# Patient Record
Sex: FEMALE | Race: ASIAN | NOT HISPANIC OR LATINO | Employment: UNEMPLOYED | URBAN - METROPOLITAN AREA
[De-identification: names, ages, dates, MRNs, and addresses within clinical notes are randomized per-mention and may not be internally consistent; named-entity substitution may affect disease eponyms.]

---

## 2017-01-05 ENCOUNTER — COMMUNICATION - HEALTHEAST (OUTPATIENT)
Dept: FAMILY MEDICINE | Facility: CLINIC | Age: 77
End: 2017-01-05

## 2017-01-05 DIAGNOSIS — M54.50 LOW BACK PAIN: ICD-10-CM

## 2017-01-17 ENCOUNTER — RECORDS - HEALTHEAST (OUTPATIENT)
Dept: ADMINISTRATIVE | Facility: OTHER | Age: 77
End: 2017-01-17

## 2017-01-17 ENCOUNTER — AMBULATORY - HEALTHEAST (OUTPATIENT)
Dept: FAMILY MEDICINE | Facility: CLINIC | Age: 77
End: 2017-01-17

## 2017-01-17 ENCOUNTER — OFFICE VISIT - HEALTHEAST (OUTPATIENT)
Dept: FAMILY MEDICINE | Facility: CLINIC | Age: 77
End: 2017-01-17

## 2017-01-17 DIAGNOSIS — G89.29 OTHER CHRONIC PAIN: ICD-10-CM

## 2017-01-17 DIAGNOSIS — K59.00 CONSTIPATION: ICD-10-CM

## 2017-01-17 DIAGNOSIS — E55.9 VITAMIN D DEFICIENCY: ICD-10-CM

## 2017-01-17 ASSESSMENT — MIFFLIN-ST. JEOR: SCORE: 942.63

## 2017-01-28 ENCOUNTER — COMMUNICATION - HEALTHEAST (OUTPATIENT)
Dept: PALLIATIVE MEDICINE | Facility: OTHER | Age: 77
End: 2017-01-28

## 2017-01-28 DIAGNOSIS — G89.4 CHRONIC PAIN SYNDROME: ICD-10-CM

## 2017-02-01 ENCOUNTER — HOSPITAL ENCOUNTER (OUTPATIENT)
Dept: PALLIATIVE MEDICINE | Facility: OTHER | Age: 77
Discharge: HOME | End: 2017-02-01
Attending: PHYSICIAN ASSISTANT

## 2017-02-01 ENCOUNTER — COMMUNICATION - HEALTHEAST (OUTPATIENT)
Dept: FAMILY MEDICINE | Facility: CLINIC | Age: 77
End: 2017-02-01

## 2017-02-01 DIAGNOSIS — M53.3 SACROILIAC JOINT PAIN: ICD-10-CM

## 2017-02-01 DIAGNOSIS — M79.602 BILATERAL ARM PAIN: ICD-10-CM

## 2017-02-01 DIAGNOSIS — K59.03 DRUG-INDUCED CONSTIPATION: ICD-10-CM

## 2017-02-01 DIAGNOSIS — G89.4 CHRONIC PAIN SYNDROME: ICD-10-CM

## 2017-02-01 DIAGNOSIS — M79.601 BILATERAL ARM PAIN: ICD-10-CM

## 2017-02-01 ASSESSMENT — MIFFLIN-ST. JEOR: SCORE: 942.63

## 2017-02-17 ENCOUNTER — COMMUNICATION - HEALTHEAST (OUTPATIENT)
Dept: PALLIATIVE MEDICINE | Facility: OTHER | Age: 77
End: 2017-02-17

## 2017-02-20 ENCOUNTER — HOSPITAL ENCOUNTER (OUTPATIENT)
Dept: PALLIATIVE MEDICINE | Facility: OTHER | Age: 77
Discharge: HOME | End: 2017-02-20
Attending: PHYSICIAN ASSISTANT

## 2017-02-20 DIAGNOSIS — G89.29 CHRONIC LEFT SI JOINT PAIN: ICD-10-CM

## 2017-02-20 DIAGNOSIS — M53.3 CHRONIC LEFT SI JOINT PAIN: ICD-10-CM

## 2017-02-20 ASSESSMENT — MIFFLIN-ST. JEOR: SCORE: 942.63

## 2017-02-21 ENCOUNTER — COMMUNICATION - HEALTHEAST (OUTPATIENT)
Dept: PALLIATIVE MEDICINE | Facility: OTHER | Age: 77
End: 2017-02-21

## 2017-02-22 ENCOUNTER — COMMUNICATION - HEALTHEAST (OUTPATIENT)
Dept: FAMILY MEDICINE | Facility: CLINIC | Age: 77
End: 2017-02-22

## 2017-02-22 DIAGNOSIS — K21.9 ESOPHAGEAL REFLUX: ICD-10-CM

## 2017-03-01 ENCOUNTER — COMMUNICATION - HEALTHEAST (OUTPATIENT)
Dept: PALLIATIVE MEDICINE | Facility: OTHER | Age: 77
End: 2017-03-01

## 2017-03-01 DIAGNOSIS — G89.4 CHRONIC PAIN SYNDROME: ICD-10-CM

## 2017-03-07 ENCOUNTER — COMMUNICATION - HEALTHEAST (OUTPATIENT)
Dept: NURSING | Facility: CLINIC | Age: 77
End: 2017-03-07

## 2017-03-08 ENCOUNTER — COMMUNICATION - HEALTHEAST (OUTPATIENT)
Dept: PALLIATIVE MEDICINE | Facility: OTHER | Age: 77
End: 2017-03-08

## 2017-03-08 ENCOUNTER — HOSPITAL ENCOUNTER (OUTPATIENT)
Dept: PALLIATIVE MEDICINE | Facility: OTHER | Age: 77
Discharge: HOME | End: 2017-03-08
Attending: PHYSICIAN ASSISTANT

## 2017-03-08 DIAGNOSIS — M71.9 DISORDER OF BURSAE AND TENDONS IN SHOULDER REGION: ICD-10-CM

## 2017-03-08 DIAGNOSIS — G89.4 CHRONIC PAIN SYNDROME: ICD-10-CM

## 2017-03-08 DIAGNOSIS — M67.919 DISORDER OF BURSAE AND TENDONS IN SHOULDER REGION: ICD-10-CM

## 2017-03-08 DIAGNOSIS — M25.562 CHRONIC PAIN OF BOTH KNEES: ICD-10-CM

## 2017-03-08 DIAGNOSIS — M54.50 LOW BACK PAIN: ICD-10-CM

## 2017-03-08 DIAGNOSIS — G89.29 CHRONIC PAIN OF BOTH KNEES: ICD-10-CM

## 2017-03-08 DIAGNOSIS — M25.561 CHRONIC PAIN OF BOTH KNEES: ICD-10-CM

## 2017-03-08 ASSESSMENT — MIFFLIN-ST. JEOR: SCORE: 942.63

## 2017-03-13 ENCOUNTER — COMMUNICATION - HEALTHEAST (OUTPATIENT)
Dept: PALLIATIVE MEDICINE | Facility: OTHER | Age: 77
End: 2017-03-13

## 2017-03-14 ENCOUNTER — HOSPITAL ENCOUNTER (OUTPATIENT)
Dept: PALLIATIVE MEDICINE | Facility: OTHER | Age: 77
Discharge: HOME | End: 2017-03-14
Attending: PAIN MEDICINE

## 2017-03-14 DIAGNOSIS — M79.18 MYOFASCIAL PAIN: ICD-10-CM

## 2017-03-14 ASSESSMENT — MIFFLIN-ST. JEOR: SCORE: 942.63

## 2017-03-15 ENCOUNTER — COMMUNICATION - HEALTHEAST (OUTPATIENT)
Dept: FAMILY MEDICINE | Facility: CLINIC | Age: 77
End: 2017-03-15

## 2017-03-15 ENCOUNTER — COMMUNICATION - HEALTHEAST (OUTPATIENT)
Dept: PALLIATIVE MEDICINE | Facility: OTHER | Age: 77
End: 2017-03-15

## 2017-03-15 DIAGNOSIS — I10 HTN (HYPERTENSION): ICD-10-CM

## 2017-03-20 ENCOUNTER — OFFICE VISIT - HEALTHEAST (OUTPATIENT)
Dept: FAMILY MEDICINE | Facility: CLINIC | Age: 77
End: 2017-03-20

## 2017-03-20 DIAGNOSIS — Z79.899 POLYPHARMACY: ICD-10-CM

## 2017-03-20 DIAGNOSIS — E55.9 VITAMIN D DEFICIENCY: ICD-10-CM

## 2017-03-20 DIAGNOSIS — I10 HTN (HYPERTENSION): ICD-10-CM

## 2017-03-20 DIAGNOSIS — G89.4 CHRONIC PAIN SYNDROME: ICD-10-CM

## 2017-03-20 ASSESSMENT — MIFFLIN-ST. JEOR: SCORE: 954.87

## 2017-03-21 ENCOUNTER — RECORDS - HEALTHEAST (OUTPATIENT)
Dept: ADMINISTRATIVE | Facility: OTHER | Age: 77
End: 2017-03-21

## 2017-03-28 ENCOUNTER — COMMUNICATION - HEALTHEAST (OUTPATIENT)
Dept: FAMILY MEDICINE | Facility: CLINIC | Age: 77
End: 2017-03-28

## 2017-03-30 ENCOUNTER — OFFICE VISIT - HEALTHEAST (OUTPATIENT)
Dept: FAMILY MEDICINE | Facility: CLINIC | Age: 77
End: 2017-03-30

## 2017-03-30 DIAGNOSIS — I10 HTN (HYPERTENSION): ICD-10-CM

## 2017-03-30 DIAGNOSIS — M81.0 OSTEOPOROSIS: ICD-10-CM

## 2017-03-30 DIAGNOSIS — G89.4 CHRONIC PAIN SYNDROME: ICD-10-CM

## 2017-03-30 DIAGNOSIS — Z79.899 POLYPHARMACY: ICD-10-CM

## 2017-03-30 ASSESSMENT — MIFFLIN-ST. JEOR: SCORE: 950.9

## 2017-04-06 ENCOUNTER — OFFICE VISIT - HEALTHEAST (OUTPATIENT)
Dept: NURSING | Facility: CLINIC | Age: 77
End: 2017-04-06

## 2017-04-06 DIAGNOSIS — M35.3 PMR (POLYMYALGIA RHEUMATICA) (H): ICD-10-CM

## 2017-04-06 DIAGNOSIS — G89.29 OTHER CHRONIC PAIN: ICD-10-CM

## 2017-04-06 DIAGNOSIS — Z79.52 CURRENT CHRONIC USE OF SYSTEMIC STEROIDS: ICD-10-CM

## 2017-04-06 DIAGNOSIS — I10 ESSENTIAL HYPERTENSION WITH GOAL BLOOD PRESSURE LESS THAN 140/90: ICD-10-CM

## 2017-04-06 DIAGNOSIS — Z79.899 POLYPHARMACY: ICD-10-CM

## 2017-04-19 ENCOUNTER — COMMUNICATION - HEALTHEAST (OUTPATIENT)
Dept: FAMILY MEDICINE | Facility: CLINIC | Age: 77
End: 2017-04-19

## 2017-04-19 DIAGNOSIS — G89.4 CHRONIC PAIN SYNDROME: ICD-10-CM

## 2017-04-20 ENCOUNTER — COMMUNICATION - HEALTHEAST (OUTPATIENT)
Dept: NURSING | Facility: CLINIC | Age: 77
End: 2017-04-20

## 2017-04-24 ENCOUNTER — RECORDS - HEALTHEAST (OUTPATIENT)
Dept: ADMINISTRATIVE | Facility: OTHER | Age: 77
End: 2017-04-24

## 2017-04-27 ENCOUNTER — OFFICE VISIT - HEALTHEAST (OUTPATIENT)
Dept: FAMILY MEDICINE | Facility: CLINIC | Age: 77
End: 2017-04-27

## 2017-04-27 DIAGNOSIS — M48.00 SPINAL STENOSIS, MULTILEVEL: ICD-10-CM

## 2017-04-27 DIAGNOSIS — M81.0 OSTEOPOROSIS: ICD-10-CM

## 2017-04-27 DIAGNOSIS — M50.30 DEGENERATIVE DISC DISEASE, CERVICAL: ICD-10-CM

## 2017-04-27 DIAGNOSIS — M47.819 SPONDYLARTHRITIS: ICD-10-CM

## 2017-04-27 DIAGNOSIS — I10 HTN (HYPERTENSION): ICD-10-CM

## 2017-04-27 DIAGNOSIS — Z79.899 POLYPHARMACY: ICD-10-CM

## 2017-04-27 DIAGNOSIS — M50.30 DISC DISEASE, DEGENERATIVE, CERVICAL: ICD-10-CM

## 2017-04-27 DIAGNOSIS — G89.4 CHRONIC PAIN SYNDROME: ICD-10-CM

## 2017-04-27 DIAGNOSIS — E55.9 VITAMIN D DEFICIENCY: ICD-10-CM

## 2017-04-27 ASSESSMENT — MIFFLIN-ST. JEOR: SCORE: 953.68

## 2017-05-02 ENCOUNTER — HOSPITAL ENCOUNTER (OUTPATIENT)
Dept: PALLIATIVE MEDICINE | Facility: OTHER | Age: 77
Discharge: HOME | End: 2017-05-02
Attending: PHYSICIAN ASSISTANT

## 2017-05-02 DIAGNOSIS — M54.50 LOW BACK PAIN: ICD-10-CM

## 2017-05-02 DIAGNOSIS — G89.4 CHRONIC PAIN SYNDROME: ICD-10-CM

## 2017-05-02 DIAGNOSIS — M35.3 PMR (POLYMYALGIA RHEUMATICA) (H): ICD-10-CM

## 2017-05-02 DIAGNOSIS — F11.20 OPIOID DEPENDENCE, EPISODIC (H): ICD-10-CM

## 2017-05-02 ASSESSMENT — MIFFLIN-ST. JEOR: SCORE: 948.29

## 2017-05-04 ENCOUNTER — RECORDS - HEALTHEAST (OUTPATIENT)
Dept: BONE DENSITY | Facility: CLINIC | Age: 77
End: 2017-05-04

## 2017-05-04 ENCOUNTER — COMMUNICATION - HEALTHEAST (OUTPATIENT)
Dept: PALLIATIVE MEDICINE | Facility: OTHER | Age: 77
End: 2017-05-04

## 2017-05-04 ENCOUNTER — RECORDS - HEALTHEAST (OUTPATIENT)
Dept: ADMINISTRATIVE | Facility: OTHER | Age: 77
End: 2017-05-04

## 2017-05-04 DIAGNOSIS — M81.0 AGE-RELATED OSTEOPOROSIS WITHOUT CURRENT PATHOLOGICAL FRACTURE: ICD-10-CM

## 2017-05-08 ENCOUNTER — HOSPITAL ENCOUNTER (OUTPATIENT)
Dept: PALLIATIVE MEDICINE | Facility: OTHER | Age: 77
Discharge: HOME | End: 2017-05-08
Attending: PAIN MEDICINE

## 2017-05-08 ENCOUNTER — RECORDS - HEALTHEAST (OUTPATIENT)
Dept: ADMINISTRATIVE | Facility: OTHER | Age: 77
End: 2017-05-08

## 2017-05-08 DIAGNOSIS — M79.18 MYOFASCIAL PAIN: ICD-10-CM

## 2017-05-08 ASSESSMENT — MIFFLIN-ST. JEOR: SCORE: 948.29

## 2017-05-16 ENCOUNTER — COMMUNICATION - HEALTHEAST (OUTPATIENT)
Dept: FAMILY MEDICINE | Facility: CLINIC | Age: 77
End: 2017-05-16

## 2017-06-01 ENCOUNTER — OFFICE VISIT - HEALTHEAST (OUTPATIENT)
Dept: FAMILY MEDICINE | Facility: CLINIC | Age: 77
End: 2017-06-01

## 2017-06-01 DIAGNOSIS — G89.4 CHRONIC PAIN SYNDROME: ICD-10-CM

## 2017-06-01 DIAGNOSIS — E55.9 VITAMIN D DEFICIENCY: ICD-10-CM

## 2017-06-01 DIAGNOSIS — I10 ESSENTIAL HYPERTENSION WITH GOAL BLOOD PRESSURE LESS THAN 140/90: ICD-10-CM

## 2017-06-01 DIAGNOSIS — M35.3 PMR (POLYMYALGIA RHEUMATICA) (H): ICD-10-CM

## 2017-06-01 DIAGNOSIS — Z79.899 MEDICATION DOSE DECREASED: ICD-10-CM

## 2017-06-01 DIAGNOSIS — Z09 HOSPITAL DISCHARGE FOLLOW-UP: ICD-10-CM

## 2017-06-01 DIAGNOSIS — I10 HTN (HYPERTENSION): ICD-10-CM

## 2017-06-01 DIAGNOSIS — Z79.899 POLYPHARMACY: ICD-10-CM

## 2017-06-01 DIAGNOSIS — D64.9 ANEMIA: ICD-10-CM

## 2017-06-01 ASSESSMENT — MIFFLIN-ST. JEOR: SCORE: 947.16

## 2017-06-05 ENCOUNTER — AMBULATORY - HEALTHEAST (OUTPATIENT)
Dept: FAMILY MEDICINE | Facility: CLINIC | Age: 77
End: 2017-06-05

## 2017-06-05 DIAGNOSIS — E55.9 VITAMIN D DEFICIENCY: ICD-10-CM

## 2017-06-28 ENCOUNTER — HOSPITAL ENCOUNTER (OUTPATIENT)
Dept: PALLIATIVE MEDICINE | Facility: OTHER | Age: 77
Discharge: HOME | End: 2017-06-28
Attending: PHYSICIAN ASSISTANT

## 2017-06-28 DIAGNOSIS — G89.4 CHRONIC PAIN SYNDROME: ICD-10-CM

## 2017-06-28 DIAGNOSIS — M35.3 POLYMYALGIA RHEUMATICA (H): ICD-10-CM

## 2017-06-28 DIAGNOSIS — F11.20 OPIOID DEPENDENCE, EPISODIC (H): ICD-10-CM

## 2017-06-28 DIAGNOSIS — M51.369 DEGENERATION OF LUMBAR INTERVERTEBRAL DISC: ICD-10-CM

## 2017-06-28 ASSESSMENT — MIFFLIN-ST. JEOR: SCORE: 944.89

## 2017-07-13 ENCOUNTER — OFFICE VISIT - HEALTHEAST (OUTPATIENT)
Dept: FAMILY MEDICINE | Facility: CLINIC | Age: 77
End: 2017-07-13

## 2017-07-13 DIAGNOSIS — M35.3 PMR (POLYMYALGIA RHEUMATICA) (H): ICD-10-CM

## 2017-07-13 DIAGNOSIS — M81.0 OSTEOPOROSIS: ICD-10-CM

## 2017-07-13 DIAGNOSIS — G89.4 CHRONIC PAIN SYNDROME: ICD-10-CM

## 2017-07-13 DIAGNOSIS — B36.9 FUNGAL SKIN INFECTION: ICD-10-CM

## 2017-07-13 DIAGNOSIS — I10 HTN (HYPERTENSION): ICD-10-CM

## 2017-07-13 DIAGNOSIS — E55.9 VITAMIN D DEFICIENCY: ICD-10-CM

## 2017-07-13 DIAGNOSIS — I10 ESSENTIAL HYPERTENSION WITH GOAL BLOOD PRESSURE LESS THAN 140/90: ICD-10-CM

## 2017-07-13 ASSESSMENT — MIFFLIN-ST. JEOR: SCORE: 955.44

## 2017-07-17 ENCOUNTER — RECORDS - HEALTHEAST (OUTPATIENT)
Dept: ADMINISTRATIVE | Facility: OTHER | Age: 77
End: 2017-07-17

## 2017-08-08 ENCOUNTER — OFFICE VISIT - HEALTHEAST (OUTPATIENT)
Dept: FAMILY MEDICINE | Facility: CLINIC | Age: 77
End: 2017-08-08

## 2017-08-08 DIAGNOSIS — E55.9 VITAMIN D DEFICIENCY: ICD-10-CM

## 2017-08-08 DIAGNOSIS — Z79.899 POLYPHARMACY: ICD-10-CM

## 2017-08-08 DIAGNOSIS — G89.4 CHRONIC PAIN SYNDROME: ICD-10-CM

## 2017-08-08 DIAGNOSIS — B36.9 FUNGAL SKIN INFECTION: ICD-10-CM

## 2017-08-08 DIAGNOSIS — G47.30 BREATHING-RELATED SLEEP DISORDER: ICD-10-CM

## 2017-08-08 DIAGNOSIS — M81.0 OSTEOPOROSIS: ICD-10-CM

## 2017-08-08 DIAGNOSIS — M25.511 SHOULDER PAIN, RIGHT: ICD-10-CM

## 2017-08-08 DIAGNOSIS — I10 HTN (HYPERTENSION): ICD-10-CM

## 2017-08-08 ASSESSMENT — MIFFLIN-ST. JEOR: SCORE: 954.87

## 2017-08-14 ENCOUNTER — HOSPITAL ENCOUNTER (OUTPATIENT)
Dept: PALLIATIVE MEDICINE | Facility: OTHER | Age: 77
Discharge: HOME | End: 2017-08-14
Attending: PAIN MEDICINE

## 2017-08-14 DIAGNOSIS — M25.511 SHOULDER PAIN, BILATERAL: ICD-10-CM

## 2017-08-14 DIAGNOSIS — M79.18 MYOFASCIAL PAIN: ICD-10-CM

## 2017-08-14 DIAGNOSIS — M25.512 SHOULDER PAIN, BILATERAL: ICD-10-CM

## 2017-08-14 ASSESSMENT — MIFFLIN-ST. JEOR: SCORE: 951.7

## 2017-08-15 ENCOUNTER — COMMUNICATION - HEALTHEAST (OUTPATIENT)
Dept: PALLIATIVE MEDICINE | Facility: OTHER | Age: 77
End: 2017-08-15

## 2017-08-16 ENCOUNTER — COMMUNICATION - HEALTHEAST (OUTPATIENT)
Dept: FAMILY MEDICINE | Facility: CLINIC | Age: 77
End: 2017-08-16

## 2017-08-16 DIAGNOSIS — K21.00 REFLUX ESOPHAGITIS: ICD-10-CM

## 2017-08-23 ENCOUNTER — COMMUNICATION - HEALTHEAST (OUTPATIENT)
Dept: FAMILY MEDICINE | Facility: CLINIC | Age: 77
End: 2017-08-23

## 2017-09-19 ENCOUNTER — RECORDS - HEALTHEAST (OUTPATIENT)
Dept: ADMINISTRATIVE | Facility: OTHER | Age: 77
End: 2017-09-19

## 2017-09-19 ENCOUNTER — OFFICE VISIT - HEALTHEAST (OUTPATIENT)
Dept: FAMILY MEDICINE | Facility: CLINIC | Age: 77
End: 2017-09-19

## 2017-09-19 ENCOUNTER — RECORDS - HEALTHEAST (OUTPATIENT)
Dept: GENERAL RADIOLOGY | Facility: CLINIC | Age: 77
End: 2017-09-19

## 2017-09-19 ENCOUNTER — COMMUNICATION - HEALTHEAST (OUTPATIENT)
Dept: FAMILY MEDICINE | Facility: CLINIC | Age: 77
End: 2017-09-19

## 2017-09-19 DIAGNOSIS — M25.511 SHOULDER PAIN, RIGHT: ICD-10-CM

## 2017-09-19 DIAGNOSIS — R06.09 DOE (DYSPNEA ON EXERTION): ICD-10-CM

## 2017-09-19 DIAGNOSIS — G47.01 INSOMNIA DUE TO MEDICAL CONDITION: ICD-10-CM

## 2017-09-19 DIAGNOSIS — R06.09 OTHER FORMS OF DYSPNEA: ICD-10-CM

## 2017-09-19 DIAGNOSIS — R06.02 SHORTNESS OF BREATH: ICD-10-CM

## 2017-09-19 DIAGNOSIS — R09.89 OTHER SPECIFIED SYMPTOMS AND SIGNS INVOLVING THE CIRCULATORY AND RESPIRATORY SYSTEMS: ICD-10-CM

## 2017-09-19 DIAGNOSIS — R06.02 SOB (SHORTNESS OF BREATH): ICD-10-CM

## 2017-09-19 DIAGNOSIS — Z23 NEED FOR IMMUNIZATION AGAINST INFLUENZA: ICD-10-CM

## 2017-09-19 DIAGNOSIS — R09.89 BIBASILAR CRACKLES: ICD-10-CM

## 2017-09-19 DIAGNOSIS — I10 HTN (HYPERTENSION): ICD-10-CM

## 2017-09-19 ASSESSMENT — MIFFLIN-ST. JEOR: SCORE: 931.74

## 2017-09-21 ENCOUNTER — COMMUNICATION - HEALTHEAST (OUTPATIENT)
Dept: FAMILY MEDICINE | Facility: CLINIC | Age: 77
End: 2017-09-21

## 2017-09-21 DIAGNOSIS — I10 HTN (HYPERTENSION): ICD-10-CM

## 2017-09-21 DIAGNOSIS — K59.00 CONSTIPATION: ICD-10-CM

## 2017-10-13 ENCOUNTER — COMMUNICATION - HEALTHEAST (OUTPATIENT)
Dept: FAMILY MEDICINE | Facility: CLINIC | Age: 77
End: 2017-10-13

## 2017-10-16 ENCOUNTER — OFFICE VISIT - HEALTHEAST (OUTPATIENT)
Dept: FAMILY MEDICINE | Facility: CLINIC | Age: 77
End: 2017-10-16

## 2017-10-16 DIAGNOSIS — I10 HTN (HYPERTENSION): ICD-10-CM

## 2017-10-16 DIAGNOSIS — R06.02 SOB (SHORTNESS OF BREATH): ICD-10-CM

## 2017-10-16 DIAGNOSIS — A04.72 C. DIFFICILE DIARRHEA: ICD-10-CM

## 2017-10-16 DIAGNOSIS — Z09 HOSPITAL DISCHARGE FOLLOW-UP: ICD-10-CM

## 2017-10-16 DIAGNOSIS — M25.511 SHOULDER PAIN, RIGHT: ICD-10-CM

## 2017-10-16 DIAGNOSIS — J18.9 CAP (COMMUNITY ACQUIRED PNEUMONIA): ICD-10-CM

## 2017-10-16 ASSESSMENT — MIFFLIN-ST. JEOR: SCORE: 939

## 2017-10-19 ENCOUNTER — COMMUNICATION - HEALTHEAST (OUTPATIENT)
Dept: NURSING | Facility: CLINIC | Age: 77
End: 2017-10-19

## 2017-10-19 ENCOUNTER — AMBULATORY - HEALTHEAST (OUTPATIENT)
Dept: FAMILY MEDICINE | Facility: CLINIC | Age: 77
End: 2017-10-19

## 2017-10-19 ENCOUNTER — COMMUNICATION - HEALTHEAST (OUTPATIENT)
Dept: FAMILY MEDICINE | Facility: CLINIC | Age: 77
End: 2017-10-19

## 2017-10-19 DIAGNOSIS — L29.9 ITCHING: ICD-10-CM

## 2017-10-23 ENCOUNTER — COMMUNICATION - HEALTHEAST (OUTPATIENT)
Dept: FAMILY MEDICINE | Facility: CLINIC | Age: 77
End: 2017-10-23

## 2017-10-23 DIAGNOSIS — G89.4 CHRONIC PAIN SYNDROME: ICD-10-CM

## 2017-10-25 ENCOUNTER — AMBULATORY - HEALTHEAST (OUTPATIENT)
Dept: CARE COORDINATION | Facility: CLINIC | Age: 77
End: 2017-10-25

## 2017-11-01 ENCOUNTER — OFFICE VISIT - HEALTHEAST (OUTPATIENT)
Dept: PULMONOLOGY | Facility: OTHER | Age: 77
End: 2017-11-01

## 2017-11-01 DIAGNOSIS — J98.4 RESTRICTIVE LUNG DISEASE: ICD-10-CM

## 2017-11-09 ENCOUNTER — OFFICE VISIT - HEALTHEAST (OUTPATIENT)
Dept: FAMILY MEDICINE | Facility: CLINIC | Age: 77
End: 2017-11-09

## 2017-11-09 DIAGNOSIS — I10 HTN (HYPERTENSION): ICD-10-CM

## 2017-11-09 DIAGNOSIS — Z79.899 POLYPHARMACY: ICD-10-CM

## 2017-11-09 DIAGNOSIS — J98.4 RESTRICTIVE LUNG DISEASE: ICD-10-CM

## 2017-11-09 DIAGNOSIS — A04.72 C. DIFFICILE DIARRHEA: ICD-10-CM

## 2017-11-09 DIAGNOSIS — G89.4 CHRONIC PAIN SYNDROME: ICD-10-CM

## 2017-11-09 DIAGNOSIS — Z29.89 NEED FOR MALARIA PROPHYLAXIS: ICD-10-CM

## 2017-11-09 DIAGNOSIS — Z71.84 TRAVEL ADVICE ENCOUNTER: ICD-10-CM

## 2017-11-09 DIAGNOSIS — M25.511 SHOULDER PAIN, RIGHT: ICD-10-CM

## 2017-11-09 ASSESSMENT — MIFFLIN-ST. JEOR: SCORE: 951.7

## 2017-11-20 ENCOUNTER — COMMUNICATION - HEALTHEAST (OUTPATIENT)
Dept: FAMILY MEDICINE | Facility: CLINIC | Age: 77
End: 2017-11-20

## 2017-11-20 ENCOUNTER — RECORDS - HEALTHEAST (OUTPATIENT)
Dept: ADMINISTRATIVE | Facility: OTHER | Age: 77
End: 2017-11-20

## 2017-11-20 DIAGNOSIS — E55.9 VITAMIN D DEFICIENCY: ICD-10-CM

## 2017-12-27 ENCOUNTER — COMMUNICATION - HEALTHEAST (OUTPATIENT)
Dept: FAMILY MEDICINE | Facility: CLINIC | Age: 77
End: 2017-12-27

## 2017-12-27 DIAGNOSIS — G89.29 OTHER CHRONIC PAIN: ICD-10-CM

## 2018-01-11 ENCOUNTER — OFFICE VISIT - HEALTHEAST (OUTPATIENT)
Dept: FAMILY MEDICINE | Facility: CLINIC | Age: 78
End: 2018-01-11

## 2018-01-11 DIAGNOSIS — E55.9 VITAMIN D DEFICIENCY: ICD-10-CM

## 2018-01-11 DIAGNOSIS — G89.4 CHRONIC PAIN SYNDROME: ICD-10-CM

## 2018-01-11 DIAGNOSIS — J98.4 RESTRICTIVE LUNG DISEASE: ICD-10-CM

## 2018-01-11 DIAGNOSIS — M81.0 OSTEOPOROSIS: ICD-10-CM

## 2018-01-11 DIAGNOSIS — I10 HTN (HYPERTENSION): ICD-10-CM

## 2018-01-11 DIAGNOSIS — Z79.899 POLYPHARMACY: ICD-10-CM

## 2018-01-11 DIAGNOSIS — M25.511 SHOULDER PAIN, RIGHT: ICD-10-CM

## 2018-01-11 DIAGNOSIS — R35.0 FREQUENT URINATION: ICD-10-CM

## 2018-01-11 LAB
ALBUMIN UR-MCNC: NEGATIVE MG/DL
APPEARANCE UR: CLEAR
BILIRUB UR QL STRIP: NEGATIVE
COLOR UR AUTO: YELLOW
GLUCOSE UR STRIP-MCNC: NEGATIVE MG/DL
HGB UR QL STRIP: NEGATIVE
KETONES UR STRIP-MCNC: NEGATIVE MG/DL
LEUKOCYTE ESTERASE UR QL STRIP: NEGATIVE
NITRATE UR QL: NEGATIVE
PH UR STRIP: 5 [PH] (ref 5–8)
SP GR UR STRIP: 1.02 (ref 1–1.03)
UROBILINOGEN UR STRIP-ACNC: NORMAL

## 2018-01-11 ASSESSMENT — MIFFLIN-ST. JEOR: SCORE: 942.63

## 2018-01-12 ENCOUNTER — COMMUNICATION - HEALTHEAST (OUTPATIENT)
Dept: NURSING | Facility: CLINIC | Age: 78
End: 2018-01-12

## 2018-02-13 ENCOUNTER — OFFICE VISIT - HEALTHEAST (OUTPATIENT)
Dept: FAMILY MEDICINE | Facility: CLINIC | Age: 78
End: 2018-02-13

## 2018-02-13 DIAGNOSIS — M81.0 OSTEOPOROSIS: ICD-10-CM

## 2018-02-13 DIAGNOSIS — Z79.899 POLYPHARMACY: ICD-10-CM

## 2018-02-13 DIAGNOSIS — E55.9 VITAMIN D DEFICIENCY: ICD-10-CM

## 2018-02-13 DIAGNOSIS — G89.4 CHRONIC PAIN SYNDROME: ICD-10-CM

## 2018-02-13 DIAGNOSIS — I10 HTN (HYPERTENSION): ICD-10-CM

## 2018-02-13 DIAGNOSIS — J98.4 RESTRICTIVE LUNG DISEASE: ICD-10-CM

## 2018-02-13 DIAGNOSIS — M25.511 SHOULDER PAIN, RIGHT: ICD-10-CM

## 2018-02-13 ASSESSMENT — MIFFLIN-ST. JEOR: SCORE: 940.64

## 2018-02-21 ENCOUNTER — COMMUNICATION - HEALTHEAST (OUTPATIENT)
Dept: FAMILY MEDICINE | Facility: CLINIC | Age: 78
End: 2018-02-21

## 2018-02-21 DIAGNOSIS — G89.4 CHRONIC PAIN SYNDROME: ICD-10-CM

## 2018-03-12 ENCOUNTER — OFFICE VISIT - HEALTHEAST (OUTPATIENT)
Dept: FAMILY MEDICINE | Facility: CLINIC | Age: 78
End: 2018-03-12

## 2018-03-12 DIAGNOSIS — M81.0 OSTEOPOROSIS: ICD-10-CM

## 2018-03-12 DIAGNOSIS — J98.4 RESTRICTIVE LUNG DISEASE: ICD-10-CM

## 2018-03-12 DIAGNOSIS — K59.00 CONSTIPATION: ICD-10-CM

## 2018-03-12 DIAGNOSIS — Z79.899 POLYPHARMACY: ICD-10-CM

## 2018-03-12 DIAGNOSIS — I10 HTN (HYPERTENSION): ICD-10-CM

## 2018-03-12 DIAGNOSIS — E55.9 VITAMIN D DEFICIENCY: ICD-10-CM

## 2018-03-12 DIAGNOSIS — G89.4 CHRONIC PAIN SYNDROME: ICD-10-CM

## 2018-03-12 ASSESSMENT — MIFFLIN-ST. JEOR: SCORE: 944.04

## 2018-03-18 ENCOUNTER — COMMUNICATION - HEALTHEAST (OUTPATIENT)
Dept: FAMILY MEDICINE | Facility: CLINIC | Age: 78
End: 2018-03-18

## 2018-03-18 DIAGNOSIS — I10 HTN (HYPERTENSION): ICD-10-CM

## 2018-03-24 ENCOUNTER — COMMUNICATION - HEALTHEAST (OUTPATIENT)
Dept: FAMILY MEDICINE | Facility: CLINIC | Age: 78
End: 2018-03-24

## 2018-03-24 DIAGNOSIS — G89.4 CHRONIC PAIN SYNDROME: ICD-10-CM

## 2018-04-10 ENCOUNTER — OFFICE VISIT - HEALTHEAST (OUTPATIENT)
Dept: FAMILY MEDICINE | Facility: CLINIC | Age: 78
End: 2018-04-10

## 2018-04-10 DIAGNOSIS — K59.00 CONSTIPATION: ICD-10-CM

## 2018-04-10 DIAGNOSIS — D64.9 ANEMIA: ICD-10-CM

## 2018-04-10 DIAGNOSIS — E55.9 VITAMIN D DEFICIENCY: ICD-10-CM

## 2018-04-10 DIAGNOSIS — E87.6 LOW SERUM POTASSIUM: ICD-10-CM

## 2018-04-10 DIAGNOSIS — R79.0 LOW MAGNESIUM LEVEL: ICD-10-CM

## 2018-04-10 DIAGNOSIS — G89.4 CHRONIC PAIN SYNDROME: ICD-10-CM

## 2018-04-10 DIAGNOSIS — M54.9 BACK PAIN: ICD-10-CM

## 2018-04-10 DIAGNOSIS — I10 HTN (HYPERTENSION): ICD-10-CM

## 2018-04-10 DIAGNOSIS — M81.0 OSTEOPOROSIS: ICD-10-CM

## 2018-04-10 DIAGNOSIS — M25.511 SHOULDER PAIN, RIGHT: ICD-10-CM

## 2018-04-10 DIAGNOSIS — M25.569 KNEE PAIN: ICD-10-CM

## 2018-04-10 DIAGNOSIS — E83.39 LOW BLOOD PHOSPHATE: ICD-10-CM

## 2018-04-10 DIAGNOSIS — Z79.899 POLYPHARMACY: ICD-10-CM

## 2018-04-10 DIAGNOSIS — J98.4 RESTRICTIVE LUNG DISEASE: ICD-10-CM

## 2018-04-10 LAB
HGB BLD-MCNC: 11.5 G/DL (ref 12–16)
MAGNESIUM SERPL-MCNC: 2.3 MG/DL (ref 1.8–2.6)
PHOSPHATE SERPL-MCNC: 3.6 MG/DL (ref 2.5–4.5)
POTASSIUM BLD-SCNC: 5 MMOL/L (ref 3.5–5)

## 2018-04-10 ASSESSMENT — MIFFLIN-ST. JEOR: SCORE: 944.44

## 2018-04-11 LAB — 25(OH)D3 SERPL-MCNC: 23.4 NG/ML (ref 30–80)

## 2018-04-16 ENCOUNTER — COMMUNICATION - HEALTHEAST (OUTPATIENT)
Dept: FAMILY MEDICINE | Facility: CLINIC | Age: 78
End: 2018-04-16

## 2018-04-16 DIAGNOSIS — K59.03 DRUG-INDUCED CONSTIPATION: ICD-10-CM

## 2018-04-27 ENCOUNTER — COMMUNICATION - HEALTHEAST (OUTPATIENT)
Dept: FAMILY MEDICINE | Facility: CLINIC | Age: 78
End: 2018-04-27

## 2018-05-08 ENCOUNTER — OFFICE VISIT - HEALTHEAST (OUTPATIENT)
Dept: FAMILY MEDICINE | Facility: CLINIC | Age: 78
End: 2018-05-08

## 2018-05-08 DIAGNOSIS — D72.10 EOSINOPHILIA: ICD-10-CM

## 2018-05-08 DIAGNOSIS — Z87.39 HISTORY OF POLYMYALGIA RHEUMATICA: ICD-10-CM

## 2018-05-08 DIAGNOSIS — I10 HTN (HYPERTENSION): ICD-10-CM

## 2018-05-08 DIAGNOSIS — E55.9 VITAMIN D DEFICIENCY: ICD-10-CM

## 2018-05-08 DIAGNOSIS — M13.0 POLYARTHRITIS: ICD-10-CM

## 2018-05-08 DIAGNOSIS — R06.02 SOB (SHORTNESS OF BREATH): ICD-10-CM

## 2018-05-08 DIAGNOSIS — Z79.899 POLYPHARMACY: ICD-10-CM

## 2018-05-08 DIAGNOSIS — M81.0 OSTEOPOROSIS: ICD-10-CM

## 2018-05-08 DIAGNOSIS — Z87.39 HISTORY OF CONNECTIVE TISSUE DISEASE: ICD-10-CM

## 2018-05-08 DIAGNOSIS — Z09 HOSPITAL DISCHARGE FOLLOW-UP: ICD-10-CM

## 2018-05-08 DIAGNOSIS — G89.4 CHRONIC PAIN SYNDROME: ICD-10-CM

## 2018-05-08 DIAGNOSIS — J98.4 RESTRICTIVE LUNG DISEASE: ICD-10-CM

## 2018-05-08 DIAGNOSIS — M54.9 BACK PAIN: ICD-10-CM

## 2018-05-08 DIAGNOSIS — K59.00 CONSTIPATION: ICD-10-CM

## 2018-05-08 ASSESSMENT — MIFFLIN-ST. JEOR: SCORE: 943.33

## 2018-05-09 ENCOUNTER — AMBULATORY - HEALTHEAST (OUTPATIENT)
Dept: FAMILY MEDICINE | Facility: CLINIC | Age: 78
End: 2018-05-09

## 2018-05-11 ENCOUNTER — COMMUNICATION - HEALTHEAST (OUTPATIENT)
Dept: FAMILY MEDICINE | Facility: CLINIC | Age: 78
End: 2018-05-11

## 2018-05-14 ENCOUNTER — COMMUNICATION - HEALTHEAST (OUTPATIENT)
Dept: PALLIATIVE MEDICINE | Facility: OTHER | Age: 78
End: 2018-05-14

## 2018-05-15 ENCOUNTER — AMBULATORY - HEALTHEAST (OUTPATIENT)
Dept: LAB | Facility: CLINIC | Age: 78
End: 2018-05-15

## 2018-05-15 ENCOUNTER — HOSPITAL ENCOUNTER (OUTPATIENT)
Dept: PALLIATIVE MEDICINE | Facility: OTHER | Age: 78
Discharge: HOME | End: 2018-05-15
Attending: PAIN MEDICINE | Admitting: PAIN MEDICINE

## 2018-05-15 DIAGNOSIS — M79.18 MYOFASCIAL PAIN: ICD-10-CM

## 2018-05-15 DIAGNOSIS — M54.16 LUMBAR RADICULOPATHY: ICD-10-CM

## 2018-05-15 DIAGNOSIS — D72.10 EOSINOPHILIA: ICD-10-CM

## 2018-05-15 ASSESSMENT — MIFFLIN-ST. JEOR: SCORE: 942.63

## 2018-05-16 ENCOUNTER — COMMUNICATION - HEALTHEAST (OUTPATIENT)
Dept: FAMILY MEDICINE | Facility: CLINIC | Age: 78
End: 2018-05-16

## 2018-05-16 ENCOUNTER — COMMUNICATION - HEALTHEAST (OUTPATIENT)
Dept: PALLIATIVE MEDICINE | Facility: OTHER | Age: 78
End: 2018-05-16

## 2018-05-16 DIAGNOSIS — M35.1 MCTD (MIXED CONNECTIVE TISSUE DISEASE) (H): ICD-10-CM

## 2018-05-16 LAB
O+P STL MICRO: NORMAL

## 2018-05-29 ENCOUNTER — AMBULATORY - HEALTHEAST (OUTPATIENT)
Dept: FAMILY MEDICINE | Facility: CLINIC | Age: 78
End: 2018-05-29

## 2018-05-29 DIAGNOSIS — R06.02 SHORT OF BREATH ON EXERTION: ICD-10-CM

## 2018-05-29 DIAGNOSIS — M35.1 MCTD (MIXED CONNECTIVE TISSUE DISEASE) (H): ICD-10-CM

## 2018-05-29 DIAGNOSIS — J98.4 RESTRICTIVE LUNG DISEASE: ICD-10-CM

## 2018-05-31 ENCOUNTER — AMBULATORY - HEALTHEAST (OUTPATIENT)
Dept: NURSING | Facility: CLINIC | Age: 78
End: 2018-05-31

## 2018-06-04 ENCOUNTER — OFFICE VISIT - HEALTHEAST (OUTPATIENT)
Dept: FAMILY MEDICINE | Facility: CLINIC | Age: 78
End: 2018-06-04

## 2018-06-04 ENCOUNTER — COMMUNICATION - HEALTHEAST (OUTPATIENT)
Dept: FAMILY MEDICINE | Facility: CLINIC | Age: 78
End: 2018-06-04

## 2018-06-04 DIAGNOSIS — M84.48XA: ICD-10-CM

## 2018-06-04 DIAGNOSIS — R06.02 SOB (SHORTNESS OF BREATH): ICD-10-CM

## 2018-06-04 DIAGNOSIS — M54.40 CHRONIC LOW BACK PAIN WITH SCIATICA, SCIATICA LATERALITY UNSPECIFIED, UNSPECIFIED BACK PAIN LATERALITY: ICD-10-CM

## 2018-06-04 DIAGNOSIS — R64 CACHEXIA (H): ICD-10-CM

## 2018-06-04 DIAGNOSIS — J98.4 RESTRICTIVE LUNG DISEASE: ICD-10-CM

## 2018-06-04 DIAGNOSIS — M54.9 BACK PAIN: ICD-10-CM

## 2018-06-04 DIAGNOSIS — R63.4 WEIGHT LOSS: ICD-10-CM

## 2018-06-04 DIAGNOSIS — G89.29 CHRONIC LOW BACK PAIN WITH SCIATICA, SCIATICA LATERALITY UNSPECIFIED, UNSPECIFIED BACK PAIN LATERALITY: ICD-10-CM

## 2018-06-04 DIAGNOSIS — M35.1 MCTD (MIXED CONNECTIVE TISSUE DISEASE) (H): ICD-10-CM

## 2018-06-04 LAB
BASOPHILS # BLD AUTO: 0.1 THOU/UL (ref 0–0.2)
BASOPHILS NFR BLD AUTO: 1 % (ref 0–2)
BNP SERPL-MCNC: 29 PG/ML (ref 0–144)
EOSINOPHIL # BLD AUTO: 0.7 THOU/UL (ref 0–0.4)
EOSINOPHIL NFR BLD AUTO: 9 % (ref 0–6)
ERYTHROCYTE [DISTWIDTH] IN BLOOD BY AUTOMATED COUNT: 14.2 % (ref 11–14.5)
HCT VFR BLD AUTO: 37.4 % (ref 35–47)
HGB BLD-MCNC: 11.7 G/DL (ref 12–16)
LYMPHOCYTES # BLD AUTO: 3.3 THOU/UL (ref 0.8–4.4)
LYMPHOCYTES NFR BLD AUTO: 40 % (ref 20–40)
MCH RBC QN AUTO: 25.2 PG (ref 27–34)
MCHC RBC AUTO-ENTMCNC: 31.4 G/DL (ref 32–36)
MCV RBC AUTO: 80 FL (ref 80–100)
MONOCYTES # BLD AUTO: 0.8 THOU/UL (ref 0–0.9)
MONOCYTES NFR BLD AUTO: 10 % (ref 2–10)
NEUTROPHILS # BLD AUTO: 3.3 THOU/UL (ref 2–7.7)
NEUTROPHILS NFR BLD AUTO: 40 % (ref 50–70)
PLATELET # BLD AUTO: 267 THOU/UL (ref 140–440)
PMV BLD AUTO: 8.3 FL (ref 7–10)
RBC # BLD AUTO: 4.65 MILL/UL (ref 3.8–5.4)
WBC: 8.2 THOU/UL (ref 4–11)

## 2018-06-04 ASSESSMENT — MIFFLIN-ST. JEOR: SCORE: 938.79

## 2018-06-05 ENCOUNTER — COMMUNICATION - HEALTHEAST (OUTPATIENT)
Dept: FAMILY MEDICINE | Facility: CLINIC | Age: 78
End: 2018-06-05

## 2018-06-05 DIAGNOSIS — E55.9 VITAMIN D DEFICIENCY: ICD-10-CM

## 2018-06-08 ENCOUNTER — HOSPITAL ENCOUNTER (OUTPATIENT)
Dept: CT IMAGING | Facility: HOSPITAL | Age: 78
Discharge: HOME | End: 2018-06-08
Attending: FAMILY MEDICINE

## 2018-06-08 ENCOUNTER — HOSPITAL ENCOUNTER (OUTPATIENT)
Dept: MRI IMAGING | Facility: HOSPITAL | Age: 78
Discharge: HOME | End: 2018-06-08
Attending: FAMILY MEDICINE

## 2018-06-08 DIAGNOSIS — M84.48XA: ICD-10-CM

## 2018-06-08 DIAGNOSIS — R64 CACHEXIA (H): ICD-10-CM

## 2018-06-08 DIAGNOSIS — R63.4 WEIGHT LOSS: ICD-10-CM

## 2018-06-08 DIAGNOSIS — M54.9 BACK PAIN: ICD-10-CM

## 2018-06-08 LAB
CREAT BLD-MCNC: 0.7 MG/DL
POC GFR AMER AF HE - HISTORICAL: >60 ML/MIN/1.73M2
POC GFR NON AMER AF HE - HISTORICAL: >60 ML/MIN/1.73M2

## 2018-06-25 ENCOUNTER — OFFICE VISIT - HEALTHEAST (OUTPATIENT)
Dept: PULMONOLOGY | Facility: OTHER | Age: 78
End: 2018-06-25

## 2018-06-25 DIAGNOSIS — J98.4 RESTRICTIVE LUNG DISEASE: ICD-10-CM

## 2018-06-30 ENCOUNTER — COMMUNICATION - HEALTHEAST (OUTPATIENT)
Dept: FAMILY MEDICINE | Facility: CLINIC | Age: 78
End: 2018-06-30

## 2018-06-30 DIAGNOSIS — E55.9 VITAMIN D DEFICIENCY: ICD-10-CM

## 2018-07-02 ENCOUNTER — OFFICE VISIT - HEALTHEAST (OUTPATIENT)
Dept: RHEUMATOLOGY | Facility: CLINIC | Age: 78
End: 2018-07-02

## 2018-07-02 DIAGNOSIS — M17.0 BILATERAL PRIMARY OSTEOARTHRITIS OF KNEE: ICD-10-CM

## 2018-07-02 DIAGNOSIS — M25.50 POLYARTHRALGIA: ICD-10-CM

## 2018-07-02 LAB
ALBUMIN SERPL-MCNC: 3.4 G/DL (ref 3.5–5)
ALT SERPL W P-5'-P-CCNC: 9 U/L (ref 0–45)
BASOPHILS # BLD AUTO: 0 THOU/UL (ref 0–0.2)
BASOPHILS NFR BLD AUTO: 0 % (ref 0–2)
C3 SERPL-MCNC: 151 MG/DL (ref 83–177)
CREAT SERPL-MCNC: 0.75 MG/DL (ref 0.6–1.1)
EOSINOPHIL # BLD AUTO: 0.8 THOU/UL (ref 0–0.4)
EOSINOPHIL NFR BLD AUTO: 10 % (ref 0–6)
ERYTHROCYTE [DISTWIDTH] IN BLOOD BY AUTOMATED COUNT: 12.6 % (ref 11–14.5)
GFR SERPL CREATININE-BSD FRML MDRD: >60 ML/MIN/1.73M2
HCT VFR BLD AUTO: 37.3 % (ref 35–47)
HGB BLD-MCNC: 11.5 G/DL (ref 12–16)
LYMPHOCYTES # BLD AUTO: 3 THOU/UL (ref 0.8–4.4)
LYMPHOCYTES NFR BLD AUTO: 39 % (ref 20–40)
MCH RBC QN AUTO: 24.7 PG (ref 27–34)
MCHC RBC AUTO-ENTMCNC: 30.9 G/DL (ref 32–36)
MCV RBC AUTO: 80 FL (ref 80–100)
MONOCYTES # BLD AUTO: 0.6 THOU/UL (ref 0–0.9)
MONOCYTES NFR BLD AUTO: 7 % (ref 2–10)
NEUTROPHILS # BLD AUTO: 3.4 THOU/UL (ref 2–7.7)
NEUTROPHILS NFR BLD AUTO: 44 % (ref 50–70)
PLATELET # BLD AUTO: 248 THOU/UL (ref 140–440)
PMV BLD AUTO: 8.3 FL (ref 7–10)
RBC # BLD AUTO: 4.67 MILL/UL (ref 3.8–5.4)
RHEUMATOID FACT SERPL-ACNC: <15 IU/ML (ref 0–30)
WBC: 7.8 THOU/UL (ref 4–11)

## 2018-07-03 LAB
CCP AB SER IA-ACNC: <0.5 U/ML
DNA (DS) ANTIBODY - HISTORICAL: 3 IU
JO-1 AUTOANTIBODIES - HISTORICAL: 1 EU
SCL-70 AUTOANTIBODIES - HISTORICAL: 2 EU
SM (SMITH AUTOANTIBODIES - HISTORICAL: 2 EU
SM/RNP AUTOANTIBODIES - HISTORICAL: 8 EU
SS-A/RO AUTOANTIBODIES - HISTORICAL: 3 EU
SS-B/LA AUTOANTIBODIES - HISTORICAL: 1 EU

## 2018-07-04 LAB — ANCA IGG TITR SER IF: NORMAL {TITER}

## 2018-07-19 ENCOUNTER — OFFICE VISIT - HEALTHEAST (OUTPATIENT)
Dept: FAMILY MEDICINE | Facility: CLINIC | Age: 78
End: 2018-07-19

## 2018-07-19 DIAGNOSIS — M13.0 POLYARTHRITIS: ICD-10-CM

## 2018-07-19 DIAGNOSIS — M54.40 CHRONIC LOW BACK PAIN WITH SCIATICA, SCIATICA LATERALITY UNSPECIFIED, UNSPECIFIED BACK PAIN LATERALITY: ICD-10-CM

## 2018-07-19 DIAGNOSIS — J98.4 RESTRICTIVE LUNG DISEASE: ICD-10-CM

## 2018-07-19 DIAGNOSIS — M35.1 MCTD (MIXED CONNECTIVE TISSUE DISEASE) (H): ICD-10-CM

## 2018-07-19 DIAGNOSIS — G89.29 CHRONIC LOW BACK PAIN WITH SCIATICA, SCIATICA LATERALITY UNSPECIFIED, UNSPECIFIED BACK PAIN LATERALITY: ICD-10-CM

## 2018-07-19 DIAGNOSIS — G89.4 CHRONIC PAIN SYNDROME: ICD-10-CM

## 2018-07-19 DIAGNOSIS — M48.061 SPINAL STENOSIS OF LUMBAR REGION WITHOUT NEUROGENIC CLAUDICATION: ICD-10-CM

## 2018-07-19 DIAGNOSIS — Z79.899 POLYPHARMACY: ICD-10-CM

## 2018-07-19 ASSESSMENT — MIFFLIN-ST. JEOR: SCORE: 931.88

## 2018-07-25 ENCOUNTER — HOSPITAL ENCOUNTER (OUTPATIENT)
Dept: PHYSICAL MEDICINE AND REHAB | Facility: CLINIC | Age: 78
Discharge: HOME | End: 2018-07-25
Attending: FAMILY MEDICINE

## 2018-07-25 ENCOUNTER — COMMUNICATION - HEALTHEAST (OUTPATIENT)
Dept: FAMILY MEDICINE | Facility: CLINIC | Age: 78
End: 2018-07-25

## 2018-07-25 DIAGNOSIS — M47.816 LUMBAR FACET ARTHROPATHY: ICD-10-CM

## 2018-07-25 DIAGNOSIS — E55.9 VITAMIN D DEFICIENCY: ICD-10-CM

## 2018-07-25 DIAGNOSIS — M54.16 LUMBAR RADICULITIS: ICD-10-CM

## 2018-07-25 DIAGNOSIS — M48.061 LUMBAR SPINAL STENOSIS: ICD-10-CM

## 2018-08-21 ENCOUNTER — COMMUNICATION - HEALTHEAST (OUTPATIENT)
Dept: FAMILY MEDICINE | Facility: CLINIC | Age: 78
End: 2018-08-21

## 2018-08-21 DIAGNOSIS — E55.9 VITAMIN D DEFICIENCY: ICD-10-CM

## 2018-08-22 ENCOUNTER — COMMUNICATION - HEALTHEAST (OUTPATIENT)
Dept: FAMILY MEDICINE | Facility: CLINIC | Age: 78
End: 2018-08-22

## 2018-08-23 ENCOUNTER — RECORDS - HEALTHEAST (OUTPATIENT)
Dept: GENERAL RADIOLOGY | Facility: CLINIC | Age: 78
End: 2018-08-23

## 2018-08-23 ENCOUNTER — OFFICE VISIT - HEALTHEAST (OUTPATIENT)
Dept: FAMILY MEDICINE | Facility: CLINIC | Age: 78
End: 2018-08-23

## 2018-08-23 DIAGNOSIS — Z79.899 POLYPHARMACY: ICD-10-CM

## 2018-08-23 DIAGNOSIS — M13.0 POLYARTHRITIS: ICD-10-CM

## 2018-08-23 DIAGNOSIS — R10.32 LLQ ABDOMINAL PAIN: ICD-10-CM

## 2018-08-23 DIAGNOSIS — E55.9 VITAMIN D DEFICIENCY: ICD-10-CM

## 2018-08-23 DIAGNOSIS — I10 HTN (HYPERTENSION): ICD-10-CM

## 2018-08-23 DIAGNOSIS — K59.00 CONSTIPATION: ICD-10-CM

## 2018-08-23 DIAGNOSIS — R10.32 LEFT LOWER QUADRANT PAIN: ICD-10-CM

## 2018-08-23 DIAGNOSIS — M48.061 SPINAL STENOSIS OF LUMBAR REGION WITHOUT NEUROGENIC CLAUDICATION: ICD-10-CM

## 2018-08-23 DIAGNOSIS — G89.4 CHRONIC PAIN SYNDROME: ICD-10-CM

## 2018-08-23 DIAGNOSIS — M35.1 MCTD (MIXED CONNECTIVE TISSUE DISEASE) (H): ICD-10-CM

## 2018-08-23 DIAGNOSIS — M54.40 CHRONIC LOW BACK PAIN WITH SCIATICA, SCIATICA LATERALITY UNSPECIFIED, UNSPECIFIED BACK PAIN LATERALITY: ICD-10-CM

## 2018-08-23 DIAGNOSIS — G89.29 CHRONIC LOW BACK PAIN WITH SCIATICA, SCIATICA LATERALITY UNSPECIFIED, UNSPECIFIED BACK PAIN LATERALITY: ICD-10-CM

## 2018-08-23 ASSESSMENT — MIFFLIN-ST. JEOR: SCORE: 924.73

## 2018-08-24 ENCOUNTER — COMMUNICATION - HEALTHEAST (OUTPATIENT)
Dept: FAMILY MEDICINE | Facility: CLINIC | Age: 78
End: 2018-08-24

## 2018-08-27 ENCOUNTER — OFFICE VISIT - HEALTHEAST (OUTPATIENT)
Dept: RHEUMATOLOGY | Facility: CLINIC | Age: 78
End: 2018-08-27

## 2018-08-27 DIAGNOSIS — M25.50 POLYARTHRALGIA: ICD-10-CM

## 2018-08-27 DIAGNOSIS — M17.0 BILATERAL PRIMARY OSTEOARTHRITIS OF KNEE: ICD-10-CM

## 2018-08-27 DIAGNOSIS — M48.00 SPINAL STENOSIS, MULTILEVEL: ICD-10-CM

## 2018-08-30 ENCOUNTER — OFFICE VISIT - HEALTHEAST (OUTPATIENT)
Dept: FAMILY MEDICINE | Facility: CLINIC | Age: 78
End: 2018-08-30

## 2018-08-30 ENCOUNTER — COMMUNICATION - HEALTHEAST (OUTPATIENT)
Dept: FAMILY MEDICINE | Facility: CLINIC | Age: 78
End: 2018-08-30

## 2018-08-30 DIAGNOSIS — Z23 NEED FOR IMMUNIZATION AGAINST INFLUENZA: ICD-10-CM

## 2018-08-30 DIAGNOSIS — Z79.899 POLYPHARMACY: ICD-10-CM

## 2018-08-30 DIAGNOSIS — M13.0 POLYARTHRITIS: ICD-10-CM

## 2018-08-30 DIAGNOSIS — G89.4 CHRONIC PAIN SYNDROME: ICD-10-CM

## 2018-08-30 DIAGNOSIS — M48.061 SPINAL STENOSIS OF LUMBAR REGION WITHOUT NEUROGENIC CLAUDICATION: ICD-10-CM

## 2018-08-30 DIAGNOSIS — R10.9 ABDOMINAL PAIN: ICD-10-CM

## 2018-08-30 DIAGNOSIS — E55.9 VITAMIN D DEFICIENCY: ICD-10-CM

## 2018-08-30 LAB
ALBUMIN SERPL-MCNC: 3.2 G/DL (ref 3.5–5)
ALP SERPL-CCNC: 116 U/L (ref 45–120)
ALT SERPL W P-5'-P-CCNC: 10 U/L (ref 0–45)
ANION GAP SERPL CALCULATED.3IONS-SCNC: 8 MMOL/L (ref 5–18)
AST SERPL W P-5'-P-CCNC: 22 U/L (ref 0–40)
BILIRUB SERPL-MCNC: 0.5 MG/DL (ref 0–1)
BUN SERPL-MCNC: 16 MG/DL (ref 8–28)
CALCIUM SERPL-MCNC: 9.4 MG/DL (ref 8.5–10.5)
CHLORIDE BLD-SCNC: 103 MMOL/L (ref 98–107)
CO2 SERPL-SCNC: 32 MMOL/L (ref 22–31)
CREAT SERPL-MCNC: 0.9 MG/DL (ref 0.6–1.1)
GFR SERPL CREATININE-BSD FRML MDRD: >60 ML/MIN/1.73M2
GLUCOSE BLD-MCNC: 109 MG/DL (ref 70–125)
MAGNESIUM SERPL-MCNC: 2.2 MG/DL (ref 1.8–2.6)
POTASSIUM BLD-SCNC: 4.8 MMOL/L (ref 3.5–5)
PROT SERPL-MCNC: 7.5 G/DL (ref 6–8)
SODIUM SERPL-SCNC: 143 MMOL/L (ref 136–145)

## 2018-08-30 ASSESSMENT — MIFFLIN-ST. JEOR: SCORE: 933.99

## 2018-08-31 LAB — 25(OH)D3 SERPL-MCNC: 34.1 NG/ML (ref 30–80)

## 2018-09-06 ENCOUNTER — COMMUNICATION - HEALTHEAST (OUTPATIENT)
Dept: FAMILY MEDICINE | Facility: CLINIC | Age: 78
End: 2018-09-06

## 2018-09-10 ENCOUNTER — HOSPITAL ENCOUNTER (OUTPATIENT)
Dept: PHYSICAL MEDICINE AND REHAB | Facility: CLINIC | Age: 78
Discharge: HOME | End: 2018-09-10
Attending: PHYSICIAN ASSISTANT

## 2018-09-10 DIAGNOSIS — M48.061 LUMBAR SPINAL STENOSIS: ICD-10-CM

## 2018-09-10 DIAGNOSIS — M47.816 LUMBAR FACET ARTHROPATHY: ICD-10-CM

## 2018-09-12 ENCOUNTER — OFFICE VISIT - HEALTHEAST (OUTPATIENT)
Dept: PHYSICAL THERAPY | Facility: REHABILITATION | Age: 78
End: 2018-09-12

## 2018-09-12 DIAGNOSIS — M54.16 LUMBAR RADICULOPATHY: ICD-10-CM

## 2018-09-12 DIAGNOSIS — M62.81 GENERALIZED MUSCLE WEAKNESS: ICD-10-CM

## 2018-09-17 ENCOUNTER — RECORDS - HEALTHEAST (OUTPATIENT)
Dept: ADMINISTRATIVE | Facility: OTHER | Age: 78
End: 2018-09-17

## 2018-09-19 ENCOUNTER — OFFICE VISIT - HEALTHEAST (OUTPATIENT)
Dept: PHYSICAL THERAPY | Facility: REHABILITATION | Age: 78
End: 2018-09-19

## 2018-09-19 DIAGNOSIS — M62.81 GENERALIZED MUSCLE WEAKNESS: ICD-10-CM

## 2018-09-19 DIAGNOSIS — M54.16 LUMBAR RADICULOPATHY: ICD-10-CM

## 2018-09-20 ENCOUNTER — COMMUNICATION - HEALTHEAST (OUTPATIENT)
Dept: FAMILY MEDICINE | Facility: CLINIC | Age: 78
End: 2018-09-20

## 2018-09-20 DIAGNOSIS — E55.9 VITAMIN D DEFICIENCY: ICD-10-CM

## 2018-09-26 ENCOUNTER — OFFICE VISIT - HEALTHEAST (OUTPATIENT)
Dept: PHYSICAL THERAPY | Facility: REHABILITATION | Age: 78
End: 2018-09-26

## 2018-09-26 DIAGNOSIS — M62.81 GENERALIZED MUSCLE WEAKNESS: ICD-10-CM

## 2018-09-26 DIAGNOSIS — M54.16 LUMBAR RADICULOPATHY: ICD-10-CM

## 2018-09-27 ENCOUNTER — OFFICE VISIT - HEALTHEAST (OUTPATIENT)
Dept: FAMILY MEDICINE | Facility: CLINIC | Age: 78
End: 2018-09-27

## 2018-09-27 DIAGNOSIS — M13.0 POLYARTHRITIS: ICD-10-CM

## 2018-09-27 DIAGNOSIS — G89.4 CHRONIC PAIN SYNDROME: ICD-10-CM

## 2018-09-27 DIAGNOSIS — E55.9 VITAMIN D DEFICIENCY: ICD-10-CM

## 2018-09-27 DIAGNOSIS — K21.9 GERD (GASTROESOPHAGEAL REFLUX DISEASE): ICD-10-CM

## 2018-09-27 DIAGNOSIS — Z79.899 POLYPHARMACY: ICD-10-CM

## 2018-09-27 DIAGNOSIS — M81.0 OSTEOPOROSIS: ICD-10-CM

## 2018-09-27 DIAGNOSIS — J98.4 RESTRICTIVE LUNG DISEASE: ICD-10-CM

## 2018-09-27 DIAGNOSIS — I10 HTN (HYPERTENSION): ICD-10-CM

## 2018-09-27 DIAGNOSIS — M48.061 SPINAL STENOSIS OF LUMBAR REGION WITHOUT NEUROGENIC CLAUDICATION: ICD-10-CM

## 2018-09-27 ASSESSMENT — MIFFLIN-ST. JEOR: SCORE: 924.28

## 2018-10-01 ENCOUNTER — OFFICE VISIT - HEALTHEAST (OUTPATIENT)
Dept: PHARMACY | Facility: CLINIC | Age: 78
End: 2018-10-01

## 2018-10-01 DIAGNOSIS — M81.0 AGE-RELATED OSTEOPOROSIS WITHOUT CURRENT PATHOLOGICAL FRACTURE: ICD-10-CM

## 2018-10-01 DIAGNOSIS — M50.30 DEGENERATIVE DISC DISEASE, CERVICAL: ICD-10-CM

## 2018-10-01 DIAGNOSIS — I10 ESSENTIAL HYPERTENSION: ICD-10-CM

## 2018-10-01 DIAGNOSIS — J98.4 RESTRICTIVE LUNG DISEASE: ICD-10-CM

## 2018-10-01 DIAGNOSIS — K59.03 DRUG-INDUCED CONSTIPATION: ICD-10-CM

## 2018-10-03 ENCOUNTER — COMMUNICATION - HEALTHEAST (OUTPATIENT)
Dept: FAMILY MEDICINE | Facility: CLINIC | Age: 78
End: 2018-10-03

## 2018-10-03 DIAGNOSIS — G89.29 OTHER CHRONIC PAIN: ICD-10-CM

## 2018-10-08 ENCOUNTER — COMMUNICATION - HEALTHEAST (OUTPATIENT)
Dept: FAMILY MEDICINE | Facility: CLINIC | Age: 78
End: 2018-10-08

## 2018-10-08 ENCOUNTER — HOSPITAL ENCOUNTER (OUTPATIENT)
Dept: PHYSICAL MEDICINE AND REHAB | Facility: CLINIC | Age: 78
Discharge: HOME | End: 2018-10-08
Attending: PHYSICIAN ASSISTANT

## 2018-10-08 DIAGNOSIS — I10 ESSENTIAL HYPERTENSION: ICD-10-CM

## 2018-10-08 DIAGNOSIS — M48.061 LUMBAR SPINAL STENOSIS: ICD-10-CM

## 2018-10-08 DIAGNOSIS — M47.816 LUMBAR FACET ARTHROPATHY: ICD-10-CM

## 2018-10-10 ENCOUNTER — OFFICE VISIT - HEALTHEAST (OUTPATIENT)
Dept: PHYSICAL THERAPY | Facility: REHABILITATION | Age: 78
End: 2018-10-10

## 2018-10-10 DIAGNOSIS — M54.16 LUMBAR RADICULOPATHY: ICD-10-CM

## 2018-10-10 DIAGNOSIS — M62.81 GENERALIZED MUSCLE WEAKNESS: ICD-10-CM

## 2018-10-17 ENCOUNTER — COMMUNICATION - HEALTHEAST (OUTPATIENT)
Dept: FAMILY MEDICINE | Facility: CLINIC | Age: 78
End: 2018-10-17

## 2018-10-17 ENCOUNTER — HOSPITAL ENCOUNTER (OUTPATIENT)
Dept: PHYSICAL MEDICINE AND REHAB | Facility: CLINIC | Age: 78
Discharge: HOME | End: 2018-10-17
Attending: PHYSICIAN ASSISTANT

## 2018-10-17 DIAGNOSIS — M48.061 LUMBAR SPINAL STENOSIS: ICD-10-CM

## 2018-10-17 DIAGNOSIS — E55.9 VITAMIN D DEFICIENCY: ICD-10-CM

## 2018-10-25 ENCOUNTER — COMMUNICATION - HEALTHEAST (OUTPATIENT)
Dept: FAMILY MEDICINE | Facility: CLINIC | Age: 78
End: 2018-10-25

## 2018-10-25 DIAGNOSIS — J98.4 RESTRICTIVE LUNG DISEASE: ICD-10-CM

## 2018-10-26 ENCOUNTER — COMMUNICATION - HEALTHEAST (OUTPATIENT)
Dept: FAMILY MEDICINE | Facility: CLINIC | Age: 78
End: 2018-10-26

## 2018-10-29 ENCOUNTER — RECORDS - HEALTHEAST (OUTPATIENT)
Dept: ADMINISTRATIVE | Facility: OTHER | Age: 78
End: 2018-10-29

## 2018-10-31 ENCOUNTER — HOSPITAL ENCOUNTER (OUTPATIENT)
Dept: PHYSICAL MEDICINE AND REHAB | Facility: CLINIC | Age: 78
Discharge: HOME | End: 2018-10-31
Attending: PHYSICIAN ASSISTANT

## 2018-10-31 ENCOUNTER — OFFICE VISIT - HEALTHEAST (OUTPATIENT)
Dept: FAMILY MEDICINE | Facility: CLINIC | Age: 78
End: 2018-10-31

## 2018-10-31 DIAGNOSIS — I10 ESSENTIAL HYPERTENSION: ICD-10-CM

## 2018-10-31 DIAGNOSIS — M53.3 SACROILIAC JOINT PAIN: ICD-10-CM

## 2018-10-31 DIAGNOSIS — M48.061 LUMBAR SPINAL STENOSIS: ICD-10-CM

## 2018-10-31 DIAGNOSIS — I10 HTN (HYPERTENSION): ICD-10-CM

## 2018-10-31 ASSESSMENT — MIFFLIN-ST. JEOR: SCORE: 925.39

## 2018-11-01 ENCOUNTER — OFFICE VISIT - HEALTHEAST (OUTPATIENT)
Dept: FAMILY MEDICINE | Facility: CLINIC | Age: 78
End: 2018-11-01

## 2018-11-01 DIAGNOSIS — Z79.899 POLYPHARMACY: ICD-10-CM

## 2018-11-01 DIAGNOSIS — G89.4 CHRONIC PAIN SYNDROME: ICD-10-CM

## 2018-11-01 DIAGNOSIS — J98.4 RESTRICTIVE LUNG DISEASE: ICD-10-CM

## 2018-11-01 DIAGNOSIS — K21.9 GERD (GASTROESOPHAGEAL REFLUX DISEASE): ICD-10-CM

## 2018-11-01 DIAGNOSIS — I10 ESSENTIAL HYPERTENSION: ICD-10-CM

## 2018-11-01 DIAGNOSIS — M81.0 OSTEOPOROSIS: ICD-10-CM

## 2018-11-01 DIAGNOSIS — E55.9 VITAMIN D DEFICIENCY: ICD-10-CM

## 2018-11-01 DIAGNOSIS — M13.0 POLYARTHRITIS: ICD-10-CM

## 2018-11-01 DIAGNOSIS — M48.061 SPINAL STENOSIS OF LUMBAR REGION WITHOUT NEUROGENIC CLAUDICATION: ICD-10-CM

## 2018-11-01 ASSESSMENT — MIFFLIN-ST. JEOR: SCORE: 922.01

## 2018-11-12 ENCOUNTER — HOSPITAL ENCOUNTER (OUTPATIENT)
Dept: PHYSICAL MEDICINE AND REHAB | Facility: CLINIC | Age: 78
Discharge: HOME | End: 2018-11-12
Attending: PHYSICIAN ASSISTANT

## 2018-11-12 DIAGNOSIS — M53.3 SACROILIAC JOINT PAIN: ICD-10-CM

## 2018-11-14 ENCOUNTER — COMMUNICATION - HEALTHEAST (OUTPATIENT)
Dept: FAMILY MEDICINE | Facility: CLINIC | Age: 78
End: 2018-11-14

## 2018-11-14 ENCOUNTER — RECORDS - HEALTHEAST (OUTPATIENT)
Dept: ADMINISTRATIVE | Facility: OTHER | Age: 78
End: 2018-11-14

## 2018-11-14 DIAGNOSIS — M35.1 MCTD (MIXED CONNECTIVE TISSUE DISEASE) (H): ICD-10-CM

## 2018-11-14 DIAGNOSIS — E55.9 VITAMIN D DEFICIENCY: ICD-10-CM

## 2018-11-23 ENCOUNTER — COMMUNICATION - HEALTHEAST (OUTPATIENT)
Dept: PHYSICAL MEDICINE AND REHAB | Facility: CLINIC | Age: 78
End: 2018-11-23

## 2018-11-25 ENCOUNTER — COMMUNICATION - HEALTHEAST (OUTPATIENT)
Dept: FAMILY MEDICINE | Facility: CLINIC | Age: 78
End: 2018-11-25

## 2018-11-25 DIAGNOSIS — K21.9 GERD (GASTROESOPHAGEAL REFLUX DISEASE): ICD-10-CM

## 2018-11-28 ENCOUNTER — OFFICE VISIT - HEALTHEAST (OUTPATIENT)
Dept: RHEUMATOLOGY | Facility: CLINIC | Age: 78
End: 2018-11-28

## 2018-11-28 DIAGNOSIS — G89.4 CHRONIC PAIN SYNDROME: ICD-10-CM

## 2018-11-28 DIAGNOSIS — M50.30 DEGENERATIVE DISC DISEASE, CERVICAL: ICD-10-CM

## 2018-11-28 DIAGNOSIS — M25.50 POLYARTHRALGIA: ICD-10-CM

## 2018-11-28 DIAGNOSIS — M17.0 BILATERAL PRIMARY OSTEOARTHRITIS OF KNEE: ICD-10-CM

## 2018-12-06 ENCOUNTER — OFFICE VISIT - HEALTHEAST (OUTPATIENT)
Dept: FAMILY MEDICINE | Facility: CLINIC | Age: 78
End: 2018-12-06

## 2018-12-06 DIAGNOSIS — M13.0 POLYARTHRITIS: ICD-10-CM

## 2018-12-06 DIAGNOSIS — I10 ESSENTIAL HYPERTENSION: ICD-10-CM

## 2018-12-06 DIAGNOSIS — M48.061 SPINAL STENOSIS OF LUMBAR REGION WITHOUT NEUROGENIC CLAUDICATION: ICD-10-CM

## 2018-12-06 DIAGNOSIS — Z79.899 POLYPHARMACY: ICD-10-CM

## 2018-12-06 DIAGNOSIS — G89.4 CHRONIC PAIN SYNDROME: ICD-10-CM

## 2018-12-06 DIAGNOSIS — E55.9 VITAMIN D DEFICIENCY: ICD-10-CM

## 2018-12-06 DIAGNOSIS — J98.4 RESTRICTIVE LUNG DISEASE: ICD-10-CM

## 2018-12-06 ASSESSMENT — MIFFLIN-ST. JEOR: SCORE: 937.49

## 2018-12-12 ENCOUNTER — COMMUNICATION - HEALTHEAST (OUTPATIENT)
Dept: FAMILY MEDICINE | Facility: CLINIC | Age: 78
End: 2018-12-12

## 2018-12-12 ENCOUNTER — HOSPITAL ENCOUNTER (OUTPATIENT)
Dept: PHYSICAL MEDICINE AND REHAB | Facility: CLINIC | Age: 78
Discharge: HOME | End: 2018-12-12
Attending: PHYSICIAN ASSISTANT

## 2018-12-12 DIAGNOSIS — M47.816 LUMBAR FACET ARTHROPATHY: ICD-10-CM

## 2018-12-12 DIAGNOSIS — M53.3 SACROILIAC JOINT PAIN: ICD-10-CM

## 2018-12-12 DIAGNOSIS — M48.061 SPINAL STENOSIS OF LUMBAR REGION, UNSPECIFIED WHETHER NEUROGENIC CLAUDICATION PRESENT: ICD-10-CM

## 2018-12-12 DIAGNOSIS — M35.1 MCTD (MIXED CONNECTIVE TISSUE DISEASE) (H): ICD-10-CM

## 2018-12-12 DIAGNOSIS — G89.4 CHRONIC PAIN SYNDROME: ICD-10-CM

## 2018-12-19 ENCOUNTER — COMMUNICATION - HEALTHEAST (OUTPATIENT)
Dept: FAMILY MEDICINE | Facility: CLINIC | Age: 78
End: 2018-12-19

## 2018-12-19 DIAGNOSIS — E55.9 VITAMIN D DEFICIENCY: ICD-10-CM

## 2018-12-20 ENCOUNTER — RECORDS - HEALTHEAST (OUTPATIENT)
Dept: ADMINISTRATIVE | Facility: OTHER | Age: 78
End: 2018-12-20

## 2018-12-20 ENCOUNTER — OFFICE VISIT - HEALTHEAST (OUTPATIENT)
Dept: OCCUPATIONAL THERAPY | Facility: REHABILITATION | Age: 78
End: 2018-12-20

## 2018-12-20 DIAGNOSIS — M54.5 CHRONIC BILATERAL LOW BACK PAIN, WITH SCIATICA PRESENCE UNSPECIFIED: ICD-10-CM

## 2018-12-20 DIAGNOSIS — Z78.9 DECREASED ACTIVITIES OF DAILY LIVING (ADL): ICD-10-CM

## 2018-12-20 DIAGNOSIS — Z78.9 IMPAIRED INSTRUMENTAL ACTIVITIES OF DAILY LIVING (IADL): ICD-10-CM

## 2018-12-20 DIAGNOSIS — G89.4 CHRONIC PAIN SYNDROME: ICD-10-CM

## 2018-12-20 DIAGNOSIS — G89.29 CHRONIC BILATERAL LOW BACK PAIN, WITH SCIATICA PRESENCE UNSPECIFIED: ICD-10-CM

## 2018-12-27 ENCOUNTER — OFFICE VISIT - HEALTHEAST (OUTPATIENT)
Dept: OCCUPATIONAL THERAPY | Facility: REHABILITATION | Age: 78
End: 2018-12-27

## 2018-12-27 DIAGNOSIS — G89.4 CHRONIC PAIN SYNDROME: ICD-10-CM

## 2018-12-27 DIAGNOSIS — M54.5 CHRONIC BILATERAL LOW BACK PAIN, WITH SCIATICA PRESENCE UNSPECIFIED: ICD-10-CM

## 2018-12-27 DIAGNOSIS — Z78.9 IMPAIRED INSTRUMENTAL ACTIVITIES OF DAILY LIVING (IADL): ICD-10-CM

## 2018-12-27 DIAGNOSIS — G89.29 CHRONIC BILATERAL LOW BACK PAIN, WITH SCIATICA PRESENCE UNSPECIFIED: ICD-10-CM

## 2018-12-27 DIAGNOSIS — Z78.9 DECREASED ACTIVITIES OF DAILY LIVING (ADL): ICD-10-CM

## 2019-01-10 ENCOUNTER — OFFICE VISIT - HEALTHEAST (OUTPATIENT)
Dept: OCCUPATIONAL THERAPY | Facility: REHABILITATION | Age: 79
End: 2019-01-10

## 2019-01-10 DIAGNOSIS — Z78.9 DECREASED ACTIVITIES OF DAILY LIVING (ADL): ICD-10-CM

## 2019-01-10 DIAGNOSIS — G89.29 CHRONIC BILATERAL LOW BACK PAIN, WITH SCIATICA PRESENCE UNSPECIFIED: ICD-10-CM

## 2019-01-10 DIAGNOSIS — Z78.9 IMPAIRED INSTRUMENTAL ACTIVITIES OF DAILY LIVING (IADL): ICD-10-CM

## 2019-01-10 DIAGNOSIS — M54.5 CHRONIC BILATERAL LOW BACK PAIN, WITH SCIATICA PRESENCE UNSPECIFIED: ICD-10-CM

## 2019-01-10 DIAGNOSIS — G89.4 CHRONIC PAIN SYNDROME: ICD-10-CM

## 2019-01-14 ENCOUNTER — COMMUNICATION - HEALTHEAST (OUTPATIENT)
Dept: FAMILY MEDICINE | Facility: CLINIC | Age: 79
End: 2019-01-14

## 2019-01-14 DIAGNOSIS — E55.9 VITAMIN D DEFICIENCY: ICD-10-CM

## 2019-01-15 ENCOUNTER — OFFICE VISIT - HEALTHEAST (OUTPATIENT)
Dept: FAMILY MEDICINE | Facility: CLINIC | Age: 79
End: 2019-01-15

## 2019-01-15 DIAGNOSIS — M48.00 SPINAL STENOSIS, MULTILEVEL: ICD-10-CM

## 2019-01-15 DIAGNOSIS — M25.50 POLYARTHRALGIA: ICD-10-CM

## 2019-01-15 DIAGNOSIS — E55.9 VITAMIN D DEFICIENCY: ICD-10-CM

## 2019-01-15 DIAGNOSIS — Z79.899 POLYPHARMACY: ICD-10-CM

## 2019-01-15 DIAGNOSIS — K21.9 GASTROESOPHAGEAL REFLUX DISEASE WITHOUT ESOPHAGITIS: ICD-10-CM

## 2019-01-15 DIAGNOSIS — G89.4 CHRONIC PAIN SYNDROME: ICD-10-CM

## 2019-01-15 DIAGNOSIS — M81.0 AGE RELATED OSTEOPOROSIS, UNSPECIFIED PATHOLOGICAL FRACTURE PRESENCE: ICD-10-CM

## 2019-01-15 DIAGNOSIS — K59.09 OTHER CONSTIPATION: ICD-10-CM

## 2019-01-15 DIAGNOSIS — J98.4 RESTRICTIVE LUNG DISEASE: ICD-10-CM

## 2019-01-15 DIAGNOSIS — G89.29 OTHER CHRONIC PAIN: ICD-10-CM

## 2019-01-15 ASSESSMENT — MIFFLIN-ST. JEOR: SCORE: 949.68

## 2019-01-16 ENCOUNTER — COMMUNICATION - HEALTHEAST (OUTPATIENT)
Dept: FAMILY MEDICINE | Facility: CLINIC | Age: 79
End: 2019-01-16

## 2019-01-17 ENCOUNTER — OFFICE VISIT - HEALTHEAST (OUTPATIENT)
Dept: OCCUPATIONAL THERAPY | Facility: REHABILITATION | Age: 79
End: 2019-01-17

## 2019-01-17 DIAGNOSIS — G89.4 CHRONIC PAIN SYNDROME: ICD-10-CM

## 2019-01-17 DIAGNOSIS — G89.29 CHRONIC BILATERAL LOW BACK PAIN, WITH SCIATICA PRESENCE UNSPECIFIED: ICD-10-CM

## 2019-01-17 DIAGNOSIS — M54.5 CHRONIC BILATERAL LOW BACK PAIN, WITH SCIATICA PRESENCE UNSPECIFIED: ICD-10-CM

## 2019-01-17 DIAGNOSIS — Z78.9 DECREASED ACTIVITIES OF DAILY LIVING (ADL): ICD-10-CM

## 2019-01-17 DIAGNOSIS — Z78.9 IMPAIRED INSTRUMENTAL ACTIVITIES OF DAILY LIVING (IADL): ICD-10-CM

## 2019-02-05 ENCOUNTER — OFFICE VISIT - HEALTHEAST (OUTPATIENT)
Dept: INTERNAL MEDICINE | Facility: CLINIC | Age: 79
End: 2019-02-05

## 2019-02-05 DIAGNOSIS — R53.82 CHRONIC FATIGUE: ICD-10-CM

## 2019-02-05 DIAGNOSIS — M81.0 AGE-RELATED OSTEOPOROSIS WITHOUT CURRENT PATHOLOGICAL FRACTURE: ICD-10-CM

## 2019-02-05 LAB
ALP SERPL-CCNC: 117 U/L (ref 45–120)
ANION GAP SERPL CALCULATED.3IONS-SCNC: 10 MMOL/L (ref 5–18)
BUN SERPL-MCNC: 9 MG/DL (ref 8–28)
CALCIUM SERPL-MCNC: 9.1 MG/DL (ref 8.5–10.5)
CHLORIDE BLD-SCNC: 101 MMOL/L (ref 98–107)
CO2 SERPL-SCNC: 31 MMOL/L (ref 22–31)
CREAT SERPL-MCNC: 0.85 MG/DL (ref 0.6–1.1)
GFR SERPL CREATININE-BSD FRML MDRD: >60 ML/MIN/1.73M2
GLUCOSE BLD-MCNC: 98 MG/DL (ref 70–125)
POTASSIUM BLD-SCNC: 3.8 MMOL/L (ref 3.5–5)
PTH-INTACT SERPL-MCNC: 94 PG/ML (ref 10–86)
SODIUM SERPL-SCNC: 142 MMOL/L (ref 136–145)
TSH SERPL DL<=0.005 MIU/L-ACNC: 1.21 UIU/ML (ref 0.3–5)

## 2019-02-06 ENCOUNTER — COMMUNICATION - HEALTHEAST (OUTPATIENT)
Dept: INTERNAL MEDICINE | Facility: CLINIC | Age: 79
End: 2019-02-06

## 2019-02-06 LAB
ALBUMIN PERCENT: 47.9 % (ref 51–67)
ALBUMIN SERPL ELPH-MCNC: 3.6 G/DL (ref 3.2–4.7)
ALPHA 1 PERCENT: 3.2 % (ref 2–4)
ALPHA 2 PERCENT: 12.9 % (ref 5–13)
ALPHA1 GLOB SERPL ELPH-MCNC: 0.2 G/DL (ref 0.1–0.3)
ALPHA2 GLOB SERPL ELPH-MCNC: 1 G/DL (ref 0.4–0.9)
B-GLOBULIN SERPL ELPH-MCNC: 1.6 G/DL (ref 0.7–1.2)
BETA PERCENT: 21.2 % (ref 10–17)
GAMMA GLOB SERPL ELPH-MCNC: 1.1 G/DL (ref 0.6–1.4)
GAMMA GLOBULIN PERCENT: 14.8 % (ref 9–20)
PATH ICD:: ABNORMAL
PROT PATTERN SERPL ELPH-IMP: ABNORMAL
PROT SERPL-MCNC: 7.6 G/DL (ref 6–8)
REVIEWING PATHOLOGIST: ABNORMAL

## 2019-02-07 ENCOUNTER — AMBULATORY - HEALTHEAST (OUTPATIENT)
Dept: LAB | Facility: CLINIC | Age: 79
End: 2019-02-07

## 2019-02-07 DIAGNOSIS — M81.0 AGE-RELATED OSTEOPOROSIS WITHOUT CURRENT PATHOLOGICAL FRACTURE: ICD-10-CM

## 2019-02-07 LAB
CALCIUM 24H UR-MRATE: 17 MG/24HR (ref 20–275)
CALCIUM UR-MCNC: 4.9 MG/DL
COLLAGEN CTX SERPL-MCNC: 60 PG/ML
SPECIMEN VOL UR: 350 ML

## 2019-02-08 ENCOUNTER — COMMUNICATION - HEALTHEAST (OUTPATIENT)
Dept: INTERNAL MEDICINE | Facility: CLINIC | Age: 79
End: 2019-02-08

## 2019-02-08 ENCOUNTER — COMMUNICATION - HEALTHEAST (OUTPATIENT)
Dept: FAMILY MEDICINE | Facility: CLINIC | Age: 79
End: 2019-02-08

## 2019-02-08 DIAGNOSIS — M48.00 SPINAL STENOSIS, MULTILEVEL: ICD-10-CM

## 2019-02-10 ENCOUNTER — COMMUNICATION - HEALTHEAST (OUTPATIENT)
Dept: INTERNAL MEDICINE | Facility: CLINIC | Age: 79
End: 2019-02-10

## 2019-02-11 ENCOUNTER — COMMUNICATION - HEALTHEAST (OUTPATIENT)
Dept: FAMILY MEDICINE | Facility: CLINIC | Age: 79
End: 2019-02-11

## 2019-02-11 ENCOUNTER — COMMUNICATION - HEALTHEAST (OUTPATIENT)
Dept: INTERNAL MEDICINE | Facility: CLINIC | Age: 79
End: 2019-02-11

## 2019-02-12 LAB — 25(OH)D3 SERPL-MCNC: 34.9 NG/ML (ref 30–80)

## 2019-02-14 ENCOUNTER — OFFICE VISIT - HEALTHEAST (OUTPATIENT)
Dept: FAMILY MEDICINE | Facility: CLINIC | Age: 79
End: 2019-02-14

## 2019-02-14 ENCOUNTER — RECORDS - HEALTHEAST (OUTPATIENT)
Dept: MAMMOGRAPHY | Facility: CLINIC | Age: 79
End: 2019-02-14

## 2019-02-14 DIAGNOSIS — Z78.9 MEDICALLY COMPLEX PATIENT: ICD-10-CM

## 2019-02-14 DIAGNOSIS — G89.4 CHRONIC PAIN SYNDROME: ICD-10-CM

## 2019-02-14 DIAGNOSIS — Z12.31 ENCOUNTER FOR SCREENING MAMMOGRAM FOR BREAST CANCER: ICD-10-CM

## 2019-02-14 DIAGNOSIS — Z75.8 LANGUAGE BARRIER AFFECTING HEALTH CARE: ICD-10-CM

## 2019-02-14 DIAGNOSIS — Z79.899 POLYPHARMACY: ICD-10-CM

## 2019-02-14 DIAGNOSIS — M81.0 AGE RELATED OSTEOPOROSIS, UNSPECIFIED PATHOLOGICAL FRACTURE PRESENCE: ICD-10-CM

## 2019-02-14 DIAGNOSIS — M48.00 SPINAL STENOSIS, MULTILEVEL: ICD-10-CM

## 2019-02-14 DIAGNOSIS — K59.09 OTHER CONSTIPATION: ICD-10-CM

## 2019-02-14 DIAGNOSIS — M25.50 POLYARTHRALGIA: ICD-10-CM

## 2019-02-14 DIAGNOSIS — J98.4 RESTRICTIVE LUNG DISEASE: ICD-10-CM

## 2019-02-14 DIAGNOSIS — Z12.31 ENCOUNTER FOR SCREENING MAMMOGRAM FOR MALIGNANT NEOPLASM OF BREAST: ICD-10-CM

## 2019-02-14 DIAGNOSIS — Z12.11 COLON CANCER SCREENING: ICD-10-CM

## 2019-02-14 DIAGNOSIS — Z59.9 FINANCIAL DIFFICULTIES: ICD-10-CM

## 2019-02-14 DIAGNOSIS — E55.9 VITAMIN D DEFICIENCY: ICD-10-CM

## 2019-02-14 DIAGNOSIS — Z60.3 LANGUAGE BARRIER AFFECTING HEALTH CARE: ICD-10-CM

## 2019-02-14 DIAGNOSIS — K21.9 GASTROESOPHAGEAL REFLUX DISEASE WITHOUT ESOPHAGITIS: ICD-10-CM

## 2019-02-14 DIAGNOSIS — I10 ESSENTIAL HYPERTENSION: ICD-10-CM

## 2019-02-14 SDOH — ECONOMIC STABILITY - INCOME SECURITY: PROBLEM RELATED TO HOUSING AND ECONOMIC CIRCUMSTANCES, UNSPECIFIED: Z59.9

## 2019-02-14 SDOH — SOCIAL STABILITY - SOCIAL INSECURITY: ACCULTURATION DIFFICULTY: Z60.3

## 2019-02-14 ASSESSMENT — MIFFLIN-ST. JEOR: SCORE: 935.83

## 2019-02-20 ENCOUNTER — HOSPITAL ENCOUNTER (OUTPATIENT)
Dept: PHYSICAL MEDICINE AND REHAB | Facility: CLINIC | Age: 79
Discharge: HOME | End: 2019-02-20
Attending: PHYSICIAN ASSISTANT

## 2019-02-20 DIAGNOSIS — M54.2 CERVICALGIA: ICD-10-CM

## 2019-02-20 DIAGNOSIS — M53.3 SACROILIAC JOINT PAIN: ICD-10-CM

## 2019-02-20 DIAGNOSIS — M47.816 LUMBAR FACET ARTHROPATHY: ICD-10-CM

## 2019-02-20 DIAGNOSIS — M48.061 SPINAL STENOSIS OF LUMBAR REGION, UNSPECIFIED WHETHER NEUROGENIC CLAUDICATION PRESENT: ICD-10-CM

## 2019-02-21 ENCOUNTER — COMMUNICATION - HEALTHEAST (OUTPATIENT)
Dept: FAMILY MEDICINE | Facility: CLINIC | Age: 79
End: 2019-02-21

## 2019-02-21 ENCOUNTER — COMMUNICATION - HEALTHEAST (OUTPATIENT)
Dept: PHYSICAL MEDICINE AND REHAB | Facility: CLINIC | Age: 79
End: 2019-02-21

## 2019-02-21 DIAGNOSIS — I10 HTN (HYPERTENSION): ICD-10-CM

## 2019-02-25 ENCOUNTER — AMBULATORY - HEALTHEAST (OUTPATIENT)
Dept: LAB | Facility: CLINIC | Age: 79
End: 2019-02-25

## 2019-02-25 DIAGNOSIS — G89.4 CHRONIC PAIN SYNDROME: ICD-10-CM

## 2019-02-25 DIAGNOSIS — Z12.11 COLON CANCER SCREENING: ICD-10-CM

## 2019-02-25 LAB
HEMOCCULT SP1 STL QL: NEGATIVE
HEMOCCULT SP2 STL QL: NEGATIVE
HEMOCCULT SP3 STL QL: NEGATIVE

## 2019-03-11 ENCOUNTER — COMMUNICATION - HEALTHEAST (OUTPATIENT)
Dept: FAMILY MEDICINE | Facility: CLINIC | Age: 79
End: 2019-03-11

## 2019-03-12 ENCOUNTER — COMMUNICATION - HEALTHEAST (OUTPATIENT)
Dept: FAMILY MEDICINE | Facility: CLINIC | Age: 79
End: 2019-03-12

## 2019-03-12 ENCOUNTER — RECORDS - HEALTHEAST (OUTPATIENT)
Dept: ADMINISTRATIVE | Facility: OTHER | Age: 79
End: 2019-03-12

## 2019-03-14 ENCOUNTER — OFFICE VISIT - HEALTHEAST (OUTPATIENT)
Dept: FAMILY MEDICINE | Facility: CLINIC | Age: 79
End: 2019-03-14

## 2019-03-14 DIAGNOSIS — M81.0 AGE RELATED OSTEOPOROSIS, UNSPECIFIED PATHOLOGICAL FRACTURE PRESENCE: ICD-10-CM

## 2019-03-14 DIAGNOSIS — M48.00 SPINAL STENOSIS, MULTILEVEL: ICD-10-CM

## 2019-03-14 DIAGNOSIS — Z60.3 LANGUAGE BARRIER AFFECTING HEALTH CARE: ICD-10-CM

## 2019-03-14 DIAGNOSIS — G89.4 CHRONIC PAIN SYNDROME: ICD-10-CM

## 2019-03-14 DIAGNOSIS — Z79.899 POLYPHARMACY: ICD-10-CM

## 2019-03-14 DIAGNOSIS — Z59.9 FINANCIAL DIFFICULTIES: ICD-10-CM

## 2019-03-14 DIAGNOSIS — Z75.8 LANGUAGE BARRIER AFFECTING HEALTH CARE: ICD-10-CM

## 2019-03-14 DIAGNOSIS — Z78.9 MEDICALLY COMPLEX PATIENT: ICD-10-CM

## 2019-03-14 DIAGNOSIS — I10 ESSENTIAL HYPERTENSION: ICD-10-CM

## 2019-03-14 DIAGNOSIS — R32 URINARY INCONTINENCE IN FEMALE: ICD-10-CM

## 2019-03-14 DIAGNOSIS — M25.50 POLYARTHRALGIA: ICD-10-CM

## 2019-03-14 SDOH — ECONOMIC STABILITY - INCOME SECURITY: PROBLEM RELATED TO HOUSING AND ECONOMIC CIRCUMSTANCES, UNSPECIFIED: Z59.9

## 2019-03-14 SDOH — SOCIAL STABILITY - SOCIAL INSECURITY: ACCULTURATION DIFFICULTY: Z60.3

## 2019-03-14 ASSESSMENT — MIFFLIN-ST. JEOR: SCORE: 964.36

## 2019-03-22 ENCOUNTER — COMMUNICATION - HEALTHEAST (OUTPATIENT)
Dept: FAMILY MEDICINE | Facility: CLINIC | Age: 79
End: 2019-03-22

## 2019-03-22 DIAGNOSIS — K59.03 DRUG-INDUCED CONSTIPATION: ICD-10-CM

## 2019-03-22 DIAGNOSIS — M81.0 OSTEOPOROSIS WITHOUT CURRENT PATHOLOGICAL FRACTURE, UNSPECIFIED OSTEOPOROSIS TYPE: ICD-10-CM

## 2019-03-22 DIAGNOSIS — I10 HTN (HYPERTENSION): ICD-10-CM

## 2019-03-26 ENCOUNTER — RECORDS - HEALTHEAST (OUTPATIENT)
Dept: ADMINISTRATIVE | Facility: OTHER | Age: 79
End: 2019-03-26

## 2019-04-02 ENCOUNTER — OFFICE VISIT - HEALTHEAST (OUTPATIENT)
Dept: FAMILY MEDICINE | Facility: CLINIC | Age: 79
End: 2019-04-02

## 2019-04-02 ENCOUNTER — RECORDS - HEALTHEAST (OUTPATIENT)
Dept: GENERAL RADIOLOGY | Facility: CLINIC | Age: 79
End: 2019-04-02

## 2019-04-02 DIAGNOSIS — R06.02 SHORT OF BREATH ON EXERTION: ICD-10-CM

## 2019-04-02 DIAGNOSIS — Z75.8 LANGUAGE BARRIER AFFECTING HEALTH CARE: ICD-10-CM

## 2019-04-02 DIAGNOSIS — Z60.3 LANGUAGE BARRIER AFFECTING HEALTH CARE: ICD-10-CM

## 2019-04-02 DIAGNOSIS — J98.4 RESTRICTIVE LUNG DISEASE: ICD-10-CM

## 2019-04-02 DIAGNOSIS — J18.9 COMMUNITY ACQUIRED PNEUMONIA OF RIGHT UPPER LOBE OF LUNG: ICD-10-CM

## 2019-04-02 DIAGNOSIS — R05.9 COUGH: ICD-10-CM

## 2019-04-02 DIAGNOSIS — Z79.899 POLYPHARMACY: ICD-10-CM

## 2019-04-02 DIAGNOSIS — Z78.9 MEDICALLY COMPLEX PATIENT: ICD-10-CM

## 2019-04-02 SDOH — SOCIAL STABILITY - SOCIAL INSECURITY: ACCULTURATION DIFFICULTY: Z60.3

## 2019-04-02 ASSESSMENT — MIFFLIN-ST. JEOR: SCORE: 943.04

## 2019-04-03 ENCOUNTER — COMMUNICATION - HEALTHEAST (OUTPATIENT)
Dept: FAMILY MEDICINE | Facility: CLINIC | Age: 79
End: 2019-04-03

## 2019-04-03 DIAGNOSIS — K21.9 GASTROESOPHAGEAL REFLUX DISEASE WITHOUT ESOPHAGITIS: ICD-10-CM

## 2019-04-04 ENCOUNTER — COMMUNICATION - HEALTHEAST (OUTPATIENT)
Dept: FAMILY MEDICINE | Facility: CLINIC | Age: 79
End: 2019-04-04

## 2019-04-16 ENCOUNTER — OFFICE VISIT - HEALTHEAST (OUTPATIENT)
Dept: FAMILY MEDICINE | Facility: CLINIC | Age: 79
End: 2019-04-16

## 2019-04-16 ENCOUNTER — COMMUNICATION - HEALTHEAST (OUTPATIENT)
Dept: FAMILY MEDICINE | Facility: CLINIC | Age: 79
End: 2019-04-16

## 2019-04-16 ENCOUNTER — AMBULATORY - HEALTHEAST (OUTPATIENT)
Dept: FAMILY MEDICINE | Facility: CLINIC | Age: 79
End: 2019-04-16

## 2019-04-16 ENCOUNTER — COMMUNICATION - HEALTHEAST (OUTPATIENT)
Dept: NURSING | Facility: CLINIC | Age: 79
End: 2019-04-16

## 2019-04-16 DIAGNOSIS — M81.0 AGE RELATED OSTEOPOROSIS, UNSPECIFIED PATHOLOGICAL FRACTURE PRESENCE: ICD-10-CM

## 2019-04-16 DIAGNOSIS — Z78.9 MEDICALLY COMPLEX PATIENT: ICD-10-CM

## 2019-04-16 DIAGNOSIS — J98.4 RESTRICTIVE LUNG DISEASE: ICD-10-CM

## 2019-04-16 DIAGNOSIS — M48.00 SPINAL STENOSIS, MULTILEVEL: ICD-10-CM

## 2019-04-16 DIAGNOSIS — R32 URINARY INCONTINENCE IN FEMALE: ICD-10-CM

## 2019-04-16 DIAGNOSIS — G89.4 CHRONIC PAIN SYNDROME: ICD-10-CM

## 2019-04-16 DIAGNOSIS — I10 ESSENTIAL HYPERTENSION: ICD-10-CM

## 2019-04-16 DIAGNOSIS — Z60.3 LANGUAGE BARRIER AFFECTING HEALTH CARE: ICD-10-CM

## 2019-04-16 DIAGNOSIS — Z75.8 LANGUAGE BARRIER AFFECTING HEALTH CARE: ICD-10-CM

## 2019-04-16 DIAGNOSIS — Z79.899 POLYPHARMACY: ICD-10-CM

## 2019-04-16 DIAGNOSIS — Z59.71 INSURANCE COVERAGE PROBLEMS: ICD-10-CM

## 2019-04-16 SDOH — SOCIAL STABILITY - SOCIAL INSECURITY: ACCULTURATION DIFFICULTY: Z60.3

## 2019-04-16 ASSESSMENT — MIFFLIN-ST. JEOR: SCORE: 948.49

## 2019-04-25 ENCOUNTER — COMMUNICATION - HEALTHEAST (OUTPATIENT)
Dept: FAMILY MEDICINE | Facility: CLINIC | Age: 79
End: 2019-04-25

## 2019-05-01 ENCOUNTER — COMMUNICATION - HEALTHEAST (OUTPATIENT)
Dept: FAMILY MEDICINE | Facility: CLINIC | Age: 79
End: 2019-05-01

## 2019-05-01 DIAGNOSIS — F51.04 PSYCHOPHYSIOLOGICAL INSOMNIA: ICD-10-CM

## 2019-05-01 DIAGNOSIS — R63.0 POOR APPETITE: ICD-10-CM

## 2019-05-02 ENCOUNTER — AMBULATORY - HEALTHEAST (OUTPATIENT)
Dept: INTERNAL MEDICINE | Facility: CLINIC | Age: 79
End: 2019-05-02

## 2019-05-02 DIAGNOSIS — M81.0 OSTEOPOROSIS: ICD-10-CM

## 2019-05-03 ENCOUNTER — COMMUNICATION - HEALTHEAST (OUTPATIENT)
Dept: INTERNAL MEDICINE | Facility: CLINIC | Age: 79
End: 2019-05-03

## 2019-05-07 ENCOUNTER — AMBULATORY - HEALTHEAST (OUTPATIENT)
Dept: NURSING | Facility: CLINIC | Age: 79
End: 2019-05-07

## 2019-05-14 ENCOUNTER — OFFICE VISIT - HEALTHEAST (OUTPATIENT)
Dept: FAMILY MEDICINE | Facility: CLINIC | Age: 79
End: 2019-05-14

## 2019-05-14 DIAGNOSIS — J98.4 RESTRICTIVE LUNG DISEASE: ICD-10-CM

## 2019-05-14 DIAGNOSIS — M50.30 DEGENERATIVE DISC DISEASE, CERVICAL: ICD-10-CM

## 2019-05-14 DIAGNOSIS — M48.00 SPINAL STENOSIS, MULTILEVEL: ICD-10-CM

## 2019-05-14 DIAGNOSIS — I10 ESSENTIAL HYPERTENSION: ICD-10-CM

## 2019-05-14 DIAGNOSIS — M80.00XD AGE-RELATED OSTEOPOROSIS WITH CURRENT PATHOLOGICAL FRACTURE WITH ROUTINE HEALING: ICD-10-CM

## 2019-05-14 DIAGNOSIS — Z75.8 LANGUAGE BARRIER AFFECTING HEALTH CARE: ICD-10-CM

## 2019-05-14 DIAGNOSIS — K59.03 DRUG-INDUCED CONSTIPATION: ICD-10-CM

## 2019-05-14 DIAGNOSIS — G89.4 CHRONIC PAIN SYNDROME: ICD-10-CM

## 2019-05-14 DIAGNOSIS — Z59.9 FINANCIAL DIFFICULTIES: ICD-10-CM

## 2019-05-14 DIAGNOSIS — Z79.899 POLYPHARMACY: ICD-10-CM

## 2019-05-14 DIAGNOSIS — E55.9 VITAMIN D DEFICIENCY: ICD-10-CM

## 2019-05-14 DIAGNOSIS — Z60.3 LANGUAGE BARRIER AFFECTING HEALTH CARE: ICD-10-CM

## 2019-05-14 SDOH — SOCIAL STABILITY - SOCIAL INSECURITY: ACCULTURATION DIFFICULTY: Z60.3

## 2019-05-14 SDOH — ECONOMIC STABILITY - INCOME SECURITY: PROBLEM RELATED TO HOUSING AND ECONOMIC CIRCUMSTANCES, UNSPECIFIED: Z59.9

## 2019-05-14 ASSESSMENT — MIFFLIN-ST. JEOR: SCORE: 947.11

## 2019-05-20 ENCOUNTER — RECORDS - HEALTHEAST (OUTPATIENT)
Dept: ADMINISTRATIVE | Facility: OTHER | Age: 79
End: 2019-05-20

## 2019-05-29 ENCOUNTER — COMMUNICATION - HEALTHEAST (OUTPATIENT)
Dept: FAMILY MEDICINE | Facility: CLINIC | Age: 79
End: 2019-05-29

## 2019-05-29 DIAGNOSIS — R63.0 POOR APPETITE: ICD-10-CM

## 2019-05-29 DIAGNOSIS — F51.04 PSYCHOPHYSIOLOGICAL INSOMNIA: ICD-10-CM

## 2019-05-30 ENCOUNTER — RECORDS - HEALTHEAST (OUTPATIENT)
Dept: ADMINISTRATIVE | Facility: OTHER | Age: 79
End: 2019-05-30

## 2019-06-28 ENCOUNTER — COMMUNICATION - HEALTHEAST (OUTPATIENT)
Dept: PALLIATIVE MEDICINE | Facility: OTHER | Age: 79
End: 2019-06-28

## 2019-07-09 ENCOUNTER — RECORDS - HEALTHEAST (OUTPATIENT)
Dept: ADMINISTRATIVE | Facility: OTHER | Age: 79
End: 2019-07-09

## 2019-07-11 ENCOUNTER — OFFICE VISIT - HEALTHEAST (OUTPATIENT)
Dept: FAMILY MEDICINE | Facility: CLINIC | Age: 79
End: 2019-07-11

## 2019-07-11 DIAGNOSIS — J98.4 RESTRICTIVE LUNG DISEASE: ICD-10-CM

## 2019-07-11 DIAGNOSIS — Z60.3 LANGUAGE BARRIER AFFECTING HEALTH CARE: ICD-10-CM

## 2019-07-11 DIAGNOSIS — E55.9 VITAMIN D DEFICIENCY: ICD-10-CM

## 2019-07-11 DIAGNOSIS — Z75.8 LANGUAGE BARRIER AFFECTING HEALTH CARE: ICD-10-CM

## 2019-07-11 DIAGNOSIS — M48.00 SPINAL STENOSIS, MULTILEVEL: ICD-10-CM

## 2019-07-11 DIAGNOSIS — M70.61 TROCHANTERIC BURSITIS OF BOTH HIPS: ICD-10-CM

## 2019-07-11 DIAGNOSIS — M80.00XA AGE-RELATED OSTEOPOROSIS WITH CURRENT PATHOLOGICAL FRACTURE, INITIAL ENCOUNTER: ICD-10-CM

## 2019-07-11 DIAGNOSIS — R32 URINARY INCONTINENCE IN FEMALE: ICD-10-CM

## 2019-07-11 DIAGNOSIS — M80.00XD AGE-RELATED OSTEOPOROSIS WITH CURRENT PATHOLOGICAL FRACTURE WITH ROUTINE HEALING: ICD-10-CM

## 2019-07-11 DIAGNOSIS — M25.50 POLYARTHRALGIA: ICD-10-CM

## 2019-07-11 DIAGNOSIS — M70.62 TROCHANTERIC BURSITIS OF BOTH HIPS: ICD-10-CM

## 2019-07-11 DIAGNOSIS — I10 ESSENTIAL HYPERTENSION: ICD-10-CM

## 2019-07-11 DIAGNOSIS — G89.4 CHRONIC PAIN SYNDROME: ICD-10-CM

## 2019-07-11 DIAGNOSIS — K59.03 DRUG-INDUCED CONSTIPATION: ICD-10-CM

## 2019-07-11 DIAGNOSIS — Z78.9 MEDICALLY COMPLEX PATIENT: ICD-10-CM

## 2019-07-11 SDOH — SOCIAL STABILITY - SOCIAL INSECURITY: ACCULTURATION DIFFICULTY: Z60.3

## 2019-07-11 ASSESSMENT — MIFFLIN-ST. JEOR: SCORE: 937.8

## 2019-08-08 ENCOUNTER — OFFICE VISIT - HEALTHEAST (OUTPATIENT)
Dept: FAMILY MEDICINE | Facility: CLINIC | Age: 79
End: 2019-08-08

## 2019-08-08 ENCOUNTER — COMMUNICATION - HEALTHEAST (OUTPATIENT)
Dept: CARE COORDINATION | Facility: CLINIC | Age: 79
End: 2019-08-08

## 2019-08-08 DIAGNOSIS — M70.60 TROCHANTERIC BURSITIS, UNSPECIFIED LATERALITY: ICD-10-CM

## 2019-08-08 DIAGNOSIS — M48.00 SPINAL STENOSIS, MULTILEVEL: ICD-10-CM

## 2019-08-08 DIAGNOSIS — Z59.71 INSURANCE COVERAGE PROBLEMS: ICD-10-CM

## 2019-08-08 DIAGNOSIS — Z79.899 POLYPHARMACY: ICD-10-CM

## 2019-08-08 DIAGNOSIS — I10 ESSENTIAL HYPERTENSION: ICD-10-CM

## 2019-08-08 DIAGNOSIS — M80.00XG AGE-RELATED OSTEOPOROSIS WITH CURRENT PATHOLOGICAL FRACTURE WITH DELAYED HEALING, SUBSEQUENT ENCOUNTER: ICD-10-CM

## 2019-08-08 DIAGNOSIS — K59.03 DRUG-INDUCED CONSTIPATION: ICD-10-CM

## 2019-08-08 DIAGNOSIS — M50.30 DEGENERATIVE DISC DISEASE, CERVICAL: ICD-10-CM

## 2019-08-08 DIAGNOSIS — E55.9 VITAMIN D DEFICIENCY: ICD-10-CM

## 2019-08-08 DIAGNOSIS — G89.4 CHRONIC PAIN SYNDROME: ICD-10-CM

## 2019-08-08 DIAGNOSIS — F11.20 OPIOID DEPENDENCE, EPISODIC (H): ICD-10-CM

## 2019-08-08 ASSESSMENT — MIFFLIN-ST. JEOR: SCORE: 934.46

## 2019-08-09 ENCOUNTER — COMMUNICATION - HEALTHEAST (OUTPATIENT)
Dept: NURSING | Facility: CLINIC | Age: 79
End: 2019-08-09

## 2019-08-15 ENCOUNTER — COMMUNICATION - HEALTHEAST (OUTPATIENT)
Dept: GERIATRIC MEDICINE | Facility: CLINIC | Age: 79
End: 2019-08-15

## 2019-08-19 ENCOUNTER — OFFICE VISIT - HEALTHEAST (OUTPATIENT)
Dept: PULMONOLOGY | Facility: OTHER | Age: 79
End: 2019-08-19

## 2019-08-19 DIAGNOSIS — J84.10 PULMONARY FIBROSIS (H): ICD-10-CM

## 2019-08-19 ASSESSMENT — MIFFLIN-ST. JEOR: SCORE: 921.08

## 2019-08-22 ENCOUNTER — AMBULATORY - HEALTHEAST (OUTPATIENT)
Dept: CARE COORDINATION | Facility: CLINIC | Age: 79
End: 2019-08-22

## 2019-08-22 DIAGNOSIS — Z71.89 COUNSELING AND COORDINATION OF CARE: ICD-10-CM

## 2019-08-30 ENCOUNTER — COMMUNICATION - HEALTHEAST (OUTPATIENT)
Dept: GERIATRIC MEDICINE | Facility: CLINIC | Age: 79
End: 2019-08-30

## 2019-09-09 ENCOUNTER — COMMUNICATION - HEALTHEAST (OUTPATIENT)
Dept: NURSING | Facility: CLINIC | Age: 79
End: 2019-09-09

## 2019-09-12 ENCOUNTER — OFFICE VISIT - HEALTHEAST (OUTPATIENT)
Dept: FAMILY MEDICINE | Facility: CLINIC | Age: 79
End: 2019-09-12

## 2019-09-12 ENCOUNTER — COMMUNICATION - HEALTHEAST (OUTPATIENT)
Dept: FAMILY MEDICINE | Facility: CLINIC | Age: 79
End: 2019-09-12

## 2019-09-12 DIAGNOSIS — M48.00 SPINAL STENOSIS, MULTILEVEL: ICD-10-CM

## 2019-09-12 DIAGNOSIS — G89.4 CHRONIC PAIN SYNDROME: ICD-10-CM

## 2019-09-12 DIAGNOSIS — Z60.3 LANGUAGE BARRIER AFFECTING HEALTH CARE: ICD-10-CM

## 2019-09-12 DIAGNOSIS — Z59.71 INSURANCE COVERAGE PROBLEMS: ICD-10-CM

## 2019-09-12 DIAGNOSIS — Z23 NEED FOR IMMUNIZATION AGAINST INFLUENZA: ICD-10-CM

## 2019-09-12 DIAGNOSIS — I10 ESSENTIAL HYPERTENSION: ICD-10-CM

## 2019-09-12 DIAGNOSIS — Z75.8 LANGUAGE BARRIER AFFECTING HEALTH CARE: ICD-10-CM

## 2019-09-12 DIAGNOSIS — M80.00XD AGE-RELATED OSTEOPOROSIS WITH CURRENT PATHOLOGICAL FRACTURE WITH ROUTINE HEALING: ICD-10-CM

## 2019-09-12 DIAGNOSIS — K59.03 DRUG-INDUCED CONSTIPATION: ICD-10-CM

## 2019-09-12 DIAGNOSIS — Z78.9 MEDICALLY COMPLEX PATIENT: ICD-10-CM

## 2019-09-12 DIAGNOSIS — Z79.899 POLYPHARMACY: ICD-10-CM

## 2019-09-12 DIAGNOSIS — E55.9 VITAMIN D DEFICIENCY: ICD-10-CM

## 2019-09-12 DIAGNOSIS — M25.50 POLYARTHRALGIA: ICD-10-CM

## 2019-09-12 DIAGNOSIS — Z59.9 FINANCIAL DIFFICULTIES: ICD-10-CM

## 2019-09-12 SDOH — SOCIAL STABILITY - SOCIAL INSECURITY: ACCULTURATION DIFFICULTY: Z60.3

## 2019-09-12 SDOH — ECONOMIC STABILITY - INCOME SECURITY: PROBLEM RELATED TO HOUSING AND ECONOMIC CIRCUMSTANCES, UNSPECIFIED: Z59.9

## 2019-09-12 ASSESSMENT — MIFFLIN-ST. JEOR: SCORE: 925.9

## 2019-09-13 ENCOUNTER — AMBULATORY - HEALTHEAST (OUTPATIENT)
Dept: NURSING | Facility: CLINIC | Age: 79
End: 2019-09-13

## 2019-09-17 ENCOUNTER — AMBULATORY - HEALTHEAST (OUTPATIENT)
Dept: FAMILY MEDICINE | Facility: CLINIC | Age: 79
End: 2019-09-17

## 2019-09-17 DIAGNOSIS — G89.29 OTHER CHRONIC PAIN: ICD-10-CM

## 2019-09-17 DIAGNOSIS — M48.00 SPINAL STENOSIS, MULTILEVEL: ICD-10-CM

## 2019-09-25 ENCOUNTER — COMMUNICATION - HEALTHEAST (OUTPATIENT)
Dept: CARE COORDINATION | Facility: CLINIC | Age: 79
End: 2019-09-25

## 2019-10-10 ENCOUNTER — OFFICE VISIT - HEALTHEAST (OUTPATIENT)
Dept: FAMILY MEDICINE | Facility: CLINIC | Age: 79
End: 2019-10-10

## 2019-10-10 DIAGNOSIS — Z60.3 LANGUAGE BARRIER AFFECTING HEALTH CARE: ICD-10-CM

## 2019-10-10 DIAGNOSIS — Z75.8 LANGUAGE BARRIER AFFECTING HEALTH CARE: ICD-10-CM

## 2019-10-10 DIAGNOSIS — G89.29 OTHER CHRONIC PAIN: ICD-10-CM

## 2019-10-10 DIAGNOSIS — G89.4 CHRONIC PAIN SYNDROME: ICD-10-CM

## 2019-10-10 DIAGNOSIS — M48.00 SPINAL STENOSIS, MULTILEVEL: ICD-10-CM

## 2019-10-10 DIAGNOSIS — Z79.899 POLYPHARMACY: ICD-10-CM

## 2019-10-10 DIAGNOSIS — Z59.71 INSURANCE COVERAGE PROBLEMS: ICD-10-CM

## 2019-10-10 DIAGNOSIS — I10 ESSENTIAL HYPERTENSION: ICD-10-CM

## 2019-10-10 DIAGNOSIS — E55.9 VITAMIN D DEFICIENCY: ICD-10-CM

## 2019-10-10 DIAGNOSIS — M25.50 POLYARTHRALGIA: ICD-10-CM

## 2019-10-10 DIAGNOSIS — K59.03 DRUG-INDUCED CONSTIPATION: ICD-10-CM

## 2019-10-10 SDOH — SOCIAL STABILITY - SOCIAL INSECURITY: ACCULTURATION DIFFICULTY: Z60.3

## 2019-10-10 ASSESSMENT — MIFFLIN-ST. JEOR: SCORE: 929.53

## 2019-10-29 ENCOUNTER — COMMUNICATION - HEALTHEAST (OUTPATIENT)
Dept: FAMILY MEDICINE | Facility: CLINIC | Age: 79
End: 2019-10-29

## 2019-10-29 DIAGNOSIS — K21.9 GERD (GASTROESOPHAGEAL REFLUX DISEASE): ICD-10-CM

## 2019-11-06 ENCOUNTER — COMMUNICATION - HEALTHEAST (OUTPATIENT)
Dept: INTERNAL MEDICINE | Facility: CLINIC | Age: 79
End: 2019-11-06

## 2019-11-07 ENCOUNTER — RECORDS - HEALTHEAST (OUTPATIENT)
Dept: GENERAL RADIOLOGY | Facility: CLINIC | Age: 79
End: 2019-11-07

## 2019-11-07 ENCOUNTER — COMMUNICATION - HEALTHEAST (OUTPATIENT)
Dept: INTERNAL MEDICINE | Facility: CLINIC | Age: 79
End: 2019-11-07

## 2019-11-07 ENCOUNTER — OFFICE VISIT - HEALTHEAST (OUTPATIENT)
Dept: FAMILY MEDICINE | Facility: CLINIC | Age: 79
End: 2019-11-07

## 2019-11-07 DIAGNOSIS — M54.6 ACUTE LEFT-SIDED THORACIC BACK PAIN: ICD-10-CM

## 2019-11-07 DIAGNOSIS — D50.8 IRON DEFICIENCY ANEMIA SECONDARY TO INADEQUATE DIETARY IRON INTAKE: ICD-10-CM

## 2019-11-07 DIAGNOSIS — M81.0 OSTEOPOROSIS: ICD-10-CM

## 2019-11-07 DIAGNOSIS — R10.814 LEFT LOWER QUADRANT ABDOMINAL TENDERNESS WITHOUT REBOUND TENDERNESS: ICD-10-CM

## 2019-11-07 DIAGNOSIS — R09.89 OTHER SPECIFIED SYMPTOMS AND SIGNS INVOLVING THE CIRCULATORY AND RESPIRATORY SYSTEMS: ICD-10-CM

## 2019-11-07 DIAGNOSIS — Z79.899 POLYPHARMACY: ICD-10-CM

## 2019-11-07 DIAGNOSIS — R05.9 COUGH: ICD-10-CM

## 2019-11-07 DIAGNOSIS — Z92.29 PERSONAL HISTORY OF OTHER DRUG THERAPY: ICD-10-CM

## 2019-11-07 DIAGNOSIS — I10 ESSENTIAL HYPERTENSION: ICD-10-CM

## 2019-11-07 DIAGNOSIS — M79.89 SOFT TISSUE MASS: ICD-10-CM

## 2019-11-07 DIAGNOSIS — R10.814 LEFT LOWER QUADRANT ABDOMINAL TENDERNESS: ICD-10-CM

## 2019-11-07 DIAGNOSIS — J18.9 COMMUNITY ACQUIRED PNEUMONIA, UNSPECIFIED LATERALITY: ICD-10-CM

## 2019-11-07 DIAGNOSIS — R09.89 RHONCHI AT LEFT LUNG BASE: ICD-10-CM

## 2019-11-07 DIAGNOSIS — K59.03 DRUG-INDUCED CONSTIPATION: ICD-10-CM

## 2019-11-07 DIAGNOSIS — J02.9 SORE THROAT: ICD-10-CM

## 2019-11-07 DIAGNOSIS — E55.9 VITAMIN D DEFICIENCY: ICD-10-CM

## 2019-11-07 DIAGNOSIS — J98.4 RESTRICTIVE LUNG DISEASE: ICD-10-CM

## 2019-11-07 DIAGNOSIS — M79.89 OTHER SPECIFIED SOFT TISSUE DISORDERS: ICD-10-CM

## 2019-11-07 DIAGNOSIS — M54.50 BILATERAL LOW BACK PAIN, UNSPECIFIED CHRONICITY, UNSPECIFIED WHETHER SCIATICA PRESENT: ICD-10-CM

## 2019-11-07 DIAGNOSIS — R10.9 ACUTE LEFT FLANK PAIN: ICD-10-CM

## 2019-11-07 LAB
ALBUMIN SERPL-MCNC: 3.5 G/DL (ref 3.5–5)
ALBUMIN UR-MCNC: NEGATIVE MG/DL
ALP SERPL-CCNC: 101 U/L (ref 45–120)
ALT SERPL W P-5'-P-CCNC: <9 U/L (ref 0–45)
ANION GAP SERPL CALCULATED.3IONS-SCNC: 12 MMOL/L (ref 5–18)
APPEARANCE UR: CLEAR
AST SERPL W P-5'-P-CCNC: 17 U/L (ref 0–40)
BASOPHILS # BLD AUTO: 0 THOU/UL (ref 0–0.2)
BASOPHILS NFR BLD AUTO: 0 % (ref 0–2)
BILIRUB SERPL-MCNC: 0.5 MG/DL (ref 0–1)
BILIRUB UR QL STRIP: NEGATIVE
BUN SERPL-MCNC: 19 MG/DL (ref 8–28)
CALCIUM SERPL-MCNC: 9.7 MG/DL (ref 8.5–10.5)
CHLORIDE BLD-SCNC: 99 MMOL/L (ref 98–107)
CO2 SERPL-SCNC: 30 MMOL/L (ref 22–31)
COLOR UR AUTO: YELLOW
CREAT SERPL-MCNC: 0.96 MG/DL (ref 0.6–1.1)
DEPRECATED S PYO AG THROAT QL EIA: NORMAL
EOSINOPHIL # BLD AUTO: 0.2 THOU/UL (ref 0–0.4)
EOSINOPHIL NFR BLD AUTO: 2 % (ref 0–6)
ERYTHROCYTE [DISTWIDTH] IN BLOOD BY AUTOMATED COUNT: 14.3 % (ref 11–14.5)
GFR SERPL CREATININE-BSD FRML MDRD: 56 ML/MIN/1.73M2
GLUCOSE BLD-MCNC: 98 MG/DL (ref 70–125)
GLUCOSE UR STRIP-MCNC: NEGATIVE MG/DL
HCT VFR BLD AUTO: 40.9 % (ref 35–47)
HGB BLD-MCNC: 11.8 G/DL (ref 12–16)
HGB UR QL STRIP: NEGATIVE
KETONES UR STRIP-MCNC: NEGATIVE MG/DL
LEUKOCYTE ESTERASE UR QL STRIP: NEGATIVE
LYMPHOCYTES # BLD AUTO: 2.4 THOU/UL (ref 0.8–4.4)
LYMPHOCYTES NFR BLD AUTO: 26 % (ref 20–40)
MCH RBC QN AUTO: 25.2 PG (ref 27–34)
MCHC RBC AUTO-ENTMCNC: 28.9 G/DL (ref 32–36)
MCV RBC AUTO: 87 FL (ref 80–100)
MONOCYTES # BLD AUTO: 0.6 THOU/UL (ref 0–0.9)
MONOCYTES NFR BLD AUTO: 7 % (ref 2–10)
NEUTROPHILS # BLD AUTO: 6 THOU/UL (ref 2–7.7)
NEUTROPHILS NFR BLD AUTO: 65 % (ref 50–70)
NITRATE UR QL: NEGATIVE
PH UR STRIP: 5.5 [PH] (ref 5–8)
PLATELET # BLD AUTO: 266 THOU/UL (ref 140–440)
PMV BLD AUTO: 12 FL (ref 8.5–12.5)
POTASSIUM BLD-SCNC: 4.7 MMOL/L (ref 3.5–5)
PROT SERPL-MCNC: 8 G/DL (ref 6–8)
RBC # BLD AUTO: 4.69 MILL/UL (ref 3.8–5.4)
SODIUM SERPL-SCNC: 141 MMOL/L (ref 136–145)
SP GR UR STRIP: 1.02 (ref 1–1.03)
UROBILINOGEN UR STRIP-ACNC: NORMAL
WBC: 9.4 THOU/UL (ref 4–11)

## 2019-11-07 ASSESSMENT — MIFFLIN-ST. JEOR: SCORE: 920

## 2019-11-08 ENCOUNTER — AMBULATORY - HEALTHEAST (OUTPATIENT)
Dept: NURSING | Facility: CLINIC | Age: 79
End: 2019-11-08

## 2019-11-08 LAB — GROUP A STREP BY PCR: NORMAL

## 2019-11-22 ENCOUNTER — COMMUNICATION - HEALTHEAST (OUTPATIENT)
Dept: FAMILY MEDICINE | Facility: CLINIC | Age: 79
End: 2019-11-22

## 2019-11-22 DIAGNOSIS — E55.9 VITAMIN D DEFICIENCY: ICD-10-CM

## 2019-12-06 ENCOUNTER — COMMUNICATION - HEALTHEAST (OUTPATIENT)
Dept: SCHEDULING | Facility: CLINIC | Age: 79
End: 2019-12-06

## 2019-12-06 ENCOUNTER — AMBULATORY - HEALTHEAST (OUTPATIENT)
Dept: FAMILY MEDICINE | Facility: CLINIC | Age: 79
End: 2019-12-06

## 2019-12-06 DIAGNOSIS — I10 ESSENTIAL HYPERTENSION: ICD-10-CM

## 2019-12-09 ENCOUNTER — OFFICE VISIT - HEALTHEAST (OUTPATIENT)
Dept: FAMILY MEDICINE | Facility: CLINIC | Age: 79
End: 2019-12-09

## 2019-12-09 DIAGNOSIS — M48.062 SPINAL STENOSIS OF LUMBAR REGION WITH NEUROGENIC CLAUDICATION: ICD-10-CM

## 2019-12-09 DIAGNOSIS — M17.0 BILATERAL PRIMARY OSTEOARTHRITIS OF KNEE: ICD-10-CM

## 2019-12-09 DIAGNOSIS — G89.4 CHRONIC PAIN SYNDROME: ICD-10-CM

## 2019-12-09 DIAGNOSIS — M80.00XD AGE-RELATED OSTEOPOROSIS WITH CURRENT PATHOLOGICAL FRACTURE WITH ROUTINE HEALING: ICD-10-CM

## 2019-12-09 DIAGNOSIS — M54.50 CHRONIC LOW BACK PAIN, UNSPECIFIED BACK PAIN LATERALITY, UNSPECIFIED WHETHER SCIATICA PRESENT: ICD-10-CM

## 2019-12-09 DIAGNOSIS — G89.29 CHRONIC LOW BACK PAIN, UNSPECIFIED BACK PAIN LATERALITY, UNSPECIFIED WHETHER SCIATICA PRESENT: ICD-10-CM

## 2019-12-09 DIAGNOSIS — K59.03 DRUG-INDUCED CONSTIPATION: ICD-10-CM

## 2019-12-09 DIAGNOSIS — Z79.899 POLYPHARMACY: ICD-10-CM

## 2019-12-09 DIAGNOSIS — Z60.3 LANGUAGE BARRIER AFFECTING HEALTH CARE: ICD-10-CM

## 2019-12-09 DIAGNOSIS — Z75.8 LANGUAGE BARRIER AFFECTING HEALTH CARE: ICD-10-CM

## 2019-12-09 DIAGNOSIS — J98.4 RESTRICTIVE LUNG DISEASE: ICD-10-CM

## 2019-12-09 SDOH — SOCIAL STABILITY - SOCIAL INSECURITY: ACCULTURATION DIFFICULTY: Z60.3

## 2019-12-09 ASSESSMENT — MIFFLIN-ST. JEOR: SCORE: 926.8

## 2019-12-16 ENCOUNTER — COMMUNICATION - HEALTHEAST (OUTPATIENT)
Dept: GERIATRIC MEDICINE | Facility: CLINIC | Age: 79
End: 2019-12-16

## 2020-01-03 ENCOUNTER — COMMUNICATION - HEALTHEAST (OUTPATIENT)
Dept: GERIATRIC MEDICINE | Facility: CLINIC | Age: 80
End: 2020-01-03

## 2020-01-04 ENCOUNTER — COMMUNICATION - HEALTHEAST (OUTPATIENT)
Dept: FAMILY MEDICINE | Facility: CLINIC | Age: 80
End: 2020-01-04

## 2020-01-04 DIAGNOSIS — M50.30 DEGENERATIVE DISC DISEASE, CERVICAL: ICD-10-CM

## 2020-01-22 ENCOUNTER — COMMUNICATION - HEALTHEAST (OUTPATIENT)
Dept: GERIATRIC MEDICINE | Facility: CLINIC | Age: 80
End: 2020-01-22

## 2020-01-30 ASSESSMENT — ACTIVITIES OF DAILY LIVING (ADL)
DEPENDENT_IADLS:: CLEANING;COOKING;LAUNDRY;SHOPPING;MEAL PREPARATION;MEDICATION MANAGEMENT;MONEY MANAGEMENT;TRANSPORTATION

## 2020-01-31 ENCOUNTER — COMMUNICATION - HEALTHEAST (OUTPATIENT)
Dept: GERIATRIC MEDICINE | Facility: CLINIC | Age: 80
End: 2020-01-31

## 2020-02-27 ENCOUNTER — COMMUNICATION - HEALTHEAST (OUTPATIENT)
Dept: FAMILY MEDICINE | Facility: CLINIC | Age: 80
End: 2020-02-27

## 2020-02-27 DIAGNOSIS — M48.00 SPINAL STENOSIS, MULTILEVEL: ICD-10-CM

## 2020-02-27 DIAGNOSIS — M17.0 BILATERAL PRIMARY OSTEOARTHRITIS OF KNEE: ICD-10-CM

## 2020-02-27 DIAGNOSIS — M54.50 CHRONIC LOW BACK PAIN, UNSPECIFIED BACK PAIN LATERALITY, UNSPECIFIED WHETHER SCIATICA PRESENT: ICD-10-CM

## 2020-02-27 DIAGNOSIS — M48.062 SPINAL STENOSIS OF LUMBAR REGION WITH NEUROGENIC CLAUDICATION: ICD-10-CM

## 2020-02-27 DIAGNOSIS — G89.4 CHRONIC PAIN SYNDROME: ICD-10-CM

## 2020-02-27 DIAGNOSIS — G89.29 CHRONIC LOW BACK PAIN, UNSPECIFIED BACK PAIN LATERALITY, UNSPECIFIED WHETHER SCIATICA PRESENT: ICD-10-CM

## 2020-02-27 DIAGNOSIS — M80.00XD AGE-RELATED OSTEOPOROSIS WITH CURRENT PATHOLOGICAL FRACTURE WITH ROUTINE HEALING: ICD-10-CM

## 2020-02-28 ENCOUNTER — COMMUNICATION - HEALTHEAST (OUTPATIENT)
Dept: FAMILY MEDICINE | Facility: CLINIC | Age: 80
End: 2020-02-28

## 2020-02-28 DIAGNOSIS — K21.9 GASTROESOPHAGEAL REFLUX DISEASE WITHOUT ESOPHAGITIS: ICD-10-CM

## 2020-03-20 ENCOUNTER — COMMUNICATION - HEALTHEAST (OUTPATIENT)
Dept: FAMILY MEDICINE | Facility: CLINIC | Age: 80
End: 2020-03-20

## 2020-03-20 DIAGNOSIS — K21.9 GASTROESOPHAGEAL REFLUX DISEASE WITHOUT ESOPHAGITIS: ICD-10-CM

## 2020-03-26 ENCOUNTER — COMMUNICATION - HEALTHEAST (OUTPATIENT)
Dept: FAMILY MEDICINE | Facility: CLINIC | Age: 80
End: 2020-03-26

## 2020-03-26 DIAGNOSIS — G89.4 CHRONIC PAIN SYNDROME: ICD-10-CM

## 2020-03-26 DIAGNOSIS — M17.0 BILATERAL PRIMARY OSTEOARTHRITIS OF KNEE: ICD-10-CM

## 2020-03-26 DIAGNOSIS — M80.00XD AGE-RELATED OSTEOPOROSIS WITH CURRENT PATHOLOGICAL FRACTURE WITH ROUTINE HEALING: ICD-10-CM

## 2020-03-26 DIAGNOSIS — M48.00 SPINAL STENOSIS, MULTILEVEL: ICD-10-CM

## 2020-03-26 DIAGNOSIS — M54.50 CHRONIC LOW BACK PAIN, UNSPECIFIED BACK PAIN LATERALITY, UNSPECIFIED WHETHER SCIATICA PRESENT: ICD-10-CM

## 2020-03-26 DIAGNOSIS — G89.29 CHRONIC LOW BACK PAIN, UNSPECIFIED BACK PAIN LATERALITY, UNSPECIFIED WHETHER SCIATICA PRESENT: ICD-10-CM

## 2020-03-26 DIAGNOSIS — M48.062 SPINAL STENOSIS OF LUMBAR REGION WITH NEUROGENIC CLAUDICATION: ICD-10-CM

## 2020-03-26 DIAGNOSIS — I10 ESSENTIAL HYPERTENSION: ICD-10-CM

## 2020-04-07 ENCOUNTER — COMMUNICATION - HEALTHEAST (OUTPATIENT)
Dept: GERIATRIC MEDICINE | Facility: CLINIC | Age: 80
End: 2020-04-07

## 2020-04-10 ENCOUNTER — COMMUNICATION - HEALTHEAST (OUTPATIENT)
Dept: GERIATRIC MEDICINE | Facility: CLINIC | Age: 80
End: 2020-04-10

## 2020-04-22 ENCOUNTER — COMMUNICATION - HEALTHEAST (OUTPATIENT)
Dept: FAMILY MEDICINE | Facility: CLINIC | Age: 80
End: 2020-04-22

## 2020-04-22 DIAGNOSIS — M17.0 BILATERAL PRIMARY OSTEOARTHRITIS OF KNEE: ICD-10-CM

## 2020-04-22 DIAGNOSIS — G89.29 CHRONIC LOW BACK PAIN, UNSPECIFIED BACK PAIN LATERALITY, UNSPECIFIED WHETHER SCIATICA PRESENT: ICD-10-CM

## 2020-04-22 DIAGNOSIS — G89.4 CHRONIC PAIN SYNDROME: ICD-10-CM

## 2020-04-22 DIAGNOSIS — M80.00XD AGE-RELATED OSTEOPOROSIS WITH CURRENT PATHOLOGICAL FRACTURE WITH ROUTINE HEALING: ICD-10-CM

## 2020-04-22 DIAGNOSIS — M48.062 SPINAL STENOSIS OF LUMBAR REGION WITH NEUROGENIC CLAUDICATION: ICD-10-CM

## 2020-04-22 DIAGNOSIS — M54.50 CHRONIC LOW BACK PAIN, UNSPECIFIED BACK PAIN LATERALITY, UNSPECIFIED WHETHER SCIATICA PRESENT: ICD-10-CM

## 2020-04-23 ENCOUNTER — COMMUNICATION - HEALTHEAST (OUTPATIENT)
Dept: FAMILY MEDICINE | Facility: CLINIC | Age: 80
End: 2020-04-23

## 2020-04-23 DIAGNOSIS — K21.9 GASTROESOPHAGEAL REFLUX DISEASE WITHOUT ESOPHAGITIS: ICD-10-CM

## 2020-04-23 DIAGNOSIS — M50.30 DEGENERATIVE DISC DISEASE, CERVICAL: ICD-10-CM

## 2020-04-23 DIAGNOSIS — I10 ESSENTIAL HYPERTENSION: ICD-10-CM

## 2020-05-04 ENCOUNTER — COMMUNICATION - HEALTHEAST (OUTPATIENT)
Dept: FAMILY MEDICINE | Facility: CLINIC | Age: 80
End: 2020-05-04

## 2020-05-07 ENCOUNTER — COMMUNICATION - HEALTHEAST (OUTPATIENT)
Dept: FAMILY MEDICINE | Facility: CLINIC | Age: 80
End: 2020-05-07

## 2020-05-07 DIAGNOSIS — M48.00 SPINAL STENOSIS, MULTILEVEL: ICD-10-CM

## 2020-05-07 DIAGNOSIS — K59.03 DRUG-INDUCED CONSTIPATION: ICD-10-CM

## 2020-05-14 ENCOUNTER — OFFICE VISIT - HEALTHEAST (OUTPATIENT)
Dept: FAMILY MEDICINE | Facility: CLINIC | Age: 80
End: 2020-05-14

## 2020-05-14 DIAGNOSIS — Z79.899 POLYPHARMACY: ICD-10-CM

## 2020-05-14 DIAGNOSIS — J98.4 RESTRICTIVE LUNG DISEASE: ICD-10-CM

## 2020-05-14 DIAGNOSIS — I10 ESSENTIAL HYPERTENSION: ICD-10-CM

## 2020-05-14 DIAGNOSIS — G89.4 CHRONIC PAIN SYNDROME: ICD-10-CM

## 2020-05-14 DIAGNOSIS — M54.50 CHRONIC LOW BACK PAIN, UNSPECIFIED BACK PAIN LATERALITY, UNSPECIFIED WHETHER SCIATICA PRESENT: ICD-10-CM

## 2020-05-14 DIAGNOSIS — G89.29 CHRONIC LOW BACK PAIN, UNSPECIFIED BACK PAIN LATERALITY, UNSPECIFIED WHETHER SCIATICA PRESENT: ICD-10-CM

## 2020-05-20 ENCOUNTER — COMMUNICATION - HEALTHEAST (OUTPATIENT)
Dept: FAMILY MEDICINE | Facility: CLINIC | Age: 80
End: 2020-05-20

## 2020-05-20 DIAGNOSIS — M80.00XD AGE-RELATED OSTEOPOROSIS WITH CURRENT PATHOLOGICAL FRACTURE WITH ROUTINE HEALING: ICD-10-CM

## 2020-05-20 DIAGNOSIS — M17.0 BILATERAL PRIMARY OSTEOARTHRITIS OF KNEE: ICD-10-CM

## 2020-05-20 DIAGNOSIS — G89.29 CHRONIC LOW BACK PAIN, UNSPECIFIED BACK PAIN LATERALITY, UNSPECIFIED WHETHER SCIATICA PRESENT: ICD-10-CM

## 2020-05-20 DIAGNOSIS — M54.50 CHRONIC LOW BACK PAIN, UNSPECIFIED BACK PAIN LATERALITY, UNSPECIFIED WHETHER SCIATICA PRESENT: ICD-10-CM

## 2020-05-20 DIAGNOSIS — G89.4 CHRONIC PAIN SYNDROME: ICD-10-CM

## 2020-05-20 DIAGNOSIS — M48.062 SPINAL STENOSIS OF LUMBAR REGION WITH NEUROGENIC CLAUDICATION: ICD-10-CM

## 2020-05-21 ENCOUNTER — COMMUNICATION - HEALTHEAST (OUTPATIENT)
Dept: FAMILY MEDICINE | Facility: CLINIC | Age: 80
End: 2020-05-21

## 2020-05-21 DIAGNOSIS — I10 ESSENTIAL HYPERTENSION: ICD-10-CM

## 2020-05-21 DIAGNOSIS — K21.9 GASTROESOPHAGEAL REFLUX DISEASE WITHOUT ESOPHAGITIS: ICD-10-CM

## 2020-06-10 ENCOUNTER — COMMUNICATION - HEALTHEAST (OUTPATIENT)
Dept: FAMILY MEDICINE | Facility: CLINIC | Age: 80
End: 2020-06-10

## 2020-06-10 DIAGNOSIS — F51.04 PSYCHOPHYSIOLOGICAL INSOMNIA: ICD-10-CM

## 2020-06-10 DIAGNOSIS — M48.00 SPINAL STENOSIS, MULTILEVEL: ICD-10-CM

## 2020-06-10 DIAGNOSIS — R63.0 POOR APPETITE: ICD-10-CM

## 2020-06-17 ENCOUNTER — COMMUNICATION - HEALTHEAST (OUTPATIENT)
Dept: FAMILY MEDICINE | Facility: CLINIC | Age: 80
End: 2020-06-17

## 2020-06-17 DIAGNOSIS — M48.00 SPINAL STENOSIS, MULTILEVEL: ICD-10-CM

## 2020-06-17 DIAGNOSIS — M50.30 DEGENERATIVE DISC DISEASE, CERVICAL: ICD-10-CM

## 2020-06-17 DIAGNOSIS — G89.29 CHRONIC LOW BACK PAIN, UNSPECIFIED BACK PAIN LATERALITY, UNSPECIFIED WHETHER SCIATICA PRESENT: ICD-10-CM

## 2020-06-17 DIAGNOSIS — M48.062 SPINAL STENOSIS OF LUMBAR REGION WITH NEUROGENIC CLAUDICATION: ICD-10-CM

## 2020-06-17 DIAGNOSIS — G89.4 CHRONIC PAIN SYNDROME: ICD-10-CM

## 2020-06-17 DIAGNOSIS — M80.00XD AGE-RELATED OSTEOPOROSIS WITH CURRENT PATHOLOGICAL FRACTURE WITH ROUTINE HEALING: ICD-10-CM

## 2020-06-17 DIAGNOSIS — M54.50 CHRONIC LOW BACK PAIN, UNSPECIFIED BACK PAIN LATERALITY, UNSPECIFIED WHETHER SCIATICA PRESENT: ICD-10-CM

## 2020-06-17 DIAGNOSIS — M17.0 BILATERAL PRIMARY OSTEOARTHRITIS OF KNEE: ICD-10-CM

## 2020-07-08 ENCOUNTER — COMMUNICATION - HEALTHEAST (OUTPATIENT)
Dept: FAMILY MEDICINE | Facility: CLINIC | Age: 80
End: 2020-07-08

## 2020-07-08 DIAGNOSIS — H57.9 ITCHY EYES: ICD-10-CM

## 2020-07-14 ENCOUNTER — RECORDS - HEALTHEAST (OUTPATIENT)
Dept: ADMINISTRATIVE | Facility: OTHER | Age: 80
End: 2020-07-14

## 2020-07-14 ENCOUNTER — OFFICE VISIT - HEALTHEAST (OUTPATIENT)
Dept: FAMILY MEDICINE | Facility: CLINIC | Age: 80
End: 2020-07-14

## 2020-07-14 DIAGNOSIS — M48.00 SPINAL STENOSIS, MULTILEVEL: ICD-10-CM

## 2020-07-14 DIAGNOSIS — M54.50 CHRONIC LOW BACK PAIN, UNSPECIFIED BACK PAIN LATERALITY, UNSPECIFIED WHETHER SCIATICA PRESENT: ICD-10-CM

## 2020-07-14 DIAGNOSIS — I10 ESSENTIAL HYPERTENSION: ICD-10-CM

## 2020-07-14 DIAGNOSIS — R05.9 COUGH: ICD-10-CM

## 2020-07-14 DIAGNOSIS — M70.62 TROCHANTERIC BURSITIS OF BOTH HIPS: ICD-10-CM

## 2020-07-14 DIAGNOSIS — K59.03 DRUG-INDUCED CONSTIPATION: ICD-10-CM

## 2020-07-14 DIAGNOSIS — Z79.899 POLYPHARMACY: ICD-10-CM

## 2020-07-14 DIAGNOSIS — J98.4 RESTRICTIVE LUNG DISEASE: ICD-10-CM

## 2020-07-14 DIAGNOSIS — F51.04 PSYCHOPHYSIOLOGICAL INSOMNIA: ICD-10-CM

## 2020-07-14 DIAGNOSIS — M70.61 TROCHANTERIC BURSITIS OF BOTH HIPS: ICD-10-CM

## 2020-07-14 DIAGNOSIS — M17.0 BILATERAL PRIMARY OSTEOARTHRITIS OF KNEE: ICD-10-CM

## 2020-07-14 DIAGNOSIS — M25.50 POLYARTHRALGIA: ICD-10-CM

## 2020-07-14 DIAGNOSIS — Z60.3 LANGUAGE BARRIER AFFECTING HEALTH CARE: ICD-10-CM

## 2020-07-14 DIAGNOSIS — K21.9 GASTROESOPHAGEAL REFLUX DISEASE WITHOUT ESOPHAGITIS: ICD-10-CM

## 2020-07-14 DIAGNOSIS — G89.4 CHRONIC PAIN SYNDROME: ICD-10-CM

## 2020-07-14 DIAGNOSIS — H57.9 ITCHY EYES: ICD-10-CM

## 2020-07-14 DIAGNOSIS — M80.00XD AGE-RELATED OSTEOPOROSIS WITH CURRENT PATHOLOGICAL FRACTURE WITH ROUTINE HEALING: ICD-10-CM

## 2020-07-14 DIAGNOSIS — M48.062 SPINAL STENOSIS OF LUMBAR REGION WITH NEUROGENIC CLAUDICATION: ICD-10-CM

## 2020-07-14 DIAGNOSIS — J84.10 PULMONARY FIBROSIS (H): ICD-10-CM

## 2020-07-14 DIAGNOSIS — E55.9 VITAMIN D DEFICIENCY: ICD-10-CM

## 2020-07-14 DIAGNOSIS — R63.0 POOR APPETITE: ICD-10-CM

## 2020-07-14 DIAGNOSIS — K59.03 DRUG INDUCED CONSTIPATION: ICD-10-CM

## 2020-07-14 DIAGNOSIS — Z75.8 LANGUAGE BARRIER AFFECTING HEALTH CARE: ICD-10-CM

## 2020-07-14 DIAGNOSIS — G89.29 CHRONIC LOW BACK PAIN, UNSPECIFIED BACK PAIN LATERALITY, UNSPECIFIED WHETHER SCIATICA PRESENT: ICD-10-CM

## 2020-07-14 SDOH — SOCIAL STABILITY - SOCIAL INSECURITY: ACCULTURATION DIFFICULTY: Z60.3

## 2020-07-30 ENCOUNTER — COMMUNICATION - HEALTHEAST (OUTPATIENT)
Dept: GERIATRIC MEDICINE | Facility: CLINIC | Age: 80
End: 2020-07-30

## 2020-07-30 DIAGNOSIS — J98.4 RESTRICTIVE LUNG DISEASE: ICD-10-CM

## 2020-07-30 DIAGNOSIS — I10 ESSENTIAL HYPERTENSION: ICD-10-CM

## 2020-07-30 DIAGNOSIS — M54.50 CHRONIC LOW BACK PAIN, UNSPECIFIED BACK PAIN LATERALITY, UNSPECIFIED WHETHER SCIATICA PRESENT: ICD-10-CM

## 2020-07-30 DIAGNOSIS — G89.29 CHRONIC LOW BACK PAIN, UNSPECIFIED BACK PAIN LATERALITY, UNSPECIFIED WHETHER SCIATICA PRESENT: ICD-10-CM

## 2020-08-26 ENCOUNTER — RECORDS - HEALTHEAST (OUTPATIENT)
Dept: ADMINISTRATIVE | Facility: OTHER | Age: 80
End: 2020-08-26

## 2020-08-28 ENCOUNTER — RECORDS - HEALTHEAST (OUTPATIENT)
Dept: ADMINISTRATIVE | Facility: OTHER | Age: 80
End: 2020-08-28

## 2020-09-01 ENCOUNTER — RECORDS - HEALTHEAST (OUTPATIENT)
Dept: ADMINISTRATIVE | Facility: OTHER | Age: 80
End: 2020-09-01

## 2020-09-09 ENCOUNTER — COMMUNICATION - HEALTHEAST (OUTPATIENT)
Dept: FAMILY MEDICINE | Facility: CLINIC | Age: 80
End: 2020-09-09

## 2020-09-14 ENCOUNTER — RECORDS - HEALTHEAST (OUTPATIENT)
Dept: ADMINISTRATIVE | Facility: OTHER | Age: 80
End: 2020-09-14

## 2020-09-22 ENCOUNTER — COMMUNICATION - HEALTHEAST (OUTPATIENT)
Dept: FAMILY MEDICINE | Facility: CLINIC | Age: 80
End: 2020-09-22

## 2020-09-22 ENCOUNTER — RECORDS - HEALTHEAST (OUTPATIENT)
Dept: ADMINISTRATIVE | Facility: OTHER | Age: 80
End: 2020-09-22

## 2020-09-22 DIAGNOSIS — M54.50 CHRONIC LOW BACK PAIN, UNSPECIFIED BACK PAIN LATERALITY, UNSPECIFIED WHETHER SCIATICA PRESENT: ICD-10-CM

## 2020-09-22 DIAGNOSIS — G89.4 CHRONIC PAIN SYNDROME: ICD-10-CM

## 2020-09-22 DIAGNOSIS — M48.062 SPINAL STENOSIS OF LUMBAR REGION WITH NEUROGENIC CLAUDICATION: ICD-10-CM

## 2020-09-22 DIAGNOSIS — G89.29 CHRONIC LOW BACK PAIN, UNSPECIFIED BACK PAIN LATERALITY, UNSPECIFIED WHETHER SCIATICA PRESENT: ICD-10-CM

## 2020-09-22 DIAGNOSIS — M17.0 BILATERAL PRIMARY OSTEOARTHRITIS OF KNEE: ICD-10-CM

## 2020-09-22 DIAGNOSIS — M80.00XD AGE-RELATED OSTEOPOROSIS WITH CURRENT PATHOLOGICAL FRACTURE WITH ROUTINE HEALING: ICD-10-CM

## 2020-09-29 ENCOUNTER — RECORDS - HEALTHEAST (OUTPATIENT)
Dept: ADMINISTRATIVE | Facility: OTHER | Age: 80
End: 2020-09-29

## 2020-10-09 ENCOUNTER — COMMUNICATION - HEALTHEAST (OUTPATIENT)
Dept: SCHEDULING | Facility: CLINIC | Age: 80
End: 2020-10-09

## 2020-10-15 ENCOUNTER — COMMUNICATION - HEALTHEAST (OUTPATIENT)
Dept: HOME HEALTH SERVICES | Facility: HOME HEALTH | Age: 80
End: 2020-10-15

## 2020-10-15 ENCOUNTER — OFFICE VISIT - HEALTHEAST (OUTPATIENT)
Dept: FAMILY MEDICINE | Facility: CLINIC | Age: 80
End: 2020-10-15

## 2020-10-15 ENCOUNTER — HOME CARE/HOSPICE - HEALTHEAST (OUTPATIENT)
Dept: HOME HEALTH SERVICES | Facility: HOME HEALTH | Age: 80
End: 2020-10-15

## 2020-10-15 DIAGNOSIS — G89.4 CHRONIC PAIN SYNDROME: ICD-10-CM

## 2020-10-15 DIAGNOSIS — F51.04 PSYCHOPHYSIOLOGICAL INSOMNIA: ICD-10-CM

## 2020-10-15 DIAGNOSIS — Z79.01 ANTICOAGULATED: ICD-10-CM

## 2020-10-15 DIAGNOSIS — K59.03 DRUG-INDUCED CONSTIPATION: ICD-10-CM

## 2020-10-15 DIAGNOSIS — J98.4 RESTRICTIVE LUNG DISEASE: ICD-10-CM

## 2020-10-15 DIAGNOSIS — Z79.899 POLYPHARMACY: ICD-10-CM

## 2020-10-15 DIAGNOSIS — I26.93 SINGLE SUBSEGMENTAL PULMONARY EMBOLISM WITHOUT ACUTE COR PULMONALE (H): ICD-10-CM

## 2020-10-15 DIAGNOSIS — I10 ESSENTIAL HYPERTENSION: ICD-10-CM

## 2020-10-15 DIAGNOSIS — R63.0 POOR APPETITE: ICD-10-CM

## 2020-10-15 LAB
ALBUMIN SERPL-MCNC: 3.1 G/DL (ref 3.5–5)
ALP SERPL-CCNC: 115 U/L (ref 45–120)
ALT SERPL W P-5'-P-CCNC: 11 U/L (ref 0–45)
ANION GAP SERPL CALCULATED.3IONS-SCNC: 12 MMOL/L (ref 5–18)
AST SERPL W P-5'-P-CCNC: 21 U/L (ref 0–40)
BILIRUB SERPL-MCNC: 0.3 MG/DL (ref 0–1)
BUN SERPL-MCNC: 17 MG/DL (ref 8–28)
CALCIUM SERPL-MCNC: 9.3 MG/DL (ref 8.5–10.5)
CHLORIDE BLD-SCNC: 97 MMOL/L (ref 98–107)
CO2 SERPL-SCNC: 29 MMOL/L (ref 22–31)
CREAT SERPL-MCNC: 1.37 MG/DL (ref 0.6–1.1)
ERYTHROCYTE [DISTWIDTH] IN BLOOD BY AUTOMATED COUNT: 13.4 % (ref 11–14.5)
GFR SERPL CREATININE-BSD FRML MDRD: 37 ML/MIN/1.73M2
GLUCOSE BLD-MCNC: 99 MG/DL (ref 70–125)
HCT VFR BLD AUTO: 35.4 % (ref 35–47)
HGB BLD-MCNC: 11.1 G/DL (ref 12–16)
INR PPP: 2.51 (ref 0.9–1.1)
MCH RBC QN AUTO: 24.5 PG (ref 27–34)
MCHC RBC AUTO-ENTMCNC: 31.3 G/DL (ref 32–36)
MCV RBC AUTO: 78 FL (ref 80–100)
PLATELET # BLD AUTO: 292 THOU/UL (ref 140–440)
PMV BLD AUTO: 8.2 FL (ref 7–10)
POTASSIUM BLD-SCNC: 4.1 MMOL/L (ref 3.5–5)
PROT SERPL-MCNC: 7.3 G/DL (ref 6–8)
RBC # BLD AUTO: 4.51 MILL/UL (ref 3.8–5.4)
SODIUM SERPL-SCNC: 138 MMOL/L (ref 136–145)
WBC: 5.6 THOU/UL (ref 4–11)

## 2020-10-16 ENCOUNTER — COMMUNICATION - HEALTHEAST (OUTPATIENT)
Dept: HOME HEALTH SERVICES | Facility: HOME HEALTH | Age: 80
End: 2020-10-16

## 2020-10-16 ENCOUNTER — COMMUNICATION - HEALTHEAST (OUTPATIENT)
Dept: ANTICOAGULATION | Facility: CLINIC | Age: 80
End: 2020-10-16

## 2020-10-16 DIAGNOSIS — I26.93 SINGLE SUBSEGMENTAL PULMONARY EMBOLISM WITHOUT ACUTE COR PULMONALE (H): ICD-10-CM

## 2020-10-16 DIAGNOSIS — Z79.01 ANTICOAGULATED: ICD-10-CM

## 2020-10-19 ENCOUNTER — COMMUNICATION - HEALTHEAST (OUTPATIENT)
Dept: FAMILY MEDICINE | Facility: CLINIC | Age: 80
End: 2020-10-19

## 2020-10-21 ENCOUNTER — COMMUNICATION - HEALTHEAST (OUTPATIENT)
Dept: FAMILY MEDICINE | Facility: CLINIC | Age: 80
End: 2020-10-21

## 2020-10-21 DIAGNOSIS — I26.93 SINGLE SUBSEGMENTAL PULMONARY EMBOLISM WITHOUT ACUTE COR PULMONALE (H): ICD-10-CM

## 2020-10-21 DIAGNOSIS — Z79.01 ANTICOAGULATED: ICD-10-CM

## 2020-10-21 LAB — INR PPP: 4.8 (ref 0.9–1.1)

## 2020-10-26 ENCOUNTER — COMMUNICATION - HEALTHEAST (OUTPATIENT)
Dept: FAMILY MEDICINE | Facility: CLINIC | Age: 80
End: 2020-10-26

## 2020-10-26 DIAGNOSIS — I26.93 SINGLE SUBSEGMENTAL PULMONARY EMBOLISM WITHOUT ACUTE COR PULMONALE (H): ICD-10-CM

## 2020-10-26 DIAGNOSIS — Z79.01 ANTICOAGULATED: ICD-10-CM

## 2020-10-26 LAB — INR PPP: 1.5 (ref 0.9–1.1)

## 2020-10-29 ENCOUNTER — COMMUNICATION - HEALTHEAST (OUTPATIENT)
Dept: FAMILY MEDICINE | Facility: CLINIC | Age: 80
End: 2020-10-29

## 2020-10-29 DIAGNOSIS — Z79.01 ANTICOAGULATED: ICD-10-CM

## 2020-10-29 DIAGNOSIS — I26.93 SINGLE SUBSEGMENTAL PULMONARY EMBOLISM WITHOUT ACUTE COR PULMONALE (H): ICD-10-CM

## 2020-10-29 LAB — INR PPP: 3.6 (ref 0.9–1.1)

## 2020-11-02 ENCOUNTER — COMMUNICATION - HEALTHEAST (OUTPATIENT)
Dept: FAMILY MEDICINE | Facility: CLINIC | Age: 80
End: 2020-11-02

## 2020-11-02 ENCOUNTER — OFFICE VISIT - HEALTHEAST (OUTPATIENT)
Dept: FAMILY MEDICINE | Facility: CLINIC | Age: 80
End: 2020-11-02

## 2020-11-02 DIAGNOSIS — K21.9 GASTROESOPHAGEAL REFLUX DISEASE WITHOUT ESOPHAGITIS: ICD-10-CM

## 2020-11-02 DIAGNOSIS — I26.93 SINGLE SUBSEGMENTAL PULMONARY EMBOLISM WITHOUT ACUTE COR PULMONALE (H): ICD-10-CM

## 2020-11-02 DIAGNOSIS — I10 ESSENTIAL HYPERTENSION: ICD-10-CM

## 2020-11-02 DIAGNOSIS — J98.4 RESTRICTIVE LUNG DISEASE: ICD-10-CM

## 2020-11-02 DIAGNOSIS — Z79.01 ANTICOAGULATED: ICD-10-CM

## 2020-11-02 DIAGNOSIS — M48.00 SPINAL STENOSIS, MULTILEVEL: ICD-10-CM

## 2020-11-02 DIAGNOSIS — Z23 NEED FOR INFLUENZA VACCINATION: ICD-10-CM

## 2020-11-02 LAB — INR PPP: 3.6 (ref 0.9–1.1)

## 2020-11-02 ASSESSMENT — MIFFLIN-ST. JEOR: SCORE: 932.97

## 2020-11-06 ENCOUNTER — COMMUNICATION - HEALTHEAST (OUTPATIENT)
Dept: FAMILY MEDICINE | Facility: CLINIC | Age: 80
End: 2020-11-06

## 2020-11-06 DIAGNOSIS — M50.30 DEGENERATIVE DISC DISEASE, CERVICAL: ICD-10-CM

## 2020-11-06 DIAGNOSIS — I26.93 SINGLE SUBSEGMENTAL PULMONARY EMBOLISM WITHOUT ACUTE COR PULMONALE (H): ICD-10-CM

## 2020-11-06 DIAGNOSIS — Z79.01 ANTICOAGULATED: ICD-10-CM

## 2020-11-06 LAB — INR PPP: 2.3 (ref 0.9–1.1)

## 2020-11-09 ENCOUNTER — RECORDS - HEALTHEAST (OUTPATIENT)
Dept: ADMINISTRATIVE | Facility: OTHER | Age: 80
End: 2020-11-09

## 2020-11-09 ENCOUNTER — COMMUNICATION - HEALTHEAST (OUTPATIENT)
Dept: FAMILY MEDICINE | Facility: CLINIC | Age: 80
End: 2020-11-09

## 2020-11-09 DIAGNOSIS — I26.93 SINGLE SUBSEGMENTAL PULMONARY EMBOLISM WITHOUT ACUTE COR PULMONALE (H): ICD-10-CM

## 2020-11-09 DIAGNOSIS — Z79.01 ANTICOAGULATED: ICD-10-CM

## 2020-11-09 LAB — INR PPP: 1.8 (ref 0.9–1.1)

## 2020-11-12 ENCOUNTER — RECORDS - HEALTHEAST (OUTPATIENT)
Dept: ADMINISTRATIVE | Facility: OTHER | Age: 80
End: 2020-11-12

## 2020-11-13 ENCOUNTER — COMMUNICATION - HEALTHEAST (OUTPATIENT)
Dept: FAMILY MEDICINE | Facility: CLINIC | Age: 80
End: 2020-11-13

## 2020-11-13 DIAGNOSIS — Z79.01 ANTICOAGULATED: ICD-10-CM

## 2020-11-13 DIAGNOSIS — I26.93 SINGLE SUBSEGMENTAL PULMONARY EMBOLISM WITHOUT ACUTE COR PULMONALE (H): ICD-10-CM

## 2020-11-13 LAB — INR PPP: 1.3 (ref 0.9–1.1)

## 2020-11-16 ENCOUNTER — COMMUNICATION - HEALTHEAST (OUTPATIENT)
Dept: FAMILY MEDICINE | Facility: CLINIC | Age: 80
End: 2020-11-16

## 2020-11-16 DIAGNOSIS — I26.93 SINGLE SUBSEGMENTAL PULMONARY EMBOLISM WITHOUT ACUTE COR PULMONALE (H): ICD-10-CM

## 2020-11-16 DIAGNOSIS — Z79.01 ANTICOAGULATED: ICD-10-CM

## 2020-11-16 LAB — INR PPP: 1.3 (ref 0.9–1.1)

## 2020-11-19 ENCOUNTER — COMMUNICATION - HEALTHEAST (OUTPATIENT)
Dept: FAMILY MEDICINE | Facility: CLINIC | Age: 80
End: 2020-11-19

## 2020-11-19 DIAGNOSIS — G89.4 CHRONIC PAIN SYNDROME: ICD-10-CM

## 2020-11-19 DIAGNOSIS — I26.93 SINGLE SUBSEGMENTAL PULMONARY EMBOLISM WITHOUT ACUTE COR PULMONALE (H): ICD-10-CM

## 2020-11-19 DIAGNOSIS — Z79.01 ANTICOAGULATED: ICD-10-CM

## 2020-11-19 DIAGNOSIS — M70.60 TROCHANTERIC BURSITIS, UNSPECIFIED LATERALITY: ICD-10-CM

## 2020-11-19 LAB — INR PPP: 1.1 (ref 0.9–1.1)

## 2020-11-23 ENCOUNTER — COMMUNICATION - HEALTHEAST (OUTPATIENT)
Dept: SCHEDULING | Facility: CLINIC | Age: 80
End: 2020-11-23

## 2020-11-23 DIAGNOSIS — I26.93 SINGLE SUBSEGMENTAL PULMONARY EMBOLISM WITHOUT ACUTE COR PULMONALE (H): ICD-10-CM

## 2020-11-23 DIAGNOSIS — Z79.01 ANTICOAGULATED: ICD-10-CM

## 2020-11-23 LAB — INR PPP: 4.6 (ref 0.9–1.1)

## 2020-11-25 ENCOUNTER — COMMUNICATION - HEALTHEAST (OUTPATIENT)
Dept: FAMILY MEDICINE | Facility: CLINIC | Age: 80
End: 2020-11-25

## 2020-11-25 DIAGNOSIS — I26.93 SINGLE SUBSEGMENTAL PULMONARY EMBOLISM WITHOUT ACUTE COR PULMONALE (H): ICD-10-CM

## 2020-11-25 DIAGNOSIS — Z79.01 ANTICOAGULATED: ICD-10-CM

## 2020-11-25 LAB — INR PPP: 4.6 (ref 0.9–1.1)

## 2020-11-27 ENCOUNTER — COMMUNICATION - HEALTHEAST (OUTPATIENT)
Dept: FAMILY MEDICINE | Facility: CLINIC | Age: 80
End: 2020-11-27

## 2020-11-27 DIAGNOSIS — I26.93 SINGLE SUBSEGMENTAL PULMONARY EMBOLISM WITHOUT ACUTE COR PULMONALE (H): ICD-10-CM

## 2020-11-27 DIAGNOSIS — Z79.01 ANTICOAGULATED: ICD-10-CM

## 2020-11-27 LAB — INR PPP: 3.3 (ref 0.9–1.1)

## 2020-11-30 ENCOUNTER — COMMUNICATION - HEALTHEAST (OUTPATIENT)
Dept: FAMILY MEDICINE | Facility: CLINIC | Age: 80
End: 2020-11-30

## 2020-11-30 DIAGNOSIS — Z79.01 ANTICOAGULATED: ICD-10-CM

## 2020-11-30 DIAGNOSIS — I26.93 SINGLE SUBSEGMENTAL PULMONARY EMBOLISM WITHOUT ACUTE COR PULMONALE (H): ICD-10-CM

## 2020-11-30 LAB — INR PPP: 2.8 (ref 0.9–1.1)

## 2020-12-02 ENCOUNTER — COMMUNICATION - HEALTHEAST (OUTPATIENT)
Dept: FAMILY MEDICINE | Facility: CLINIC | Age: 80
End: 2020-12-02

## 2020-12-03 ENCOUNTER — COMMUNICATION - HEALTHEAST (OUTPATIENT)
Dept: FAMILY MEDICINE | Facility: CLINIC | Age: 80
End: 2020-12-03

## 2020-12-03 DIAGNOSIS — I26.93 SINGLE SUBSEGMENTAL PULMONARY EMBOLISM WITHOUT ACUTE COR PULMONALE (H): ICD-10-CM

## 2020-12-03 DIAGNOSIS — I10 ESSENTIAL HYPERTENSION: ICD-10-CM

## 2020-12-03 DIAGNOSIS — Z79.01 ANTICOAGULATED: ICD-10-CM

## 2020-12-03 LAB — INR PPP: 1.7 (ref 0.9–1.1)

## 2020-12-07 ENCOUNTER — OFFICE VISIT - HEALTHEAST (OUTPATIENT)
Dept: PULMONOLOGY | Facility: OTHER | Age: 80
End: 2020-12-07

## 2020-12-07 DIAGNOSIS — J45.41 MODERATE PERSISTENT ASTHMA WITH EXACERBATION: ICD-10-CM

## 2020-12-07 DIAGNOSIS — B37.0 THRUSH: ICD-10-CM

## 2020-12-07 ASSESSMENT — MIFFLIN-ST. JEOR: SCORE: 922.21

## 2020-12-10 ENCOUNTER — COMMUNICATION - HEALTHEAST (OUTPATIENT)
Dept: FAMILY MEDICINE | Facility: CLINIC | Age: 80
End: 2020-12-10

## 2020-12-10 DIAGNOSIS — Z79.01 ANTICOAGULATED: ICD-10-CM

## 2020-12-10 DIAGNOSIS — I26.93 SINGLE SUBSEGMENTAL PULMONARY EMBOLISM WITHOUT ACUTE COR PULMONALE (H): ICD-10-CM

## 2020-12-10 LAB — INR PPP: 3.6 (ref 0.9–1.1)

## 2020-12-14 ENCOUNTER — COMMUNICATION - HEALTHEAST (OUTPATIENT)
Dept: GERIATRIC MEDICINE | Facility: CLINIC | Age: 80
End: 2020-12-14

## 2020-12-14 ENCOUNTER — RECORDS - HEALTHEAST (OUTPATIENT)
Dept: ADMINISTRATIVE | Facility: OTHER | Age: 80
End: 2020-12-14

## 2020-12-14 ENCOUNTER — OFFICE VISIT - HEALTHEAST (OUTPATIENT)
Dept: FAMILY MEDICINE | Facility: CLINIC | Age: 80
End: 2020-12-14

## 2020-12-14 DIAGNOSIS — Z00.00 ROUTINE GENERAL MEDICAL EXAMINATION AT A HEALTH CARE FACILITY: ICD-10-CM

## 2020-12-14 DIAGNOSIS — N18.31 STAGE 3A CHRONIC KIDNEY DISEASE (H): ICD-10-CM

## 2020-12-14 DIAGNOSIS — F51.04 PSYCHOPHYSIOLOGICAL INSOMNIA: ICD-10-CM

## 2020-12-14 DIAGNOSIS — Z60.3 LANGUAGE BARRIER AFFECTING HEALTH CARE: ICD-10-CM

## 2020-12-14 DIAGNOSIS — K59.03 DRUG-INDUCED CONSTIPATION: ICD-10-CM

## 2020-12-14 DIAGNOSIS — M81.0 AGE-RELATED OSTEOPOROSIS WITHOUT CURRENT PATHOLOGICAL FRACTURE: ICD-10-CM

## 2020-12-14 DIAGNOSIS — J84.10 PULMONARY FIBROSIS (H): ICD-10-CM

## 2020-12-14 DIAGNOSIS — E55.9 VITAMIN D DEFICIENCY: ICD-10-CM

## 2020-12-14 DIAGNOSIS — G89.4 CHRONIC PAIN SYNDROME: ICD-10-CM

## 2020-12-14 DIAGNOSIS — I26.93 SINGLE SUBSEGMENTAL PULMONARY EMBOLISM WITHOUT ACUTE COR PULMONALE (H): ICD-10-CM

## 2020-12-14 DIAGNOSIS — Z65.8 SUPPORT SYSTEM DEFICIT: ICD-10-CM

## 2020-12-14 DIAGNOSIS — Z75.8 LANGUAGE BARRIER AFFECTING HEALTH CARE: ICD-10-CM

## 2020-12-14 DIAGNOSIS — K21.00 GASTROESOPHAGEAL REFLUX DISEASE WITH ESOPHAGITIS WITHOUT HEMORRHAGE: ICD-10-CM

## 2020-12-14 DIAGNOSIS — Z78.9 MEDICALLY COMPLEX PATIENT: ICD-10-CM

## 2020-12-14 DIAGNOSIS — Z79.899 POLYPHARMACY: ICD-10-CM

## 2020-12-14 DIAGNOSIS — R63.0 POOR APPETITE: ICD-10-CM

## 2020-12-14 SDOH — SOCIAL STABILITY - SOCIAL INSECURITY: ACCULTURATION DIFFICULTY: Z60.3

## 2020-12-14 ASSESSMENT — MIFFLIN-ST. JEOR: SCORE: 921.02

## 2020-12-16 ENCOUNTER — COMMUNICATION - HEALTHEAST (OUTPATIENT)
Dept: GERIATRIC MEDICINE | Facility: CLINIC | Age: 80
End: 2020-12-16

## 2020-12-17 ENCOUNTER — COMMUNICATION - HEALTHEAST (OUTPATIENT)
Dept: FAMILY MEDICINE | Facility: CLINIC | Age: 80
End: 2020-12-17

## 2020-12-17 DIAGNOSIS — Z79.01 ANTICOAGULATED: ICD-10-CM

## 2020-12-17 DIAGNOSIS — I26.93 SINGLE SUBSEGMENTAL PULMONARY EMBOLISM WITHOUT ACUTE COR PULMONALE (H): ICD-10-CM

## 2020-12-17 DIAGNOSIS — I10 ESSENTIAL HYPERTENSION: ICD-10-CM

## 2020-12-17 LAB — INR PPP: 1.6 (ref 0.9–1.1)

## 2020-12-22 ENCOUNTER — COMMUNICATION - HEALTHEAST (OUTPATIENT)
Dept: GERIATRIC MEDICINE | Facility: CLINIC | Age: 80
End: 2020-12-22

## 2020-12-28 ENCOUNTER — COMMUNICATION - HEALTHEAST (OUTPATIENT)
Dept: FAMILY MEDICINE | Facility: CLINIC | Age: 80
End: 2020-12-28

## 2020-12-28 DIAGNOSIS — I26.93 SINGLE SUBSEGMENTAL PULMONARY EMBOLISM WITHOUT ACUTE COR PULMONALE (H): ICD-10-CM

## 2020-12-28 DIAGNOSIS — Z79.01 ANTICOAGULATED: ICD-10-CM

## 2020-12-28 LAB — INR PPP: 1.4 (ref 0.9–1.1)

## 2020-12-31 ENCOUNTER — COMMUNICATION - HEALTHEAST (OUTPATIENT)
Dept: FAMILY MEDICINE | Facility: CLINIC | Age: 80
End: 2020-12-31

## 2020-12-31 DIAGNOSIS — I26.93 SINGLE SUBSEGMENTAL PULMONARY EMBOLISM WITHOUT ACUTE COR PULMONALE (H): ICD-10-CM

## 2020-12-31 DIAGNOSIS — Z79.01 ANTICOAGULATED: ICD-10-CM

## 2020-12-31 LAB — INR PPP: 2.6 (ref 0.9–1.1)

## 2021-01-05 ENCOUNTER — RECORDS - HEALTHEAST (OUTPATIENT)
Dept: ADMINISTRATIVE | Facility: OTHER | Age: 81
End: 2021-01-05

## 2021-01-07 ENCOUNTER — COMMUNICATION - HEALTHEAST (OUTPATIENT)
Dept: FAMILY MEDICINE | Facility: CLINIC | Age: 81
End: 2021-01-07

## 2021-01-07 DIAGNOSIS — I26.93 SINGLE SUBSEGMENTAL PULMONARY EMBOLISM WITHOUT ACUTE COR PULMONALE (H): ICD-10-CM

## 2021-01-07 DIAGNOSIS — Z79.01 ANTICOAGULATED: ICD-10-CM

## 2021-01-07 LAB — INR PPP: 1.1 (ref 0.9–1.1)

## 2021-01-12 ENCOUNTER — RECORDS - HEALTHEAST (OUTPATIENT)
Dept: ADMINISTRATIVE | Facility: OTHER | Age: 81
End: 2021-01-12

## 2021-01-14 ENCOUNTER — COMMUNICATION - HEALTHEAST (OUTPATIENT)
Dept: FAMILY MEDICINE | Facility: CLINIC | Age: 81
End: 2021-01-14

## 2021-01-14 DIAGNOSIS — Z79.01 ANTICOAGULATED: ICD-10-CM

## 2021-01-14 DIAGNOSIS — I26.93 SINGLE SUBSEGMENTAL PULMONARY EMBOLISM WITHOUT ACUTE COR PULMONALE (H): ICD-10-CM

## 2021-01-14 LAB — INR PPP: 4 (ref 0.9–1.1)

## 2021-01-18 ENCOUNTER — COMMUNICATION - HEALTHEAST (OUTPATIENT)
Dept: FAMILY MEDICINE | Facility: CLINIC | Age: 81
End: 2021-01-18

## 2021-01-18 DIAGNOSIS — I10 ESSENTIAL HYPERTENSION: ICD-10-CM

## 2021-01-19 ENCOUNTER — RECORDS - HEALTHEAST (OUTPATIENT)
Dept: ADMINISTRATIVE | Facility: OTHER | Age: 81
End: 2021-01-19

## 2021-01-21 ENCOUNTER — COMMUNICATION - HEALTHEAST (OUTPATIENT)
Dept: FAMILY MEDICINE | Facility: CLINIC | Age: 81
End: 2021-01-21

## 2021-01-21 DIAGNOSIS — I26.93 SINGLE SUBSEGMENTAL PULMONARY EMBOLISM WITHOUT ACUTE COR PULMONALE (H): ICD-10-CM

## 2021-01-21 DIAGNOSIS — I10 ESSENTIAL HYPERTENSION: ICD-10-CM

## 2021-01-21 DIAGNOSIS — Z79.01 ANTICOAGULATED: ICD-10-CM

## 2021-01-21 LAB — INR PPP: 1.8 (ref 0.9–1.1)

## 2021-01-25 ENCOUNTER — RECORDS - HEALTHEAST (OUTPATIENT)
Dept: ADMINISTRATIVE | Facility: OTHER | Age: 81
End: 2021-01-25

## 2021-01-28 ENCOUNTER — COMMUNICATION - HEALTHEAST (OUTPATIENT)
Dept: FAMILY MEDICINE | Facility: CLINIC | Age: 81
End: 2021-01-28

## 2021-01-28 DIAGNOSIS — I26.93 SINGLE SUBSEGMENTAL PULMONARY EMBOLISM WITHOUT ACUTE COR PULMONALE (H): ICD-10-CM

## 2021-01-28 DIAGNOSIS — Z79.01 ANTICOAGULATED: ICD-10-CM

## 2021-01-28 LAB — INR PPP: 2.4 (ref 0.9–1.1)

## 2021-02-04 ENCOUNTER — COMMUNICATION - HEALTHEAST (OUTPATIENT)
Dept: SCHEDULING | Facility: CLINIC | Age: 81
End: 2021-02-04

## 2021-02-04 DIAGNOSIS — Z79.01 ANTICOAGULATED: ICD-10-CM

## 2021-02-04 DIAGNOSIS — I26.93 SINGLE SUBSEGMENTAL PULMONARY EMBOLISM WITHOUT ACUTE COR PULMONALE (H): ICD-10-CM

## 2021-02-04 LAB — INR PPP: 1.9 (ref 0.9–1.1)

## 2021-02-11 ENCOUNTER — COMMUNICATION - HEALTHEAST (OUTPATIENT)
Dept: FAMILY MEDICINE | Facility: CLINIC | Age: 81
End: 2021-02-11

## 2021-02-11 DIAGNOSIS — Z79.01 ANTICOAGULATED: ICD-10-CM

## 2021-02-11 DIAGNOSIS — I26.93 SINGLE SUBSEGMENTAL PULMONARY EMBOLISM WITHOUT ACUTE COR PULMONALE (H): ICD-10-CM

## 2021-02-11 LAB — INR PPP: 3.4 (ref 0.9–1.1)

## 2021-02-18 ENCOUNTER — COMMUNICATION - HEALTHEAST (OUTPATIENT)
Dept: ANTICOAGULATION | Facility: CLINIC | Age: 81
End: 2021-02-18

## 2021-02-18 ENCOUNTER — COMMUNICATION - HEALTHEAST (OUTPATIENT)
Dept: FAMILY MEDICINE | Facility: CLINIC | Age: 81
End: 2021-02-18

## 2021-02-18 DIAGNOSIS — I26.93 SINGLE SUBSEGMENTAL PULMONARY EMBOLISM WITHOUT ACUTE COR PULMONALE (H): ICD-10-CM

## 2021-02-18 DIAGNOSIS — Z79.01 ANTICOAGULATED: ICD-10-CM

## 2021-02-18 LAB — INR PPP: 1.5 (ref 0.9–1.1)

## 2021-02-25 ENCOUNTER — OFFICE VISIT - HEALTHEAST (OUTPATIENT)
Dept: FAMILY MEDICINE | Facility: CLINIC | Age: 81
End: 2021-02-25

## 2021-02-25 ENCOUNTER — COMMUNICATION - HEALTHEAST (OUTPATIENT)
Dept: FAMILY MEDICINE | Facility: CLINIC | Age: 81
End: 2021-02-25

## 2021-02-25 DIAGNOSIS — I26.93 SINGLE SUBSEGMENTAL PULMONARY EMBOLISM WITHOUT ACUTE COR PULMONALE (H): ICD-10-CM

## 2021-02-25 DIAGNOSIS — M50.30 DEGENERATIVE DISC DISEASE, CERVICAL: ICD-10-CM

## 2021-02-25 DIAGNOSIS — R63.0 POOR APPETITE: ICD-10-CM

## 2021-02-25 DIAGNOSIS — F51.04 PSYCHOPHYSIOLOGICAL INSOMNIA: ICD-10-CM

## 2021-02-25 DIAGNOSIS — Z75.8 LANGUAGE BARRIER AFFECTING HEALTH CARE: ICD-10-CM

## 2021-02-25 DIAGNOSIS — M48.061 SPINAL STENOSIS OF LUMBAR REGION WITHOUT NEUROGENIC CLAUDICATION: ICD-10-CM

## 2021-02-25 DIAGNOSIS — N18.31 STAGE 3A CHRONIC KIDNEY DISEASE (H): ICD-10-CM

## 2021-02-25 DIAGNOSIS — G89.4 CHRONIC PAIN SYNDROME: ICD-10-CM

## 2021-02-25 DIAGNOSIS — Z86.711 HISTORY OF PULMONARY EMBOLISM: ICD-10-CM

## 2021-02-25 DIAGNOSIS — Z79.899 POLYPHARMACY: ICD-10-CM

## 2021-02-25 DIAGNOSIS — Z78.9 MEDICALLY COMPLEX PATIENT: ICD-10-CM

## 2021-02-25 DIAGNOSIS — M54.50 CHRONIC LOW BACK PAIN, UNSPECIFIED BACK PAIN LATERALITY, UNSPECIFIED WHETHER SCIATICA PRESENT: ICD-10-CM

## 2021-02-25 DIAGNOSIS — J84.10 PULMONARY FIBROSIS (H): ICD-10-CM

## 2021-02-25 DIAGNOSIS — G89.29 CHRONIC LOW BACK PAIN, UNSPECIFIED BACK PAIN LATERALITY, UNSPECIFIED WHETHER SCIATICA PRESENT: ICD-10-CM

## 2021-02-25 DIAGNOSIS — R09.89 SCATTERED RESPIRATORY CRACKLES OF RIGHT LUNG: ICD-10-CM

## 2021-02-25 DIAGNOSIS — R79.89 ELEVATED SERUM CREATININE: ICD-10-CM

## 2021-02-25 DIAGNOSIS — Z60.3 LANGUAGE BARRIER AFFECTING HEALTH CARE: ICD-10-CM

## 2021-02-25 LAB
ANION GAP SERPL CALCULATED.3IONS-SCNC: 9 MMOL/L (ref 5–18)
BUN SERPL-MCNC: 17 MG/DL (ref 8–28)
CALCIUM SERPL-MCNC: 8.8 MG/DL (ref 8.5–10.5)
CHLORIDE BLD-SCNC: 102 MMOL/L (ref 98–107)
CO2 SERPL-SCNC: 31 MMOL/L (ref 22–31)
CREAT SERPL-MCNC: 1.01 MG/DL (ref 0.6–1.1)
GFR SERPL CREATININE-BSD FRML MDRD: 53 ML/MIN/1.73M2
GLUCOSE BLD-MCNC: 98 MG/DL (ref 70–125)
POTASSIUM BLD-SCNC: 5 MMOL/L (ref 3.5–5)
SODIUM SERPL-SCNC: 142 MMOL/L (ref 136–145)

## 2021-02-25 SDOH — SOCIAL STABILITY - SOCIAL INSECURITY: ACCULTURATION DIFFICULTY: Z60.3

## 2021-02-25 ASSESSMENT — MIFFLIN-ST. JEOR: SCORE: 918.8

## 2021-02-26 ENCOUNTER — HOSPITAL ENCOUNTER (OUTPATIENT)
Dept: PHYSICAL MEDICINE AND REHAB | Facility: CLINIC | Age: 81
Discharge: HOME | End: 2021-02-26
Attending: PHYSICIAN ASSISTANT

## 2021-02-26 DIAGNOSIS — G89.29 CHRONIC BILATERAL LOW BACK PAIN WITH BILATERAL SCIATICA: ICD-10-CM

## 2021-02-26 DIAGNOSIS — M54.42 CHRONIC BILATERAL LOW BACK PAIN WITH BILATERAL SCIATICA: ICD-10-CM

## 2021-02-26 DIAGNOSIS — R20.2 PARESTHESIA: ICD-10-CM

## 2021-02-26 DIAGNOSIS — M48.062 SPINAL STENOSIS OF LUMBAR REGION WITH NEUROGENIC CLAUDICATION: ICD-10-CM

## 2021-02-26 DIAGNOSIS — G89.29 CHRONIC NECK PAIN: ICD-10-CM

## 2021-02-26 DIAGNOSIS — M54.41 CHRONIC BILATERAL LOW BACK PAIN WITH BILATERAL SCIATICA: ICD-10-CM

## 2021-02-26 DIAGNOSIS — M54.2 CHRONIC NECK PAIN: ICD-10-CM

## 2021-03-01 ENCOUNTER — OFFICE VISIT - HEALTHEAST (OUTPATIENT)
Dept: INTERNAL MEDICINE | Facility: CLINIC | Age: 81
End: 2021-03-01

## 2021-03-01 ENCOUNTER — RECORDS - HEALTHEAST (OUTPATIENT)
Dept: ADMINISTRATIVE | Facility: OTHER | Age: 81
End: 2021-03-01

## 2021-03-01 DIAGNOSIS — Z92.29 PERSONAL HISTORY OF OTHER DRUG THERAPY: ICD-10-CM

## 2021-03-01 DIAGNOSIS — M81.0 AGE-RELATED OSTEOPOROSIS WITHOUT CURRENT PATHOLOGICAL FRACTURE: ICD-10-CM

## 2021-03-01 LAB — PTH-INTACT SERPL-MCNC: 101 PG/ML (ref 10–86)

## 2021-03-01 ASSESSMENT — MIFFLIN-ST. JEOR: SCORE: 922.49

## 2021-03-02 LAB — 25(OH)D3 SERPL-MCNC: 26.8 NG/ML (ref 30–80)

## 2021-03-04 ENCOUNTER — COMMUNICATION - HEALTHEAST (OUTPATIENT)
Dept: INTERNAL MEDICINE | Facility: CLINIC | Age: 81
End: 2021-03-04

## 2021-03-08 ENCOUNTER — HOSPITAL ENCOUNTER (OUTPATIENT)
Dept: MRI IMAGING | Facility: HOSPITAL | Age: 81
Discharge: HOME | End: 2021-03-08
Attending: PHYSICIAN ASSISTANT

## 2021-03-08 DIAGNOSIS — G89.29 CHRONIC NECK PAIN: ICD-10-CM

## 2021-03-08 DIAGNOSIS — M48.062 SPINAL STENOSIS OF LUMBAR REGION WITH NEUROGENIC CLAUDICATION: ICD-10-CM

## 2021-03-08 DIAGNOSIS — M54.42 CHRONIC BILATERAL LOW BACK PAIN WITH BILATERAL SCIATICA: ICD-10-CM

## 2021-03-08 DIAGNOSIS — M54.41 CHRONIC BILATERAL LOW BACK PAIN WITH BILATERAL SCIATICA: ICD-10-CM

## 2021-03-08 DIAGNOSIS — M54.2 CHRONIC NECK PAIN: ICD-10-CM

## 2021-03-08 DIAGNOSIS — G89.29 CHRONIC BILATERAL LOW BACK PAIN WITH BILATERAL SCIATICA: ICD-10-CM

## 2021-03-10 ENCOUNTER — COMMUNICATION - HEALTHEAST (OUTPATIENT)
Dept: PHYSICAL MEDICINE AND REHAB | Facility: CLINIC | Age: 81
End: 2021-03-10

## 2021-03-12 ENCOUNTER — HOSPITAL ENCOUNTER (OUTPATIENT)
Dept: PHYSICAL MEDICINE AND REHAB | Facility: CLINIC | Age: 81
Discharge: HOME | End: 2021-03-12
Attending: PHYSICIAN ASSISTANT

## 2021-03-12 DIAGNOSIS — M48.02 CERVICAL STENOSIS OF SPINAL CANAL: ICD-10-CM

## 2021-03-12 DIAGNOSIS — M54.16 LUMBAR RADICULITIS: ICD-10-CM

## 2021-03-12 DIAGNOSIS — M47.816 LUMBAR FACET ARTHROPATHY: ICD-10-CM

## 2021-03-12 ASSESSMENT — MIFFLIN-ST. JEOR: SCORE: 922.21

## 2021-03-25 ENCOUNTER — RECORDS - HEALTHEAST (OUTPATIENT)
Dept: ADMINISTRATIVE | Facility: OTHER | Age: 81
End: 2021-03-25

## 2021-03-26 ENCOUNTER — HOSPITAL ENCOUNTER (OUTPATIENT)
Dept: PHYSICAL MEDICINE AND REHAB | Facility: CLINIC | Age: 81
Discharge: HOME | End: 2021-03-26
Attending: PHYSICIAN ASSISTANT

## 2021-03-26 DIAGNOSIS — M47.816 LUMBAR FACET ARTHROPATHY: ICD-10-CM

## 2021-03-31 ENCOUNTER — HOSPITAL ENCOUNTER (OUTPATIENT)
Dept: PHYSICAL MEDICINE AND REHAB | Facility: CLINIC | Age: 81
Discharge: HOME | End: 2021-03-31
Attending: PHYSICIAN ASSISTANT

## 2021-03-31 DIAGNOSIS — M54.2 CHRONIC NECK PAIN: ICD-10-CM

## 2021-03-31 DIAGNOSIS — G89.29 CHRONIC NECK PAIN: ICD-10-CM

## 2021-03-31 DIAGNOSIS — M47.816 LUMBAR FACET ARTHROPATHY: ICD-10-CM

## 2021-04-06 ENCOUNTER — HOSPITAL ENCOUNTER (OUTPATIENT)
Dept: PHYSICAL MEDICINE AND REHAB | Facility: CLINIC | Age: 81
Discharge: HOME | End: 2021-04-06
Attending: PHYSICIAN ASSISTANT

## 2021-04-06 DIAGNOSIS — M47.816 LUMBAR FACET ARTHROPATHY: ICD-10-CM

## 2021-04-08 ENCOUNTER — OFFICE VISIT - HEALTHEAST (OUTPATIENT)
Dept: FAMILY MEDICINE | Facility: CLINIC | Age: 81
End: 2021-04-08

## 2021-04-08 DIAGNOSIS — I10 ESSENTIAL HYPERTENSION: ICD-10-CM

## 2021-04-08 DIAGNOSIS — H57.9 ITCHY EYES: ICD-10-CM

## 2021-04-08 DIAGNOSIS — Z59.6 POVERTY: ICD-10-CM

## 2021-04-08 DIAGNOSIS — Z23 NEED FOR VACCINATION: ICD-10-CM

## 2021-04-08 DIAGNOSIS — Z75.8 LANGUAGE BARRIER AFFECTING HEALTH CARE: ICD-10-CM

## 2021-04-08 DIAGNOSIS — Z60.3 LANGUAGE BARRIER AFFECTING HEALTH CARE: ICD-10-CM

## 2021-04-08 DIAGNOSIS — M81.0 SENILE OSTEOPOROSIS: ICD-10-CM

## 2021-04-08 DIAGNOSIS — G89.4 CHRONIC PAIN SYNDROME: ICD-10-CM

## 2021-04-08 DIAGNOSIS — M48.061 SPINAL STENOSIS OF LUMBAR REGION WITHOUT NEUROGENIC CLAUDICATION: ICD-10-CM

## 2021-04-08 SDOH — ECONOMIC STABILITY - INCOME SECURITY: LOW INCOME: Z59.6

## 2021-04-08 SDOH — SOCIAL STABILITY - SOCIAL INSECURITY: ACCULTURATION DIFFICULTY: Z60.3

## 2021-04-08 ASSESSMENT — MIFFLIN-ST. JEOR: SCORE: 891.24

## 2021-04-09 ENCOUNTER — HOSPITAL ENCOUNTER (OUTPATIENT)
Dept: PHYSICAL MEDICINE AND REHAB | Facility: CLINIC | Age: 81
Discharge: HOME | End: 2021-04-09
Attending: PHYSICIAN ASSISTANT

## 2021-04-09 DIAGNOSIS — M47.816 LUMBAR FACET ARTHROPATHY: ICD-10-CM

## 2021-04-19 ENCOUNTER — HOSPITAL ENCOUNTER (OUTPATIENT)
Dept: PHYSICAL MEDICINE AND REHAB | Facility: CLINIC | Age: 81
Discharge: HOME | End: 2021-04-19
Attending: PHYSICIAN ASSISTANT

## 2021-04-19 DIAGNOSIS — M47.816 LUMBAR FACET ARTHROPATHY: ICD-10-CM

## 2021-05-11 ENCOUNTER — OFFICE VISIT - HEALTHEAST (OUTPATIENT)
Dept: FAMILY MEDICINE | Facility: CLINIC | Age: 81
End: 2021-05-11

## 2021-05-11 DIAGNOSIS — Z59.6 POVERTY: ICD-10-CM

## 2021-05-11 DIAGNOSIS — Z86.711 HISTORY OF PULMONARY EMBOLISM: ICD-10-CM

## 2021-05-11 DIAGNOSIS — F51.04 PSYCHOPHYSIOLOGICAL INSOMNIA: ICD-10-CM

## 2021-05-11 DIAGNOSIS — M48.061 SPINAL STENOSIS OF LUMBAR REGION WITHOUT NEUROGENIC CLAUDICATION: ICD-10-CM

## 2021-05-11 DIAGNOSIS — J98.4 RESTRICTIVE LUNG DISEASE: ICD-10-CM

## 2021-05-11 DIAGNOSIS — R63.0 POOR APPETITE: ICD-10-CM

## 2021-05-11 DIAGNOSIS — Z79.899 POLYPHARMACY: ICD-10-CM

## 2021-05-11 DIAGNOSIS — J84.10 PULMONARY FIBROSIS (H): ICD-10-CM

## 2021-05-11 DIAGNOSIS — Z78.9 MEDICALLY COMPLEX PATIENT: ICD-10-CM

## 2021-05-11 DIAGNOSIS — I10 ESSENTIAL HYPERTENSION: ICD-10-CM

## 2021-05-11 DIAGNOSIS — M81.0 SENILE OSTEOPOROSIS: ICD-10-CM

## 2021-05-11 DIAGNOSIS — E55.9 VITAMIN D DEFICIENCY: ICD-10-CM

## 2021-05-11 DIAGNOSIS — F33.0 MAJOR DEPRESSIVE DISORDER, RECURRENT EPISODE, MILD (H): ICD-10-CM

## 2021-05-11 DIAGNOSIS — G89.4 CHRONIC PAIN SYNDROME: ICD-10-CM

## 2021-05-11 DIAGNOSIS — Z75.8 LANGUAGE BARRIER AFFECTING HEALTH CARE: ICD-10-CM

## 2021-05-11 DIAGNOSIS — Z60.3 LANGUAGE BARRIER AFFECTING HEALTH CARE: ICD-10-CM

## 2021-05-11 SDOH — ECONOMIC STABILITY - INCOME SECURITY: LOW INCOME: Z59.6

## 2021-05-11 SDOH — SOCIAL STABILITY - SOCIAL INSECURITY: ACCULTURATION DIFFICULTY: Z60.3

## 2021-05-11 ASSESSMENT — MIFFLIN-ST. JEOR: SCORE: 913.12

## 2021-05-12 ENCOUNTER — HOSPITAL ENCOUNTER (OUTPATIENT)
Dept: CT IMAGING | Facility: HOSPITAL | Age: 81
Discharge: HOME | End: 2021-05-12
Attending: FAMILY MEDICINE

## 2021-05-12 DIAGNOSIS — J84.10 PULMONARY FIBROSIS (H): ICD-10-CM

## 2021-05-12 DIAGNOSIS — Z86.711 HISTORY OF PULMONARY EMBOLISM: ICD-10-CM

## 2021-05-12 DIAGNOSIS — J98.4 RESTRICTIVE LUNG DISEASE: ICD-10-CM

## 2021-05-12 LAB
CREAT BLD-MCNC: 1.1 MG/DL (ref 0.6–1.1)
GFR SERPL CREATININE-BSD FRML MDRD: 48 ML/MIN/1.73M2

## 2021-05-15 ENCOUNTER — COMMUNICATION - HEALTHEAST (OUTPATIENT)
Dept: FAMILY MEDICINE | Facility: CLINIC | Age: 81
End: 2021-05-15

## 2021-05-15 DIAGNOSIS — G89.4 CHRONIC PAIN SYNDROME: ICD-10-CM

## 2021-05-15 DIAGNOSIS — M48.061 SPINAL STENOSIS OF LUMBAR REGION WITHOUT NEUROGENIC CLAUDICATION: ICD-10-CM

## 2021-05-15 DIAGNOSIS — M50.30 DEGENERATIVE DISC DISEASE, CERVICAL: ICD-10-CM

## 2021-05-15 DIAGNOSIS — G89.29 CHRONIC LOW BACK PAIN, UNSPECIFIED BACK PAIN LATERALITY, UNSPECIFIED WHETHER SCIATICA PRESENT: ICD-10-CM

## 2021-05-15 DIAGNOSIS — F51.04 PSYCHOPHYSIOLOGICAL INSOMNIA: ICD-10-CM

## 2021-05-15 DIAGNOSIS — M54.50 CHRONIC LOW BACK PAIN, UNSPECIFIED BACK PAIN LATERALITY, UNSPECIFIED WHETHER SCIATICA PRESENT: ICD-10-CM

## 2021-05-17 ENCOUNTER — HOSPITAL ENCOUNTER (OUTPATIENT)
Dept: PHYSICAL MEDICINE AND REHAB | Facility: CLINIC | Age: 81
Discharge: HOME | End: 2021-05-17
Attending: PHYSICIAN ASSISTANT

## 2021-05-17 DIAGNOSIS — M47.816 LUMBAR FACET ARTHROPATHY: ICD-10-CM

## 2021-05-17 DIAGNOSIS — M79.18 MYOFASCIAL PAIN: ICD-10-CM

## 2021-05-17 DIAGNOSIS — G89.29 CHRONIC NECK PAIN: ICD-10-CM

## 2021-05-17 DIAGNOSIS — M54.2 CHRONIC NECK PAIN: ICD-10-CM

## 2021-05-17 ASSESSMENT — MIFFLIN-ST. JEOR: SCORE: 911.71

## 2021-05-26 NOTE — TELEPHONE ENCOUNTER
Refill Approved    Rx renewed per Medication Renewal Policy.     Docusate sodium was last renewed on 4/18/18  #60 R-11.  Ibandronate was last renewed on 8/23/18  #1 R-6.  Losartan-hydrochlorothiazide was last renewed on 2/21/19  #30 R-0.    Last OV 3/14/19    Ruby Sorensen, [facility] Triage/Med Refill 3/23/2019     Requested Prescriptions   Pending Prescriptions Disp Refills     ibandronate (BONIVA) 150 mg tablet [Pharmacy Med Name: IBANDRONATE SODIUM 150 MG TAB] 1 tablet 0     Sig: TAKE 1 TABLET BY MOUTH EVERY 30 DAYS. TAKE IN MORNING WITH A GLASS OFWATER PRIOR TO FOOD. DON'T LIE DOWN FOR 30 MINUTES    Biphosphonates Refill Protocol Passed - 3/22/2019  2:28 PM       Passed - PCP or prescribing provider visit in last 12 months    Last office visit with prescriber/PCP: 3/14/2019 Kateryna Morales MD OR same dept: 3/14/2019 Kateryna Morales MD OR same specialty: 3/14/2019 Kateryna Morales MD  Last physical: Visit date not found Last MTM visit: Visit date not found   Next visit within 3 mo: Visit date not found  Next physical within 3 mo: Visit date not found  Prescriber OR PCP: Kateryna Morales MD  Last diagnosis associated with med order: 1. HTN (hypertension)  - losartan-hydrochlorothiazide (HYZAAR) 100-25 mg per tablet [Pharmacy Med Name: LOSARTAN-HCTZ 100-25 MG TAB]; TAKE 1 TABLET BY MOUTH DAILY.  Dispense: 30 tablet; Refill: 0    2. Drug-induced constipation  - docusate sodium (COLACE) 100 MG capsule [Pharmacy Med Name: DOCUSATE SODIUM 100MG CAP]; TAKE 1 CAPSULE BY MOUTH TWICE DAILY.  Dispense: 60 capsule; Refill: 0    If protocol passes may refill for 12 months if within 3 months of last provider visit (or a total of 15 months).            Passed - Serum creatinine in last 12 months    Creatinine   Date Value Ref Range Status   02/05/2019 0.85 0.60 - 1.10 mg/dL Final             losartan-hydrochlorothiazide (HYZAAR) 100-25 mg per tablet [Pharmacy Med Name: LOSARTAN-HCTZ 100-25 MG TAB] 30  tablet 0     Sig: TAKE 1 TABLET BY MOUTH DAILY.    Diuretics/Combination Diuretics Refill Protocol  Passed - 3/22/2019  2:28 PM       Passed - Visit with PCP or prescribing provider visit in past 12 months    Last office visit with prescriber/PCP: 3/14/2019 Kateryna Morales MD OR same dept: 3/14/2019 Kateryna Morales MD OR same specialty: 3/14/2019 Kateryna Morales MD  Last physical: Visit date not found Last MTM visit: Visit date not found   Next visit within 3 mo: Visit date not found  Next physical within 3 mo: Visit date not found  Prescriber OR PCP: Kateryna Morales MD  Last diagnosis associated with med order: 1. HTN (hypertension)  - losartan-hydrochlorothiazide (HYZAAR) 100-25 mg per tablet [Pharmacy Med Name: LOSARTAN-HCTZ 100-25 MG TAB]; TAKE 1 TABLET BY MOUTH DAILY.  Dispense: 30 tablet; Refill: 0    2. Drug-induced constipation  - docusate sodium (COLACE) 100 MG capsule [Pharmacy Med Name: DOCUSATE SODIUM 100MG CAP]; TAKE 1 CAPSULE BY MOUTH TWICE DAILY.  Dispense: 60 capsule; Refill: 0    If protocol passes may refill for 12 months if within 3 months of last provider visit (or a total of 15 months).            Passed - Serum Potassium in past 12 months     Lab Results   Component Value Date    Potassium 3.8 02/05/2019            Passed - Serum Sodium in past 12 months     Lab Results   Component Value Date    Sodium 142 02/05/2019            Passed - Blood pressure on file in past 12 months    BP Readings from Last 1 Encounters:   03/14/19 162/72            Passed - Serum Creatinine in past 12 months     Creatinine   Date Value Ref Range Status   02/05/2019 0.85 0.60 - 1.10 mg/dL Final             docusate sodium (COLACE) 100 MG capsule [Pharmacy Med Name: DOCUSATE SODIUM 100MG CAP] 60 capsule 0     Sig: TAKE 1 CAPSULE BY MOUTH TWICE DAILY.    GI Medications Refill Protocol Passed - 3/22/2019  2:28 PM       Passed - PCP or prescribing provider visit in last 12 or next 3 months.    Last  office visit with prescriber/PCP: 3/14/2019 Kateryna Morales MD OR same dept: 3/14/2019 Kateryna Morales MD OR same specialty: 3/14/2019 Kateryna Morales MD  Last physical: Visit date not found Last MTM visit: Visit date not found   Next visit within 3 mo: Visit date not found  Next physical within 3 mo: Visit date not found  Prescriber OR PCP: Kateryna Morales MD  Last diagnosis associated with med order: 1. HTN (hypertension)  - losartan-hydrochlorothiazide (HYZAAR) 100-25 mg per tablet [Pharmacy Med Name: LOSARTAN-HCTZ 100-25 MG TAB]; TAKE 1 TABLET BY MOUTH DAILY.  Dispense: 30 tablet; Refill: 0    2. Drug-induced constipation  - docusate sodium (COLACE) 100 MG capsule [Pharmacy Med Name: DOCUSATE SODIUM 100MG CAP]; TAKE 1 CAPSULE BY MOUTH TWICE DAILY.  Dispense: 60 capsule; Refill: 0    If protocol passes may refill for 12 months if within 3 months of last provider visit (or a total of 15 months).

## 2021-05-27 VITALS
HEART RATE: 60 BPM | TEMPERATURE: 97.7 F | WEIGHT: 122.3 LBS | SYSTOLIC BLOOD PRESSURE: 134 MMHG | RESPIRATION RATE: 16 BRPM | OXYGEN SATURATION: 99 % | DIASTOLIC BLOOD PRESSURE: 66 MMHG | HEIGHT: 58 IN | BODY MASS INDEX: 25.67 KG/M2

## 2021-05-27 VITALS — BODY MASS INDEX: 25.61 KG/M2 | WEIGHT: 122 LBS | HEIGHT: 58 IN

## 2021-05-27 NOTE — TELEPHONE ENCOUNTER
----- Message from Carmella Felder sent at 4/16/2019  9:22 AM CDT -----  Regarding: Dexa order request  Dr. Morales,    Patient is scheduled to get a prolia shot in 3 weeks but  stated patient will need the DEXA done first.  Can you enter an order.  [facility] will not schedule without an order.      Note to SS.  Prefers any day around 9:30.  Needs transportation.    Thank you,  Carmella Felder  04.16.19

## 2021-05-27 NOTE — TELEPHONE ENCOUNTER
Dr Morales    Fax received from [employer] , insurance plan does not cover Gabapentin 100mg capsules for more than 3 capsules daily    Current RX: Gabapentin 100mg- take 2 capsules 3 times daily    Will you consider changing to a higher strength?    If you prefer that the patient stays on this medciation and you would like to start the PA process, please send this encounter to the [facility] MED pool       Insurance Plan: [employer] Part D  Patient ID: [id number]

## 2021-05-27 NOTE — TELEPHONE ENCOUNTER
Care Guide received referral in the WQ which PCP indicated that patient needs a new medicare ID card. In this case patient does not need to be see by [facility]  just to request new medicare ID card, Care Guide contact patient, spoke with daughter and informed that patient must go to social security office to update with new address and request new medicare ID card because patient recent move. Care Guide completed referral in the WQ and informed PCP.

## 2021-05-27 NOTE — TELEPHONE ENCOUNTER
RN cannot approve Refill Request    RN can NOT refill this medication med is not covered by policy/route to provider     . Last office visit: 4/2/2019 Kateryna Morales MD Last Physical: Visit date not found Last MTM visit: Visit date not found Last visit same specialty: 4/2/2019 Kateryna Morales MD.  Next visit within 3 mo: Visit date not found  Next physical within 3 mo: Visit date not found      Blaire Keating, [facility] Triage/Med Refill 4/4/2019    Requested Prescriptions   Pending Prescriptions Disp Refills     VICK-GEST ANTACID 200 mg calcium (500 mg) chewable tablet [Pharmacy Med Name: VICK-GEST 500 MG TABLET CHEW] 90 tablet 0     Sig: CHEW 1 TABLET BY MOUTH THREE TIMES A DAY TO KEEP YOUR BONES HEALTHY    There is no refill protocol information for this order

## 2021-05-27 NOTE — PROGRESS NOTES
HPI - 80 yo female here with a cough and wheezing for 4 days.     Cough started 4 days ago  Feeling some shortness of breath and wheezing  Sputum is white  Mild sore throat but not pain with swallowing  No ear pain, vomiting or diarrhea or rash  Sick contact - none  Tried - ginger drink  inhlalers include combivent and ventolin but she stopped the combivent b/c confused about to use it  H/o restrictive lung disease    Bruising easily  hgb 11.5, plts 248 on 7/2018  INR wnl 2017      Current Outpatient Medications   Medication Sig Note     acetaminophen (MAPAP ARTHRITIS PAIN) 650 MG CR tablet Take 2 tablets (1,300 mg total) by mouth 2 (two) times a day.      amLODIPine (NORVASC) 10 MG tablet TAKE 1 TABLET ORALLY EVERY DAY.      ANTACID, CALCIUM CARBONATE, 200 mg calcium (500 mg) chewable tablet CHEW 1 TABLET BY MOUTH THREE TIMES A DAY TO KEEP YOUR BONES HEALTHY      docusate sodium (COLACE) 100 MG capsule TAKE 1 CAPSULE BY MOUTH TWICE DAILY.      gabapentin (NEURONTIN) 100 MG capsule Take 200 mg by mouth 3 (three) times a day. 2/20/2019: 1 cap AM, 1 cap noon, 3 caps at bedtime      ibandronate (BONIVA) 150 mg tablet TAKE 1 TABLET BY MOUTH EVERY 30 DAYS. TAKE IN MORNING WITH A GLASS OFWATER PRIOR TO FOOD. DON'T LIE DOWN FOR 30 MINUTES      ipratropium-albuterol (COMBIVENT RESPIMAT)  mcg/actuation Mist inhaler Inhale 1 puff every 6 (six) hours as needed.      losartan-hydrochlorothiazide (HYZAAR) 100-25 mg per tablet TAKE 1 TABLET BY MOUTH DAILY.      metoprolol succinate (TOPROL XL) 25 MG Take 1 tablet (25 mg total) by mouth daily.      mirtazapine (REMERON) 7.5 MG tablet Take 1 tablet (7.5 mg total) by mouth at bedtime.      polyvinyl alcohol (LIQUIFILM TEARS) 1.4 % ophthalmic solution Apply to eye daily as needed      ranitidine (ZANTAC) 150 MG tablet TAKE 1 TABLET BY MOUTH 2 TIMES A DAY      VENTOLIN HFA 90 mcg/actuation inhaler INAHLE 2 PUFFS BY MOUTH EVERY 6 HOURS AS NEEDED FOR WHEEZING      VITAMIN D2  "50,000 unit capsule TAKE 1 CAPSULE BY MOUTH ONCE WEEKLY      clotrimazole (LOTRIMIN) 1 % cream Apply topically 2 (two) times a day as needed.      Vitals:    04/02/19 1554   BP: 100/60   Pulse: 78   Resp: 18   Temp: 98.2  F (36.8  C)   TempSrc: Oral   SpO2: 94%   Weight: 124 lb 14.4 oz (56.7 kg)   Height: 4' 11.06\" (1.5 m)     OBJECTIVE:  Vitals listed above within normal limits  General appearance: well groomed, pleasant, well hydrated, nontoxic appearing  ENT: PERRL, throat clear  Neck: neck supple, no lymphadenopathy, no thyromegaly  Lungs: bibasilar crackles and coarse breath sounds L>R  Heart: regular rate and rhythm, no murmurs, rubs or gallops  Abdomen: soft, nontender  Neuro: no focal deficits, CN II-XII grossly intact, alert and oriented  Psych:  mood stable, appears to have good insight and judgment    CXR - right infiltration    A/P  Community acquired pneumonia  rx for azithromycin  rx for robitussin-DM  Call or return to clinic if not improving or symptoms worsening, fever or unable to adequate oral intake.   F/u appointment already scheduled in 2 weeks on 4/16/19      "

## 2021-05-27 NOTE — TELEPHONE ENCOUNTER
Appt has been scheduled as:  Date: 5/7/2019 Status: [facility]   Time: 9:30 AM Length: 15   Visit Type: NURSE VISIT [[id number]] Copay: $0.00   Provider: FAITH NICOLE Department: [facility] CLINICAL SUPPORT   Referring Provider: ABDULLAHI LINARES CSN: [id number]   Notes: Ana Pt - Prolia Injection      Please check for PA/PP.

## 2021-05-27 NOTE — TELEPHONE ENCOUNTER
Please start PA process.   She did not tolerate the 300mg doses that causes dizziness and constipation.   She has already tried narcotics, plaquenil, cellcept, methotrexate, prednisone, NSAIDS, tylenol, acupunture,PT, pain clinic, Rheum consults.       Thanks,   Dr. Kateryna Morales  4/4/2019

## 2021-05-27 NOTE — PROGRESS NOTES
HPI - 80 yo femalel here for f/u    She recently had pneumonia and now feeling better.     Female stress and urge incontinence -   Order for adult pull up diapers only with [employer] - she was able to get these        Chronic neck, back and joint pain  Already seen by endo, rheum, pain and spine specialists w/o much improvement     Lumbar stenosis and compression fracture T12  on gabapen 200mg TID and tylenol 650mg x2 tabs twice daily  On 12/11/18 seen at [facility] for SI belt - she reports she has this belt but is is uncomfortable on her pelvis  Last visit spine clinic on 2/20/19:        Polyarthralgia -   S/p rheum consults, multiple meds including prednisone and rheum meds, acupuncture, pain consults  continue gabapen 200mg TID and tylenol 650mg x2 tabs twice daily  She feels she is managing her pain ok        HTN  - BP is highly variable and per note from home RN, BP has variablity but high  Taking losaratan- HCTZ 100-25mg and amlodipine 10mg  Added  metoprolol XL 25mg daily      Restrictive lung disease - minimal symptoms - prn combivent only.  Last pulm consult was 6/25/18 - f/u 1 yr     Poor appetite and sleep - sleeping better with remeron 7.5mg     H/o Vit D deficiency -continue weekly ergo     Age-related osteoporosis without current pathological fracture  Approved by  insurance for [employer] in 2/2019 - but not yet received  Check bone density after 5/4/2019    Insurance questions    Current Outpatient Medications   Medication Sig Note     acetaminophen (MAPAP ARTHRITIS PAIN) 650 MG CR tablet Take 2 tablets (1,300 mg total) by mouth 2 (two) times a day.      amLODIPine (NORVASC) 10 MG tablet TAKE 1 TABLET ORALLY EVERY DAY.      VICK-GEST ANTACID 200 mg calcium (500 mg) chewable tablet CHEW 1 TABLET BY MOUTH THREE TIMES A DAY TO KEEP YOUR BONES HEALTHY      docusate sodium (COLACE) 100 MG capsule TAKE 1 CAPSULE BY MOUTH TWICE DAILY.      gabapentin (NEURONTIN) 100 MG capsule Take 200 mg by mouth 3 (three)  "times a day. 2/20/2019: 1 cap AM, 1 cap noon, 3 caps at bedtime      ibandronate (BONIVA) 150 mg tablet TAKE 1 TABLET BY MOUTH EVERY 30 DAYS. TAKE IN MORNING WITH A GLASS OFWATER PRIOR TO FOOD. DON'T LIE DOWN FOR 30 MINUTES      ipratropium-albuterol (COMBIVENT RESPIMAT)  mcg/actuation Mist inhaler Inhale 1 puff every 6 (six) hours as needed.      losartan-hydrochlorothiazide (HYZAAR) 100-25 mg per tablet TAKE 1 TABLET BY MOUTH DAILY.      metoprolol succinate (TOPROL XL) 25 MG Take 1 tablet (25 mg total) by mouth daily.      mirtazapine (REMERON) 7.5 MG tablet Take 1 tablet (7.5 mg total) by mouth at bedtime.      polyvinyl alcohol (LIQUIFILM TEARS) 1.4 % ophthalmic solution Apply to eye daily as needed      ranitidine (ZANTAC) 150 MG tablet TAKE 1 TABLET BY MOUTH 2 TIMES A DAY      VENTOLIN HFA 90 mcg/actuation inhaler INAHLE 2 PUFFS BY MOUTH EVERY 6 HOURS AS NEEDED FOR WHEEZING      VITAMIN D2 50,000 unit capsule TAKE 1 CAPSULE BY MOUTH ONCE WEEKLY      clotrimazole (LOTRIMIN) 1 % cream Apply topically 2 (two) times a day as needed.      Vitals:    04/16/19 0809   BP: 118/74   Pulse: 64   Resp: 12   Temp: 98.2  F (36.8  C)   TempSrc: Oral   SpO2: 96%   Weight: 126 lb 1.6 oz (57.2 kg)   Height: 4' 11.06\" (1.5 m)     PHYSICAL EXAM   General Appearance: Awake and alert, in no acute distress  HEENT: neck is supple  CV: regular rate  Resp: No respiratory distress. Breathing comfortably  Musculoskeletal: moving limbs comfortably with not deficits or deformities  Skin: no rashes noted    A/P  Chronic neck, back and joint pain  Already seen by endo, rheum, pain and spine specialists w/o much improvement     Lumbar stenosis and compression fracture T12  on gabapen 200mg TID and tylenol 650mg x2 tabs twice daily  continue SI belt        Polyarthralgia -   S/p rheum consults, multiple meds including prednisone and rheum meds, acupuncture, pain consults and spine consults  continue gabapen 200mg TID and tylenol 650mg " x2 tabs twice daily  She feels she is managing her pain ok        HTN  - well controlled with current regimen     Restrictive lung disease - minimal symptoms - prn combivent only.  Last pulm consult was 6/25/18 - f/u 1 yr     Poor appetite and sleep - sleeping better with remeron 7.5mg     H/o Vit D deficiency -continue weekly ergo     Age-related osteoporosis without current pathological fracture  Approved by  insurance for Prolia in 2/2019 - but not yet received  Check bone density after 5/4/2019  Needs f/u with  for prolia shot and repeat DEXA -    Female stress and urge incontinence -   Order for adult pull up diapers only with [employer] - she was able to get these    Insurance questions - referral for [facility] resource guide    Polypharmacy - RN sets up meds    Spent 25 min face to face with patient with more the 50% spent in counseling, reviewing chart, and coordination of care and discussing problems listed above.

## 2021-05-27 NOTE — TELEPHONE ENCOUNTER
Central PA team  [phone]  Pool: [facility] PA MED ([id number])          PA has been initiated.       PA form completed and faxed insurance via Cover My Meds     Key: [id number]     Medication:  Gabapentin 100MG capsules    Insurance: [employer]         Response will be received via fax and may take up to 5-10 business days depending on plan

## 2021-05-28 ASSESSMENT — ASTHMA QUESTIONNAIRES: ACT_TOTALSCORE: 20

## 2021-05-28 NOTE — PROGRESS NOTES
HPI - 78 yo female here for f/u.     Lumbar stenosis and compression fracture T12  on gabapen 200mg TID and tylenol 650mg x2 tabs twice daily  On 12/11/18 seen at [facility] for SI belt - she reports she has this belt but is is uncomfortable on her pelvis  Lower back pain is so bad, she has trouble bending over.     Polyarthralgia -   Chronic neck, back and joint pain  Already seen by endo, rheum, pain and spine specialists w/o much improvement  S/p rheum consults, multiple meds including prednisone and rheum meds, acupuncture, pain consults  continue gabapen 200mg TID and tylenol 650mg x2 tabs twice daily  Tramadol caused dizziness and nausea  She reports that in [place] she used to get IVs with Vit B1 and B12 that helped hr    HTN  - BP is highly variable and per note from home RN, BP has variablity but high  Taking losaratan- HCTZ 100-25mg and amlodipine 10mg  Added  metoprolol XL 25mg daily on 3/14/19  Discussed in diet including Khmer bouillon for soup     Restrictive lung disease - minimal symptoms - prn combivent only.  Last pulm consult was 6/25/18 - f/u 1 yr     Poor appetite and sleep - sleeping better with remeron 7.5mg     H/o Vit D deficiency -continue weekly ergo    Drug induced constipation - on colace     Age-related osteoporosis without current pathological fracture  Approved by  insurance for Prolia in 2/2019  Also on calcium and Vit D and boniva  DEXA 5/1/19: OSTEOPOROSIS and HIGH predicted future fracture risk .  Recommendations:  Stability or increase in bone density is regarded as a positive response to an antiresorptive agent.  Therefore, continuation of current management is reasonable with a recheck in 2 years.       Current Outpatient Medications   Medication Sig Note     acetaminophen (MAPAP ARTHRITIS PAIN) 650 MG CR tablet Take 2 tablets (1,300 mg total) by mouth 2 (two) times a day.      amLODIPine (NORVASC) 10 MG tablet TAKE 1 TABLET ORALLY EVERY DAY.      VICK-GEST ANTACID 200 mg calcium  "(500 mg) chewable tablet CHEW 1 TABLET BY MOUTH THREE TIMES A DAY TO KEEP YOUR BONES HEALTHY      docusate sodium (COLACE) 100 MG capsule TAKE 1 CAPSULE BY MOUTH TWICE DAILY.      gabapentin (NEURONTIN) 100 MG capsule Take 200 mg by mouth 3 (three) times a day. 2/20/2019: 1 cap AM, 1 cap noon, 3 caps at bedtime      ibandronate (BONIVA) 150 mg tablet TAKE 1 TABLET BY MOUTH EVERY 30 DAYS. TAKE IN MORNING WITH A GLASS OFWATER PRIOR TO FOOD. DON'T LIE DOWN FOR 30 MINUTES      ipratropium-albuterol (COMBIVENT RESPIMAT)  mcg/actuation Mist inhaler Inhale 1 puff every 6 (six) hours as needed.      losartan-hydrochlorothiazide (HYZAAR) 100-25 mg per tablet TAKE 1 TABLET BY MOUTH DAILY.      metoprolol succinate (TOPROL XL) 25 MG Take 1 tablet (25 mg total) by mouth daily.      mirtazapine (REMERON) 15 MG tablet TAKE HALF TABLET (7.5 MG TOTAL) BY MOUTH AT BEDTIME      ranitidine (ZANTAC) 150 MG tablet TAKE 1 TABLET BY MOUTH 2 TIMES A DAY      VENTOLIN HFA 90 mcg/actuation inhaler INAHLE 2 PUFFS BY MOUTH EVERY 6 HOURS AS NEEDED FOR WHEEZING      VITAMIN D2 50,000 unit capsule TAKE 1 CAPSULE BY MOUTH ONCE WEEKLY      clotrimazole (LOTRIMIN) 1 % cream Apply topically 2 (two) times a day as needed.      mirtazapine (REMERON) 7.5 MG tablet Take 1 tablet (7.5 mg total) by mouth at bedtime.      polyvinyl alcohol (LIQUIFILM TEARS) 1.4 % ophthalmic solution Apply to eye daily as needed      Vitals:    05/14/19 0828 05/14/19 0836 05/14/19 0908   BP: 160/70 162/70 162/68   Pulse: 73     Resp: 14     Temp: 97.8  F (36.6  C)     TempSrc: Oral     SpO2: 94%     Weight: 126 lb 14.4 oz (57.6 kg)     Height: 4' 10.74\" (1.492 m)             PHYSICAL EXAM   General Appearance: Awake and alert, in no acute distress  HEENT: neck is supple  CV: regular rate  Resp: No respiratory distress. Breathing comfortably  Musculoskeletal: moving limbs comfortably with not deficits or deformities  Skin: no rashes noted    A/P  Lumbar stenosis and " compression fracture T12 - pain not managed well   on gabapen 200mg TID and tylenol 650mg x2 tabs twice daily      Polyarthralgia - Chronic neck, back and joint pain  Already seen by endo, rheum, pain and spine specialists w/o much improvement  S/p rheum consults, multiple meds including prednisone and rheum meds, acupuncture, pain consults  continue gabapen 200mg TID and tylenol 650mg x2 tabs twice daily  Start Vit B complex    HTN  - not controlled   Continue losaratan- HCTZ 100-25mg and amlodipine 10mg  increase metoprolol XL 25mg daily -> 50mg     Restrictive lung disease - minimal symptoms -   Refill combivent and ventolin prn  Last pulm consult was 6/25/18 - f/u 1 yr     Poor appetite and sleep - sleeping better with remeron 7.5mg     H/o Vit D deficiency -continue weekly ergo    Drug induced constipation - on colace     Age-related osteoporosis with current pathological fracture DEXA 5/1/19   Prolia started 5/7/19  continue calcium and Vit D and boniva    Spent 25 min face to face with patient with more the 100% spent in counseling, reviewing chart, and coordination of care and discussing problems listed above.

## 2021-05-28 NOTE — TELEPHONE ENCOUNTER
"Spoke with pt through  services and went over below questions.    Prolia Injection Phone Screen      Screening questions have been asked 2-3 days prior to administration visit for Prolia. If any questions are answered with \"Yes,\" this phone encounter were will routed to ordering provider for further evaluation.     1.  When was the last injection?  New    2.  Has insurance for this injection been verified?  Yes    3.  Did you experience any new onset achiness or rashes that lasted for over a month with your previous Prolia injection?   NEW    4.  Do you have a fever over 101?F or a new deep cough that is unusual for you today? No    5.  Have you started any new medications in the last 6 months that you were told could affect your immune system? These may have been prescribed by oncologist, transplant, rheumatology, or dermatology.   No    6.  In the last 6 months have you have gastric bypass or parathyroid surgery?   No    7.  Do you plan dental work requiring drilling into the bone such as implants/extractions or oral surgery in the next 2-3 months?   No    8. Do you have new insurance since the last injection?    NEW    Patient informed if symptoms discussed above present prior to their administration appointment, they are to notify clinic immediately.     Aria Ball              "

## 2021-05-28 NOTE — TELEPHONE ENCOUNTER
RN cannot approve Refill Request    RN can NOT refill this medication medication not on med list     Pt has 7.5 mg on med list    Blaire Keating, [facility] Triage/Med Refill 5/2/2019    Requested Prescriptions   Pending Prescriptions Disp Refills     mirtazapine (REMERON) 15 MG tablet [Pharmacy Med Name: MIRTAZAPINE 15 MG TABLET] 15 tablet 0     Sig: TAKE HALF TABLET (7.5 MG TOTAL) BY MOUTH AT BEDTIME       Tricyclics/Misc Antidepressant/Antianxiety Meds Refill Protocol Passed - 5/1/2019  2:46 PM        Passed - PCP or prescribing provider visit in last year     Last office visit with prescriber/PCP: 4/16/2019 Kateryna Morales MD OR same dept: 4/16/2019 Kateryna Morales MD OR same specialty: 4/16/2019 Kateryna Morales MD  Last physical: Visit date not found Last MTM visit: Visit date not found   Next visit within 3 mo: Visit date not found  Next physical within 3 mo: Visit date not found  Prescriber OR PCP: Kateryna Morales MD  Last diagnosis associated with med order: There are no diagnoses linked to this encounter.  If protocol passes may refill for 12 months if within 3 months of last provider visit (or a total of 15 months).

## 2021-05-28 NOTE — TELEPHONE ENCOUNTER
Who is calling:  [facility]  Reason for Call:  They are requesting the order for patient's bone density scan be transmitted electronically to them. Patient scheduled for 5/1/19. Please advise, thank you.   Date of last appointment with primary care: 4/16/19  Okay to leave a detailed message: Yes

## 2021-05-29 NOTE — TELEPHONE ENCOUNTER
Refill Approved    Rx renewed per Medication Renewal Policy. Medication was last renewed on 5/2/19.    Blaire Keating, [facility] Triage/Med Refill 5/30/2019     Requested Prescriptions   Pending Prescriptions Disp Refills     mirtazapine (REMERON) 15 MG tablet [Pharmacy Med Name: MIRTAZAPINE 15 MG TABLET] 15 tablet 0     Sig: TAKE 1/2 TABLET BY MOUTH AT BEDTIME       Tricyclics/Misc Antidepressant/Antianxiety Meds Refill Protocol Passed - 5/29/2019  3:33 PM        Passed - PCP or prescribing provider visit in last year     Last office visit with prescriber/PCP: 5/14/2019 Kateryna Morales MD OR same dept: 5/14/2019 Kateryna Morales MD OR same specialty: 5/14/2019 Kateryna Morales MD  Last physical: Visit date not found Last MTM visit: Visit date not found   Next visit within 3 mo: Visit date not found  Next physical within 3 mo: Visit date not found  Prescriber OR PCP: Kateryna Morales MD  Last diagnosis associated with med order: 1. Psychophysiological insomnia  - mirtazapine (REMERON) 15 MG tablet [Pharmacy Med Name: MIRTAZAPINE 15 MG TABLET]; TAKE 1/2 TABLET BY MOUTH AT BEDTIME  Dispense: 15 tablet; Refill: 0    2. Poor appetite  - mirtazapine (REMERON) 15 MG tablet [Pharmacy Med Name: MIRTAZAPINE 15 MG TABLET]; TAKE 1/2 TABLET BY MOUTH AT BEDTIME  Dispense: 15 tablet; Refill: 0    If protocol passes may refill for 12 months if within 3 months of last provider visit (or a total of 15 months).

## 2021-05-30 VITALS — HEIGHT: 59 IN | WEIGHT: 127.7 LBS | BODY MASS INDEX: 25.74 KG/M2

## 2021-05-30 VITALS — BODY MASS INDEX: 25.2 KG/M2 | WEIGHT: 125 LBS | HEIGHT: 59 IN

## 2021-05-30 VITALS — WEIGHT: 125 LBS | HEIGHT: 59 IN | BODY MASS INDEX: 25.2 KG/M2

## 2021-05-30 VITALS — BODY MASS INDEX: 25.2 KG/M2 | HEIGHT: 59 IN | WEIGHT: 125 LBS

## 2021-05-30 VITALS — HEIGHT: 59 IN | BODY MASS INDEX: 25.8 KG/M2 | WEIGHT: 128 LBS

## 2021-05-30 VITALS — HEIGHT: 59 IN | BODY MASS INDEX: 25.87 KG/M2 | WEIGHT: 128.31 LBS

## 2021-05-30 VITALS — HEIGHT: 59 IN | BODY MASS INDEX: 25.2 KG/M2 | WEIGHT: 125 LBS

## 2021-05-30 NOTE — PROGRESS NOTES
HPI - 80yo female here to f/u HTN and Pain      HTN - BP good today  Taking  losaratan- HCTZ 100-25mg and amlodipine 10mg and metoprolol that was recently increased to 50mg    Chronic Pain - on gabapen 200mg TID and tylenol 650mg x2 tabs twice daily   - She is reporting pain all over her body and especially near lower back and hip.   Walking and exercises at adult day care make pain worse  She fell 4 years ago and that triggered her lower back pain. She reports the back /hip pain is progressing.   Sitting and standing are painful   --Lumbar stenosis and compression fracture T12 - pain not managed well   Per spine clinic consult note 2/20/19:  Chronic bilateral low back pain with radiation into the right greater than left lower extremity.  MRI lumbar spine shows mild spinal canal stenosis at L4-5 related to mild facet arthropathy and a circumferential disc bulge.  Patient is status post a right SI joint injection on November 12, 2018 which provided partial relief of her pain lasting only 2 days.  She had previously had an L5-S1 intralaminar epidural steroid injection on provided relief of her left-sided pain.  I believe pain is multifactorial.  I do think she is at least in part symptomatic from sacroiliac joint dysfunction.  She is likely symptomatic from the spinal stenosis.  Additionally, she may have myofascial pain.  She had some hypertonicity in the bilateral gluteal muscles on exam today  -h/o trochanter bursitis   --Polyarthralgia - Chronic neck, back and joint pain  Already seen by endo, rheum, pain and spine specialists w/o much improvement  S/p rheum consults, multiple meds including prednisone and rheum meds, acupuncture, pain consults  Started Vit B complex    Restrictive lung disease - minimal symptoms - with current regimen of combivent and ventolin prn  Last pulm consult was 6/25/18 - f/u 1 yr     Poor appetite and sleep - sleeping better with remeron 7.5mg     H/o Vit D deficiency -continue  weekly ergo  Last level 34.9 on 2/5/19     Drug induced constipation - on colace and miralax  but still constipated     Age-related osteoporosis with current pathological fracture DEXA 5/1/19   Prolia started 5/7/19 and needs Q6 months  Also taking calcium and Vit D and boniva    mildlly elevated parathyroid hormone and low urine calcium  Per letter from Endo, this is likely related to Vit D deficiency and other osteoporosis labs were good and thus started prolia shots    Polypharmacy - home health RN helps with meds    H/o Urinary incontinence - she has occasional accidents and wears diapers sometimes  Usually related to nocturia and difficulty getting to toilet in time (she walks slowly). Diapers are not comfortable and she does not like to wear it. She has a bedside commode but she does not like to use it.     Current Outpatient Medications   Medication Sig Note     acetaminophen (MAPAP ARTHRITIS PAIN) 650 MG CR tablet Take 2 tablets (1,300 mg total) by mouth 2 (two) times a day.      albuterol (VENTOLIN HFA) 90 mcg/actuation inhaler INAHLE 2 PUFFS BY MOUTH EVERY 6 HOURS AS NEEDED FOR WHEEZING      amLODIPine (NORVASC) 10 MG tablet TAKE 1 TABLET ORALLY EVERY DAY.      VICK-GEST ANTACID 200 mg calcium (500 mg) chewable tablet CHEW 1 TABLET BY MOUTH THREE TIMES A DAY TO KEEP YOUR BONES HEALTHY      docusate sodium (COLACE) 100 MG capsule TAKE 1 CAPSULE BY MOUTH TWICE DAILY.      gabapentin (NEURONTIN) 100 MG capsule Take 200 mg by mouth 3 (three) times a day. 2/20/2019: 1 cap AM, 1 cap noon, 3 caps at bedtime      ipratropium-albuterol (COMBIVENT RESPIMAT)  mcg/actuation Mist inhaler Inhale 1 puff every 6 (six) hours as needed.      losartan-hydrochlorothiazide (HYZAAR) 100-25 mg per tablet TAKE 1 TABLET BY MOUTH DAILY.      metoprolol succinate (TOPROL XL) 50 MG 24 hr tablet Take 1 tablet (50 mg total) by mouth daily.      mirtazapine (REMERON) 15 MG tablet TAKE 1/2 TABLET BY MOUTH AT BEDTIME      polyvinyl  "alcohol (LIQUIFILM TEARS) 1.4 % ophthalmic solution Apply to eye daily as needed      ranitidine (ZANTAC) 150 MG tablet TAKE 1 TABLET BY MOUTH 2 TIMES A DAY      VITAMIN D2 50,000 unit capsule TAKE 1 CAPSULE BY MOUTH ONCE WEEKLY      b complex vitamins capsule Take 1 capsule by mouth daily.      clotrimazole (LOTRIMIN) 1 % cream Apply topically 2 (two) times a day as needed.      ibandronate (BONIVA) 150 mg tablet TAKE 1 TABLET BY MOUTH EVERY 30 DAYS. TAKE IN MORNING WITH A GLASS OFWATER PRIOR TO FOOD. DON'T LIE DOWN FOR 30 MINUTES      Vitals:    07/11/19 0856   BP: 124/62   Patient Site: Right Arm   Patient Position: Sitting   Cuff Size: Adult Regular   Pulse: 92   Resp: (!) 32   Temp: 97.5  F (36.4  C)   TempSrc: Oral   SpO2: 97%   Weight: 125 lb 2 oz (56.8 kg)   Height: 4' 10.66\" (1.49 m)     PHYSICAL EXAM   General Appearance: Awake and alert, in no acute distress  HEENT: neck is supple  CV: regular rate  Resp: No respiratory distress. Breathing comfortably  Musculoskeletal: MS: normal back exam, able to bend forward and touch toes and extend back, twist at the waist and bend side-to-side, negative straight leg raises, no bony tenderness along the spine  Skin: no rashes noted    A/P  HTN -  Well controlled with current regimen  -  losaratan- HCTZ 100-25mg and amlodipine 10mg and metoprolol 50mg    Chronic Pain - on gabapen 200mg TID and tylenol 650mg x2 tabs twice daily  --Lumbar stenosis and compression fracture T12 - pain not managed well   -h/o trochanter bursitis   --Polyarthralgia   Given toradol 30mg  shot in clinic  rx for naproxen     Restrictive lung disease - minimal symptoms - with current regimen of combivent and ventolin prn  Last pulm consult was 6/25/18 - f/u 1 yr  Will help her schedule f/u with pulm clinic     Poor appetite and sleep - continue remeron 7.5mg     H/o Vit D deficiency -continue weekly ergo     Drug induced constipation - continue colace and miralax  Discussed increasing water and " trying pineapple and prune juice     Age-related osteoporosis with current pathological fracture DEXA 5/1/19   Prolia started 5/7/19 and needs Q6 months  Also taking calcium and Vit D and boniva    mildlly elevated parathyroid hormone and low urine calcium  Continue to montior    Polypharmacy - home health RN helps with meds    H/o Urinary incontinence -discussed her current supplies    Spent 40 min face to face with patient with more the 100% spent in counseling, reviewing chart with patient, and coordination of care and discussing problems listed above.

## 2021-05-31 VITALS — WEIGHT: 129.5 LBS | HEIGHT: 58 IN | BODY MASS INDEX: 27.18 KG/M2

## 2021-05-31 VITALS — WEIGHT: 128.7 LBS | HEIGHT: 59 IN | BODY MASS INDEX: 25.95 KG/M2

## 2021-05-31 VITALS — HEIGHT: 58 IN | BODY MASS INDEX: 27.08 KG/M2 | WEIGHT: 129 LBS

## 2021-05-31 VITALS — BODY MASS INDEX: 24.72 KG/M2 | WEIGHT: 122.6 LBS | HEIGHT: 59 IN

## 2021-05-31 VITALS — HEIGHT: 59 IN | BODY MASS INDEX: 25.74 KG/M2 | WEIGHT: 127.7 LBS

## 2021-05-31 VITALS — WEIGHT: 128 LBS | HEIGHT: 59 IN | BODY MASS INDEX: 25.8 KG/M2

## 2021-05-31 VITALS — WEIGHT: 124.2 LBS | HEIGHT: 59 IN | BODY MASS INDEX: 25.04 KG/M2

## 2021-05-31 VITALS — WEIGHT: 127 LBS | BODY MASS INDEX: 25.6 KG/M2 | HEIGHT: 59 IN

## 2021-05-31 VITALS — WEIGHT: 127 LBS | HEIGHT: 59 IN | BODY MASS INDEX: 25.6 KG/M2

## 2021-05-31 VITALS — BODY MASS INDEX: 25.2 KG/M2 | HEIGHT: 59 IN | WEIGHT: 125 LBS

## 2021-05-31 VITALS — WEIGHT: 124 LBS | BODY MASS INDEX: 25.04 KG/M2

## 2021-05-31 NOTE — PROGRESS NOTES
[facility] Care Coordination Contact    Internal CC change effective 9/1/19.  Christina Collazo  [facility]  [phone]

## 2021-05-31 NOTE — PATIENT INSTRUCTIONS - HE
1) It doesn't seem like the fibrosis is progressing  2) The shortness of breath that wakes you up is interesting.  I'm not sure what that is from but I suspect you are having a component of heart burn.  I would take the zantac in the morning at night.  This may help prevent your stomach burning and the breathing problems.  3) The combivent may help as needed

## 2021-05-31 NOTE — PROGRESS NOTES
"HPI - 80 yo female here to f/u on BP and knee/back pain.       HTN -   Taking  losaratan- HCTZ 100-25mg and amlodipine 10mg and metoprolol that was recently increased to 50mg         Chronic Pain - --Polyarthralgia - Chronic neck, back and joint pain  Pain meds: gabapentin 200mg TID and tylenol 650mg x2 tabs twice daily and Vit B complex  --Lumbar stenosis and compression fracture T12 - MRI lumbar spine shows mild spinal canal stenosis at L4-5 related to mild facet arthropathy and a circumferential disc bulge  Per spine clinic consult note 2/20/19: s/p  S/p  L5-S1 intralaminar epidural steroid injection  -symptomatic from the spinal stenosis and  myofascial pain  -h/o trochanter bursitis   -S/p rheum consults, multiple meds including prednisone and rheum meds, acupuncture, pain consults      Restrictive lung disease - minimal symptoms - with current regimen of combivent and ventolin prn  Last pulm consult was 6/25/18      Poor appetite and sleep - sleeping better with remeron 7.5mg     H/o Vit D deficiency -continue weekly ergo  Last level 34.9 on 2/5/19     Drug induced constipation - on colace and miralax   which helped     Age-related osteoporosis with current pathological fracture DEXA 5/1/19   Prolia started 5/7/19 and needs Q6 months  Also taking calcium and Vit D and boniva     mildlly elevated parathyroid hormone and low urine calcium  Per letter from Endo, this is likely related to Vit D deficiency and other osteoporosis labs were good and thus started prolia shots    Polypharmacy -   Home RN sets up meds  Unclear about dose she is taking for gabapentin:   Per clinic note: \"1 cap AM, 1 cap noon, 3 caps at bedtime\" vs 200mg three times a day as on bottle    Note from pharmacy - \"patient no longer has secondary insurance so patient's OTC will be covered\".     Current Outpatient Medications   Medication Sig Note     acetaminophen (MAPAP ARTHRITIS PAIN) 650 MG CR tablet Take 2 tablets (1,300 mg total) by mouth " "2 (two) times a day.      albuterol (VENTOLIN HFA) 90 mcg/actuation inhaler INAHLE 2 PUFFS BY MOUTH EVERY 6 HOURS AS NEEDED FOR WHEEZING      amLODIPine (NORVASC) 10 MG tablet TAKE 1 TABLET ORALLY EVERY DAY.      b complex vitamins capsule Take 1 capsule by mouth daily.      VICK-GEST ANTACID 200 mg calcium (500 mg) chewable tablet CHEW 1 TABLET BY MOUTH THREE TIMES A DAY TO KEEP YOUR BONES HEALTHY      capsaicin (ZOSTRIX) 0.025 % cream Apply topically 2 (two) times a day. 8/8/2019: Patient states uses once a day      docusate sodium (COLACE) 100 MG capsule TAKE 1 CAPSULE BY MOUTH TWICE DAILY.      ibandronate (BONIVA) 150 mg tablet TAKE 1 TABLET BY MOUTH EVERY 30 DAYS. TAKE IN MORNING WITH A GLASS OFWATER PRIOR TO FOOD. DON'T LIE DOWN FOR 30 MINUTES      ipratropium-albuterol (COMBIVENT RESPIMAT)  mcg/actuation Mist inhaler Inhale 1 puff every 6 (six) hours as needed.      losartan-hydrochlorothiazide (HYZAAR) 100-25 mg per tablet TAKE 1 TABLET BY MOUTH DAILY.      metoprolol succinate (TOPROL XL) 50 MG 24 hr tablet Take 1 tablet (50 mg total) by mouth daily.      mirtazapine (REMERON) 15 MG tablet TAKE 1/2 TABLET BY MOUTH AT BEDTIME      naproxen sodium (ALEVE) 220 MG tablet Take 1 tablet (220 mg total) by mouth 2 (two) times a day with meals.      polyvinyl alcohol (LIQUIFILM TEARS) 1.4 % ophthalmic solution Apply to eye daily as needed      ranitidine (ZANTAC) 150 MG tablet TAKE 1 TABLET BY MOUTH 2 TIMES A DAY      VITAMIN D2 50,000 unit capsule TAKE 1 CAPSULE BY MOUTH ONCE WEEKLY      gabapentin (NEURONTIN) 100 MG capsule Take 200 mg by mouth 3 (three) times a day. 2/20/2019: 1 cap AM, 1 cap noon, 3 caps at bedtime      menthol 2.5 % Gel Apply 1 application topically 3 (three) times a day.      Vitals:    08/08/19 0827   BP: 136/64   Pulse: 70   Resp: 16   Temp: 97.6  F (36.4  C)   TempSrc: Oral   SpO2: 95%   Weight: 124 lb 12.8 oz (56.6 kg)   Height: 4' 10.54\" (1.487 m)     PHYSICAL EXAM   General " "Appearance: Awake and alert, in no acute distress  HEENT: neck is supple  CV: regular rate  Resp: No respiratory distress. Breathing comfortably  Musculoskeletal: moving limbs comfortably with not deficits or deformities  Skin: no rashes noted    A/P  HTN -   Well controlled with current regimen     Chronic Pain - --Polyarthralgia - Chronic neck, back and joint pain  Pain meds: gabapentin 200mg TID and tylenol 650mg x2 tabs twice daily and Vit B complex and topical capsaicin cream  Will get clarification for dose of gabapentin   - order for Toradol shot in clinic today  -ace wraps per patient request      Restrictive lung disease - Last pulm consult with  f/u 1 yr on 8/19/19     Poor appetite and sleep - improved, continue remeron     H/o Vit D deficiency -continue weekly ergo     Drug induced constipation - improved with current regimen     Age-related osteoporosis with current pathological fracture DEXA 5/1/19  Continue Prolia Q6 months and continue calcium and Vit D and boniva     mildlly elevated parathyroid hormone and low urine calcium  continue to monitor    Polypharmacy -   Will ask patient to give note to home RN  To try to clarify her dose she is taking for gabapentin:   Per clinic note: \"1 cap AM, 1 cap noon, 3 caps at bedtime\" vs 200mg three times a day as on bottle    Referral for  for insurance help b/c of the   Note from pharmacy - \"patient no longer has secondary insurance so patient's OTC will be covered\".     Spent 25 min face to face with patient with more the 50% spent in counseling, reviewing chart with patient and coordination of care, medication reconciliation and discussing problems listed above.     "

## 2021-05-31 NOTE — PROGRESS NOTES
See FR notes in chart.   Ruby [facility] Financial Resource Guide  [phone]

## 2021-05-31 NOTE — PATIENT INSTRUCTIONS - HE
"  Home RN -  Can you please clarify what dose she is taking for gabapentin?  Is she taking?   Per clinic note: \"1 cap AM, 1 cap noon, 3 caps at bedtime\"   vs 200mg three times a day as on bottle    Note from pharmacy - \"patient no longer has secondary insurance so patient's OTC will be covered\".   - referred to  to get help with insurance  "

## 2021-05-31 NOTE — PROGRESS NOTES
Programs Applying For: Medical Assistance   County:  Case #: [id number]  County Worker: Luis #[phone]  Mnsure #:   PMI #:   Tracking:   Date Applied:     Outreach:   8/29/2019: FRW called financial workerLuis for 2nd time and left . FRW asked to expedite due to patient is in nursing home and may discharge due to insurance issues. FRW will make 3rd attempt next week.  8/22/2019: FRW met patient's daughter in clinic. [facility]. Gerson indicated the health insurance case is with financial workerLuis. FRW called Luis and left  for next steps to get patient with active Medical Assistance. Patient's daughter is going to have english speaking children also call Luis this week and leave . FRW sent daughter home with [facility] for mother to sign for FRW, FRW will call financial worker again next week.  8/15/2019: FRW called patient and spoke with daughter. Daughter would like to come to clinic to complete paperwork. Appointment made for 8/22 @ 10:00.  8/9/2019: FRW called patient and spoke with daughter. Daughter explained she brought in paperwork for mother's health insurance to [employer]. FRW will receive form next week and call [facility].       Health Insurance Information:       Referral:

## 2021-05-31 NOTE — PROGRESS NOTES
[facility] Care Coordination Contact      [facility] Six-Month Telephone Assessment    6 month telephone assessment completed on 8.15.19. Introduced self as new Care Coordinator with [facility]. Updated Care Team to reflect involvement.     ER visits: No  Hospitalizations: No  TCU stays: No  Significant health status: Member has been having a lot of difficulty managing pain lately. Pt does report that pain is a little better today following injections that were given yesterday. Member reports doing exercises daily at home in the morning and afternoon. These exercises were provided to member by PT who also included a band that she uses for strengthening. Does endorse some ongoing difficulty with back pain and leg mobility.   Unclear if utilizing tylenol 2 x daily for pain management. Nurse comes and sets up her medications once every two weeks. Will verify with [facility] that medication list is up to date.   Falls/Injuries: No:   ADL/IADL changes: No  Changes in services: No. Reviewed the need to complete Medical Assistance paperwork. Member's daughter reports that she did not complete any paperwork yet. Noticed that a financial worker had made note of this last week will touch base with W for follow-up. Member's daughter Lay May said she is available today after 3 pm to come into the clinic if needed to complete the paperwork.     Caregiver Assessment follow up:  N/A    Goals: See POC in chart for goal progress documentation. Member will continue to receive PCA support daily from her daughter, Shanita, as well as bi-weekly SN visits for medication management and set-up. Please continue to notify [employer] [phone] with any medication changes.      Will see member in 6 months for an annual health risk assessment.   Encouraged member to call CC with any questions or concerns in the meantime.     REBECCA Srinivasan  [facility]  [phone]

## 2021-05-31 NOTE — PROGRESS NOTES
"Assessment and Plan:Luis Manuel Lemon is a 79 y.o. 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  She doesn't describe classic symptoms of pulmonary fibrosis as she doesn't cough or have progressive dyspnea.  She does occasionally awaken short of breath, and remains that way for a couple of days.  It only happens at night, which makes me highly suspicious for nocturnal aspiration.  This could also explain her fibrosis issue as well.  She needs to remain on zantac and tums as prescribed.     1) Dyspnea - more likely occasional nocturnal reflux than progressive fibrosis.   Remain on two times a day zantac and prn tums.  OK to use as needed combivent, and can stop the albuterol if she doesn't like that one.    2) RTC in 1 year           CCx: dyspnea    HPI: Ms. Lemon is a 79 year old woman who returns for evaluation of her presumed pulmonary fibrosis.  She states her breathing is usually fine with no cough or dyspnea.  She does occasionally feel worse from a breathing standpoint, possibly once every month or so she will wake up feeling short of breath.  Combivent helps some, but she will remain short of breath for a couple of days before it gets better.  She denies having heart burn, or acid reflux, but does mention that foods make her belly \"burn\" like chili paste.  She thinks she is not on zantac anymore, but produces a pill bottle with zantac in it.  She has been using her combivent, but there is no cartridge in it.  Its not clear how long she has been using it this way.  I placed a cartridge in it for her from her medication bag.    ROS:  Review of Systems - History obtained from the patient  General ROS: negative  Psychological ROS: negative  ENT ROS: negative  Allergy and Immunology ROS: negative  Endocrine ROS: negative  Respiratory ROS: positive for - shortness of breath  negative for - cough, hemoptysis, sputum changes, " stridor, tachypnea or wheezing  Cardiovascular ROS: no chest pain or palpitations  Gastrointestinal ROS: no abdominal pain, change in bowel habits, or black or bloody stools  Genito-Urinary ROS: no dysuria, trouble voiding, or hematuria  Musculoskeletal ROS: negative  Neurological ROS: no TIA or stroke symptoms  Dermatological ROS: negative      Current Meds:  Current Outpatient Medications   Medication Sig Note     acetaminophen (MAPAP ARTHRITIS PAIN) 650 MG CR tablet Take 2 tablets (1,300 mg total) by mouth 2 (two) times a day.      amLODIPine (NORVASC) 10 MG tablet TAKE 1 TABLET ORALLY EVERY DAY.      b complex vitamins capsule Take 1 capsule by mouth daily.      VICK-GEST ANTACID 200 mg calcium (500 mg) chewable tablet CHEW 1 TABLET BY MOUTH THREE TIMES A DAY TO KEEP YOUR BONES HEALTHY      capsaicin (ZOSTRIX) 0.025 % cream Apply topically 2 (two) times a day. 8/8/2019: Patient states uses once a day      docusate sodium (COLACE) 100 MG capsule TAKE 1 CAPSULE BY MOUTH TWICE DAILY.      gabapentin (NEURONTIN) 100 MG capsule Take 200 mg by mouth 3 (three) times a day. 2/20/2019: 1 cap AM, 1 cap noon, 3 caps at bedtime      ipratropium-albuterol (COMBIVENT RESPIMAT)  mcg/actuation Mist inhaler Inhale 1 puff every 6 (six) hours as needed.      losartan-hydrochlorothiazide (HYZAAR) 100-25 mg per tablet TAKE 1 TABLET BY MOUTH DAILY.      menthol 2.5 % Gel Apply 1 application topically 3 (three) times a day.      metoprolol succinate (TOPROL XL) 50 MG 24 hr tablet Take 1 tablet (50 mg total) by mouth daily.      mirtazapine (REMERON) 15 MG tablet TAKE 1/2 TABLET BY MOUTH AT BEDTIME      naproxen sodium (ALEVE) 220 MG tablet Take 1 tablet (220 mg total) by mouth 2 (two) times a day with meals.      polyvinyl alcohol (LIQUIFILM TEARS) 1.4 % ophthalmic solution Apply to eye daily as needed      ranitidine (ZANTAC) 150 MG tablet TAKE 1 TABLET BY MOUTH 2 TIMES A DAY      VITAMIN D2 50,000 unit capsule TAKE 1 CAPSULE BY  "MOUTH ONCE WEEKLY        Labs:  No results found for this or any previous visit (from the past 72 hour(s)).    I have personally reviewed all pertinent imaging studies and PFT results unless otherwise noted.    Imaging studies:  No results found.      Physical Exam:  /60   Pulse 73   Ht 4' 10.5\" (1.486 m)   Wt 122 lb (55.3 kg)   SpO2 93%   Breastfeeding? No   BMI 25.06 kg/m    General - Well nourished  Ears/Mouth -  OP pink moist, no thrush  Neck - no cervical lymphadenopathy  Lungs - basilar crackles  CVS - regular rhythm with no murmurs, rubs or gallups  Abdomen - soft, NT, ND, NABS  Ext - no cyanosis, clubbing or edema  Skin - no rash  Psychology - alert and oriented, answers appropriate        Electronically signed by:    Johan Chavez MD PhD  [facility] Pulmonary and Critical Care Medicine  "

## 2021-06-01 VITALS — BODY MASS INDEX: 25.09 KG/M2 | WEIGHT: 124.3 LBS

## 2021-06-01 VITALS — WEIGHT: 125.4 LBS | HEIGHT: 59 IN | BODY MASS INDEX: 25.28 KG/M2

## 2021-06-01 VITALS — BODY MASS INDEX: 25.29 KG/M2 | WEIGHT: 125.3 LBS

## 2021-06-01 VITALS — HEIGHT: 59 IN | WEIGHT: 122.56 LBS | BODY MASS INDEX: 24.71 KG/M2

## 2021-06-01 VITALS — WEIGHT: 125.31 LBS | BODY MASS INDEX: 25.26 KG/M2 | HEIGHT: 59 IN

## 2021-06-01 VITALS — HEIGHT: 59 IN | WEIGHT: 121 LBS | BODY MASS INDEX: 24.39 KG/M2

## 2021-06-01 VITALS — WEIGHT: 124.56 LBS | BODY MASS INDEX: 25.11 KG/M2 | HEIGHT: 59 IN

## 2021-06-01 VITALS — BODY MASS INDEX: 25.22 KG/M2 | WEIGHT: 125.1 LBS | HEIGHT: 59 IN

## 2021-06-01 VITALS — WEIGHT: 125 LBS | BODY MASS INDEX: 25.23 KG/M2

## 2021-06-01 VITALS — HEIGHT: 59 IN | BODY MASS INDEX: 25.02 KG/M2 | WEIGHT: 124.1 LBS

## 2021-06-01 VITALS — WEIGHT: 125 LBS | HEIGHT: 59 IN | BODY MASS INDEX: 25.2 KG/M2

## 2021-06-01 NOTE — PROGRESS NOTES
Programs Applying For: Medical Assistance   County:  Case #: [id number]  [facility] Worker: Luis #[phone]  Mnsure #:   PMI #:   Tracking:   Date Applied:     Outreach:   9/13/2019: FRW and patient's daughter called [employer] together and spoke with Magali. Magali indicated it was a 12 month renewal that wasn't returned in the time. Magali stated case is closed and we would need to complete a certain population application. Daughter stated she is going to [facility] on Monday, 9/16 to complete application. FRW made sure daughter new it was a certain population application. FRW offered to meet with daughter to complete, daughter would like to take mother to [facility]. FRW consulted with RN. No further needs at this time, taking patient off panel.   9/9/2019: FRW called Luis arcos for 3nd time and left VM. FRW asked to expedite due to patient is in nursing home and may discharge due to insurance issues. FRW will make 4th attempt next week.  CLOSED ENCOUNTER:  8/29/2019: FRW called Luis arcos for 2nd time and left VM. FRW asked to expedite due to patient is in nursing home and may discharge due to insurance issues. FRW will make 3rd attempt next week.  8/22/2019: FRW met patient's daughter in clinic. FRW called [facility] and spoke with Gerson. Gerson indicated the health insurance case is with financial workerLuis. FRW called Luis and left  for next steps to get patient with active Medical Assistance. Patient's daughter is going to have english speaking children also call Luis this week and leave VM. FRW sent daughter home with [facility] for mother to sign for FRW, FRW will call financial worker again next week.  8/15/2019: FRW called patient and spoke with daughter. Daughter would like to come to clinic to complete paperwork. Appointment made for 8/22 @ 10:00.  8/9/2019: FRW called patient and spoke with daughter. Daughter explained she brought in paperwork for mother's health insurance to  [place]. W will receive form next week and call County.       Health Insurance Information:       Referral:

## 2021-06-01 NOTE — TELEPHONE ENCOUNTER
Refill Approved    Rx renewed per Medication Renewal Policy. Medication was last renewed on 10/8/2018 .    Sherwin Bhagat, [facility] Triage/Med Refill 9/12/2019     Requested Prescriptions   Pending Prescriptions Disp Refills     amLODIPine (NORVASC) 10 MG tablet [Pharmacy Med Name: AMLODIPINE BESYLATE 10 MG TABZ] 90 tablet 0     Sig: TAKE 1 TABLET ORALLY EVERY DAY.       Calcium-Channel Blockers Protocol Passed - 9/12/2019  1:17 PM        Passed - PCP or prescribing provider visit in past 12 months or next 3 months     Last office visit with prescriber/PCP: 9/12/2019 Kateryna Morales MD OR same dept: 9/12/2019 Kateryna Morales MD OR same specialty: 9/12/2019 Kateryna Morales MD  Last physical: Visit date not found Last MTM visit: Visit date not found   Next visit within 3 mo: Visit date not found  Next physical within 3 mo: Visit date not found  Prescriber OR PCP: Kateryna Morales MD  Last diagnosis associated with med order: 1. Essential hypertension  - amLODIPine (NORVASC) 10 MG tablet [Pharmacy Med Name: AMLODIPINE BESYLATE 10 MG TABZ]; TAKE 1 TABLET ORALLY EVERY DAY.  Dispense: 90 tablet; Refill: 0    If protocol passes may refill for 12 months if within 3 months of last provider visit (or a total of 15 months).             Passed - Blood pressure filed in past 12 months     BP Readings from Last 1 Encounters:   09/12/19 134/60

## 2021-06-01 NOTE — PROGRESS NOTES
Clinic Care Coordination Contact    Clinic Care Coordination Medication Education INITIAL Visit    Patient presents for:  Medication education, Pill box teaching, Compliance monitoring and Medication reconciliation    Language: Amie    Communication: Illiterate    Accompanied by:     Patient Living Situation:  Dependent with family    Primary Care Provider:  Kateryna Morales MD    Barriers:  Language, Cultural, Poor insight into disease process and Multiple uncontrolled disease states    Medication List (see cited below): Patient presents with all ordered medications    Current Outpatient Medications   Medication Sig     acetaminophen (MAPAP ARTHRITIS PAIN) 650 MG CR tablet Take 2 tablets (1,300 mg total) by mouth 2 (two) times a day.     amLODIPine (NORVASC) 10 MG tablet TAKE 1 TABLET ORALLY EVERY DAY.     b complex vitamins capsule Take 1 capsule by mouth daily.     VICK-GEST ANTACID 200 mg calcium (500 mg) chewable tablet CHEW 1 TABLET BY MOUTH THREE TIMES A DAY TO KEEP YOUR BONES HEALTHY     capsaicin (ZOSTRIX) 0.025 % cream Apply topically 2 (two) times a day.     docusate sodium (COLACE) 100 MG capsule TAKE 1 CAPSULE BY MOUTH TWICE DAILY.     gabapentin (NEURONTIN) 100 MG capsule Take 200 mg by mouth 3 (three) times a day.     ipratropium-albuterol (COMBIVENT RESPIMAT)  mcg/actuation Mist inhaler Inhale 1 puff every 6 (six) hours as needed.     losartan-hydrochlorothiazide (HYZAAR) 100-25 mg per tablet TAKE 1 TABLET BY MOUTH DAILY.     menthol 2.5 % Gel Apply 1 application topically 3 (three) times a day.     metoprolol succinate (TOPROL XL) 50 MG 24 hr tablet Take 1 tablet (50 mg total) by mouth daily.     mirtazapine (REMERON) 15 MG tablet TAKE 1/2 TABLET BY MOUTH AT BEDTIME     polyvinyl alcohol (LIQUIFILM TEARS) 1.4 % ophthalmic solution Apply to eye daily as needed     ranitidine (ZANTAC) 150 MG tablet TAKE 1 TABLET BY MOUTH 2 TIMES A DAY     VITAMIN D2 50,000 unit capsule TAKE 1 CAPSULE BY  MOUTH ONCE WEEKLY       Equipment: Basic pill box- twice daily dosing    Pill Box was set up by RN at visit  [facility] RN    Medication Set Up: Dependent  Medication Administration:  Dependent    Compliance:100%    Future Appointments   Date Time Provider Department [place]   10/10/2019  9:20 AM Kateryna Morales MD [facility]   11/7/2019  9:20 AM Kateryna Morales MD [facility]   11/8/2019  9:30 AM MID [facility] Clinic       Action Plan  RN Will:  MEV on 9/27/19 if insurance still in active.     Care Guide Will:  Care Guide Delegation      Nursing Notes:      Patient came to see [facility] RN today for med set up.   There's lapse in insurance, FRW has been working with patient since 8/9/19. FRQ left 3 messages since with financial workerLuis but no return back.     Writer explained to daughter that FRW has been calling Luis financial jer since 8/9/19 x3 but no return call back.     The best option is to go down to the [facility] and find out why insurance was stopped and request to see or speak with someone.     Daughter agrees to take patient down to the [facility] today after visit with writer.     Home care stopped coming due to in active insurance. States she's out of meds x1 week, not 3 weeks.     Patient received a letter from social security stating that the [facility] has stopped paying for medicare premium and patient is responsible to pay for the premium as of 9/1/19. Patient and daughter states they can't pay for the premium.     Writer called and refilled - Amlodipine, Metoprolol, Mirtazapine, and Hyzaar today. Instructed daughter to pick it up on Monday and to call [facility] RN with any concerns or questions.     Has enough meds x 2 weeks.     ~~~MSU BID x 2 weeks~~~    1) Tylenol - taking only as needed - not two times a day as prescribed. States she doesn't need it.     2) Amlodipine 10mg (1) tab daily - AM    3) B complex - none in home - states hasn't been taking it for months. Not  sure is she should take it. No refill left per pharmacy - sent message to PCP to clarify    4) Colace 100mg (1) tab two times a day     5) Gabapentin 100mg (2) tabs two times a day - reports taking two times a day, not three times a day because making her too drowsy.    6) Combivent inhaler - as needed    7) Hyzaar (1) tab AM    8) Metoprolol 50mg (1) tab AM    9) Mirtazapine 15mg (1/2) tab HS    10) Ranitidine 150mg (1) tab two times a day     11) Vit D 50, 000 IU every Wednesday.

## 2021-06-01 NOTE — PROGRESS NOTES
Clinic Care Coordination Contact    Follow Up Progress Note      Assessment: post active insurance  Writer was notified by patient that her insurance is active now and she would like home care service back for med mgmt.   Writer left a vm for FLORENTINO Worrell with [facility] to call writer back if she needs a new referral for home care or they can re-start home care service.    Goals addressed this encounter:   Goals Addressed    None                 Plan:   No further assist needed unless home care needs a new referral for home care.

## 2021-06-01 NOTE — PROGRESS NOTES
HPI - 78 yo female here for BP check.     She has had problems with insurance and thus the home health RN has not been coming. Since she does not know how to take her meds, she has not taking any for almost 3 weeks.   The financial resource care guide has been trying to help her with insurance renewal for almost 4 weeks.         HTN -   Meds: losaratan- HCTZ 100-25mg and amlodipine 10mg and metoprolol 50mg        Chronic Pain - --Polyarthralgia - Chronic neck, back and joint pain  Pain meds: gabapentin 200mg TID and tylenol 650mg x2 tabs twice daily and Vit B complex  --Lumbar stenosis and compression fracture T12 - MRI lumbar spine shows mild spinal canal stenosis at L4-5 related to mild facet arthropathy and a circumferential disc bulge  Per spine clinic consult note 2/20/19: s/p  S/p  L5-S1 intralaminar epidural steroid injection  -symptomatic from the spinal stenosis and  myofascial pain  -h/o trochanter bursitis   -S/p rheum consults, multiple meds including prednisone and rheum meds, acupuncture, pain consults        Restrictive lung disease and pulmonary fibrosis   - minimal symptoms - with current regimen of combivent and ventolin prn  Last pulm consult  8/19/19 and note was reviewed with patient     Poor appetite and sleep - she has not taken remeron 7.5mg for 2 weeks and is sleeping ok   Appetite has not changes off the meds     H/o Vit D deficiency -  Last level 34.9 on 2/5/19     Drug induced constipation - on colace and miralax     Age-related osteoporosis with current pathological fracture DEXA 5/1/19   Prolia started 5/7/19 with repeat Q6 months  Also taking calcium and Vit D      GERD - ranitidine on med clinic and possibly cause of shortness of breath at night per pulm consult  Naproxen is causing worsening heart burn    Current Outpatient Medications   Medication Sig Note     acetaminophen (MAPAP ARTHRITIS PAIN) 650 MG CR tablet Take 2 tablets (1,300 mg total) by mouth 2 (two) times a day.       "amLODIPine (NORVASC) 10 MG tablet TAKE 1 TABLET ORALLY EVERY DAY.      b complex vitamins capsule Take 1 capsule by mouth daily.      VICK-GEST ANTACID 200 mg calcium (500 mg) chewable tablet CHEW 1 TABLET BY MOUTH THREE TIMES A DAY TO KEEP YOUR BONES HEALTHY      capsaicin (ZOSTRIX) 0.025 % cream Apply topically 2 (two) times a day. 8/8/2019: Patient states uses once a day      docusate sodium (COLACE) 100 MG capsule TAKE 1 CAPSULE BY MOUTH TWICE DAILY.      gabapentin (NEURONTIN) 100 MG capsule Take 200 mg by mouth 3 (three) times a day. 2/20/2019: 1 cap AM, 1 cap noon, 3 caps at bedtime      ipratropium-albuterol (COMBIVENT RESPIMAT)  mcg/actuation Mist inhaler Inhale 1 puff every 6 (six) hours as needed.      losartan-hydrochlorothiazide (HYZAAR) 100-25 mg per tablet TAKE 1 TABLET BY MOUTH DAILY.      menthol 2.5 % Gel Apply 1 application topically 3 (three) times a day.      metoprolol succinate (TOPROL XL) 50 MG 24 hr tablet Take 1 tablet (50 mg total) by mouth daily.      mirtazapine (REMERON) 15 MG tablet TAKE 1/2 TABLET BY MOUTH AT BEDTIME      naproxen sodium (ALEVE) 220 MG tablet Take 1 tablet (220 mg total) by mouth 2 (two) times a day with meals.      polyvinyl alcohol (LIQUIFILM TEARS) 1.4 % ophthalmic solution Apply to eye daily as needed      ranitidine (ZANTAC) 150 MG tablet TAKE 1 TABLET BY MOUTH 2 TIMES A DAY      VITAMIN D2 50,000 unit capsule TAKE 1 CAPSULE BY MOUTH ONCE WEEKLY      Vitals:    09/12/19 0905 09/12/19 0909   BP: 140/64 134/60   Pulse: 70    Resp: 14    Temp: 97.8  F (36.6  C)    TempSrc: Oral    SpO2: 96%    Weight: 122 lb 8 oz (55.6 kg)    Height: 4' 10.66\" (1.49 m)      PHYSICAL EXAM   General Appearance: Awake and alert, in no acute distress  HEENT: neck is supple  CV: regular rate  Resp: No respiratory distress. Breathing comfortably  Musculoskeletal: moving limbs comfortably with not deficits or deformities  Skin: no rashes noted    A/P  Insurance problems - financial " resource guide is helping    Polypharmacy - She needs help with med set up  She will come to clinic tomorrow to meet with [facility] RN and have med box set up, so she can restart her meds  Discussed having daughter try to learn to copy the med box set up.       HTN - BP elevated b/c out of meds   Meds: losaratan- HCTZ 100-25mg and amlodipine 10mg and metoprolol 50mg        Chronic Pain - --Polyarthralgia - Chronic neck, back and joint pain  Restart Pain meds as listed above    Restrictive lung disease and pulmonary fibrosis  Continue current regimen of combivent and ventolin prn     Poor appetite and sleep - restart remeron 7.5mg     H/o Vit D deficiency -  Restart ergo weekly     Drug induced constipation - restart colace and miralax     Age-related osteoporosis with current pathological fracture DEXA 5/1/19    continue Prolia shots and calcium and Vit D    GERD -restart ranitidine   Naproxen stopped b/c side effects  May consider celebrex if she needs a different NSAIDs    Flu shot given    Spent 25 min face to face with patient with more the 50% spent in counseling, reviewing chart with patient and coordination of care, medication reconciliation and discussing problems listed above.

## 2021-06-02 VITALS — WEIGHT: 121.2 LBS | BODY MASS INDEX: 24.44 KG/M2 | HEIGHT: 59 IN

## 2021-06-02 VITALS — BODY MASS INDEX: 26.13 KG/M2 | HEIGHT: 59 IN | WEIGHT: 129.6 LBS

## 2021-06-02 VITALS — HEIGHT: 59 IN | WEIGHT: 120.4 LBS | BODY MASS INDEX: 24.27 KG/M2

## 2021-06-02 VITALS — WEIGHT: 126.5 LBS | HEIGHT: 59 IN | BODY MASS INDEX: 25.5 KG/M2

## 2021-06-02 VITALS — WEIGHT: 120.9 LBS | BODY MASS INDEX: 24.37 KG/M2 | HEIGHT: 59 IN

## 2021-06-02 VITALS — BODY MASS INDEX: 24.83 KG/M2 | WEIGHT: 123 LBS

## 2021-06-02 VITALS — BODY MASS INDEX: 24.63 KG/M2 | WEIGHT: 122 LBS

## 2021-06-02 VITALS — BODY MASS INDEX: 24.27 KG/M2 | WEIGHT: 120.25 LBS

## 2021-06-02 VITALS — WEIGHT: 120 LBS | BODY MASS INDEX: 24.22 KG/M2

## 2021-06-02 VITALS — BODY MASS INDEX: 25.18 KG/M2 | WEIGHT: 124.9 LBS | HEIGHT: 59 IN

## 2021-06-02 VITALS — BODY MASS INDEX: 25.37 KG/M2 | WEIGHT: 125 LBS

## 2021-06-02 VITALS — WEIGHT: 123 LBS | BODY MASS INDEX: 24.83 KG/M2

## 2021-06-02 VITALS — BODY MASS INDEX: 24.8 KG/M2 | WEIGHT: 123.04 LBS | HEIGHT: 59 IN

## 2021-06-02 VITALS — BODY MASS INDEX: 24.22 KG/M2 | WEIGHT: 120 LBS

## 2021-06-02 VITALS — HEIGHT: 59 IN | BODY MASS INDEX: 25 KG/M2 | WEIGHT: 124 LBS

## 2021-06-02 VITALS — WEIGHT: 125 LBS | BODY MASS INDEX: 25.23 KG/M2

## 2021-06-02 VITALS — HEIGHT: 59 IN | BODY MASS INDEX: 24.96 KG/M2 | WEIGHT: 123.8 LBS

## 2021-06-02 NOTE — TELEPHONE ENCOUNTER
Refill Approved    Rx renewed per Medication Renewal Policy. Medication was last renewed on 11/27/18    Angle Noel, [facility] Triage/Med Refill 10/29/2019     Requested Prescriptions   Pending Prescriptions Disp Refills     ranitidine (ZANTAC) 150 MG tablet [Pharmacy Med Name: RANITIDINE 150 MG TABLET] 180 tablet 0     Sig: TAKE 1 TABLET BY MOUTH 2 TIMES A DAY       GI Medications Refill Protocol Passed - 10/29/2019  1:55 PM        Passed - PCP or prescribing provider visit in last 12 or next 3 months.     Last office visit with prescriber/PCP: 10/10/2019 Kateryna Morales MD OR same dept: 10/10/2019 Kateryna Morales MD OR same specialty: 10/10/2019 Kateryna Morales MD  Last physical: Visit date not found Last MTM visit: Visit date not found   Next visit within 3 mo: Visit date not found  Next physical within 3 mo: Visit date not found  Prescriber OR PCP: Kateryna Morales MD  Last diagnosis associated with med order: 1. GERD (gastroesophageal reflux disease)  - ranitidine (ZANTAC) 150 MG tablet [Pharmacy Med Name: RANITIDINE 150 MG TABLET]; TAKE 1 TABLET BY MOUTH 2 TIMES A DAY  Dispense: 180 tablet; Refill: 0    If protocol passes may refill for 12 months if within 3 months of last provider visit (or a total of 15 months).

## 2021-06-02 NOTE — PROGRESS NOTES
HPI - 80 yo female here to f/u on HTN.     Insurance problems - she met with financial care guide and it is now activated  She just restarted her meds a few weeks ago.     HTN  -  restarted BP meds but forgot to take her BP meds today b/c rushing to get to clinic  BP yesterday with home RN was 130/80    Chronic Pain - --Polyarthralgia - Chronic neck, back and joint pain  Pain meds: gabapentin 200mg TID and tylenol 650mg x2 tabs twice daily and Vit B complex     Restrictive lung disease and pulmonary fibrosis  current regimen: combivent and ventolin prn     Poor appetite and sleep - improving after restarting remeron 7.5mg     H/o Vit D deficiency - now taking ergo weekly     Drug induced constipation - colace and miralax     Age-related osteoporosis with current pathological fracture DEXA 5/1/19   stopped boniva and started Prolia shots in May and calcium and Vit D       GERD -restarted ranitidine   Naproxen stopped b/c side effects  May consider celebrex if she needs a different NSAIDs    Current Outpatient Medications   Medication Sig Note     acetaminophen (MAPAP ARTHRITIS PAIN) 650 MG CR tablet Take 2 tablets (1,300 mg total) by mouth 2 (two) times a day as needed for pain.      amLODIPine (NORVASC) 10 MG tablet TAKE 1 TABLET ORALLY EVERY DAY.      VICK-GEST ANTACID 200 mg calcium (500 mg) chewable tablet CHEW 1 TABLET BY MOUTH THREE TIMES A DAY TO KEEP YOUR BONES HEALTHY      docusate sodium (COLACE) 100 MG capsule TAKE 1 CAPSULE BY MOUTH TWICE DAILY.      gabapentin (NEURONTIN) 100 MG capsule Take 200 mg by mouth 2 (two) times a day.      ipratropium-albuterol (COMBIVENT RESPIMAT)  mcg/actuation Mist inhaler Inhale 1 puff every 6 (six) hours as needed.      losartan-hydrochlorothiazide (HYZAAR) 100-25 mg per tablet TAKE 1 TABLET BY MOUTH DAILY.      metoprolol succinate (TOPROL XL) 50 MG 24 hr tablet Take 1 tablet (50 mg total) by mouth daily.      mirtazapine (REMERON) 15 MG tablet TAKE 1/2 TABLET BY  "MOUTH AT BEDTIME      ranitidine (ZANTAC) 150 MG tablet TAKE 1 TABLET BY MOUTH 2 TIMES A DAY      VITAMIN D2 50,000 unit capsule TAKE 1 CAPSULE BY MOUTH ONCE WEEKLY      capsaicin (ZOSTRIX) 0.025 % cream Apply topically 2 (two) times a day. 8/8/2019: Patient states uses once a day      menthol 2.5 % Gel Apply 1 application topically 3 (three) times a day.      polyvinyl alcohol (LIQUIFILM TEARS) 1.4 % ophthalmic solution Apply to eye daily as needed      Vitals:    10/10/19 0939 10/10/19 0946   BP: 160/80 160/80   Pulse: 76    Resp: 14    Temp: 97.4  F (36.3  C)    TempSrc: Oral    SpO2: 92%    Weight: 123 lb 4.8 oz (55.9 kg)    Height: 4' 10.66\" (1.49 m)      PHYSICAL EXAM   General Appearance: Awake and alert, in no acute distress  HEENT: neck is supple  CV: regular rate  Resp: No respiratory distress. Breathing comfortably  Musculoskeletal: moving limbs comfortably with not deficits or deformities  Skin: no rashes noted    A/P  HTN  -  Elevated today b/c forget pills today  Meds: amlodipine, losartan-HCTZ, metoprolol  Will ask home health RN to check BP and RTC in 4 weeks    Chronic Pain - --Polyarthralgia - Chronic neck, back and joint pain  Pain meds: gabapentin 200mg TID and tylenol 650mg x2 tabs twice daily and Vit B complex  Discussed adding celebrex but she wants to wait on this b/c is controlling her pain     Restrictive lung disease and pulmonary fibrosis - breathing ok with current inhalers      Poor appetite and sleep - improving with remeron     H/o Vit D deficiency - continue ergo weekly     Drug induced constipation - colace and miralax     Age-related osteoporosis with current pathological fracture DEXA 5/1/19  continue Prolia shots and calcium and Vit D       GERD -improved with ranitidine     Polypharmacy  Med reconciled    Spent 40 min face to face with patient with more the 50% spent in counseling, reviewing chart with patient, and coordination of care, medication reconciliation and discussing " problems listed above.

## 2021-06-03 VITALS
DIASTOLIC BLOOD PRESSURE: 80 MMHG | BODY MASS INDEX: 24.86 KG/M2 | HEART RATE: 76 BPM | TEMPERATURE: 97.4 F | OXYGEN SATURATION: 92 % | SYSTOLIC BLOOD PRESSURE: 170 MMHG | WEIGHT: 123.3 LBS | RESPIRATION RATE: 14 BRPM | HEIGHT: 59 IN

## 2021-06-03 VITALS — BODY MASS INDEX: 24.6 KG/M2 | WEIGHT: 122 LBS | HEIGHT: 59 IN

## 2021-06-03 VITALS
HEART RATE: 70 BPM | HEIGHT: 59 IN | TEMPERATURE: 97.8 F | DIASTOLIC BLOOD PRESSURE: 60 MMHG | OXYGEN SATURATION: 96 % | SYSTOLIC BLOOD PRESSURE: 134 MMHG | WEIGHT: 122.5 LBS | RESPIRATION RATE: 14 BRPM | BODY MASS INDEX: 24.7 KG/M2

## 2021-06-03 VITALS — WEIGHT: 126.9 LBS | BODY MASS INDEX: 25.58 KG/M2 | HEIGHT: 59 IN

## 2021-06-03 VITALS — HEIGHT: 59 IN | BODY MASS INDEX: 25.23 KG/M2 | WEIGHT: 125.13 LBS

## 2021-06-03 VITALS
RESPIRATION RATE: 12 BRPM | WEIGHT: 121.2 LBS | HEIGHT: 59 IN | DIASTOLIC BLOOD PRESSURE: 60 MMHG | HEART RATE: 54 BPM | BODY MASS INDEX: 24.44 KG/M2 | OXYGEN SATURATION: 92 % | TEMPERATURE: 97 F | SYSTOLIC BLOOD PRESSURE: 104 MMHG

## 2021-06-03 VITALS — BODY MASS INDEX: 25.42 KG/M2 | WEIGHT: 126.1 LBS | HEIGHT: 59 IN

## 2021-06-03 VITALS — WEIGHT: 124.8 LBS | HEIGHT: 59 IN | BODY MASS INDEX: 25.16 KG/M2

## 2021-06-03 NOTE — TELEPHONE ENCOUNTER
RN cannot approve Refill Request    RN can NOT refill this medication med is not covered by policy/route to provider     . Last office visit: 11/7/2019 Kateryna Morales MD Last Physical: Visit date not found Last MTM visit: Visit date not found Last visit same specialty: 11/7/2019 Kateryna Morales MD.  Next visit within 3 mo: Visit date not found  Next physical within 3 mo: Visit date not found      Blaire Keating, [facility] Triage/Med Refill 11/22/2019    Requested Prescriptions   Pending Prescriptions Disp Refills     VITAMIN D2 50,000 unit capsule [Pharmacy Med Name: VITAMIN D2 1.25MG(50,000 UNIT)] 4 capsule 0     Sig: TAKE 1 CAPSULE BY MOUTH ONCE WEEKLY       There is no refill protocol information for this order

## 2021-06-03 NOTE — TELEPHONE ENCOUNTER
LVTCB.  Please transfer call to clinic on [facility] so we can complete prolia prescreening questions.

## 2021-06-03 NOTE — TELEPHONE ENCOUNTER
----- Message from Cindy Guzman sent at 11/7/2019  2:17 PM CST -----  Regarding: Prolia  Hello,    This patient is scheduled for prolia 11.8.19.  With their insurance coverage, it follows Medicare's NGS policy.  I have attached the policy below:    [email or web address]/medicare-coverage-database/details/article-[email or web address]    Please scroll to the bottom of the page and click accept, this will bring you to the policy on the next page.    Per this policy, the diagnosis on hand falls into Group 3, for this group, and a secondary diagnosis code is required from Group 4, if applicable, would a second diagnosis code apply to this patient? If so, can you please have this added to the order?    Thank you,  Dyana Guzman

## 2021-06-03 NOTE — PROGRESS NOTES
HPI - 80 yo female here for f/u and worsening back pain.     She has chronic pain but last night she started to worsening back pain and left flank pain.   She has been coughing and having a sore throat for a couple of days.   Pain with swallowing.   Left mid back pain/flank pain that radiates to LLQ and down left leg  No fevers, no N&V, no diarrhea, no ear pain, no dysuria or hematuria, no vaginal irritation or discharge, no shortness of breath, no rash  Sick contacts - none    H/o anemia    HTN  -    Meds: amlodipine, losartan-HCTZ, metoprolol   home health RN has been checking BP     Chronic Pain - --Polyarthralgia - Chronic neck, back and joint pain  Pain meds: gabapentin 200mg TID and tylenol 650mg x2 tabs twice daily and Vit B complex  Last month we discussed adding celebrex but she wants to wait on this b/c is controlling her pain     Restrictive lung disease and pulmonary fibrosis - breathing ok with current inhalers        Poor appetite and sleep - improving with remeron     H/o Vit D deficiency - on ergo weekly     Drug induced constipation - colace and miralax    Age-related osteoporosis with current pathological fracture DEXA 5/1/19   Prolia shots started and next shot is tomorrow on 11/8/19 and calcium and Vit D        GERD -improved with ranitidine      Polypharmacy  Med reconciled    REVIEW OF SYSTEMS  General: no weight changes, fatigue  HEENT:  no HA,  no vision changes, yes - URI sx  Respiratory:  Yes -  cough, no - dyspnea  Cardiovascular: no chest pain, palpitations  Gastrointestinal: no nausea/vomiting, diarrhea/constipation, melena  : no dysuria, no vaginal d/c  Neurologic: no seizures, focal weakness, tremors  Skin: no rashes        Current Outpatient Medications   Medication Sig Note     acetaminophen (MAPAP ARTHRITIS PAIN) 650 MG CR tablet Take 2 tablets (1,300 mg total) by mouth 2 (two) times a day as needed for pain.      amLODIPine (NORVASC) 10 MG tablet TAKE 1 TABLET ORALLY EVERY DAY.   "    VICK-GEST ANTACID 200 mg calcium (500 mg) chewable tablet CHEW 1 TABLET BY MOUTH THREE TIMES A DAY TO KEEP YOUR BONES HEALTHY      docusate sodium (COLACE) 100 MG capsule TAKE 1 CAPSULE BY MOUTH TWICE DAILY.      gabapentin (NEURONTIN) 100 MG capsule Take 200 mg by mouth 2 (two) times a day.      ipratropium-albuterol (COMBIVENT RESPIMAT)  mcg/actuation Mist inhaler Inhale 1 puff every 6 (six) hours as needed.      losartan-hydrochlorothiazide (HYZAAR) 100-25 mg per tablet TAKE 1 TABLET BY MOUTH DAILY.      metoprolol succinate (TOPROL XL) 50 MG 24 hr tablet Take 1 tablet (50 mg total) by mouth daily.      mirtazapine (REMERON) 15 MG tablet TAKE 1/2 TABLET BY MOUTH AT BEDTIME      polyvinyl alcohol (LIQUIFILM TEARS) 1.4 % ophthalmic solution Apply to eye daily as needed      ranitidine (ZANTAC) 150 MG tablet TAKE 1 TABLET BY MOUTH 2 TIMES A DAY      VITAMIN D2 50,000 unit capsule TAKE 1 CAPSULE BY MOUTH ONCE WEEKLY      capsaicin (ZOSTRIX) 0.025 % cream Apply topically 2 (two) times a day. 8/8/2019: Patient states uses once a day      menthol 2.5 % Gel Apply 1 application topically 3 (three) times a day.      Vitals:    11/07/19 0848   BP: 104/60   Pulse: (!) 54   Resp: 12   Temp: 97  F (36.1  C)   TempSrc: Oral   SpO2: 92%   Weight: 121 lb 3.2 oz (55 kg)   Height: 4' 10.66\" (1.49 m)     OBJECTIVE:  Vitals listed above within normal limits  General appearance: well groomed, pleasant, well hydrated, nontoxic appearing  ENT: PERRL, throat clear  Neck: neck supple, no lymphadenopathy, no thyromegaly  Lungs: left base with rhonchi  Heart: regular rate and rhythm, no murmurs, rubs or gallops  Abdomen: soft,tender to palpate left flank with soft tissue mass at edge of lower left rib  Neuro: no focal deficits, CN II-XII grossly intact, alert and oriented  Psych:  mood stable, appears to have good insight and judgment    Xr Abdomen 2 Views -Result Date: 11/7/2019  Prior cholecystectomy. No evidence of free air. " Bowel gas pattern is unremarkable with a mild stool burden. No suspicious calcifications.     CXR - pending but possible consolidation    Recent Results (from the past 1008 hour(s))   Rapid Strep A Screen- Throat Swab    Collection Time: 11/07/19  9:42 AM   Result Value Ref Range    Rapid Strep A Antigen No Group A Strep detected, presumptive negative No Group A Strep detected, presumptive negative     Results for LUIS FRAZIER (MRN [id number]) as of 11/7/2019 11:10   Ref. Range 11/7/2019 10:20   Color, UA Latest Ref Range: Colorless, Yellow, Straw, Light Yellow  Yellow   Clarity, UA Latest Ref Range: Clear  Clear   Blood, UA Latest Ref Range: Negative  Negative   Bilirubin, UA Latest Ref Range: Negative  Negative   Urobilinogen, UA Latest Ref Range: 0.2 E.U./dL, 1.0 E.U./dL  0.2 E.U./dL   Ketones, UA Latest Ref Range: Negative  Negative   Glucose, UA Latest Ref Range: Negative  Negative   Protein, UA Latest Ref Range: Negative mg/dL Negative   Nitrite, UA Latest Ref Range: Negative  Negative   Leukocytes, UA Latest Ref Range: Negative  Negative   pH, UA Latest Ref Range: 5.0 - 8.0  5.5   Specific Gravity, UA Latest Ref Range: 1.005 - 1.030  1.020       Orders Placed This Encounter   Procedures     Rapid Strep A Screen- Throat Swab     Group A Strep, RNA Direct Detection, Throat     XR Chest 2 Views     XR Abdomen 2 Views     Urinalysis-UC if Indicated     Comprehensive Metabolic Panel     HM1 (CBC with Diff)         A/P  Possible pneumonia -   rx for azithromycin  rx for nyquil  Call or return to clinic if not improving or symptoms worsening, fever or unable to adequate oral intake.       Flank/back pain -  No hematuria or calcifications on AXR to be concerning for urinary stones  Soft tissues mass not seen on xray and may be muscles spasm  Lidocaine gel applied in clinic    H/o anemia check CBC    HTN  -    Meds: amlodipine, losartan-HCTZ, metoprolol   home health RN has been checking BP     Chronic Pain  - --Polyarthralgia - Chronic neck, back and joint pain  Pain meds: gabapentin 200mg TID and tylenol 650mg x2 tabs twice daily and Vit B complex  Last month we discussed adding celebrex but she wants to wait on this b/c is controlling her pain  Lidocaine gel applied in clinic     Restrictive lung disease and pulmonary fibrosis - continue current inhalers     Poor appetite and sleep - improving with remeron     H/o Vit D deficiency - continue ergo weekly     Drug induced constipation - continue colace and miralax    Age-related osteoporosis with current pathological fracture   DEXA 5/1/19   Prolia shots started and next shot is tomorrow on 11/8/19 and calcium and Vit D        GERD -continue ranitidine      Polypharmacy      Spent 40 min face to face with patient with more the 50% spent in counseling, reviewing chart with patient, and coordination of care, medication reconciliation and discussing problems listed above.

## 2021-06-03 NOTE — PROGRESS NOTES
"Prolia Injection Phone Screen      Screening questions have been asked 2-3 days prior to administration visit for Prolia. If any questions are answered with \"Yes,\" this phone encounter were will routed to ordering provider for further evaluation.     1.  When was the last injection?  5/7/19    2.  Has insurance for this injection been verified?  Yes    3.  Did you experience any new onset achiness or rashes that lasted for over a month with your previous Prolia injection?   No    4.  Do you have a fever over 101?F or a new deep cough that is unusual for you today? No    5.  Have you started any new medications in the last 6 months that you were told could affect your immune system? These may have been prescribed by oncologist, transplant, rheumatology, or dermatology.   No    6.  In the last 6 months have you have gastric bypass or parathyroid surgery?   No    7.  Do you plan dental work requiring drilling into the bone such as implants/extractions or oral surgery in the next 2-3 months?   No    8. Do you have new insurance since the last injection?No    Patient informed if symptoms discussed above present prior to their administration appointment, they are to notify clinic immediately.     Kera Benavidez              "

## 2021-06-04 VITALS
BODY MASS INDEX: 24.74 KG/M2 | DIASTOLIC BLOOD PRESSURE: 76 MMHG | RESPIRATION RATE: 14 BRPM | HEIGHT: 59 IN | HEART RATE: 70 BPM | TEMPERATURE: 97.2 F | WEIGHT: 122.7 LBS | OXYGEN SATURATION: 92 % | SYSTOLIC BLOOD PRESSURE: 130 MMHG

## 2021-06-04 NOTE — TELEPHONE ENCOUNTER
RN cannot approve Refill Request    RN can NOT refill this medication historical medication requested. Last office visit: 12/9/2019 Kateryna Morales MD Last Physical: Visit date not found Last MTM visit: Visit date not found Last visit same specialty: 12/9/2019 Kateryna Morales MD.  Next visit within 3 mo: Visit date not found  Next physical within 3 mo: Visit date not found      Carlito Riojas, [facility] Triage/Med Refill 1/5/2020    Requested Prescriptions   Pending Prescriptions Disp Refills     gabapentin (NEURONTIN) 100 MG capsule [Pharmacy Med Name: GABAPENTIN 100 MG CAPSULE] 450 capsule 0     Sig: TAKE 2 CAPSULES BY MOUTH THREE TIMES A DAY.       Gabapentin/Levetiracetam/Tiagabine Refill Protocol  Passed - 1/4/2020  9:57 AM        Passed - PCP or prescribing provider visit in past 12 months or next 3 months     Last office visit with prescriber/PCP: 12/9/2019 Kateryna Morales MD OR same dept: 12/9/2019 Kateryna Morales MD OR same specialty: 12/9/2019 Kateryna Morales MD  Last physical: Visit date not found Last MTM visit: Visit date not found   Next visit within 3 mo: Visit date not found  Next physical within 3 mo: Visit date not found  Prescriber OR PCP: Kateryna Morales MD  Last diagnosis associated with med order: There are no diagnoses linked to this encounter.  If protocol passes may refill for 12 months if within 3 months of last provider visit (or a total of 15 months).

## 2021-06-04 NOTE — PROGRESS NOTES
[facility] Care Coordination Contact    Called member to schedule annual HRA home visit. Left a message requesting a return call to schedule HRA.     REBECCA Srinivasan  [facility]  [phone]

## 2021-06-04 NOTE — PROGRESS NOTES
[facility] Care Coordination Contact  CC received notification of Emergency Room visit.  ER visit occurred on 12/14/2019 at [facility] with Dx of shortness of breathe and fatigue.    CC contacted adult daughter Lay May and reviewed discharge summary.  Member has a follow-up appointment with PCP: Yes: scheduled on 1/9/2020. Offered assistance with helping to schedule an earlier appointment due to recent ED visit and family declined.   Member has had a change in condition: No  New referrals placed: No  Home Visit Needed: No  Care plan reviewed and updated.  PCP notified of ED visit via EMR.    REBECCA Srinivasan  [facility]  [phone]

## 2021-06-04 NOTE — TELEPHONE ENCOUNTER
Separate prescriptions, losartan 100 mg and hydrochlorothiazide 25 mg for 1 month sent.    Please let the patient know that it will be 2 separate medications instead of 1 combination pill for 1 month.    Dr. Agusto Olguin  12/6/2019 9:11 AM

## 2021-06-04 NOTE — TELEPHONE ENCOUNTER
Medication Question or Clarification  Who is calling: [facility]  What medication are you calling about?: hyzaar  What dose do you take?: 100-25 mg  How often are you taking the medication?: daily  Who prescribed the medication?: [facility]  What is your question/concern?: on back order- please send new rx for separate meds  Pharmacy: [facility]  Okay to leave a detailed message?: Yes  Site CMT - Please call the pharmacy to obtain any additional needed information.

## 2021-06-04 NOTE — PATIENT INSTRUCTIONS - HE
HOME HEALTH RN    MED CHANGES -    Start celebrex   Change colace to senna-docusate   Encourage miralax only one scoop at a time  And 1-2 daily prn

## 2021-06-04 NOTE — PROGRESS NOTES
HPI - 80 yo female here for f/u back and flank and knee pain        Chronic Pain - --Polyarthralgia - Chronic neck, back and joint pain  Pain meds: gabapentin 200mg TID and tylenol 650mg x2 tabs twice daily and Vit B complex  She has chronic pain but last night she started to worsening back pain and left flank pain.   Left mid back pain/flank pain that radiates to LLQ and down left leg  Recent pneumonia on 11/7/19 resolved after z-pack  No hematuria or calcifications on AXR to be concerning for urinary stones  Soft tissues mass not seen on xray and may be muscles spasm  Lidocaine gel applied in clinic - not holping  Lumbar spinal stenosis per MRI lumbar spine shows mild spinal canal stenosis at L4-5 related to mild facet arthropathy and a circumferential disc bulge.  Patient has had multiple steroid injections in the past.   She had a bilateral L5-S1 facet joint injection June 22, 2016, and L4-5 interlaminar epidural steroid injection July 6, 2016, and a left sacroiliac joint injection February 20, 2017.  Last month we discussed adding celebrex but she wants to wait on this b/c is controlling her pain  Already tried cymbalta, amitriptyline, gabapentin PT, acupuncture, spine clinic, pain clinic, rheum consults, prednisone, plaquenil, methotrexate, steroid joint injections, SI belt      H/o anemia -   hgb 11.8 with MCV 87 on 11/7/19     HTN  -    Meds: amlodipine, losartan-HCTZ, metoprolol   home health RN has been checking BP        Restrictive lung disease and pulmonary fibrosis - breathing intermittently ok with current inhalers     Poor appetite and sleep - poor appetite even with remeron 7.5mg  but only tolerated the lower dose b/c higher dose caused excessive sleepiness and dizziness  Sleep has improved     H/o Vit D deficiency - on ergo weekly     Drug induced constipation - colace and miralax  Using 2 scoops of miralax     Age-related osteoporosis with current pathological fracture DEXA 5/1/19   Prolia shots  started and next shot is tomorrow on 11/8/19 and calcium and Vit D        GERD -improved with ranitidine      Polypharmacy  Med reconciled  Home health RN sets up meds  She feels she is taking too many meds and reluctant to start a new one    Current Outpatient Medications   Medication Sig Note     acetaminophen (MAPAP ARTHRITIS PAIN) 650 MG CR tablet Take 2 tablets (1,300 mg total) by mouth 2 (two) times a day as needed for pain.      amLODIPine (NORVASC) 10 MG tablet TAKE 1 TABLET ORALLY EVERY DAY.      VICK-GEST ANTACID 200 mg calcium (500 mg) chewable tablet CHEW 1 TABLET BY MOUTH THREE TIMES A DAY TO KEEP YOUR BONES HEALTHY      docusate sodium (COLACE) 100 MG capsule TAKE 1 CAPSULE BY MOUTH TWICE DAILY.      gabapentin (NEURONTIN) 100 MG capsule Take 200 mg by mouth 2 (two) times a day.      ipratropium-albuterol (COMBIVENT RESPIMAT)  mcg/actuation Mist inhaler Inhale 1 puff every 6 (six) hours as needed.      losartan-hydrochlorothiazide (HYZAAR) 100-25 mg per tablet TAKE 1 TABLET BY MOUTH DAILY.      metoprolol succinate (TOPROL XL) 50 MG 24 hr tablet Take 1 tablet (50 mg total) by mouth daily.      mirtazapine (REMERON) 15 MG tablet TAKE 1/2 TABLET BY MOUTH AT BEDTIME      ranitidine (ZANTAC) 150 MG tablet TAKE 1 TABLET BY MOUTH 2 TIMES A DAY      VITAMIN D2 50,000 unit capsule TAKE 1 CAPSULE BY MOUTH ONCE WEEKLY      capsaicin (ZOSTRIX) 0.025 % cream Apply topically 2 (two) times a day. 8/8/2019: Patient states uses once a day      hydroCHLOROthiazide (HYDRODIURIL) 25 MG tablet Take 1 tablet (25 mg total) by mouth daily.      losartan (COZAAR) 100 MG tablet Take 1 tablet (100 mg total) by mouth daily.      menthol 2.5 % Gel Apply 1 application topically 3 (three) times a day.      polyvinyl alcohol (LIQUIFILM TEARS) 1.4 % ophthalmic solution Apply to eye daily as needed      Vitals:    12/09/19 0838   BP: 130/76   Pulse: 70   Resp: 14   Temp: 97.2  F (36.2  C)   TempSrc: Oral   SpO2: 92%   Weight: 122  "lb 11.2 oz (55.7 kg)   Height: 4' 10.66\" (1.49 m)     PHYSICAL EXAM   General Appearance: Awake and alert, in no acute distress  HEENT: neck is supple  CV: regular rate  Resp: No respiratory distress. Breathing comfortably  Musculoskeletal: moving limbs comfortably with not deficits or deformities  Skin: no rashes noted    A/P  Chronic Pain - --Polyarthralgia - Chronic neck, back and joint pain,Lumbar spinal stenosis  Pain meds: gabapentin 200mg TID and tylenol 650mg x2 tabs twice daily and Vit B complex  Add celebrex     H/o anemia - continue to monitor       HTN  -  well controlled with current regimen     Restrictive lung disease and pulmonary fibrosis - continue current inhalers     Poor appetite and sleep - continue remeron 7.5mg       H/o Vit D deficiency - on ergo weekly     Drug induced constipation -   continue miralax but only one scoop at a time  Change colace to senna-docusate     Age-related osteoporosis with current pathological fracture DEXA 5/1/19   Prolia shots started and next shot is tomorrow on 11/8/19 and calcium and Vit D        GERD -improved with ranitidine      Polypharmacy  Med reconciled  Home health RN sets up meds    Spent 25 min face to face with patient with more the 50% spent in counseling, reviewing chart with patient and coordination of care, medication reconciliation and discussing problems listed above.       "

## 2021-06-05 VITALS
WEIGHT: 124.06 LBS | RESPIRATION RATE: 18 BRPM | HEIGHT: 58 IN | BODY MASS INDEX: 26.04 KG/M2 | DIASTOLIC BLOOD PRESSURE: 70 MMHG | HEART RATE: 50 BPM | OXYGEN SATURATION: 100 % | SYSTOLIC BLOOD PRESSURE: 116 MMHG

## 2021-06-05 VITALS
HEART RATE: 56 BPM | DIASTOLIC BLOOD PRESSURE: 68 MMHG | HEIGHT: 59 IN | RESPIRATION RATE: 20 BRPM | WEIGHT: 124.06 LBS | BODY MASS INDEX: 25.01 KG/M2 | OXYGEN SATURATION: 92 % | TEMPERATURE: 97.6 F | SYSTOLIC BLOOD PRESSURE: 130 MMHG

## 2021-06-05 VITALS
HEIGHT: 58 IN | BODY MASS INDEX: 25.78 KG/M2 | OXYGEN SATURATION: 96 % | SYSTOLIC BLOOD PRESSURE: 132 MMHG | TEMPERATURE: 97.4 F | DIASTOLIC BLOOD PRESSURE: 66 MMHG | RESPIRATION RATE: 18 BRPM | WEIGHT: 122.8 LBS | HEART RATE: 88 BPM

## 2021-06-05 VITALS
HEART RATE: 88 BPM | HEIGHT: 58 IN | OXYGEN SATURATION: 95 % | WEIGHT: 117.2 LBS | RESPIRATION RATE: 16 BRPM | TEMPERATURE: 97.7 F | SYSTOLIC BLOOD PRESSURE: 158 MMHG | DIASTOLIC BLOOD PRESSURE: 98 MMHG | BODY MASS INDEX: 24.6 KG/M2

## 2021-06-05 VITALS
HEIGHT: 58 IN | WEIGHT: 123 LBS | DIASTOLIC BLOOD PRESSURE: 60 MMHG | BODY MASS INDEX: 25.89 KG/M2 | RESPIRATION RATE: 18 BRPM | TEMPERATURE: 97.5 F | HEART RATE: 57 BPM | BODY MASS INDEX: 25.82 KG/M2 | OXYGEN SATURATION: 96 % | WEIGHT: 124.2 LBS | SYSTOLIC BLOOD PRESSURE: 114 MMHG

## 2021-06-05 VITALS — BODY MASS INDEX: 26.03 KG/M2 | HEIGHT: 58 IN | WEIGHT: 124 LBS

## 2021-06-05 VITALS
DIASTOLIC BLOOD PRESSURE: 52 MMHG | WEIGHT: 125.25 LBS | BODY MASS INDEX: 25.59 KG/M2 | SYSTOLIC BLOOD PRESSURE: 102 MMHG | HEART RATE: 60 BPM

## 2021-06-05 VITALS
WEIGHT: 124 LBS | BODY MASS INDEX: 26.03 KG/M2 | HEIGHT: 58 IN | HEART RATE: 58 BPM | OXYGEN SATURATION: 94 % | DIASTOLIC BLOOD PRESSURE: 62 MMHG | SYSTOLIC BLOOD PRESSURE: 128 MMHG

## 2021-06-05 NOTE — PROGRESS NOTES
[facility] Care Coordination Contact  [facility] Home Visit Assessment     Home visit for Health Risk Assessment with [facility] completed on 1/30/2020    Type of residence:: Apartment  Current living arrangement:: I live in a private home with family     Assessment completed with:: Patient, Family    Current Care Plan  Member currently receiving the following home care services: Skilled Nursing Visits- Biweekly- [facility],  [phone]  Member currently receiving the following community resources: PCA, PCA supervision    Medication Review  Medication reconciliation completed in Epic: YES  Medication set-up & administration: RN set up every two weeks  Self-administers medications-Family provides reminders when needed  Medication Risk Assessment Medication (1 or more, place referral to MTM):  N/A: No risk factors identified  MTM Referral Placed: No: No risk factors idenified    Mental/Behavioral Health   Depression Screening: See PHQ assessment flowsheet.   Mental health DX:: No      Mental Health Diagnosis: No  Mental Health Services: None: No further intervention needed at this time.    Falls Assessment:   Fallen 2 or more times in the past year?: No   Any fall with injury in the past year?: No    ADL/IADL Dependencies:   Dependent ADLs:: Ambulation-cane  Dependent IADLs:: Cleaning, Cooking, Laundry, Shopping, Meal Preparation, Medication Management, Money Management, Transportation    [facility] benefits: Shared information re: Silver Sneakers/gym memberships, ASA, Calcium +D.    PCA Assessment completed at visit: Yes- Luis Manuel showed significant functional improvement which was both verbalized and demonstrated during PCA assessment. Areas of functional improvement include independence with: dressing, grooming, transfers, mobility, positioning, and toileting. Reviewed assessment due to significant decrease in dependencies and Member agreed with statements of independence of the above  areas. Reviewed how this will be reflected in PCA hours.     Elderly Waiver Eligibility: Yes, but member declines EW service; will not open to EW    Care Plan & Recommendations: Luis Manuel Lemon is a 80 year old femal with a past medical history of chronic pain syndrome, restrictive lung disease, pulmonary fibrosis, age related constipation, GERD, and hypertension.     Luis Manuel lives in a duplex on the second floor with her daughter, Lay Nicholas (who is her PCA/Primary caregiver), and grandchildren. Luis Manuel appears fairly independent with most ADL's with the exception of bathing, but requires assistance with most IADL's to ensure that her needs are met.     See [facility] for detailed assessment information.    Follow-Up Plan: Member informed of future contact, plan to f/u with member with a 6 month telephone assessment.  Contact information shared with member and family, encouraged member to call with any questions or concerns at any time.    REBECCA Srinivasan  [facility]  [phone]

## 2021-06-05 NOTE — PROGRESS NOTES
[facility] Care Coordination Contact    Received after visit chart from care coordinator.  Completed following tasks: Mailed copy of care plan to client.    PCA DTR per CC.  Faxed completed PCA Assessment to PCA Agency and mailed copy to member.  Faxed MD Communication to PCP.    Kera Cavanaugh  Care Management Specialist  [facility]  [phone]     negative detailed exam

## 2021-06-05 NOTE — PROGRESS NOTES
[facility] Care Coordination Contact    Called adult daughter La May to schedule annual HRA home visit. HRA has been scheduled for 1/22/2020.  Called Ellie Salomon and scheduled an  for the home visit. Timing pending  confirmation.     REBECCA Srinivasan  [facility]  [phone]

## 2021-06-06 NOTE — TELEPHONE ENCOUNTER
RN cannot approve Refill Request    RN can NOT refill this medication med is not covered by policy/route to provider     . Last office visit: 12/9/2019 Kateryna Morales MD Last Physical: Visit date not found Last MTM visit: Visit date not found Last visit same specialty: 12/9/2019 Kateryna Morales MD.  Next visit within 3 mo: Visit date not found  Next physical within 3 mo: Visit date not found      Blaire Keating, [facility] Connection Triage/Med Refill 2/28/2020    Requested Prescriptions   Pending Prescriptions Disp Refills     ARTHRITIS PAIN RELIEF, ACETAM, 650 mg CR tablet [Pharmacy Med Name: GS ARTHRITIS PAIN  MG] 120 tablet 0     Sig: TAKE TWO TABLETS BY MOUTH 2 TIMES A DAY       There is no refill protocol information for this order        celecoxib (CELEBREX) 100 MG capsule [Pharmacy Med Name: CELECOXIB 100 MG CAPSULE] 30 capsule 0     Sig: TAKE ONE CAPSULE BY MOUTH DAILY.       There is no refill protocol information for this order

## 2021-06-06 NOTE — TELEPHONE ENCOUNTER
Drug Change Request  Who is calling?:  Fax from pharmacy  What is the current medication?:    ranitidine (ZANTAC) 150 MG tablet 180 tablet 3 10/29/2019     Sig: TAKE 1 TABLET BY MOUTH 2 TIMES A DAY        What alternative is being requested?: suggested famotidine  Why the request to change?:  Fax request received from pharmacy states: this medication is on recall.  Requested Pharmacy?: [facility]  Okay to leave a detailed message?:  Yes

## 2021-06-07 ENCOUNTER — COMMUNICATION - HEALTHEAST (OUTPATIENT)
Dept: FAMILY MEDICINE | Facility: CLINIC | Age: 81
End: 2021-06-07

## 2021-06-07 DIAGNOSIS — M48.00 SPINAL STENOSIS, MULTILEVEL: ICD-10-CM

## 2021-06-07 NOTE — TELEPHONE ENCOUNTER
Last office visit: 12/09/2019  Last refill: 03/27/2020   Last lab check: 12/14/19 BMP   Next appointment:    Chart reviewed. Please review findings below.     HTN  -    Meds: amlodipine, losartan-HCTZ, metoprolol   home health RN has been checking BP    HTN  -  well controlled with current regimen

## 2021-06-07 NOTE — PROGRESS NOTES
[facility] Care Coordination Contact    [facility] Health Plan or Product Change    CC received notification that member's health plan or health plan product changed from [facility] MSC+ to are [facility] effective 04/01/2020.  CC contacted member and discussed change and face-to-face visit was offered. Member declined need for home visit. CC reviewed previous Health Risk Assessment/LTCC and POC with member and no changes noted.    Called member and introduced self as member s new CC. Confirmed with member that the welcome letter with writer's name and contact information has been received.  Reviewed LTCC/Health Risk Assessment (HRA) and POC with member. No changes noted.  Transitional HRA completed. Care Plan Summary updated and reflects current services.  Required referral authorization information communicated to CMS: No  MMIS entry completed by: Care Coordinator  Writer reviewed the following with member:  ER visits: Yes -  [facility]-Dyspnea- Reevaluated at most recent assessment, No significant changes noted.   Hospitalizations: No  TCU stays: No  Significant health status changes: No significant health changes reported.   Falls/Injuries: No  ADL/IADL changes: No  Changes in services: No    Follow-Up Plan: Member informed of future contact, plan to f/u with member with at next regularly scheduled contact.  Contact information shared with member and family, encouraged member to call with any questions or concerns.  REBECCA Srinivasan  [facility]  [phone]

## 2021-06-07 NOTE — TELEPHONE ENCOUNTER
Last office visit: 01/15/2019  Last refill: 01/06/2020  Last lab check: N/A   Next appointment: 05/14/2020 Dr. Morales     Chart reviewed. Please review findings below.     A/P  Lumbar stenosis and compression fracture T12  on gabapentin and tylenol  Continue OT and recommended by spine clinic  Continue wearing SI belt   rx lidocaine gel  F/u with spine 2/12/19

## 2021-06-07 NOTE — PROGRESS NOTES
[facility] Care Coordination Contact    Product Change from MSC+ to [facility].  Remain with same CC.  Welcome Letter sent.    Kera Cavanaugh  Care Management Specialist  [facility]  [phone]

## 2021-06-07 NOTE — PROGRESS NOTES
[facility] Care Coordination Contact     Situation: Received notification from [facility] that AVS form is due in April. Outreach to Member to discuss.      Assessment: Spoke with member via phone explaining the need to complete the form and what to look for in the mail. Encouraged her to call with any questions or concerns.      Plan/Recommendations: Will remain available to Member to answer any questions.      REBECCA Srinivasan  [facility]  [phone]

## 2021-06-07 NOTE — TELEPHONE ENCOUNTER
RN cannot approve Refill Request    RN can NOT refill this medication medication not on med list.      Blaire Keating, [facility] Triage/Med Refill 3/27/2020    Requested Prescriptions   Pending Prescriptions Disp Refills     hydroCHLOROthiazide (HYDRODIURIL) 25 MG tablet [Pharmacy Med Name: HYDROCHLOROTHIAZIDE 25 MG TAB] 30 tablet 0     Sig: TAKE ONE TABLET BY MOUTH ONCE DAILY WITH LOSARTAN 100MG.       Diuretics/Combination Diuretics Refill Protocol  Passed - 3/26/2020  2:32 PM        Passed - Visit with PCP or prescribing provider visit in past 12 months     Last office visit with prescriber/PCP: 12/9/2019 Kateryna Morales MD OR same dept: 12/9/2019 Kateryna Morales MD OR same specialty: 12/9/2019 Kateryna Morales MD  Last physical: Visit date not found Last MTM visit: Visit date not found   Next visit within 3 mo: Visit date not found  Next physical within 3 mo: Visit date not found  Prescriber OR PCP: Kateryna Morales MD  Last diagnosis associated with med order: There are no diagnoses linked to this encounter.  If protocol passes may refill for 12 months if within 3 months of last provider visit (or a total of 15 months).             Passed - Serum Potassium in past 12 months      Lab Results   Component Value Date    Potassium 4.4 12/14/2019             Passed - Serum Sodium in past 12 months      Lab Results   Component Value Date    Sodium 140 12/14/2019             Passed - Blood pressure on file in past 12 months     BP Readings from Last 1 Encounters:   12/14/19 155/69             Passed - Serum Creatinine in past 12 months      Creatinine   Date Value Ref Range Status   12/14/2019 1.23 (H) 0.60 - 1.10 mg/dL Final                losartan (COZAAR) 100 MG tablet [Pharmacy Med Name: LOSARTAN POTASSIUM 100 MG TAB] 30 tablet 0     Sig: TAKE 1 TABLET BY MOUTH DAILY WITH HYDROCHLOROTHIAZIDE 25MG       Angiotensin Receptor Blocker Protocol Passed - 3/26/2020  2:32 PM        Passed - PCP or  prescribing provider visit in past 12 months       Last office visit with prescriber/PCP: 12/9/2019 Kateryna Morales MD OR same dept: 12/9/2019 Kateryna Morales MD OR same specialty: 12/9/2019 Kateryna Morales MD  Last physical: Visit date not found Last MTM visit: Visit date not found   Next visit within 3 mo: Visit date not found  Next physical within 3 mo: Visit date not found  Prescriber OR PCP: Kateryna Morales MD  Last diagnosis associated with med order: There are no diagnoses linked to this encounter.  If protocol passes may refill for 12 months if within 3 months of last provider visit (or a total of 15 months).             Passed - Serum potassium within the past 12 months     Lab Results   Component Value Date    Potassium 4.4 12/14/2019             Passed - Blood pressure filed in past 12 months     BP Readings from Last 1 Encounters:   12/14/19 155/69             Passed - Serum creatinine within the past 12 months     Creatinine   Date Value Ref Range Status   12/14/2019 1.23 (H) 0.60 - 1.10 mg/dL Final

## 2021-06-07 NOTE — TELEPHONE ENCOUNTER
RN cannot approve Refill Request    RN can NOT refill this medication med is not covered by policy/route to provider     . Last office visit: Visit date not found Last Physical: Visit date not found Last MTM visit: Visit date not found Last visit same specialty: 12/9/2019 Kateryna Morales MD.  Next visit within 3 mo: Visit date not found  Next physical within 3 mo: Visit date not found      Blaire Keating, [facility] Triage/Med Refill 3/27/2020    Requested Prescriptions   Pending Prescriptions Disp Refills     MAPAP ARTHRITIS PAIN 650 mg CR tablet [Pharmacy Med Name: MAPAP ARTHRITIS  MG CPLT] 120 tablet 0     Sig: TAKE TWO TABLETS BY MOUTH 2 TIMES A DAY       There is no refill protocol information for this order        celecoxib (CELEBREX) 100 MG capsule [Pharmacy Med Name: CELECOXIB 100 MG CAPSULE] 30 capsule 0     Sig: TAKE ONE CAPSULE BY MOUTH DAILY.       There is no refill protocol information for this order

## 2021-06-07 NOTE — TELEPHONE ENCOUNTER
RN cannot approve Refill Request    RN can NOT refill this medication med is not covered by policy/route to provider. Last office visit: 12/9/2019 Kateryna Morales MD Last Physical: Visit date not found Last MTM visit: Visit date not found Last visit same specialty: 12/9/2019 Kateryna Morales MD.  Next visit within 3 mo: Visit date not found  Next physical within 3 mo: Visit date not found      Carli Moody, [facility] Triage/Med Refill 4/23/2020    Requested Prescriptions   Pending Prescriptions Disp Refills     ANTACID, CALCIUM CARBONATE, 200 mg calcium (500 mg) chewable tablet [Pharmacy Med Name: ANTACID 500 MG CHEWABLE TABLET] 90 tablet 0     Sig: CHEW 1 TABLET BY MOUTH THREE TIMES A DAY TO KEEP YOUR BONES HEALTHY       There is no refill protocol information for this order

## 2021-06-07 NOTE — TELEPHONE ENCOUNTER
Refill Approved    Rx renewed per Medication Renewal Policy. Medication was last renewed on 2/28/20.    Blaire Keating, [facility] Connection Triage/Med Refill 3/20/2020     Requested Prescriptions   Pending Prescriptions Disp Refills     famotidine (PEPCID) 20 MG tablet [Pharmacy Med Name: FAMOTIDINE 20 MG TABLET] 60 tablet 0     Sig: TAKE 2 TABLETS BY MOUTH TWICE DAILY.       GI Medications Refill Protocol Passed - 3/20/2020 11:29 AM        Passed - PCP or prescribing provider visit in last 12 or next 3 months.     Last office visit with prescriber/PCP: Visit date not found OR same dept: 12/9/2019 Kateryna Morales MD OR same specialty: 12/9/2019 Kateryna Morales MD  Last physical: Visit date not found Last MTM visit: Visit date not found   Next visit within 3 mo: Visit date not found  Next physical within 3 mo: Visit date not found  Prescriber OR PCP: Chichi Brown MD  Last diagnosis associated with med order: 1. Gastroesophageal reflux disease without esophagitis  - famotidine (PEPCID) 20 MG tablet [Pharmacy Med Name: FAMOTIDINE 20 MG TABLET]; TAKE 2 TABLETS BY MOUTH TWICE DAILY.  Dispense: 60 tablet; Refill: 0    If protocol passes may refill for 12 months if within 3 months of last provider visit (or a total of 15 months).

## 2021-06-07 NOTE — TELEPHONE ENCOUNTER
Thank you  Results for orders placed or performed during the hospital encounter of 12/14/19   Basic Metabolic Panel   Result Value Ref Range    Sodium 140 136 - 145 mmol/L    Potassium 4.4 3.5 - 5.0 mmol/L    Chloride 100 98 - 107 mmol/L    CO2 29 22 - 31 mmol/L    Anion Gap, Calculation 11 5 - 18 mmol/L    Glucose 105 70 - 125 mg/dL    Calcium 9.5 8.5 - 10.5 mg/dL    BUN 23 8 - 28 mg/dL    Creatinine 1.23 (H) 0.60 - 1.10 mg/dL    GFR MDRD Af Amer 51 (L) >60 mL/min/1.73m2    GFR MDRD Non Af Amer 42 (L) >60 mL/min/1.73m2       BP Readings from Last 3 Encounters:   12/14/19 155/69   12/09/19 130/76   11/07/19 104/60

## 2021-06-08 NOTE — TELEPHONE ENCOUNTER
RN cannot approve Refill Request    RN can NOT refill this medication med is not covered by policy/route to provider. Last office visit: Visit date not found Last Physical: Visit date not found Last MTM visit: Visit date not found Last visit same specialty: 12/9/2019 Kateryna Morales MD.  Next visit within 3 mo: Visit date not found  Next physical within 3 mo: Visit date not found      Sofi Pelletier, [facility] Triage/Med Refill 5/7/2020    Requested Prescriptions   Pending Prescriptions Disp Refills     MAPAP ARTHRITIS PAIN 650 mg CR tablet [Pharmacy Med Name: MAPAP ARTHRITIS  MG CPLT] 120 tablet 0     Sig: TAKE TWO TABLETS BY MOUTH 2 TIMES A DAY       There is no refill protocol information for this order

## 2021-06-08 NOTE — PROGRESS NOTES
"Luis Manuel Lemon is a 80 y.o. female who is being evaluated via a billable telephone visit.      The patient has been notified of following:     \"This telephone visit will be conducted via a call between you and your physician/provider. We have found that certain health care needs can be provided without the need for a physical exam.  This service lets us provide the care you need with a short phone conversation.  If a prescription is necessary we can send it directly to your pharmacy.  If lab work is needed we can place an order for that and you can then stop by our lab to have the test done at a later time.    Telephone visits are billed at different rates depending on your insurance coverage. During this emergency period, for some insurers they may be billed the same as an in-person visit.  Please reach out to your insurance provider with any questions.    If during the course of the call the physician/provider feels a telephone visit is not appropriate, you will not be charged for this service.\"    Patient has given verbal consent to a Telephone visit? Yes    What phone number would you like to be contacted at?     Patient would like to receive their AVS by AVS Preference: Mail a copy.no    Additional provider notes: n/a     Per patient:   \"doing good\" except her usual issues of back pain and shortness of breath      H/o shortness of breath and seen in ER on 12/14/19  She reports still having concerns about her breathing about the same. She is able to walk around house w/o shortness of breath or MILLER. She uses her ProAir inhaler that helps and only using less than once per week    Back pain -   Pain meds: tylenol and celebrex and capsaicin  Pain not worse or getting better    HTN -   Report taking her BP meds    Refills - home health RN helps with refills    Home health RN visits every 2 weeks  Lives with daughter and grandchildren - no sick contacts  Discussed calling if anyone in house gets symptoms    Patient Active " Problem List   Diagnosis     Constipation     Vitamin D Deficiency     Essential hypertension     Rotator Cuff Tendonitis     Rupture Of The Bicipital Tendon     Asymptomatic Postmenopausal Status     Hyperlipidemia     Osteoporosis on Prolia 5.7.19     Chronic reflux esophagitis     Bursitis, trochanteric     Hypoalbuminemia     Low back pain     Polypharmacy     Degenerative disc disease, cervical     Spinal stenosis, multilevel     Chronic pain syndrome     Restrictive lung disease     Short of breath on exertion     GERD (gastroesophageal reflux disease)     Sinus bradycardia     Bilateral primary osteoarthritis of knee     Polyarthralgia     Medically complex patient     Language barrier affecting health care     Age-related osteoporosis with current pathological fracture with routine healing     Financial difficulties     Urinary incontinence in female     Insurance coverage problems     Current Outpatient Medications   Medication Sig     albuterol (PROAIR HFA;PROVENTIL HFA;VENTOLIN HFA) 90 mcg/actuation inhaler Inhale 2 puffs every 6 (six) hours as needed for wheezing or shortness of breath.     amLODIPine (NORVASC) 10 MG tablet TAKE 1 TABLET ORALLY EVERY DAY.     ANTACID, CALCIUM CARBONATE, 200 mg calcium (500 mg) chewable tablet CHEW 1 TABLET BY MOUTH THREE TIMES A DAY TO KEEP YOUR BONES HEALTHY     capsaicin (ZOSTRIX) 0.025 % cream Apply topically 2 (two) times a day as needed.      celecoxib (CELEBREX) 100 MG capsule TAKE ONE CAPSULE BY MOUTH DAILY.     diphenhydrAMINE (BENADRYL) 25 mg tablet Take 25 mg by mouth at bedtime as needed for sleep.     famotidine (PEPCID) 20 MG tablet TAKE 2 TABLETS BY MOUTH TWICE DAILY.     gabapentin (NEURONTIN) 100 MG capsule TAKE 2 CAPSULES BY MOUTH THREE TIMES A DAY.     hydroCHLOROthiazide (HYDRODIURIL) 25 MG tablet TAKE ONE TABLET BY MOUTH ONCE DAILY WITH LOSARTAN 100MG.     ipratropium-albuterol (COMBIVENT RESPIMAT)  mcg/actuation Mist inhaler Inhale 1 puff every  6 (six) hours as needed.     losartan (COZAAR) 100 MG tablet TAKE 1 TABLET BY MOUTH DAILY WITH HYDROCHLOROTHIAZIDE 25MG     MAPAP ARTHRITIS PAIN 650 mg CR tablet TAKE TWO TABLETS BY MOUTH 2 TIMES A DAY     menthol 2.5 % Gel Apply 1 application topically 3 (three) times a day. (Patient taking differently: Apply 1 application topically 3 (three) times a day as needed. )     metoprolol succinate (TOPROL XL) 50 MG 24 hr tablet Take 1 tablet (50 mg total) by mouth daily.     mirtazapine (REMERON) 15 MG tablet TAKE 1/2 TABLET BY MOUTH AT BEDTIME     polyethylene glycol (MIRALAX) 17 gram packet Take 17 g by mouth daily as needed.     polyvinyl alcohol (LIQUIFILM TEARS) 1.4 % ophthalmic solution Apply to eye daily as needed     SENEXON-S 8.6-50 mg tablet TAKE 2 TABLETS BY MOUTH EVERY DAY.     VITAMIN D2 50,000 unit capsule TAKE 1 CAPSULE BY MOUTH ONCE WEEKLY     dextromethorphan-guaiFENesin (ROBITUSSIN-DM)  mg/5 mL liquid Take 5 mL by mouth 3 (three) times a day as needed.     losartan-hydrochlorothiazide (HYZAAR) 100-25 mg per tablet TAKE 1 TABLET BY MOUTH DAILY.         Assessment/Plan:  1. Restrictive lung disease - continue inhaler prn    2. Chronic back and joint pain - continue current pain meds which are keeping pain manageagble and stable    3. HTN - monitored by home health RN, well controlled, continue current regimen    4. Polypharmacy - home health RN sets up meds and helps with refills    Reviewed how to call clinic to get help and to  request  from [facility]. Bi reviewed what symptoms to watch for if concerned about COVID      Phone call duration:  15 minutes    TI Morales Dr.  5/14/2020

## 2021-06-08 NOTE — TELEPHONE ENCOUNTER
RN cannot approve Refill Request    RN can NOT refill this medication med is not covered by policy/route to provider. Last office visit: Visit date not found Last Physical: Visit date not found Last MTM visit: Visit date not found Last visit same specialty: 12/9/2019 Kateryna Morales MD.  Next visit within 3 mo: Visit date not found  Next physical within 3 mo: Visit date not found      Sofi Pelletier, [facility] Triage/Med Refill 5/20/2020    Requested Prescriptions   Pending Prescriptions Disp Refills     celecoxib (CELEBREX) 100 MG capsule [Pharmacy Med Name: CELECOXIB 100 MG CAPSULE] 30 capsule 0     Sig: TAKE ONE CAPSULE BY MOUTH DAILY.       There is no refill protocol information for this order

## 2021-06-08 NOTE — TELEPHONE ENCOUNTER
Refill Approved    Rx renewed per Medication Renewal Policy. Medication was last renewed on 5/14/19.    Blaire Keating, [facility] Triage/Med Refill 5/26/2020     Requested Prescriptions   Pending Prescriptions Disp Refills     metoprolol succinate (TOPROL-XL) 50 MG 24 hr tablet [Pharmacy Med Name: METOPROLOL SUCC ER 50 MG TAB] 30 tablet 0     Sig: TAKE 1 TABLET BY MOUTH DAILY       Beta-Blockers Refill Protocol Passed - 5/21/2020 11:01 AM        Passed - PCP or prescribing provider visit in past 12 months or next 3 months     Last office visit with prescriber/PCP: 12/9/2019 Kateryna Morales MD OR same dept: 12/9/2019 Kateryna Morales MD OR same specialty: 12/9/2019 Kateryna Morales MD  Last physical: Visit date not found Last MTM visit: Visit date not found   Next visit within 3 mo: Visit date not found  Next physical within 3 mo: Visit date not found  Prescriber OR PCP: Kateryna Morales MD  Last diagnosis associated with med order: 1. Essential hypertension  - metoprolol succinate (TOPROL-XL) 50 MG 24 hr tablet [Pharmacy Med Name: METOPROLOL SUCC ER 50 MG TAB]; TAKE 1 TABLET BY MOUTH DAILY  Dispense: 30 tablet; Refill: 0    If protocol passes may refill for 12 months if within 3 months of last provider visit (or a total of 15 months).             Passed - Blood pressure filed in past 12 months     BP Readings from Last 1 Encounters:   12/14/19 155/69

## 2021-06-08 NOTE — TELEPHONE ENCOUNTER
RN cannot approve Refill Request    RN can NOT refill this medication med is not covered by policy/route to provider     . Last office visit: 12/9/2019 Kateryna Morales MD Last Physical: Visit date not found Last MTM visit: Visit date not found Last visit same specialty: 12/9/2019 Kateryna Morales MD.  Next visit within 3 mo: Visit date not found  Next physical within 3 mo: Visit date not found      Blaire Keating, [facility] Connection Triage/Med Refill 6/18/2020    Requested Prescriptions   Pending Prescriptions Disp Refills     MAPAP ARTHRITIS PAIN 650 mg CR tablet [Pharmacy Med Name: MAPAP ARTHRITIS  MG CPLT] 120 tablet 0     Sig: TAKE 2 TABLETS BY MOUTH 2 TIMES A DAY       There is no refill protocol information for this order        celecoxib (CELEBREX) 100 MG capsule [Pharmacy Med Name: CELECOXIB 100 MG CAPSULE] 30 capsule 0     Sig: TAKE ONE CAPSULE BY MOUTH DAILY.       There is no refill protocol information for this order

## 2021-06-08 NOTE — TELEPHONE ENCOUNTER
RN cannot approve Refill Request    RN can NOT refill this medication med is not covered by policy/route to provider. Last office visit: Visit date not found Last Physical: Visit date not found Last MTM visit: Visit date not found Last visit same specialty: 12/9/2019 Kateryna Morales MD.  Next visit within 3 mo: Visit date not found  Next physical within 3 mo: Visit date not found      Sofi Pelletier, [facility] Triage/Med Refill 5/21/2020    Requested Prescriptions   Pending Prescriptions Disp Refills     ANTACID, CALCIUM CARBONATE, 200 mg calcium (500 mg) chewable tablet [Pharmacy Med Name: ANTACID 500 MG CHEWABLE TABLET] 90 tablet 0     Sig: CHEW 1 TABLET BY MOUTH THREE TIMES A DAY TO KEEP YOUR BONES HEALTHY       There is no refill protocol information for this order

## 2021-06-08 NOTE — TELEPHONE ENCOUNTER
Refill Approved    Rx renewed per Medication Renewal Policy. Medication was last renewed on 12/9/19.    Blaire Keating, [facility] Triage/Med Refill 5/8/2020     Requested Prescriptions   Pending Prescriptions Disp Refills     SENEXON-S 8.6-50 mg tablet [Pharmacy Med Name: SENEXON-S 50-8.6 MG TABLET] 60 tablet 0     Sig: TAKE 2 TABLETS BY MOUTH EVERY DAY.       GI Medications Refill Protocol Passed - 5/7/2020 11:02 AM        Passed - PCP or prescribing provider visit in last 12 or next 3 months.     Last office visit with prescriber/PCP: 12/9/2019 Kateryna Morales MD OR same dept: 12/9/2019 Kateryna Morales MD OR same specialty: 12/9/2019 Kateryna Morales MD  Last physical: Visit date not found Last MTM visit: Visit date not found   Next visit within 3 mo: Visit date not found  Next physical within 3 mo: Visit date not found  Prescriber OR PCP: Kateryna Morales MD  Last diagnosis associated with med order: 1. Drug-induced constipation  - SENEXON-S 8.6-50 mg tablet [Pharmacy Med Name: SENEXON-S 50-8.6 MG TABLET]; TAKE 2 TABLETS BY MOUTH EVERY DAY.  Dispense: 60 tablet; Refill: 0    If protocol passes may refill for 12 months if within 3 months of last provider visit (or a total of 15 months).

## 2021-06-08 NOTE — TELEPHONE ENCOUNTER
RN cannot approve Refill Request    RN can NOT refill this medication med is not covered by policy/route to provider. Last office visit: 12/9/2019 Kateryna Morales MD Last Physical: Visit date not found Last MTM visit: Visit date not found Last visit same specialty: 12/9/2019 Kateryna Morales MD.  Next visit within 3 mo: Visit date not found  Next physical within 3 mo: Visit date not found      Kandcae Sanchez, [facility] Triage/Med Refill 6/11/2020    Requested Prescriptions   Pending Prescriptions Disp Refills     MAPAP ARTHRITIS PAIN 650 mg CR tablet [Pharmacy Med Name: MAPAP ARTHRITIS  MG CPLT] 120 tablet 0     Sig: TAKE TWO TABLETS BY MOUTH 2 TIMES A DAY       There is no refill protocol information for this order        mirtazapine (REMERON) 15 MG tablet [Pharmacy Med Name: MIRTAZAPINE 15 MG TABLET] 45 tablet 0     Sig: TAKE 1/2 TABLET BY MOUTH AT BEDTIME       Tricyclics/Misc Antidepressant/Antianxiety Meds Refill Protocol Passed - 6/10/2020  9:29 AM        Passed - PCP or prescribing provider visit in last year     Last office visit with prescriber/PCP: 12/9/2019 Kateryna Morales MD OR same dept: 12/9/2019 Kateryna Morales MD OR same specialty: 12/9/2019 Kateryna Morales MD  Last physical: Visit date not found Last MTM visit: Visit date not found   Next visit within 3 mo: Visit date not found  Next physical within 3 mo: Visit date not found  Prescriber OR PCP: Kateryna Morales MD  Last diagnosis associated with med order: 1. Spinal stenosis, multilevel  - MAPAP ARTHRITIS PAIN 650 mg CR tablet [Pharmacy Med Name: MAPAP ARTHRITIS  MG CPLT]; TAKE TWO TABLETS BY MOUTH 2 TIMES A DAY  Dispense: 120 tablet; Refill: 0    2. Psychophysiological insomnia  - mirtazapine (REMERON) 15 MG tablet [Pharmacy Med Name: MIRTAZAPINE 15 MG TABLET]; TAKE 1/2 TABLET BY MOUTH AT BEDTIME  Dispense: 45 tablet; Refill: 0    3. Poor appetite  - mirtazapine (REMERON) 15 MG tablet [Pharmacy Med  Name: MIRTAZAPINE 15 MG TABLET]; TAKE 1/2 TABLET BY MOUTH AT BEDTIME  Dispense: 45 tablet; Refill: 0    If protocol passes may refill for 12 months if within 3 months of last provider visit (or a total of 15 months).

## 2021-06-08 NOTE — PROGRESS NOTES
HPI - 78 yo female here to f/u on chronic pain and pain clinic referral.     She was seen at the pain clinic on 12/21/16 for the following:   Major issues:  1. Chronic pain syndrome    2. Nontraumatic rupture of tendons of biceps (long head), left    3. Chronic left SI joint pain    4. Myalgia    5. Low back pain    6. Neck pain      Per consult note from Pain Clinic:  Recommend increasing Gabapentin to 100 mg 3 times a day. Will continue to increase as tolerated  Continue Cymbalta 60 mg twice daily.  Start physical therapy at [facility] as scheduled  Recommend compounded cream to apply topically up to 3 times a day to painful areas including bicep, left flank  Recommend left sacroiliac joint injection with Dr. Sagastume with a . Patient not taking anticoagulants. We will review results at your next follow-up appointment  Follow-up as needed after above injection to evaluate the above plan of care.       Per daughter, she was told she needed to get extra xrays and the return to pain clinic and did not know about the f/u apt on 2/1/16. She and daughter did not know that SI joint injections.   Starting PT on 1/24/17    Vit D deficiency - 14.1 on 4/25/16  Taking ergo 793435 units weekly    Current Outpatient Prescriptions   Medication Sig Note     acetaminophen (TYLENOL) 650 MG CR tablet Take 650 mg by mouth every 8 (eight) hours as needed.      alendronate (FOSAMAX) 70 MG tablet TAKE 1 TABLET ORALLY EVERY 7 DAYS      amLODIPine (NORVASC) 10 MG tablet Take 1 tablet (10 mg total) by mouth daily.      ANTACID, CALCIUM CARBONATE, 200 mg calcium (500 mg) chewable tablet CHEW 1 TABLET BY MOUTH THREE TIMES A DAY TO KEEP YOUR BONES HEALTHY      docusate sodium (COLACE) 100 MG capsule TAKE 1 CAPSULE BY MOUTH TWICE DAILY.      DULoxetine (CYMBALTA) 60 MG capsule Take 1 capsule (60 mg total) by mouth 2 (two) times a day.      gabapentin (NEURONTIN) 100 MG capsule Take 100 mg by mouth 3 (three) times a day.       "hydrochlorothiazide (HYDRODIURIL) 25 MG tablet Take 1 tablet (25 mg total) by mouth daily.      losartan (COZAAR) 25 MG tablet TAKE ONE TABLET BY MOUTH DAILY      mirtazapine (REMERON) 7.5 MG tablet TAKE 1 TABLET BY MOUTH AT BEDTIME.      omeprazole (PRILOSEC) 20 MG capsule Take 1 capsule (20 mg total) by mouth daily. 1 hour before a meal      ondansetron (ZOFRAN, AS HYDROCHLORIDE,) 4 MG tablet Take 1-2 tab po 4 times daily prn nausea, max daily dose 4 tab      predniSONE (DELTASONE) 5 MG tablet TAKE 1 TABLET ORALLY 2 TIMES DAILY      VITAMIN D2 50,000 unit capsule Take 1 capsule (50,000 Units total) by mouth once a week.      atenolol (TENORMIN) 100 MG tablet TAKE ONE TABLET BY MOUTH DAILY      L.acidophilus-Bif. animalis 5 billion cell CpSP Take 1 capsule by mouth daily. 4/8/2016: Culturelle     polyvinyl alcohol (LIQUIFILM TEARS) 1.4 % ophthalmic solution Apply to eye daily as needed      Vitals:    01/17/17 1356 01/17/17 1403   BP: 160/80 150/80   Pulse: 75    Resp: 16    Temp: 97.5  F (36.4  C)    SpO2: 92%    Weight: 125 lb (56.7 kg)    Height: 4' 11\" (1.499 m)      PHYSICAL EXAM   General Appearance: Awake and alert, in no acute distress  HEENT: neck is supple  CV: regular rate  Resp: No respiratory distress. Breathing comfortably  Musculoskeletal: moving limbs comfortably with not deficits or deformities  Skin: no rashes noted    A/P  Chronic pain - ? Connective tissue d/o, fibromyalgia, polyarthritis, SI joint pain  There seems to be a discrepancy between what daughter understood and what is in the consult note.   Pain clinic f/u 2/1/17  PT - next apt on 1/24/17  Continue current meds  Ask  to help with ride  Consider acupuncture or massage     Vit D deficiency - 14.1 on 4/25/16  Taking ergo [id number] units weekly  "

## 2021-06-08 NOTE — PROGRESS NOTES
PAIN CENTER PROGRESS NOTE    Subjective:   Luis Manuel Lemon is a 77 y.o. female who presents for evaluation of multi-site pain secondary to cervical and lumbar degeneration, connective tissue disease following rheumatologist.      Major issues:  1. Chronic pain syndrome    2. Drug-induced constipation    3. Sacroiliac joint pain    4. Bilateral arm pain      Pain location and description: 8/10 constant shooting sharp pain in lower back and bilateral arms.  Function rated 7.  Last visit pain rated 8/10.  Difficulty sleeping due to pain in right shoulder.  Radiation of pain: left lower back to left hip and leg, not past the knee.  Lower abdomen bilaterally.  Paresthesias, numbness, weakness: upper extremity weakness bilaterally  Gait disturbance: Uses cane.  Denies recent falls or changes in balance.  Exacerbating factors: activity, walking.  Difficult to change positions from sitting to standing.  Alleviating factors: rest, injections  Associated symptoms: sleep interruption, activity limitations, mood  Adverse effects of medications: Dizziness and headache, constipation.  Denies nausea or headache.  BM once every 1-2 days.  Current treatment efficacy: Fair, reports Gabapentin helpful for a little while.  Current treatment compliance: Fair; keeping follow-up and injection appointments.  Doing PT appointments.    LBP primarily on left lower back to left hip down her leg to her knee.  She states she has not had new injury or fall since last seen. She denies any lumbar surgery history.  Denies new numbness, tingling, weakness in bilateral legs.  LESI L4-5 on 07/06/16 she reports she felt much better with lower back pain relief 60%.  Duration of pain relief 1 week.  Denies any side effects from steroid and denies post procedure complications.  Lumbar facet injection bilateral L5-S1 did not give relief on 06/22/16 which was tried first as she was reporting more axial back pain.  Had EDE C6-7 07/27/16 for neck symptoms which she  is not currently reporting.      Did not schedule SI joint injection as there was a discrepancy in understanding how to schedule after last visit.  She is interested in pursuing for treatment.    Reports weakness bilateral arms near her biceps due to pain.  Right handed.     States for pain relief currently she is using heat but there is still pain.  She denies topical medications at home.      She was asked to continue with rheumatologist Dr. Pedro and does not have any upcoming appointments scheduled.  She has diagnosis of connective tissue disease and PMR.    She is taking Gabapentin 100 mg TID and Cymbalta 60 mg daily.  Tylenol 650 mg every 8 hours as needed.  RN sets up medications weekly.  States when she takes Gabapentin she feels better but then when it wears off she feels pain worsen.  She estimates duration of relief to be 2-3 hours.  She denies side effects.    She has hx biceps tendon rupture and referred to orthopedics.  She was seen 12/19 by Dr. Santos and was referred to PT at [facility].  Scheduled every 2 weeks and reports she is seeing benefits in ROM exercises but pain is the same.  Ice pack modalities and using topical cream.  HEP daily.  She is not scheduled with orthopedic in follow-up.  She has had joint injections in the past.    She saw Dr. Morales on 01/17/17 and also discussed considering acupuncture or massage for chronic pain.     Review of Systems  Constitutional: Sleep disturbance due to pain.  Denies fever, chills, night sweats, lethargy, weight loss, weight gain.  Musculoskeletal: Positive for back pain, neck pain, bilateral shoulder/arm pain. denies joint pain, joint swelling, recent falls.  Gastrointestional: Constipation.  Denies difficulty swallowing, change in appetite, abdominal pain, nausea, vomiting, diarrhea, GERD, fecal incontinence.  Genitourinary: Denies urinary incontinence, dysuria, hematuria, UTI, frequency, hesitancy, change in libido.  Neurologic: Denies headaches,  "confusion, seizure, weakness, changes in balance, changes in speech.  Psychiatric: Denies depression, anxiety, memory loss, psychoses, suicidal ideation, substance use/abuse.     Objective:     Vitals:    02/01/17 1320   BP: 126/75   Pulse: 90   Resp: 18   Weight: 125 lb (56.7 kg)   Height: 4' 11\" (1.499 m)   PainSc:   8     Physical Exam  Constitutional- General appearance: Abnormal.  Well developed, uncomfortable, within normal limits of ideal weight and appearance reflects stated age.  No acute distress or pain behaviors noted.  Presents with daughter and , all information gathered through .  Psychiatric- Judgment and insight: Fair.  Speech: Normal rhythm.  Thought process: Normal.  No abnormal thoughts reported. Alert & Oriented to person, place, and time.  Recent and remote memory: Normal.  Mood and affect: Normal.  Observed mood: appropriate  Respiratory- Breathing is non-labored; normal rhythm and rate.  Cardiovascular- Extremities warm and well perfused, no peripheral edema or varicosities.  Dermatologic- Exposed skin is clean, dry, and intact to inspection and palpation.  Musculoskeletal- Gait and station: Abnormal, stooped, antalgic on left.  Gait evaluation demonstrates ambulating with single point cane.  Patient does have difficulty rising from a seated position.      Reviewed [place] [facility] 02/01/17 as expected    Imaging:  CT Abdomen and Pelvis 09/16/16  IMPRESSION:   CONCLUSION:  1. Nonspecific wall thickening of the distal stomach. Small amount of fluid and debris in the stomach. Correlate with recent meal and consider gastritis.  2. No mechanical bowel obstruction. There is colonic diverticulosis without acute diverticulitis.  3. Heterogeneity of the liver may be related to contrast bolus timing or hepatic congestion.  4. Atherosclerotic disease including coronary artery calcification. The middle coronary vein is prominent, which is nonspecific.  5. Significant epicardial and " subpleural fat. No airspace disease in the lung bases.    Assessment:   Luis Manuel Lemon is a 77 y.o. female seen in clinic today for low back pain, lumbar spine x-ray demonstrating disc space height loss L4-S1 and interbdy spurring L3-4 and L4-5. Compression deformity T12. She did not have pain relief with lumbar facet injection but reports 60% relief from LESI.  Upon examination, her pain appears originating at the left SI joint and testing of this joint is positive.  She may return to see Dr. Sagastume for left SI joint injection versus repeating LESI and is educated on these treatment options today including possible risks and benefits. She is instructed on how to schedule before she leaves the pain center today. Until injection, she may continue medications for pain with increase in Gabapentin to 300 mg at night to see if assistive as she is waking from pain.  Also, temporary use of Tramadol 50 mg BID prn until injection, she has taken this medication in the past without adverse effects. Unclear how much benefit it provided in the past.  She is educated on constipation and treatment including diet, OTC measures, walking for exercise, increasing fluid intake.   For bilateral arm pain she will follow recommendations by [facility] and continue PT.      Plan:   Recommend increasing Gabapentin to 100 mg in the am and pm and 300 mg at night.  Will continue to increase as tolerated  Temporary use of Tramadol 50 mg 1-2 times a day as needed for pain until injection completed.  Continue Cymbalta 60 mg twice daily.  Continue physical therapy at [facility] as scheduled and home exercise program  For constipation, take miralax 1 capful of powder in 8 ounces of liquid.  Also patient has stool softener colace to take twice daily  Schedule left sacroiliac joint injection with Dr. Sagastume with a . Patient not taking anticoagulants.  We will review results at your next follow-up appointment  Follow-up in 4-6 weeks after above  injection to evaluate the above plan of care.      Trista Hoffman PA-C  [facility]  [address]  [place] [address]  Ph: [phone]  Fax: [phone]

## 2021-06-09 ENCOUNTER — HOSPITAL ENCOUNTER (EMERGENCY)
Dept: EMERGENCY MEDICINE | Facility: HOSPITAL | Age: 81
Discharge: HOME | End: 2021-06-09
Attending: EMERGENCY MEDICINE

## 2021-06-09 ENCOUNTER — COMMUNICATION - HEALTHEAST (OUTPATIENT)
Dept: GERIATRIC MEDICINE | Facility: CLINIC | Age: 81
End: 2021-06-09

## 2021-06-09 DIAGNOSIS — M54.50 CHRONIC BILATERAL LOW BACK PAIN WITHOUT SCIATICA: ICD-10-CM

## 2021-06-09 DIAGNOSIS — R06.02 SOB (SHORTNESS OF BREATH): ICD-10-CM

## 2021-06-09 DIAGNOSIS — G89.29 CHRONIC BILATERAL LOW BACK PAIN WITHOUT SCIATICA: ICD-10-CM

## 2021-06-09 DIAGNOSIS — G89.29 CHRONIC NECK PAIN: ICD-10-CM

## 2021-06-09 DIAGNOSIS — M54.2 CHRONIC NECK PAIN: ICD-10-CM

## 2021-06-09 LAB
ANION GAP SERPL CALCULATED.3IONS-SCNC: 10 MMOL/L (ref 5–18)
BNP SERPL-MCNC: 26 PG/ML (ref 0–155)
BUN SERPL-MCNC: 11 MG/DL (ref 8–28)
CALCIUM SERPL-MCNC: 8.8 MG/DL (ref 8.5–10.5)
CHLORIDE BLD-SCNC: 102 MMOL/L (ref 98–107)
CO2 SERPL-SCNC: 26 MMOL/L (ref 22–31)
CREAT SERPL-MCNC: 0.94 MG/DL (ref 0.6–1.1)
ERYTHROCYTE [DISTWIDTH] IN BLOOD BY AUTOMATED COUNT: 14.7 % (ref 11–14.5)
GFR SERPL CREATININE-BSD FRML MDRD: 57 ML/MIN/1.73M2
GLUCOSE BLD-MCNC: 99 MG/DL (ref 70–125)
HCT VFR BLD AUTO: 35.6 % (ref 35–47)
HGB BLD-MCNC: 10.3 G/DL (ref 12–16)
MCH RBC QN AUTO: 24.5 PG (ref 27–34)
MCHC RBC AUTO-ENTMCNC: 28.9 G/DL (ref 32–36)
MCV RBC AUTO: 85 FL (ref 80–100)
PLATELET # BLD AUTO: 262 THOU/UL (ref 140–440)
PMV BLD AUTO: 10.8 FL (ref 8.5–12.5)
POTASSIUM BLD-SCNC: 4.5 MMOL/L (ref 3.5–5)
RBC # BLD AUTO: 4.2 MILL/UL (ref 3.8–5.4)
SARS-COV-2 PCR RESULT-HE - HISTORICAL: NEGATIVE
SODIUM SERPL-SCNC: 138 MMOL/L (ref 136–145)
TROPONIN I SERPL-MCNC: <0.01 NG/ML (ref 0–0.29)
WBC: 5.9 THOU/UL (ref 4–11)

## 2021-06-09 NOTE — PROGRESS NOTES
-Care Guide reminded patient for upcoming appointments with specialist, PCP and arranged transportation.  -Patient receives medications monthly and health insurance is active.  -Patient stated no form or paper work needed helps to complete.  -Care Guide will outreach patient again in 6 to 8 weeks and inform to call sooner if needed.

## 2021-06-09 NOTE — PROGRESS NOTES
Injection - Other  Date/Time: 3/14/2017 1:55 PM  Performed by: HEATH RUIZ  Authorized by: HEATH RUIZ   Comments:     TRIGGER POINT INJECTION  Performed on: 3/14/2017    Ms. Lemon is a 77-year-old woman who is here today with the .  She has pain especially in the low back and right shoulder.  This is myofascial pain.  She is referred by her primary care physician to give a trial to trigger point injections which have helped her in the past.    Pre Procedure Diagnosis:  Myofascial pain  Vital Signs:  As per intra-procedure documentation    The procedure was discussed with Luis Manuel Lemon in detail along with the attendant risks, including but not limited to: nerve injury, infection, bleeding, allergic reaction, or worsening of pain.  Informed consent was obtained and patient elected to proceed.    After informed consent was obtained the patient was seated in the examination chair.  The lumbar area and posterior right shoulder was prepped sterilely with alcohol.  A 1-1/2 inch #25-gauge needle was used.  Therefore injections made in the lower lumbosacral muscles and upper lumbar paraspinal muscles bilaterally.  An injection was also made in the right levator scapula muscle at the attachment of the scapula as well as in the subacromial bursa space on the right.  A total of 10 mL of 0.25% Marcaine with 10 mg of Decadron was used in divided doses.    The patient tolerated the procedure well.  The patient was taken to the recovery area after the injection.  There were no complications.      Pre Procedure Pain Scale: 9  Pain Score:   8 (POST PROCEDURE VITALS AND PAIN)    If Luis Manuel Lemon has any questions or concerns after discharge, she was instructed to call us.

## 2021-06-09 NOTE — TELEPHONE ENCOUNTER
RN cannot approve Refill Request    RN can NOT refill this medication med is not covered by policy/route to provider. Last office visit: 12/9/2019 Kateryna Morales MD Last Physical: Visit date not found Last MTM visit: Visit date not found Last visit same specialty: 12/9/2019 Kateryna Morales MD.  Next visit within 3 mo: Visit date not found  Next physical within 3 mo: Visit date not found      Sofi Pelletier, [facility] Triage/Med Refill 7/8/2020    Requested Prescriptions   Pending Prescriptions Disp Refills     polyvinyl alcohol (ARTIFICIAL TEARS, POLYVIN ALC,) 1.4 % ophthalmic solution [Pharmacy Med Name: ARTIFICIAL TEARS 1.4% DROPS] 15 mL 0     Sig: APPLY TO EYE DAILY AS NEEDED.       There is no refill protocol information for this order

## 2021-06-09 NOTE — PROGRESS NOTES
MTM Encounter    Assessment/Plan  Polypharmacy: Improving.  We discussed proper medication disposal today regarding her duloxetine.  We reviewed the intended taper for her prednisone and she will start doing this now.  Although she did express some hesitancy because she thought this medication was for pain, she was willing to move forward with the taper when I described the possible side effects of this medication including: Increased blood pressure, increased hunger and weight gain, mood changes including irritability, difficulty sleeping, and water retention.[facility] endorsed some of the symptoms.  - Prednisone taper: 1 tablet for one week, then 1/2 tablet for one week, then stop    Hypertension: Controlled to goal of less than 150/90.  She appears to have properly executed the switch from individual losartan and HCTZ to Hyzaar.  - Continue current therapy    Follow Up  As requested by patient or provider.    Subjective/Objective  Luis Manuel Lemon is a 77 y.o. female here for a medication therapy management (MTM) appointment; her chief concern today is a six month medication check up.    Polypharmacy: List below of recent changes from visit with PCP one week ago.  Simplified regimen  Combining losaratan 50mg and HCTZ 25mg -> [id number]  Taper prednisone - 5mg BID -> one a day for a week, then 1/2 tab a day for a week, then stop  Taper off cymbalta - 60mg BID -> one daily for a week, then one every other day for a week  Taken off list since she is not taking and has not bottles - atenolol and remeron    Luis Manuel stopped taking her duloxetine without a taper.  She feels fine and did not experience any withdrawal side effects.  She has not started her taper off prednisone, it does not seem that she knew she was supposed to stop this medication.    Hypertension:  Currently taking combination losartan-HCTZ and amlodipine.  Does not have individual losartan and HCTZ with her today.  RN at home is using a wrist cuff; Luis Manuel  wonders if the nurse at home is not sure how to take blood pressure because it is always good when she comes into clinic.  BP today is 130/82.    Arthritis: Getting injections which are helping a lot.  She is being followed by the pain clinic and has an appointment with them this month.Luis Manuel has refills remaining on all medications except tramadol and gabapentin, both of which are managed by the pain clinic.     Osteoporosis/Hypovitaminosis D: No apparent side effects or adherence issues with alendronate, tums, or vitamin D.  Needs to refill Tums; has refills available.  Daughter also notes a second bottle is available at home.  ----------------    Luis Manuel's medication list was reviewed with them, discussing reason for use, directions for use, and potential side effects of each medication as needed. Indication, safety, efficacy, and convenience was assessed for all medications addressed above.  No environmental factors were noted currently affecting patient.  This care plan was communicated via EMR with her primary care provider, Kateryna Morales MD, who is the authorizing prescriber for this visit.  Direct supervision was available by either the patient's PCP or other available provider.  Time and complexity billing metrics are included in the docflowsheet linked to this visit.  Time spent: 25 minutes      Odell Meek, PharmD  [facility] Pharmacist  [facility]  [phone]

## 2021-06-09 NOTE — PROGRESS NOTES
"Luis Manuel Lemon is a 80 y.o. female who is being evaluated via a billable telephone visit.      The patient has been notified of following:     \"This telephone visit will be conducted via a call between you and your physician/provider. We have found that certain health care needs can be provided without the need for a physical exam.  This service lets us provide the care you need with a short phone conversation.  If a prescription is necessary we can send it directly to your pharmacy.  If lab work is needed we can place an order for that and you can then stop by our lab to have the test done at a later time.    Telephone visits are billed at different rates depending on your insurance coverage. During this emergency period, for some insurers they may be billed the same as an in-person visit.  Please reach out to your insurance provider with any questions.    If during the course of the call the physician/provider feels a telephone visit is not appropriate, you will not be charged for this service.\"    Patient has given verbal consent to a Telephone visit? Yes    What phone number would you like to be contacted at? [phone]    Patient would like to receive their AVS by AVS Preference: Mail a copy.    HPI - 81 yo female with phone for f/u.     She reports shortness of breath and need for inhaler refill.        H/o shortness of breath and seen in ER on 12/14/19 and had a virtual visit on 5/14/20 and at that time she reported still having concerns about her breathing but improving. She was able to walk around house w/o shortness of breath or MILLER.    She ran out of both inhalers combivent-respimat and ventolin about 2 weeks ago.   She feels fatigued and feels shortness of breath if lying flat - this started when she ran out of inhaler.     Per chart review:   Echo 4/2018:     IVC: Normal central venous pressure.    Left ventricle ejection fraction is normal. The estimated left ventricular ejection fraction is 70%.    Normal " right ventricular size and systolic function.    No hemodynamically significant valvular heart abnormalities.    Thoracic Aorta: The ascending aorta is mildly dilated.    When compared to the previous study dated 10/9/2017, no significant change.    Back pain -   Pain meds: tylenol and celebrex and capsaicin  Pain not worse or getting better     Chronic Pain - --Polyarthralgia, spinal stenosis  Chronic neck, back and joint pain  Already tried cymbalta, amitriptyline, gabapentin PT, acupuncture, spine clinic, pain clinic, rheum consults, prednisone, plaquenil, methotrexate, steroid joint injections, SI belt   Pain meds: gabapentin 200mg TID and tylenol 650mg x2 tabs twice daily and Vit B complex    HTN -   Report taking her BP meds  Meds: amlodipine, losartan,HCTZ, metoprolol     H/o Vit D deficiency - on ergo weekly     Drug induced constipation - needs colace and miralax      Refills - home health RN helps with refills but was unable get refills b/c the rx .   Home health RN visits every 2 weeks    Polypharmacy - after reviewing the med list, she needs refills on several meds within the next month    Social -   Lives with daughter and next month, she will move in with her older son in [place], [place]    Patient Active Problem List   Diagnosis     Constipation     Vitamin D Deficiency     Essential hypertension     Rotator Cuff Tendonitis     Rupture Of The Bicipital Tendon     Asymptomatic Postmenopausal Status     Hyperlipidemia     Osteoporosis on Prolia 5.7.19     Chronic reflux esophagitis     Bursitis, trochanteric     Hypoalbuminemia     Low back pain     Polypharmacy     Degenerative disc disease, cervical     Spinal stenosis, multilevel     Chronic pain syndrome     Restrictive lung disease     Short of breath on exertion     GERD (gastroesophageal reflux disease)     Sinus bradycardia     Bilateral primary osteoarthritis of knee     Polyarthralgia     Medically complex patient     Language barrier  affecting health care     Age-related osteoporosis with current pathological fracture with routine healing     Financial difficulties     Urinary incontinence in female     Insurance coverage problems     Current Outpatient Medications   Medication Sig     albuterol (PROAIR HFA;PROVENTIL HFA;VENTOLIN HFA) 90 mcg/actuation inhaler Inhale 2 puffs every 6 (six) hours as needed for wheezing or shortness of breath.     amLODIPine (NORVASC) 10 MG tablet TAKE 1 TABLET ORALLY EVERY DAY.     ANTACID, CALCIUM CARBONATE, 200 mg calcium (500 mg) chewable tablet CHEW 1 TABLET BY MOUTH THREE TIMES A DAY TO KEEP YOUR BONES HEALTHY     capsaicin (ZOSTRIX) 0.025 % cream Apply topically 2 (two) times a day as needed.      celecoxib (CELEBREX) 100 MG capsule TAKE ONE CAPSULE BY MOUTH DAILY.     dextromethorphan-guaiFENesin (ROBITUSSIN-DM)  mg/5 mL liquid Take 5 mL by mouth 3 (three) times a day as needed.     diphenhydrAMINE (BENADRYL) 25 mg tablet Take 25 mg by mouth at bedtime as needed for sleep.     famotidine (PEPCID) 20 MG tablet TAKE 2 TABLETS BY MOUTH TWICE DAILY.     gabapentin (NEURONTIN) 100 MG capsule TAKE 2 CAPSULES BY MOUTH THREE TIMES A DAY.     hydroCHLOROthiazide (HYDRODIURIL) 25 MG tablet TAKE ONE TABLET BY MOUTH ONCE DAILY WITH LOSARTAN 100MG.     ipratropium-albuterol (COMBIVENT RESPIMAT)  mcg/actuation Mist inhaler Inhale 1 puff every 6 (six) hours as needed.     losartan (COZAAR) 100 MG tablet TAKE 1 TABLET BY MOUTH DAILY WITH HYDROCHLOROTHIAZIDE 25MG     losartan-hydrochlorothiazide (HYZAAR) 100-25 mg per tablet TAKE 1 TABLET BY MOUTH DAILY.     MAPAP ARTHRITIS PAIN 650 mg CR tablet TAKE 2 TABLETS BY MOUTH 2 TIMES A DAY     menthol 2.5 % Gel Apply 1 application topically 3 (three) times a day. (Patient taking differently: Apply 1 application topically 3 (three) times a day as needed. )     metoprolol succinate (TOPROL-XL) 50 MG 24 hr tablet TAKE 1 TABLET BY MOUTH DAILY     mirtazapine (REMERON) 15 MG  tablet TAKE 1/2 TABLET BY MOUTH AT BEDTIME     polyethylene glycol (MIRALAX) 17 gram packet Take 17 g by mouth daily as needed.     polyvinyl alcohol (ARTIFICIAL TEARS, POLYVIN ALC,) 1.4 % ophthalmic solution APPLY TO EYE DAILY AS NEEDED.     SENEXON-S 8.6-50 mg tablet TAKE 2 TABLETS BY MOUTH EVERY DAY.     VITAMIN D2 50,000 unit capsule TAKE 1 CAPSULE BY MOUTH ONCE WEEKLY           Assessment/Plan:    Restrictive lung disease and fibrosis  Refilled both inhalers which were helping before she ran out    Chronic pain syndrome  Manageable with current regimen    HTN -   Stable, continue current meds     H/o Vit D deficiency - continue ergo weekly     Drug induced constipation - improved with colace and miralax      Refills - continue with Home health RN med set up  Discussed that when she moves to [place], [place] with her son, she may needs a new doctor there to help with ordering home health services. She can continue with phone visits and/or inperson visits at [facility] as she wished but we are unable to order home health in a different location.   She will discuss with her son.     Polypharmacy - refilled meds as indicated today        Phone call duration:  25 minutes    Dr. Kateryna Morales  7/14/2020

## 2021-06-09 NOTE — PROGRESS NOTES
HPI - 76 yo female here to f/u on pain clinic consult.     She was seen 2 x and received trigger point injections in back and shoulder, most recently 3/14/17.   F/u apt on 4/11/7.   She feels the injections are helpful and would like them regularly - like monthly  Starting PT on 1/24/17 - went 2 times but missed other b/c of transportation  She was seen at the pain clinic on 12/21/16 for the following:   Major issues:  1. Chronic pain syndrome    2. Nontraumatic rupture of tendons of biceps (long head), left    3. Chronic left SI joint pain    4. Myalgia    5. Low back pain    6. Neck pain       gabapentin 300mg makes her throat burn but the 100mg does not cause problems    Vit D deficiency - 14.1 on 4/25/16  Taking ergo 912768 units weekly    Polypharmacy -   Home health RN sets up weekly  She has apt with clinic pharmacist on 3/29/17    HTN - on amlodipine, HCTZ, losartan  BP high today    Current Outpatient Prescriptions   Medication Sig Note     acetaminophen (TYLENOL) 650 MG CR tablet Take 650 mg by mouth every 8 (eight) hours as needed.      alendronate (FOSAMAX) 70 MG tablet TAKE 1 TABLET ORALLY EVERY 7 DAYS      amLODIPine (NORVASC) 10 MG tablet Take 1 tablet (10 mg total) by mouth daily.      ANTACID, CALCIUM CARBONATE, 200 mg calcium (500 mg) chewable tablet CHEW 1 TABLET BY MOUTH THREE TIMES A DAY TO KEEP YOUR BONES HEALTHY      docusate sodium (COLACE) 100 MG capsule TAKE 1 CAPSULE BY MOUTH TWICE DAILY.      DULoxetine (CYMBALTA) 60 MG capsule Take 1 capsule (60 mg total) by mouth 2 (two) times a day.      gabapentin (NEURONTIN) 100 MG capsule TAKE ONE CAPSULE BY MOUTH TWO TIMES A DAY.      gabapentin (NEURONTIN) 300 MG capsule TAKE 1 CAPSULE BY MOUTH DAILY AT BEDTIME      hydrochlorothiazide (HYDRODIURIL) 25 MG tablet Take 1 tablet (25 mg total) by mouth daily.      losartan (COZAAR) 25 MG tablet TAKE ONE TABLET BY MOUTH DAILY      mirtazapine (REMERON) 7.5 MG tablet TAKE 1 TABLET BY MOUTH AT BEDTIME.       omeprazole (PRILOSEC) 20 MG capsule Take 1 capsule (20 mg total) by mouth daily. 1 hour before a meal      ondansetron (ZOFRAN, AS HYDROCHLORIDE,) 4 MG tablet Take 1-2 tab po 4 times daily prn nausea, max daily dose 4 tab      predniSONE (DELTASONE) 5 MG tablet TAKE 1 TABLET BY MOUTH 2 TIMES DAILY.      traMADol (ULTRAM) 50 mg tablet Take 1 tablet (50 mg total) by mouth 2 (two) times a day as needed for pain.      VITAMIN D2 50,000 unit capsule Take 1 capsule (50,000 Units total) by mouth once a week.      atenolol (TENORMIN) 100 MG tablet TAKE ONE TABLET BY MOUTH DAILY      L.acidophilus-Bif. animalis 5 billion cell CpSP Take 1 capsule by mouth daily. 4/8/2016: Culturelle     polyvinyl alcohol (LIQUIFILM TEARS) 1.4 % ophthalmic solution Apply to eye daily as needed          PHYSICAL EXAM   General Appearance: Awake and alert, in no acute distress  HEENT: neck is supple  CV: regular rate  Resp: No respiratory distress. Breathing comfortably  Musculoskeletal: moving limbs comfortably with not deficits or deformities  Skin: no rashes noted    A/P  Chronic pain syndrome  She and daughter are reporting that she was told she can get the injections Q2 weeks but she wants to get them Q4 weeks.   Continue current pain meds  Stop gabapentin 300mg b/c causing dizziness and throat burning  F/u apt with pain clinic on 4/11/17    Vit D deficiency - 14.1 on 4/25/16  Taking ergo 811043 units weekly    Polypharmacy -   Home health RN sets up weekly  She has apt with clinic pharmacist on 3/29/17  Encouraged take meds daily    HTN -   Variable -947 /60's and to 160/80  Continue amlodipine 10m  Continue HCTZ 25mg  Increase losartan from 25mg to 50mg  Limit salt - she does use a lot of boullion but not as much fish paste      Spent 40 min face to face with patient with more the 50% spent in counseling, reviewing chart, and coordination of care and discussing problems listed above.           Vitals:    03/20/17 1340 03/20/17  "1348 03/20/17 1417   BP: 150/84 150/86 158/84   Pulse: 86     Resp: 18     Temp: 97.7  F (36.5  C)     TempSrc: Oral     SpO2: 97%     Weight: 127 lb 11.2 oz (57.9 kg)     Height: 4' 11\" (1.499 m)         "

## 2021-06-09 NOTE — PROGRESS NOTES
PAIN CENTER PROGRESS NOTE    Subjective:   Luis Manuel Lemon is a 77 y.o. female who presents for evaluation of multi-site pain secondary to cervical and lumbar degeneration, connective tissue disease following rheumatologist.      Major issues:  1. Chronic pain syndrome    2. Low back pain    3. Disorder of bursae and tendons in shoulder region    4. Chronic pain of both knees      Pain location and description: 8/10 constant unable to describe pain in lower back, bilateral knees, right shoulder greater than left.  Function rated 8.  Last visit pain rated 8/10.  Difficulty sleeping due to pain in right shoulder.  Radiation of pain: left lower back to left hip and leg, not past the knee.  Lower abdomen bilaterally.  Paresthesias, numbness, weakness: upper extremity weakness bilaterally  Gait disturbance: Uses cane.  Denies recent falls or changes in balance.  Exacerbating factors: activity, walking.  Difficult to change positions from sitting to standing.  Alleviating factors: rest, injections  Associated symptoms: sleep interruption, activity limitations, mood  Adverse effects of medications: Constipation, takes Colace BID which helps.  Dizziness and headache, constipation.  Denies nausea or headache.  BM once every 1-2 days.  Current treatment efficacy: Fair, reports Gabapentin helpful for a little while.  Did not start Tramadol since last visit.  Current treatment compliance: Fair; keeping follow-up and injection appointments.  Doing PT appointments.    LBP primarily on left lower back to left hip down her leg to her knee.  She states she has not had new injury or fall since last seen. She denies any lumbar surgery history.  Denies new numbness, tingling, weakness in bilateral legs.  LESI L4-5 on 07/06/16 she reports she felt much better with lower back pain relief 60%.  Duration of pain relief 1 week.  Denies any side effects from steroid and denies post procedure complications.  Lumbar facet injection bilateral L5-S1  did not give relief on 06/22/16 which was tried first as she was reporting more axial back pain.  Had left SI joint injection 02/20/17 and reports she had more than 50% pain relief in this area.  Denies adverse effects from steroid or post procedure complications.      She is interested in having right shoulder injection as this is worst area of pain and limitations in sleep and ROM due to pain.  Reports weakness bilateral arms near her biceps due to pain.  Right handed.   She has hx biceps tendon rupture and referred to orthopedics.  She was seen 12/19 by Dr. Santos and was referred to PT at [facility] which she attended and does her HEP.     States for pain relief currently she is using heat but there is still pain.  She denies topical medications at home.      She was asked to continue with rheumatologist Dr. Pedro and does not have any upcoming appointments scheduled.  She has diagnosis of connective tissue disease and PMR.  Takes prednisone 5 mg daily.    She is taking Gabapentin 100 mg TID and Cymbalta 60 mg daily.  Tylenol 650 mg every 8 hours as needed.  RN sets up medications weekly.  States when she takes Gabapentin she feels better but then when it wears off she feels pain worsen.  She estimates duration of relief to be 2-3 hours.  She denies side effects. She was prescribed tramadol but wasn't filled at pharmacy due to unknown reason.  Nurse fills medication each Wednesday.    She saw Dr. Morales on 01/17/17 and also discussed considering acupuncture or massage for chronic pain.     Review of Systems  Constitutional: Sleep disturbance due to pain.  Denies fever, chills, night sweats, lethargy, weight loss, weight gain.  Musculoskeletal: Positive for back pain, neck pain, bilateral shoulder/arm pain, knee joint pain, joint swelling, recent falls.  Gastrointestional: Constipation.  Denies difficulty swallowing, change in appetite, abdominal pain, nausea, vomiting, diarrhea, GERD, fecal  "incontinence.  Genitourinary: Denies urinary incontinence, dysuria, hematuria, UTI, frequency, hesitancy, change in libido.  Neurologic: Denies headaches, confusion, seizure, weakness, changes in balance, changes in speech.  Psychiatric: Denies depression, anxiety, memory loss, psychoses, suicidal ideation, substance use/abuse.     Objective:     Vitals:    03/08/17 1301   BP: 169/85   Pulse: 81   Resp: 18   Weight: 125 lb (56.7 kg)   Height: 4' 11\" (1.499 m)   PainSc:   8     Physical Exam  Constitutional- General appearance: Abnormal.  Well developed, uncomfortable, within normal limits of ideal weight and appearance reflects stated age.  No acute distress or pain behaviors noted.  Presents with daughter and , all information gathered through .  Psychiatric- Judgment and insight: Fair.  Speech: Normal rhythm.  Thought process: Normal.  No abnormal thoughts reported. Alert & Oriented to person, place, and time.  Recent and remote memory: Normal.  Mood and affect: Normal.  Observed mood: appropriate  Respiratory- Breathing is non-labored; normal rhythm and rate.  Cardiovascular- Extremities warm and well perfused, no peripheral edema or varicosities.  Dermatologic- Exposed skin is clean, dry, and intact to inspection and palpation.  Musculoskeletal- Gait and station: Abnormal, stooped, antalgic on left.  Gait evaluation demonstrates ambulating with single point cane.  Patient does have difficulty rising from a seated position.  Pain to palpation right shoulder is generalized at right trapezius, right bicipital groove, right bicep.    Reviewed [place] [facility] 03/08/17 as expected, no tramadol filled.    Imaging:  None new.    Assessment:   Luis Manuel Lemon is a 77 y.o. female seen in clinic today for low back pain, lumbar spine x-ray demonstrating disc space height loss L4-S1 and interbdy spurring L3-4 and L4-5. Compression deformity T12. She did not have pain relief with lumbar facet injection but reports 60% " relief from LESI and recent left SI joint injection over 50% relief.  She may continue medications for pain with increase in Gabapentin to 300 mg at night to see if assistive as she is waking from pain.  Also, temporary use of Tramadol 50 mg BID prn; she has taken this medication in the past without adverse effects. Unclear how much benefit it provided in the past.  She is educated on constipation and treatment including diet, OTC measures, walking for exercise, increasing fluid intake.   For bilateral arm pain she will follow recommendations by [facility] and continue HEP and also TPI for right shoulder pain.       Plan:   Continue Gabapentin 100 mg in the am and pm and 300 mg at night.    Fill Tramadol 50 mg 1-2 times a day as needed for pain.  Continue Cymbalta 60 mg twice daily.  Continue home exercise program learned at physical therapy  For constipation, take miralax 1 capful of powder in 8 ounces of liquid.  Also patient has stool softener colace to take twice daily  Schedule right shoulder trigger point injection with Dr. Sagastume with a . Patient not taking anticoagulants.  We will review results at your next follow-up appointment  Follow-up in 4-6 weeks after above injection to evaluate the above plan of care.      Trista Hoffman PA-C  [facility]  [address]  [place] [address]  Ph: [phone]  Fax: [phone]

## 2021-06-09 NOTE — PROGRESS NOTES
HPI - 76 yo female here to f/u on HTN.     HTN - Her BP at home with the RN was 195/98 two days ago.   Today her BP is 132/80  She denies missing any meds.   BP meds: [id number], HCTZ 25mg, losartan 50mg, (not atenolol)  Salty: fish sauce (no fish paste), octopus sauce, boullion -all very high in sodium (teaching with labels)  She does have a home BP machine.     gabapentin 300mg makes her throat burn but the 100mg does not cause problems   Vit D deficiency - 14.1 on 4/25/16  Taking ergo 150529 units weekly    Back/neck/SI joint and biceps tendon pain   Managed by pain clinic     Osteoporosis =  Did have DEXA  on alendoldronate  Taking Tums and Vit D    Polypharmacy -   Home health RN sets up weekly  She has apt with clinic pharmacist on 3/29/17 - this needs to be rescheduled 4/6/17  She wants to simplify meds    Current Outpatient Prescriptions   Medication Sig Note     acetaminophen (TYLENOL) 650 MG CR tablet Take 650 mg by mouth every 8 (eight) hours as needed.      amLODIPine (NORVASC) 10 MG tablet Take 1 tablet (10 mg total) by mouth daily.      ANTACID, CALCIUM CARBONATE, 200 mg calcium (500 mg) chewable tablet CHEW 1 TABLET BY MOUTH THREE TIMES A DAY TO KEEP YOUR BONES HEALTHY      docusate sodium (COLACE) 100 MG capsule TAKE 1 CAPSULE BY MOUTH TWICE DAILY.      DULoxetine (CYMBALTA) 60 MG capsule Take 1 capsule (60 mg total) by mouth 2 (two) times a day.      gabapentin (NEURONTIN) 100 MG capsule Take 100 mg by mouth 3 (three) times a day.      hydroCHLOROthiazide (HYDRODIURIL) 25 MG tablet Take 1 tablet (25 mg total) by mouth daily.      losartan (COZAAR) 50 MG tablet Take 1 tablet (50 mg total) by mouth daily.      mirtazapine (REMERON) 7.5 MG tablet TAKE 1 TABLET BY MOUTH AT BEDTIME.      omeprazole (PRILOSEC) 20 MG capsule Take 1 capsule (20 mg total) by mouth daily. 1 hour before a meal      ondansetron (ZOFRAN, AS HYDROCHLORIDE,) 4 MG tablet Take 1-2 tab po 4 times daily prn nausea, max daily dose  "4 tab      predniSONE (DELTASONE) 5 MG tablet TAKE 1 TABLET BY MOUTH 2 TIMES DAILY.      traMADol (ULTRAM) 50 mg tablet Take 1 tablet (50 mg total) by mouth 2 (two) times a day as needed for pain.      VITAMIN D2 50,000 unit capsule Take 1 capsule (50,000 Units total) by mouth once a week.      alendronate (FOSAMAX) 70 MG tablet TAKE 1 TABLET ORALLY EVERY 7 DAYS      atenolol (TENORMIN) 100 MG tablet TAKE ONE TABLET BY MOUTH DAILY      L.acidophilus-Bif. animalis 5 billion cell CpSP Take 1 capsule by mouth daily. 4/8/2016: Culturelle     polyvinyl alcohol (LIQUIFILM TEARS) 1.4 % ophthalmic solution Apply to eye daily as needed      Vitals:    03/30/17 1608   BP: 132/80   Pulse: 87   Resp: 14   Temp: 98  F (36.7  C)   SpO2: 93%   Weight: 127 lb 11.2 oz (57.9 kg)   Height: 4' 10.75\" (1.492 m)     PHYSICAL EXAM   General Appearance: Awake and alert, in no acute distress  HEENT: neck is supple  CV: regular rate  Resp: No respiratory distress. Breathing comfortably  Musculoskeletal: moving limbs comfortably with not deficits or deformities  Skin: no rashes noted      A/P  Polypharmacy   Simplified regimen  Combining losaratan  50mg and HCTZ 25mg -> [id number]  Taper prednisone - 5mg BID -> one a day for a week, then 1/2 tab a day for a week, then stop  Taper off cymbalta - 60mg BID -> one daily for a week, then one every other day for a week  Taken off list since she is not taking and has not bottles - atenolol and remeron    HTN -   Discussed salt at length with pictures and labels (boullion, fish paste, fish sauce all very high in salt)  Check BP machine with RN and clinic  Continue current meds    Vit D deficiency - 14.1 on 4/25/16  Taking ergo 575101 units weekly    Back/neck/SI joint and biceps tendon pain   Managed by pain clinic   Pain meds - gabapentin 100mg and tramadol prn    Osteoporosis =  Did have DEXA  on alendoldronate  Taking Tums and Vit D    Spent 40 min face to face with patient with more " the 50% spent in counseling, reviewing chart, and coordination of care and discussing problems listed above.

## 2021-06-09 NOTE — TELEPHONE ENCOUNTER
Refill Approved    Rx renewed per Medication Renewal Policy. Medication was last renewed on 4/23/20.    Blaire Keating, [facility] Connection Triage/Med Refill 6/18/2020     Requested Prescriptions   Pending Prescriptions Disp Refills     gabapentin (NEURONTIN) 100 MG capsule [Pharmacy Med Name: GABAPENTIN 100 MG CAPSULE] 450 capsule 0     Sig: TAKE 2 CAPSULES BY MOUTH THREE TIMES A DAY.       Gabapentin/Levetiracetam/Tiagabine Refill Protocol  Passed - 6/17/2020 12:30 PM        Passed - PCP or prescribing provider visit in past 12 months or next 3 months     Last office visit with prescriber/PCP: Visit date not found OR same dept: Visit date not found OR same specialty: 12/9/2019 Kateryna Morales MD  Last physical: Visit date not found Last MTM visit: Visit date not found   Next visit within 3 mo: Visit date not found  Next physical within 3 mo: Visit date not found  Prescriber OR PCP: Kera Delgado MD  Last diagnosis associated with med order: 1. Degenerative disc disease, cervical  - gabapentin (NEURONTIN) 100 MG capsule [Pharmacy Med Name: GABAPENTIN 100 MG CAPSULE]; TAKE 2 CAPSULES BY MOUTH THREE TIMES A DAY.  Dispense: 450 capsule; Refill: 0    If protocol passes may refill for 12 months if within 3 months of last provider visit (or a total of 15 months).

## 2021-06-10 ENCOUNTER — COMMUNICATION - HEALTHEAST (OUTPATIENT)
Dept: GERIATRIC MEDICINE | Facility: CLINIC | Age: 81
End: 2021-06-10

## 2021-06-10 ENCOUNTER — COMMUNICATION - HEALTHEAST (OUTPATIENT)
Dept: SCHEDULING | Facility: CLINIC | Age: 81
End: 2021-06-10

## 2021-06-10 ENCOUNTER — OFFICE VISIT - HEALTHEAST (OUTPATIENT)
Dept: FAMILY MEDICINE | Facility: CLINIC | Age: 81
End: 2021-06-10

## 2021-06-10 DIAGNOSIS — G89.29 CHRONIC LOW BACK PAIN, UNSPECIFIED BACK PAIN LATERALITY, UNSPECIFIED WHETHER SCIATICA PRESENT: ICD-10-CM

## 2021-06-10 DIAGNOSIS — M48.00 SPINAL STENOSIS, MULTILEVEL: ICD-10-CM

## 2021-06-10 DIAGNOSIS — Z75.8 LANGUAGE BARRIER AFFECTING HEALTH CARE: ICD-10-CM

## 2021-06-10 DIAGNOSIS — M54.50 CHRONIC LOW BACK PAIN, UNSPECIFIED BACK PAIN LATERALITY, UNSPECIFIED WHETHER SCIATICA PRESENT: ICD-10-CM

## 2021-06-10 DIAGNOSIS — E55.9 VITAMIN D DEFICIENCY: ICD-10-CM

## 2021-06-10 DIAGNOSIS — G89.4 CHRONIC PAIN SYNDROME: ICD-10-CM

## 2021-06-10 DIAGNOSIS — M81.0 AGE-RELATED OSTEOPOROSIS WITHOUT CURRENT PATHOLOGICAL FRACTURE: ICD-10-CM

## 2021-06-10 DIAGNOSIS — I10 ESSENTIAL HYPERTENSION: ICD-10-CM

## 2021-06-10 DIAGNOSIS — R06.02 SHORT OF BREATH ON EXERTION: ICD-10-CM

## 2021-06-10 DIAGNOSIS — K21.9 GASTROESOPHAGEAL REFLUX DISEASE WITHOUT ESOPHAGITIS: ICD-10-CM

## 2021-06-10 DIAGNOSIS — F33.0 MAJOR DEPRESSIVE DISORDER, RECURRENT EPISODE, MILD (H): ICD-10-CM

## 2021-06-10 DIAGNOSIS — J98.4 RESTRICTIVE LUNG DISEASE: ICD-10-CM

## 2021-06-10 DIAGNOSIS — R59.0 LAD (LYMPHADENOPATHY) OF LEFT CERVICAL REGION: ICD-10-CM

## 2021-06-10 DIAGNOSIS — J84.10 PULMONARY FIBROSIS (H): ICD-10-CM

## 2021-06-10 DIAGNOSIS — Z79.899 POLYPHARMACY: ICD-10-CM

## 2021-06-10 DIAGNOSIS — Z60.3 LANGUAGE BARRIER AFFECTING HEALTH CARE: ICD-10-CM

## 2021-06-10 LAB
ATRIAL RATE - MUSE: 67 BPM
DIASTOLIC BLOOD PRESSURE - MUSE: 58 MMHG
INTERPRETATION ECG - MUSE: NORMAL
P AXIS - MUSE: 57 DEGREES
PR INTERVAL - MUSE: 138 MS
QRS DURATION - MUSE: 78 MS
QT - MUSE: 424 MS
QTC - MUSE: 448 MS
R AXIS - MUSE: 56 DEGREES
SYSTOLIC BLOOD PRESSURE - MUSE: 120 MMHG
T AXIS - MUSE: 73 DEGREES
VENTRICULAR RATE- MUSE: 67 BPM

## 2021-06-10 SDOH — SOCIAL STABILITY - SOCIAL INSECURITY: ACCULTURATION DIFFICULTY: Z60.3

## 2021-06-10 ASSESSMENT — MIFFLIN-ST. JEOR: SCORE: 922.02

## 2021-06-10 ASSESSMENT — PATIENT HEALTH QUESTIONNAIRE - PHQ9: SUM OF ALL RESPONSES TO PHQ QUESTIONS 1-9: 7

## 2021-06-10 NOTE — PROGRESS NOTES
This Maintenance Wellness Plan provides private information in regards to the work I have done with my Care Team from my Primary Care Clinic.  This document provides insight on the goals I have worked hard to achieve.  My Care Team congratulates me on my journey to become well.  With the assistance of my Clinic Care Guide and Primary Care Physician,  I have succeeded in improving areas of my health that I identified as barriers to becoming well.  I will continue to seek wellness and use the skills I have obtained to further my journey.  My Care Guide will follow up with me in six months.  In the meantime, if I should have any questions or concerns I will contact my Care Guide.      My Clinic Care Coordination Maintenance Plan    [facility]  [address] [place], [place]  [id number] [phone]      My Preferred Method of Contact:  Phone: [phone]    My Primary/Preferred Language:  Amie    Emergency Contact: Extended Emergency Contact Information  Primary Emergency Contact: May,La  Address: [address] [place] [address] [id number] [place] [address] of [place]  Home Phone: [phone]  Relation: Child     PCP:  Kateryna Morales MD  Specialists:    [facility]  [phone]  Trista Hoffman PA-C    Advanced Directive/Living Will: The patient was given information regarding Adanced Directives/Living Will      My Care Guide's Name and Phone Number:  Jose Win: [phone]   The Care Guide and myself agreed to be in contact every 6 months.    Goal:  I will maintain to stay healthy in terms of managing my chronic pain and depression.   Warning signs  1.  If the pain is increasing and unable to tolerate even taking anti-pain medications.  2.  If the anti-pain medications are not helping.  3.  If I lost to follow up with specialist and PCP.    Responses to warning signs  1.  I will contact the clinic or Care Guide and request an appointment to be seen.  2.  I will keep taking  anti-pain medicines daily as prescribed.  3.  I will go to the nearest ED if I have sever pain or life threatening situation.     All [facility] clinic patients have access to a Nurse 24 hours a day, 7 days a week.  If you have questions or want advice from a Nurse, please know [facility] is here for you.  You can call your clinic and they will connect you or you can call [facility] at [phone].  [facility] also has Walk In Care clinics in multiple locations.  Call the number listed above for more information about our Walk In Care clinics or visit the [facility] website at [email or web address].

## 2021-06-10 NOTE — PROGRESS NOTES
HPI - 78 yo female here with chronic pain and HTN.    Chronic pain   multifactorial -   Polyarthritis, myalgia,   The arm pain is a little better.   The back pain is constant but worse in the morning.   She has been going to the pain clinic and getting injections that help for a few weeks. Last Pain clinic apt 3/14/16 and f/u 5/2/17  Pain meds:  Cymbalta, prednisone, tylenol, gabapentin, tramadol    Prior imaging:  XR LUMBAR SPINE 4 OR MORE VWS12/8/2015 11:54 AMINDICATION: Low back pain. .COMPARISON: Lumbar spine radiographs Cecilia 6 2015.FINDINGS: Mild levoconvex curvature of the lumbar spine. Diffuse osteopenia. Five lumbar type vertebral bodies. Vertebral bodies  are in anatomic sagittal alignment. Mild loss of disc space height at L4-L5 and L5-S1. Mild ventral interbody spurring at L3-L4 and L4-L5. Mild superior endplate compression deformity of the T12 vertebral body is chronic and unchanged in appearance from  previous lumbar spine radiographs 01/06/2015. Atherosclerotic vascular calcifications in the abdomen. Surgical clips in the right upper quadrant of the abdomen. Moderate degenerative changes of the bilateral hipsThis report was electronically   interpreted by    Cervical MRI 4/15/16  CONCLUSION:  1. Limited by motion. Moderate cervical spondylosis, congenital shortening of the pedicles, and narrowing of the spinal canal contributes to mild to moderate spinal canal narrowing at C5-C6 and mild changes C3-C4, C4-C5, and C6-C7.  2. Multilevel foraminal narrowing moderate bilaterally at C5-C6 and at least moderate bilaterally at C4-C5.  3. Moderate multilevel degenerative disc disease and mild multilevel facet arthropathy.    HTN - BP good today  On amlodipine, losartan-HCTZ    Osteoporosis  On Dexa 2014  Repeat ordered 9/2016 but this has not yet been done  On alendronate and calcium and Vit D      Current Outpatient Prescriptions   Medication Sig     acetaminophen (TYLENOL) 650 MG CR tablet Take 650 mg by  "mouth every 8 (eight) hours as needed.     alendronate (FOSAMAX) 70 MG tablet TAKE 1 TABLET ORALLY EVERY 7 DAYS     amLODIPine (NORVASC) 10 MG tablet Take 1 tablet (10 mg total) by mouth daily.     ANTACID, CALCIUM CARBONATE, 200 mg calcium (500 mg) chewable tablet CHEW 1 TABLET BY MOUTH THREE TIMES A DAY TO KEEP YOUR BONES HEALTHY     docusate sodium (COLACE) 100 MG capsule TAKE 1 CAPSULE BY MOUTH TWICE DAILY.     gabapentin (NEURONTIN) 100 MG capsule TAKE 1 CAPSULE BY MOUTH 3 TIMES A DAY.     losartan-hydrochlorothiazide (HYZAAR) 100-25 mg per tablet Take 1 tablet by mouth daily.     omeprazole (PRILOSEC) 20 MG capsule Take 1 capsule (20 mg total) by mouth daily. 1 hour before a meal     polyvinyl alcohol (LIQUIFILM TEARS) 1.4 % ophthalmic solution Apply to eye daily as needed     traMADol (ULTRAM) 50 mg tablet Take 50 mg by mouth daily as needed for pain.     VITAMIN D2 50,000 unit capsule Take 1 capsule (50,000 Units total) by mouth once a week.     DULoxetine (CYMBALTA) 60 MG capsule Take 1 capsule (60 mg total) by mouth 2 (two) times a day.     ondansetron (ZOFRAN, AS HYDROCHLORIDE,) 4 MG tablet Take 1-2 tab po 4 times daily prn nausea, max daily dose 4 tab     predniSONE (DELTASONE) 5 MG tablet TAKE 1 TABLET BY MOUTH 2 TIMES DAILY.     Vitals:    04/27/17 1342   BP: 126/68   Pulse: 96   Resp: 20   Temp: 97.2  F (36.2  C)   TempSrc: Oral   Weight: 128 lb 5 oz (58.2 kg)   Height: 4' 10.75\" (1.492 m)     PHYSICAL EXAM   General Appearance: Awake and alert, in no acute distress  HEENT: neck is supple  CV: regular rate  Resp: No respiratory distress. Breathing comfortably  Musculoskeletal: moving limbs comfortably with not deficits or deformities  Skin: no rashes noted    A/P  Chronic pain multifactorial -   Change tylenol to BID (not prn. She is not taking since not in pill box)  Teaching to take tramadol out of bottle for pain as needed  Continue other meds  F/u with pain clinic as schduled    HTN - BP good " today  On amlodipine, losartan-HCTZ    Osteoporosis  On Dexa 2014  Repeat ordered 9/2016 - will ask specialty  to help schedule  On alendronate and calcium and Vit D    Spent 25 min face to face with patient with more the 50% spent in counseling, reviewing chart, and coordination of care and discussing problems listed above.

## 2021-06-10 NOTE — PROGRESS NOTES
08/05/20  4:12 PM    Orders faxed along with face sheet, problem list, and med list to [facility].

## 2021-06-10 NOTE — PROGRESS NOTES
[facility] Care Coordination Contact      [facility] Six-Month Telephone Assessment    6 month telephone assessment completed on 7/30/2020 .    ER visits: No  Hospitalizations: No  TCU stays: No  Significant health status changes: No significant health changes noted.   Falls/Injuries: No  ADL/IADL changes: No  Changes in services: Yes- Member's daughter Efrain Prieto reports that Luis Manuel plans to move to [place], [place] on Saturday to live with her son, Santos Stallings (his family recently purchased a home there).   Discussed transferring services to the appropriate county ([facility]) and decided to hold off a month to ensure that Luis Manuel is comfortable in her new environment.   Luis Manuel does currently receive PCA services- 30 minutes a day, as well as Biweekly SN visits provided by [facility].   [facility] does not service this area, so a new agency will need to be identified to ensure medication compliance. Previous SN set up was Wednesday 7/29 (next set up needed by Wednesday 8/12).     Identified an agency:  [facility]   DAN[place] Kang   Phone: [phone]  Fax: [phone]  Will request home care orders be faxed.     New Address/Contact:  Santos Eaton  Phone: [phone] (Amie Speaking)  Address: [address] [place] [address] [place] [address] [address]    Caregiver Assessment follow up:  N/A    Goals: See POC in chart for goal progress documentation.      Will plan to follow up with Member's son in one month to ensure that everything is going well and reassess.     REBECCA Srinivasan  [facility]  [phone]

## 2021-06-10 NOTE — PROGRESS NOTES
PAIN CENTER PROGRESS NOTE    Subjective:   Luis Manuel Lemon is a 77 y.o. female who presents for evaluation of multi-site pain secondary to cervical and lumbar degeneration, connective tissue disease following rheumatologist.      Major issues:  1. Chronic pain syndrome    2. Low back pain    3. PMR (polymyalgia rheumatica)    4. Opioid dependence, episodic      Pain location and description: 7/10 constant aching, muscle pain everywhere primarily lower back, bilateral knees, right shoulder greater than left.  Function rated 7.  Last visit pain rated 8/10.    Radiation of pain: left lower back to left hip and leg, not past the knee.  Lower abdomen bilaterally.  Paresthesias, numbness, weakness: upper extremity weakness bilaterally  Gait disturbance: Uses cane.  Denies recent falls or changes in balance.  Exacerbating factors: activity, walking.  Difficult to change positions from sitting to standing.  Alleviating factors: rest, injections  Associated symptoms: sleep interruption, activity limitations, mood  Adverse effects of medications: Constipation, takes Colace BID which helps.  Dizziness and headache.  Denies nausea or headache.   Current treatment efficacy: Fair, reports Gabapentin helpful for a little while.  Tramadol helpful, using sparingly.  Current treatment compliance: Fair; keeping follow-up and injection appointments.  Has difficult with transportation service at times.  Discontinued PT appointments.    LBP primarily on left lower back to left hip down her leg to her knee.  She states she has not had new injury or fall since last seen. She denies any lumbar surgery history.  Denies new numbness, tingling, weakness in bilateral legs.  LESI L4-5 on 07/06/16 she reports she felt much better with lower back pain relief 60%.  Duration of pain relief 1 week.  Denies any side effects from steroid and denies post procedure complications.  Lumbar facet injection bilateral L5-S1 did not give relief on 06/22/16 as she was  "reporting more axial back pain.  Had left SI joint injection 02/20/17 and reports she had more than 50% pain relief in this area.  Denies adverse effects from steroid or post procedure complications.      She is interested in having repeat right shoulder injection as this is worst area of pain and limitations in sleep and ROM due to pain.  Reports weakness bilateral arms near her biceps due to pain.  Right handed.   She has hx biceps tendon rupture and referred to orthopedics.  She was seen 12/19 by Dr. Santos and was referred to PT at [facility] which she attended and does her HEP.  She had right TPI to shoulder on 03/14/17 and reports this gave over 70% pain relief but now the pain has returned.  Reports her pain rating down to 2/10 after injection, daughter present today states she noticed right away pain and sleep improved.  Activity still fairly poor at home.  She reports duration of relief lasting about 6 weeks. She denies post procedure complications or reactions to steroid.      States for pain relief currently she is using heat but there is still pain.  She denies topical medications at home.  We have discussed acupuncture for pain control but patient reports her preference is to continue injections.    She is taking Gabapentin 100 mg TID and Cymbalta 60 mg daily. Gabapentin 300 mg caused \"throat burning.\" Tylenol 650 mg every 8 hours as needed.  RN sets up medications weekly. She had MTM visit with Odell Meek PharmD on 04/06/17 and recommended tapering prednisone no longer taking.    Saw Dr. Morales on 04/27/17 and reported arm pain a little better, back pain constant, worse in the morning. Recommended Tylenol BID instead of prn.  Teaching to take tramadol prn out of the bottle and she reports she is taking on average 1-2 tabs but not daily.  Last week didn't take at all.  Brings bottle filled on 03/08/17 for #60 and #52 remaining.  Denies side effects but feels tired all the time and sometimes dizzy not " "related to this medication.    Review of Systems  Constitutional: Sleep disturbance due to pain.  Feels tired \"all the time.\"  Denies fever, chills, night sweats, weight loss, weight gain.  Musculoskeletal: Positive for back pain, neck pain, bilateral shoulder/arm pain, knee joint pain, joint swelling, recent falls.  Gastrointestional: Constipation.  Denies difficulty swallowing, change in appetite, abdominal pain, nausea, vomiting, diarrhea, GERD, fecal incontinence.  Genitourinary: Denies urinary incontinence, dysuria, hematuria, UTI, frequency, hesitancy, change in libido.  Neurologic: Denies headaches, confusion, seizure, weakness, changes in balance, changes in speech.  Psychiatric: Depression.  Denies anxiety, memory loss, psychoses, suicidal ideation, substance use/abuse.     Objective:     Vitals:    05/02/17 1332   BP: 99/62   Pulse: 76   Resp: 16   Weight: 128 lb (58.1 kg)   Height: 4' 10.5\" (1.486 m)   PainSc:   7     Physical Exam  Constitutional- General appearance: Normal.  Well developed, comfortable, within normal limits of ideal weight and appearance reflects stated age.  No acute distress or pain behaviors noted.  Presents with daughter and , all information gathered through .  Psychiatric- Judgment and insight: Fair.  Speech: Normal rhythm.  Thought process: Normal.  No abnormal thoughts reported. Alert & Oriented to person, place, and time.  Recent and remote memory: Normal.  Mood and affect: Normal.  Observed mood: appropriate  Respiratory- Breathing is non-labored; normal rhythm and rate.  Cardiovascular- Extremities warm and well perfused, no peripheral edema or varicosities.  Dermatologic- Exposed skin is clean, dry, and intact to inspection and palpation.  Musculoskeletal- Gait and station: Abnormal, stooped, antalgic on left.  Gait evaluation demonstrates ambulating with single point cane.  Patient does have difficulty rising from a seated position.  Pain to palpation " right shoulder is generalized at right trapezius, right bicipital groove, right bicep.    *Opioid Universal Precautions:    UDS/Swab - will collect random in future (no tramadol dose reported within detection window)  Opioid Consent - n/a   Opioid Agreement - due  Pharmacy- as documented    [place]  Reviewed - 05/02/17 last refill tramadol 50 mg #60 on 03/08/7 and gabapentin 100 mg #90 on 04/23/17 by Dr. Morales.  Pill Count - n/a  Psychological evaluation - n/a  MME - up to 10  Pharmacogenetic testing - n/a    Imaging:  None new.    Assessment:   Luis Manuel Lemon is a 77 y.o. female seen in clinic today for low back pain, lumbar spine x-ray demonstrating disc space height loss L4-S1 and interbdy spurring L3-4 and L4-5. Compression deformity T12. She did not have pain relief with lumbar facet injection but reports 60% relief from LESI and recent left SI joint injection over 50% relief.  She also has multi-site joint pain, several etiologies including OA, PMR/connective tissue disease per rheumatology. She may continue medications for pain including Gabapentin, Cymbalta, Tramadol 50 mg BID prn; she has taken this medication in the past without adverse effects.  She may repeat trigger point injections and discuss today would still like to space these out by 2-3 months as possible. At times, Dr. Sagastume will repeat TPI in 2 weeks but we discuss today that we cannot indefinitely repeat these every 2-4 weeks as patient would like.  She is having DEXA scan on 05/04/17 and also discontinued oral prednisone dose.     Plan:   Continue Gabapentin 100 mg 3 times a day.   Continue Tramadol 50 mg 1-2 times a day as needed for pain.  #52 tablets left, no refill needed.    Continue Cymbalta 60 mg twice daily.  Continue home exercise program learned at physical therapy  Patient off prednisone orally per recommendations of PMD/MTM visit.  For constipation, take miralax 1 capful of powder in 8 ounces of liquid.  Also patient has stool  softener colace to take twice daily  Schedule right shoulder trigger point injection with Dr. Sagastume with a . Patient not taking anticoagulants.    Patient will wait on acupuncture but discussed today as another treatment option.  Follow-up with Trista in 2 months to evaluate the above plan of care.      Trista Hoffman PA-C  [facility]  [address] [place] [address]. [address]  [place] [address]  Ph: [phone]  Fax: [phone]

## 2021-06-10 NOTE — PROGRESS NOTES
Injection - Other  Date/Time: 5/8/2017 1:20 PM  Performed by: HEATH RUIZ  Authorized by: HEATH RUIZ   Comments:     TRIGGER POINT INJECTION  Performed on: 5/8/2017    Ms. Lemon is a 77-year-old woman here today with the .  She mainly has shoulder pain and low back pain.   this year she had sacroiliac joint injections done as well as some trigger point injections in the shoulder and low back.  These have helped.  Today she is bothered by the shoulders and wishes to repeat the trigger point injections in both shoulders.  If the low back pain persists she could return for trigger point injections in the low back also.    Pre Procedure Diagnosis:  Myofascial pain  Vital Signs:  As per intra-procedure documentation    The procedure was discussed with Luis Manuel Lemon in detail along with the attendant risks, including but not limited to: nerve injury, infection, bleeding, allergic reaction, or worsening of pain.  Informed consent was obtained and patient elected to proceed.    After informed consent was obtained the patient was seated in the examination chair.  The shoulder areas were prepped sterilely with alcohol.  A 1-1/2 inch #25-gauge needle was used.  There were injections made into both shoulders from a posterolateral approach into the subacromial bursa area.  A total of 10 mL of 0.25% Marcaine with 10 mg of Decadron was used.    The patient tolerated the procedure well.  The patient was taken to the recovery area after the injection.  There were no complications.      Pre Procedure Pain Scale: 8  Pain Score:   8 (Post-TPI to both shoulders)    If Luis Manuel Lemon has any questions or concerns after discharge, she was instructed to call us.

## 2021-06-11 NOTE — TELEPHONE ENCOUNTER
Chart reviewed no release to speak to son but staff did obtain permission to speak with daughter. CMT called Brisa Prieto (tawanna). Advised per note below.

## 2021-06-11 NOTE — PROGRESS NOTES
HPI - 78 yo female here for hospital f/u.     She was admitted 5/12/17-5/15/17 for Acute respiratory failure with hypoxia and headache, and N&V and diarrhea.   D/c summary reviewed.   Tx'd with prednisone   Anemia - hgb 9.9    Today she is feeling better. Neck and shoulder and headache are better. No N&V and diarrhea.   Breathing is good.     She wants to stop the prednisone, which she has been on 5mg BID for years.   Prior to hospital, she had cut down the prednisone to 1/2 tab BID about 2-3 weeks ago.     Chronic pain multifactorial -   Change tylenol to BID (not prn. She is not taking since not in pill box)  Teaching to take tramadol out of bottle for pain as needed  Continue other meds  F/u with pain clinic as schduled     HTN - BP slightly elevated  On amlodipine, losartan-HCTZ     Osteoporosis  On Dexa 2014  Repeat ordered 9/2016 - will ask specialty  to help schedule  On alendronate and calcium and Vit D    H/o Vit D deficiency -   Last level 16.4 on 8/2016  Taking weekly ergo 29816    Polypharmacy - Home RN sets up meds weekly    Current Outpatient Prescriptions   Medication Sig Note     acetaminophen (TYLENOL) 650 MG CR tablet Take 1 tablet (650 mg total) by mouth 2 (two) times a day.      alendronate (FOSAMAX) 70 MG tablet Take 70 mg by mouth every 7 days. Take in the morning on an empty stomach with a full glass of water 30 minutes before food 5/12/2017: Wednesdays      amLODIPine (NORVASC) 10 MG tablet Take 1 tablet (10 mg total) by mouth daily.      calcium, as carbonate, (TUMS) 200 mg calcium (500 mg) chewable tablet Chew 1 tablet 3 (three) times a day.      docusate sodium (COLACE) 100 MG capsule Take 100 mg by mouth 2 (two) times a day.      gabapentin (NEURONTIN) 100 MG capsule Take 100 mg by mouth 3 (three) times a day.      losartan-hydrochlorothiazide (HYZAAR) 100-25 mg per tablet Take 1 tablet by mouth daily.      omeprazole (PRILOSEC) 20 MG capsule Take 1 capsule (20 mg total) by mouth  "daily. 1 hour before a meal      polyvinyl alcohol (LIQUIFILM TEARS) 1.4 % ophthalmic solution Apply to eye daily as needed      traMADol (ULTRAM) 50 mg tablet Take 50 mg by mouth 2 (two) times a day as needed for pain.       VITAMIN D2 50,000 unit capsule Take 1 capsule (50,000 Units total) by mouth once a week. 5/12/2017: Monday per bottle     ondansetron (ZOFRAN, AS HYDROCHLORIDE,) 4 MG tablet Take 1-2 tab po 4 times daily prn nausea, max daily dose 4 tab      predniSONE (DELTASONE) 5 MG tablet Take 1 tablet (5 mg total) by mouth daily with breakfast. Fill only if muscle aches worsening and talk to your doctor.      Vitals:    06/01/17 1145 06/01/17 1152   BP: 150/70 150/70   Pulse: 76    Resp: 16    Temp: 97.6  F (36.4  C)    TempSrc: Oral    SpO2: 94%    Weight: 129 lb 8 oz (58.7 kg)    Height: 4' 10\" (1.473 m)      OBJECTIVE:  Vitals listed above within normal limits  General appearance: well groomed, pleasant, well hydrated, nontoxic appearing  ENT: PERRL, throat clear  Neck: neck supple, no lymphadenopathy, no thyromegaly  Lungs: lungs clear to auscultation bilaterally, no wheezes or rhonchi  Heart: regular rate and rhythm, no murmurs, rubs or gallops  Abdomen: soft, nontender  Neuro: no focal deficits, CN II-XII grossly intact, alert and oriented  Psych:  mood stable, appears to have good insight and judgment    A/P  Hospital followup - sx improving  D/c summary reviewed.   Tx'd with prednisone   Anemia - hgb 9.9 - recheck today      Prednisone taper  She wants to stop the prednisone, which she has been on 5mg BID for years.   Prior to hospital, she had cut down the prednisone to 1/2 tab BID about 2-3 weeks ago. This was increased in the hospital to one tab 5mg daily.   Will finish this week with current 5mg daily  Next week new dose of 2.5 mg daily x 7   Then 1/2 tab of 1.25mg daily x 8 days  Then stop      Chronic pain multifactorial -   Change tylenol to BID  Tramadol prn - print rx    Continue other " meds  F/u with pain clinic as schduled     HTN - BP slightly elevated  On amlodipine, losartan-HCTZ     Osteoporosis  On Dexa 2014  Repeat ordered 9/2016 - will ask specialty  to help schedule  On alendronate and calcium and Vit D    H/o Vit D deficiency -   Last level 16.4 on 8/2016  Taking weekly ergo 21145  Recheck today    Polypharmacy - Home RN sets up meds weekly    Spent 40 min face to face with patient with more the 50% spent in counseling, reviewing chart, and coordination of care and discussing problems listed above.

## 2021-06-11 NOTE — TELEPHONE ENCOUNTER
RN cannot approve Refill Request    RN can NOT refill this medication No established PCP. Last office visit: 12/9/2019 Kateryna Morales MD Last Physical: Visit date not found Last MTM visit: Visit date not found Last visit same specialty: 12/9/2019 Kateryna Morales MD.  Next visit within 3 mo: Visit date not found  Next physical within 3 mo: Visit date not found      Madelyn Miles, [facility] Triage/Med Refill 9/23/2020    Requested Prescriptions   Pending Prescriptions Disp Refills     celecoxib (CELEBREX) 100 MG capsule [Pharmacy Med Name: CELECOXIB 100 MG CAPSULE] 30 capsule 0     Sig: TAKE ONE CAPSULE BY MOUTH DAILY.       There is no refill protocol information for this order

## 2021-06-11 NOTE — PROGRESS NOTES
PAIN CENTER PROGRESS NOTE    Subjective:   Luis Manuel Lemon is a 77 y.o. female who presents for evaluation of multi-site pain secondary to cervical and lumbar degeneration, connective tissue disease following rheumatologist.      Major issues:  1. Chronic pain syndrome    2. Polymyalgia rheumatica    3. Opioid dependence, episodic    4. Degeneration of lumbar intervertebral disc      Pain location and description: 6/10 constant aching, muscle pain everywhere primarily lower back, bilateral knees, right shoulder greater than left.  Function rated 7.  Last visit pain rated 7/10.    Radiation of pain: left lower back to left hip and leg, not past the knee.  Lower abdomen bilaterally.  Paresthesias, numbness, weakness: upper extremity weakness bilaterally  Gait disturbance: Uses cane.  Denies recent falls or changes in balance.  Exacerbating factors: activity, walking.  Difficult to change positions from sitting to standing.  Alleviating factors: rest, injections, heat  Associated symptoms: sleep interruption, activity limitations, mood  Adverse effects of medications: Constipation, takes Colace BID which helps.  Dizziness and headache.  Denies nausea or headache.   Current treatment efficacy: Fair, reports Gabapentin 100 mg TID helpful for a little while.  Tramadol helpful 50 mg BID, using sparingly.  Current treatment compliance: Fair; keeping follow-up and injection appointments.  Has difficult with transportation service at times.  Discontinued PT appointments.    Had TPI 05/08/17 and reports she recalls helpful for shoulder pain without complications.    ED-hospital admission 05/12/17-05/15/2017 for acute respiratory failure with hypoxia.  Denies other health concerns since last visit.  Saw PMD in follow-up on 06/01/17 for which prednisone taper was reviewed and also tramadol and gabapentin renewed.    She states she is doing a bit better and pain control stable, even without prednisone.  Lower back most painful to  "bilateral knees.      LBP primarily on left lower back to left hip down her leg to her knee.  She states she has not had new injury or fall since last seen. She denies any lumbar surgery hisory.  Denies new numbness, tingling, weakness in bilateral legs.  Had left SI joint injection 02/20/17 and reports she had more than 50% pain relief in this area.  Denies adverse effects from steroid or post procedure complications.      Review of Systems  Constitutional: Sleep disturbance due to pain.  Feels tired \"all the time.\"  Denies fever, chills, night sweats, weight loss, weight gain.  Musculoskeletal: Positive for back pain, neck pain, bilateral shoulder/arm pain, knee joint pain, joint swelling, recent falls.  Gastrointestional: Constipation.  Denies difficulty swallowing, change in appetite, abdominal pain, nausea, vomiting, diarrhea, GERD, fecal incontinence.  Genitourinary: Denies urinary incontinence, dysuria, hematuria, UTI, frequency, hesitancy, change in libido.  Neurologic: Denies headaches, confusion, seizure, weakness, changes in balance, changes in speech.  Psychiatric: Depression.  Denies anxiety, memory loss, psychoses, suicidal ideation, substance use/abuse.     Objective:     Vitals:    06/28/17 1307   BP: 150/84   Pulse: 70   Resp: 16   Weight: 129 lb (58.5 kg)   Height: 4' 10\" (1.473 m)   PainSc:   6     Physical Exam  Constitutional- General appearance: Normal.  Well developed, comfortable, within normal limits of ideal weight and appearance reflects stated age.  No acute distress or pain behaviors noted.  Presents with daughter and , all information gathered through .  Psychiatric- Judgment and insight: Fair.  Speech: Normal rhythm.  Thought process: Normal.  No abnormal thoughts reported. Alert & Oriented to person, place, and time.  Recent and remote memory: Normal.  Mood and affect: Normal.  Observed mood: appropriate  Respiratory- Breathing is non-labored; normal rhythm and " rate.  Cardiovascular- Extremities warm and well perfused, no peripheral edema or varicosities.  Dermatologic- Exposed skin is clean, dry, and intact to inspection and palpation.  Musculoskeletal- Gait and station: Abnormal, stooped, antalgic on left.  Gait evaluation demonstrates ambulating with single point cane.  Patient does have difficulty rising from a seated position.      Imaging:  None new.    Assessment:   Luis Manuel Lemon is a 77 y.o. female seen in clinic today for low back pain, lumbar spine x-ray demonstrating disc space height loss L4-S1 and interbdy spurring L3-4 and L4-5. Compression deformity T12. She did not have pain relief with lumbar facet injection but reports 60% relief from LESI and recent left SI joint injection over 50% relief.  She also has multi-site joint pain, several etiologies including OA, PMR/connective tissue disease per rheumatology. She may continue medications for pain including Gabapentin, Cymbalta, Tramadol 50 mg BID prn; she has taken this medication in the past without adverse effects.  Dr. Morales has currently refilled so we will continue to have PMD renew to keep continuity of care; discussed with patient she may continue with Dr. Sagastume for intermittent injections prn and only return to see me if symptoms worsen or change.       Plan:   Continue Gabapentin 100 mg 3 times a day.   Continue Tramadol 50 mg 1-2 times a day as needed for pain.      Continue Cymbalta 60 mg twice daily.  Recommend Dr. Morales continue to refill; if this plan is not helping or changes in pain, then you may return to re-evaluate.  Continue home exercise program learned at physical therapy  Patient off prednisone orally per recommendations of PMD/MTM visit.  Continue with Dr. Sagastume as needed for lower back and shoulder injections.      Trista Hoffman PA-C  [facility]  [address]  [place] [address]  Ph: [phone]  Fax: [phone]

## 2021-06-11 NOTE — TELEPHONE ENCOUNTER
When she was here in July with her last visit we discussed that when she moves to [place], [place] with her son, she may needs a new doctor there to help with ordering home health services. She can continue with phone visits and/or inperson visits at [facility] as she wished but we are unable to order home health in a different location.   She will discuss with her son.     Can you contact her and her son and let them know that if they want home health this will need to be ordered by  and [facility] for service in that area.    Thanks  s

## 2021-06-11 NOTE — TELEPHONE ENCOUNTER
Need to schedule an appt with Dr Morales for face to face for Home Care orders med check MILLER spinal stenosis.    [phone]  No answer

## 2021-06-11 NOTE — PROGRESS NOTES
HPI - 78 yo female here for f/u on HTN, pain, and DEXA.       HTN -   BP elevated   On amlodipine, losartan-HCTZ,     Chronic pain multifactorial - Today she feels the pain is well controlled and manageable.   Changed tylenol to BID (not prn. She is not taking since not in pill box)  Teaching to take tramadol out of bottle for pain as needed  She has tapered off prednisone and does not have more - pain is the same  Per Pain Clinic consult note 6/28/17:  Major issues:  1. Chronic pain syndrome    2. Polymyalgia rheumatica    3. Opioid dependence, episodic    4. Degeneration of lumbar intervertebral disc    lumbar spine x-ray demonstrating disc space height loss L4-S1 and interbdy spurring L3-4 and L4-5. Compression deformity T12. She did not have pain relief with lumbar facet injection but reports 60% relief from LESI and recent left SI joint injection over 50% relief.   She is not on cymbalta.     Osteoporosis -   On alendronate and TUMS and Vit D  DEXA - 5/4/17  OSTEOPOROSIS currently on pharmacologic treatment   Recommendations:  Appropriate ongoing  evaluation and treatment is recommended. Because of mixed response, one could continue a change in therapy to Prolia if bisphosphonate treatment has approached the five year antoine,  with follow up bone density scan in 2 years.  Per chart review - initial DEXA was 4/2014    GERD - on omeprazole    Polypharmacy - home RN set up meds    Fungal infection on feet    Current Outpatient Prescriptions   Medication Sig Note     acetaminophen (TYLENOL) 650 MG CR tablet Take 1 tablet (650 mg total) by mouth 2 (two) times a day.      alendronate (FOSAMAX) 70 MG tablet Take 70 mg by mouth every 7 days. Take in the morning on an empty stomach with a full glass of water 30 minutes before food 5/12/2017: Wednesdays      amLODIPine (NORVASC) 10 MG tablet Take 1 tablet (10 mg total) by mouth daily.      calcium, as carbonate, (TUMS) 200 mg calcium (500 mg) chewable tablet Chew 1 tablet  "3 (three) times a day.      docusate sodium (COLACE) 100 MG capsule Take 100 mg by mouth 2 (two) times a day.      gabapentin (NEURONTIN) 100 MG capsule Take 100 mg by mouth 3 (three) times a day.      losartan-hydrochlorothiazide (HYZAAR) 100-25 mg per tablet Take 1 tablet by mouth daily.      omeprazole (PRILOSEC) 20 MG capsule Take 1 capsule (20 mg total) by mouth daily. 1 hour before a meal      polyvinyl alcohol (LIQUIFILM TEARS) 1.4 % ophthalmic solution Apply to eye daily as needed      predniSONE (DELTASONE) 2.5 MG tablet 1 tab 2.5mg daily x 7 days, then 1/2 tab of 1.25 mg daily x 8 days then stop.      traMADol (ULTRAM) 50 mg tablet Take 1 tablet (50 mg total) by mouth 2 (two) times a day as needed for pain.      VITAMIN D2 50,000 unit capsule Take 1 capsule (50,000 Units total) by mouth once a week. 5/12/2017: Monday per bottle     ondansetron (ZOFRAN, AS HYDROCHLORIDE,) 4 MG tablet Take 1-2 tab po 4 times daily prn nausea, max daily dose 4 tab      Vitals:    07/13/17 1319 07/13/17 1328 07/13/17 1402   BP: 166/76 178/84 160/84   Patient Site: Left Arm Left Arm    Patient Position: Sitting Sitting    Cuff Size: Adult Regular Adult Regular    Pulse: 75     Resp: 16     Temp: 97.9  F (36.6  C)     TempSrc: Oral     SpO2: 94%     Weight: 128 lb 11.2 oz (58.4 kg)     Height: 4' 10.75\" (1.492 m)           PHYSICAL EXAM   General Appearance: Awake and alert, in no acute distress  HEENT: neck is supple  CV: regular rate  Resp: No respiratory distress. Breathing comfortably  Musculoskeletal: moving limbs comfortably with not deficits or deformities  Skin: no rashes noted    A/P  HTN -   BP elevated   On amlodipine, losartan-HCTZ,     Chronic pain multifactorial - Today she feels the pain is well controlled and manageable after injections.  Changed tylenol to BID (not prn. She is not taking since not in pill box)  Teaching to take tramadol out of bottle for pain as needed    Osteoporosis -   continue TUMS and Vit " D  Stop alendronate and start boniva  DEXA - 5/4/17  OSTEOPOROSIS currently on pharmacologic treatment   Recommendations:  Appropriate ongoing  evaluation and treatment is recommended. Because of mixed response, one could continue a change in therapy to Prolia if bisphosphonate treatment has approached the five year antoine,  with follow up bone density scan in 2 years.  Per chart review - initial DEXA was 4/2014      GERD - on omeprazole    Fungal infection on feet  rx clotrimazole cream  Encouraged epsom salt soaks

## 2021-06-12 NOTE — PROGRESS NOTES
Injection - Other  Date/Time: 8/14/2017 8:20 AM  Performed by: HEATH RUIZ  Authorized by: HEATH RUIZ   Comments:     TRIGGER POINT INJECTION SHOULDERS BILATERALLY  Performed on: 8/14/2017    Ms. Lemon is a 77 year old woman with pain in the posterior shoulder areas, right greater than left.  Trigger point injections do seem to give her about 2-3 months of benefit.    Pre Procedure Diagnosis:  Myofascial pain  Vital Signs:  As per intra-procedure documentation    The procedure was discussed with Luis Manuel Lemon in detail along with the attendant risks, including but not limited to: nerve injury, infection, bleeding, allergic reaction, or worsening of pain.  Informed consent was obtained and patient elected to proceed.    After informed consent was obtained the patient was seated in the examination chair.  The posterior shoulder areas were prepped sterilely.  A 1-1/4 inch #27-gauge needle was used.  There were injections made in the levator scapula muscles near the attachment to the scapula on both sides and also in the rhomboid muscles bilaterally.  A single injection was made in the right subacromial bursa area.  A total of 10 mL of 0.25% Marcaine with 20 mg of Decadron was used in divided doses.    The patient tolerated the procedure well.  The patient was taken to the recovery area after the injection.  There were no complications.      Pre Procedure Pain Scale: 9  Pain Score:   9 (post procedure vitals and pain. bilateral shoulder injection)    If Luis Manuel Lemon has any questions or concerns after discharge, she was instructed to call us.

## 2021-06-12 NOTE — TELEPHONE ENCOUNTER
New order placed for homecare specifying anticoagulation education for patient and family in addition to med management. Okay to initiate at 48 hours

## 2021-06-12 NOTE — TELEPHONE ENCOUNTER
Request for Orders  HE Home received referral for patient to be seen within 24hrs of order date, however HE cannot see patient within 24hrs. Can we get a 48hr home care order.    Who s Requesting: Renee Becerra    Orders being requested: SN for med management  to begin within 48hrs.      Where to send Orders:   Simply respond to this Epic Telephone Call message string, and we can take as a verbal order if appropriate.         Thank you.

## 2021-06-12 NOTE — PATIENT INSTRUCTIONS - HE
Recommendations from today's visit                                                       1. We are going to order home health to come out and help with med management    2. Medications NOT currently in med box:  Warfarin, amlodipine, losartan, mirtazipine.        Next appointment: one week     To reschedule your appointment, please call the clinic directly at [phone].   It was a pleasure seeing you today! I look forward to seeing you again.

## 2021-06-12 NOTE — TELEPHONE ENCOUNTER
RN cannot approve Refill Request    RN can NOT refill this medication med is not covered by policy/route to provider. Last office visit: 12/9/2019 Kateryna Morales MD Last Physical: Visit date not found Last MTM visit: Visit date not found Last visit same specialty: 10/15/2020 Ludivina Early CNP.  Next visit within 3 mo: 11/2/2020 Kateryna Morales MD  Next physical within 3 mo: Visit date not found      Blaire Keating, [facility] Triage/Med Refill 11/5/2020    Requested Prescriptions   Pending Prescriptions Disp Refills     MAPAP ARTHRITIS PAIN 650 mg CR tablet [Pharmacy Med Name: MAPAP ARTHRITIS  MG C 650 Tablet] 120 tablet 6     Sig: TAKE 2 TABLETS BY MOUTH 2 TIMES A DAY       There is no refill protocol information for this order      Signed Prescriptions Disp Refills    metoprolol succinate (TOPROL-XL) 50 MG 24 hr tablet 90 tablet 3     Sig: TAKE 1 TABLET BY MOUTH DAILY       Beta-Blockers Refill Protocol Passed - 11/2/2020 10:44 AM        Passed - PCP or prescribing provider visit in past 12 months or next 3 months     Last office visit with prescriber/PCP: 12/9/2019 Kateryna Morales MD OR same dept: 10/15/2020 Ludivina Early CNP OR same specialty: 10/15/2020 Ludivina Early CNP  Last physical: Visit date not found Last MTM visit: Visit date not found   Next visit within 3 mo: 11/2/2020 Kateryna Morales MD  Next physical within 3 mo: Visit date not found  Prescriber OR PCP: Kateryna Morales MD  Last diagnosis associated with med order: 1. Essential hypertension  - metoprolol succinate (TOPROL-XL) 50 MG 24 hr tablet; TAKE 1 TABLET BY MOUTH DAILY  Dispense: 90 tablet; Refill: 3    2. Spinal stenosis, multilevel  - MAPAP ARTHRITIS PAIN 650 mg CR tablet [Pharmacy Med Name: MAPAP ARTHRITIS  MG C 650 Tablet]; TAKE 2 TABLETS BY MOUTH 2 TIMES A DAY  Dispense: 120 tablet; Refill: 6    If protocol passes may refill for 12 months if within 3 months of last provider visit (or a total of 15  months).             Passed - Blood pressure filed in past 12 months     BP Readings from Last 1 Encounters:   11/02/20 130/68

## 2021-06-12 NOTE — TELEPHONE ENCOUNTER
Orders being requested: Home Care Orders  Reason service is needed/diagnosis: Patient was being followed by [facility] for a Pulmonary Embolism, and is currently on blood thinners. Patient is due for an INR on Monday 10/12/2020.  When are orders needed by: As Able  Where to send Orders: Did not have a home care agency at the time of the call.  Okay to leave detailed message?  Yes

## 2021-06-12 NOTE — TELEPHONE ENCOUNTER
INR result is        3.6  INR   Date Value Ref Range Status   10/26/2020 1.50 (!) 0.9 - 1.1 Final       Will the patient be seen, or did they already see, MD or CNP today? No    Most Recent Warfarin dose day/week  Sunday Monday Tuesday Wednesday Thursday Friday Saturday        none 2.5     Sunday Monday Tuesday Wednesday Thursday Friday Saturday   2.5 5 2.5 2.5          Has the patient missed any doses of Coumadin, Warfarin, Jantoven in the past 7 days? No    Has the patients medications changed since the last visit? No    Has the patient experienced any bleeding recently? No    Has the patient experienced any injuries or illness recently? No    Has the patient experienced any 'new' shortness of breath, severe headaches, or changes in vision recently? No    Has the patient had any changes in their diet, or alcohol consumption? No    Is the patient here today to prepare for any type of upcoming surgery, procedure, or for a cardioversion procedure? No    What phone number can we reach the patient at today? home phone listed in demographics.

## 2021-06-12 NOTE — TELEPHONE ENCOUNTER
INR result is        1.8  INR   Date Value Ref Range Status   11/06/2020 2.30 (!) 0.9 - 1.1 Final       Will the patient be seen, or did they already see, MD or CNP today? No    Most Recent Warfarin dose day/week  Sunday Monday Tuesday Wednesday Thursday Friday Saturday     2.5 2.5  1.25 2.5     Sunday Monday Tuesday Wednesday Thursday Friday Saturday   2.5             Has the patient missed any doses of Coumadin, Warfarin, Jantoven in the past 7 days? No    Has the patients medications changed since the last visit? No    Has the patient experienced any bleeding recently? No    Has the patient experienced any injuries or illness recently? No    Has the patient experienced any 'new' shortness of breath, severe headaches, or changes in vision recently? No    Has the patient had any changes in their diet, or alcohol consumption? No    Is the patient here today to prepare for any type of upcoming surgery, procedure, or for a cardioversion procedure? No    What phone number can we reach the patient at today? home phone listed in demographics.

## 2021-06-12 NOTE — PROGRESS NOTES
Assessment & Plan   1. Single subsegmental pulmonary embolism without acute cor pulmonale (H)  Records received after visit from [facility] describing acute PE on 8/22.  Called granddaughter back and updated her as well as discussed plan for INR monitoring and management.  Orders for home care for anticoagulation teaching and med management.  Referral to anticoagulation monitoring.  INR therapeutic today.  Unclear per daughter and granddaughter if she has actually taken the warfarin the last two days since she has been home so far now will continue with previous regimen of 1/2 tablet daily with full tablet Wednesdays. Follow up with PCP next week   - Ambulatory referral to Home Health  - Ambulatory referral to Anticoagulation Monitoring    2. Anticoagulated  - INR   - HM2(CBC w/o Differential)  - Ambulatory referral to Home Health  - Ambulatory referral to Anticoagulation Monitoring    3. Essential hypertension  Reviewed records.  Clarified with daughter and granddaughter that she was previously on amlodipine, losartan, hydrochlorothiazide and metoprolol in [place].  Ordered the two medications they are missing.  Again, unclear if she has been taking these the past two days and blood pressure is slightly soft today.  Will schedule them for follow up next week to recheck blood pressure after she is taking her medications to ensure she is not hypotensive.    - amLODIPine (NORVASC) 10 MG tablet; TAKE 1 TABLET ORALLY EVERY DAY.  Dispense: 90 tablet; Refill: 0  - losartan (COZAAR) 100 MG tablet; TAKE 1 TABLET BY MOUTH DAILY WITH HYDROCHLOROTHIAZIDE 25MG  Dispense: 90 tablet; Refill: 0  - Comprehensive Metabolic Panel    4. Restrictive lung disease  Due for follow up with pulmonology. Denies dyspnea today.  As needed combivent and albuterol  - ipratropium-albuteroL (COMBIVENT RESPIMAT)  mcg/actuation Mist inhaler; Inhale 1 puff every 6 (six) hours as needed.  Dispense: 1 Inhaler; Refill: 12    5. Chronic pain  syndrome  Continue current medications. Did not have time to address chronic pain today, follow up with visit next week    6. Polypharmacy  - Ambulatory referral to Home Health    7. Drug-induced constipation  - senna-docusate (SENEXON-S) 8.6-50 mg tablet; Take 2 tablets by mouth daily.  Dispense: 180 tablet; Refill: 3    8. Psychophysiological insomnia  - mirtazapine (REMERON) 15 MG tablet; Take 0.5 tablets (7.5 mg total) by mouth at bedtime.  Dispense: 45 tablet; Refill: 0    9. Poor appetite  - mirtazapine (REMERON) 15 MG tablet; Take 0.5 tablets (7.5 mg total) by mouth at bedtime.  Dispense: 45 tablet; Refill: 0    Ludivina Early CNP     Total Time: 65 minutes.  Coordination of Care Time: 60 minutes spent including:  History, exam, discussion of possible diagnoses, testing today, next steps and follow up.    Subjective   Chief Complaint:  Follow-up and Medication Refill    HPI:   Luis Manuel Lemon is a 80 y.o. female who presents for medication check.     Patient of Dr. Morales.  Complicated medical history.  Here today with daughter and granddaughter who serves as .      Just moved back from [place] where she was staying with her son. Son called during the visit and states she was in the emergency room twice.  States didn't have energy and was having trouble breathing.  Not admitted.  Now on Warfarin. One tablet Wednesday and 1/2 tablet every other day.  Daughter and granddaughter do not know the details and why she is on this medication.      Records received from [facility] in [place].  Stated ED visit 8/22 for shortness of breath.  D-dimer elevated.  CT showed small solitary subsegmental PE in right upper lobe.  Started on Lovenox and Warfarin.  Also noted mild anemia in ED.  She states she is overall feeling well.  No dyspnea.  No fatigue.  Her pain is her only concern.     Need new home health care and med setup. Meds have not been set up so has not taken medications for two days. Brings in med boxes  today.     Chronic pain:  Spinal stenosis, polyarthralgia, back pain. Continues on gabapentin 200mg three times a day and Tylenol 650mg two times a day    Pulmonary fibrosis/Restrictive lung disease:  Albuterol and Combivent. Follows with pulmonology?     Constipation:  Colace and Miralax.     HTN:  Amlodipine, losartan, hydrochlorothiazide, metoprolol.  Do not have losartan and hydrochlorothiazide bottles.  Daughter states that she did not bring these bottles back from [place].  Questions if still taking.  Records from [facility] show that she was still taking these medications.  Son is unsure.      Allergies:  is allergic to ciprofloxacin and lisinopril.    SH/FH:  Social History and Family History reviewed and updated.   Tobacco Status:  She  reports that she has never smoked. She has never used smokeless tobacco.    Review of Systems:  A complete head to toe ROS is negative unless otherwise noted in HPI    Objective     Vitals:    10/15/20 0841   BP: 102/52   Patient Site: Right Arm   Patient Position: Sitting   Cuff Size: Adult Regular   Pulse: 60   Weight: 125 lb 4 oz (56.8 kg)       Physical Exam:  GENERAL: Alert, frail appearing. Seated in wheelchair   CV: Regular rate and rhythm without murmurs, rubs or gallops.  RESP: Lung sounds clear.  No increased work of breathing.   PV : No edema

## 2021-06-12 NOTE — TELEPHONE ENCOUNTER
Spoke with nurse Casanova from [facility]. She will home home care nurse check INR at home on 10/21/2020.

## 2021-06-12 NOTE — TELEPHONE ENCOUNTER
INR result is 4.8  INR   Date Value Ref Range Status   10/15/2020 2.51 (H) 0.90 - 1.10 Final       Will the patient be seen, or did they already see, MD or CNP today? No    Most Recent Warfarin dose day/week  Sunday Monday Tuesday Wednesday Thursday Friday Saturday      5 2.5 2.5 2.5     Sunday Monday Tuesday Wednesday Thursday Friday Saturday   2.5 2.5 2.5           Has the patient missed any doses of Coumadin, Warfarin, Jantoven in the past 7 days? No    Has the patients medications changed since the last visit? No    Has the patient experienced any bleeding recently? Yes bruising on right arm    Has the patient experienced any injuries or illness recently? No    Has the patient experienced any 'new' shortness of breath, severe headaches, or changes in vision recently? No    Has the patient had any changes in their diet, or alcohol consumption? No    Is the patient here today to prepare for any type of upcoming surgery, procedure, or for a cardioversion procedure? No    What phone number can we reach the patient at today? home phone listed in demographics.

## 2021-06-12 NOTE — TELEPHONE ENCOUNTER
ANTICOAGULATION  MANAGEMENT    Assessment     10/15's INR result of 2.51 is Therapeutic (goal INR of 2.0-3.0)        Warfarin taken as previously instructed    No new diet changes affecting INR    No new medication/supplements affecting INR    Continues to tolerate warfarin with no reported s/s of bleeding or thromboembolism     Has been on warfarin since August 2020.     Plan:     Spoke on phone with granddaughter La May regarding INR result and instructed:     Warfarin Dosing Instructions:  Continue current warfarin dose 5 mg daily on Wed; and 2.5 mg daily rest of week  (0 % change)    Instructed patient to follow up no later than: 1 week with home care.    Education provided: importance of following up for INR monitoring at instructed interval and importance of taking warfarin as instructed    La May verbalizes understanding and agrees to warfarin dosing plan.    Instructed to call the [facility] for any changes, questions or concerns. (#[phone])   ?   Kevin Duarte RN    Subjective/Objective:      Luis Manuel Lemon, a 80 y.o. female is on warfarin.     [facility] reports:     Home warfarin dose: verbally confirmed home dose with La May and updated on anticoagulation calendar     Missed doses: No     Medication changes:  No     S/S of bleeding or thromboembolism:  No     New Injury or illness:  No     Changes in diet or alcohol consumption:  No     Upcoming surgery, procedure or cardioversion:  No    Anticoagulation Episode Summary     Current INR goal:  2.0-3.0   TTR:  --   Next INR check:  10/22/2020   INR from last check:  2.51 (10/15/2020)   Weekly max warfarin dose:     Target end date:  2/22/2021   INR check location:     Preferred lab:     Send INR reminders to:  ROOSEVELT DUFF    Indications    Anticoagulated [Z79.01]  Single subsegmental pulmonary embolism without acute cor pulmonale (H) [I26.93]           Comments:           Anticoagulation Care Providers     Provider Role Specialty Phone number     Ludivina Early, CNP Referring Nurse Practitioner [phone]

## 2021-06-12 NOTE — TELEPHONE ENCOUNTER
INR result is   3.6    Will the patient be seen, or did they already see, MD or CNP today? Yes at 3 pm.    Most Recent Warfarin dose day/week    Home Care nurse had no INR dosing information.    Sunday Monday Tuesday Wednesday Thursday Friday Saturday Sunday Monday Tuesday Wednesday Thursday Friday Saturday                Has the patient missed any doses of Coumadin, Warfarin, Jantoven in the past 7 days? Unknown    Has the patients medications changed since the last visit? No    Has the patient experienced any bleeding recently? No    Has the patient experienced any injuries or illness recently? No    Has the patient experienced any 'new' shortness of breath, severe headaches, or changes in vision recently? No    Has the patient had any changes in their diet, or alcohol consumption? No    Is the patient here today to prepare for any type of upcoming surgery, procedure, or for a cardioversion procedure? No    What phone number can we reach the patient at today? [phone]

## 2021-06-12 NOTE — TELEPHONE ENCOUNTER
INR result is 1.5  INR   Date Value Ref Range Status   10/21/2020 4.80 (!) 0.9 - 1.1 Final       Will the patient be seen, or did they already see, MD or CNP today? No    Most Recent Warfarin dose day/week  Sunday Monday Tuesday Wednesday Thursday Friday Saturday       HELD HELD HELD     Sunday Monday Tuesday Wednesday Thursday Friday Saturday   HELD             Has the patient missed any doses of Coumadin, Warfarin, Jantoven in the past 7 days? No    Has the patients medications changed since the last visit? No    Has the patient experienced any bleeding recently? No    Has the patient experienced any injuries or illness recently? No    Has the patient experienced any 'new' shortness of breath, severe headaches, or changes in vision recently? No    Has the patient had any changes in their diet, or alcohol consumption? No    Is the patient here today to prepare for any type of upcoming surgery, procedure, or for a cardioversion procedure? No    What phone number can we reach the patient at today? home phone listed in demographics.

## 2021-06-12 NOTE — TELEPHONE ENCOUNTER
INR result is 2.3  INR   Date Value Ref Range Status   11/02/2020 3.60 (!) 0.9 - 1.1 Final       Will the patient be seen, or did they already see, MD or CNP today? No    Most Recent Warfarin dose day/week  Sunday Monday Tuesday Wednesday Thursday Friday Saturday        2.5 1.25     Sunday Monday Tuesday Wednesday Thursday Friday Saturday   2.5 HELD 2.5 2.5          Has the patient missed any doses of Coumadin, Warfarin, Jantoven in the past 7 days? No    Has the patients medications changed since the last visit? No    Has the patient experienced any bleeding recently? No    Has the patient experienced any injuries or illness recently? No    Has the patient experienced any 'new' shortness of breath, severe headaches, or changes in vision recently? No    Has the patient had any changes in their diet, or alcohol consumption? No    Is the patient here today to prepare for any type of upcoming surgery, procedure, or for a cardioversion procedure? No    What phone number can we reach the patient at today? home phone listed in demographics.

## 2021-06-12 NOTE — TELEPHONE ENCOUNTER
Due to our capacity we sent this referral to [facility]. Phone : [phone]    Tyson Sorensen at [facility].

## 2021-06-12 NOTE — TELEPHONE ENCOUNTER
Refill Approved    Rx renewed per Medication Renewal Policy. Medication was last renewed on 7/14/20.    Blaire Keating, Care Connection Triage/Med Refill 11/5/2020     Requested Prescriptions   Pending Prescriptions Disp Refills     metoprolol succinate (TOPROL-XL) 50 MG 24 hr tablet [Pharmacy Med Name: METOPROLOL SUCC ER 50 MG TA 50 Tablet] 90 tablet 2     Sig: TAKE 1 TABLET BY MOUTH DAILY       Beta-Blockers Refill Protocol Passed - 11/2/2020 10:44 AM        Passed - PCP or prescribing provider visit in past 12 months or next 3 months     Last office visit with prescriber/PCP: 12/9/2019 Kateryna Morales MD OR same dept: 10/15/2020 Ludivina Early CNP OR same specialty: 10/15/2020 Ludivina Early CNP  Last physical: Visit date not found Last MTM visit: Visit date not found   Next visit within 3 mo: 11/2/2020 Kateryna Morales MD  Next physical within 3 mo: Visit date not found  Prescriber OR PCP: Kateryna Morales MD  Last diagnosis associated with med order: 1. Essential hypertension  - metoprolol succinate (TOPROL-XL) 50 MG 24 hr tablet [Pharmacy Med Name: METOPROLOL SUCC ER 50 MG TA 50 Tablet]; TAKE 1 TABLET BY MOUTH DAILY  Dispense: 90 tablet; Refill: 2    2. Spinal stenosis, multilevel  - MAPAP ARTHRITIS PAIN 650 mg CR tablet [Pharmacy Med Name: MAPAP ARTHRITIS  MG C 650 Tablet]; TAKE 2 TABLETS BY MOUTH 2 TIMES A DAY  Dispense: 120 tablet; Refill: 6    If protocol passes may refill for 12 months if within 3 months of last provider visit (or a total of 15 months).             Passed - Blood pressure filed in past 12 months     BP Readings from Last 1 Encounters:   11/02/20 130/68                MAPAP ARTHRITIS PAIN 650 mg CR tablet [Pharmacy Med Name: MAPAP ARTHRITIS  MG C 650 Tablet] 120 tablet 6     Sig: TAKE 2 TABLETS BY MOUTH 2 TIMES A DAY       There is no refill protocol information for this order

## 2021-06-12 NOTE — TELEPHONE ENCOUNTER
Refill Approved    Rx renewed per Medication Renewal Policy. Medication was last renewed on 6/18/20.    Blaire Keating, [facility] Connection Triage/Med Refill 11/9/2020     Requested Prescriptions   Pending Prescriptions Disp Refills     gabapentin (NEURONTIN) 100 MG capsule [Pharmacy Med Name: GABAPENTIN 100 MG CAPS 100 Capsule] 450 capsule 3     Sig: TAKE 2 CAPSULES BY MOUTH THREE TIMES A DAY.       Gabapentin/Levetiracetam/Tiagabine Refill Protocol  Passed - 11/6/2020 12:30 PM        Passed - PCP or prescribing provider visit in past 12 months or next 3 months     Last office visit with prescriber/PCP: Visit date not found OR same dept: Visit date not found OR same specialty: 11/2/2020 Kateryna Morales MD  Last physical: Visit date not found Last MTM visit: Visit date not found   Next visit within 3 mo: Visit date not found  Next physical within 3 mo: Visit date not found  Prescriber OR PCP: Kera Delgado MD  Last diagnosis associated with med order: 1. Degenerative disc disease, cervical  - gabapentin (NEURONTIN) 100 MG capsule [Pharmacy Med Name: GABAPENTIN 100 MG CAPS 100 Capsule]; TAKE 2 CAPSULES BY MOUTH THREE TIMES A DAY.  Dispense: 450 capsule; Refill: 3    If protocol passes may refill for 12 months if within 3 months of last provider visit (or a total of 15 months).

## 2021-06-12 NOTE — TELEPHONE ENCOUNTER
ANTICOAGULATION  MANAGEMENT- Home Care/Care Facility Result    Assessment     Today's INR result of 2.3 is Therapeutic (goal INR of 2.0-3.0)        Missed dose(s) may be affecting INR- missed yesterday 11/5.    No new diet changes affecting INR    No new medication/supplements affecting INR    Continues to tolerate warfarin with no reported s/s of bleeding or thromboembolism     Previous INR was Supratherapeutic    Plan:     Spoke with home care nurse Vicky discussed INR result and instructed:     Warfarin Dosing Instructions: Continue current warfarin dose    1.25 mg every Mon, Fri; 2.5 mg all other days         Next INR to be drawn: Mon 11/9.    Education provided: importance of following up for INR monitoring at instructed interval and importance of taking warfarin as instructed    Vicky verbalizes understanding and agrees to warfarin dosing plan.   ?   Kevin Duarte RN    Subjective/Objective:      Luis Manuel Lemon, a 80 y.o. female is established on warfarin.     Home care/care facility RN's report of [facility] INR, recent warfarin dosing, diet changes, medication changes, and symptoms is documented below.    Additional findings: verbally confirmed home dose with Vicky and updated on anticoagulation calendar    Anticoagulation Episode Summary     Current INR goal:  2.0-3.0   TTR:  31.9 % (1.7 wk)   Next INR check:  11/9/2020   INR from last check:  2.30 (11/6/2020)   Weekly max warfarin dose:     Target end date:  2/22/2021   INR check location:     Preferred lab:     Send INR reminders to:  ROOSEVELT DUFF    Indications    Anticoagulated [Z79.01]  Single subsegmental pulmonary embolism without acute cor pulmonale (H) [I26.93]           Comments:           Anticoagulation Care Providers     Provider Role Specialty Phone number    Ludivina Early CNP Referring Nurse Practitioner [phone]

## 2021-06-12 NOTE — PROGRESS NOTES
"HPI - 76 yo female here for left shoulder pain and paperwork.     HTN -   BP elevated   On amlodipine, losartan-HCTZ, toprol XL    Chronic pain multifactorial -  Pain is intermittent and at times feels good and other days pain returns  Today c/o left shoulder pain  Seen by spine clinic  Last visit on 7/13/17 we changed tylenol to scheduled BID and gabapentin 100mg TID  She was advised to try tramadol prn but it makes her dizzy and nausea, so she takes only when really painful    Osteoporosis -   continue TUMS and Vit D  Started on boniva last month  Vit D deficiency  - 22.7 on 6/1/17 and on ergo        GERD - on omeprazole and TUMS     Fungal infection on feet  rx clotrimazole cream  Encouraged epsom salt soaks        H/o respiratory failure with hypoxia - admit 5/2017 5/ 2017  Enlarged pulmonary artery, consistent with pulmonary hypertension and Prominent subpleural and mediastinal fat deposition - on chest CT  Echo was normal   During her sleep, she feels sob like she has no oxygen but the is still breathing fine  Daughter says she does not snore or stop breathing during sleep but she seems to make effort to \"try to breath\"   Breathing is ok during the day    Polypharmacy - home RN set up meds      Current Outpatient Prescriptions   Medication Sig Note     acetaminophen (TYLENOL) 650 MG CR tablet Take 1 tablet (650 mg total) by mouth 2 (two) times a day.      amLODIPine (NORVASC) 10 MG tablet Take 1 tablet (10 mg total) by mouth daily.      calcium, as carbonate, (TUMS) 200 mg calcium (500 mg) chewable tablet Chew 1 tablet 3 (three) times a day.      docusate sodium (COLACE) 100 MG capsule Take 100 mg by mouth 2 (two) times a day.      gabapentin (NEURONTIN) 100 MG capsule Take 100 mg by mouth 3 (three) times a day.      ibandronate (BONIVA) 150 mg tablet Take 1 tablet (150 mg total) by mouth every 30 (thirty) days. Take in AM with glass of water prior to food, don't lie down for 30 minutes.      " "losartan-hydrochlorothiazide (HYZAAR) 100-25 mg per tablet Take 1 tablet by mouth daily.      metoprolol succinate (TOPROL XL) 25 MG Take 1 tablet (25 mg total) by mouth daily.      omeprazole (PRILOSEC) 20 MG capsule Take 1 capsule (20 mg total) by mouth daily. 1 hour before a meal      polyvinyl alcohol (LIQUIFILM TEARS) 1.4 % ophthalmic solution Apply to eye daily as needed      VITAMIN D2 50,000 unit capsule Take 1 capsule (50,000 Units total) by mouth once a week. 5/12/2017: Monday per bottle     clotrimazole (LOTRIMIN) 1 % cream Apply to affected area twice daily      ondansetron (ZOFRAN, AS HYDROCHLORIDE,) 4 MG tablet Take 1-2 tab po 4 times daily prn nausea, max daily dose 4 tab      traMADol (ULTRAM) 50 mg tablet Take 1 tablet (50 mg total) by mouth 2 (two) times a day as needed for pain.      Vitals:    08/08/17 0932   BP: 130/78   Pulse: 73   Resp: 20   Temp: 97.8  F (36.6  C)   TempSrc: Oral   SpO2: 93%   Weight: 127 lb 11.2 oz (57.9 kg)   Height: 4' 11\" (1.499 m)     OBJECTIVE:  Vitals listed above within normal limits  General appearance: well groomed, pleasant, well hydrated, nontoxic appearing  ENT: PERRL, throat clear  Neck: neck supple, no lymphadenopathy, no thyromegaly  Lungs: lungs clear to auscultation bilaterally, no wheezes or rhonchi  Heart: regular rate and rhythm, no murmurs, rubs or gallops  Abdomen: soft, nontender  Neuro: no focal deficits, CN II-XII grossly intact, alert and oriented  Psych:  mood stable, appears to have good insight and judgment        A/P  HTN -   BP elevated   On amlodipine, losartan-HCTZ, toprol XL    Chronic pain multifactorial -  Pain is intermittent and at times feels good and other days pain returns  Today c/o left shoulder pain  (Joint injection in the past helped with the pain)  Seen by spine clinic  Last visit on 7/13/17 we changed tylenol to scheduled BID  Stop tramadol   Increase gabapentin to 300 qhs, and 100mg am and midday  Help schedule f/u apt with " pain clinic for repeat injection      Osteoporosis -   continue TUMS and Vit D  Started on boniva last month  Vit D deficiency  - 22.7 on 6/1/17 and on ergo        GERD - on omeprazole and TUMS     Fungal infection on feet  rx clotrimazole cream  Encouraged epsom salt soaks    Polypharmacy - home RN set up meds    Sleep /breathing concerns with sleep  Discussed referral to sleep clinic eval to r/o ALFRED or other sleep d/o  H/o respiratory distress with hypoxia and admit in 5/2017 - referral to pulm for further evaluation      Spent 40 min face to face with patient with more the 50% spent in counseling, reviewing chart, and coordination of care and discussing problems listed above.

## 2021-06-13 NOTE — PROGRESS NOTES
HPI - 78 yo female here for HTN, SOB, and shoulder pain.     HTN - BP well controlled with current meds  On amlodipine, losartan-HCTZ, toprol XL      Shoulder pain  -  Chronic pain multifactorial -  Pain is intermittent and at times feels good and other days pain returns  Today c/o left shoulder pain  (Joint injection in the past helped with the pain)  Seen by spine clinic and had another shoulder injection on 8/14/17  Last month we stopped tramadol and Increased gabapentin to 300 qhs, and 100mg am and midday    SOB that is worse at night and with exertion  Sleep /breathing concerns with sleep - she has to sit up and gaps in the middle of the night b/c of feeling like her breathing might stop  Discussed referral to sleep clinic eval to r/o ALFRED or other sleep d/o  H/o respiratory distress with hypoxia and admit in 5/2017 - referral to pulm for further evaluation and this is set up for 10/3/17  Per chart review:  1. Chest CT on 5/12/17 showed  * Enlarged pulmonary artery, consistent with pulmonary hypertension  *Prominent subpleural and mediastinal fat deposition  *Coronary artery calcification.  2. Echo 5/13/7 showed    When compared to the previous study dated 6/01/2016, no significant change    Left ventricle ejection fraction is normal. The estimated left ventricular ejection fraction is 65%.    Mild aortic insufficiency      Current Outpatient Prescriptions   Medication Sig Note     acetaminophen (TYLENOL) 650 MG CR tablet Take 1 tablet (650 mg total) by mouth 2 (two) times a day.      amLODIPine (NORVASC) 10 MG tablet Take 1 tablet (10 mg total) by mouth daily.      calcium, as carbonate, (TUMS) 200 mg calcium (500 mg) chewable tablet Chew 1 tablet 3 (three) times a day.      DOC-Q-LACE 100 mg capsule TAKE 1 CAPSULE BY MOUTH TWICE DAILY.      gabapentin (NEURONTIN) 100 MG capsule 100mg in morning, 100mg midday and 300mg at bedtime      ibandronate (BONIVA) 150 mg tablet Take 1 tablet (150 mg total) by mouth  "every 30 (thirty) days. Take in AM with glass of water prior to food, don't lie down for 30 minutes.      losartan-hydrochlorothiazide (HYZAAR) 100-25 mg per tablet Take 1 tablet by mouth daily.      metoprolol succinate (TOPROL XL) 25 MG Take 1 tablet (25 mg total) by mouth daily.      omeprazole (PRILOSEC) 20 MG capsule TAKE 1 CAPSULE ORALLY DAILY. TAKE 1 HOUR BEFORE A MEAL.      polyvinyl alcohol (LIQUIFILM TEARS) 1.4 % ophthalmic solution Apply to eye daily as needed      VITAMIN D2 50,000 unit capsule Take 1 capsule (50,000 Units total) by mouth once a week. 5/12/2017: Monday per bottle     clotrimazole (LOTRIMIN) 1 % cream Apply to affected area twice daily      ondansetron (ZOFRAN, AS HYDROCHLORIDE,) 4 MG tablet Take 1-2 tab po 4 times daily prn nausea, max daily dose 4 tab      Vitals:    09/19/17 0806 09/19/17 0808   BP: 104/60    Pulse: 68 68   Resp: 18 26   Temp: 98.2  F (36.8  C)    TempSrc: Oral    SpO2: 93% 95%   Weight: 122 lb 9.6 oz (55.6 kg)    Height: 4' 11\" (1.499 m)      OBJECTIVE:  Vitals listed above within normal limits  General appearance: well groomed, pleasant, well hydrated, nontoxic appearing  ENT: PERRL, throat clear  Neck: neck supple, no lymphadenopathy, no thyromegaly  Lungs: lungs clear to auscultation bilaterally, no wheezes or rhonchi  Heart: regular rate and rhythm, no murmurs, rubs or gallops  Abdomen: soft, nontender  Neuro: no focal deficits, CN II-XII grossly intact, alert and oriented  Psych:  mood stable, appears to have good insight and judgment    CXR in clinic - I personally read Xray and spoke with the radiologist.   Per radiologist - cardiac enlargement, hyperinflation of lungs, subpleural and mediastinal fat, no discrete infiltrate  Looks similar to prior     A/P  SOB that is worse at night and with exertion with h/o of acute respiratory failure /hypoxia in 5/2017  Bibasilar crackles on exam  Chest CT and echo not revealing  Today check will check CXR  Severe " restriction on spirometry in clinic today   ambulatory O2 sat >90%  Pulm consult on 10/3/17 as scheduled - will try to see if can get in sooner  Schedule sleep consult    HTN - BP well controlled with current meds  On amlodipine, losartan-HCTZ, toprol XL      Shoulder pain  -  Chronic pain multifactorial -    Seen by spine clinic and had another shoulder injection on 8/14/17  Last month we stopped tramadol and Increased gabapentin to 300 qhs, and 100mg am and midday      Spent 40 min face to face with patient with more the 50% spent in counseling, reviewing chart, and coordination of care and discussing problems listed above.

## 2021-06-13 NOTE — TELEPHONE ENCOUNTER
INR result is 1.3  INR   Date Value Ref Range Status   11/13/2020 1.30 (!) 0.9 - 1.1 Final       Will the patient be seen, or did they already see, MD or CNP today? No    Most Recent Warfarin dose day/week  Sunday Monday Tuesday Wednesday Thursday Friday Saturday    2.5 2.5 2.5 2.5 3.75 missed     Sunday Monday Tuesday Wednesday Thursday Friday Saturday   missed             Has the patient missed any doses of Coumadin, Warfarin, Jantoven in the past 7 days? Yes, missed Saturday & Sunday    Has the patients medications changed since the last visit? No    Has the patient experienced any bleeding recently? No    Has the patient experienced any injuries or illness recently? No    Has the patient experienced any 'new' shortness of breath, severe headaches, or changes in vision recently? No    Has the patient had any changes in their diet, or alcohol consumption? No    Is the patient here today to prepare for any type of upcoming surgery, procedure, or for a cardioversion procedure? No    What phone number can we reach the patient at today? home phone listed in demographics.

## 2021-06-13 NOTE — TELEPHONE ENCOUNTER
INR result is   1.1    Will the patient be seen, or did they already see, MD or CNP today? No    Most Recent Warfarin dose day/week  Sunday Monday Tuesday Wednesday Thursday Friday Saturday        3.7 2.5     Sunday Monday Tuesday Wednesday Thursday Friday Saturday   2.5 3.75 2.5 2.5          Has the patient missed any doses of Coumadin, Warfarin, Jantoven in the past 7 days? Yes Saturday and Sunday was missed    Has the patients medications changed since the last visit? No    Has the patient experienced any bleeding recently? No    Has the patient experienced any injuries or illness recently? No    Has the patient experienced any 'new' shortness of breath, severe headaches, or changes in vision recently? No    Has the patient had any changes in their diet, or alcohol consumption? No    Is the patient here today to prepare for any type of upcoming surgery, procedure, or for a cardioversion procedure? No    What phone number can we reach the patient at today? [phone]

## 2021-06-13 NOTE — PROGRESS NOTES
"[facility] Care Coordination Contact    Situation: Received a referral for Care Management to assist with appealing PCA hour reduction.     Background: Care Coordinator completed an annual assessment in January, 2020 based on reported improvements with ADL's a reduction in overall PCA hours occured. Every member receives a notice on their rights to appeal a reduction following an assessment. They must do so within 30 days. Then the health plan will review the assessment in it's entirety and decide if a changes should be made or a new assessment should be completed. Typically, assessments only occur one time per year unless a significant change occurs in which case a \"Significant Change Assessment\" can be requested.     Assessment: This member is due for an annual assessment this month (as assessments are completed on an 11 month schedule).     Plan/Recommendations: CC will reach out to schedule an assessment with [facility] and family to reassess PCA hours.     REBECCA Srinivasan  [facility]  [phone]      "

## 2021-06-13 NOTE — TELEPHONE ENCOUNTER
INR result is 1.7  INR   Date Value Ref Range Status   11/30/2020 2.80 (!) 0.9 - 1.1 Final       Will the patient be seen, or did they already see, MD or CNP today? No    Most Recent Warfarin dose day/week  Sunday Monday Tuesday Wednesday Thursday Friday Saturday       held 1.25 2.5     Sunday Monday Tuesday Wednesday Thursday Friday Saturday   1.25 2.5 1.25 2.5          Has the patient missed any doses of Coumadin, Warfarin, Jantoven in the past 7 days? No    Has the patients medications changed since the last visit? No    Has the patient experienced any bleeding recently? No    Has the patient experienced any injuries or illness recently? No    Has the patient experienced any 'new' shortness of breath, severe headaches, or changes in vision recently? No    Has the patient had any changes in their diet, or alcohol consumption? No    Is the patient here today to prepare for any type of upcoming surgery, procedure, or for a cardioversion procedure? No    What phone number can we reach the patient at today? Vicky

## 2021-06-13 NOTE — PROGRESS NOTES
Instructed on proper use of Duo-neb inhaler.  Demonstrated back correctly, no questions at this time. Number given and instructed to call with any questions.

## 2021-06-13 NOTE — TELEPHONE ENCOUNTER
INR result is 2.8  INR   Date Value Ref Range Status   11/27/2020 3.30 (!) 0.9 - 1.1 Final       Will the patient be seen, or did they already see, MD or CNP today? No    Most Recent Warfarin dose day/week  Sunday Monday Tuesday Wednesday Thursday Friday Saturday    held 1.25 held held 1.25 2.5     Sunday Monday Tuesday Wednesday Thursday Friday Saturday   1.25             Has the patient missed any doses of Coumadin, Warfarin, Jantoven in the past 7 days? No    Has the patients medications changed since the last visit? No    Has the patient experienced any bleeding recently? No    Has the patient experienced any injuries or illness recently? No    Has the patient experienced any 'new' shortness of breath, severe headaches, or changes in vision recently? No    Has the patient had any changes in their diet, or alcohol consumption? No    Is the patient here today to prepare for any type of upcoming surgery, procedure, or for a cardioversion procedure? No    What phone number can we reach the patient at today? Vicky

## 2021-06-13 NOTE — TELEPHONE ENCOUNTER
INR result is 1.6  INR   Date Value Ref Range Status   12/10/2020 3.60 (!) 0.9 - 1.1 Final       Will the patient be seen, or did they already see, MD or CNP today? No    Most Recent Warfarin dose day/week  Sunday Monday Tuesday Wednesday Thursday Friday Saturday       held 1.25 2.5     Sunday Monday Tuesday Wednesday Thursday Friday Saturday   1.25 2.5 1.25 2.5          Has the patient missed any doses of Coumadin, Warfarin, Jantoven in the past 7 days? No    Has the patients medications changed since the last visit? No    Has the patient experienced any bleeding recently? No    Has the patient experienced any injuries or illness recently? No    Has the patient experienced any 'new' shortness of breath, severe headaches, or changes in vision recently? No    Has the patient had any changes in their diet, or alcohol consumption? No    Is the patient here today to prepare for any type of upcoming surgery, procedure, or for a cardioversion procedure? No    What phone number can we reach the patient at today? home phone listed in demographics.

## 2021-06-13 NOTE — TELEPHONE ENCOUNTER
INR result is 3.6  INR   Date Value Ref Range Status   12/03/2020 1.70 (!) 0.9 - 1.1 Final       Will the patient be seen, or did they already see, MD or CNP today? No    Most Recent Warfarin dose day/week  Sunday Monday Tuesday Wednesday Thursday Friday Saturday       3.75 1.25 2.5     Sunday Monday Tuesday Wednesday Thursday Friday Saturday   1.25 2.5 1.25 2.5          Has the patient missed any doses of Coumadin, Warfarin, Jantoven in the past 7 days? No    Has the patients medications changed since the last visit? Patient prescribed inhaler, hasn't started using it yet    Has the patient experienced any bleeding recently? No    Has the patient experienced any injuries or illness recently? No    Has the patient experienced any 'new' shortness of breath, severe headaches, or changes in vision recently? No    Has the patient had any changes in their diet, or alcohol consumption? No    Is the patient here today to prepare for any type of upcoming surgery, procedure, or for a cardioversion procedure? No    What phone number can we reach the patient at today? home phone listed in demographics.

## 2021-06-13 NOTE — TELEPHONE ENCOUNTER
RN cannot approve Refill Request    RN can NOT refill this medication med is not covered by policy/route to provider. Last office visit: 11/2/2020 Kateryna Morales MD Last Physical: Visit date not found Last MTM visit: Visit date not found Last visit same specialty: 11/2/2020 Kateryna Morales MD.  Next visit within 3 mo: Visit date not found  Next physical within 3 mo: Visit date not found      Sofi Pelletier, [facility] Triage/Med Refill 11/19/2020    Requested Prescriptions   Pending Prescriptions Disp Refills     celecoxib (CELEBREX) 100 MG capsule [Pharmacy Med Name: CELECOXIB 100 MG CAPS 100 Capsule] 30 capsule 0     Sig: TAKE ONE CAPSULE BY MOUTH DAILY.       There is no refill protocol information for this order

## 2021-06-13 NOTE — TELEPHONE ENCOUNTER
INR result is       1.3  INR   Date Value Ref Range Status   11/09/2020 1.80 (!) 0.9 - 1.1 Final       Will the patient be seen, or did they already see, MD or CNP today? No    Most Recent Warfarin dose day/week  Sunday Monday Tuesday Wednesday Thursday Friday Saturday        1.25 2.5     Sunday Monday Tuesday Wednesday Thursday Friday Saturday   2.5 2.5 2.5 2.5 2.5         Has the patient missed any doses of Coumadin, Warfarin, Jantoven in the past 7 days? No    Has the patients medications changed since the last visit? No    Has the patient experienced any bleeding recently? No    Has the patient experienced any injuries or illness recently? No    Has the patient experienced any 'new' shortness of breath, severe headaches, or changes in vision recently? No    Has the patient had any changes in their diet, or alcohol consumption? No    Is the patient here today to prepare for any type of upcoming surgery, procedure, or for a cardioversion procedure? No    What phone number can we reach the patient at today? home phone listed in demographics.

## 2021-06-13 NOTE — TELEPHONE ENCOUNTER
ANTICOAGULATION  MANAGEMENT- Home Care/Care Facility Result    Assessment     Today's INR result of 2.8 is Therapeutic (goal INR of 2.0-3.0)        Warfarin recently held as instructed which may be affecting INR    No new diet changes affecting INR    No new medication/supplements affecting INR    Continues to tolerate warfarin with no reported s/s of bleeding or thromboembolism     Previous INR was Supratherapeutic dose adjustment done.    Plan:     Spoke with Home Care Nurse Vicky discussed INR result and instructed:     Warfarin Dosing Instructions: Continue current warfarin dose    1.25 mg every Sun, Tue, Fri; 2.5 mg all other days     (0 % change)    Next INR to be drawn: 12/3    Education provided: importance of therapeutic range, target INR goal and significance of current INR result and importance of following up for INR monitoring at instructed interval    Vicky verbalizes understanding and agrees to warfarin dosing plan.   ?   Kera Phillips RN    Subjective/Objective:      Luis Manuel Lemon, a 80 y.o. female is established on warfarin.     Home care/care facility RN's report of [facility] INR, recent warfarin dosing, diet changes, medication changes, and symptoms is documented below.    Additional findings: none    Anticoagulation Episode Summary     Current INR goal:  2.0-3.0   TTR:  22.1 % (1.2 mo)   Next INR check:  12/3/2020   INR from last check:  2.80 (11/30/2020)   Weekly max warfarin dose:     Target end date:  2/22/2021   INR check location:     Preferred lab:     Send INR reminders to:  ROOSEVELT DUFF    Indications    Anticoagulated [Z79.01]  Single subsegmental pulmonary embolism without acute cor pulmonale (H) [I26.93]           Comments:           Anticoagulation Care Providers     Provider Role Specialty Phone number    Ludivina Early CNP Referring Nurse Practitioner [phone]

## 2021-06-13 NOTE — PROGRESS NOTES
Assessment and Plan:   Routine general medical examination at a health care facility  Due for repeat colonoscopy but wants to wait till after the Covid pandemic    Psychophysiological insomnia  Poor appetite  We will increase her Remeron dose from 7.5 to 15 mg  - mirtazapine (REMERON) 15 MG tablet; Take 1 tablet (15 mg total) by mouth at bedtime.  Dispense: 30 tablet; Refill: 11    Pulmonary embolism  Reviewed pulmonology consult  Discussed the need to stay on warfarin for the 6 months and then she can stop.  So she will be completed her 6-month on February 22, 2021.  We will have her follow-up with me around that time      Stage 3a chronic kidney disease  Continue monitoring    Age-related osteoporosis without current pathological fracture  She was supposed to start Prolia shots last year but this never happened.  So I am putting in the referral for her to follow-up with endocrinology so she can start the Prolia shots as planned  - Ambulatory referral to Endocrinology    Vitamin D deficiency  Continue vitamin D supplementation    Drug-induced constipation   manageable current regimen    Gastroesophageal reflux disease with esophagitis without hemorrhage    Chronic pain syndrome  Pain is tolerable with current regimen      Polypharmacy  Consult pill bottles with med list     Medically complex patient  She recently had her PCA hours reduced dramatically to 30 minutes/day.  Is unclear why this was changed  Referral to natural resource guide to see what options she has    Pulmonary fibrosis (H)  Reviewed pulmonology consult recommendations with the patient  Encouraged her to use the medications and inhalers as prescribed by her lung doctor     Language barrier affecting health care        The patient's current medical problems were reviewed.    The following are part of a depression follow up plan for the patient:  emotional support management  The following health maintenance schedule was reviewed with the patient  and provided in printed form in the after visit summary:   Health Maintenance   Topic Date Due     ASTHMA ACTION PLAN  1940     HEPATITIS C SCREENING  1940     ZOSTER VACCINES (1 of 2) 01/01/1991     ADVANCE CARE PLANNING  10/04/2016     LIPID  06/03/2018     FALL RISK ASSESSMENT  12/11/2021     Asthma Control Test  12/14/2021     MEDICARE ANNUAL WELLNESS VISIT  12/14/2021     TD 18+ HE  03/08/2023     DEXA SCAN  05/01/2034     Pneumococcal Vaccine: 65+ Years  Completed     INFLUENZA VACCINE RULE BASED  Completed     Pneumococcal Vaccine: Pediatrics (0 to 5 Years) and At-Risk Patients (6 to 64 Years)  Aged Out     HEPATITIS B VACCINES  Aged Out        Subjective:   Chief Complaint: Luis Manuel Lemon is an 80 y.o. female here for an Annual Wellness visit.   HPI:     Memory loss -   Per granddaughter - she is forgetting family members and saying things are not true. She seems confused.   She has citizenship    Colonoscopy - 2015  Tubular adenoma - repeat in 5 years    Restrictive lung dz and pulm fibrosis  Single subsegmental pulmonary embolism without acute cor pulmonale (H)  Per records from [facility] describing acute PE on 8/22.  Getting  home care for anticoagulation teaching and med management.  anticoagulation monitoring managed with anti-coag RN. Will stop warfarin 2/22/2021 - at 6 months.   ACT today = 20  Pulm Consult 12/7/20  try an ICS/LABA    1) Pulmonary fibrosis - she seems to do better when she has combivent so there may be a secondary diagnosis causing bronchoconstriction which is usually not associated with classic fibrosis.  An alternate theory remains that she aspirates causing airway irritation which may be benefited with this inhaler.  Will try Breo 200/50 one puff a day until I see her back to see if this helps.  Continue albuterol or combivent prn.  2) Pulmonary embolism - unclear if this is provoked or not.  Subsegmental PEs do not always require treatment.   Would complete 6 months of  therapy and stop given her instability requiring a cane.  3) Thrush - complaining of a sore mouth, has white spots confirming thrush.  10 days of nystatin four times a day.   4) RTC in 6 months    HTN -   Meds: amlodipine, losartan, hydrochlorothiazide, metoprolol     Chronic pain -   Pain is manageable with Tylenol, gabapentin, capsaicin     Osteoporosis -   Prolia shots -ordered 11/21/2019 but unsure if she started these  DEXA - 5/2017 = OSTEOPOROSIS currently on pharmacologic treatment .  Vit D 34.9 on 2/2019     Constipation-the stool softeners working  BM's normal     History of PTSD with poor appetite and insomnia  Poor appetite - eating very little and only small amounts, not hungry but tries to make herself eating  She is getting ensure 4 times per day - small appts  No abdo pain, no N&V  Sleeping well  PHQ2 = 1  Taking remeron 7.5mg     No long needing incontinence supplies.     Review of Systems:  Please see above.  The rest of the review of systems are negative for all systems.    Patient Care Team:  Kateryna Morales MD as PCP - General (Family Medicine)  Georgina Simon RN as Nursing Home Facility  Brisa Prieto as Patient Care Assistant  Kateryna Morales MD as Assigned PCP  Orquidea Ramon SW as Lead Care Coordinator  Johan Chavez MD as Assigned Pulmonology Provider     Patient Active Problem List   Diagnosis     Constipation     Vitamin D Deficiency     Essential hypertension     Rotator Cuff Tendonitis     Rupture Of The Bicipital Tendon     Asymptomatic Postmenopausal Status     Hyperlipidemia     Osteoporosis on Prolia 5.7.19     Chronic reflux esophagitis     Bursitis, trochanteric     Hypoalbuminemia     Low back pain     Polypharmacy     Degenerative disc disease, cervical     Spinal stenosis, multilevel     Chronic pain syndrome     Restrictive lung disease     Short of breath on exertion     GERD (gastroesophageal reflux disease)     Sinus bradycardia     Bilateral primary  osteoarthritis of knee     Polyarthralgia     Medically complex patient     Language barrier affecting health care     Age-related osteoporosis with current pathological fracture with routine healing     Financial difficulties     Urinary incontinence in female     Insurance coverage problems     Pulmonary fibrosis (H)     Anticoagulated     Single subsegmental pulmonary embolism without acute cor pulmonale (H)     Past Medical History:   Diagnosis Date     Biceps tendon rupture      CHF (congestive heart failure) (H) 10/8/2017     Chronic use of steroids 2/24/2015 2/24/2015:  For connective tissue disease.  On steroids since at least 2011.  Attempts to wean have been unsuccessful.  Rheum currently trying again to wean.  Has known osteoporosis with compression fracture.      Compression fracture 2/4/2015 2/24/2015:  T12 compression fracture with 33% height loss seen on plain films 1/2015.  Was not seen on plain films 6/2014, so is presumably new since then.      MILLER (dyspnea on exertion) 4/25/2018     Dyspepsia      Eustachian Tube Dysfunction Of The Left Ear     Created by Conversion      GERD (gastroesophageal reflux disease)      History of immunological disorder     Connective Tissue Disorder     History of kidney stones      Hyperlipidemia      Hypertension      Inverted nipple     Bilaterally     Myalgia and myositis      Neuralgia      Noncompliance With Therapy Due To Lack Of Comprehension     2/24/2015:  Has had trouble historically understanding and taking medicines.  Much improved now with home health RN Georgina Salinas from [employer].  Georgina's cell [phone].  She needs to be called with all new orders.  Also in [facility] home program -- Care Guide is Jose Castillo.       Opioid dependence, episodic (H) 8/8/2019     Osteoarthritis      Osteoporosis      Pancreatitis 4/15/2016     Polymyalgia rheumatica (H)      Restrictive lung disease      Tinea Pedis     Created by Conversion       Vitamin D deficiency       Past Surgical History:   Procedure Laterality Date     CHOLECYSTECTOMY       KIDNEY STONE SURGERY       RI TOTAL ABDOM HYSTERECTOMY      Description: Hysterectomy;  Recorded: 03/08/2013;     VENTRAL HERNIA REPAIR N/A 4/9/2016    Procedure: HERNIA REPAIR VENTRAL ;  Surgeon: Duncan Odonnell MD;  Location: [facility];  Service:       Family History   Problem Relation Age of Onset     Breast cancer Neg Hx       Social History     Socioeconomic History     Marital status:      Spouse name: Not on file     Number of children: Not on file     Years of education: Not on file     Highest education level: Not on file   Occupational History     Not on file   Social Needs     Financial resource strain: Not on file     Food insecurity     Worry: Not on file     Inability: Not on file     Transportation needs     Medical: Not on file     Non-medical: Not on file   Tobacco Use     Smoking status: Never Smoker     Smokeless tobacco: Never Used     Tobacco comment: chew betel nut   Substance and Sexual Activity     Alcohol use: No     Drug use: No     Sexual activity: Not on file   Lifestyle     Physical activity     Days per week: Not on file     Minutes per session: Not on file     Stress: Not on file   Relationships     Social connections     Talks on phone: Not on file     Gets together: Not on file     Attends Gnosticist service: Not on file     Active member of club or organization: Not on file     Attends meetings of clubs or organizations: Not on file     Relationship status: Not on file     Intimate partner violence     Fear of current or ex partner: Not on file     Emotionally abused: Not on file     Physically abused: Not on file     Forced sexual activity: Not on file   Other Topics Concern     Not on file   Social History Narrative    3/27/2015:  Kris Holloway.  C[place] from [facility] in approximately 2010.  No alcohol use, lives with her daughter Efrain Prieto.  Never a smoker.   Home ALVIN Salinas from [facility] visits every 2 weeks and sets up meds.  Has PCA 1.75 hours/day as well.  Goes to Adult  at [facility] 2 days per week.      Current Outpatient Medications   Medication Sig Dispense Refill     albuterol (PROAIR HFA;PROVENTIL HFA;VENTOLIN HFA) 90 mcg/actuation inhaler Inhale 2 puffs every 6 (six) hours as needed for wheezing or shortness of breath. 1 Inhaler 11     amLODIPine (NORVASC) 10 MG tablet TAKE 1 TABLET ORALLY EVERY DAY. 90 tablet 0     calcium, as carbonate, (ANTACID, CALCIUM CARBONATE,) 200 mg calcium (500 mg) chewable tablet CHEW 1 TABLET BY MOUTH THREE TIMES A DAY TO KEEP YOUR BONES HEALTHY 90 tablet 3     capsaicin (ZOSTRIX) 0.025 % cream Apply topically 2 (two) times a day as needed. 60 g 0     celecoxib (CELEBREX) 100 MG capsule TAKE ONE CAPSULE BY MOUTH DAILY. 90 capsule 3     famotidine (PEPCID) 40 MG tablet TAKE 1 TABLET BY MOUTH TWICE DAILY. 180 tablet 2     fluticasone furoate-vilanteroL (BREO ELLIPTA) 100-25 mcg/dose inhaler Inhale 1 puff daily. 28 each 6     gabapentin (NEURONTIN) 100 MG capsule TAKE 2 CAPSULES BY MOUTH THREE TIMES A DAY. 450 capsule 3     hydroCHLOROthiazide (HYDRODIURIL) 25 MG tablet TAKE ONE TABLET BY MOUTH ONCE DAILY WITH LOSARTAN 100MG. 90 tablet 3     ipratropium-albuteroL (COMBIVENT RESPIMAT)  mcg/actuation Mist inhaler Inhale 1 puff every 6 (six) hours as needed. 1 Inhaler 12     losartan (COZAAR) 100 MG tablet TAKE 1 TABLET BY MOUTH DAILY WITH HYDROCHLOROTHIAZIDE 25MG 90 tablet 0     MAPAP ARTHRITIS PAIN 650 mg CR tablet TAKE 2 TABLETS BY MOUTH 2 TIMES A  tablet 6     metoprolol succinate (TOPROL-XL) 50 MG 24 hr tablet TAKE 1 TABLET BY MOUTH DAILY 90 tablet 3     mirtazapine (REMERON) 15 MG tablet Take 0.5 tablets (7.5 mg total) by mouth at bedtime. 45 tablet 0     nystatin (NYSTATIN) 100,000 unit/mL suspension Take 5 mL (500,000 Units total) by mouth 4 (four) times a day for 10 days. 200 mL 0      "polyvinyl alcohol (ARTIFICIAL TEARS, POLYVIN ALC,) 1.4 % ophthalmic solution APPLY TO EYE DAILY AS NEEDED. 15 mL 0     senna-docusate (SENEXON-S) 8.6-50 mg tablet Take 2 tablets by mouth daily. 180 tablet 3     warfarin ANTICOAGULANT (COUMADIN/JANTOVEN) 2.5 MG tablet Take half to one tablets (1.25 to 2.5 mg) by mouth daily. Adjust dose based on INR results as directed. 90 tablet 0     warfarin ANTICOAGULANT (COUMADIN/JANTOVEN) 5 MG tablet TAKE 1 TABLET (5MG) BY MOUTH EVERY WEDNESDAY AND 1/2 TABLET (2.5MG) BY MOUTH ALL OTHER DAYS       Current Facility-Administered Medications   Medication Dose Route Frequency Provider Last Rate Last Admin     denosumab 60 mg (PROLIA 60 mg/ml)  60 mg Subcutaneous Once Vikas Perez MD          Objective:   Vital Signs:  Vitals:    12/14/20 1154   BP: 132/66   Pulse: 88   Resp: 18   Temp: 97.4  F (36.3  C)   TempSrc: Oral   SpO2: 96%   Weight: 122 lb 12.8 oz (55.7 kg)   Height: 4' 10.27\" (1.48 m)         VisionScreening:  No exam data present     PHYSICAL EXAM  OBJECTIVE:  Vitals listed above within normal limits  General appearance: well groomed, pleasant, well hydrated, nontoxic appearing  ENT: PERRL, throat clear  Neck: neck supple, no lymphadenopathy, no thyromegaly  Lungs: good air movement with bibasilar crackles L>R  Heart: regular rate and rhythm, no murmurs, rubs or gallops  Abdomen: soft, nontender  Neuro: no focal deficits, CN II-XII grossly intact, alert and oriented  Psych:  mood stable, appears to have good insight and judgment        Assessment Results 12/11/2020   Activities of Daily Living 2-4 - Moderate impairment   Instrumental Activities of Daily Living 2-4 - Moderate impairment   Get Up and Go Score 12 seconds or more   Some recent data might be hidden     A Mini-Cog score of 0-2 suggests the possibility of dementia, score of 3-5 suggests no dementia    Identified Health Risks:     The patient was provided with suggestions to help her develop a healthy physical " lifestyle.   She is at risk for lack of exercise and has been provided with information to increase physical activity for the benefit of her well-being.  The patient reports that she has difficulty with activities of daily living. The patient reports that she has difficulty with instrumental activities of daily living.    The patient was provided with written information regarding signs of hearing loss.  Information on urinary incontinence and treatment options given to patient.  The patient was provided with suggestions to help her develop a healthy emotional lifestyle.   The patient's PHQ-9 score is consistent with mild depression..  She is at risk for falling and has been provided with information to reduce the risk of falling at home.

## 2021-06-13 NOTE — PROGRESS NOTES
This Graduation Wellness Plan provides private information in regards to the work I have done with my Care Team from my Primary Care Clinic.  This document provides insight on the goals I have accomplished.  My Care Team congratulates me on my journey to maintain wellness.  This document will help guide me on my journey to maintain a healthy lifestyle.  I will use this to help me overcome any barriers I may encounter.  If I should have any questions or concerns, I will continue to contact the members of my Care Team or contact my Primary Care Clinic for assistance.       My Clinic Care Coordination Wellness Plan    [facility]  [address] [place]  [address]  [phone]        My Preferred Method of Contact:  Phone: [phone]    My Primary/Preferred Language:  Amie    Preferred Learning Style:  Face to face discussion, Pictures/Diagrams and Hands on teaching    Emergency Contact: Extended Emergency Contact Information  Primary Emergency Contact: May,La  Address: [address] [place] [address]  Home Phone: [phone]  Relation: Child     PCP:  Kateryna Morales MD  Specialists:    [facility], [facility], [facility] and [facility]  Accomplishments:  Goals       COMPLETED: I would like the coordination of my specialist appointments. (pt-stated)            Action steps to achieve this goal:  1. I will take the recommendations that my primary doctor advised to hold on follow up with specialists.  2. I will see the specialist when my doctor refer again.        COMPLETED: I would like to reduce generally aching pain. (pt-stated)            Action steps to achieve this goal:    1. I will follow up with PCP on 04/27/2017 as scheduled.  2. I will continue to follow up with pain specialist to manage my pain.  3. I will accomplish this goal and continue to follow up with specialist, PCP as needed and taking  medications as prescribed.      Goal completed: 04/20/2017.            Advanced Directive/Living Will: In patient's chart.    Clinical Emergency Plan:  Updated emergency care plan for graduation plan.     Preventing Falls    Having a health problem can make you more likely to fall. Taking certain kinds of medicines may also increase your risk of falls. So, improving your health can help you avoid a fall. Work with your healthcare provider to manage health problems and to review your medicines. If you have your health under control, your risk of falling is lessened.     When to call your healthcare provider  Be sure to call your healthcare provider if you fall. Also call if you have any of these signs or symptoms (someone else may need to point them out to you):    Feeling lightheaded or dizzy more than once a day    Losing your balance often or feeling unsteady on your feet    Feeling numbness in your legs or feet, or noticing a change in the way you walk    Having a steady decline in your memory or mental sharpness  Managing Chronic Pain: Medicines  Medicines can help you live better with chronic pain. You may use over-the-counter or prescription medicines. It can take some time and trial and error to work out the best treatment plan for you. Work with your health care provider to find the best medicines for you, and to use them safely and effectively.  Tell your health care provider about all medicines you`re taking, including herbs and vitamins.   A part of your treatment plan  Depending on your situation and the type of pain, you may take medicines:    At times when pain is more intense than usual.    For pain relief throughout the day.    Before activities that tend to trigger pain, like going shopping or doing physical therapy.     To decrease sensitivity to pain and help you sleep.  There are 4 major groupings of medicines for the treatment of chronic pain:  Non-opioids. These include the commonly used  medicine acetaminophen as well as the non-steroidal anti-inflammatory drugs (NSAIDs), like aspirin and ibuprofen, naproxen sodium, and ketoprofen. These all help control pain but NSAIDs also help relieve inflammation. These drugs are available over-the-counter. Some NSAIDS are available by prescription only.  Acetaminophen can cause liver damage if taken above the recommended dose. NSAIDs may cause stomach problems like bleeding ulcers. Using them over the long-term can cause heart problems and stroke in a very small number of people. None of these drugs is addictive.  Opioids. This includes drugs, like morphine, oxycodone, codeine, fentanyl, and methadone. Opioids may be used to treat breakthrough pain or severe chronic pain. Opioids are available only by prescription. These medicines may be effective for managing chronic pain. But they are controversial because of their side effects and because they can be addictive.    Adjuvants. This group includes medicines that were originally made to treat other conditions, but were also found to relieve pain. Examples of adjuvant drugs include antidepressants and anticonvulsants.  Antidepressants. These help pain by working on the same brain chemicals that play a role in depression. They also help improve sleep. Tricyclic antidepressants are 1 group of antidepressants used for treating chronic pain caused by nerve injury (neuropathic pain). Examples include amitriptyline, nortriptyline, and desipramine. Serotonin-norepinephrine reuptake inhibitors (SNRIs), like duloxetine and milnacipran, are also used.  Some types of antidepressants are used in low doses for sleep problems. They may also be prescribed if you are very sensitive to pain or some kinds of nerve pain.  Anticonvulsants, developed to prevent seizures, can help certain pain conditions, particularly nerve (neuropathic) pain. Examples include gabapentin and pregabalin.  Other pain medicines    Topical. These  medicines, like lidocaine or capsaicin, are applied to the skin to treat pain in one location.    Muscle relaxants. These may be used to stop painful muscle spasms. Drugs like cyclobenzaprine can be sedating.  Taking medicine safely    Take your medicine on time and in the right dose as prescribed.    Tell your health care professional if your medicine doesn't relieve your pain or work for a long enough time, or if you have side effects.    Don't take other people's medicines. They may not be safe for you.  Avoid alcohol, tobacco, and illegal drugs. These may interact with your medicines causing you harm.    All [facility] clinic patients have access to a Nurse 24 hours a day, 7 days a week.  If you have questions or want advice from a Nurse, please know [facility] is here for you.  You can call your clinic and they will connect you or you can call [facility] at [phone].  [facility] also has Walk In Care clinics in multiple locations.  Call the number listed above for more information about our Walk In Care clinics or visit the [facility] website at [email or web address].

## 2021-06-13 NOTE — TELEPHONE ENCOUNTER
INR result is 3.3  INR   Date Value Ref Range Status   11/25/2020 4.60 (!) 0.9 - 1.1 Final       Will the patient be seen, or did they already see, MD or CNP today? No    Most Recent Warfarin dose day/week  Sunday Monday Tuesday Wednesday Thursday Friday Saturday        3.75 2.5     Sunday Monday Tuesday Wednesday Thursday Friday Saturday   2.5 held 1.25 held held         Has the patient missed any doses of Coumadin, Warfarin, Jantoven in the past 7 days? No    Has the patients medications changed since the last visit? No    Has the patient experienced any bleeding recently? No    Has the patient experienced any injuries or illness recently? No    Has the patient experienced any 'new' shortness of breath, severe headaches, or changes in vision recently? No    Has the patient had any changes in their diet, or alcohol consumption? No    Is the patient here today to prepare for any type of upcoming surgery, procedure, or for a cardioversion procedure? No    What phone number can we reach the patient at today? home phone listed in demographics.

## 2021-06-13 NOTE — PROGRESS NOTES
[facility] Care Coordination Contact  [facility] Home Visit Assessment     Home visit for Health Risk Assessment with Luis Manuel Lemon completed on 12/17/2020    Type of residence:: Apartment  Current living arrangement:: I live in a private home with family   Assessment completed with: Patient    Current Care Plan  Member currently receiving the following home care services: Skilled Nursing   Member currently receiving the following community resources: PCA    Medication Review  Medication reconciliation completed in Epic: NO- Family is unfamiliar with medication regimen, SN weekly.   Medication set-up & administration: RN set up every two weeks  Family caregiver administers medications  Medication Risk Assessment Medication (1 or more, place referral to MTM):  N/A: No risk factors identified  MTM Referral Placed: No: No risk factors idenified    Mental/Behavioral Health   Depression Screening:    PHQ-2 Total Score: 0  Mental health DX:: No       Falls Assessment:   Fallen 2 or more times in the past year?: No   Any fall with injury in the past year?: No    ADL/IADL Dependencies:   Dependent ADLs:: Bathing, Dressing, Grooming, Transfers, Toileting, Ambulation-walker  Dependent IADLs:: Cleaning, Cooking, Laundry, Shopping, Meal Preparation, Medication Management, Money Management, Transportation    [facility] sponsored benefits: Shared information re: Silver Sneakers/gym memberships, ASA, Calcium +D.    PCA Assessment completed at visit: Yes Annual PCA assessment indicated 18 units per day of PCA. This is an increase from the previous assessment.     Elderly Waiver Eligibility: Yes, but member declines EW service; will not open to EW    Care Plan & Recommendations: Luis Manuel Lemon is a 80 year old female with a past medical history of chronic pain syndrome, restrictive lung disease, pulmonary fibrosis, age related constipation, GERD, spinal stenosis, and hypertension.     Luis Manuel lives in a duplex on the second  floor with her daughter, Lay Nicholas (who is her PCA/Primary caregiver), and grandchildren. Luis Manuel reports a decrease in overall functioning related to some minor forgetfulness/disorientation and chronic pain. PCA hours reflex change. No other services requested at this time.    See [facility] for detailed assessment information.    Follow-Up Plan: Member informed of future contact, plan to f/u with member with a 6 month telephone assessment.  Contact information shared with member and family, encouraged member to call with any questions or concerns at any time.    REBECCA Srinivasan  [facility]  [phone]

## 2021-06-13 NOTE — PATIENT INSTRUCTIONS - HE
1) Lets fix the thrush in your mouth with nystatin  2) Will also start Breo once a day to help your breathing.  Take this one puff a day and rinse your mouth out when done.  3) I'll see you back in 6 months

## 2021-06-13 NOTE — PROGRESS NOTES
Assessment and Plan:Luis Manuel Lemon is a 80 y.o. female with a past medical history significant for pulmonary fibrosis and now pulmonary embolism who presents to clinic today for annual follow up.  She is complaining of worsening shortness of breath and her current inhaler helps but only for a little while.  This suggests she has more of an asthma phenotype.  I'd like to try an ICS/LABA until I see her again to see if this helps.  She has some mild thrush to treat with nystatin as well.  Finally I cannot determine if her clot was provoked or not, would recommend a minimum of 6 months, and given her stability issues, she may need to stop this at that time.  Will discuss on her follow up visit if not dispositioned by her primary care doctor first.    1) Pulmonary fibrosis - she seems to do better when she has combivent so there may be a secondary diagnosis causing bronchoconstriction which is usually not associated with classic fibrosis.  An alternate theory remains that she aspirates causing airway irritation which may be benefited with this inhaler.  Will try Breo 200/50 one puff a day until I see her back to see if this helps.  Continue albuterol or combivent prn.    2) Pulmonary embolism - unclear if this is provoked or not.  Subsegmental PEs do not always require treatment.   Would complete 6 months of therapy and stop given her instability requiring a cane.    3) Thrush - complaining of a sore mouth, has white spots confirming thrush.  10 days of nystatin four times a day.    4) RTC in 6 months          CCx: pulm fibrosis    HPI: Ms. Luis Manuel Lemon is a 80 year old female with a history of pulmonary fibrosis who returns for follow up.  Since her last visit she was diagnosed with a small PE and started on coumadin.  She has shortness of breath, but it is not changed from her baseline. She uses albuterol and combivent to some effect, mostly at night as she gets short of breath with laying flat.  She is also complaining of a  sore throat.    ROS:  Review of Systems - History obtained from the patient  General ROS: negative  Psychological ROS: negative  ENT ROS: negative  Allergy and Immunology ROS: negative  Endocrine ROS: negative  Respiratory ROS: positive for - cough, orthopnea and shortness of breath  negative for - sputum changes, stridor, tachypnea or wheezing  Cardiovascular ROS: no chest pain or palpitations  Gastrointestinal ROS: no abdominal pain, change in bowel habits, or black or bloody stools  Genito-Urinary ROS: no dysuria, trouble voiding, or hematuria  Musculoskeletal ROS: negative  Neurological ROS: no TIA or stroke symptoms  Dermatological ROS: negative      Current Meds:  Current Outpatient Medications   Medication Sig     albuterol (PROAIR HFA;PROVENTIL HFA;VENTOLIN HFA) 90 mcg/actuation inhaler Inhale 2 puffs every 6 (six) hours as needed for wheezing or shortness of breath.     amLODIPine (NORVASC) 10 MG tablet TAKE 1 TABLET ORALLY EVERY DAY.     calcium, as carbonate, (ANTACID, CALCIUM CARBONATE,) 200 mg calcium (500 mg) chewable tablet CHEW 1 TABLET BY MOUTH THREE TIMES A DAY TO KEEP YOUR BONES HEALTHY     capsaicin (ZOSTRIX) 0.025 % cream Apply topically 2 (two) times a day as needed.     celecoxib (CELEBREX) 100 MG capsule TAKE ONE CAPSULE BY MOUTH DAILY.     famotidine (PEPCID) 40 MG tablet TAKE 1 TABLET BY MOUTH TWICE DAILY.     gabapentin (NEURONTIN) 100 MG capsule TAKE 2 CAPSULES BY MOUTH THREE TIMES A DAY.     hydroCHLOROthiazide (HYDRODIURIL) 25 MG tablet TAKE ONE TABLET BY MOUTH ONCE DAILY WITH LOSARTAN 100MG.     ipratropium-albuteroL (COMBIVENT RESPIMAT)  mcg/actuation Mist inhaler Inhale 1 puff every 6 (six) hours as needed.     losartan (COZAAR) 100 MG tablet TAKE 1 TABLET BY MOUTH DAILY WITH HYDROCHLOROTHIAZIDE 25MG     MAPAP ARTHRITIS PAIN 650 mg CR tablet TAKE 2 TABLETS BY MOUTH 2 TIMES A DAY     metoprolol succinate (TOPROL-XL) 50 MG 24 hr tablet TAKE 1 TABLET BY MOUTH DAILY     mirtazapine  (REMERON) 15 MG tablet Take 0.5 tablets (7.5 mg total) by mouth at bedtime.     polyvinyl alcohol (ARTIFICIAL TEARS, POLYVIN ALC,) 1.4 % ophthalmic solution APPLY TO EYE DAILY AS NEEDED.     senna-docusate (SENEXON-S) 8.6-50 mg tablet Take 2 tablets by mouth daily.     warfarin ANTICOAGULANT (COUMADIN/JANTOVEN) 2.5 MG tablet Take half to one tablets (1.25 to 2.5 mg) by mouth daily. Adjust dose based on INR results as directed.     warfarin ANTICOAGULANT (COUMADIN/JANTOVEN) 5 MG tablet TAKE 1 TABLET (5MG) BY MOUTH EVERY WEDNESDAY AND 1/2 TABLET (2.5MG) BY MOUTH ALL OTHER DAYS       Labs:  No results found for this or any previous visit (from the past 72 hour(s)).    I have personally reviewed all pertinent imaging studies and PFT results unless otherwise noted.    Imaging studies:  Xr Chest 1 View Portable    Result Date: 12/14/2019  EXAM: XR CHEST 1 VIEW PORTABLE LOCATION: [facility] DATE/TIME: 12/14/2019 1:53 PM INDICATION: sob COMPARISON: 11/07/2019     There is a mildly elevated right diaphragm on the present and prior study. There is cardiomegaly, the pulmonary vasculature is within normal limits. No pneumonia is seen. There is blunting of the left costophrenic angle which is stable compared to the prior study.    Cta Chest Pe Run    Result Date: 12/14/2019  EXAM: CTA CHEST PE RUN LOCATION: [facility] DATE/TIME: 12/14/2019 3:49 PM INDICATION: Shortness of breath and dyspnea COMPARISON: 04/25/2018 CTA chest. TECHNIQUE: CT angiogram chest during arterial phase injection IV contrast. 2D and 3D MIP reconstructions were performed by the CT technologist. Dose reduction techniques were used. CONTRAST: Iohexol (Omni) 75mL FINDINGS: ANGIOGRAM CHEST: Negative for pulmonary emboli. Enlarged central pulmonary arteries concerning for pulmonary artery hypertension similar to previous. Fusiform ascending thoracic aortic aneurysm is stable measuring 3.9 cm. No CT evidence of right heart strain. LUNGS AND  "PLEURA: Minimal mosaic pattern to the lung bases likely from some mild small airway obstruction of little significance but similar to previous. MEDIASTINUM/AXILLAE: No lymphadenopathy. Generalized cardiac enlargement with reflux of contrast into the IVC and hepatic veins suggestive of right heart failure. UPPER ABDOMEN: Normal. MUSCULOSKELETAL: Normal.     1.  No acute new findings. 2.  Negative for pulmonary emboli and negative for dissections. 3.  Enlarged central pulmonary arteries suggestive of pulmonary artery hypertension. 4.  Stable 3.9 cm fusiform ascending thoracic aortic aneurysm. 5.  Enlarged heart with reflux of contrast into the IVC often seen with right heart failure.    Ct Abdomen Pelvis Without Oral With Iv Contrast    Result Date: 12/14/2019  EXAM: CT ABDOMEN PELVIS WO ORAL W IV CONTRAST LOCATION: [facility] DATE/TIME: 12/14/2019 3:50 PM INDICATION: Abdominal distension LLQ abdominal pain, diverticulitis suspected pain COMPARISON: None. TECHNIQUE: CT scan of the abdomen and pelvis was performed following injection of IV contrast. Multiplanar reformats were obtained. Dose reduction techniques were used. CONTRAST: Iohexol (Omni) 75mL FINDINGS: LOWER CHEST: CTA chest read separately. HEPATOBILIARY: Gallbladder removed. Otherwise negative. PANCREAS: Normal. SPLEEN: Normal. ADRENAL GLANDS: Normal. KIDNEYS/BLADDER: 3 cm simple cyst left kidney. Otherwise negative. BOWEL: Scattered diverticula but nothing for acute diverticulitis. LYMPH NODES: Normal. VASCULATURE: Moderate calcified plaque. No abdominal aortic aneurysms. PELVIC ORGANS: Postop change. Otherwise negative. OTHER: None. MUSCULOSKELETAL: Normal.     1.  No acute findings in the abdomen and pelvis. 2.  Diverticulosis in the colon but nothing for acute diverticulitis.         Physical Exam:  /62   Pulse (!) 58   Ht 4' 10\" (1.473 m)   Wt 124 lb (56.2 kg) Comment: per pt  SpO2 94% Comment: RA  BMI 25.92 kg/m    General - Well " nourished  Ears/Mouth - white plaques on right tonsillar pillar  Neck - no cervical lymphadenopathy  Lungs - Crackles in the base  CVS - regular rhythm with no murmurs, rubs or gallups  Abdomen - soft, NT, ND, NABS  Ext - no cyanosis, clubbing or edema  Skin - no rash  Psychology - alert and oriented, answers appropriate        Electronically signed by:    Johan Chavez MD PhD  [facility] Pulmonary and Critical Care Medicine

## 2021-06-13 NOTE — PROGRESS NOTES
HPI - 78 yo female here for hospital f/u.     Today she reports feeling dizzy and tired and shaky and low appetite, so she not eating much.   Diarrhea is better after starting PO vanco for C diff  Breathing is better after tx with azithromycin for pneumonia    She was admitted from 10/8/17 - 10/13/17 for SOB  Reviewed d/c summary  Dyspnea on exertion:   -I dont think this is CHF. BNP is normal. Echo- normal EF  -Suspect atypical lung infection, likely pneumonia. Less likely PE with negative leg ultrasound and VQ with low probability of PE  -CT chest showed some bronchial wall thickening in lower lobes and some patchy groundglass opacities in the upper lobes. These are new compared to CT from May 2017.  -Chlamydia serology negative. Legionella urine antigen negativefor atypical pneumonia.   -Improved with rocephin and zithromax  Acute kidney injury:   -Likely from prerenal azotemia from IV furosemide.  Discontinued IV furosemide.    -Resolved with some IVF. Encouraged oral fluids  -Held Hyzaar due to REGGIE. Re-started now as renal functio has improved  -Checked renal ultrasound, ruled out obstruction. Checked total CK- normal  -Nephrology consulted  Hypertension:   -Resumed home medication- metoprolol, Norvasc  -Re-started Hyzaar.  GERD:   -Cont PPI.  C diff diarrhea:  -Clinically getting better  -Cont with PO vancomycin   Hypomagenesemia    Shoulder pain  - improving  Chronic pain multifactorial -  Pain is intermittent and at times feels good and other days pain returns  Today c/o left shoulder pain  (Joint injection in the past helped with the pain)  Seen by spine clinic and had another shoulder injection on 8/14/17  Last month we stopped tramadol and Increased gabapentin to 300 qhs, and 100mg am and midday    Current Outpatient Prescriptions   Medication Sig Note     acetaminophen (TYLENOL) 650 MG CR tablet Take 1 tablet (650 mg total) by mouth 2 (two) times a day.      amLODIPine (NORVASC) 10 MG tablet Take 10 mg  "by mouth every morning.      calcium, as carbonate, (TUMS) 200 mg calcium (500 mg) chewable tablet Chew 1 tablet 3 (three) times a day.      docusate sodium (COLACE) 100 MG capsule Take 100 mg by mouth 2 (two) times a day.      gabapentin (NEURONTIN) 100 MG capsule Take 100 mg by mouth 2 times daily before breakfast and lunch.      gabapentin (NEURONTIN) 100 MG capsule Take 300 mg by mouth at bedtime.      ibandronate (BONIVA) 150 mg tablet Take 1 tablet (150 mg total) by mouth every 30 (thirty) days. Take in AM with glass of water prior to food, don't lie down for 30 minutes.      losartan-hydrochlorothiazide (HYZAAR) 100-25 mg per tablet Take 1 tablet by mouth daily.      metoprolol succinate (TOPROL XL) 25 MG Take 1 tablet (25 mg total) by mouth daily.      omeprazole (PRILOSEC) 20 MG capsule Take 20 mg by mouth daily before breakfast.      polyvinyl alcohol (LIQUIFILM TEARS) 1.4 % ophthalmic solution Apply to eye daily as needed      vancomycin (VANCOCIN) 125 MG capsule Take 1 capsule (125 mg total) by mouth 4 (four) times a day for 12 days.      VITAMIN D2 50,000 unit capsule Take 1 capsule (50,000 Units total) by mouth once a week. 5/12/2017: Monday per bottle     Vitals:    10/16/17 0929   BP: 92/60   Pulse: 61   Resp: 14   Temp: 98.5  F (36.9  C)   TempSrc: Oral   SpO2: 94%   Weight: 124 lb 3.2 oz (56.3 kg)   Height: 4' 11\" (1.499 m)     OBJECTIVE:  Vitals listed above within normal limits  General appearance: well groomed, pleasant, well hydrated, nontoxic appearing  ENT: PERRL, throat clear  Neck: neck supple, no lymphadenopathy, no thyromegaly  Lungs: lungs clear to auscultation bilaterally, no wheezes or rhonchi  Heart: regular rate and rhythm, no murmurs, rubs or gallops  Abdomen: soft, nontender  Neuro: no focal deficits, CN II-XII grossly intact, alert and oriented  Psych:  mood stable, appears to have good insight and judgment    A/P  Hospital f/u  Reviewed D/c Summary   Sx improving    Pneumonia - " resolved with anzithromycin    C diff diarrhea - improving with PO vanco but still having some low appetite  Encouraged to stay hydrated and to drink fluids  Will reassess after completing 12 days of vanco    Hypertension: well controlled  -continue metoprolol, Norvasc, Hyzaar.    Shoulder pain  - improving after injection and with gabapentin    Spent 25 min face to face with patient with more the 50% spent in counseling, reviewing chart, and coordination of care and discussing problems listed above.

## 2021-06-13 NOTE — PROGRESS NOTES
Updated emergency care plan for graduation plan.    Preventing Falls    Having a health problem can make you more likely to fall. Taking certain kinds of medicines may also increase your risk of falls. So, improving your health can help you avoid a fall. Work with your healthcare provider to manage health problems and to review your medicines. If you have your health under control, your risk of falling is lessened.    When to call your healthcare provider  Be sure to call your healthcare provider if you fall. Also call if you have any of these signs or symptoms (someone else may need to point them out to you):    Feeling lightheaded or dizzy more than once a day    Losing your balance often or feeling unsteady on your feet    Feeling numbness in your legs or feet, or noticing a change in the way you walk    Having a steady decline in your memory or mental sharpness  Managing Chronic Pain: Medicines  Medicines can help you live better with chronic pain. You may use over-the-counter or prescription medicines. It can take some time and trial and error to work out the best treatment plan for you. Work with your health care provider to find the best medicines for you, and to use them safely and effectively.  Tell your health care provider about all medicines you`re taking, including herbs and vitamins.   A part of your treatment plan  Depending on your situation and the type of pain, you may take medicines:    At times when pain is more intense than usual.    For pain relief throughout the day.    Before activities that tend to trigger pain, like going shopping or doing physical therapy.     To decrease sensitivity to pain and help you sleep.  There are 4 major groupings of medicines for the treatment of chronic pain:  Non-opioids. These include the commonly used medicine acetaminophen as well as the non-steroidal anti-inflammatory drugs (NSAIDs), like aspirin and ibuprofen, naproxen sodium, and ketoprofen. These all  help control pain but NSAIDs also help relieve inflammation. These drugs are available over-the-counter. Some NSAIDS are available by prescription only.  Acetaminophen can cause liver damage if taken above the recommended dose. NSAIDs may cause stomach problems like bleeding ulcers. Using them over the long-term can cause heart problems and stroke in a very small number of people. None of these drugs is addictive.  Opioids. This includes drugs, like morphine, oxycodone, codeine, fentanyl, and methadone. Opioids may be used to treat breakthrough pain or severe chronic pain. Opioids are available only by prescription. These medicines may be effective for managing chronic pain. But they are controversial because of their side effects and because they can be addictive.    Adjuvants. This group includes medicines that were originally made to treat other conditions, but were also found to relieve pain. Examples of adjuvant drugs include antidepressants and anticonvulsants.  Antidepressants. These help pain by working on the same brain chemicals that play a role in depression. They also help improve sleep. Tricyclic antidepressants are 1 group of antidepressants used for treating chronic pain caused by nerve injury (neuropathic pain). Examples include amitriptyline, nortriptyline, and desipramine. Serotonin-norepinephrine reuptake inhibitors (SNRIs), like duloxetine and milnacipran, are also used.  Some types of antidepressants are used in low doses for sleep problems. They may also be prescribed if you are very sensitive to pain or some kinds of nerve pain.  Anticonvulsants, developed to prevent seizures, can help certain pain conditions, particularly nerve (neuropathic) pain. Examples include gabapentin and pregabalin.  Other pain medicines    Topical. These medicines, like lidocaine or capsaicin, are applied to the skin to treat pain in one location.    Muscle relaxants. These may be used to stop painful muscle spasms.  Drugs like cyclobenzaprine can be sedating.  Taking medicine safely    Take your medicine on time and in the right dose as prescribed.    Tell your health care professional if your medicine doesn't relieve your pain or work for a long enough time, or if you have side effects.    Don't take other people's medicines. They may not be safe for you.  Avoid alcohol, tobacco, and illegal drugs. These may interact with your medicines causing you harm.

## 2021-06-13 NOTE — TELEPHONE ENCOUNTER
INR result is 4.6  INR   Date Value Ref Range Status   11/23/2020 4.60 (!) 0.9 - 1.1 Final       Will the patient be seen, or did they already see, MD or CNP today? No    Most Recent Warfarin dose day/week  Sunday Monday Tuesday Wednesday Thursday Friday Saturday      3.75 3.75 3.75 2.5     Sunday Monday Tuesday Wednesday Thursday Friday Saturday   2.5 held 1.25           Has the patient missed any doses of Coumadin, Warfarin, Jantoven in the past 7 days? No    Has the patients medications changed since the last visit? No    Has the patient experienced any bleeding recently? No    Has the patient experienced any injuries or illness recently? No    Has the patient experienced any 'new' shortness of breath, severe headaches, or changes in vision recently? No    Has the patient had any changes in their diet, or alcohol consumption? No    Is the patient here today to prepare for any type of upcoming surgery, procedure, or for a cardioversion procedure? No    What phone number can we reach the patient at today? Vicky

## 2021-06-13 NOTE — TELEPHONE ENCOUNTER
RN Med Refill request:    Last refill 7/14/20 for 90 tablets, 3 refills.    There should be refills remaining on this Rx order.    Will refuse this encounter.    Kandace Rojas RN   [facility] RN Triage

## 2021-06-13 NOTE — TELEPHONE ENCOUNTER
INR result is  4.6 via finger sticks.  INR   Date Value Ref Range Status   11/19/2020 1.10 0.9 - 1.1 Final       Will the patient be seen, or did they already see, MD or CNP today? No    Most Recent Warfarin dose day/week  Sunday Monday Tuesday Wednesday Thursday Friday Saturday    3.75 2.5 2.5 3.75 3.75 2.5     Sunday Monday Tuesday Wednesday Thursday Friday Saturday   2.5             Has the patient missed any doses of Coumadin, Warfarin, Jantoven in the past 7 days? No    Has the patients medications changed since the last visit? No    Has the patient experienced any bleeding recently? No    Has the patient experienced any injuries or illness recently? No    Has the patient experienced any 'new' shortness of breath, severe headaches, or changes in vision recently? No    Has the patient had any changes in their diet, or alcohol consumption? No    Is the patient here today to prepare for any type of upcoming surgery, procedure, or for a cardioversion procedure? No    What phone number can we reach the patient at today?  ALVIN Perry  [facility] [phone]

## 2021-06-13 NOTE — PROGRESS NOTES
[facility] Care Coordination Contact    Received after visit chart from care coordinator.  Completed following tasks: Mailed copy of care plan to client and Updated services in access.     [facility]:  Emailed completed PCA assessment to [facility].  Faxed copy of PCA assessment to [facility] Agency and mailed copy to member.  Faxed MD Communication to PCP.     Mailed PCA Signature page and POC Signature page to member w/ self-stamped envelope for return.    Kera Cavanaugh  Care Management Specialist  [facility]  [phone]

## 2021-06-14 ENCOUNTER — COMMUNICATION - HEALTHEAST (OUTPATIENT)
Dept: MULTI SPECIALTY CLINIC | Facility: CLINIC | Age: 81
End: 2021-06-14

## 2021-06-14 ENCOUNTER — OFFICE VISIT - HEALTHEAST (OUTPATIENT)
Dept: PULMONOLOGY | Facility: OTHER | Age: 81
End: 2021-06-14

## 2021-06-14 DIAGNOSIS — R06.09 OTHER FORM OF DYSPNEA: ICD-10-CM

## 2021-06-14 ASSESSMENT — MIFFLIN-ST. JEOR: SCORE: 922.21

## 2021-06-14 NOTE — TELEPHONE ENCOUNTER
Refill Approved    Rx renewed per Medication Renewal Policy. Medication was last renewed on 10/15/20.    Blaire Keating, [facility] Triage/Med Refill 1/19/2021     Requested Prescriptions   Pending Prescriptions Disp Refills     losartan (COZAAR) 100 MG tablet [Pharmacy Med Name: LOSARTAN POTASSIUM 100 MG T 100 Tablet] 30 tablet 0     Sig: TAKE 1 TABLET BY MOUTH DAILY WITH HYDROCHLOROTHIAZIDE 25MG       Angiotensin Receptor Blocker Protocol Passed - 1/18/2021  1:55 PM        Passed - PCP or prescribing provider visit in past 12 months       Last office visit with prescriber/PCP: 10/15/2020 Ludivina Early CNP OR same dept: 11/2/2020 Kateryna Morales MD OR same specialty: 11/2/2020 Kateryna Morales MD  Last physical: Visit date not found Last MTM visit: Visit date not found   Next visit within 3 mo: Visit date not found  Next physical within 3 mo: Visit date not found  Prescriber OR PCP: Ludivina Early CNP  Last diagnosis associated with med order: 1. Essential hypertension  - losartan (COZAAR) 100 MG tablet [Pharmacy Med Name: LOSARTAN POTASSIUM 100 MG T 100 Tablet]; TAKE 1 TABLET BY MOUTH DAILY WITH HYDROCHLOROTHIAZIDE 25MG  Dispense: 30 tablet; Refill: 0    If protocol passes may refill for 12 months if within 3 months of last provider visit (or a total of 15 months).             Passed - Serum potassium within the past 12 months     Lab Results   Component Value Date    Potassium 4.1 10/15/2020             Passed - Blood pressure filed in past 12 months     BP Readings from Last 1 Encounters:   12/14/20 132/66             Passed - Serum creatinine within the past 12 months     Creatinine   Date Value Ref Range Status   10/15/2020 1.37 (H) 0.60 - 1.10 mg/dL Final

## 2021-06-14 NOTE — TELEPHONE ENCOUNTER
INR result is 4.0  INR   Date Value Ref Range Status   01/07/2021 1.10 0.9 - 1.1 Final       Will the patient be seen, or did they already see, MD or CNP today? No    Most Recent Warfarin dose day/week  Sunday Monday Tuesday Wednesday Thursday Friday Saturday       3.75 1.25 2.5     Sunday Monday Tuesday Wednesday Thursday Friday Saturday   1.25 2.5 2.5 1.25          Has the patient missed any doses of Coumadin, Warfarin, Jantoven in the past 7 days? No    Has the patients medications changed since the last visit? No    Has the patient experienced any bleeding recently? No    Has the patient experienced any injuries or illness recently? No    Has the patient experienced any 'new' shortness of breath, severe headaches, or changes in vision recently? No    Has the patient had any changes in their diet, or alcohol consumption? No    Is the patient here today to prepare for any type of upcoming surgery, procedure, or for a cardioversion procedure? No    What phone number can we reach the patient at today? home phone listed in demographics.

## 2021-06-14 NOTE — TELEPHONE ENCOUNTER
INR result is 1.1  INR   Date Value Ref Range Status   12/31/2020 2.60 (!) 0.9 - 1.1 Final       Will the patient be seen, or did they already see, MD or CNP today? No    Most Recent Warfarin dose day/week  Sunday Monday Tuesday Wednesday Thursday Friday Saturday       2.5 1.25 2.5     Sunday Monday Tuesday Wednesday Thursday Friday Saturday   1.25 2.5 2.5 1.25          Has the patient missed any doses of Coumadin, Warfarin, Jantoven in the past 7 days? Yes missed Thurs 2.5 mg dose    Has the patients medications changed since the last visit? Yes, mirtazapine increased to 15 mg    Has the patient experienced any bleeding recently? No    Has the patient experienced any injuries or illness recently? No    Has the patient experienced any 'new' shortness of breath, severe headaches, or changes in vision recently? No    Has the patient had any changes in their diet, or alcohol consumption? No    Is the patient here today to prepare for any type of upcoming surgery, procedure, or for a cardioversion procedure? No    What phone number can we reach the patient at today? home phone listed in demographics.

## 2021-06-14 NOTE — TELEPHONE ENCOUNTER
INR result is           2.4  INR   Date Value Ref Range Status   01/21/2021 1.80 (!) 0.9 - 1.1 Final       Will the patient be seen, or did they already see, MD or CNP today? No    Most Recent Warfarin dose day/week  Sunday Monday Tuesday Wednesday Thursday Friday Saturday       2.5 1.25 2.5     Sunday Monday Tuesday Wednesday Thursday Friday Saturday   1.25 2.5 2.5 1.25          Has the patient missed any doses of Coumadin, Warfarin, Jantoven in the past 7 days? No    Has the patients medications changed since the last visit? No    Has the patient experienced any bleeding recently? No    Has the patient experienced any injuries or illness recently? No    Has the patient experienced any 'new' shortness of breath, severe headaches, or changes in vision recently? No    Has the patient had any changes in their diet, or alcohol consumption? No    Is the patient here today to prepare for any type of upcoming surgery, procedure, or for a cardioversion procedure? No    What phone number can we reach the patient at today? home phone listed in demographics.

## 2021-06-14 NOTE — PROGRESS NOTES
HPI - 76 yo female here to f/u on c diff.     She had a positive c diff test on 10/11/17 while in hospital and treated with PO vancomycin.     Today she reports diarrhea is resolved. She also reports breathing is good so not using the combivent b/c she has been breathing well and told she can use prn. She has not needed it for several weeks.     HTN - BP well controlled with current meds  On amlodipine, losartan-HCTZ, toprol XL    Shoulder pain  -  Chronic pain multifactorial -  Pain is intermittent and at times feels good and other days pain returns  Today c/o left shoulder pain  (Joint injection in the past helped with the pain)  Seen by spine clinic and had another shoulder injection on 8/14/17  A few months ago, we stopped tramadol and Increased gabapentin to 300 qhs, and 100mg am and midday  Pain is now tolerable with meds which is helping with the shoulder, back and knee pain.     Per chart review:   She was admitted 10/8-10/13/17  Principal Problem:    CHF (congestive heart failure)  Active Problems:    Essential hypertension    CAP (community acquired pneumonia)    REGGIE (acute kidney injury)    F/u with pulm on 11/1/17  Assessment and Plan:Luis Manuel Lemon is a 77 y.o. with a past medical history significant for moderate restrictive lung disease who presents to clinic today for follow up.  I suspect she has some bronchiectasis from her restrictive lung disease and gets recurrent symptoms from this.  She has been difficult to schedule in the office, and I would like to get more aggressive at finding solutions for her symptoms as she is on no inhalers at the moment.  I suspect we should start simply and work our way up from there.    1) Cough/dyspnea - will try combivent 1-2 puffs q6h prn.  I suspect this will help a fair bit.  If not feeling great on her next visit we can consider adding an inhaled steroid which may help any inflammatory component of her lung process.  2) RTC in 3 months      She plans to travel to  [facility] from 11/28-12/28/17  to visit her son.   Reviewed CDC guidelines  Rabies advised to be seen at a [facility] clinic if bitten by any animals, jurgen dogs. [facility] has a good rabies program.   Typhoid shot given  Hep A/B - need to review records for possible immunity  Malaria - given rx for doxycline and advised Deet wipes, prometherin spray for clothes, and bednets      Current Outpatient Prescriptions   Medication Sig Note     amLODIPine (NORVASC) 10 MG tablet Take 10 mg by mouth every morning.      calcium, as carbonate, (TUMS) 200 mg calcium (500 mg) chewable tablet Chew 1 tablet 3 (three) times a day.      docusate sodium (COLACE) 100 MG capsule Take 100 mg by mouth 2 (two) times a day.      gabapentin (NEURONTIN) 100 MG capsule Take 100 mg by mouth 2 times daily before breakfast and lunch.      gabapentin (NEURONTIN) 100 MG capsule Take 300 mg by mouth at bedtime.      ibandronate (BONIVA) 150 mg tablet Take 1 tablet (150 mg total) by mouth every 30 (thirty) days. Take in AM with glass of water prior to food, don't lie down for 30 minutes.      ipratropium-albuterol (COMBIVENT RESPIMAT)  mcg/actuation Mist inhaler Inhale 1 puff every 6 (six) hours as needed.      losartan-hydrochlorothiazide (HYZAAR) 100-25 mg per tablet Take 1 tablet by mouth daily.      MAPAP ARTHRITIS PAIN 650 mg CR tablet TAKE 1 TABLET BY MOUTH EVERY 8 HOURS AS NEEDED FOR PAIN      metoprolol succinate (TOPROL XL) 25 MG Take 1 tablet (25 mg total) by mouth daily.      omeprazole (PRILOSEC) 20 MG capsule Take 20 mg by mouth daily before breakfast.      polyvinyl alcohol (LIQUIFILM TEARS) 1.4 % ophthalmic solution Apply to eye daily as needed      VITAMIN D2 50,000 unit capsule Take 1 capsule (50,000 Units total) by mouth once a week. 5/12/2017: Monday per bottle     diphenhydrAMINE (DIPHENHIST) 25 mg tablet Take 1 tablet (25 mg total) by mouth at bedtime as needed for itching or sleep.      Vitals:    11/09/17 1023   BP: 132/64  "  Pulse: 76   Resp: 14   Temp: 97.6  F (36.4  C)   TempSrc: Oral   SpO2: 96%   Weight: 127 lb (57.6 kg)   Height: 4' 11\" (1.499 m)     OBJECTIVE:  Vitals listed above within normal limits  General appearance: well groomed, pleasant, well hydrated, nontoxic appearing  ENT: PERRL, throat clear  Neck: neck supple, no lymphadenopathy, no thyromegaly  Lungs: lungs clear to auscultation bilaterally, no wheezes or rhonchi  Heart: regular rate and rhythm, no murmurs, rubs or gallops  Abdomen: soft, nontender  Neuro: no focal deficits, CN II-XII grossly intact, alert and oriented  Psych:  mood stable, appears to have good insight and judgment    A/P  C diff - resolved    Restrictive lung disease   Followed by pulm and has combivent prn that she has not needed    HTN - BP well controlled with current meds    Polypharmacy -   Home health RN sets up meds  On amlodipine, losartan-HCTZ, toprol XL    Joint pain well managed with gabapentin    Travel consult -  She plans to travel to [facility] from 11/28-12/28/17  to visit her son.   Reviewed CDC guidelines  Rabies advised to be seen at a [facility] clinic if bitten by any animals, jurgen dogs. [facility] has a good rabies program.   Typhoid shot given  Hep A/B - need to review records for possible immunity  Malaria - given rx for doxycline and advised Deet wipes, prometherin spray for clothes, and bednets  Will ask her pulmonologist if she is safe to travel or if she needs any precautions with flying.       Spent 40 min face to face with patient with more the 50% spent in counseling, reviewing chart, and coordination of care and discussing problems listed above.     "

## 2021-06-14 NOTE — TELEPHONE ENCOUNTER
ANTICOAGULATION  MANAGEMENT- Home Care/Care Facility Result    Assessment     Today's INR result of 2.4 is Therapeutic (goal INR of 2.0-3.0)        Warfarin taken as previously instructed    No new diet changes affecting INR    No new medication/supplements affecting INR    Continues to tolerate warfarin with no reported s/s of bleeding or thromboembolism     Previous INR was Subtherapeutic    Plan:     Spoke with Vicky discussed INR result and instructed:     Warfarin Dosing Instructions: Change warfarin dose to 2.5 mg daily on Tuesdays, Thursdays and Saturdays; and 1.25 mg daily rest of week  (9.1 % change) Patient very sensitive with the warfarin. Patient and nurse would like to change dose a small amount so does not end back up to 4.0    Next INR to be drawn: one week.    Education provided: importance of therapeutic range, importance of following up for INR monitoring at instructed interval and importance of taking warfarin as instructed    Vicky verbalizes understanding and agrees to warfarin dosing plan.   ?   Radha Avitia RN    Subjective/Objective:      Luis Manuel Lemon, a 81 y.o. female is established on warfarin.     Home care/care facility RN's report of [facility] INR, recent warfarin dosing, diet changes, medication changes, and symptoms is documented below.    Additional findings: verbally confirmed home dose with Vicky and updated on anticoagulation calendar    Anticoagulation Episode Summary     Current INR goal:  2.0-3.0   TTR:  35.2 % (3.2 mo)   Next INR check:  2/4/2021   INR from last check:  2.40 (1/28/2021)   Weekly max warfarin dose:     Target end date:  2/22/2021   INR check location:     Preferred lab:     Send INR reminders to:  [facility]OO[facility]    Indications    Anticoagulated [Z79.01]  Single subsegmental pulmonary embolism without acute cor pulmonale (H) [I26.93]           Comments:           Anticoagulation Care Providers     Provider Role Specialty Phone number    Ludivina Early CNP  Referring Nurse Practitioner [phone]

## 2021-06-14 NOTE — TELEPHONE ENCOUNTER
ANTICOAGULATION  MANAGEMENT- Home Care/Care Facility Result    Assessment     Today's INR result of 2.6 is Therapeutic (goal INR of 2.0-3.0)        More warfarin taken than instructed which may be affecting INR  Took 1.25 mg Wednesday and 2.5 mg all other days this past week.    No new diet changes affecting INR    No new medication/supplements affecting INR    Continues to tolerate warfarin with no reported s/s of bleeding or thromboembolism     Previous INR was Subtherapeutic    Plan:     Spoke with Vicky discussed INR result and instructed:     Warfarin Dosing Instructions: Continue current warfarin dose 1.25 mg daily on Sundays, Wednesdays and Fridays; and 2.5 mg daily rest of week  (0 % change)    Next INR to be drawn: one week.    Education provided: importance of therapeutic range, importance of following up for INR monitoring at instructed interval and importance of taking warfarin as instructed    Vicky verbalizes understanding and agrees to warfarin dosing plan.   ?   Radha Avitia RN    Subjective/Objective:      Luis Manuel Lemon, a 80 y.o. female is established on warfarin.     Home care/care facility RN's report of [facility] INR, recent warfarin dosing, diet changes, medication changes, and symptoms is documented below.    Additional findings: template incorrect; verbally confirmed home dose with Vicky and updated on anticoagulation calendar    Anticoagulation Episode Summary     Current INR goal:  2.0-3.0   TTR:  30.3 % (2.2 mo)   Next INR check:  1/7/2021   INR from last check:  2.60 (12/31/2020)   Weekly max warfarin dose:     Target end date:  2/22/2021   INR check location:     Preferred lab:     Send INR reminders to:  [facility]    Indications    Anticoagulated [Z79.01]  Single subsegmental pulmonary embolism without acute cor pulmonale (H) [I26.93]           Comments:           Anticoagulation Care Providers     Provider Role Specialty Phone number    Ludivina Early CNP Referring Nurse  [facility] [phone]

## 2021-06-14 NOTE — TELEPHONE ENCOUNTER
INR result is 2.6  INR   Date Value Ref Range Status   12/25/2020 1.40 (!) 0.9 - 1.1 Final       Will the patient be seen, or did they already see, MD or CNP today? No    Most Recent Warfarin dose day/week  Sunday Monday Tuesday Wednesday Thursday Friday Saturday        2.5 2.5     Sunday Monday Tuesday Wednesday Thursday Friday Saturday   2.5 2.5 2.5 1.25          Has the patient missed any doses of Coumadin, Warfarin, Jantoven in the past 7 days? No    Has the patients medications changed since the last visit? No    Has the patient experienced any bleeding recently? No    Has the patient experienced any injuries or illness recently? No    Has the patient experienced any 'new' shortness of breath, severe headaches, or changes in vision recently? No    Has the patient had any changes in their diet, or alcohol consumption? No    Is the patient here today to prepare for any type of upcoming surgery, procedure, or for a cardioversion procedure? No    What phone number can we reach the patient at today? home phone listed in demographics.

## 2021-06-14 NOTE — TELEPHONE ENCOUNTER
Refill Approved    Rx renewed per Medication Renewal Policy. Medication was last renewed on 10/15/20.    Blaire Keating, [facility] Triage/Med Refill 1/22/2021     Requested Prescriptions   Pending Prescriptions Disp Refills     amLODIPine (NORVASC) 10 MG tablet [Pharmacy Med Name: AMLODIPINE BESYLATE 10 MG T 10 Tablet] 30 tablet 0     Sig: TAKE 1 TABLET ORALLY EVERY DAY.       Calcium-Channel Blockers Protocol Passed - 1/21/2021 11:46 AM        Passed - PCP or prescribing provider visit in past 12 months or next 3 months     Last office visit with prescriber/PCP: 10/15/2020 Ludivina Early CNP OR same dept: 11/2/2020 Kateryna Morales MD OR same specialty: 11/2/2020 Kateryna Morales MD  Last physical: Visit date not found Last MTM visit: Visit date not found   Next visit within 3 mo: Visit date not found  Next physical within 3 mo: Visit date not found  Prescriber OR PCP: Ludivina Early CNP  Last diagnosis associated with med order: 1. Essential hypertension  - amLODIPine (NORVASC) 10 MG tablet [Pharmacy Med Name: AMLODIPINE BESYLATE 10 MG T 10 Tablet]; TAKE 1 TABLET ORALLY EVERY DAY.  Dispense: 30 tablet; Refill: 0    If protocol passes may refill for 12 months if within 3 months of last provider visit (or a total of 15 months).             Passed - Blood pressure filed in past 12 months     BP Readings from Last 1 Encounters:   12/14/20 132/66

## 2021-06-14 NOTE — TELEPHONE ENCOUNTER
FYI - Status Update  Who is Calling: Group Home  Update: Calling to inform INR result of 1.4 that was drawn on 12.25.20. Needs to speak to a nurse in regards to dosing  Okay to leave a detailed message?:  Yes

## 2021-06-14 NOTE — TELEPHONE ENCOUNTER
INR result is 1.8 fingerstick  INR   Date Value Ref Range Status   01/14/2021 4.00 (!) 0.9 - 1.1 Final       Will the patient be seen, or did they already see, MD or CNP today? No    Most Recent Warfarin dose day/week  Sunday Monday Tuesday Wednesday Thursday Friday Saturday       held 1.25 2.5     Sunday Monday Tuesday Wednesday Thursday Friday Saturday   1.25 2.5 2.5 1.25          Has the patient missed any doses of Coumadin, Warfarin, Jantoven in the past 7 days? No    Has the patients medications changed since the last visit? No    Has the patient experienced any bleeding recently? No    Has the patient experienced any injuries or illness recently? No    Has the patient experienced any 'new' shortness of breath, severe headaches, or changes in vision recently? No    Has the patient had any changes in their diet, or alcohol consumption? No    Is the patient here today to prepare for any type of upcoming surgery, procedure, or for a cardioversion procedure? No    What phone number can we reach the patient at today? home phone listed in demographics.

## 2021-06-15 PROBLEM — M48.00 SPINAL STENOSIS, MULTILEVEL: Status: ACTIVE | Noted: 2017-04-27

## 2021-06-15 PROBLEM — M50.30 DEGENERATIVE DISC DISEASE, CERVICAL: Status: ACTIVE | Noted: 2017-04-27

## 2021-06-15 NOTE — PROGRESS NOTES
Assessment:   Luis Manuel Lemon is a 81 y.o. y.o. female with past medical history significant for vitamin D deficiency, hypertension, hyperlipidemia, osteoporosis on Prolia, chronic GERD, chronic pain syndrome, pulmonary fibrosis, polyarthralgia, stage III chronic kidney disease who presents today for follow-up regarding 2 areas of concern.  1.  Chronic right neck pain and right upper extremity paresthesias.  My review of the MRI cervical spine shows multilevel degenerative change most pronounced at C6-7 where there is moderate spinal canal stenosis.  2.  Chronic and worsening bilateral low back pain with radiation into the bilateral lower extremities with associated weakness and limited walking tolerance.  My review of an MRI lumbar spine shows mild spinal stenosis at L4-5 related to a disc bulge and facet arthropathy.  There is no high-grade spinal canal or neuroforaminal stenosis.  Patient has had lumbar facet joint injections, epidural steroid injection x2, sacroiliac joint injection x2.  None of these injections have provided lasting relief of her lower back pain.  On exam, patient does appear to be primarily symptomatic from the lumbar facet arthropathy.  She had reproduction of her pain with lumbar facet loading maneuvers bilaterally.  -On exam, patient had diffuse weakness in the upper extremities and in the bilateral hip flexors.  She appears to be significantly deconditioned.  She did not have any hyperreflexia or pathologic reflexes.  She had a sensory deficit right fingers 3 and 4.       Plan:     A shared decision making plan was used.  The patient's values and choices were respected.  The following represents what was discussed and decided upon by the physician assistant and the patient.  A telephone  was used for the visit.  Patient's granddaughter was also present for the visit and helps provide history.    1.  DIAGNOSTIC TESTS: I reviewed the MRI scans of the cervical and lumbar spine in detail  with the patient with the help of a spine model.  No further diagnostic tests were ordered.    2.  PHYSICAL THERAPY: Most recent physical therapy was in October 2018.  She still does her home exercises on a regular basis.  No further physical therapy was ordered.  -Patient completed 4 sessions of occupational therapy with Michelle Russell in 2019 which she did not feel is helpful.    3.  MEDICATIONS: No changes are made to the patient's medications.  Medications are set up by a nurse.   On her medication list is Celebrex 100 mg daily, duloxetine 30 mg daily, gabapentin 100 mg in the morning and 200 mg at bedtime, Tylenol arthritis 1300 mg twice daily.    4.  INTERVENTIONS:   -I offer the patient bilateral L3, L4, L5 medial branch blocks as a work-up for radiofrequency ablation.  Procedure was discussed at length with the patient.  I explained how this is different than her previous steroid injections.  Hopefully this will be more effective.  Patient irrigated she would like to proceed with a medial branch blocks.  -In regards to the neck pain, we could consider repeating a cervical epidural steroid injection.  Patient does not recall her response to the C6-7 interlaminar epidural steroid injection July 26, 2016, but in reviewing notes from around that time she did not complain of neck pain after that injection so perhaps it was helpful.    5.  PATIENT EDUCATION: Patient is in agreement the above plan.  All questions were answered.    6.  FOLLOW-UP: Patient will return to the clinic for her bilateral L 3, L4, L5 medial branch blocks.  If she has questions or concerns in the meantime, she should not hesitate to call.    Subjective:     Luis Manuel Lemon is a 81 y.o. female who presents today for follow-up regarding chronic and worsening neck pain and chronic and worsening low back pain with radiation into the bilateral lower extremities.  I saw the patient February 26, 2021.  At that time I ordered MRI scans of the cervical and  lumbar spine.  She returns today to review those results.  She denies any change in her pain since she was last seen.    Patient complains of right neck pain.  She denies any pain down the arms.  She has numbness and tingling in right fingers 3 and 4.  She feels generally weak.  She states that her neck pain is not too severe right now.    Patient also complains of bilateral low back pain.  This is her primary concern.  Pain spans across the low back in a broadband distribution at the belt line.  Pain radiates into the buttocks and on the posterior thighs to the knees.  Patient reports that back pain is much worse than leg pain.  Pain is aggravated with bending and walking.  She uses a cane for assistance.  Patient notes that her back pain is significantly impacting her quality of life.  She rates her pain today as a 9 out of 10.  At its worst it is a 9 out of 10.  At its best it is a 9 out of 10.  She denies any alleviating factors to the pain.    Most recent course of physical therapy for the lower back was in October 2018.  She also did 4 sessions of occupational therapy ending in January 2019.  She states that she does her home exercises on a regular basis.  Patient's medications are set up by her nurse.   On her list is Celebrex 100 mg daily, duloxetine 30 mg daily, gabapentin 100 mg in the morning and 200 mg at bedtime, Tylenol arthritis 1300 mg twice daily.    Past medical history is reviewed and is unchanged.    Review of Systems:  Positive for numbness/tingling, weakness, pain worse at night.  Negative for loss of bowel/bladder control, inability to urinate, headache, trip/stumble/falls, difficulty swallowing, difficulty with hand skills, fevers, unintentional weight loss.     Objective:   CONSTITUTIONAL:  Vital signs as above.  No acute distress.  The patient is well nourished and well groomed.    PSYCHIATRIC:  The patient is awake, alert, oriented to person, place and time.  The patient is answering  questions appropriately with clear speech.  Normal affect.  HEENT: Normocephalic, atraumatic.  Sclera clear.    SKIN:  Skin over the face, posterior torso, bilateral upper and lower extremities is clean, dry, intact without rashes.  VASCULAR: No significant lower extremity edema.  MUSCULOSKELETAL:  Gait is unsteady.  She ambulates with a significantly flexed forward posture at the hips.  Uses a cane for assistance.  Significant tenderness over the bilateral cervical paraspinous muscles at L4-5 and L5-S1.  Mild tenderness palpation bilateral sacroiliac joints.  Lumbar flexion mildly restricted.  Lumbar extension severely restricted with reproduction of pain.  Patient can barely reach neutral posture.  Lumbar facet loading maneuvers increased low back pain bilaterally.  The patient ha diffuse weakness which I would grade 4/5 for the bilateral shoulder abductors, elbow flexors/extensors, finger flexors/abductors, wrist extensors.  She had 4/5 strength bilateral hip flexors, 5/5 strength bilateral knee flexors/extensors, ankle dorsi/plantar flexors.    NEUROLOGICAL: 1-2+ biceps, triceps, brachioradialis reflexes bilaterally.  2+ patellar, achilles reflexes which are symmetric bilaterally.  Negative Ruby sign bilaterally.  Negative Babinski's bilaterally.  No ankle clonus bilaterally.  Sensation light touch diminished right fingers 3 and 4.  Sensation to light touch is intact in the bilateral L4, L5, and S1 dermatomes.       RESULTS:     I reviewed the MRI cervical spine from [facility] dated March 8, 2021.  This shows multilevel degenerative change.  There is facet arthropathy throughout the cervical spine.  At C3-4 there is mild spinal canal stenosis with moderate left and mild right foraminal stenosis.  At C4-5 there is mild spinal canal stenosis with moderate bilateral foraminal stenosis.  At C5-6 there is mild spinal canal stenosis with moderate bilateral foraminal stenosis.  At C6-7 there is moderate spinal  canal stenosis with mild bilateral foraminal stenosis.  At C7-T1 there is mild bilateral foraminal stenosis.  Please see report for further details.    I reviewed the MRI lumbar spine from [facility] dated March 8, 2021.  This shows multilevel lumbar spondylosis.  At L2-3 there is a disc bulge with mild bilateral foraminal stenosis.  At L3-4 there is a disc bulge with mild bilateral foraminal stenosis.  At L4-5 there is a disc bulge and mild facet arthropathy with mild spinal canal stenosis and mild bilateral foraminal stenosis.  Please see report for further details.

## 2021-06-15 NOTE — TELEPHONE ENCOUNTER
INR result is 1.9  INR   Date Value Ref Range Status   01/28/2021 2.40 (!) 0.9 - 1.1 Final       Will the patient be seen, or did they already see, MD or CNP today? No    Most Recent Warfarin dose day/week  Sunday Monday Tuesday Wednesday Thursday Friday Saturday       2.5 1.25 2.5     Sunday Monday Tuesday Wednesday Thursday Friday Saturday   1.25 1.25 2.5 1.25          Has the patient missed any doses of Coumadin, Warfarin, Jantoven in the past 7 days? No    Has the patients medications changed since the last visit? No    Has the patient experienced any bleeding recently? No    Has the patient experienced any injuries or illness recently? No    Has the patient experienced any 'new' shortness of breath, severe headaches, or changes in vision recently? No    Has the patient had any changes in their diet, or alcohol consumption? No    Is the patient here today to prepare for any type of upcoming surgery, procedure, or for a cardioversion procedure? No    What phone number can we reach the patient at today? Vicky

## 2021-06-15 NOTE — TELEPHONE ENCOUNTER
INR result is 2.6  INR   Date Value Ref Range Status   02/18/2021 1.50 (!) 0.90 - 1.10 Final       Will the patient be seen, or did they already see, MD or CNP today? No    Most Recent Warfarin dose day/week  Sunday Monday Tuesday Wednesday Thursday Friday Saturday       2.5 mg 1.25 mg 2.5 mg     Sunday Monday Tuesday Wednesday Thursday Friday Saturday   2.5 mg 1.25 mg 2.5 mg 1.25 mg          Has the patient missed any doses of Coumadin, Warfarin, Jantoven in the past 7 days? No    Has the patients medications changed since the last visit? No    Has the patient experienced any bleeding recently? No    Has the patient experienced any injuries or illness recently? No    Has the patient experienced any 'new' shortness of breath, severe headaches, or changes in vision recently? No    Has the patient had any changes in their diet, or alcohol consumption? No    Is the patient here today to prepare for any type of upcoming surgery, procedure, or for a cardioversion procedure? No    What phone number can we reach the patient at today? [phone] Vicky.

## 2021-06-15 NOTE — TELEPHONE ENCOUNTER
Spoke with ALVIN Perry with [facility].  Informed that patient was to stop warfarin as of 2/22, so INR checks are no longer necessary.   Vicky unaware that warfarin stopped-- informed that it was stopped as Luis Manuel was only to be on warfarin for 6 months which was 2/22. Vicky stated she will removed warfarin for med box and no longer check INRs.     No further action needed.    Christine Aviles RN  Anticoagulation Clinic

## 2021-06-15 NOTE — TELEPHONE ENCOUNTER
"Anticoagulation Annual Referral Renewal Review    [facility]'s chart reviewed for annual renewal of referral to anticoagulation monitoring.        Criteria for anticoagulation nurse and/or pharmacist renewal met   Warfarin indication: PE Yes , DVT/PE with previous provider documentation patient to be on extended anticoagulation   Current with INR monitoring/compliant Yes Yes   Date of last office visit 12/14/20 Yes, had office visit within last year   Time in Therapeutic Range (TTR) 40.8 % No, TTR < 60 %       [facility] did NOT meet all criteria for anticoagulation management program initiated renewal and requires provider review. Using dot phrase, \".acmrenewalprovider\", please advise if [facility]'s anticoagulation management referral should be renewed or if patient should be seen in office to review anticoagulation therapy      Christine Aviles RN  3:06 PM      "

## 2021-06-15 NOTE — PROGRESS NOTES
"Prolia Injection Phone Screen      Screening questions have been asked 2-3 days prior to administration visit for Prolia. If any questions are answered with \"Yes,\" this phone encounter were will routed to ordering provider for further evaluation.     1.  When was the last injection?  2019    2.  Has insurance for this injection been verified?  Yes    3.  Did you experience any new onset achiness or rashes that lasted for over a month with your previous Prolia injection?   No    4.  Do you have a fever over 101?F or a new deep cough that is unusual for you today? No    5.  Have you started any new medications in the last 6 months that you were told could affect your immune system? These may have been prescribed by oncologist, transplant, rheumatology, or dermatology.   No    6.  In the last 6 months have you have gastric bypass or parathyroid surgery?   No    7.  Do you plan dental work requiring drilling into the bone such as implants/extractions or oral surgery in the next 2-3 months?   No    8. Do you have new insurance since the last injection? no    9. Have you received the Covid-19 vaccine? No  If No - Proceed with Prolia injection      Patient informed if symptoms discussed above present prior to their administration appointment, they are to notify clinic immediately.     Kera Benavidez              "

## 2021-06-15 NOTE — PROGRESS NOTES
Assessment & Plan     History of pulmonary embolism  Single subsegmental pulmonary embolism without acute cor pulmonale (H)  Pulmonary fibrosis (H)  Scattered respiratory crackles of right lung  She completed her 6 months of anticoagulation treatment and this was stopped as recommended by her pulmonologist.  I did hear crackles at her right lung.  Ordered a chest x-ray.  Also ordered a repeat chest CT to rule out another PE since she has been off the anticoagulation for about a week or 2  - XR Chest 2 Views  - CTA Chest PE Run    Psychophysiological insomnia  Poor appetite  Continue her Cymbalta for her depression and PTSD.  Will decrease her Remeron dose to try to still help with her appetite and insomnia but hopefully not make her so sedated during the day.  - DULoxetine (CYMBALTA) 30 MG capsule  Dispense: 30 capsule; Refill: 2  Decrease dose of REMERON or MIRTAZAPINE  To 1/2 tabs (15mg x 1/2  = 7.5mg dose at bedime)        Spinal stenosis of lumbar region without neurogenic claudication  Chronic pain syndrome  Chronic low back pain, unspecified back pain laterality, unspecified whether sciatica present  Degenerative disc disease, cervical  We will make a referral for her to follow-up with the spine clinic given her known spinal stenosis and continuing neck and low back pain.  Will adjust her up gabapentin dosing to try to improve her pain with less daytime sedation  - Ambulatory referral to [facility]  - capsaicin (ZOSTRIX) 0.025 % cream  Dispense: 60 g; Refill: 0  - gabapentin (NEURONTIN) 100 MG capsule  Dispense: 90 capsule; Refill: 3  - DULoxetine (CYMBALTA) 30 MG capsule  Dispense: 30 capsule; Refill: 2        Elevated serum creatinine  Stage 3a chronic kidney disease  We will recheck her creatinine to follow her kidney function  - Basic Metabolic Panel      Medically complex patient  Polypharmacy  We will notify her home health nurse regarding medication changes    Language barrier affecting health  "care      Review of external notes as documented in note  Diagnosis or treatment significantly limited by social determinants of health - Language barrier, low health literacy, poverty  50 minutes spent on the date of the encounter doing chart review, history and exam, documentation and further activities as noted above  [id number]}     BMI:   Estimated body mass index is 25.64 kg/m  as calculated from the following:    Height as of this encounter: 4' 10.07\" (1.475 m).    Weight as of this encounter: 123 lb (55.8 kg).       Return in about 4 weeks (around 3/25/2021) for Recheck.    Kateryna Morales MD  [facility] Lemon is 81 y.o. and presents today for the following health issues   HPI   Amie professional phone  used.   She is here with her granddaughter.     H/o chronic pain   Low back pain is bothering her the most today.   Last visit her pain was manageable with Tylenol, gabapentin, capsaicin  Last spine consult was 12/2018 and she previously had an L5-S1 interlaminar epidural steroid injection on October 17, 2018 and status post a right SI joint injection on November 12, 2018   MRI 6/2018:  1.  At L4-5, mild trefoil type spinal canal stenosis with asymmetric narrowing of the left lateral recess. Mild left neural foraminal stenosis. Mild displacement of left greater than right L5 nerve roots.  2.  At L3-4, low-grade narrowing the lateral recesses with mild right and mild to moderate left neural foraminal stenosis.  3.  Mild lumbar spondylosis elsewhere as above without additional high-grade stenosis or focal neural impingement.  4.  Mild chronic superior endplate compression fracture at T12.    h/o small solitary segmental PE in [facility] on 8/22/20 in [place]   per pulm notes need tx or 6 months which ended 2/22/21 and has now finished her coumadin treatment  Shortness of breath occasionally only 2 x per 2 weeks and using inhalers      pulm consult note 12/7/20 " reviewed  1) Pulmonary fibrosis - she seems to do better when she has combivent so there may be a secondary diagnosis causing bronchoconstriction which is usually not associated with classic fibrosis.  An alternate theory remains that she aspirates causing airway irritation which may be benefited with this inhaler.  Will try Breo 200/50 one puff a day until I see her back to see if this helps.  Continue albuterol or combivent prn.  2) Pulmonary embolism - unclear if this is provoked or not.  Subsegmental PEs do not always require treatment.   Would complete 6 months of therapy and stop given her instability requiring a cane.  3) Thrush - complaining of a sore mouth, has white spots confirming thrush.  10 days of nystatin four times a day.  4) RTC in 6 months    insomnia /appetite  Poor appetite - eating very little and only small amounts, not hungry but tries to make herself eating.She is getting ensure 4 times per day - small appts  Poor appetite and not eating well so she is losing weight. Only eating small amounts of rice  Her weight has been stable:  124# on 11/2/20  122# on 12/14/20  123# today  increased remeron to 15m on 12/14/20 - this is making her too tired and not helping with appetite    Polypharmacy -   Did not bring bottles  Home health RN comes weekly from [facility] services      Review of Systems   Please see above.  The rest of the review of systems are negative for all systems.     Current Outpatient Medications   Medication Sig     albuterol (PROAIR HFA;PROVENTIL HFA;VENTOLIN HFA) 90 mcg/actuation inhaler Inhale 2 puffs every 6 (six) hours as needed for wheezing or shortness of breath.     amLODIPine (NORVASC) 10 MG tablet TAKE 1 TABLET ORALLY EVERY DAY.     calcium, as carbonate, (ANTACID, CALCIUM CARBONATE,) 200 mg calcium (500 mg) chewable tablet CHEW 1 TABLET BY MOUTH THREE TIMES A DAY TO KEEP YOUR BONES HEALTHY     capsaicin (ZOSTRIX) 0.025 % cream Apply topically 2 (two) times a day as needed.  "    celecoxib (CELEBREX) 100 MG capsule TAKE ONE CAPSULE BY MOUTH DAILY.     famotidine (PEPCID) 40 MG tablet TAKE 1 TABLET BY MOUTH TWICE DAILY.     fluticasone furoate-vilanteroL (BREO ELLIPTA) 100-25 mcg/dose inhaler Inhale 1 puff daily.     gabapentin (NEURONTIN) 100 MG capsule TAKE 2 CAPSULES BY MOUTH THREE TIMES A DAY.     hydroCHLOROthiazide (HYDRODIURIL) 25 MG tablet TAKE ONE TABLET BY MOUTH ONCE DAILY WITH LOSARTAN 100MG.     ipratropium-albuteroL (COMBIVENT RESPIMAT)  mcg/actuation Mist inhaler Inhale 1 puff every 6 (six) hours as needed.     losartan (COZAAR) 100 MG tablet TAKE 1 TABLET BY MOUTH DAILY WITH HYDROCHLOROTHIAZIDE 25MG     MAPAP ARTHRITIS PAIN 650 mg CR tablet TAKE 2 TABLETS BY MOUTH 2 TIMES A DAY     metoprolol succinate (TOPROL-XL) 50 MG 24 hr tablet TAKE 1 TABLET BY MOUTH DAILY     mirtazapine (REMERON) 15 MG tablet Take 1 tablet (15 mg total) by mouth at bedtime.     polyvinyl alcohol (ARTIFICIAL TEARS, POLYVIN ALC,) 1.4 % ophthalmic solution APPLY TO EYE DAILY AS NEEDED.     senna-docusate (SENEXON-S) 8.6-50 mg tablet Take 2 tablets by mouth daily.           Objective    /60   Pulse (!) 57   Temp 97.5  F (36.4  C) (Oral)   Resp 18   Ht 4' 10.07\" (1.475 m)   Wt 123 lb (55.8 kg)   SpO2 96%   BMI 25.64 kg/m    Body mass index is 25.64 kg/m .  Physical Exam    Vitals:    02/25/21 1214   BP: 114/60   Pulse: (!) 57   Resp: 18   Temp: 97.5  F (36.4  C)   TempSrc: Oral   SpO2: 96%   Weight: 123 lb (55.8 kg)   Height: 4' 10.07\" (1.475 m)     OBJECTIVE:  Vitals listed above within normal limits  General appearance: well groomed, pleasant, well hydrated, nontoxic appearing  ENT: PERRL, throat clear  Neck: neck supple, no lymphadenopathy, no thyromegaly  Lungs: left lung has crackles over full lung,  no wheezes or rhonchi  Heart: regular rate and rhythm, no murmurs, rubs or gallops  Abdomen: soft, nontender  Neuro: no focal deficits, CN II-XII grossly intact, alert and " oriented  Psych:  mood stable, appears to have good insight and judgment    Xr Chest 2 Views    Result Date: 2/25/2021  EXAM: XR CHEST 2 VIEWS LOCATION: [facility] DATE/TIME: 2/25/2021 1:28 PM INDICATION: Other specified symptoms and signs involving the circulatory and respiratory systems. Scattered respiratory crackles at right lung. History of pulmonary embolus. COMPARISON: 12/14/2019     Cardiomegaly with normal pulmonary vascularity. Mild atelectasis right lung base. Prominent epicardial fat pad on the right. Scoliosis. No definite pneumonia. No pleural effusion.

## 2021-06-15 NOTE — PATIENT INSTRUCTIONS - HE
1.  Advise restarting Prolia.  Recheck insurance.    2.  DXA scan after 5/01/21    3.  Next prolia injection in six months.  See me in one year    4.  Call to schedule Covid vaccine [phone] option #7

## 2021-06-15 NOTE — PROGRESS NOTES
ASSESSMENT:  1.  Osteoporosis:  Luis Manuel originally was seen in February 2019.  Prolia was advised.  It looks like she only received 1 injection of this and then was lost to follow-up.  Further management options were reviewed.  She would be interested in restarting Prolia.    Potential benefits and adverse effects were reviewed.  Careful efforts to maintain compliance with the every 6-month schedule was emphasized    2.  Lumbar radiculopathy:  Complains of severe pain radiating down the left leg.  Spine clinic evaluation was reviewed    3.  Health maintenance:  Covid vaccination was encouraged    PLAN:  1.  Patient was educated on safety of Prolia utilizing Patient Counseling Chart for Healthcare Providers, as outlined by the Prolia REMS progam.     2.  Recheck vitamin D level, parathyroid hormone level today.    3.  Repeat DEXA scan after 5/21    4.  Next Prolia injection in 6 months.  See me in 1 year.    5.  She was encouraged to schedule Covid vaccination    Orders Placed This Encounter   Procedures     DXA Bone Density Scan     Standing Status:   Future     Standing Expiration Date:   3/1/2022     Order Specific Question:   Can the procedure be changed per Radiologist protocol?     Answer:   Yes     Parathyroid Hormone Intact     Vitamin D, Total (25-Hydroxy)     There are no discontinued medications.  Administrations This Visit     denosumab 60 mg (PROLIA 60 mg/ml)     Admin Date  03/01/2021 Action  Given Dose  60 mg Route  Subcutaneous Administered By  Kera Benavidez LPN              Return in about 1 year (around 3/1/2022) for Recheck.    ASSESSED PROBLEMS:  1. Age-related osteoporosis without current pathological fracture  denosumab 60 mg (PROLIA 60 mg/ml)    Parathyroid Hormone Intact    Vitamin D, Total (25-Hydroxy)    DXA Bone Density Scan   2. Personal history of other drug therapy  denosumab 60 mg (PROLIA 60 mg/ml)       CHIEF COMPLAINT:  Chief Complaint   Patient presents with     Osteoporosis Consult  "    Back Pain     in to leg       HISTORY OF PRESENT ILLNESS:  Luis Manuel is a 81 y.o. female seen for follow-up of osteoporosis.  She last was seen in February 2019.  Was advised to start Prolia at that time.  It looks like she only received 1 injection.  She does have evidence for T12 compression fracture on previous spine studies.  Denies knowledge of other fractures.  She recently got off of anticoagulation for pulmonary emboli. she had previous treatment with ibandronate.  Her major current concern is radicular low back pain extending down the left leg.  He has been seen at the spine clinic for this.    REVIEW OF SYSTEMS:    Comprehensive review of systems is otherwise negative.    PFSH:  With a family member.  History was obtained with the assistance of a telephone     TOBACCO USE:  Social History     Tobacco Use   Smoking Status Never Smoker   Smokeless Tobacco Never Used   Tobacco Comment    chew betel nut       VITALS:  Vitals:    03/01/21 1011   BP: 116/70   Patient Site: Left Arm   Patient Position: Sitting   Cuff Size: Adult Regular   Pulse: (!) 50   Resp: 18   SpO2: 100%   Weight: 124 lb 1 oz (56.3 kg)   Height: 4' 10\" (1.473 m)     Wt Readings from Last 3 Encounters:   03/01/21 124 lb 1 oz (56.3 kg)   02/26/21 124 lb 3.2 oz (56.3 kg)   02/25/21 123 lb (55.8 kg)       PHYSICAL EXAM:  Constitutional:   Reveals an alert elderly woman who ambulates with a cane.  She appears uncomfortable with change in position.  Walks with a very stooped posture.  She does appear rather unstable and a high falling risk.    Vitals: per nursing notes.  HEENT: Atraumatic  Eyes: No conjunctival hyperemia  Neck:  Supple, no carotid bruits or adenopathy.  Back: Walks with a very kyphotic posture  Thorax:  No bony deformities..    Skin:  No jaundice, peripheral cyanosis or lesions to suggest malignancy.  Neuro:  Alert and oriented.  No gross focal deficits.  Psychiatric:  Memory intact, mood appropriate.    DATA " REVIEWED:  Additional History from Old Records Summarized (2): Chart reviewed  Decision to Obtain Records (1): None.   Radiology Tests Summarized or Ordered (1): Previous DEXA scans and back x-rays reviewed.  Labs Reviewed or Ordered (1): Labs reviewed and ordered  Medicine Test Summarized or Ordered (1): None.   Independent Review of EKG or X-RAY(2 each): None.    The visit lasted a total of 25 minutes face to face with the patient. Over 50% of the time was spent counseling and educating the patient about management of osteoporosis.    Dragon dictation was used for this note. Speech recognition errors are a possibility.     MEDICATIONS:  Current Outpatient Medications   Medication Sig Dispense Refill     albuterol (PROAIR HFA;PROVENTIL HFA;VENTOLIN HFA) 90 mcg/actuation inhaler Inhale 2 puffs every 6 (six) hours as needed for wheezing or shortness of breath. 1 Inhaler 11     amLODIPine (NORVASC) 10 MG tablet TAKE 1 TABLET ORALLY EVERY DAY. 90 tablet 2     calcium, as carbonate, (ANTACID, CALCIUM CARBONATE,) 200 mg calcium (500 mg) chewable tablet CHEW 1 TABLET BY MOUTH THREE TIMES A DAY TO KEEP YOUR BONES HEALTHY 90 tablet 3     capsaicin (ZOSTRIX) 0.025 % cream Apply topically 2 (two) times a day as needed. 60 g 0     celecoxib (CELEBREX) 100 MG capsule TAKE ONE CAPSULE BY MOUTH DAILY. 90 capsule 3     DULoxetine (CYMBALTA) 30 MG capsule Take 1 capsule (30 mg total) by mouth daily. 30 capsule 2     famotidine (PEPCID) 40 MG tablet TAKE 1 TABLET BY MOUTH TWICE DAILY. 180 tablet 2     fluticasone furoate-vilanteroL (BREO ELLIPTA) 100-25 mcg/dose inhaler Inhale 1 puff daily. 28 each 6     gabapentin (NEURONTIN) 100 MG capsule Take 1 tab (100mg) in the morning and 2 tabs (200mg) at bedtime 90 capsule 3     hydroCHLOROthiazide (HYDRODIURIL) 25 MG tablet TAKE ONE TABLET BY MOUTH ONCE DAILY WITH LOSARTAN 100MG. 90 tablet 3     ipratropium-albuteroL (COMBIVENT RESPIMAT)  mcg/actuation Mist inhaler Inhale 1 puff  every 6 (six) hours as needed. 1 Inhaler 12     losartan (COZAAR) 100 MG tablet TAKE 1 TABLET BY MOUTH DAILY WITH HYDROCHLOROTHIAZIDE 25MG 90 tablet 2     MAPAP ARTHRITIS PAIN 650 mg CR tablet TAKE 2 TABLETS BY MOUTH 2 TIMES A  tablet 6     metoprolol succinate (TOPROL-XL) 50 MG 24 hr tablet TAKE 1 TABLET BY MOUTH DAILY 90 tablet 3     mirtazapine (REMERON) 15 MG tablet Take 1 tablet (15 mg total) by mouth at bedtime. 30 tablet 11     polyvinyl alcohol (ARTIFICIAL TEARS, POLYVIN ALC,) 1.4 % ophthalmic solution APPLY TO EYE DAILY AS NEEDED. 15 mL 0     senna-docusate (SENEXON-S) 8.6-50 mg tablet Take 2 tablets by mouth daily. 180 tablet 3     Current Facility-Administered Medications   Medication Dose Route Frequency Provider Last Rate Last Admin     [START ON 3/15/2021] denosumab 60 mg (PROLIA 60 mg/ml)  60 mg Subcutaneous Once Vikas Perez MD

## 2021-06-15 NOTE — TELEPHONE ENCOUNTER
Phone call to patient to review results and provider's recommendations with assistance of Amie  # [phone]. Left message to return call.

## 2021-06-15 NOTE — PROGRESS NOTES
HPI - 79 yo female here for f/u.     Recently returned from [facility].     Restrictive lung disease   Followed by pulm and has combivent prn that she has not needed     HTN - BP well controlled with current meds  On amlodipine, losartan-HCTZ, toprol XL     Polypharmacy -   Home health RN sets up meds    Joint pain well managed with gabapentin and tylenol  Seen by pain clinic and given injection on 8/14/17 for bilateral shoulders    Problem with SSI - she thinks b/c she was in [facility] and missed some paperwork  Stack of mail that she needs help with.     Osteoporosis - on Boniva  DEXA 5/4/17    Frequent urination   No dysuria, no hematuria    Current Outpatient Prescriptions   Medication Sig Note     amLODIPine (NORVASC) 10 MG tablet Take 10 mg by mouth every morning.      calcium, as carbonate, (TUMS) 200 mg calcium (500 mg) chewable tablet Chew 1 tablet 3 (three) times a day.      docusate sodium (COLACE) 100 MG capsule Take 100 mg by mouth 2 (two) times a day.      doxycycline (VIBRA-TABS) 100 MG tablet Start taking 3 days before departure then daily while traveling and 30days after return.      gabapentin (NEURONTIN) 100 MG capsule TAKE 1 CAPSULE IN THE MORNING, 1 CAPSULE MIDDAY AND 3 CAPSULES AT BEDTIME      ipratropium-albuterol (COMBIVENT RESPIMAT)  mcg/actuation Mist inhaler Inhale 1 puff every 6 (six) hours as needed.      losartan-hydrochlorothiazide (HYZAAR) 100-25 mg per tablet Take 1 tablet by mouth daily.      MAPAP ARTHRITIS PAIN 650 mg CR tablet TAKE 1 TABLET BY MOUTH EVERY 8 HOURS AS NEEDED FOR PAIN      metoprolol succinate (TOPROL XL) 25 MG Take 1 tablet (25 mg total) by mouth daily.      omeprazole (PRILOSEC) 20 MG capsule Take 20 mg by mouth daily before breakfast.      polyvinyl alcohol (LIQUIFILM TEARS) 1.4 % ophthalmic solution Apply to eye daily as needed      VITAMIN D2 50,000 unit capsule TAKE 1 CAPSULE BY MOUTH ONCE WEEKLY      diphenhydrAMINE (DIPHENHIST) 25 mg tablet Take 1 tablet  "(25 mg total) by mouth at bedtime as needed for itching or sleep.      gabapentin (NEURONTIN) 100 MG capsule Take 300 mg by mouth at bedtime. 1/11/2018: See new sig      ibandronate (BONIVA) 150 mg tablet Take 1 tablet (150 mg total) by mouth every 30 (thirty) days. Take in AM with glass of water prior to food, don't lie down for 30 minutes.      Vitals:    01/11/18 0844   BP: 126/78   Pulse: 61   Resp: 14   Temp: 97.9  F (36.6  C)   TempSrc: Oral   SpO2: 97%   Weight: 125 lb (56.7 kg)   Height: 4' 11\" (1.499 m)     PHYSICAL EXAM   General Appearance: Awake and alert, in no acute distress  HEENT: neck is supple  CV: regular rate  Resp: No respiratory distress. Breathing comfortably  Musculoskeletal: moving limbs comfortably with not deficits or deformities  Skin: no rashes noted    Recent Results (from the past 1008 hour(s))   Urinalysis-UC if Indicated    Collection Time: 01/11/18  9:59 AM   Result Value Ref Range    Color, UA Yellow Colorless, Yellow, Straw, Light Yellow    Clarity, UA Clear Clear    Glucose, UA Negative Negative    Bilirubin, UA Negative Negative    Ketones, UA Negative Negative    Specific Gravity, UA 1.020 1.005 - 1.030    Blood, UA Negative Negative    pH, UA 5.0 5.0 - 8.0    Protein, UA Negative Negative mg/dL    Urobilinogen, UA 0.2 E.U./dL 0.2 E.U./dL, 1.0 E.U./dL    Nitrite, UA Negative Negative    Leukocytes, UA Negative Negative         A/P  Joint pain well managed with gabapentin and tylenol  Seen by pain clinic and given injection on 8/14/17 for bilateral shoulders  Referral for acupuncture at  Pain clinic    HTN - BP well controlled with current meds  On amlodipine, losartan-HCTZ, toprol XL     Polypharmacy -   Home health RN sets up meds        Problem with SSI - she thinks b/c she was in [facility] and missed some paperwork  Stack of mail that she needs help with.   She met with SW in clinic to look for SSI forms and made an apt to come back to review the rest of her mail. "     Osteoporosis - on Boniva  DEXA 5/4/17    Frequent urination - UA neg  No dysuria, no hematuria    Restrictive lung disease   Followed by pulm and has combivent prn that she has not needed    Spent 25 min face to face with patient with more the 50% spent in counseling, reviewing chart, and coordination of care and discussing problems listed above.

## 2021-06-15 NOTE — TELEPHONE ENCOUNTER
INR result is   3.4  INR   Date Value Ref Range Status   02/04/2021 1.90 (!) 0.9 - 1.1 Final       Will the patient be seen, or did they already see, MD or CNP today? No    Most Recent Warfarin dose day/week  Sunday Monday Tuesday Wednesday Thursday Friday Saturday       2.5 mg 1.25 mg 2.5 mg     Sunday Monday Tuesday Wednesday Thursday Friday Saturday   2.5 mg 1.25 mg 2.5 mg 1.25 mg          Has the patient missed any doses of Coumadin, Warfarin, Jantoven in the past 7 days? No    Has the patients medications changed since the last visit? No    Has the patient experienced any bleeding recently? No    Has the patient experienced any injuries or illness recently? No    Has the patient experienced any 'new' shortness of breath, severe headaches, or changes in vision recently? No    Has the patient had any changes in their diet, or alcohol consumption? No    Is the patient here today to prepare for any type of upcoming surgery, procedure, or for a cardioversion procedure? No    What phone number can we reach the patient at today? [facility] [phone].

## 2021-06-15 NOTE — TELEPHONE ENCOUNTER
----- Message from Tiffanie Byrne PA-C sent at 3/10/2021  8:22 AM CST -----  Please call the patient and let her know that I reviewed the MRI scans of the cervical and lumbar spine.  Both areas of the spine show wear-and-tear type changes causing narrowing around nerves which I believe is the source of her pain.  I recommended the patient return to the clinic so that we can review the results and discuss treatment options.  Please make this a 40-minute visit.

## 2021-06-15 NOTE — PATIENT INSTRUCTIONS - HE
[facility] Scheduling    Please call [phone] to schedule your image(s) (select option #1). There are 3 different locations, see below. You can do walk-in visits for xray only images if you want.     [facility]  [address] [place] [id number]    [address] [address]    [place] [address] [facility] [address]

## 2021-06-15 NOTE — PROGRESS NOTES
Assessment:   Luis Manuel Lemon is a 81 y.o. y.o. female with past medical history significant for vitamin D deficiency, hypertension, hyperlipidemia, osteoporosis on Prolia, chronic GERD, chronic pain syndrome, pulmonary fibrosis, polyarthralgia, stage III chronic kidney disease who presents today for follow-up regarding 2 areas of concern.  1.  Chronic bilateral neck pain and right upper extremity paresthesias.  Patient has not had any advanced imaging of the cervical spine.  2.  Chronic and worsening bilateral low back pain with radiation into the bilateral lower extremities with associated weakness and limited walking tolerance.  My review of an MRI lumbar spine from 2018 shows mild spinal canal stenosis at L4-5 related to mild facet arthropathy and a circumferential disc bulge.  I am concerned the lumbar spinal stenosis may have worsened.  -Patient had a right SI joint injection November 12, 2018 which provided partial relief of her pain lasting only 2 days.  -She had an L5-S1 interlaminar epidural steroid injection October 17, 2018 which provided relief of her left-sided pain.  -On exam, patient had a sensory deficit right fingers 3 and 4.  She had diffuse weakness in the bilateral upper extremities.  She demonstrated weakness in the bilateral hip flexors.        Plan:     A shared decision making plan was used.  The patient's values and choices were respected.  The following represents what was discussed and decided upon by the physician assistant and the patient.  A telephone  was used for the visit.  Patient's granddaughter was also present for the visit and helps provide history.    1.  DIAGNOSTIC TESTS: I reviewed the MRI lumbar spine from 2018. I ordered an updated MRI lumbar spine and I also ordered an MRI cervical spine for further evaluation.    2.  PHYSICAL THERAPY: Most recent physical therapy was in October 2018.  She still does her home exercises on a regular basis.  No further physical therapy  was ordered.  -Patient completed 4 sessions of occupational therapy with Michelle Russell in 2019 which she did not feel is helpful.    3.  MEDICATIONS: No changes are made to the patient's medications.  Medications are set up by a nurse.  Patient does not know how she takes her medications.  On her medication list is Celebrex 100 mg daily, duloxetine 30 mg daily, gabapentin 200 mg at bedtime, Tylenol arthritis 1300 mg twice daily.    4.  INTERVENTIONS: No interventions were ordered.  We will await the results of her imaging studies.    5.  PATIENT EDUCATION: Patient is in agreement the above plan.  All questions were answered.    6.  FOLLOW-UP: I will see the patient back in the clinic in 2 weeks to review the imaging studies.  If she has questions or concerns in the meantime, she should not hesitate to call.    Subjective:     Luis Manuel Lemon is a 81 y.o. female who presents today for follow-up regarding chronic and worsening neck pain and chronic and worsening low back pain with radiation into the bilateral lower extremities.  Patient has not been seen in our clinic since February 20, 2019.  Patient reports that since he was last seen her pain has gotten gradually worse.    Patient complains of bilateral neck pain.  Pain radiates up into the head.  She denies any pain radiating down the arms.  She has numbness and tingling in the right fingers 3 and 4.  She denies weakness in the arms.    Patient also complains of low back pain.  Pain spans across low back in a broadband distribution at the belt line.  Pain radiates into the buttocks and lateral hips.  Pain radiates down the entire thighs bilaterally to the knees.  She denies any pain distal to the knees.  Denies any numbness or tingling down the legs.  She states that her legs feel very weak.  Her walking is limited by her pain.  She states she can only walk between rooms in the house using her cane before the back pain becomes so severe that she has to rest.  She feels like  she is leaning forward when she walks.  Patient has chronic loss of bladder control.  Denies loss of bowel control.    Most recent course of physical therapy for the lower back was in October 2018.  She also did 4 sessions of occupational therapy ending in January 2019.  She states that she does her home exercises on a regular basis.  Patient's medications are set up by her nurse.  She does not know how she takes her medications.  On her list is Celebrex 100 mg daily, duloxetine 30 mg daily, gabapentin 200 mg at bedtime, Tylenol arthritis 1300 mg twice daily.    Past medical history is reviewed and is pertinent for having a fall last year.    Review of Systems:  Positive for weakness, loss of bladder control, wetting pants, pain much worse at night, difficulty with hand skills.  Negative for numbness/tingling, loss of bowel control, inability to urinate, headache, trip/double/falls, difficulty swallowing, fevers, unintentional weight loss.     Objective:   CONSTITUTIONAL:  Vital signs as above.  No acute distress.  The patient is well nourished and well groomed.    PSYCHIATRIC:  The patient is awake, alert, oriented to person, place and time.  The patient is answering questions appropriately with clear speech.  Normal affect.  HEENT: Normocephalic, atraumatic.  Sclera clear.    SKIN:  Skin over the face, posterior torso, bilateral upper and lower extremities is clean, dry, intact without rashes.  VASCULAR: No significant lower extremity edema.  MUSCULOSKELETAL:  Gait is unsteady.  She ambulates with a significantly flexed forward posture at the hips.  Uses a cane for assistance.  Patient has significant difficulty rising onto toes with support.  She is able to rise onto heels with support.  Mild tenderness over the bilateral cervical paraspinous muscles at L4-5 and L5-S1.  Mild tenderness palpation bilateral sacroiliac joints.  The patient ha diffuse weakness which I would grade 4/5 for the bilateral shoulder  abductors, elbow flexors/extensors, finger flexors/abductors, wrist extensors.  She had 4/5 strength bilateral hip flexors, 5/5 strength bilateral knee flexors/extensors, ankle dorsi/plantar flexors.    NEUROLOGICAL: 2+ biceps, triceps, brachioradialis, patellar, achilles reflexes which are symmetric bilaterally.  Negative Ruby sign bilaterally.  Negative Babinski's bilaterally.  No ankle clonus bilaterally.  Sensation light touch diminished right fingers 3 and 4.  Sensation to light touch is intact in the bilateral L4, L5, and S1 dermatomes.       RESULTS:   I reviewed the MRI lumbar spine from [facility] dated June 8, 2018.  At L4-5 there is mild spinal canal stenosis with asymmetric left lateral recess stenosis.  There is mild left  foraminal stenosis.  At this level there is mild facet arthropathy and a circumferential disc bulge.  At L3-4 there is low-grade lateral recess stenosis with mild right and mild to moderate left foraminal stenosis.  There is a chronic superior endplate compression fracture T12.  Please see report for further details.

## 2021-06-15 NOTE — TELEPHONE ENCOUNTER
INR result is 1.5  INR   Date Value Ref Range Status   02/11/2021 3.40 (!) 0.90 - 1.10 Final       Will the patient be seen, or did they already see, MD or CNP today? No    Most Recent Warfarin dose day/week  Sunday Monday Tuesday Wednesday Thursday Friday Saturday       1.25 1.25 2.5     Sunday Monday Tuesday Wednesday Thursday Friday Saturday   2.5 1.25 2.5 1.25          Has the patient missed any doses of Coumadin, Warfarin, Jantoven in the past 7 days? No    Has the patients medications changed since the last visit? No    Has the patient experienced any bleeding recently? No    Has the patient experienced any injuries or illness recently? No    Has the patient experienced any 'new' shortness of breath, severe headaches, or changes in vision recently? No    Has the patient had any changes in their diet, or alcohol consumption? No    Is the patient here today to prepare for any type of upcoming surgery, procedure, or for a cardioversion procedure? No    What phone number can we reach the patient at today? home phone listed in demographics.

## 2021-06-16 PROBLEM — R00.1 SINUS BRADYCARDIA: Status: ACTIVE | Noted: 2018-04-25

## 2021-06-16 PROBLEM — I26.93 SINGLE SUBSEGMENTAL PULMONARY EMBOLISM WITHOUT ACUTE COR PULMONALE (H): Status: ACTIVE | Noted: 2020-10-15

## 2021-06-16 PROBLEM — Z59.71 INSURANCE COVERAGE PROBLEMS: Status: ACTIVE | Noted: 2019-08-08

## 2021-06-16 PROBLEM — Z59.9 FINANCIAL DIFFICULTIES: Status: ACTIVE | Noted: 2019-02-14

## 2021-06-16 PROBLEM — Z78.9 MEDICALLY COMPLEX PATIENT: Status: ACTIVE | Noted: 2019-02-14

## 2021-06-16 PROBLEM — Z79.01 ANTICOAGULATED: Status: ACTIVE | Noted: 2020-10-15

## 2021-06-16 PROBLEM — Z60.3 LANGUAGE BARRIER AFFECTING HEALTH CARE: Status: ACTIVE | Noted: 2019-02-14

## 2021-06-16 PROBLEM — M25.50 POLYARTHRALGIA: Status: ACTIVE | Noted: 2018-07-02

## 2021-06-16 PROBLEM — R32 URINARY INCONTINENCE IN FEMALE: Status: ACTIVE | Noted: 2019-03-14

## 2021-06-16 PROBLEM — Z75.8 LANGUAGE BARRIER AFFECTING HEALTH CARE: Status: ACTIVE | Noted: 2019-02-14

## 2021-06-16 PROBLEM — J84.10 PULMONARY FIBROSIS (H): Status: ACTIVE | Noted: 2020-07-14

## 2021-06-16 PROBLEM — N18.30 CHRONIC KIDNEY DISEASE, STAGE 3 (H): Status: ACTIVE | Noted: 2020-12-14

## 2021-06-16 PROBLEM — M80.00XD AGE-RELATED OSTEOPOROSIS WITH CURRENT PATHOLOGICAL FRACTURE WITH ROUTINE HEALING: Status: ACTIVE | Noted: 2019-02-14

## 2021-06-16 PROBLEM — J98.4 RESTRICTIVE LUNG DISEASE: Status: ACTIVE | Noted: 2018-01-11

## 2021-06-16 PROBLEM — R06.02 SHORT OF BREATH ON EXERTION: Status: ACTIVE | Noted: 2018-04-25

## 2021-06-16 PROBLEM — M17.0 BILATERAL PRIMARY OSTEOARTHRITIS OF KNEE: Status: ACTIVE | Noted: 2018-07-02

## 2021-06-16 NOTE — TELEPHONE ENCOUNTER
Telephone Encounter by Nga Cervantes at 4/8/2019  4:20 PM     Author: Nga Cervantes Service: -- Author Type: --    Filed: 4/8/2019  4:22 PM Encounter Date: 4/4/2019 Status: Signed    : Nga Cervantes APPROVED:    Approval start date: 1/1/2019  Approval end date: 12/31/2019    Pharmacy has been notified of approval and will contact patient when medication is ready for pickup.

## 2021-06-16 NOTE — PROGRESS NOTES
Assessment & Plan     Chronic pain syndrome  Spinal stenosis of lumbar region without neurogenic claudication  Continue treatment to the spine clinic which seems to be giving her some relief    Itchy eyes  Films ordered  - polyvinyl alcohol (ARTIFICIAL TEARS, POLYVIN ALC,) 1.4 % ophthalmic solution  Dispense: 15 mL; Refill: 11    Essential hypertension  Blood pressure is elevated today but had been previously normal at the last several visits.  For now I will not change her blood pressure medications.  And will have her home health nurse continue to monitor her blood pressures.    Senile osteoporosis  Continue Prolia shots as ordered    Need for vaccination  She agrees to be put on the list for the Covid vaccine    Language barrier affecting health care      Poverty        Review of external notes as documented in note  Diagnosis or treatment significantly limited by social determinants of health - Language barrier, low health literacy, poverty    Return in about 4 weeks (around 5/6/2021) for Recheck, for BP check.    Kateryna Morales MD  [facility]   Luis Manuel Ploh is 81 y.o. and presents today for the following health issues   HPI   [place] professional phone  used.  However she speaks [place] did not understand this interpreted very well so her granddaughter assisted since her unable to get up (  Here with granddaughter to f/u on spine consult.  She had consultations with the spine clinic and had a couple of spine injections in the last month.  She reports that these are incredibly helpful.      MRI cervical spine 2/26/21:  1.  Motion artifact limits evaluation of the neural foramen. Multilevel degenerative changes of the cervical spine are grossly similar to the prior exam in 2016.  2.  Moderate canal stenosis at C6-C7. Multilevel bilateral neural foraminal narrowing, as described.    MRI lumbar spine 2/26/21:  1.  Multilevel lumbar spondylosis.  2.  No  high-grade spinal canal or neural foraminal stenosis.    HTN-   BP meds: losaratan 100mg, hydrochlorothiazide 25mg, amlodipine 10mg  Blood pressure is elevated today but had been previously normal at the last several visits.  For now I will not change her blood pressure medications.  And will have her home health nurse continue to monitor her blood pressures.    covid -she agrees to get her Covid vaccine after much discussion    Osteoporosis.  Reviewed osteoporosis clinic consult note.  She was restarted on Prolia    He has itchy eyes and requesting refills for her eyedrops    Review of Systems   Please see above.  The rest of the review of systems are negative for all systems.     Current Outpatient Medications   Medication Sig     albuterol (PROAIR HFA;PROVENTIL HFA;VENTOLIN HFA) 90 mcg/actuation inhaler Inhale 2 puffs every 6 (six) hours as needed for wheezing or shortness of breath.     amLODIPine (NORVASC) 10 MG tablet TAKE 1 TABLET ORALLY EVERY DAY.     celecoxib (CELEBREX) 100 MG capsule TAKE ONE CAPSULE BY MOUTH DAILY.     DULoxetine (CYMBALTA) 30 MG capsule Take 1 capsule (30 mg total) by mouth daily.     famotidine (PEPCID) 40 MG tablet TAKE 1 TABLET BY MOUTH TWICE DAILY.     fluticasone furoate-vilanteroL (BREO ELLIPTA) 100-25 mcg/dose inhaler Inhale 1 puff daily.     gabapentin (NEURONTIN) 100 MG capsule Take 1 tab (100mg) in the morning and 2 tabs (200mg) at bedtime     hydroCHLOROthiazide (HYDRODIURIL) 25 MG tablet TAKE ONE TABLET BY MOUTH ONCE DAILY WITH LOSARTAN 100MG.     ipratropium-albuteroL (COMBIVENT RESPIMAT)  mcg/actuation Mist inhaler Inhale 1 puff every 6 (six) hours as needed.     losartan (COZAAR) 100 MG tablet TAKE 1 TABLET BY MOUTH DAILY WITH HYDROCHLOROTHIAZIDE 25MG     MAPAP ARTHRITIS PAIN 650 mg CR tablet TAKE 2 TABLETS BY MOUTH 2 TIMES A DAY     metoprolol succinate (TOPROL-XL) 50 MG 24 hr tablet TAKE 1 TABLET BY MOUTH DAILY     mirtazapine (REMERON) 15 MG tablet Take 1 tablet  "(15 mg total) by mouth at bedtime.     polyvinyl alcohol (ARTIFICIAL TEARS, POLYVIN ALC,) 1.4 % ophthalmic solution APPLY TO EYE DAILY AS NEEDED.     senna-docusate (SENEXON-S) 8.6-50 mg tablet Take 2 tablets by mouth daily.     calcium, as carbonate, (ANTACID, CALCIUM CARBONATE,) 200 mg calcium (500 mg) chewable tablet CHEW 1 TABLET BY MOUTH THREE TIMES A DAY TO KEEP YOUR BONES HEALTHY     capsaicin (ZOSTRIX) 0.025 % cream Apply topically 2 (two) times a day as needed.           Objective    /74   Pulse 88   Temp 97.7  F (36.5  C) (Oral)   Resp 16   Ht 4' 9.99\" (1.473 m)   Wt 117 lb 3.2 oz (53.2 kg)   SpO2 95%   BMI 24.50 kg/m    Body mass index is 24.5 kg/m .  Physical Exam    Vitals:    04/08/21 1155 04/08/21 1203   BP: 156/80 160/74   Pulse: 88    Resp: 16    Temp: 97.7  F (36.5  C)    TempSrc: Oral    SpO2: 95%    Weight: 117 lb 3.2 oz (53.2 kg)    Height: 4' 9.99\" (1.473 m)      PHYSICAL EXAM   General Appearance: Awake and alert, in no acute distress  HEENT: neck is supple  CV: regular rate  Resp: No respiratory distress. Breathing comfortably  Musculoskeletal: moving limbs comfortably with not deficits or deformities  Skin: no rashes noted          "

## 2021-06-16 NOTE — PROGRESS NOTES
"Luis Manuel Lemon is a 81 y.o. female who is being evaluated via a billable telephone visit during the COVID-19 crisis.      The patient has been notified of following:     \"This telephone visit will be conducted via a call between you and your physician/provider. We have found that certain health care needs can be provided without the need for a physical exam.  This service lets us provide the care you need with a short phone conversation.  If a prescription is necessary we can send it directly to your pharmacy.  If lab work is needed we can place an order for that and you can then stop by our lab to have the test done at a later time.    If during the course of the call the physician/provider feels a telephone visit is not appropriate, you will not be charged for this service.\"     Patient has given verbal consent to a Telephone visit? YES    Patient would like to receive their AVS by AVS Preference: Mail a copy.        Assessment:   Luis Manuel Lemon is a 81 y.o. y.o. female with past medical history significant for vitamin D deficiency, hypertension, hyperlipidemia, osteoporosis on Prolia, chronic GERD, chronic pain syndrome, pulmonary fibrosis, polyarthralgia, stage III chronic kidney disease who presents today for follow-up regarding 2 areas of concern.  1.  Chronic right neck pain and right upper extremity paresthesias.  My review of the MRI cervical spine shows multilevel degenerative change most pronounced at C6-7 where there is moderate spinal canal stenosis.  2.  Chronic and worsening bilateral low back pain with radiation into the bilateral lower extremities with associated weakness and limited walking tolerance.  My review of an MRI lumbar spine shows mild spinal stenosis at L4-5 related to a disc bulge and facet arthropathy.  There is no high-grade spinal canal or neuroforaminal stenosis.  Pain is thought to be at least partially related to lumbar facet arthropathy.  Patient had bilateral L3, L4, L5 medial branch blocks " March 26, 2021.  She reports that she had significant relief of her pain for several hours after the injection (80%).  -Patient has had lumbar facet joint injections, epidural steroid injection x2, sacroiliac joint injection x2.  None of these injections have provided lasting relief of her lower back pain.         Plan:     A shared decision making plan was used.  The patient's values and choices were respected.  The following represents what was discussed and decided upon by the physician assistant and the patient.  A telephone  was used for the visit.       1.  DIAGNOSTIC TESTS: I reviewed the MRI scans of the cervical and lumbar spine. No further diagnostic tests were ordered.     2.  PHYSICAL THERAPY: Most recent physical therapy was in October 2018.  She still does her home exercises on a regular basis.  No further physical therapy was ordered.  -Patient completed 4 sessions of occupational therapy with Michelle Russell in 2019 which she did not feel is helpful.     3.  MEDICATIONS: No changes are made to the patient's medications.  Medications are set up by a nurse.   On her medication list is Celebrex 100 mg daily, duloxetine 30 mg daily, gabapentin 100 mg in the morning and 200 mg at bedtime, Tylenol arthritis 1300 mg twice daily.    4.  INTERVENTIONS:   -I recommended confirmatory bilateral L3, L4, L5 MBBs as a work up toward radiofrequency ablation.  She was pleased with the relief she experienced after the medial branch blocks.      -In regards to the neck pain, we could consider repeating a cervical epidural steroid injection.  Patient does not recall her response to the C6-7 interlaminar epidural steroid injection July 26, 2016, but in reviewing notes from around that time she did not complain of neck pain after that injection so perhaps it was helpful.    5.  PATIENT EDUCATION: Patient is in agreement the above plan.  All questions were answered.     6.  FOLLOW-UP: Patient will return to the clinic  for her confirmatory bilateral L3, L4, L5 medial branch blocks.  If she has questions or concerns in the meantime, she should not hesitate to call.      Phone call duration: 19 minutes   (Start time:  1:27 PM, End time: 1:46 PM)    Subjective:     Luis Manuel Lemon is a 81 y.o. female who presents today for follow-up regarding chronic and worsening neck pain and chronic and worsening low back pain with radiation into the bilateral lower extremities.    Patient is following up after bilateral L3, 4, L5 medial branch blocks March 26, 2021.  On the patient's pain diary she reported that her pain improved from a 9 out of 10 to 4 out of 10 and that she had meaningful relief.  Patient clarifies today that she was very pleased with her relief of her lower back pain for several hours after the injection.  She estimates she felt 80% improvement in her lower back for several hours.  She went to sleep and then when she woke up her same pain in the lower back with radiation into the hips, buttocks, and thighs returned.    Patient also continues to complain of chronic neck pain which is stable.     Most recent course of physical therapy for the lower back was in October 2018.  She also did 4 sessions of occupational therapy ending in January 2019.  She states that she does her home exercises on a regular basis.  Patient's medications are set up by her nurse.   On her list is Celebrex 100 mg daily, duloxetine 30 mg daily, gabapentin 100 mg in the morning and 200 mg at bedtime, Tylenol arthritis 1300 mg twice daily.     Past medical history is reviewed and is unchanged.  Review of Systems:  Positive for  pain worse at night.  Negative for loss of numbness/tingling, weakness, bowel/bladder control, inability to urinate, headache, trip/stumble/falls, difficulty swallowing, difficulty with hand skills, fevers, unintentional weight loss.    Objective:     PSYCHIATRIC:  The patient is awake, alert, oriented to person, place and time.  The  patient is answering questions appropriately with clear speech.  Normal affect.    The rest of the exam is deferred as this is a telephone visit.       RESULTS:     I reviewed the MRI cervical spine from [facility] dated March 8, 2021.  This shows multilevel degenerative change.  There is facet arthropathy throughout the cervical spine.  At C3-4 there is mild spinal canal stenosis with moderate left and mild right foraminal stenosis.  At C4-5 there is mild spinal canal stenosis with moderate bilateral foraminal stenosis.  At C5-6 there is mild spinal canal stenosis with moderate bilateral foraminal stenosis.  At C6-7 there is moderate spinal canal stenosis with mild bilateral foraminal stenosis.  At C7-T1 there is mild bilateral foraminal stenosis.  Please see report for further details.     I reviewed the MRI lumbar spine from [facility] dated March 8, 2021.  This shows multilevel lumbar spondylosis.  At L2-3 there is a disc bulge with mild bilateral foraminal stenosis.  At L3-4 there is a disc bulge with mild bilateral foraminal stenosis.  At L4-5 there is a disc bulge and mild facet arthropathy with mild spinal canal stenosis and mild bilateral foraminal stenosis.  Please see report for further details.

## 2021-06-16 NOTE — TELEPHONE ENCOUNTER
Telephone Encounter by Nga Cervantes at 2/18/2019  9:16 AM     Author: Nga Cervantes Service: -- Author Type: --    Filed: 2/18/2019  9:16 AM Encounter Date: 2/11/2019 Status: Signed    : Nga Cervantes APPROVED:    Approval start date: 12/30/2018  Approval end date: 12/31/2019    Pharmacy has been notified of approval and will contact patient when medication is ready for pickup.

## 2021-06-16 NOTE — PATIENT INSTRUCTIONS - [facility]
*Return Pain Diary as soon as possible. We will review & get back to you with future plan of care.      DISCHARGE INSTRUCTIONS    During office hours (8:00 a.m.- 4:00 p.m.) questions or concerns may be answered  by calling [facility] Navigation Nurses at  [phone].  Messages received after hours will be returned the following business day.      In the case of an emergency, please dial 911 or seek assistance at the nearest Emergency Room/Urgent Care facility.     All Patients:  ? You may experience an increase in your symptoms for the first 2 days, once the numbing medication wears off.    ? You may resume your regular medication, no pain medication until you have completed your diary    ? You may shower. No swimming, tub bath or hot tub for 24 hours; remove bandage after 4 hours    ? Continue your activities that can cause you pain to test the blocks.                         ? You should not drive for the next 3 to 5 hours (have someone drive you)           POSSIBLE PROCEDURE SIDE EFFECTS  -Call Spine Center if concerned-    Increased Pain  Increased numbness/tingling     Nausea/Vomiting  Bruising/bleeding at site (hematoma)             Swelling at site (edema) Headache  Difficulty walking  Infection        Fever greater than 100.5

## 2021-06-16 NOTE — PROGRESS NOTES
HPI - 77 yo female here for f/u.     She is feeling more happy and stronger and better appetite.     Joint pain well managed with gabapentin and tylenol  Seen by pain clinic and given injection on 8/14/17 for bilateral shoulders  She is also c/o knee pain  Referral for [facility] at [facility] on 1/11/18 and intake form given to Blank CLAYTON to help with the intake form. But she missed this apt b/c of transportation.   I filled out the form with her myself on 2/13/18.   She has not yet had this appt. She no longer wants this.       HTN -BP slightly elevated today but last visit it was wnl  Daughter reports BP was high for home RN  On amlodipine 10mg, losartan-HCTZ 100-25, toprol XL 25      Polypharmacy -   Home health RN sets up meds  Simplified  Med list and verified with bottles     Osteoporosis - on Boniva  DEXA 5/4/17  Taking Vit D and TUMS      Vit D deficiency - last level 22.7 on 6/1/17  Taking weekly ergo 50,000 units    abdo pain - resolved with TUMS  Off omeprazole    Constipation -resolved with colace    Restrictive lung disease - she feels her breathing is better  Followed by pulm and has combivent prn  She has been using it 0-3 x per week    Current Outpatient Prescriptions   Medication Sig     amLODIPine (NORVASC) 10 MG tablet Take 10 mg by mouth every morning.     ANTACID, CALCIUM CARBONATE, 200 mg calcium (500 mg) chewable tablet CHEW 1 TABLET BY MOUTH THREE TIMES A DAY TO KEEP YOUR BONES HEALTHY     docusate sodium (COLACE) 100 MG capsule Take 100 mg by mouth 2 (two) times a day.     gabapentin (NEURONTIN) 100 MG capsule TAKE 1 CAPSULE IN THE MORNING, 1 CAPSULE MIDDAY AND 3 CAPSULES AT BEDTIME     ibandronate (BONIVA) 150 mg tablet Take 1 tablet (150 mg total) by mouth every 30 (thirty) days. Take in AM with glass of water prior to food, don't lie down for 30 minutes.     losartan-hydrochlorothiazide (HYZAAR) 100-25 mg per tablet Take 1 tablet by mouth daily.     MAPAP ARTHRITIS PAIN 650 mg CR  "tablet TAKE 1 TABLET BY MOUTH EVERY 8 HOURS AS NEEDED FOR PAIN     metoprolol succinate (TOPROL XL) 25 MG Take 1 tablet (25 mg total) by mouth daily.     omeprazole (PRILOSEC) 20 MG capsule TAKE 1 CAPSULE ORALLY DAILY. TAKE 1 HOUR BEFORE A MEAL.     VITAMIN D2 50,000 unit capsule TAKE 1 CAPSULE BY MOUTH ONCE WEEKLY     ipratropium-albuterol (COMBIVENT RESPIMAT)  mcg/actuation Mist inhaler Inhale 1 puff every 6 (six) hours as needed.     polyvinyl alcohol (LIQUIFILM TEARS) 1.4 % ophthalmic solution Apply to eye daily as needed     Vitals:    03/12/18 1155 03/12/18 1201   BP: 160/76 156/78   Patient Site: Left Arm Left Arm   Patient Position: Sitting Sitting   Cuff Size: Adult Regular Adult Regular   Pulse: 60    Resp: 20    Temp: 97.5  F (36.4  C)    TempSrc: Oral    SpO2: 97%    Weight: 125 lb 5 oz (56.8 kg)    Height: 4' 11\" (1.499 m)      OBJECTIVE:  Vitals listed above within normal limits  General appearance: well groomed, pleasant, well hydrated, nontoxic appearing  ENT: PERRL, throat clear  Neck: neck supple, no lymphadenopathy, no thyromegaly  Lungs: lungs clear to auscultation bilaterally, no wheezes or rhonchi  Heart: regular rate and rhythm, no murmurs, rubs or gallops  Abdomen: soft, nontender  Neuro: no focal deficits, CN II-XII grossly intact, alert and oriented  Psych:  mood stable, appears to have good insight and judgment    A/P    Joint pain/knee pain/back pain -  well managed with gabapentin and tylenol    HTN -BP slightly elevated today but last visit it was wnl  Daughter reports BP was high for home RN  Continue amlodipine 10mg, losartan-HCTZ 100-25,   Increase toprol XL 25 -> 50mg      Polypharmacy -   Home health RN sets up meds  Simplified  Med list and verified with bottles  AVS printed for RN     Osteoporosis - on Boniva  DEXA 5/4/17  Taking Vit D and TUMS      Vit D deficiency - last level 22.7 on 6/1/17  Taking weekly ergo 50,000 units    abdo pain - resolved with TUMS  Off " omeprazole    Constipation -resolved with colace    Restrictive lung disease - she feels her breathing is better  Followed by pulm and has combivent prn  She has been using it 0-3 x per week    Spent 25 min face to face with patient with more the 50% spent in counseling, reviewing chart, and coordination of care and discussing problems listed above.

## 2021-06-16 NOTE — PROGRESS NOTES
"Luis Manuel Lemon is a 81 y.o. female who is being evaluated via a billable telephone visit during the COVID-19 crisis.      The patient has been notified of following:     \"This telephone visit will be conducted via a call between you and your physician/provider. We have found that certain health care needs can be provided without the need for a physical exam.  This service lets us provide the care you need with a short phone conversation.  If a prescription is necessary we can send it directly to your pharmacy.  If lab work is needed we can place an order for that and you can then stop by our lab to have the test done at a later time.    If during the course of the call the physician/provider feels a telephone visit is not appropriate, you will not be charged for this service.\"     Patient has given verbal consent to a Telephone visit? YES    Patient would like to receive their AVS by AVS Preference: Mail a copy.        Assessment:   Luis Manuel Lemon is a 81 y.o. y.o. female with past medical history significant for vitamin D deficiency, hypertension, hyperlipidemia, osteoporosis on Prolia, chronic GERD, chronic pain syndrome, pulmonary fibrosis, polyarthralgia, stage III chronic kidney disease who presents today for follow-up regarding chronic bilateral low back pain. My review of an MRI lumbar spine shows mild spinal stenosis at L4-5 related to a disc bulge and facet arthropathy.  There is no high-grade spinal canal or neuroforaminal stenosis.  Pain is thought to be at least partially related to lumbar facet arthropathy.  Patient had confirmatory bilateral L3, L4, L5 medial branch blocks April 6, 2021.  She reports that she had significant relief of her pain for several hours after the injection (80%).  -Patient has had lumbar facet joint injections, epidural steroid injection x2, sacroiliac joint injection x2.  None of these injections have provided lasting relief of her lower back pain.         Plan:     A shared decision " making plan was used.  The patient's values and choices were respected.  The following represents what was discussed and decided upon by the physician assistant and the patient.  A telephone  was used for the visit.       1.  DIAGNOSTIC TESTS: I reviewed the MRI scan of the lumbar spine. No further diagnostic tests were ordered.     2.  PHYSICAL THERAPY: Most recent physical therapy was in October 2018.  She still does her home exercises on a regular basis.  No further physical therapy was ordered.  -Patient completed 4 sessions of occupational therapy with Michelle Russell in 2019 which she did not feel was helpful.     3.  MEDICATIONS: No changes are made to the patient's medications.  Medications are set up by a nurse.   On her medication list is Celebrex 100 mg daily, duloxetine 30 mg daily, gabapentin 100 mg in the morning and 200 mg at bedtime, Tylenol arthritis 1300 mg twice daily.    4.  INTERVENTIONS:   -I recommended bilateral L3, L4, L5 radiofrequency ablation.  Patient had a positive response to the bilateral L3, L4, L5 medial branch blocks x2.  Patient felt that these were the most successful injection she has ever had.  She was very pleased with her relief after both sets of medial branch blocks.    5.  PATIENT EDUCATION: Patient is in agreement the above plan.  All questions were answered.     6.  FOLLOW-UP: Patient will return to the clinic for her bilateral L3, L4, L5 radiofrequency ablation.  If she has questions or concerns in the meantime, she should not hesitate to call.      Phone call duration: 9 minutes   (Start time: 228 PM, End time: 237 PM)    Subjective:     Luis Manuel Lemon is a 81 y.o. female who presents today for follow-up regarding chronic bilateral low back pain.  Patient had confirmatory bilateral L3, L4, L5 medial branch blocks April 6, 2021.  On the patient's pain diary her pain went from a level 9 to a level 3.  Patient reports on the phone today that her pain is at least 80%  better.  She felt like these blocks were even more effective than her diagnostic blocks.  She states that she felt really good after these injections.  In fact, she felt like these were the most effective injection she has ever had.  She states this was meaningful relief and she would be very grateful if she could have similar but longer lasting pain relief after the radiofrequency ablation.    Pain spans across low back in a broadband distribution at the belt line.  She denies any pain radiating in the legs today.  She states that her low back feels stiff.  Denies any numbness, tingling, or weakness down the legs.  She rates her pain today as a 7 out of 10.  It is aggravated with movement.  It is alleviated with not moving.  She denies any new symptoms and she was last seen.     Most recent course of physical therapy for the lower back was in October 2018.  She also did 4 sessions of occupational therapy ending in January 2019.  She states that she does her home exercises on a regular basis.  Patient's medications are set up by her nurse.   On her list is Celebrex 100 mg daily, duloxetine 30 mg daily, gabapentin 100 mg in the morning and 200 mg at bedtime, Tylenol arthritis 1300 mg twice daily.     Past medical history is reviewed and is unchanged.  Review of Systems:    Negative for pain worse at night, loss of numbness/tingling, weakness, bowel/bladder control, inability to urinate, headache, trip/stumble/falls, difficulty swallowing, difficulty with hand skills, fevers, unintentional weight loss.    Objective:     PSYCHIATRIC:  The patient is awake, alert, oriented to person, place and time.  The patient is answering questions appropriately with clear speech.  Normal affect.    The rest of the exam is deferred as this is a telephone visit.       RESULTS:     I reviewed the MRI cervical spine from [facility] dated March 8, 2021.  This shows multilevel degenerative change.  There is facet arthropathy throughout  the cervical spine.  At C3-4 there is mild spinal canal stenosis with moderate left and mild right foraminal stenosis.  At C4-5 there is mild spinal canal stenosis with moderate bilateral foraminal stenosis.  At C5-6 there is mild spinal canal stenosis with moderate bilateral foraminal stenosis.  At C6-7 there is moderate spinal canal stenosis with mild bilateral foraminal stenosis.  At C7-T1 there is mild bilateral foraminal stenosis.  Please see report for further details.     I reviewed the MRI lumbar spine from [facility] dated March 8, 2021.  This shows multilevel lumbar spondylosis.  At L2-3 there is a disc bulge with mild bilateral foraminal stenosis.  At L3-4 there is a disc bulge with mild bilateral foraminal stenosis.  At L4-5 there is a disc bulge and mild facet arthropathy with mild spinal canal stenosis and mild bilateral foraminal stenosis.  Please see report for further details.

## 2021-06-16 NOTE — PATIENT INSTRUCTIONS - HE
Please follow up four weeks post procedure with Tiffanie to evaluate your plan of care.       DISCHARGE INSTRUCTIONS    During office hours (8:00 a.m.- 4:00 p.m.) questions or concerns may be answered  by calling [facility] Navigation Nurses at  [phone].  Messages received after hours will be returned the following business day.      In the case of an emergency, please dial 911 or seek assistance at the nearest Emergency Room/Urgent Care facility.     All Patients:    ? You may experience an increase in your symptoms for the first 5-7 days. (It may take anywhere between 2 days- 2 weeks for the steroid to have maximum effect). The nerves take about 4-6 weeks to fully heal so soreness may persist the first few weeks.    ? You may use ice on the injection site, as frequently as 20 minutes each hour if needed.    ? You may take your pain medicine.    ? You may continue taking your regular medication after your injection.     ? You may shower. No swimming, tub bath or hot tub for 48 hours.  You may remove your bandaid/bandage as soon as you are home.    ? You may resume light activities, as tolerated.    ? Resume your usual diet as tolerated.    ? It is strongly advised that you do not drive for 1-3 hours post injection.      POSSIBLE STEROID SIDE EFFECTS (If steroid/cortisone was used for your procedure)    -If you experience these symptoms, it should only last for a short period      Swelling of the legs                Skin redness (flushing)       Mouth (oral) irritation     Blood sugar (glucose) levels              Sweats                      Mood changes    Headache    Sleeplessness    Weakened immune system for up to 14 days, which could increase the risk of nathanael the COVID-19 virus and/or experiencing more severe symptoms of the disease, if exposed.    Decreased effectiveness of the flu vaccine if given within 2 weeks of the steroid.         POSSIBLE PROCEDURE SIDE EFFECTS  -Call the Spine Center if you  are concerned    Increased Pain             Increased numbness/tingling        Nausea/Vomiting            Bruising/bleeding at site        Redness or swelling                                                Difficulty walking        Weakness             Fever greater than 100.5    *In the event of a severe headache after an epidural steroid injection that is relieved by lying down, please call the [facility] to speak with a clinical staff member*

## 2021-06-16 NOTE — TELEPHONE ENCOUNTER
Telephone Encounter by Nga Cervantes at 2/15/2019 11:28 AM     Author: Nga Cervantes Service: -- Author Type: --    Filed: 2/15/2019 11:29 AM Encounter Date: 2/11/2019 Status: Signed    : Nga Cervantes       [facility] team  [phone]    PA has been initiated.

## 2021-06-16 NOTE — PATIENT INSTRUCTIONS - HE
Home Health RN       BP elevated in clinic today but had been normal previously. So I did not adjust her BP meds today.   110/52 on 4/6/21  110/60 on 3/12/21  Thank you for continuing to monitor her BP.       Dr. Kateryna Morales  4/8/2021

## 2021-06-16 NOTE — TELEPHONE ENCOUNTER
Telephone Encounter by Yennifer Piña at 1/17/2019  9:28 AM     Author: Yennifer Piña Service: -- Author Type: --    Filed: 1/17/2019  9:30 AM Encounter Date: 1/16/2019 Status: Signed    : Yennifer Piña       PA INITIATION

## 2021-06-16 NOTE — PATIENT INSTRUCTIONS - HE
Please complete your pain diary and return the diary to the Spine Center at your earliest convenience.  The Spine Center will contact you to schedule your next appointment after your pain diary is reviewed by your doctor.  Thank you.    DISCHARGE INSTRUCTIONS    During office hours (8:00 a.m.- 4:00 p.m.) questions or concerns may be answered  by calling [facility] Navigation Nurses at  [phone].  Messages received after hours will be returned the following business day.      In the case of an emergency, please dial 911 or seek assistance at the nearest Emergency Room/Urgent Care facility.     All Patients:  ? You may experience an increase in your symptoms for the first 2 days, once the numbing medication wears off.    ? You may resume your regular medication, no pain medication until you have completed your diary    ? You may shower. No swimming, tub bath or hot tub for 24 hours; remove bandage after 4 hours    ? Continue your activities that can cause you pain to test the blocks.                         ? You should not drive for the next 3 to 5 hours (have someone drive you)           POSSIBLE PROCEDURE SIDE EFFECTS  -Call Spine Center if concerned-    Increased Pain  Increased numbness/tingling     Nausea/Vomiting  Bruising/bleeding at site (hematoma)             Swelling at site (edema) Headache  Difficulty walking  Infection        Fever greater than 100.5

## 2021-06-16 NOTE — PROGRESS NOTES
HPI - 79 yo female her for f/u.     Neck and back pain is getting much better.   She is happier.     Joint pain well managed with gabapentin and tylenol  Seen by pain clinic and given injection on 8/14/17 for bilateral shoulders  She is also c/o knee pain  Referral for [facility] on 1/11/18 and intake form given to Blank CLAYTON to help with the intake form. But she missed this apt b/c of transportation.      HTN - BP well controlled with current meds  On amlodipine, losartan-HCTZ, toprol XL      Polypharmacy -   Home health RN sets up meds  Simplified  Med list and verified with bottles    Osteoporosis - on Boniva  DEXA 5/4/17     Restrictive lung disease   Followed by pulm and has combivent prn  She has been using it 0-3 x per week    Current Outpatient Prescriptions   Medication Sig Note     amLODIPine (NORVASC) 10 MG tablet Take 10 mg by mouth every morning.      calcium, as carbonate, (TUMS) 200 mg calcium (500 mg) chewable tablet Chew 1 tablet 3 (three) times a day.      docusate sodium (COLACE) 100 MG capsule Take 100 mg by mouth 2 (two) times a day.      doxycycline (VIBRA-TABS) 100 MG tablet Start taking 3 days before departure then daily while traveling and 30days after return.      gabapentin (NEURONTIN) 100 MG capsule TAKE 1 CAPSULE IN THE MORNING, 1 CAPSULE MIDDAY AND 3 CAPSULES AT BEDTIME      ibandronate (BONIVA) 150 mg tablet Take 1 tablet (150 mg total) by mouth every 30 (thirty) days. Take in AM with glass of water prior to food, don't lie down for 30 minutes.      losartan-hydrochlorothiazide (HYZAAR) 100-25 mg per tablet Take 1 tablet by mouth daily.      MAPAP ARTHRITIS PAIN 650 mg CR tablet TAKE 1 TABLET BY MOUTH EVERY 8 HOURS AS NEEDED FOR PAIN      metoprolol succinate (TOPROL XL) 25 MG Take 1 tablet (25 mg total) by mouth daily.      omeprazole (PRILOSEC) 20 MG capsule Take 20 mg by mouth daily before breakfast.      VITAMIN D2 50,000 unit capsule TAKE 1 CAPSULE BY MOUTH ONCE  "WEEKLY      diphenhydrAMINE (DIPHENHIST) 25 mg tablet Take 1 tablet (25 mg total) by mouth at bedtime as needed for itching or sleep.      gabapentin (NEURONTIN) 100 MG capsule Take 300 mg by mouth at bedtime. 1/11/2018: See new sig      ipratropium-albuterol (COMBIVENT RESPIMAT)  mcg/actuation Mist inhaler Inhale 1 puff every 6 (six) hours as needed.      polyvinyl alcohol (LIQUIFILM TEARS) 1.4 % ophthalmic solution Apply to eye daily as needed      predniSONE (DELTASONE) 10 mg tablet Take 10 mg by mouth. 2/13/2018: Received from: [facility] Received Sig: Take 10 mg by mouth daily.       Vitals:    02/13/18 1327 02/13/18 1351   BP: 144/78 144/72   Pulse: 83    Resp: 18    Temp: 97.8  F (36.6  C)    TempSrc: Oral    Weight: 124 lb 9 oz (56.5 kg)    Height: 4' 11\" (1.499 m)        PHYSICAL EXAM   General Appearance: Awake and alert, in no acute distress  HEENT: neck is supple  CV: regular rate  Resp: No respiratory distress. Breathing comfortably  Musculoskeletal: moving limbs comfortably with not deficits or deformities  Skin: no rashes noted    A/P  Joint pain well managed with gabapentin and tylenol  Seen by pain clinic and given injection on 8/14/17 for bilateral shoulders  She is also c/o knee pain  Referral for acupuncture at  Pain clinic on 1/11/18 and intake form given to Blank CLAYTON to help with the intake form. But she missed this apt b/c of transportation.   I just filled out the form with her myself.      HTN - BP well controlled with current meds  On amlodipine, losartan-HCTZ, toprol XL      Polypharmacy -   Home health RN sets up meds  Simplified  Med list and verified with bottles    Osteoporosis - on Boniva  DEXA 5/4/17     Restrictive lung disease - she feels her breathing is better  Followed by pulm and has combivent prn  She has been using it 0-3 x per week    Spent 40 min face to face with patient with more the 50% spent in counseling, reviewing chart, and coordination of care and " discussing problems listed above and filling out intake form acupuncture with her.

## 2021-06-17 ENCOUNTER — HOSPITAL ENCOUNTER (OUTPATIENT)
Dept: ULTRASOUND IMAGING | Facility: HOSPITAL | Age: 81
Discharge: HOME | End: 2021-06-17
Attending: FAMILY MEDICINE
Payer: COMMERCIAL

## 2021-06-17 DIAGNOSIS — R59.0 LAD (LYMPHADENOPATHY) OF LEFT CERVICAL REGION: ICD-10-CM

## 2021-06-17 NOTE — TELEPHONE ENCOUNTER
Telephone Encounter by Yoko Murray RN at 12/3/2020 10:34 AM     Author: Yoko Murray RN Service: -- Author Type: Registered Nurse    Filed: 12/3/2020 10:38 AM Encounter Date: 12/3/2020 Status: Signed    : Yoko Murray RN (Registered Nurse)       ANTICOAGULATION  MANAGEMENT- Home Care/Care Facility Result    Assessment     Today's INR result of 1.70 is Subtherapeutic (goal INR of 2.0-3.0)        Warfarin recently held as instructed which may be affecting INR    - held warfarin doses on 11/25 -26, as instructed.    No new diet changes affecting INR    No new medication/supplements affecting INR    Continues to tolerate warfarin with no reported s/s of bleeding or thromboembolism     Previous INR was Therapeutic at 2.80 on 11/30/20.    Plan:     Spoke with FAY Perry with [employer] discussed INR result and instructed:     Warfarin Dosing Instructions:   - advised one time booster with 3.75mg warfarin dose,   - then continue current warfarin dose 1.25 mg daily on Sun/Tues/Thurs; and 2.5 mg daily rest of week.    Next INR to be drawn: one wk.  Scheduled on 12/10/20.    Education provided: importance of consistent vitamin K intake, target INR goal and significance of current INR result and importance of notifying clinic for changes in medications    FAY Perry verbalizes understanding and agrees to warfarin dosing plan.   ?   Yoko Murray RN    Subjective/Objective:      Luis Manueldelmi Lemon, a 80 y.o. female is established on warfarin.     Home care/care facility RN's report of [facility] INR, recent warfarin dosing, diet changes, medication changes, and symptoms is documented below.    Additional findings: verbally confirmed home dose with FAY Perry and updated on anticoagulation calendar    Anticoagulation Episode Summary     Current INR goal:  2.0-3.0   TTR:  26.0 % (1.3 mo)   Next INR check:  12/10/2020   INR from last check:  1.70 (12/3/2020)   Weekly max warfarin dose:     Target end  date:  2/22/2021   INR check location:     Preferred lab:     Send INR reminders to:  [facility]    Indications    Anticoagulated [Z79.01]  Single subsegmental pulmonary embolism without acute cor pulmonale (H) [I26.93]           Comments:           Anticoagulation Care Providers     Provider Role Specialty Phone number    Ludivina Early CNP Referring Nurse Practitioner [phone]

## 2021-06-17 NOTE — TELEPHONE ENCOUNTER
Telephone Encounter by Bessie Hercules RN at 1/14/2021 10:17 AM     Author: Bessie Hercules RN Service: -- Author Type: Registered Nurse    Filed: 1/14/2021 10:20 AM Encounter Date: 1/14/2021 Status: Signed    : Bessie Hercules RN (Registered Nurse)       ANTICOAGULATION  MANAGEMENT- Home Care/Care Facility Result    Assessment     Today's INR result of 4.0 is Supratherapeutic (goal INR of 2.0-3.0)        Warfarin taken as previously instructed    No new diet changes affecting INR Jenn will review need for consistency with vit k foods per week    No new medication/supplements affecting INR    Continues to tolerate warfarin with no reported s/s of bleeding or thromboembolism     Previous INR was Subtherapeutic    Plan:     Spoke with nurse jenn discussed INR result and instructed:     Warfarin Dosing Instructions: hold today then continue current warfarin dose 1.25 mg daily on sun/wed/fri; and 2.5 mg daily rest of week  (0 % change)    Next INR to be drawn: one week with home care    Jenn verbalizes understanding and agrees to warfarin dosing plan.   ?   Bessie Hercules RN    Subjective/Objective:      Luis Manuel Lemon, a 81 y.o. female is established on warfarin.     Home care/care facility RN's report of [facility] INR, recent warfarin dosing, diet changes, medication changes, and symptoms is documented below.    Additional findings: none    Anticoagulation Episode Summary     Current INR goal:  2.0-3.0   TTR:  31.5 % (2.7 mo)   Next INR check:  1/21/2021   INR from last check:  4.00 (1/14/2021)   Weekly max warfarin dose:     Target end date:  2/22/2021   INR check location:     Preferred lab:     Send INR reminders to:  [facility]    Indications    Anticoagulated [Z79.01]  Single subsegmental pulmonary embolism without acute cor pulmonale (H) [I26.93]           Comments:           Anticoagulation Care Providers     Provider Role Specialty Phone number    Ludivina Early CNP Referring Nurse Practitioner  [phone]

## 2021-06-17 NOTE — TELEPHONE ENCOUNTER
Telephone Encounter by Kevin Duarte RN at 10/21/2020 12:14 PM     Author: Kevin Duarte RN Service: -- Author Type: RN, Care Manager    Filed: 10/21/2020 12:15 PM Encounter Date: 10/21/2020 Status: Signed    : Kevin Duarte RN (RN, Care Manager)       ANTICOAGULATION  MANAGEMENT- Home Care/Care Facility Result    Assessment     Today's INR result of 4.8 is Supratherapeutic (goal INR of 2.0-3.0)        Warfarin taken as previously instructed    No new diet changes affecting INR    No new medication/supplements affecting INR    Continues to tolerate warfarin with no reported s/s of bleeding or thromboembolism     Previous INR was Therapeutic    Plan:     Spoke with home care nurse Christine discussed INR result and instructed:     Warfarin Dosing Instructions: (Pt already took 5 mg today). Hold 10/22 and 10/23 then change warfarin dose to 2.5 mg daily  (12.5 % change)    Next INR to be drawn: Mon 10/26.    Education provided: importance of following up for INR monitoring at instructed interval and importance of taking warfarin as instructed    Christine verbalizes understanding and agrees to warfarin dosing plan.   ?   Kevin Duarte RN    Subjective/Objective:      Luis Manuel Lemon, a 80 y.o. female is established on warfarin.     Home care/care facility RN's report of [facility] INR, recent warfarin dosing, diet changes, medication changes, and symptoms is documented below.    Additional findings: none    Anticoagulation Episode Summary     Current INR goal:  2.0-3.0   TTR:  --   Next INR check:  10/26/2020   INR from last check:  4.80 (10/21/2020)   Weekly max warfarin dose:     Target end date:  2/22/2021   INR check location:     Preferred lab:     Send INR reminders to:  [facility]    Indications    Anticoagulated [Z79.01]  Single subsegmental pulmonary embolism without acute cor pulmonale (H) [I26.93]           Comments:           Anticoagulation Care Providers     Provider Role  Specialty Phone number    Ludivina Early, CNP Referring Nurse Practitioner [phone]

## 2021-06-17 NOTE — TELEPHONE ENCOUNTER
Telephone Encounter by Kevin Duarte RN at 11/9/2020 11:57 AM     Author: Kevin Duarte RN Service: -- Author Type: RN, Care Manager    Filed: 11/9/2020 11:58 AM Encounter Date: 11/9/2020 Status: Signed    : Kevin Duarte RN (RN, Care Manager)       ANTICOAGULATION  MANAGEMENT- Home Care/Care Facility Result    Assessment     Today's INR result of 1.8 is Subtherapeutic (goal INR of 2.0-3.0)        Warfarin taken as previously instructed since last INR.    No new diet changes affecting INR    No new medication/supplements affecting INR    Continues to tolerate warfarin with no reported s/s of bleeding or thromboembolism     Previous INR was Therapeutic    Plan:     Spoke with home care nurse Vicky discussed INR result and instructed:     Warfarin Dosing Instructions: Take booster dose of 2.5 mg today only then continue current warfarin dose    1.25 mg every Mon, Fri; 2.5 mg all other days         Next INR to be drawn: Fri 11/13.     Education provided: importance of following up for INR monitoring at instructed interval and importance of taking warfarin as instructed    Vicky verbalizes understanding and agrees to warfarin dosing plan.   ?   Kevin Duarte RN    Subjective/Objective:      Luis Manuel Lemon, a 80 y.o. female is established on warfarin.     Home care/care facility RN's report of [facility] INR, recent warfarin dosing, diet changes, medication changes, and symptoms is documented below.    Additional findings: verbally confirmed home dose with Vicky and updated on anticoagulation calendar    Anticoagulation Episode Summary     Current INR goal:  2.0-3.0   TTR:  37.5 % (2.1 wk)   Next INR check:  11/13/2020   INR from last check:  1.80 (11/9/2020)   Weekly max warfarin dose:     Target end date:  2/22/2021   INR check location:     Preferred lab:     Send INR reminders to:  [facility]    Indications    Anticoagulated [Z79.01]  Single subsegmental pulmonary embolism without  acute cor pulmonale (H) [I26.93]           Comments:           Anticoagulation Care Providers     Provider Role Specialty Phone number    Ludivina Early CNP Referring Nurse Practitioner [phone]

## 2021-06-17 NOTE — PROGRESS NOTES
HPI - 79 yo female here for f/u.    Joint pain/knee pain/back pain -  still having some pain but mostly managed with gabapentin and tylenol  Managed by pain clinic, missed last apt at pain clinic b/c of transportation     HTN -BP slightly elevated today but last visit it was wnl  Daughter reports BP was high for home RN  Continue amlodipine 10mg, losartan-HCTZ 100-25,   Increase toprol XL 25 -> 50mg last visit and is doing better      Polypharmacy -   Home health RN sets up meds      Osteoporosis - on Boniva  DEXA 5/4/17  Taking Vit D and TUMS      Vit D deficiency - last level 22.7 on 6/1/17  Taking weekly ergo 50,000 units     abdo pain - resolved with TUMS  Off omeprazole     Constipation -resolved with colace     Restrictive lung disease - she feels her breathing is better  Followed by pulm and has combivent prn  She has been using it 0-3 x per week    H/o low K+, low Mg+, low Phos     Current Outpatient Prescriptions   Medication Sig     amLODIPine (NORVASC) 10 MG tablet Take 10 mg by mouth every morning.     ANTACID, CALCIUM CARBONATE, 200 mg calcium (500 mg) chewable tablet CHEW 1 TABLET BY MOUTH THREE TIMES A DAY TO KEEP YOUR BONES HEALTHY     docusate sodium (COLACE) 100 MG capsule TAKE 1 CAPSULE BY MOUTH TWICE DAILY.     gabapentin (NEURONTIN) 100 MG capsule TAKE 1 CAPSULE IN THE MORNING, 1 CAPSULE MIDDAY AND 3 CAPSULES AT BEDTIME     ibandronate (BONIVA) 150 mg tablet Take 1 tablet (150 mg total) by mouth every 30 (thirty) days. Take in AM with glass of water prior to food, don't lie down for 30 minutes.     losartan-hydrochlorothiazide (HYZAAR) 100-25 mg per tablet TAKE 1 TABLET BY MOUTH DAILY.     MAPAP ARTHRITIS PAIN 650 mg CR tablet TAKE 1 TABLET BY MOUTH EVERY 8 HOURS AS NEEDED FOR PAIN     metoprolol succinate (TOPROL XL) 50 MG 24 hr tablet Take 1 tablet (50 mg total) by mouth daily.     omeprazole (PRILOSEC) 20 MG capsule TAKE 1 CAPSULE ORALLY DAILY. TAKE 1 HOUR BEFORE A MEAL.     VITAMIN D2 50,000  "unit capsule TAKE 1 CAPSULE BY MOUTH ONCE WEEKLY     ipratropium-albuterol (COMBIVENT RESPIMAT)  mcg/actuation Mist inhaler Inhale 1 puff every 6 (six) hours as needed.     polyvinyl alcohol (LIQUIFILM TEARS) 1.4 % ophthalmic solution Apply to eye daily as needed     Vitals:    04/10/18 1026   BP: 112/60   Pulse: (!) 53   Resp: 16   Temp: 97.4  F (36.3  C)   TempSrc: Oral   SpO2: 93%   Weight: 125 lb 6.4 oz (56.9 kg)   Height: 4' 11\" (1.499 m)     PHYSICAL EXAM   General Appearance: Awake and alert, in no acute distress  HEENT: neck is supple  CV: regular rate  Resp: No respiratory distress. Breathing comfortably  Musculoskeletal: moving limbs comfortably with not deficits or deformities  Skin: no rashes noted    A/P  Joint pain/knee pain/back pain -  still having some pain but mostly managed with gabapentin and tylenol  Managed by pain clinic, missed last apt at pain clinic b/c of transportation  Will help r/s pain clinic apt with transportation     HTN -BP improved  Daughter reports BP was high for home RN  Continue amlodipine 10mg, losartan-HCTZ 100-25,   Continue toprol XL 50mg       Polypharmacy -   Home health RN sets up meds      Osteoporosis - on Boniva  DEXA 5/4/17  Taking Vit D and TUMS      Vit D deficiency - last level 22.7 on 6/1/17  Taking weekly ergo 50,000 units  Recheck level     abdo pain - resolved with TUMS  Off omeprazole     Constipation -resolved with colace     Restrictive lung disease - she feels her breathing is better  Followed by pulm and has combivent prn  She has been using it 0-3 x per week    H/o low K+, low Mg+, low Phos, hgb  Recheck these today    Spent 25 min face to face with patient with more the 50% spent in counseling, reviewing chart, and coordination of care and discussing problems listed above.     "

## 2021-06-17 NOTE — TELEPHONE ENCOUNTER
Telephone Encounter by Christine Aviles RN at 2/23/2021  4:07 PM     Author: Christine Aviles RN Service: -- Author Type: Registered Nurse    Filed: 2/23/2021  4:17 PM Encounter Date: 2/18/2021 Status: Signed    : Christine Aviles RN (Registered Nurse)       Kateryna Morales MD Wolyniec, Brittany M, RN   Caller: Unspecified (5 days ago,  3:06 PM)             I Reviewed pulmonology consult and recommendation was to stay on warfarin for the 6 months and then she can stop given fall risk.  So she will be completed her 6-month on February 22, 2021.     Thanks for your assistance.     Dr. Kateryna Morales   2/23/2021          ANTICOAGULATION  MANAGEMENT PROGRAM    Luis Manuel Lemon is being discharged from the [facility] Anticoagulation Management Program (ACM).    Reason for discharge: warfarin therapy completed    ACM referral closed, anticoagulation episode resolved and INR standing order discontinued.       Called patient via [employer]  Line ( #[phone]), to inform of the warfarin stop date. Granddaughter, La, answered and speaks English-- informed La that Luis Manuel should not take her warfarin any longer-- La verbalized understanding and agreed to no longer give it to Luis Manuel to take.    Christine Aviles RN

## 2021-06-17 NOTE — TELEPHONE ENCOUNTER
Telephone Encounter by Remi Vigil RN at 10/29/2020  2:09 PM     Author: Remi Vigil RN Service: -- Author Type: Registered Nurse    Filed: 10/29/2020  2:14 PM Encounter Date: 10/29/2020 Status: Signed    : Remi Vigil RN (Registered Nurse)       ANTICOAGULATION  MANAGEMENT    Assessment     Today's INR result of 3.6 is Supratherapeutic (goal INR of 2.0-3.0)        Warfarin taken as previously instructed    No new diet changes affecting INR    No new medication/supplements affecting INR    Continues to tolerate warfarin with no reported s/s of bleeding or thromboembolism     Previous INR was Subtherapeutic    Plan:     Spoke on phone with home care nurse Christine  regarding INR result and instructed:     Warfarin Dosing Instructions:  Hold warfarin tonight  then change warfarin dose to 1.25 mg daily on Mondays and Fridays; and 2.5 mg daily rest of week  (14.3 % change)    Went over change in tablet size, home care will be providing all dosing to decrease confusion between tablet sizes. Sent warfarin 2.5 mg to preferred pharmacy. Possible rise in INR may be r/t to past 2 INRs.     Instructed patient to follow up no later than: 11/2    Education provided: importance of taking warfarin as instructed and monitoring for bleeding signs and symptoms    Christine verbalizes understanding and agrees to warfarin dosing plan.    Instructed to call the [facility] for any changes, questions or concerns. (#[phone])   ?   Remi Vigil RN    Subjective/Objective:      [facility] Ion, a 80 y.o. female is on warfarin.     [facility] reports:     Home warfarin dose: verbally confirmed home dose with Christine and updated on anticoagulation calendar     Missed doses: No     Medication changes:  No     S/S of bleeding or thromboembolism:  No     New Injury or illness:  No     Changes in diet or alcohol consumption:  No     Upcoming surgery, procedure or cardioversion:  No    Anticoagulation Episode Summary      Current INR goal:  2.0-3.0   TTR:  41.8 % (4 d)   Next INR check:  11/2/2020   INR from last check:  3.60 (10/29/2020)   Weekly max warfarin dose:     Target end date:  2/22/2021   INR check location:     Preferred lab:     Send INR reminders to:  [facility]    Indications    Anticoagulated [Z79.01]  Single subsegmental pulmonary embolism without acute cor pulmonale (H) [I26.93]           Comments:           Anticoagulation Care Providers     Provider Role Specialty Phone number    Ludivina Early CNP Referring Nurse Practitioner [phone]

## 2021-06-17 NOTE — TELEPHONE ENCOUNTER
Telephone Encounter by Kera Phillips RN at 12/17/2020 10:27 AM     Author: Kera Phillips RN Service: -- Author Type: Registered Nurse    Filed: 12/17/2020 10:29 AM Encounter Date: 12/17/2020 Status: Signed    : Kera Phillips RN (Registered Nurse)       ANTICOAGULATION  MANAGEMENT- Home Care/Care Facility Result    Assessment     Today's INR result of 1.6 is Subtherapeutic (goal INR of 2.0-3.0)        Warfarin recently held as instructed which may be affecting INR    No new diet changes affecting INR    No new medication/supplements affecting INR    Continues to tolerate warfarin with no reported s/s of bleeding or thromboembolism     Previous INR was Supratherapeutic    Plan:     Spoke with Home Care Nurse Vicky discussed INR result and instructed:     Warfarin Dosing Instructions: 2.5 mg booster dose today then continue current warfarin dose    2.5 mg every Mon, Wed, Sat; 1.25 mg all other days     (0 % change)    Next INR to be drawn: one week    Education provided: importance of therapeutic range and target INR goal and significance of current INR result    Vicky verbalizes understanding and agrees to warfarin dosing plan.   ?   Kera Phillips RN    Subjective/Objective:      Luis Manueldelmi Lemon, a 80 y.o. female is established on warfarin.     Home care/care facility RN's report of [facility] INR, recent warfarin dosing, diet changes, medication changes, and symptoms is documented below.    Additional findings: none    Anticoagulation Episode Summary     Current INR goal:  2.0-3.0   TTR:  32.7 % (1.8 mo)   Next INR check:  12/24/2020   INR from last check:  1.60 (12/17/2020)   Weekly max warfarin dose:     Target end date:  2/22/2021   INR check location:     Preferred lab:     Send INR reminders to:  [facility]    Indications    Anticoagulated [Z79.01]  Single subsegmental pulmonary embolism without acute cor pulmonale (H) [I26.93]           Comments:           Anticoagulation Care Providers      Provider Role Specialty Phone number    Ludivina Early, CNP Referring Nurse Practitioner [phone]

## 2021-06-17 NOTE — PROGRESS NOTES
Assessment:   Luis Manuel Lemon is a 81 y.o. y.o. female with past medical history significant for vitamin D deficiency, hypertension, hyperlipidemia, osteoporosis on Prolia, chronic GERD, chronic pain syndrome, pulmonary fibrosis, polyarthralgia, stage III chronic kidney disease who presents today for follow-up regarding 2 areas of pain.  1.  Chronic neck pain, currently worse on the left than the right.  My review of the MRI cervical spine shows multilevel degenerative change most pronounced at C6-7 where there is moderate spinal canal stenosis.  Pain today seems primarily myofascial in nature.  She had significant hypertonicity left greater than right upper trapezius muscle.  2.  Chronic bilateral low back pain with radiation into the bilateral lower extremities with associated weakness and limited walking tolerance.  My review of an MRI lumbar spine shows mild spinal stenosis at L4-5 related to a disc bulge and facet arthropathy.  There is no high-grade spinal canal or neuroforaminal stenosis.  Patient status post bilateral L3, L4, L5 radiofrequency ablation April 19, 2021 which provided 70% relief of her pain.  -On exam, patient had diffuse weakness in the upper extremities and in the bilateral hip flexors.  She appears to be deconditioned.  She did not have any hyperreflexia or pathologic reflexes.  She had a sensory deficit right fingers 3 and 4.       Plan:     A shared decision making plan was used.  The patient's values and choices were respected.  The following represents what was discussed and decided upon by the physician assistant and the patient.  A telephone  was used for the visit.  Patient's granddaughter was also present for the visit and helps provide history.    1.  DIAGNOSTIC TESTS: I reviewed the MRI scans of the cervical and lumbar spine in detail with the patient with the help of a spine model.  No further diagnostic tests were ordered.    2.  PHYSICAL THERAPY: Most recent physical therapy  was in October 2018.  I offered her referral back to physical therapy.  She declined.  She was given some home exercises for her neck.  Also suggest that she try using a tennis ball to massage the muscles in her neck and upper back.  -Patient completed 4 sessions of occupational therapy with Michelle Russell in 2019 which she did not feel is helpful.    3.  MEDICATIONS: No changes are made to the patient's medications.  Medications are set up by a nurse.   On her medication list is Celebrex 100 mg daily, duloxetine 30 mg daily, gabapentin 100 mg in the morning and 200 mg at bedtime, Tylenol arthritis 1300 mg twice daily.  -I did recommend that the patient try applying icy hot to her neck muscles.  She already uses this on the lower back which is helpful.    4.  INTERVENTIONS:   -No additional interventions were ordered.  If low back pain were to return, we could repeat the bilateral L3, L4, L5 radiofrequency ablation.  -In regards to the neck pain, would likely begin with upper trapezius trigger point injections.    5.  PATIENT EDUCATION: Patient is in agreement the above plan.  All questions were answered.    6.  FOLLOW-UP: Patient follow-up with me in 6 weeks.  If she has questions or concerns in the meantime, she should not hesitate to call.    Subjective:     Luis Manuel Lemon is a 81 y.o. female who presents today for follow-up regarding chronic neck and low back pain..  Patient is following up after a bilateral L3, 4, L5 radiofrequency ablation April 19, 2021.  Patient reports 70% improvement in her low back pain since the procedure.      Patient complains of mild residual bilateral low back pain.  Pain is located the lumbosacral junction.  She denies any pain radiating down the legs.  She endorses intermittent numbness and tingling anterior thighs.    She is more concerned today about neck pain.  She has chronic neck pain.  Last my talk to her it was worse on the right than the left.  About a week ago the pain became more  severe on the left than the right.  Denies any injury.  Pain extends into the upper trapezius muscles.  Denies any pain radiating down the arms.  She has intermittent numbness and tingling right fingers 3 and 4.    Overall, patient rates her pain today as a 6 out of 10.  Pain tends to be aggravated with increased activity and alleviated with rest.    Most recent course of physical therapy for the lower back was in October 2018.  She also did 4 sessions of occupational therapy ending in January 2019.  She states that she does her home exercises on a regular basis.  Patient's medications are set up by her nurse.   On her list is Celebrex 100 mg daily, duloxetine 30 mg daily, gabapentin 100 mg in the morning and 200 mg at bedtime, Tylenol arthritis 1300 mg twice daily.    Past medical history is reviewed and is unchanged.    Review of Systems:  Positive for numbness/tingling.  Negative for weakness, loss of bowel/bladder control, inability to urinate, headache, pain much worse at night, trip/stumble/falls, difficulty swallowing, difficulty with hand skills, fevers, unintentional weight loss.     Objective:   CONSTITUTIONAL:  Vital signs as above.  No acute distress.  The patient is well nourished and well groomed.    PSYCHIATRIC:  The patient is awake, alert, oriented to person, place and time.  The patient is answering questions appropriately with clear speech.  Normal affect.  HEENT: Normocephalic, atraumatic.  Sclera clear.    SKIN:  Skin over the face, posterior torso, bilateral upper and lower extremities is clean, dry, intact without rashes.  VASCULAR: No significant lower extremity edema.  MUSCULOSKELETAL:  Gait is unsteady.  She ambulates with a significantly flexed forward posture at the hips.  Uses a cane for assistance.  Minimal tenderness over the bilateral cervical paraspinous muscles at L4-5 and L5-S1.  Lumbar extension increases pain.  Tender to palpation left greater than right upper trapezius muscles  with hypertonicity. The patient has  Diffuse weakness which I would grade 4/5 for the bilateral shoulder abductors, elbow flexors/extensors, finger flexors/abductors, wrist extensors.  She had 4/5 strength bilateral hip flexors, 5/5 strength bilateral knee flexors/extensors, ankle dorsi/plantar flexors.    NEUROLOGICAL: 1-2+ biceps, triceps, brachioradialis reflexes bilaterally.  2+ patellar, achilles reflexes which are symmetric bilaterally.  Negative Ruby sign bilaterally.  Negative Babinski's bilaterally.  No ankle clonus bilaterally.  Sensation light touch diminished right fingers 3 and 4.  Sensation to light touch is intact in the bilateral L4, L5, and S1 dermatomes.       RESULTS:     I reviewed the MRI cervical spine from [facility] dated March 8, 2021.  This shows multilevel degenerative change.  There is facet arthropathy throughout the cervical spine.  At C3-4 there is mild spinal canal stenosis with moderate left and mild right foraminal stenosis.  At C4-5 there is mild spinal canal stenosis with moderate bilateral foraminal stenosis.  At C5-6 there is mild spinal canal stenosis with moderate bilateral foraminal stenosis.  At C6-7 there is moderate spinal canal stenosis with mild bilateral foraminal stenosis.  At C7-T1 there is mild bilateral foraminal stenosis.  Please see report for further details.    I reviewed the MRI lumbar spine from [facility] dated March 8, 2021.  This shows multilevel lumbar spondylosis.  At L2-3 there is a disc bulge with mild bilateral foraminal stenosis.  At L3-4 there is a disc bulge with mild bilateral foraminal stenosis.  At L4-5 there is a disc bulge and mild facet arthropathy with mild spinal canal stenosis and mild bilateral foraminal stenosis.  Please see report for further details.

## 2021-06-17 NOTE — PROGRESS NOTES
Assessment & Plan     Spinal stenosis of lumbar region without neurogenic claudication  Chronic pain syndrome  S/p recent injections with Spine clinic  Follow-up appointment with spine clinic next week  Continue current meds    Major depressive disorder, recurrent episode, mild (H)  Psychophysiological insomnia  Continue current meds.  Will increase the dose of Remeron to 1-1/2 tablets  - mirtazapine (REMERON) 15 MG tablet  Dispense: 45 tablet; Refill: 11    Poor appetite  Unclear etiology of her poor appetite.  Work-up has been normal.  We will increase her Remeron to 22.5 mg which is 1-1/2 tablets.  And will also start a multivitamin.  - mirtazapine (REMERON) 15 MG tablet  Dispense: 45 tablet; Refill: 11  - multivitamin (ONE A DAY) per tablet  Dispense: 120 tablet; Refill: 2    Essential hypertension  Well-controlled with current regimen    Senile osteoporosis  Continue Prolia shots  To new calcium and vitamin D    History of pulmonary embolism  Pulmonary fibrosis (H)  Restrictive lung disease  Needs repeat chest CT to show full resolution of PE and follow-up on pulmonary fibrosis  - CTA Chest PE Run    Vitamin D deficiency  Continue vitamin D supplementation    Polypharmacy    Medically complex patient    Language barrier affecting health care    Poverty      Review of external notes as documented in note  Diagnosis or treatment significantly limited by social determinants of health - Language barrier, low health literacy, poverty  Prescription drug management      Return in about 4 weeks (around 6/8/2021) for Recheck and f/u on chronic pain, for PTSD/MDD.    Kateryna Morales MD  [facility] is 81 y.o. and presents today for the following health issues   [employer] professional phone  used.   Here to f/u on HTN, back pain and knee pain      HTN -   BP wnl today  BP meds: losaratan 100mg, hydrochlorothiazide 25mg, amlodipine 10mg    Chronic Back  pain - a little better since injections last month and walking better  Spine clinic consult note reviewed from 4/9/21:  Lumbosacral spondylosis without myelopathy    MRI lumbar spine shows mild spinal stenosis at L4-5 related to a disc bulge and facet arthropathy  Patient has had lumbar facet joint injections, epidural steroid injection x2, sacroiliac joint injection x2.   bilateral L3, L4, L5 medial branch blocks April 6, 2021  recommended bilateral L3, L4, L5 radiofrequency ablation.  Lumbar facet arthropathy  4/19/21   Pain meds: cymbalta, gabapentin, celebrex  F/u with spine clinic next week    Knee pain - walking better b/c less pain      h/o PE and per pulm notes need tx or 6 months which ended 2/22/21  Chest CTA ordered 2/25/21 but this got cancelled for unknown reason  Stopped the warfarin  But still getting refills from pharmacy so has several full bottles    Senile osteoporosis  getting Prolia shots thru endo clinic - last dose 3/15/21  Vit D - 26.8 on 3/1/21    PTSD/MDD  Meds: cymbalta, remeron 15mg  Mood is good, sleeping ok but occasionally wakes at night and talks with kids but does not remember in the morning she did that  Poor appetite and change in taste (low Vit D, neg Hpylori, TSH wnl, h/o eos but O&P neg, FOBT 2019 neg, abdo CT 12/2019 normal except diverticulosis)  Son in [facility] that she does not have contact with so is worried about him in the violence occurring and that country.    covid vaccine - scheduled for 5/22/21 (which will be 4 weeks after lumbar steroid injection)    Review of Systems   Please see above.  The rest of the review of systems are negative for all systems.     Current Outpatient Medications   Medication Sig     albuterol (PROAIR HFA;PROVENTIL HFA;VENTOLIN HFA) 90 mcg/actuation inhaler Inhale 2 puffs every 6 (six) hours as needed for wheezing or shortness of breath.     amLODIPine (NORVASC) 10 MG tablet TAKE 1 TABLET ORALLY EVERY DAY.     celecoxib (CELEBREX) 100 MG capsule  "TAKE ONE CAPSULE BY MOUTH DAILY.     DULoxetine (CYMBALTA) 30 MG capsule Take 1 capsule (30 mg total) by mouth daily.     famotidine (PEPCID) 40 MG tablet TAKE 1 TABLET BY MOUTH TWICE DAILY.     fluticasone furoate-vilanteroL (BREO ELLIPTA) 100-25 mcg/dose inhaler Inhale 1 puff daily.     gabapentin (NEURONTIN) 100 MG capsule Take 1 tab (100mg) in the morning and 2 tabs (200mg) at bedtime     hydroCHLOROthiazide (HYDRODIURIL) 25 MG tablet TAKE ONE TABLET BY MOUTH ONCE DAILY WITH LOSARTAN 100MG.     ipratropium-albuteroL (COMBIVENT RESPIMAT)  mcg/actuation Mist inhaler Inhale 1 puff every 6 (six) hours as needed.     losartan (COZAAR) 100 MG tablet TAKE 1 TABLET BY MOUTH DAILY WITH HYDROCHLOROTHIAZIDE 25MG     MAPAP ARTHRITIS PAIN 650 mg CR tablet TAKE 2 TABLETS BY MOUTH 2 TIMES A DAY     metoprolol succinate (TOPROL-XL) 50 MG 24 hr tablet TAKE 1 TABLET BY MOUTH DAILY     mirtazapine (REMERON) 15 MG tablet Take 1 tablet (15 mg total) by mouth at bedtime.     senna-docusate (SENEXON-S) 8.6-50 mg tablet Take 2 tablets by mouth daily.     calcium, as carbonate, (ANTACID, CALCIUM CARBONATE,) 200 mg calcium (500 mg) chewable tablet CHEW 1 TABLET BY MOUTH THREE TIMES A DAY TO KEEP YOUR BONES HEALTHY     capsaicin (ZOSTRIX) 0.025 % cream Apply topically 2 (two) times a day as needed.     polyvinyl alcohol (ARTIFICIAL TEARS, POLYVIN ALC,) 1.4 % ophthalmic solution APPLY TO EYE DAILY AS NEEDED.           Objective    /66   Pulse 60   Temp 97.7  F (36.5  C) (Oral)   Resp 16   Ht 4' 9.91\" (1.471 m)   Wt 122 lb 4.8 oz (55.5 kg)   SpO2 99%   BMI 25.64 kg/m    Body mass index is 25.64 kg/m .  Physical Exam    Vitals:    05/11/21 1129   BP: 134/66   Pulse: 60   Resp: 16   Temp: 97.7  F (36.5  C)   TempSrc: Oral   SpO2: 99%   Weight: 122 lb 4.8 oz (55.5 kg)   Height: 4' 9.91\" (1.471 m)     PHYSICAL EXAM   General Appearance: Awake and alert, in no acute distress  HEENT: neck is supple  CV: regular rate  Resp: No " respiratory distress. Breathing comfortably  Musculoskeletal: moving limbs comfortably with not deficits or deformities  Skin: no rashes noted

## 2021-06-17 NOTE — TELEPHONE ENCOUNTER
Telephone Encounter by Gabrielle Azul RN at 1/7/2021 12:01 PM     Author: Gabrielle Azul RN Service: -- Author Type: Registered Nurse    Filed: 1/7/2021 12:05 PM Encounter Date: 1/7/2021 Status: Signed    : Gabrielle Azul RN (Registered Nurse)       ANTICOAGULATION  MANAGEMENT- Home Care/Care Facility Result    Assessment     Today's INR result of 1.1 is Subtherapeutic (goal INR of 2.0-3.0)        Missed dose(s) may be affecting INR. Home care RN states that patient is very compliant with medications and she believes that patient only missed the one dose    No new diet changes affecting INR    No new medication/supplements affecting INR    Continues to tolerate warfarin with no reported s/s of bleeding or thromboembolism     Previous INR was Therapeutic, per review of history INR appears to demonstrate rapid rise/drop to small dose changes within a few days    Plan:     Spoke with home care RN Vicky discussed INR result and instructed:     Warfarin Dosing Instructions: boost dose today Thur 1/7 take 3.75mg then continue current warfarin dose    1.25 mg every Sun, Wed, Fri; 2.5 mg all other days         Next INR to be drawn: 1 week    Education provided: importance of consistent vitamin K intake, target INR goal and significance of current INR result, monitoring for bleeding signs and symptoms and monitoring for clotting signs and symptoms    Vicky verbalizes understanding and agrees to warfarin dosing plan.   ?   Gabrielle Azul RN    Subjective/Objective:      Luis Manuel Lemon, a 81 y.o. female is established on warfarin.     Home care/care facility RN's report of [facility] INR, recent warfarin dosing, diet changes, medication changes, and symptoms is documented below.    Additional findings: diet change: discussed with Vicky and she states that patient is really consistent with her diet. They have discussed this many times because INR has been so variable. Patient denies drinking any herbal  teas or taking any supplements.     Anticoagulation Episode Summary     Current INR goal:  2.0-3.0   TTR:  31.3 % (2.5 mo)   Next INR check:  1/14/2021   INR from last check:  1.10 (1/7/2021)   Weekly max warfarin dose:     Target end date:  2/22/2021   INR check location:     Preferred lab:     Send INR reminders to:  [facility]    Indications    Anticoagulated [Z79.01]  Single subsegmental pulmonary embolism without acute cor pulmonale (H) [I26.93]           Comments:           Anticoagulation Care Providers     Provider Role Specialty Phone number    Ludivina Early CNP Referring Nurse Practitioner [phone]

## 2021-06-17 NOTE — TELEPHONE ENCOUNTER
Telephone Encounter by Kevin Duarte RN at 11/25/2020  8:29 AM     Author: Kevin Duarte RN Service: -- Author Type: RN, Care Manager    Filed: 11/25/2020  8:30 AM Encounter Date: 11/25/2020 Status: Signed    : Kevin Duarte RN (RN, Care Manager)       ANTICOAGULATION  MANAGEMENT- Home Care/Care Facility Result    Assessment     Today's INR result of 4.6 is Supratherapeutic (goal INR of 2.0-3.0)        Warfarin taken as previously instructed    No new diet changes affecting INR    No new medication/supplements affecting INR    Continues to tolerate warfarin with no reported s/s of bleeding or thromboembolism     Previous INR was Supratherapeutic    Plan:     Spoke with home care nurse Vicky discussed INR result and instructed:     Warfarin Dosing Instructions: Hold today and tmr 11/26.    Next INR to be drawn: Fri 11/27.    Education provided: importance of following up for INR monitoring at instructed interval and importance of taking warfarin as instructed    Vicky verbalizes understanding and agrees to warfarin dosing plan.   ?   Kevin Duarte RN    Subjective/Objective:      Luis Manuel Lemon, a 80 y.o. female is established on warfarin.     Home care/care facility RN's report of [facility] INR, recent warfarin dosing, diet changes, medication changes, and symptoms is documented below.    Additional findings: verbally confirmed home dose with Vicky and updated on anticoagulation calendar    Anticoagulation Episode Summary     Current INR goal:  2.0-3.0   TTR:  21.8 % (1 mo)   Next INR check:  11/27/2020   INR from last check:  4.60 (11/25/2020)   Weekly max warfarin dose:     Target end date:  2/22/2021   INR check location:     Preferred lab:     Send INR reminders to:  [facility]    Indications    Anticoagulated [Z79.01]  Single subsegmental pulmonary embolism without acute cor pulmonale (H) [I26.93]           Comments:           Anticoagulation Care Providers     Provider  Role Specialty Phone number    Ludivina Early, CNP Referring Nurse Practitioner [phone]

## 2021-06-17 NOTE — TELEPHONE ENCOUNTER
Telephone Encounter by Kevin Duarte RN at 11/2/2020 10:40 AM     Author: Kevin Duarte RN Service: -- Author Type: RN, Care Manager    Filed: 11/2/2020 10:41 AM Encounter Date: 11/2/2020 Status: Signed    : Kevin Duarte RN (RN, Care Manager)       ANTICOAGULATION  MANAGEMENT- Home Care/Care Facility Result    Assessment     Today's INR result of 3.6 is Supratherapeutic (goal INR of 2.0-3.0)        Warfarin taken as previously instructed    No new diet changes affecting INR    No new medication/supplements affecting INR    Continues to tolerate warfarin with no reported s/s of bleeding or thromboembolism     Previous INR was Supratherapeutic    Plan:     Spoke with home care nurse  Vicky discussed INR result and instructed:     Warfarin Dosing Instructions: Hold today only then continue current warfarin dose    1.25 mg every Mon, Fri; 2.5 mg all other days       Next INR to be drawn: Thurs 11/5.     Education provided: importance of following up for INR monitoring at instructed interval and importance of taking warfarin as instructed    Vicky verbalizes understanding and agrees to warfarin dosing plan.   ?   Kevin Duarte RN    Subjective/Objective:      Luis Manuel Lemon, a 80 y.o. female is established on warfarin.     Home care/care facility RN's report of [facility] INR, recent warfarin dosing, diet changes, medication changes, and symptoms is documented below.    Additional findings: verbally confirmed home dose with Vicky and updated on anticoagulation calendar    Anticoagulation Episode Summary     Current INR goal:  2.0-3.0   TTR:  20.9 % (1.1 wk)   Next INR check:  11/5/2020   INR from last check:  3.60 (11/2/2020)   Weekly max warfarin dose:     Target end date:  2/22/2021   INR check location:     Preferred lab:     Send INR reminders to:  [facility]    Indications    Anticoagulated [Z79.01]  Single subsegmental pulmonary embolism without acute cor pulmonale (H)  [I26.93]           Comments:           Anticoagulation Care Providers     Provider Role Specialty Phone number    Ludivina Early, CNP Referring Nurse Practitioner [phone]

## 2021-06-17 NOTE — TELEPHONE ENCOUNTER
Telephone Encounter by Kevin Duarte RN at 10/26/2020 12:21 PM     Author: Kevin Duarte RN Service: -- Author Type: RN, Care Manager    Filed: 10/26/2020 12:22 PM Encounter Date: 10/26/2020 Status: Signed    : Kevin Duarte RN (RN, Care Manager)       ANTICOAGULATION  MANAGEMENT- Home Care/Care Facility Result    Assessment     Today's INR result of 1.5 is Subtherapeutic (goal INR of 2.0-3.0)        Warfarin taken as previously instructed    No new diet changes affecting INR    No new medication/supplements affecting INR    Continues to tolerate warfarin with no reported s/s of bleeding or thromboembolism     Previous INR was Supratherapeutic    Plan:     Spoke with home care nurse Vicky discussed INR result and instructed:     Warfarin Dosing Instructions: Take booster dose of 5 mg today only then continue current warfarin dose 2.5 mg daily.    Next INR to be drawn: Thurs 10/29.     Education provided: importance of following up for INR monitoring at instructed interval and importance of taking warfarin as instructed    Vicky verbalizes understanding and agrees to warfarin dosing plan.   ?   Kevin Duarte RN    Subjective/Objective:      Luis Manuel Lemon, a 80 y.o. female is established on warfarin.     Home care/care facility RN's report [facility], recent warfarin dosing, diet changes, medication changes, and symptoms is documented below.    Additional findings: template incorrect; verbally confirmed home dose with Vicky and updated on anticoagulation calendar    Anticoagulation Episode Summary     Current INR goal:  2.0-3.0   TTR:  24.2 % (1 d)   Next INR check:  10/29/2020   INR from last check:  1.50 (10/26/2020)   Weekly max warfarin dose:     Target end date:  2/22/2021   INR check location:     Preferred lab:     Send INR reminders to:  [facility]    Indications    Anticoagulated [Z79.01]  Single subsegmental pulmonary embolism without acute cor pulmonale (H)  [I26.93]           Comments:           Anticoagulation Care Providers     Provider Role Specialty Phone number    Ludivina Early, CNP Referring Nurse Practitioner [phone]

## 2021-06-17 NOTE — TELEPHONE ENCOUNTER
Telephone Encounter by Mireya English RN at 12/28/2020 10:17 AM     Author: Mireya English RN Service: -- Author Type: Registered Nurse    Filed: 12/28/2020 10:18 AM Encounter Date: 12/28/2020 Status: Signed    : Mireya English RN (Registered Nurse)       ANTICOAGULATION  MANAGEMENT    Assessment     Friday's INR result of 1.4 is Subtherapeutic (goal INR of 2.0-3.0)        Warfarin taken as previously instructed - nurse did boost dose on 12/25 of 2.5 mg instead of 1.25 mg     No new diet changes affecting INR    No new medication/supplements affecting INR    Continues to tolerate warfarin with no reported s/s of bleeding or thromboembolism     Previous INR was Subtherapeutic    Plan:     Spoke on phone with nurse Vicky, regarding INR result and instructed:     Warfarin Dosing Instructions:  Change warfarin dose to 1.25 mg daily on Sun, Wed, Fri; and 2.5 mg daily rest of week  (10 % change)    Instructed patient to follow up no later than: 12/31    Education provided: target INR goal and significance of current INR result    Vicky verbalizes understanding and agrees to warfarin dosing plan.    Instructed to call the [facility] for any changes, questions or concerns. (#[phone])   ?   Mireya English RN    Subjective/Objective:      Luis Manuel Diaoh, a 80 y.o. female is on warfarin.     [facility] reports:     Home warfarin dose: verbally confirmed home dose with Vicky and updated on anticoagulation calendar     Missed doses: No     Medication changes:  No     S/S of bleeding or thromboembolism:  No     New Injury or illness:  No     Changes in diet or alcohol consumption:  No     Upcoming surgery, procedure or cardioversion:  No    Anticoagulation Episode Summary     Current INR goal:  2.0-3.0   TTR:  28.4 % (2 mo)   Next INR check:  12/31/2020   INR from last check:  1.40 (12/25/2020)   Weekly max warfarin dose:     Target end date:  2/22/2021   INR check location:     Preferred lab:     Send INR  reminders to:  [facility]    Indications    Anticoagulated [Z79.01]  Single subsegmental pulmonary embolism without acute cor pulmonale (H) [I26.93]           Comments:           Anticoagulation Care Providers     Provider Role Specialty Phone number    Ludivina Early CNP Referring Nurse Practitioner [phone]

## 2021-06-17 NOTE — TELEPHONE ENCOUNTER
Telephone Encounter by Kevin Duarte RN at 11/19/2020 10:54 AM     Author: Kevin Duarte RN Service: -- Author Type: RN, Care Manager    Filed: 11/19/2020 10:56 AM Encounter Date: 11/19/2020 Status: Signed    : Kevin Duarte RN (RN, Care Manager)       ANTICOAGULATION  MANAGEMENT- Home Care/Care Facility Result    Assessment     Today's INR result of 1.1 is Subtherapeutic (goal INR of 2.0-3.0)        Warfarin taken as previously instructed since last INR check.    No new diet changes affecting INR    No new medication/supplements affecting INR    Continues to tolerate warfarin with no reported s/s of bleeding or thromboembolism     Previous INR was Subtherapeutic    Plan:     Spoke with home care nurse Vicky discussed INR result and instructed:     Warfarin Dosing Instructions: Take booster dose of 3.75 mg today  then continue current warfarin dose    3.75 mg every Fri; 2.5 mg all other days         Next INR to be drawn: Mon 11/23.     Education provided: importance of following up for INR monitoring at instructed interval and importance of taking warfarin as instructed    Vicky verbalizes understanding and agrees to warfarin dosing plan.   ?   Kevin Duarte RN    Subjective/Objective:      Luis Manuel Lemon, a 80 y.o. female is established on warfarin.     Home care/care facility RN's report of [facility] INR, recent warfarin dosing, diet changes, medication changes, and symptoms is documented below.    Additional findings: verbally confirmed home dose with Vicky and updated on anticoagulation calendar    Anticoagulation Episode Summary     Current INR goal:  2.0-3.0   TTR:  22.5 % (3.6 wk)   Next INR check:  11/23/2020   INR from last check:  1.10 (11/19/2020)   Weekly max warfarin dose:     Target end date:  2/22/2021   INR check location:     Preferred lab:     Send INR reminders to:  [facility]    Indications    Anticoagulated [Z79.01]  Single subsegmental pulmonary embolism  without acute cor pulmonale (H) [I26.93]           Comments:           Anticoagulation Care Providers     Provider Role Specialty Phone number    Ludivina Early CNP Referring Nurse Practitioner [phone]

## 2021-06-17 NOTE — PATIENT INSTRUCTIONS - HE
Please try using icyhot on your neck muscles.  You can also use a tennis ball to massage your neck muscles.    Try doing the neck stretches every day.

## 2021-06-17 NOTE — PROGRESS NOTES
HPI - 79 yo female here for hospital f/u and back pain.       1. Hospital f/u  She was admitted 4/25 - 4/27  For Short of breath on exertion  Per D/C summary -   Dyspnea without hypoxia:    CT PE run pretty unremarkable   - Stopped metoprolol (no perceived benefit) - b/c sinus bradycardia   - Prednisone 10mg daily   - Follow up with Pulm   - Rheum referral b/c of h/o PMH/Connective tissues disease   - Add Duonebs PRN at home-- she felt like Neb was more helpful for symptoms than inhaler (?inhaler technique)  Recommendations:   Follow up with Primary Care Physician: Kateryna Morales MD in 1 week  Follow up appointment with Specialist: Dr Chavez with Pulmonology in 1-2 months  Re-establish care with Rheumatology    Today =   She reports her breathing is a little better. Waiting for neb machine that was ordered by hospital.   She is feeling weak.        Back pain radiates down right leg  Last visit on 4/10/18 she reported her Joint pain/knee pain/back pain -  still having some pain but mostly managed with gabapentin and tylenol and wanted help to f/u with pain clinic.   She was started on prednisone by hopsitialist and referred to rheum to reestablish care b/c of h/o mixed connective tissue d/o.   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  Toprol XL stopped in hospital b/c bradycardia  BP good today off the toprol XL  Current meds: amlodipine 10mg, losartan-HCTZ 100/25      Restrictive lung disease - ? Related to connective Tissue disease  AT her last apt on 4/10/18 she was breathing is better  Followed by pulm and has combivent prn   Last pulm apt 11/2017 and was supposed to f/u in 3 months  Needs f/u with pulm clinic  Awaiting neb machine ordered at hospital discharge      Osteoporosis - on Boniva, Vit  D and TUMS  DEXA 5/4/17        Vit D deficiency - last level 22.7 on 6/1/17 -> 23.4 on 4/10/18  Taking weekly ergo 50,000 units    GERD -   off omeprazole since 3/2018    Constipation -resolved with colace         H/o low K+, low Mg+, low Phos - resolved    Anemia -   hgb 11.5 on 4/10/18 -> 11.9 on 4/25/18    eos - new in hospital   Prior O&P neg but she has been to [facility] since then     Polypharmacy -   Home health RN sets up meds  She feels the medications are causing all of her health problems. Daughter thinks the meds are helping her b/c when she is out of the meds her mom's sx get worse.     Current Outpatient Prescriptions   Medication Sig Note     acetaminophen (TYLENOL) 650 MG CR tablet Take 1 tablet (650 mg total) by mouth every 8 (eight) hours as needed for pain.      amLODIPine (NORVASC) 10 MG tablet Take 10 mg by mouth every morning.      ANTACID, CALCIUM CARBONATE, 200 mg calcium (500 mg) chewable tablet CHEW 1 TABLET BY MOUTH THREE TIMES A DAY TO KEEP YOUR BONES HEALTHY      docusate sodium (COLACE) 100 MG capsule TAKE 1 CAPSULE BY MOUTH TWICE DAILY.      gabapentin (NEURONTIN) 100 MG capsule TAKE 1 CAPSULE IN THE MORNING, 1 CAPSULE MIDDAY AND 3 CAPSULES AT BEDTIME      ibandronate (BONIVA) 150 mg tablet Take 1 tablet (150 mg total) by mouth every 30 (thirty) days. Take in AM with glass of water prior to food, don't lie down for 30 minutes.      ipratropium-albuterol (COMBIVENT RESPIMAT)  mcg/actuation Mist inhaler Inhale 1 puff every 6 (six) hours as needed.      losartan-hydrochlorothiazide (HYZAAR) 100-25 mg per tablet TAKE 1 TABLET BY MOUTH DAILY.      omeprazole (PRILOSEC) 20 MG capsule Take 1 capsule by mouth daily. 5/8/2018: Received from: External Pharmacy     predniSONE (DELTASONE) 10 mg tablet Take 1 tablet (10 mg total) by mouth daily with breakfast.      VITAMIN D2 50,000 unit capsule TAKE 1 CAPSULE BY MOUTH ONCE WEEKLY 4/25/2018: Takes on Mondays     camphor-menthol 0.2-3.5 % Gel  "Apply topically daily as needed. 4/25/2018: Unknown strength     clotrimazole (LOTRIMIN) 1 % cream Apply topically 2 (two) times a day as needed.      diphenhydrAMINE (BENADRYL) 12.5 mg/5 mL liquid Take 20 mg by mouth at bedtime as needed for allergies.      ipratropium-albuterol (DUO-NEB) 0.5-2.5 mg/3 mL nebulizer Take 3 mL by nebulization every 6 (six) hours as needed (shortness of breath).      ondansetron (ZOFRAN) 4 MG tablet Take 4-8 mg by mouth every 4 (four) hours as needed for nausea (not to exceed 4 tablets/day).      polyvinyl alcohol (LIQUIFILM TEARS) 1.4 % ophthalmic solution Apply to eye daily as needed      Vitals:    05/08/18 1033   BP: 118/64   Pulse: 81   Resp: 16   Temp: 97.5  F (36.4  C)   TempSrc: Oral   SpO2: 93%   Weight: 125 lb 1.6 oz (56.7 kg)   Height: 4' 11.02\" (1.499 m)     OBJECTIVE:  Vitals listed above within normal limits  General appearance: well groomed, pleasant, well hydrated, nontoxic appearing  ENT: PERRL, throat clear  Neck: neck supple, no lymphadenopathy, no thyromegaly  Lungs: lungs clear to auscultation bilaterally, no wheezes or rhonchi  Heart: regular rate and rhythm, no murmurs, rubs or gallops  Abdomen: soft, nontender  Neuro: no focal deficits, CN II-XII grossly intact, alert and oriented  Psych:  mood stable, appears to have good insight and judgment    A/P  1. Hospital f/u  She was admitted 4/25 - 4/27  For Short of breath on exertion-     - Stopped metoprolol (no perceived benefit) - b/c sinus bradycardia   - Prednisone 10mg daily   - Follow up with Pulm   - Rheum referral b/c of h/o PMH/Connective tissues disease   - Add Duonebs PRN at home-- she felt like Neb was more helpful for symptoms than inhaler (?inhaler technique)  Recommendations:   Follow up with Primary Care Physician: Kateryna Morales MD in 1 week  Follow up appointment with Specialist: Dr Chavez with Pulmonology in 1-2 months - will help schedule apt  Re-establish care with Rheumatology - - will " help schedule apt     Polyarthritis and Back pain radiates down right leg - related to h/o connective tissues disease? However this is unclear b/c she was feeling ok off the rheum meds for several months.   Last visit on 4/10/18 she reported her Joint pain/knee pain/back pain -  still having some pain but mostly managed with gabapentin and tylenol and wanted help to f/u with pain clinic.   She was started on prednisone by hopsitialist and referred to rheum to reestablish care b/c of h/o mixed connective tissue d/o.   Pain meds: gabapentin, tylenol,  Prednisone recently restarted and feels this is really helping her  Will help her schedule rheum apt    HTN - BP ok today with 10mg, losartan-HCTZ 100-25      Restrictive lung disease - ? Related to connective Tissue disease  AT her last apt on 4/10/18 she was breathing is better  Followed by pulm and has combivent prn   Last pulm apt 11/2017 and was supposed to f/u in 3 months  Needs f/u with pulm clinic  Awaiting neb machine ordered at hospital discharge    Osteoporosis - on Boniva, Vit D and TUMS  DEXA 5/4/17    Vit D deficiency - last level 22.7 on 6/1/17 -> 23.4 on 4/10/18  Taking weekly ergo 50,000 units    GERD -   off omeprazole since 3/2018    Constipation -resolved with colace      H/o low K+, low Mg+, low Phos - resolved    Anemia -   hgb 11.5 on 4/10/18 -> 11.9 on 4/25/18    Polypharmacy -   Home health RN sets up meds  She feels the medications are causing all of her health problems. Daughter thinks the meds are helping her b/c when she is out of the meds her mom's sx get worse.   Reviewed med list and pill bottles and reasons with patient and her daughter   Will call pharm to stop delivering Toprol and omeprazole    Spent 40 min face to face with patient with more the 50% spent in counseling, reviewing chart, and coordination of care and discussing problems listed above.       ADDENDUM:  Apparently neb machine not covered by insurance except with specific  vendors.   I will r/o today via [facility] department.

## 2021-06-17 NOTE — TELEPHONE ENCOUNTER
Telephone Encounter by Yoko Murray RN at 1/21/2021 11:35 AM     Author: Yoko Murray RN Service: -- Author Type: Registered Nurse    Filed: 1/21/2021 11:38 AM Encounter Date: 1/21/2021 Status: Signed    : Yoko Murray RN (Registered Nurse)       ANTICOAGULATION  MANAGEMENT- Home Care/Care Facility Result    Assessment     Today's INR result of 1.80 is Subtherapeutic (goal INR of 2.0-3.0)        Warfarin recently held as instructed which may be affecting INR     - reported holding warfarin dose on 1/14, as instructed.    No new diet changes affecting INR    No new medication/supplements affecting INR    Continues to tolerate warfarin with no reported s/s of bleeding or thromboembolism     Previous INR was Supratherapeutic at 4.00 on 1/14/21.    No new concerns.    Plan:     Spoke with ALVIN Perry with [employer] discussed INR result and instructed:     Warfarin Dosing Instructions:   - Continue current warfarin dose 1.25 mg daily on Sun/Wed/Fri; and 2.5 mg daily rest of week.    Next INR to be drawn:  One wk.  Scheduled on 1/28/21.    Education provided: importance of consistent vitamin K intake and target INR goal and significance of current INR result    ALVIN Perry verbalizes understanding and agrees to warfarin dosing plan.   ?   Yoko Murray RN    Subjective/Objective:      Luis Manuel Lemon, a 81 y.o. female is established on warfarin.     Home care/care facility RN's report of [facility] INR, recent warfarin dosing, diet changes, medication changes, and symptoms is documented below.    Additional findings: none    Anticoagulation Episode Summary     Current INR goal:  2.0-3.0   TTR:  32.6 % (2.9 mo)   Next INR check:  1/28/2021   INR from last check:  1.80 (1/21/2021)   Weekly max warfarin dose:     Target end date:  2/22/2021   INR check location:     Preferred lab:     Send INR reminders to:  [facility]    Indications    Anticoagulated [Z79.01]  Single subsegmental pulmonary  embolism without acute cor pulmonale (H) [I26.93]           Comments:           Anticoagulation Care Providers     Provider Role Specialty Phone number    Ludivina Early CNP Referring Nurse Practitioner [phone]

## 2021-06-17 NOTE — TELEPHONE ENCOUNTER
Telephone Encounter by Mireya English RN at 2/18/2021 12:09 PM     Author: Mireya English RN Service: -- Author Type: Registered Nurse    Filed: 2/18/2021 12:10 PM Encounter Date: 2/18/2021 Status: Signed    : Mireya English RN (Registered Nurse)       ANTICOAGULATION  MANAGEMENT- Home Care/Care Facility Result    Assessment     Today's INR result of 1.5 is Subtherapeutic (goal INR of 2.0-3.0)        Warfarin taken as previously instructed    No new diet changes affecting INR    No new medication/supplements affecting INR    Continues to tolerate warfarin with no reported s/s of bleeding or thromboembolism     Previous INR was Supratherapeutic    Plan:     Spoke with Vicky home care nurse discussed INR result and instructed:     Warfarin Dosing Instructions: Continue current warfarin dose 1.25 mg daily on Mon, Wed, Fri; and 2.5 mg daily rest of week  (0 % change)    Next INR to be drawn: 1 week    Education provided: target INR goal and significance of current INR result    Vicky verbalizes understanding and agrees to warfarin dosing plan.   ?   Mireya English RN    Subjective/Objective:      Luis Manuel Lemon, a 81 y.o. female is established on warfarin.     Home care/care facility RN's report of [facility] INR, recent warfarin dosing, diet changes, medication changes, and symptoms is documented below.    Additional findings: verbally confirmed home dose with Vicky and updated on anticoagulation calendar    Anticoagulation Episode Summary     Current INR goal:  2.0-3.0   TTR:  40.8 % (3.9 mo)   Next INR check:  2/25/2021   INR from last check:  1.50 (2/18/2021)   Weekly max warfarin dose:     Target end date:  2/22/2021   INR check location:     Preferred lab:     Send INR reminders to:  [facility]    Indications    Anticoagulated [Z79.01]  Single subsegmental pulmonary embolism without acute cor pulmonale (H) [I26.93]           Comments:           Anticoagulation Care Providers     Provider Role  Specialty Phone number    Ludivina Early, CNP Referring Nurse Practitioner [phone]

## 2021-06-17 NOTE — TELEPHONE ENCOUNTER
Telephone Encounter by Bessie Hercules RN at 11/27/2020 11:40 AM     Author: Bessie Hercules RN Service: -- Author Type: Registered Nurse    Filed: 11/27/2020 11:56 AM Encounter Date: 11/27/2020 Status: Addendum    : Bessie Hercules RN (Registered Nurse)    Related Notes: Original Note by Bessie Hercules RN (Registered Nurse) filed at 11/27/2020 11:55 AM       ANTICOAGULATION  MANAGEMENT- Home Care/Care Facility Result    Assessment     Today's INR result of 3.3 is Supratherapeutic (goal INR of 2.0-3.0)        Warfarin taken as previously instructed    No new diet changes affecting INR    No new medication/supplements affecting INR    Continues to tolerate warfarin with no reported s/s of bleeding or thromboembolism     Previous INR was Supratherapeutic    Plan:     Spoke with home care Vicky discussed INR result and instructed:     Warfarin Dosing Instructions: Change warfarin dose to 1.25 mg daily on sun/tue/fri; and 2.5 mg daily rest of week  (15 % change)    Next INR to be drawn: 11/30       Vicky verbalizes understanding and agrees to warfarin dosing plan.   ?   Bessie Hercules RN    Subjective/Objective:      Luis Manuel Lemon, a 80 y.o. female is established on warfarin.     Home care/care facility RN's report of [facility] INR, recent warfarin dosing, diet changes, medication changes, and symptoms is documented below.    Additional findings: none    Anticoagulation Episode Summary     Current INR goal:  2.0-3.0   TTR:  20.5 % (1.1 mo)   Next INR check:  11/30/2020   INR from last check:  3.30 (11/27/2020)   Weekly max warfarin dose:     Target end date:  2/22/2021   INR check location:     Preferred lab:     Send INR reminders to:  [facility]    Indications    Anticoagulated [Z79.01]  Single subsegmental pulmonary embolism without acute cor pulmonale (H) [I26.93]           Comments:           Anticoagulation Care Providers     Provider Role Specialty Phone number    Ludivina Early, DORA Referring Nurse  Practitioner [phone]

## 2021-06-17 NOTE — TELEPHONE ENCOUNTER
Telephone Encounter by Bessie Hercules RN at 11/16/2020 10:45 AM     Author: Bessie Herucles RN Service: -- Author Type: Registered Nurse    Filed: 11/16/2020 10:52 AM Encounter Date: 11/16/2020 Status: Addendum    : Bessie Hercules RN (Registered Nurse)    Related Notes: Original Note by Bessie Hercules RN (Registered Nurse) filed at 11/16/2020 10:51 AM       ANTICOAGULATION  MANAGEMENT- Home Care/Care Facility Result    Assessment     Today's INR result of 1.3 is Subtherapeutic (goal INR of 2.0-3.0)        Missed dose(s) may be affecting INR missed sat and sun    No new diet changes affecting INR    No new medication/supplements affecting INR    Continues to tolerate warfarin with no reported s/s of bleeding or thromboembolism     Previous INR was Subtherapeutic    Plan:     Spoke with home care nurse Vicky discussed INR result and instructed:     Warfarin Dosing Instructions: 3.75 mg tonight to boost then continue current warfarin dose 3.75 mg daily on fri; and 2.5 mg daily rest of week  (0 % change)    Next INR to be drawn: thurs 11/19    Vicky verbalizes understanding and agrees to warfarin dosing plan.   ?   Bessie Hercules RN    Subjective/Objective:      Luis Manuel Lemon, a 80 y.o. female is established on warfarin.     Home care/care facility RN's report of [facility] INR, recent warfarin dosing, diet changes, medication changes, and symptoms is documented below.    Additional findings: none    Anticoagulation Episode Summary     Current INR goal:  2.0-3.0   TTR:  25.6 % (3.1 wk)   Next INR check:  11/19/2020   INR from last check:  1.30 (11/16/2020)   Weekly max warfarin dose:     Target end date:  2/22/2021   INR check location:     Preferred lab:     Send INR reminders to:  [facility]    Indications    Anticoagulated [Z79.01]  Single subsegmental pulmonary embolism without acute cor pulmonale (H) [I26.93]           Comments:           Anticoagulation Care Providers     Provider Role Specialty Phone  number    Ludivina Early, CNP Referring Nurse Practitioner [phone]

## 2021-06-17 NOTE — TELEPHONE ENCOUNTER
Telephone Encounter by Radha Avitia RN at 2/11/2021 10:21 AM     Author: Radha Avitia RN Service: -- Author Type: Registered Nurse    Filed: 2/11/2021 10:23 AM Encounter Date: 2/11/2021 Status: Signed    : Radha Avitia RN (Registered Nurse)       ANTICOAGULATION  MANAGEMENT- Home Care/Care Facility Result    Assessment     Today's INR result of 3.4 is Supratherapeutic (goal INR of 2.0-3.0)        Warfarin taken as previously instructed    No new diet changes affecting INR    No new medication/supplements affecting INR    Continues to tolerate warfarin with no reported s/s of bleeding or thromboembolism     Previous INR was Subtherapeutic    Plan:     Spoke with Vicky discussed INR result and instructed:     Warfarin Dosing Instructions: Take 1.25 mg today then continue current warfarin dose 1.25 mg daily on Mondays, Wednesdays and Fridays; and 2.5 mg daily rest of week  (0 % change)    Next INR to be drawn: one week.    Education provided: importance of therapeutic range, importance of following up for INR monitoring at instructed interval and importance of taking warfarin as instructed    Vicky verbalizes understanding and agrees to warfarin dosing plan.   ?   Radha Avitia RN    Subjective/Objective:      Luis Manuel Lemon, a 81 y.o. female is established on warfarin.     Home care/care facility RN's report of [facility] INR, recent warfarin dosing, diet changes, medication changes, and symptoms is documented below.    Additional findings: verbally confirmed home dose with Vicky and updated on anticoagulation calendar    Anticoagulation Episode Summary     Current INR goal:  2.0-3.0   TTR:  40.1 % (3.6 mo)   Next INR check:  2/18/2021   INR from last check:  3.40 (2/11/2021)   Weekly max warfarin dose:     Target end date:  2/22/2021   INR check location:     Preferred lab:     Send INR reminders to:  [facility]    Indications    Anticoagulated [Z79.01]  Single subsegmental  pulmonary embolism without acute cor pulmonale (H) [I26.93]           Comments:           Anticoagulation Care Providers     Provider Role Specialty Phone number    Ludivina Early CNP Referring Nurse Practitioner [phone]

## 2021-06-17 NOTE — TELEPHONE ENCOUNTER
Telephone Encounter by Kevin Duarte RN at 11/13/2020  9:45 AM     Author: Kevin Duarte RN Service: -- Author Type: RN, Care Manager    Filed: 11/13/2020  9:46 AM Encounter Date: 11/13/2020 Status: Signed    : Kevin Duarte RN (RN, Care Manager)       ANTICOAGULATION  MANAGEMENT- Home Care/Care Facility Result    Assessment     Today's INR result of 1.3 is Subtherapeutic (goal INR of 2.0-3.0)        Warfarin taken as previously instructed    No new diet changes affecting INR    No new medication/supplements affecting INR    Continues to tolerate warfarin with no reported s/s of bleeding or thromboembolism     Previous INR was Subtherapeutic    Plan:     Spoke with home care nurse Vicky discussed INR result and instructed:     Warfarin Dosing Instructions: Change warfarin dose to 3.75 mg daily on Fri; and 2.5 mg daily rest of week  (15 % change from what pt took the past 7 days)    Next INR to be drawn: Mon 11/16.    Education provided: importance of following up for INR monitoring at instructed interval and importance of taking warfarin as instructed    Vicky verbalizes understanding and agrees to warfarin dosing plan.   ?   Kevin Duarte RN    Subjective/Objective:      Luis Manuel Lemon, a 80 y.o. female is established on warfarin.     Home care/care facility RN's report of [facility] INR, recent warfarin dosing, diet changes, medication changes, and symptoms is documented below.    Additional findings: verbally confirmed home dose with Vicky and updated on anticoagulation calendar    Anticoagulation Episode Summary     Current INR goal:  2.0-3.0   TTR:  29.6 % (2.7 wk)   Next INR check:  11/16/2020   INR from last check:  1.30 (11/13/2020)   Weekly max warfarin dose:     Target end date:  2/22/2021   INR check location:     Preferred lab:     Send INR reminders to:  [facility]    Indications    Anticoagulated [Z79.01]  Single subsegmental pulmonary embolism without acute cor  pulmonale (H) [I26.93]           Comments:           Anticoagulation Care Providers     Provider Role Specialty Phone number    Ludivina Early CNP Referring Nurse Practitioner [phone]

## 2021-06-17 NOTE — TELEPHONE ENCOUNTER
Telephone Encounter by Remi Vigil RN at 12/10/2020 11:25 AM     Author: Remi Vigil RN Service: -- Author Type: Registered Nurse    Filed: 12/10/2020 11:27 AM Encounter Date: 12/10/2020 Status: Signed    : Remi Vigil RN (Registered Nurse)       ANTICOAGULATION  MANAGEMENT    Assessment     Today's INR result of 3.6 is Supratherapeutic (goal INR of 2.0-3.0)        Warfarin taken as previously instructed    No new diet changes affecting INR    No new medication/supplements affecting INR    Continues to tolerate warfarin with no reported s/s of bleeding or thromboembolism     Previous INR was Subtherapeutic    Plan:     Spoke on phone with home care nurse Vicky regarding INR result and instructed:     Warfarin Dosing Instructions:  Hold warfarin 12/10 then change warfarin dose to 2.5 mg daily on Mondays, Wednesdays and Saturdays; and 1.25 mg daily rest of week  (9.1 % change)    Instructed patient to follow up no later than: 12/17 (day  available)     Education provided: importance of taking warfarin as instructed    Vicky verbalizes understanding and agrees to warfarin dosing plan.    Instructed to call the [facility] for any changes, questions or concerns. (#[phone])   ?   Remi Vigil RN    Subjective/Objective:      Luis Manuel Lemon, a 80 y.o. female is on warfarin.     [facility] reports:     Home warfarin dose: verbally confirmed home dose with Vicky and updated on anticoagulation calendar     Missed doses: No     Medication changes:  No     S/S of bleeding or thromboembolism:  No     New Injury or illness:  No     Changes in diet or alcohol consumption:  No     Upcoming surgery, procedure or cardioversion:  No    Anticoagulation Episode Summary     Current INR goal:  2.0-3.0   TTR:  30.1 % (1.5 mo)   Next INR check:  12/17/2020   INR from last check:  3.60 (12/10/2020)   Weekly max warfarin dose:     Target end date:  2/22/2021   INR check location:     Preferred lab:      Send INR reminders to:  [facility]    Indications    Anticoagulated [Z79.01]  Single subsegmental pulmonary embolism without acute cor pulmonale (H) [I26.93]           Comments:           Anticoagulation Care Providers     Provider Role Specialty Phone number    Ludivina Early CNP Referring Nurse Practitioner [phone]

## 2021-06-17 NOTE — TELEPHONE ENCOUNTER
Telephone Encounter by Kevin Duarte RN at 11/23/2020 11:43 AM     Author: Kevin Duarte RN Service: -- Author Type: RN, Care Manager    Filed: 11/23/2020 11:44 AM Encounter Date: 11/23/2020 Status: Signed    : Kevin Duarte RN (RN, Care Manager)       ANTICOAGULATION  MANAGEMENT- Home Care/Care Facility Result    Assessment     Today's INR result of 4.6 is Supratherapeutic (goal INR of 2.0-3.0)        Warfarin taken as previously instructed    No new diet changes affecting INR    No new medication/supplements affecting INR    Continues to tolerate warfarin with no reported s/s of bleeding or thromboembolism     Previous INR was Subtherapeutic    Plan:     Spoke with home care nurse Vicky discussed INR result and instructed:     Warfarin Dosing Instructions: Hold today then change warfarin dose to 1.25 mg daily on Tues; and 2.5 mg daily rest of week  (13 % change)    Next INR to be drawn: Wed 11/25.     Education provided: importance of following up for INR monitoring at instructed interval and importance of taking warfarin as instructed    Vicky verbalizes understanding and agrees to warfarin dosing plan.   ?   Kevin Duarte RN    Subjective/Objective:      Luis Manuel Lemon, a 80 y.o. female is established on warfarin.     Home care/care facility RN's report of [facility] INR, recent warfarin dosing, diet changes, medication changes, and symptoms is documented below.    Additional findings: verbally confirmed home dose with Vicky and updated on anticoagulation calendar    Anticoagulation Episode Summary     Current INR goal:  2.0-3.0   TTR:  23.3 % (4.1 wk)   Next INR check:  11/25/2020   INR from last check:  4.60 (11/23/2020)   Weekly max warfarin dose:     Target end date:  2/22/2021   INR check location:     Preferred lab:     Send INR reminders to:  ROOSEVELT DUFF    Indications    Anticoagulated [Z79.01]  Single subsegmental pulmonary embolism without acute cor pulmonale (H)  [I26.93]           Comments:           Anticoagulation Care Providers     Provider Role Specialty Phone number    Ludivina Early, CNP Referring Nurse Practitioner [phone]

## 2021-06-17 NOTE — TELEPHONE ENCOUNTER
Telephone Encounter by Radha Avitia, RN at 2/4/2021 12:14 PM     Author: Radha Avitia, RN Service: -- Author Type: Registered Nurse    Filed: 2/4/2021 12:21 PM Encounter Date: 2/4/2021 Status: Signed    : Radha Avitia RN (Registered Nurse)       ANTICOAGULATION  MANAGEMENT- Home Care/Care Facility Result    Assessment     Today's INR result of 1.9 is Subtherapeutic (goal INR of 2.0-3.0)        Warfarin taken as previously instructed    No new diet changes affecting INR    No new medication/supplements affecting INR    Continues to tolerate warfarin with no reported s/s of bleeding or thromboembolism     Previous INR was Therapeutic    Plan:     Spoke with Vicky discussed INR result and instructed:     Warfarin Dosing Instructions: Change warfarin dose to 1.25 mg daily on Mondays, Wednesdays and Fridays; and 2.5 mg daily rest of week  (10 % change)    Next INR to be drawn: one week    Education provided: importance of consistent vitamin K intake, impact of vitamin K foods on INR, importance of therapeutic range, importance of following up for INR monitoring at instructed interval and importance of taking warfarin as instructed    Vicky verbalizes understanding and agrees to warfarin dosing plan.   ?   Radha Avitia RN    Subjective/Objective:      Luis Manuel Lemon, a 81 y.o. female is established on warfarin.     Home care/care facility RN's report of [facility] INR, recent warfarin dosing, diet changes, medication changes, and symptoms is documented below.    Additional findings: verbally confirmed home dose with Vicky and updated on anticoagulation calendar    Anticoagulation Episode Summary     Current INR goal:  2.0-3.0   TTR:  38.2 % (3.4 mo)   Next INR check:  2/11/2021   INR from last check:  1.90 (2/4/2021)   Weekly max warfarin dose:     Target end date:  2/22/2021   INR check location:     Preferred lab:     Send INR reminders to:  ROOSEVELT DUFF    Indications     Anticoagulated [Z79.01]  Single subsegmental pulmonary embolism without acute cor pulmonale (H) [I26.93]           Comments:           Anticoagulation Care Providers     Provider Role Specialty Phone number    Ludivina Early CNP Referring Nurse Practitioner [phone]

## 2021-06-17 NOTE — TELEPHONE ENCOUNTER
Refill Approved    Rx renewed per Medication Renewal Policy. Medication was last renewed on 2/25/21.    [facility] Triage/Med Refill 5/17/2021     Requested Prescriptions   Pending Prescriptions Disp Refills     DULoxetine (CYMBALTA) 30 MG capsule [Pharmacy Med Name: DULOXETINE HCL 30 MG CPEP 30 Capsule] 30 capsule 2     Sig: TAKE 1 CAPSULE (30 MG TOTAL) BY MOUTH DAILY.       Tricyclics/Misc Antidepressant/Antianxiety Meds Refill Protocol Passed - 5/15/2021  2:02 PM        Passed - PCP or prescribing provider visit in last year     Last office visit with prescriber/PCP: 5/11/2021 Kateryna Morales MD OR same dept: 5/11/2021 Kateryna Morales MD OR same specialty: 5/11/2021 Kateryna Morales MD  Last physical: 12/14/2020 Last MTM visit: Visit date not found   Next visit within 3 mo: Visit date not found  Next physical within 3 mo: Visit date not found  Prescriber OR PCP: Kateryna Morales MD  Last diagnosis associated with med order: 1. Psychophysiological insomnia  - DULoxetine (CYMBALTA) 30 MG capsule [Pharmacy Med Name: DULOXETINE HCL 30 MG CPEP 30 Capsule]; Take 1 capsule (30 mg total) by mouth daily.  Dispense: 30 capsule; Refill: 2    2. Spinal stenosis of lumbar region without neurogenic claudication  - DULoxetine (CYMBALTA) 30 MG capsule [Pharmacy Med Name: DULOXETINE HCL 30 MG CPEP 30 Capsule]; Take 1 capsule (30 mg total) by mouth daily.  Dispense: 30 capsule; Refill: 2    3. Chronic pain syndrome  - DULoxetine (CYMBALTA) 30 MG capsule [Pharmacy Med Name: DULOXETINE HCL 30 MG CPEP 30 Capsule]; Take 1 capsule (30 mg total) by mouth daily.  Dispense: 30 capsule; Refill: 2    4. Chronic low back pain, unspecified back pain laterality, unspecified whether sciatica present  - DULoxetine (CYMBALTA) 30 MG capsule [Pharmacy Med Name: DULOXETINE HCL 30 MG CPEP 30 Capsule]; Take 1 capsule (30 mg total) by mouth daily.  Dispense: 30 capsule; Refill: 2    5. Degenerative disc disease,  cervical  - DULoxetine (CYMBALTA) 30 MG capsule [Pharmacy Med Name: DULOXETINE HCL 30 MG CPEP 30 Capsule]; Take 1 capsule (30 mg total) by mouth daily.  Dispense: 30 capsule; Refill: 2    If protocol passes may refill for 12 months if within 3 months of last provider visit (or a total of 15 months).

## 2021-06-18 NOTE — PROGRESS NOTES
HPI - 77 yo female here for f/u.     She feels tired, weak, poor appetite, and having low back pain.     Per Chart Review:  She was admitted 4/25 - 4/27  For Short of breath on exertion - per d/c summary  -  Stopped metoprolol (no perceived benefit) - b/c sinus bradycardia   - Prednisone 10mg daily   - Follow up with Pulm   - Rheum referral b/c of h/o PMH/Connective tissues disease   - Add Duonebs PRN at home-- she felt like Neb was more helpful for symptoms than inhaler (?inhaler technique)  Followup apt:  --pulm pat 6/25/18  --rheum apt 7/2/18    Echo 4/25/18 - mostly normal with mild Tricuspid and pulm Regurg    ---She never got the neb machine b/c of medicare rules. A new rx was faxed to [facility] on 5/15/18 for a neb machine and still does not have it.     Not eating much, poor appetite and feeling weak -   O&P neg  h pylori neg  eos positive (unclear if parasite vs lung related)  Vit D 23.4 on 4/10/18  CMP wnl except low protein  Previously on remeron - but dropped of med list  Last abdo CT 9/2016 showed =   1.  Nonspecific wall thickening of the distal stomach. Small amount of fluid and debris in the stomach. Correlate with recent meal and consider gastritis.  2.  No mechanical bowel obstruction. There is colonic diverticulosis without acute diverticulitis.  3.  Heterogeneity of the liver may be related to contrast bolus timing or hepatic congestion.  4.  Atherosclerotic disease including coronary artery calcification. The middle coronary vein is prominent, which is nonspecific.  5.  Significant epicardial and subpleural fat. No airspace disease in the lung bases.    Back pain -  On gabapentin   Followed by pain clinic  Trigger point injections on 5/15/18 that did not work as well as prior injections.   Lumbar spine xray 12/2015:  Mild levoconvex curvature of the lumbar spine. Diffuse osteopenia. Five lumbar type vertebral bodies. Vertebral bodies   are in anatomic sagittal alignment. Mild loss of disc space  height at L4-L5 and L5-S1. Mild ventral interbody spurring at L3-L4 and L4-L5. Mild superior endplate compression deformity of the T12 vertebral body is chronic and unchanged in appearance from previous lumbar spine radiographs 01/06/2015. Atherosclerotic vascular calcifications in the abdomen. Surgical clips in the right upper quadrant of the abdomen. Moderate degenerative changes of the bilateral hips.      Osteoporosis -0N Ibandronate   DEXA - 5/4/17    A statistically significant increase of 5.4% is seen in the spine. Stability is seen in the right total hip and a 4% decline is seen in the left total hip.   Recommendations:  Appropriate ongoing  evaluation and treatment is recommended. Because of mixed response, one could continue a change in therapy to Prolia if bisphosphonate treatment has approached the five year antoine,  with follow up bone density scan in 2 years    Current Outpatient Prescriptions   Medication Sig Note     amLODIPine (NORVASC) 10 MG tablet Take 10 mg by mouth every morning.      ANTACID, CALCIUM CARBONATE, 200 mg calcium (500 mg) chewable tablet CHEW 1 TABLET BY MOUTH THREE TIMES A DAY TO KEEP YOUR BONES HEALTHY      docusate sodium (COLACE) 100 MG capsule TAKE 1 CAPSULE BY MOUTH TWICE DAILY.      gabapentin (NEURONTIN) 100 MG capsule TAKE 1 CAPSULE IN THE MORNING, 1 CAPSULE MIDDAY AND 3 CAPSULES AT BEDTIME      ibandronate (BONIVA) 150 mg tablet Take 1 tablet (150 mg total) by mouth every 30 (thirty) days. Take in AM with glass of water prior to food, don't lie down for 30 minutes.      ipratropium-albuterol (COMBIVENT RESPIMAT)  mcg/actuation Mist inhaler Inhale 1 puff every 6 (six) hours as needed.      losartan-hydrochlorothiazide (HYZAAR) 100-25 mg per tablet TAKE 1 TABLET BY MOUTH DAILY.      MAPAP ARTHRITIS PAIN 650 mg CR tablet TAKE 1 TABLET BY MOUTH EVERY 8 HOURS AS NEEDED FOR PAIN      nebulizer accessories Kit Nebulizer with all accessories for dyspnea and restrictive lung  "disease      predniSONE (DELTASONE) 10 mg tablet Take 1 tablet (10 mg total) by mouth daily with breakfast.      VITAMIN D2 50,000 unit capsule TAKE 1 CAPSULE BY MOUTH ONCE WEEKLY 4/25/2018: Takes on Mondays     camphor-menthol 0.2-3.5 % Gel Apply topically daily as needed. 4/25/2018: Unknown strength     clotrimazole (LOTRIMIN) 1 % cream Apply topically 2 (two) times a day as needed.      diphenhydrAMINE (BENADRYL) 12.5 mg/5 mL liquid Take 20 mg by mouth at bedtime as needed for allergies.      ipratropium-albuterol (DUO-NEB) 0.5-2.5 mg/3 mL nebulizer Take 3 mL by nebulization every 6 (six) hours as needed (shortness of breath).      ondansetron (ZOFRAN) 4 MG tablet Take 4-8 mg by mouth every 4 (four) hours as needed for nausea (not to exceed 4 tablets/day).      polyvinyl alcohol (LIQUIFILM TEARS) 1.4 % ophthalmic solution Apply to eye daily as needed      Vitals:    06/04/18 1050 06/04/18 1129 06/04/18 1130 06/04/18 1131   BP: (!) 78/40      Pulse: 82  87 87   Resp: 20      Temp: 98.2  F (36.8  C)      SpO2: 90% 90% 92% (!) 89%   Weight: 124 lb 1.6 oz (56.3 kg)      Height: 4' 11.02\" (1.499 m)        OBJECTIVE:  Vitals listed above within normal limits  General appearance: well groomed, pleasant, well hydrated, nontoxic appearing  ENT: PERRL, throat clear  Neck: neck supple, no lymphadenopathy, no thyromegaly  Lungs: bibasilar crackles  Heart: regular rate and rhythm, no murmurs, rubs or gallops  Abdomen: soft, nontender  Neuro: no focal deficits, CN II-XII grossly intact, alert and oriented  Psych:  mood stable, appears to have good insight and judgment    hgb 11.7 today (down from 11.9 on 4/25/18)  BNP pending  CXR - opacity at right lung base but no significant change from last month      A/P  Weakness/ poor appetite/sob/weight loss  H/o thickening for gastritic lining on CT  Restart remeron to help with appetite  Check chest/abdo/pelvic CT to r/o cancer    Back pain with radiculopathy  H/o T12 compression " fracture and disc space narrowing  Check lumbar MRI  Consider acupuncture    Restrictive lung disease  Continue inhalers - combivent and ventolin  Will call [facility] to check status of neb machine  F/u with pulm on 6/25/18 as scheduled    Connective tissues -   Continue prednisone 10mg daily  Rheum consult on 7/2/18    Osteoporosis -   After CT/MRI - will refer again for prolia consultation    Spent 40 min face to face with patient with more the 50% spent in counseling, reviewing chart, and coordination of care and discussing problems listed above.

## 2021-06-18 NOTE — PROGRESS NOTES
Injection - Other  Date/Time: 5/15/2018 9:50 AM  Performed by: HEATH RUIZ  Authorized by: HEATH RUIZ   Comments:     TRIGGER POINT INJECTION  Performed on: 5/15/2018    Ms. Lemon is a 78-year-old woman with neck and shoulder pain as well as low back and right leg pain.  Trigger point injections have given her some relief in the past.  She is having pain mainly in the right lumbar area today with some radiation into the posterior thigh.  We will give a trial to trigger point injections for this.  If this does not help the pain we could consider repeating a lumbar epidural injection which she did have about 2 years ago.    Pre Procedure Diagnosis:  Myofascial pain  Vital Signs:  As per intra-procedure documentation    The procedure was discussed with Luis Manuel Lemon in detail along with the attendant risks, including but not limited to: nerve injury, infection, bleeding, allergic reaction, or worsening of pain.  Informed consent was obtained and patient elected to proceed.    After informed consent was obtained the patient was seated in the examination chair.  The right lumbar area was prepped sterilely with alcohol.  A 1 1/4 inch #27-gauge needle was used.  There were injections made in the upper, mid and lower lumbar paraspinal muscles on the right.  A single injection was made in the right upper gluteal musculature.  A total of 10 mL of 0.25% Marcaine with 10 mg of Decadron was used in divided doses.    The patient tolerated the procedure well.  The patient was taken to the recovery area after the injection.  There were no complications.      Pre Procedure Pain Scale: 8  Pain Score:   9 (Constant achy)    If Luis Manuel Lemon has any questions or concerns after discharge, she was instructed to call us.

## 2021-06-18 NOTE — PROGRESS NOTES
Assessment and Plan:Luis Manuel Lemon is a 78 y.o. with a past medical history significant for mild restrictive lung disease from connective tissue disease who presents to clinic today for follow up.  She was recently hospitalized with chest tightness.  She took prednisone and this seemed to help.  Today she minimizes her symptoms, and it is not clear that she has many pulmonary concerns.  I do not think she needs a significant pulmonary regimen and I'm not certain she has any progression.      1) Restrictive lung disease - minimal symptoms - prn combivent only.  Prednisone may help with flares.    2) Sleep issues - she startles from sleep at times.  I do not think these are apneas as she is not sleepy during the day, and her family does not describe actual apneas.  It may be reflux.    3) RTC in 1 year.           CCx: restrictive lung disease    HPI: Luis Manuel Lemon is a 78 year old female with restrictive lung disease from a connective tissue disease.  She follows periodically.  Today she says she feels mostly fine.  She has combivent that helps when she gets short of breath, and when she does take it her problems go away.  She only uses it about once a month.  She was recently in the hospital with chest tightness.  The discharging doctor started her on prednisone for her connective tissue disease and asked her to follow with her rheumatologist.  She thinks the prednisone worked, but didn't like taking it.  Her rheumatologist did injections of her knees, and it didn't help her.  She mostly complains of aches and pains rather than dyspnea today.    ROS:  Review of Systems - History obtained from the patient  General ROS: negative  Psychological ROS: negative  ENT ROS: negative  Allergy and Immunology ROS: negative  Endocrine ROS: negative  Respiratory ROS: positive for - cough, shortness of breath and wheezing  negative for - hemoptysis, orthopnea, stridor or tachypnea  Cardiovascular ROS: no chest pain or  palpitations  Gastrointestinal ROS: no abdominal pain, change in bowel habits, or black or bloody stools  Genito-Urinary ROS: no dysuria, trouble voiding, or hematuria  Musculoskeletal ROS:  Aches and pains, in knees  Neurological ROS: no TIA or stroke symptoms  Dermatological ROS: itchy dry skin on legs      Current Meds:  Current Outpatient Prescriptions   Medication Sig Note     amLODIPine (NORVASC) 10 MG tablet Take 10 mg by mouth every morning.      ANTACID, CALCIUM CARBONATE, 200 mg calcium (500 mg) chewable tablet CHEW 1 TABLET BY MOUTH THREE TIMES A DAY TO KEEP YOUR BONES HEALTHY      camphor-menthol 0.2-3.5 % Gel Apply topically daily as needed. 4/25/2018: Unknown strength     clotrimazole (LOTRIMIN) 1 % cream Apply topically 2 (two) times a day as needed.      diphenhydrAMINE (BENADRYL) 12.5 mg/5 mL liquid Take 20 mg by mouth at bedtime as needed for allergies.      docusate sodium (COLACE) 100 MG capsule TAKE 1 CAPSULE BY MOUTH TWICE DAILY.      gabapentin (NEURONTIN) 100 MG capsule TAKE 1 CAPSULE IN THE MORNING, 1 CAPSULE MIDDAY AND 3 CAPSULES AT BEDTIME      ibandronate (BONIVA) 150 mg tablet Take 1 tablet (150 mg total) by mouth every 30 (thirty) days. Take in AM with glass of water prior to food, don't lie down for 30 minutes.      ipratropium-albuterol (COMBIVENT RESPIMAT)  mcg/actuation Mist inhaler Inhale 1 puff every 6 (six) hours as needed.      losartan-hydrochlorothiazide (HYZAAR) 100-25 mg per tablet TAKE 1 TABLET BY MOUTH DAILY.      MAPAP ARTHRITIS PAIN 650 mg CR tablet TAKE 1 TABLET BY MOUTH EVERY 8 HOURS AS NEEDED FOR PAIN      mirtazapine (REMERON) 7.5 MG tablet Take 1 tablet (7.5 mg total) by mouth at bedtime.      ondansetron (ZOFRAN) 4 MG tablet Take 4-8 mg by mouth every 4 (four) hours as needed for nausea (not to exceed 4 tablets/day).      polyvinyl alcohol (LIQUIFILM TEARS) 1.4 % ophthalmic solution Apply to eye daily as needed      predniSONE (DELTASONE) 10 mg tablet Take 1  tablet (10 mg total) by mouth daily with breakfast.      VITAMIN D2 50,000 unit capsule TAKE 1 CAPSULE BY MOUTH ONCE WEEKLY        Labs:  No results found for this or any previous visit (from the past 72 hour(s)).    I have personally reviewed all pertinent imaging studies and PFT results unless otherwise noted.    Imaging studies:  Ct Chest Abdomen Pelvis With Oral With Iv Cont    Result Date: 6/8/2018  CT CHEST, ABDOMEN, AND PELVIS 6/8/2018 5:50 PM      INDICATION: Enlarged lymph nodes weakness/ poor appetite/sob/weight loss TECHNIQUE: CT chest, abdomen, and pelvis. Dose reduction techniques were used. IV CONTRAST: Iohexol (Omni) 100 mL COMPARISON: CT chest 04/25/2018. CT abdomen 09/16/2016 FINDINGS: CHEST: The previous mild groundglass infiltrates have resolved. Shallow inspiration. Lungs otherwise clear. Enlarged central pulmonary arteries consistent with pulmonary artery hypertension. Moderate ectasia ascending thoracic aorta measuring 3.9 cm unchanged. No mediastinal lymphadenopathy.  ABDOMEN: No significant findings in the liver, spleen, pancreas, and adrenal glands. No lymphadenopathy. 3 cm simple cyst left kidney. Kidneys otherwise negative. Scattered diverticula in the colon. Bowel loops otherwise normal. PELVIS: Negative. No lymphadenopathy. MUSCULOSKELETAL: Negative.     CONCLUSION: 1.  Nothing for malignancy in the chest, abdomen, and pelvis. 2.  No lymphadenopathy. 3.  Enlarged central pulmonary arteries consistent with pulmonary artery hypertension.    Mr Lumbar Spine Without Contrast    Result Date: 6/10/2018  [facility] MR LUMBAR SPINE WO CONTRAST 6/8/2018 6:33 PM INDICATION: Abnormal x-ray. Back pain. History of vertebral fractures. TECHNIQUE: Without IV contrast. CONTRAST: None COMPARISON: Lumbar radiographs 12/8/2015 FINDINGS: Mildly motion degraded examination. Nomenclature is based on 5 lumbar type vertebral bodies. Mild lumbar levocurvature similar to the previous radiograph. No  subluxation. Mild chronic superior endplate compression fracture at T12 with 20-30% anterior vertebral height loss. Preserved vertebral body heights elsewhere. Heterogeneous marrow signal without focal marrow replacement or edema. No pars defect. The conus tip is identified at upper L2. Mild to moderate paraspinal muscular atrophy. T2 hyperintense left renal lesions measuring up to 4 cm, presumably cysts. No abdominal aortic aneurysm. Mild osteoarthritic changes of the hips. Visualized portions of the bony pelvis are otherwise age-appropriate. T12-L1: Normal disc height and signal. No herniation. No facet arthropathy. No spinal canal stenosis. No right neural foraminal stenosis. No left neural foraminal stenosis. L1-L2: Preserved disc height and signal. No herniation. No facet arthropathy. No spinal canal stenosis. No right neural foraminal stenosis. No left neural foraminal stenosis. L2-L3: Mild to moderate loss of disc height and signal. Circumferential disc bulge. Mild ventral interbody spurring. No facet arthropathy. Low-grade narrowing of the lateral recesses without central spinal canal stenosis. Partial effacement of the inferior neural foramina. Minimal right neural foraminal stenosis. Minimal left neural foraminal stenosis. L3-L4: Disc desiccation with minimal loss of disc height. Mild circumferential disc bulge and ventral interbody spurring. Mild facet arthropathy. Low-grade narrowing the lateral recesses without central spinal canal stenosis. Mild right neural foraminal stenosis. Mild to moderate left neural foraminal stenosis. L4-L5: Mild loss of disc height and signal. Circumferential disc bulge and minor endplate spurring. Mild facet arthropathy. Mild ligamentum flavum thickening. Mild trefoil spinal canal stenosis. Partial effacement of the inferior neural foramina. No significant right neural foraminal stenosis. Mild left neural foraminal stenosis. L5-S1: Preserved disc height and signal. No  herniation. Moderate right and smaller left lateral bridging osteophytes.. Minimal facet arthropathy. No spinal canal stenosis. No right neural foraminal stenosis. No left neural foraminal stenosis.     CONCLUSION: 1.  At L4-5, mild trefoil type spinal canal stenosis with asymmetric narrowing of the left lateral recess. Mild left neural foraminal stenosis. Mild displacement of left greater than right L5 nerve roots. 2.  At L3-4, low-grade narrowing the lateral recesses with mild right and mild to moderate left neural foraminal stenosis. 3.  Mild lumbar spondylosis elsewhere as above without additional high-grade stenosis or focal neural impingement. 4.  Mild chronic superior endplate compression fracture at T12.         Physical Exam:  /78  Pulse 64  Resp 20  Wt 125 lb 4.8 oz (56.8 kg)  SpO2 95% Comment: RA  BMI 25.29 kg/m2  General - Well nourished  Ears/Mouth -  OP pink moist, no thrush  Neck - no cervical lymphadenopathy  Lungs - Clear to ausculation bilaterally   CVS - regular rhythm with no murmurs, rubs or gallups  Abdomen - soft, NT, ND, NABS  Ext - no cyanosis, clubbing or edema  Skin - shiny, dry skin on legs  Psychology - alert and oriented, answers appropriate        Electronically signed by:    Johan Chavez MD PhD  [facility]monary [facility] Critical [facility] Medicine

## 2021-06-18 NOTE — LETTER
Letter by Vikas Perez MD at      Author: Vikas Perez MD Service: -- Author Type: --    Filed:  Encounter Date: 2/10/2019 Status: (Other)       Luis Manuel Lemon  [address] [place] [address]             February 13, 2019         Dear Ms. Lemon,    Below are the results from your recent visit:    Resulted Orders   Beta-CrossLaps (Beta-CTx)   Result Value Ref Range    C-Telopeptide, Beta-Cross-Linked, Serum 60 pg/mL      Comment:       Postmenopausal Females: 104-1008 pg/mL  REFERENCE INTERVAL: C-Telopeptide, Beta-Cross-Linked, Serum    Access complete set of age- and/or gender-specific reference   intervals for this test in the [facility] Test Directory   ([email or web address]).  Performed by [facility],  [address] [place] [id number] [phone]  [email or web address], Daniel Avila MD, Lab. Director   Electrophoresis, Protein, Serum   Result Value Ref Range    Albumin % 47.9 (L) 51.0 - 67.0 %    Albumin  3.6 3.2 - 4.7 g/dL    Alpha 1 % 3.2 2.0 - 4.0 %    Alpha 1 0.2 0.1 - 0.3 g/dL    Alpha 2 % 12.9 5.0 - 13.0 %    Alpha 2 1.0 (H) 0.4 - 0.9 g/dL    % Beta 21.2 (H) 10.0 - 17.0 %    Beta 1.6 (H) 0.7 - 1.2 g/dL    Gamma Globulin % 14.8 9.0 - 20.0 %    Gamma Globulin 1.1 0.6 - 1.4 g/dL    ELP Comment       The alpha 2 globulin fraction is increased. Isolated increases in alpha 2 globulin are associated with a variety of chronic inflammatory conditions, particularly connective tissue inflammatory disorders, or collagen-vascular diseases.    Increased beta globulin fraction, which may be seen in liver disease, anemia, hyperlipidemia, chronic inflammation, and nephrotic syndrome among possibilities. Clinical correlation is required.    Protein, Total 7.6 6.0 - 8.0 g/dL    Path ICD: M81.0     Interpreted By: Juan C Souza MD    Parathyroid Hormone Intact   Result Value Ref Range    PTH 94 (H) 10 - 86 pg/mL   Thyroid Stimulating Hormone (TSH)   Result Value Ref Range    TSH 1.21 0.30 - 5.00 uIU/mL   Basic  Metabolic Panel   Result Value Ref Range    Sodium 142 136 - 145 mmol/L    Potassium 3.8 3.5 - 5.0 mmol/L    Chloride 101 98 - 107 mmol/L    CO2 31 22 - 31 mmol/L    Anion Gap, Calculation 10 5 - 18 mmol/L    Glucose 98 70 - 125 mg/dL    Calcium 9.1 8.5 - 10.5 mg/dL    BUN 9 8 - 28 mg/dL    Creatinine 0.85 0.60 - 1.10 mg/dL    GFR MDRD Af Amer >60 >60 mL/min/1.73m2    GFR MDRD Non Af Amer >60 >60 mL/min/1.73m2    Narrative    Fasting Glucose reference range is 70-99 mg/dL per  American Diabetes Association (ADA) guidelines.   Alkaline Phosphatase   Result Value Ref Range    Alkaline Phosphatase 117 45 - 120 U/L   Vitamin D, Total (25-Hydroxy)   Result Value Ref Range    Vitamin D, Total (25-Hydroxy) 34.9 30.0 - 80.0 ng/mL    Narrative    Deficiency <10.0 ng/mL  Insufficiency 10.0-29.9 ng/mL  Sufficiency 30.0-80.0 ng/mL  Toxicity (possible) >100.0 ng/mL       Luis Manuel: Vitamin D level returned in the desired range.  Continue your current supplement.    Please call with questions or contact us using [facility].    Sincerely,        Electronically signed by Vikas Perez MD

## 2021-06-18 NOTE — LETTER
Letter by Vikas Perez MD at      Author: Vikas Perez MD Service: -- Author Type: --    Filed:  Encounter Date: 2/10/2019 Status: (Other)       Luis Manuel Lemon  [address] [place] [address]             February 10, 2019         Dear Ms. Lemon,    Below are the results from your recent visit:    Resulted Orders   Beta-CrossLaps (Beta-CTx)   Result Value Ref Range    C-Telopeptide, Beta-Cross-Linked, Serum 60 pg/mL      Comment:       Postmenopausal Females: 104-1008 pg/mL  REFERENCE INTERVAL: C-Telopeptide, Beta-Cross-Linked, Serum    Access complete set of age- and/or gender-specific reference   intervals for this test in the [facility] Test Directory   ([email or web address]).  Performed by [facility],  [address] [place] [id number] [phone]  [email or web address], Daniel Avila MD, Lab. Director   Electrophoresis, Protein, Serum   Result Value Ref Range    Albumin % 47.9 (L) 51.0 - 67.0 %    Albumin  3.6 3.2 - 4.7 g/dL    Alpha 1 % 3.2 2.0 - 4.0 %    Alpha 1 0.2 0.1 - 0.3 g/dL    Alpha 2 % 12.9 5.0 - 13.0 %    Alpha 2 1.0 (H) 0.4 - 0.9 g/dL    % Beta 21.2 (H) 10.0 - 17.0 %    Beta 1.6 (H) 0.7 - 1.2 g/dL    Gamma Globulin % 14.8 9.0 - 20.0 %    Gamma Globulin 1.1 0.6 - 1.4 g/dL    ELP Comment       The alpha 2 globulin fraction is increased. Isolated increases in alpha 2 globulin are associated with a variety of chronic inflammatory conditions, particularly connective tissue inflammatory disorders, or collagen-vascular diseases.    Increased beta globulin fraction, which may be seen in liver disease, anemia, hyperlipidemia, chronic inflammation, and nephrotic syndrome among possibilities. Clinical correlation is required.    Protein, Total 7.6 6.0 - 8.0 g/dL    Path ICD: M81.0     Interpreted By: Juan C Souza MD    Parathyroid Hormone Intact   Result Value Ref Range    PTH 94 (H) 10 - 86 pg/mL   Thyroid Stimulating Hormone (TSH)   Result Value Ref Range    TSH 1.21 0.30 - 5.00 uIU/mL   Basic  Metabolic Panel   Result Value Ref Range    Sodium 142 136 - 145 mmol/L    Potassium 3.8 3.5 - 5.0 mmol/L    Chloride 101 98 - 107 mmol/L    CO2 31 22 - 31 mmol/L    Anion Gap, Calculation 10 5 - 18 mmol/L    Glucose 98 70 - 125 mg/dL    Calcium 9.1 8.5 - 10.5 mg/dL    BUN 9 8 - 28 mg/dL    Creatinine 0.85 0.60 - 1.10 mg/dL    GFR MDRD Af Amer >60 >60 mL/min/1.73m2    GFR MDRD Non Af Amer >60 >60 mL/min/1.73m2    Narrative    Fasting Glucose reference range is 70-99 mg/dL per  American Diabetes Association (ADA) guidelines.   Alkaline Phosphatase   Result Value Ref Range    Alkaline Phosphatase 117 45 - 120 U/L       Luis Manuel: Your parathyroid hormone levels mildly elevated to 94.  I suspect this is related to a past history of vitamin D deficiency.  A vitamin D level will be rechecked.  Other labs looking for secondary causes of osteoporosis looked good.  We will check your insurance coverage for Prolia.  This would be advised assuming that your insurance coverage for it is reasonable.    Please call with questions or contact us using [facility].    Sincerely,        Electronically signed by Vikas Perez MD

## 2021-06-18 NOTE — LETTER
Letter by Vikas Perez MD at      Author: Vikas Perez MD Service: -- Author Type: --    Filed:  Encounter Date: 2/8/2019 Status: (Other)       Luis Manuel Ploh  [address] [place] [address]             February 8, 2019         Dear Ms. Lemon,    Below are the results from your recent visit:    Resulted Orders   Calcium, 24 Hour Urine   Result Value Ref Range    Calcium, 24H Urine 17 (L) 20 - 275 mg/24hr      Comment:        Hypercalciuria >350  Values are for persons with  average daily calcium intake  (600-800 mg/day)    24H Urine Volume 350 mL    Calcium, Urine 4.9 mg/dL       Luis Manuel: Urine calcium and urine volume are low.    Please call with questions or contact us using [facility].    Sincerely,        Electronically signed by Vikas Perez MD

## 2021-06-18 NOTE — PROGRESS NOTES
[facility] came in to see SW. She is having a lot of pain in her lower back with little relief. She was wondering if acupuncture was covered under her are senior. Regular [facility] covers acupuncture visits completed in a chiropractors office by a certified chiropractor. SW referred her to her [facility]  Myra Todd for assistance. SW sent  an EPIC message to contact family and discus options. SW will also ask PCP for referral to pain center. Patient was very happy when she left.

## 2021-06-19 NOTE — PROGRESS NOTES
HPI - 77 yo female her for f/u on scans and consults.     Weakness/ poor appetite/sob/weight loss  H/o thickening for gastritic lining on CT  Restarted remeron to help with appetite, which is helping  6/8/18 chest/abdo/pelvic CT to r/o cancer - no malignancy or lymphadenopathy. But did show Enlarged central pulmonary arteries consistent with pulmonary artery hypertension.     Back pain with radiculopathy  H/o T12 compression fracture and disc space narrowing  H/o trigger point injection at pain clinic on 5/18/18 fopr myofascial pain   Consider acupuncture  Lumbar spine MRI 6/8/18:  1.  At L4-5, mild trefoil type spinal canal stenosis with asymmetric narrowing of the left lateral recess. Mild left neural foraminal stenosis. Mild displacement of left greater than right L5 nerve roots.  2.  At L3-4, low-grade narrowing the lateral recesses with mild right and mild to moderate left neural foraminal stenosis.  3.  Mild lumbar spondylosis elsewhere as above without additional high-grade stenosis or focal neural impingement.  4.  Mild chronic superior endplate compression fracture at T12.     Restrictive lung disease  Continue inhalers - combivent and ventolin  Will call [facility] to check status of neb machine  pulm consult note on 6/25/18:   1) Restrictive lung disease - minimal symptoms - prn combivent only.  Prednisone may help with flares.    2) Sleep issues - she startles from sleep at times.  I do not think these are apneas as she is not sleepy during the day, and her family does not describe actual apneas.  It may be reflux.     Connective tissues -   Continue prednisone 10mg daily  Rheum consult on 7/2/18 who did steroid injections in both knees and ordered labs for Polyarthralgia  -     DNA (ds) Antibody Screen  -     DANIELLE (Antibodies to Extractable Nuclear Antigens) Profile  -     Anti-Neutrophil Cytoplasmic Antibody, IgG (ANCA IFA)  -     Complement, C'3  -     HM1(CBC and Differential)  -     Creatinine  -      ALT (SGPT)  -     Albumin  -     Rheumatoid Factor Quant  -     CCP Antibodies  -     HM1 (CBC with Diff)        Osteoporosis -   H/o T12 compression fracture and disc space narrowing  After CT/MRI - will refer again for prolia consultation    Vit D deficiency - last level 23.4 on 4/10/18 and taking ergo 88756 weekly    Polypharmacy- RN sets up meds      HTN  - on losaratan- HCTZ 100-25mg and amlodipine    Current Outpatient Prescriptions   Medication Sig Note     amLODIPine (NORVASC) 10 MG tablet Take 10 mg by mouth every morning.      ANTACID, CALCIUM CARBONATE, 200 mg calcium (500 mg) chewable tablet CHEW 1 TABLET BY MOUTH THREE TIMES A DAY TO KEEP YOUR BONES HEALTHY      camphor-menthol 0.2-3.5 % Gel Apply topically daily as needed. 4/25/2018: Unknown strength     clotrimazole (LOTRIMIN) 1 % cream Apply topically 2 (two) times a day as needed.      diphenhydrAMINE (BENADRYL) 12.5 mg/5 mL liquid Take 20 mg by mouth at bedtime as needed for allergies.      docusate sodium (COLACE) 100 MG capsule TAKE 1 CAPSULE BY MOUTH TWICE DAILY.      gabapentin (NEURONTIN) 100 MG capsule TAKE 1 CAPSULE IN THE MORNING, 1 CAPSULE MIDDAY AND 3 CAPSULES AT BEDTIME      ipratropium-albuterol (COMBIVENT RESPIMAT)  mcg/actuation Mist inhaler Inhale 1 puff every 6 (six) hours as needed.      losartan-hydrochlorothiazide (HYZAAR) 100-25 mg per tablet TAKE 1 TABLET BY MOUTH DAILY.      MAPAP ARTHRITIS PAIN 650 mg CR tablet TAKE 1 TABLET BY MOUTH EVERY 8 HOURS AS NEEDED FOR PAIN      mirtazapine (REMERON) 7.5 MG tablet Take 1 tablet (7.5 mg total) by mouth at bedtime.      ondansetron (ZOFRAN) 4 MG tablet Take 4-8 mg by mouth every 4 (four) hours as needed for nausea (not to exceed 4 tablets/day).      polyvinyl alcohol (LIQUIFILM TEARS) 1.4 % ophthalmic solution Apply to eye daily as needed      predniSONE (DELTASONE) 10 mg tablet Take 1 tablet (10 mg total) by mouth daily with breakfast.      VITAMIN D2 50,000 unit capsule TAKE 1  "CAPSULE BY MOUTH ONCE WEEKLY      Vitals:    07/19/18 1306   BP: 100/50   Pulse: 64   Resp: 28   Temp: 98.1  F (36.7  C)   TempSrc: Oral   SpO2: 92%   Weight: 122 lb 9 oz (55.6 kg)   Height: 4' 11.02\" (1.499 m)     PHYSICAL EXAM   General Appearance: Awake and alert, in no acute distress  HEENT: neck is supple  CV: regular rate  Resp: No respiratory distress. Breathing comfortably  Musculoskeletal: moving limbs comfortably with not deficits or deformities  Skin: no rashes noted    Recent Results (from the past 1008 hour(s))   POCT creatinine    Collection Time: 06/08/18  5:03 PM   Result Value Ref Range    POC Creatinine 0.7 mg/dL   POCT GFR    Collection Time: 06/08/18  5:04 PM   Result Value Ref Range    POC GFR AMER AF HE >60  >60 mL/min/1.73m2    POC GFR NON AMER AF >60  >60 mL/min/1.73m2   DNA (ds) Antibody Screen    Collection Time: 07/02/18 11:35 AM   Result Value Ref Range    DNA (ds) Antibody 3 <25 IU   DANIELLE (Antibodies to Extractable Nuclear Antigens) Profile    Collection Time: 07/02/18 11:35 AM   Result Value Ref Range    Donna-1 Autoantibodies 1 <20 EU    Scl-70 Autoantibodies 2 <20 EU    Sm (Luna) Autoantibodies 2 <20 EU    Sm/RNP Autoantibodies 8 <20 EU    SS-A/Ro Autoantibodies 3 <20 EU    SS-B/La Autoantibodies 1 <20 EU   Anti-Neutrophil Cytoplasmic Antibody, IgG (ANCA IFA)    Collection Time: 07/02/18 11:35 AM   Result Value Ref Range    Anti-Neutrophil Cytoplasmic Ab, IgG <1:20 <1:20   Complement, C'3    Collection Time: 07/02/18 11:35 AM   Result Value Ref Range    C3 Complement 151 83 - 177 mg/dL   Creatinine    Collection Time: 07/02/18 11:35 AM   Result Value Ref Range    Creatinine 0.75 0.60 - 1.10 mg/dL    GFR MDRD Af Amer >60 >60 mL/min/1.73m2    GFR MDRD Non Af Amer >60 >60 mL/min/1.73m2   ALT (SGPT)    Collection Time: 07/02/18 11:35 AM   Result Value Ref Range    ALT 9 0 - 45 U/L   Albumin    Collection Time: 07/02/18 11:35 AM   Result Value Ref Range    Albumin 3.4 (L) 3.5 - 5.0 g/dL "   Rheumatoid Factor Quant    Collection Time: 07/02/18 11:35 AM   Result Value Ref Range    Rheumatoid Factor Quantitative <15.0 0 - 30 IU/mL   CCP Antibodies    Collection Time: 07/02/18 11:35 AM   Result Value Ref Range    CCP IgG Antibodies <0.5 <=4.9 U/mL   HM1 (CBC with Diff)    Collection Time: 07/02/18 11:35 AM   Result Value Ref Range    WBC 7.8 4.0 - 11.0 thou/uL    RBC 4.67 3.80 - 5.40 mill/uL    Hemoglobin 11.5 (L) 12.0 - 16.0 g/dL    Hematocrit 37.3 35.0 - 47.0 %    MCV 80 80 - 100 fL    MCH 24.7 (L) 27.0 - 34.0 pg    MCHC 30.9 (L) 32.0 - 36.0 g/dL    RDW 12.6 11.0 - 14.5 %    Platelets 248 140 - 440 thou/uL    MPV 8.3 7.0 - 10.0 fL    Neutrophils % 44 (L) 50 - 70 %    Lymphocytes % 39 20 - 40 %    Monocytes % 7 2 - 10 %    Eosinophils % 10 (H) 0 - 6 %    Basophils % 0 0 - 2 %    Neutrophils Absolute 3.4 2.0 - 7.7 thou/uL    Lymphocytes Absolute 3.0 0.8 - 4.4 thou/uL    Monocytes Absolute 0.6 0.0 - 0.9 thou/uL    Eosinophils Absolute 0.8 (H) 0.0 - 0.4 thou/uL    Basophils Absolute 0.0 0.0 - 0.2 thou/uL         A/P  Lumbar spinal stenosis per MRI-   Continue gabapentin and tylenol  toradol shot offered today   Referral to spine clinic    Restrictive lung disease  Continue inhalers - combivent and ventolin    Connective tissues -   Continue prednisone 10mg daily  Rheum consult on 7/2/18 who did steroid injections in both knees and ordered labs for Polyarthralgia. Needs f/u with Rheum in 6 weeks    Poor appetite and sleep improved with remeron    Vit D deficiency - last level 23.4 on 4/10/18 and taking ergo 89235 weekly      Polypharmacy- RN sets up meds    HTN  - well controlled on losaratan- HCTZ 100-25mg and amlodipine    Spent 25 min face to face with patient with more the 50% spent in counseling, reviewing chart, and coordination of care and discussing problems listed above.

## 2021-06-19 NOTE — LETTER
Letter by Johan Chavez MD at      Author: Johan Chavez MD Service: -- Author Type: --    Filed:  Encounter Date: 8/19/2019 Status: (Other)         Kateryna Morales MD  [address] [place] [address]                                  August 19, 2019    Patient: Luis Manuel Lemon   MR Number: [id number]   YOB: 1940   Date of Visit: 8/19/2019     Dear Dr. Andrew MD:    Thank you for referring Luis Manuel Lemon to me for evaluation. Below are the relevant portions of my assessment and plan of care.    If you have questions, please do not hesitate to call me. I look forward to following Luis Manuel along with you.    Sincerely,        Johan Chavez MD          CC  No Recipients  Johan Chavez MD  8/19/2019 10:17 AM  Sign at close encounter  Assessment and Plan:Luis Manuel Lemon is a 79 y.o. 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  She doesn't describe classic symptoms of pulmonary fibrosis as she doesn't cough or have progressive dyspnea.  She does occasionally awaken short of breath, and remains that way for a couple of days.  It only happens at night, which makes me highly suspicious for nocturnal aspiration.  This could also explain her fibrosis issue as well.  She needs to remain on zantac and tums as prescribed.     1) Dyspnea - more likely occasional nocturnal reflux than progressive fibrosis.   Remain on two times a day zantac and prn tums.  OK to use as needed combivent, and can stop the albuterol if she doesn't like that one.    2) RTC in 1 year           CCx: dyspnea    HPI: Ms. Lemon is a 79 year old woman who returns for evaluation of her presumed pulmonary fibrosis.  She states her breathing is usually fine with no cough or dyspnea.  She does occasionally feel worse from a breathing standpoint, possibly once every month or so she will wake up feeling short of breath.   "Combivent helps some, but she will remain short of breath for a couple of days before it gets better.  She denies having heart burn, or acid reflux, but does mention that foods make her belly \"burn\" like chili paste.  She thinks she is not on zantac anymore, but produces a pill bottle with zantac in it.  She has been using her combivent, but there is no cartridge in it.  Its not clear how long she has been using it this way.  I placed a cartridge in it for her from her medication bag.    ROS:  Review of Systems - History obtained from the patient  General ROS: negative  Psychological ROS: negative  ENT ROS: negative  Allergy and Immunology ROS: negative  Endocrine ROS: negative  Respiratory ROS: positive for - shortness of breath  negative for - cough, hemoptysis, sputum changes, stridor, tachypnea or wheezing  Cardiovascular ROS: no chest pain or palpitations  Gastrointestinal ROS: no abdominal pain, change in bowel habits, or black or bloody stools  Genito-Urinary ROS: no dysuria, trouble voiding, or hematuria  Musculoskeletal ROS: negative  Neurological ROS: no TIA or stroke symptoms  Dermatological ROS: negative      Current Meds:  Current Outpatient Medications   Medication Sig Note   ? acetaminophen (MAPAP ARTHRITIS PAIN) 650 MG CR tablet Take 2 tablets (1,300 mg total) by mouth 2 (two) times a day.    ? amLODIPine (NORVASC) 10 MG tablet TAKE 1 TABLET ORALLY EVERY DAY.    ? b complex vitamins capsule Take 1 capsule by mouth daily.    ? VICK-GEST ANTACID 200 mg calcium (500 mg) chewable tablet CHEW 1 TABLET BY MOUTH THREE TIMES A DAY TO KEEP YOUR BONES HEALTHY    ? capsaicin (ZOSTRIX) 0.025 % cream Apply topically 2 (two) times a day. 8/8/2019: Patient states uses once a day    ? docusate sodium (COLACE) 100 MG capsule TAKE 1 CAPSULE BY MOUTH TWICE DAILY.    ? gabapentin (NEURONTIN) 100 MG capsule Take 200 mg by mouth 3 (three) times a day. 2/20/2019: 1 cap AM, 1 cap noon, 3 caps at bedtime    ? " "ipratropium-albuterol (COMBIVENT RESPIMAT)  mcg/actuation Mist inhaler Inhale 1 puff every 6 (six) hours as needed.    ? losartan-hydrochlorothiazide (HYZAAR) 100-25 mg per tablet TAKE 1 TABLET BY MOUTH DAILY.    ? menthol 2.5 % Gel Apply 1 application topically 3 (three) times a day.    ? metoprolol succinate (TOPROL XL) 50 MG 24 hr tablet Take 1 tablet (50 mg total) by mouth daily.    ? mirtazapine (REMERON) 15 MG tablet TAKE 1/2 TABLET BY MOUTH AT BEDTIME    ? naproxen sodium (ALEVE) 220 MG tablet Take 1 tablet (220 mg total) by mouth 2 (two) times a day with meals.    ? polyvinyl alcohol (LIQUIFILM TEARS) 1.4 % ophthalmic solution Apply to eye daily as needed    ? ranitidine (ZANTAC) 150 MG tablet TAKE 1 TABLET BY MOUTH 2 TIMES A DAY    ? VITAMIN D2 50,000 unit capsule TAKE 1 CAPSULE BY MOUTH ONCE WEEKLY        Labs:  No results found for this or any previous visit (from the past 72 hour(s)).    I have personally reviewed all pertinent imaging studies and PFT results unless otherwise noted.    Imaging studies:  No results found.      Physical Exam:  /60   Pulse 73   Ht 4' 10.5\" (1.486 m)   Wt 122 lb (55.3 kg)   SpO2 93%   Breastfeeding? No   BMI 25.06 kg/m     General - Well nourished  Ears/Mouth -  OP pink moist, no thrush  Neck - no cervical lymphadenopathy  Lungs - basilar crackles  CVS - regular rhythm with no murmurs, rubs or gallups  Abdomen - soft, NT, ND, NABS  Ext - no cyanosis, clubbing or edema  Skin - no rash  Psychology - alert and oriented, answers appropriate        Electronically signed by:    Johan Chavez MD PhD  [facility]       "

## 2021-06-19 NOTE — LETTER
Letter by Orquidea Ramon SW at      Author: Orquidea Ramon SW Service: -- Author Type: --    Filed:  Encounter Date: 8/30/2019 Status: (Other)             August 30, 2019    LUIS MANUEL PLOH  [address]  [place] [address]      Dear Luis Manuel:    As a member of [facility] ([facility] you are provided a care coordinator. I will be your new care coordinator as of 9/1/19. I will be calling you soon to see how you are doing and determine your needs.    If you have any questions, please feel free to call me at   [phone]. If you reach my voice mail, please leave a message and your phone number. If you are hearing impaired, please call the [place] [employer] at [phone] or [phone] ([email or web address] relay service).    I look forward to speaking with you soon.    Sincerely,        REBECCA Srinivasan  [phone]  [email or web address]          [facility]  [id number] DHS Approved ([id number])  [id number] (11/18)

## 2021-06-19 NOTE — PROGRESS NOTES
ASSESSMENT: Luis Manuel Lemno is a 78 y.o. female with past medical history significant for vitamin D deficiency, hypertension, hyperlipidemia, osteoporosis, chronic steroid use for mixed connective tissue disease, restrictive lung disease, GERD, congestive heart failure who presents today for new patient evaluation of chronic and progressive bilateral low back pain with radiation into bilateral lower extremities.  MRI lumbar spine shows mild spinal canal stenosis at L4-5 related to mild facet arthropathy and a circumferential disc bulge.    ALEX: 79  Who 5: 16    PLAN:  A shared decision making model was used.  The patient's values and choices were respected.  The following represents what was discussed and decided upon by the physician assistant and the patient.  A professional  is present for the visit.    1.  DIAGNOSTIC TESTS: I reviewed the MRI lumbar spine in detail with the patient with the help of a spine model.  No additional diagnostic tests were ordered.    2.  PHYSICAL THERAPY:  Discussed the importance of core strengthening, ROM, stretching exercises with the patient and how each of these entities is important in decreasing pain.  Explained to the patient that the purpose of physical therapy is to teach the patient a home exercise program.  These exercises need to be performed every day in order to decrease pain and prevent future occurrences of pain.  I strongly recommended that the patient begin physical therapy.  She states that she has had physical therapy in 2017, but I cannot find record of that.  She is not doing any home exercise program.  Patient indicated she is not interested in physical therapy.    3.  MEDICATIONS: No changes are made to the patient's medications.  Uses gabapentin 100 mg twice daily and 300 mg at bedtime.  She also uses Tylenol 650 mg twice daily.    4.  INTERVENTIONS: No interventions were ordered.  Patient is not really interested in any additional interventional pain  management.  Patient has had multiple steroid injections in the past.  She states none of them is been helpful.  She had a bilateral L5-S1 facet joint injection June 22, 2016, and L4-5 interlaminar epidural steroid injection July 6, 2016, and a left sacroiliac joint injection February 20, 2017.  I do see that there is an order in place for another intralaminar epidural steroid injection by the pain clinic.    5.  PATIENT EDUCATION:  - Patient is not interested in considering spine surgery.  -Patient's questions were answered.  -I did recommend that the patient follow-up with the pain clinic. Patient is not interested in considering injections, physical therapy, or surgery.  Perhaps patient's pain could be managed medically.    6.  FOLLOW-UP:   Patient follow-up with me as needed.  She has any questions or concerns, she should not hesitate to call.      SUBJECTIVE:  Luis Manuel Lemon  Is a 78 y.o. female who presents today for new patient evaluation of low back pain with radiation to bilateral lower extremities.  Patient states that she has had pain for years.  She states that several years ago she slipped and fell.  She does not remember if this was before or after her back pain started.  She denies any other injury or event to cause the pain.  She feels that it is getting progressively worse.    Patient complains of bilateral low back pain.  Pain spans across low back in a broadband distribution at the lumbosacral junction.  Pain radiates into the buttocks and down the posterior thighs to the knees.  Both sides are equally affected.  Back pain is more severe than leg pain.  She describes the pain as burning and aching.  She states that her pain is aggravated with bending, standing, walking.  She says that she cannot even walk 1 block.  She also has pain with sitting.  Pain improves slightly with lying down.  She denies numbness or tingling down the legs.  She feels weak in both legs with walking.  She denies loss of bowel  or bladder control.  She denies any recent fevers, chills, sweats.    She states that she did physical therapy in 2017.  She states it helped with her shoulder pain, but not her back pain.  She is not doing any home exercises for her lower back.  She tried chiropractic treatment in 2014.  She does not remember if this was helpful.  She has had multiple injections.  She states that none of the injections have been helpful, even for short period of time.  She had a bilateral L5-S1 facet joint injection on June 22, 2016.  She then had an L4-5 intralaminar epidural steroid injection on July 6, 2016.  She then went on to have a left sacroiliac joint injection on February 20, 2017.  All of these injections were done at the pain clinic.  Patient never had any spine surgery.  Patient uses gabapentin 100 mg twice daily and 300 mg at bedtime.  She uses Tylenol 650 mg twice daily.    Current Outpatient Prescriptions on File Prior to Encounter   Medication Sig Dispense Refill     amLODIPine (NORVASC) 10 MG tablet Take 10 mg by mouth every morning.       ANTACID, CALCIUM CARBONATE, 200 mg calcium (500 mg) chewable tablet CHEW 1 TABLET BY MOUTH THREE TIMES A DAY TO KEEP YOUR BONES HEALTHY 90 tablet 11     docusate sodium (COLACE) 100 MG capsule TAKE 1 CAPSULE BY MOUTH TWICE DAILY. 60 capsule 11     gabapentin (NEURONTIN) 100 MG capsule TAKE 1 CAPSULE IN THE MORNING, 1 CAPSULE MIDDAY AND 3 CAPSULES AT BEDTIME 120 capsule 11     ipratropium-albuterol (COMBIVENT RESPIMAT)  mcg/actuation Mist inhaler Inhale 1 puff every 6 (six) hours as needed. 1 Inhaler 12     losartan-hydrochlorothiazide (HYZAAR) 100-25 mg per tablet TAKE 1 TABLET BY MOUTH DAILY. 30 tablet 11     MAPAP ARTHRITIS PAIN 650 mg CR tablet TAKE 1 TABLET BY MOUTH EVERY 8 HOURS AS NEEDED FOR PAIN 100 tablet 3     mirtazapine (REMERON) 7.5 MG tablet Take 1 tablet (7.5 mg total) by mouth at bedtime. 30 tablet 11     predniSONE (DELTASONE) 10 mg tablet Take 1 tablet (10  mg total) by mouth daily with breakfast. 30 tablet 3     VITAMIN D2 50,000 unit capsule TAKE 1 CAPSULE BY MOUTH ONCE WEEKLY 4 capsule 0     camphor-menthol 0.2-3.5 % Gel Apply topically daily as needed.       clotrimazole (LOTRIMIN) 1 % cream Apply topically 2 (two) times a day as needed.       diphenhydrAMINE (BENADRYL) 12.5 mg/5 mL liquid Take 20 mg by mouth at bedtime as needed for allergies.       ondansetron (ZOFRAN) 4 MG tablet Take 4-8 mg by mouth every 4 (four) hours as needed for nausea (not to exceed 4 tablets/day).       polyvinyl alcohol (LIQUIFILM TEARS) 1.4 % ophthalmic solution Apply to eye daily as needed 15 mL 6     Current Facility-Administered Medications on File Prior to Encounter   Medication Dose Route Frequency Provider Last Rate Last Dose     [] ketorolac injection 15 mg (TORADOL)  15 mg Intramuscular Once Kateryna Mell Morales MD           Allergies   Allergen Reactions     Ciprofloxacin      Lisinopril        Past Medical History:   Diagnosis Date     Biceps tendon rupture      Compression fracture 2015:  T12 compression fracture with 33% height loss seen on plain films 2015.  Was not seen on plain films 2014, so is presumably new since then.      Dyspepsia      Eustachian Tube Dysfunction Of The Left Ear     Created by Conversion      GERD (gastroesophageal reflux disease)      History of immunological disorder     Connective Tissue Disorder     History of kidney stones      Hyperlipidemia      Hypertension      Myalgia and myositis      Neuralgia      Osteoarthritis      Osteoporosis      Pancreatitis 4/15/2016     Polymyalgia rheumatica (H)      Restrictive lung disease      Tinea Pedis     Created by Conversion      Vitamin D deficiency         Past Surgical History:   Procedure Laterality Date     CHOLECYSTECTOMY       KIDNEY STONE SURGERY       AL TOTAL ABDOM HYSTERECTOMY      Description: Hysterectomy;  Recorded: 2013;     VENTRAL HERNIA REPAIR N/A  4/9/2016    Procedure: HERNIA REPAIR VENTRAL ;  Surgeon: Duncan Odonnell MD;  Location: [facility] OR;  Service:        Family History   Problem Relation Age of Onset     Family history unknown: Yes       Social History     Social History     Marital status:      Spouse name: N/A     Number of children: N/A     Years of education: N/A     Social History Main Topics     Smoking status: Never Smoker     Smokeless tobacco: Never Used      Comment: chew betel nut     Alcohol use No     Drug use: No     Sexual activity: Not Asked     Other Topics Concern     None     Social History Narrative    3/27/2015:  Speakrajat Holloway.  Came to [place] from [facility] in approximately 2010.  No alcohol use, lives with her daughter Efrain Prieto.  Never a smoker.  Home RN Georgina Salinas from [facility] visits every 2 weeks and sets up meds.  Has PCA 1.75 hours/day as well.  Goes to Adult  at [facility] 2 days per week.         ROS: Positive for easy bruising, poor sleep quality, back pain, joint pain.  Specifically negative for bowel/bladder dysfunction, fevers,chills, appetite changes, unexplained weight loss.   Otherwise 13 systems reviewed are negative.  Please see the patient's intake questionnaire from today for details.      OBJECTIVE:  PHYSICAL EXAMINATION:    CONSTITUTIONAL:  Vital signs as above.  No acute distress.  The patient is well nourished and well groomed.  PSYCHIATRIC:  The patient is awake, alert, oriented to person, place, time and answering questions appropriately with clear speech.    HEENT: Normocephalic, atraumatic.  Sclera clear.  Neck is supple.  SKIN:  Skin over the face, bilateral lower extremities, and posterior torso is clean, dry, intact without rashes.    GAIT:  Gait is antalgic.  Patient ambulate with a cane for assistance.  She has a flexed forward posture at the hips.  The patient is able to rise onto toes and heels bilaterally.  STANDING EXAMINATION: Tender to palpation  bilateral lower lumbar paraspinous muscles.  Tender to palpation bilateral sacroiliac joints.  Lumbar flexion and extension are both severely restricted due to pain.  Lumbar facet loading maneuvers reproduce low back pain bilaterally.  MUSCLE STRENGTH:  The patient has 4/5 strength for the bilateral hip flexors, knee flexors/extensors, 5/5 ankle dorsiflexors/plantar flexors, great toe extensors.  NEUROLOGICAL: 2+ patellar, and 1+ Achilles reflexes bilaterally.  Negative Babinski's bilaterally.  No ankle clonus bilaterally. Sensation to light touch is intact in the bilateral L4, L5, and S1 dermatomes.  VASCULAR:  2/4 dorsalis pedis pulses bilaterally.  Bilateral lower extremities are warm.  There is no pitting edema of the bilateral lower extremities.  ABDOMINAL:  Soft, non-distended, non-tender throughout all quadrants.  No pulsatile mass palpated in the left lower quadrant.  LYMPH NODES:  No palpable or tender inguinal lymph nodes.  MUSCULOSKELETAL: Straight leg raise negative bilaterally.    RESULTS: I reviewed the MRI lumbar spine from [facility] dated June 8, 2018.  At L4-5 there is mild spinal canal stenosis with asymmetric left lateral recess stenosis.  There is mild left  foraminal stenosis.  At this level there is mild facet arthropathy and a circumferential disc bulge.  At L3-4 there is low-grade lateral recess stenosis with mild right and mild to moderate left foraminal stenosis.  There is a chronic superior endplate compression fracture T12.  Please see report for further details.

## 2021-06-19 NOTE — PROGRESS NOTES
ASSESSMENT AND PLAN:  Luis Manuel Lemon 78 y.o. female is seen here on 07/02/18 for evaluation of painful joints.  She is especially painful in her knees bilaterally where she has difficult time ambulating.  She has evidence of osteoarthritis.  She would like to proceed local injection pros and cons outlined via the .  40 mg of Kenalog injected into each of the knees, anterolateral approach.  She also carries a diagnosis of connective tissue disease and at one time thought to have polymyalgia rheumatica.  She is on prednisone 10 mg evidently for pulmonary reasons although it is not clear at this point.  If she is from the perspective of rheumatologic reasons then she may not any longer have a solid indication for staying on this.  This issue was going to require careful monitoring and workup which is started today.  Will meet here in 6 weeks.  Diagnoses and all orders for this visit:    Bilateral primary osteoarthritis of knee  -     triamcinolone acetonide 40 mg/mL injection 40 mg (KENALOG-40); Inject 1 mL (40 mg total) into the joint once.  -     triamcinolone acetonide 40 mg/mL injection 40 mg (KENALOG-40); Inject 1 mL (40 mg total) into the joint once.    Polyarthralgia  -     DNA (ds) Antibody Screen  -     DANIELLE (Antibodies to Extractable Nuclear Antigens) Profile  -     Anti-Neutrophil Cytoplasmic Antibody, IgG (ANCA IFA)  -     Complement, C'3  -     HM1(CBC and Differential)  -     Creatinine  -     ALT (SGPT)  -     Albumin  -     Rheumatoid Factor Quant  -     CCP Antibodies  -     HM1 (CBC with Diff)      HISTORY OF PRESENTING ILLNESS:  Luis Manuel Lemon, 78 y.o., female is here for painful joints.  She reports widespread joint pains.  Were started in the knees.  She rated them as moderately severe to severe, 9.0/10.  Interfering with a variety of day-to-day activities.  She noted no swelling.  No history of trauma.  She ambulates with the help of a cane.  She reports no pain in her hands wrists elbows mild pain  in her shoulders.  She noted no pain in her hips.  She describes no pain in her ankles toes.  She has long-standing low back pain.  She reports no headache, jaw claudication double vision.  She has been seen in the past by another rheumatologist.  Her thought process at that time seems to suggest possibility PMR, possibly connective tissue disease.  Her LEANDRO has been negative.  Her RNP was positive on 2 occasions.. Her sed rate has been consistently elevated.  This is been high as far back as 2011.  When it was 48 recently 53.  She reports no mouth ulcers, photosensitivity, rash.  She reports no chest pain.  As well as could be a certain there is no history of stiffness in the morning when she wakes up.  He reports no fever weight loss blurry vision eye redness mouth also nausea cough no history of DVT or PE. Further historical information and ADL limitations as noted in the multidimensional health assessment questionnaire attached in the EMR. Rest of the 13 system ROS is negative.     ALLERGIES:Ciprofloxacin and Lisinopril    PAST MEDICAL/ACTIVE PROBLEMS/MEDICATION/ FAMILY HISTORY/SOCIAL DATA:  The patient has a family history of  Past Medical History:   Diagnosis Date     Biceps tendon rupture      Compression fracture 2/4/2015 2/24/2015:  T12 compression fracture with 33% height loss seen on plain films 1/2015.  Was not seen on plain films 6/2014, so is presumably new since then.      Dyspepsia      Eustachian Tube Dysfunction Of The Left Ear     Created by Conversion      GERD (gastroesophageal reflux disease)      History of immunological disorder     Connective Tissue Disorder     History of kidney stones      Hyperlipidemia      Hypertension      Myalgia and myositis      Neuralgia      Osteoarthritis      Osteoporosis      Pancreatitis 4/15/2016     Polymyalgia rheumatica (H)      Restrictive lung disease      Tinea Pedis     Created by Conversion      Vitamin D deficiency      History   Smoking Status      Never Smoker   Smokeless Tobacco     Never Used     Comment: chew betel nut     Patient Active Problem List   Diagnosis     Constipation     Vitamin D Deficiency     Essential hypertension     Rotator Cuff Tendonitis     Rupture Of The Bicipital Tendon     Asymptomatic Postmenopausal Status     Hyperlipidemia     Osteoporosis     Noncompliance With Therapy Due To Lack Of Comprehension     Chronic reflux esophagitis     Bursitis, trochanteric     Chronic use of steroids     Hypoalbuminemia     MCTD (mixed connective tissue disease) (H)     Low back pain     Polypharmacy     Degenerative disc disease, cervical     Spinal stenosis, multilevel     Spondylarthritis (H)     Chronic pain syndrome     CHF (congestive heart failure) (H)     Restrictive lung disease     Short of breath on exertion     MILLER (dyspnea on exertion)     Polymyalgia rheumatica (H)     GERD (gastroesophageal reflux disease)     Sinus bradycardia     Bilateral primary osteoarthritis of knee     Current Outpatient Prescriptions   Medication Sig Dispense Refill     amLODIPine (NORVASC) 10 MG tablet Take 10 mg by mouth every morning.       ANTACID, CALCIUM CARBONATE, 200 mg calcium (500 mg) chewable tablet CHEW 1 TABLET BY MOUTH THREE TIMES A DAY TO KEEP YOUR BONES HEALTHY 90 tablet 11     camphor-menthol 0.2-3.5 % Gel Apply topically daily as needed.       clotrimazole (LOTRIMIN) 1 % cream Apply topically 2 (two) times a day as needed.       diphenhydrAMINE (BENADRYL) 12.5 mg/5 mL liquid Take 20 mg by mouth at bedtime as needed for allergies.       docusate sodium (COLACE) 100 MG capsule TAKE 1 CAPSULE BY MOUTH TWICE DAILY. 60 capsule 11     gabapentin (NEURONTIN) 100 MG capsule TAKE 1 CAPSULE IN THE MORNING, 1 CAPSULE MIDDAY AND 3 CAPSULES AT BEDTIME 120 capsule 11     ibandronate (BONIVA) 150 mg tablet Take 1 tablet (150 mg total) by mouth every 30 (thirty) days. Take in AM with glass of water prior to food, don't lie down for 30 minutes. 1 tablet 11      ipratropium-albuterol (COMBIVENT RESPIMAT)  mcg/actuation Mist inhaler Inhale 1 puff every 6 (six) hours as needed. 1 Inhaler 12     losartan-hydrochlorothiazide (HYZAAR) 100-25 mg per tablet TAKE 1 TABLET BY MOUTH DAILY. 30 tablet 11     MAPAP ARTHRITIS PAIN 650 mg CR tablet TAKE 1 TABLET BY MOUTH EVERY 8 HOURS AS NEEDED FOR PAIN 100 tablet 3     mirtazapine (REMERON) 7.5 MG tablet Take 1 tablet (7.5 mg total) by mouth at bedtime. 30 tablet 11     ondansetron (ZOFRAN) 4 MG tablet Take 4-8 mg by mouth every 4 (four) hours as needed for nausea (not to exceed 4 tablets/day).       polyvinyl alcohol (LIQUIFILM TEARS) 1.4 % ophthalmic solution Apply to eye daily as needed 15 mL 6     predniSONE (DELTASONE) 10 mg tablet Take 1 tablet (10 mg total) by mouth daily with breakfast. 30 tablet 3     VITAMIN D2 50,000 unit capsule TAKE 1 CAPSULE BY MOUTH ONCE WEEKLY 4 capsule 0     Current Facility-Administered Medications   Medication Dose Route Frequency Provider Last Rate Last Dose     triamcinolone acetonide 40 mg/mL injection 40 mg (KENALOG-40)  40 mg Intra-articular Once MANPREET Fajardo         triamcinolone acetonide 40 mg/mL injection 40 mg (KENALOG-40)  40 mg Intra-articular Once MANPREET Fajardo           COMPREHENSIVE EXAMINATION:  Vitals:    07/02/18 1028   BP: 180/84   Patient Site: Right Arm   Patient Position: Sitting   Cuff Size: Adult Regular   Pulse: 72   Weight: 125 lb (56.7 kg)     A well appearing alert oriented female. Vital data as noted above. Her eyes without inflammation/scleromalacia. ENTwithout oral mucositis, thrush, nasal deformity, external ear redness, deformity. Her neck is without lymphadenopathy and supple. Lungs normal sounds, no pleural rub. Heart auscultation normal rate, rhythm; no pericardial rub and murmurs. Abdomen soft, non tender, no organomegaly. Skin examined for heliotrope, malar area eruption, lupus pernio, periungual erythema, sclerodactyly, papules, erythema nodosum,  purpura, nail pitting, onycholysis, and obvious psoriasis lesion. Neurological examination shows normal alertness, speech, facial symmetry, tone and power in upper and lower extremities, Tinel's and Phalen's at wrist and gait. The joint examination is performed for swelling, tenderness, warmth, erythema, and range of motion in the following joints: DIPs, PIPs, MCPs, wrists, first CMC's, elbows, shoulders, hips, knees, ankles, feet; spine for range of motion and paraspinal muscles for tenderness. The salient normal / abnormal findings are appended.  She is markedly tender in the joint line of the knees bilaterally where she has warmth but no effusion is detectable.  She does not have synovitis in any of the palpable appendical joints.  She has scattered PIP tenderness.  She does not have sclerodactyly there is no periungual erythema there is no malar area eruption.  She has mild impingement of her shoulder joints bilaterally.    LAB / IMAGING DATA:            ALT   Date Value Ref Range Status   04/25/2018 <9 0 - 45 U/L Final   10/08/2017 10 0 - 45 U/L Final   05/12/2017 15 0 - 45 U/L Final     Albumin   Date Value Ref Range Status   04/25/2018 3.1 (L) 3.5 - 5.0 g/dL Final   10/12/2017 2.8 (L) 3.5 - 5.0 g/dL Final   10/11/2017 2.7 (L) 3.5 - 5.0 g/dL Final     Creatinine   Date Value Ref Range Status   04/25/2018 1.06 0.60 - 1.10 mg/dL Final   10/12/2017 0.73 0.60 - 1.10 mg/dL Final   10/11/2017 1.06 0.60 - 1.10 mg/dL Final       WBC   Date Value Ref Range Status   06/04/2018 8.2 4.0 - 11.0 thou/uL Final   04/25/2018 8.1 4.0 - 11.0 thou/uL Final   02/24/2015 5.6 4.0 - 11.0 thou/uL Final     Hemoglobin   Date Value Ref Range Status   06/04/2018 11.7 (L) 12.0 - 16.0 g/dL Final   04/25/2018 11.9 (L) 12.0 - 16.0 g/dL Final   04/10/2018 11.5 (L) 12.0 - 16.0 g/dL Final     Platelets   Date Value Ref Range Status   06/04/2018 267 140 - 440 thou/uL Final   04/25/2018 259 140 - 440 thou/uL Final   10/12/2017 176 140 - 440  rocky/Panchito Final       Lab Results   Component Value Date    RF <15 05/20/2014    SEDRATE 53 (H) 05/12/2017

## 2021-06-20 NOTE — LETTER
Letter by Orquidea Ramon SW at      Author: Orquidea Ramon SW Service: -- Author Type: --    Filed:  Encounter Date: 4/10/2020 Status: (Other)       April 10, 2020    [facility]  [address]  [place] [address]      Dear Luis Manuel,    Welcome to [facility] \[facility]\"" ([facility]) health program. My name is REBECCA Srinivasan. I am your [facility] care coordinator.     I will call you soon to see how you are doing and determine what needs you may have. My job is to help connect you to services, complete an assessment, and develop a care plan with you. There is no charge to you for the care coordination and assessment services. Our goal is to keep you as healthy and independent as possible.     [facility] combines the benefits you may already receive from Medical Assistance, Medicare and the Prescription Drug Coverage Program.    Soon you will receive a new [facility] member identification (ID) card from [facility]. When you receive it, please use this card where you get your health services.]    If you have questions, please call me at [phone]. If you reach my voice mail, leave a message and your phone number. If you are hearing impaired, please call the [place] Relay at 572 or [phone] (yphrww-az-[email or web address] relay service).    Sincerely,    REBECCA Srinivasan  [phone]  [email or web address] (\[facility]\"") is a health plan that contracts with both Medicare and the [place] Medical Assistance (Medicaid) program to provide benefits of both programs to enrollees. Enrollment in [facility] depends on contract renewal.    [facility] [id number] DHS Approved ([id number])  [id number] (11/18)

## 2021-06-20 NOTE — PROGRESS NOTES
"[facility] Daily Progress     Patient Name: Luis Manuel Lemon  Date: 10/10/2018  Visit #: 4  PTA visit #:  2  Referral Diagnosis: lumbar spinal stenosis  Referring provider: Kateryna Morales MD  Visit Diagnosis:     ICD-10-CM    1. Lumbar radiculopathy M54.16    2. Generalized muscle weakness M62.81          Assessment:   Pt was able to perform all ex's with verbal cues today.  Pt has been inconsistent with her HEP and is not icing.  Pt with issues at home which may be limiting her progress.  A slight decrease in pain today.  Initial goals have not been met.    HEP/POC compliance is  fair .  Patient demonstrates understanding/independence with home program.    Goal Status: On going  Pt. will demonstrate/verbalize independence in self-management of condition in : 6 weeks  Pt. will be independent with home exercise program in : 6 weeks  Pt. will have improved quality of sleep: with less pain;waking less times/night;in 6 weeks  Pt will: no longer have positive neural tension testing in B LE's within 8 weeks in order to improve her QOL.    Plan / Patient Education:         Exercises:  Exercise #1: single knee to chest and piriformis stretch  Comment #1: 30\" holds  Exercise #2: LAQ  Comment #2: x20 B  Exercise #3: LTR  Comment #3: 10\" x10  Exercise #4: prayer stretch  Comment #4: 10\" x 3  Exercise #5: prone HS curls/nerve glides  Comment #5: x10 B  Exercise #6: supine nerve glides  Comment #6: x10 B     Plan: Pt to have an injection next week.  No further visits scheduled at this time. Pt instructed to continue with her HEP.    Subjective:   \" Not good.\"  Pt has been doing her HEP but not icing. \" I don't like cold.\"  Pt to have an injection next week.  Pt's daughter reports they are having issues with their apartment. They have gotten water in the basement. Pt has not been doing the exercises every day due to cleaning the water.  Pain Ratin          I    Objective:     Pt ambulates using a SEC. Camille is " "slow + Trendelenburg    Lumbar ROM: Pain with all motions  Flexion: min loss finger tips to mid shin  Extension: major loss  Side bending : Min loss B  Rotation: mod loss B    B LE strength grossly 4-/5    Pt denies tingling and numbness \"just achy pain.\"    Treatment Today     TREATMENT MINUTES COMMENTS   Evaluation     Self-care/ Home management     Manual therapy     Neuromuscular Re-education     Therapeutic Activity     Therapeutic Exercises 25 Discussed progress  Finalized HEP (no new ex's issued) objective measures taken    Gait training     Modality__________________                Total 25    Blank areas are intentional and mean the treatment did not include these items.       Vaishali Oliveira, PTA,CLT  10/10/2018      "

## 2021-06-20 NOTE — PROGRESS NOTES
Assessment:   Luis Manuel Lemon is a 78 y.o. y.o. female with past medical history significant for vitamin D deficiency, hypertension, hyperlipidemia, osteoporosis, chronic steroid use (now tapering off), GERD, congestive heart failure who presents today for follow-up regarding chronic bilateral low back pain with radiation to bilateral lower extremity's.  MRI lumbar spine shows mild spinal canal stenosis at L4-5 related to mild facet arthropathy and circumferential disc bulge.  -Hypertension.  Patient's blood pressure in the clinic was 197/88.  Patient states that she forgot to take her blood pressure medication this morning.  She denies headache, dizziness, blurry vision, chest pain, difficulty breathing.  Patient reports the blood pressure has been labile in the past.       Plan:     A shared decision making plan was used.  The patient's values and choices were respected.  The following represents what was discussed and decided upon by the physician assistant and the patient.  A professional  is present for the visit.    1.  DIAGNOSTIC TESTS: I reviewed the MRI lumbar spine.  No further diagnostic tests were ordered.    2.  PHYSICAL THERAPY: I placed an order for the patient begin physical therapy at the [place] location of \[facility]\"" rehab.    3.  MEDICATIONS: No changes are made to patient's medications.  She is gabapentin 100 mg twice daily most days, but on days when pain is more severe she uses this medication 3 times per day.  She also uses Tylenol 650 mg twice daily.    4.  INTERVENTIONS: No interventions were ordered.  I would like the patient to trial conservative treatment before we do any additional interventional pain management.  If pain fails to improve, I would likely begin with bilateral L3, L4, L5 medial branch blocks.  Back pain is more severe than leg pain, but the patient does endorse limited walking and standing tolerance with a positive shopping cart sign.  If the patient had a positive  response to the medial branch blocks, I recommend bilateral L4-5, L5-S1 facet joint injections.  If the patient fails her medial branch blocks, I recommend an L5-S1 interlaminar epidural steroid injection to address the spinal stenosis at L4-5.    5.  PATIENT EDUCATION: The patient is in agreement with the above plan.  All questions were answered.    6.  FOLLOW-UP: I will see the patient back in the clinic in 4 weeks for follow-up.  If she has any questions or concerns in the meantime, she should not hesitate to contact our clinic.    Subjective:     Luis Manuel Lemon is a 78 y.o. female who presents today for follow-up regarding bilateral low back pain with radiation into left greater than right lower extremity.  I last saw the patient on July 25, 2018.  At that time I recommended physical therapy and also discussed injections patient was not interested in trying either of those treatment options.  The patient then saw her rheumatologist on August 27, 2018 who referred her back to our clinic for further evaluation and treatment.    Patient denies any significant change in her pain since she was last seen, although she states it may be slightly more severe.  Patient planes of bilateral lower back pain.  Pain spans across low back in a broadband distribution at the belt line.  Pain radiates into bilateral buttocks and on the posterior thighs and the knees.  Patient reports that her back pain is more severe than her leg pain.  Her back pain is equally severe on both sides.  The leg pain is worse on the left than the right.  She rates her pain today as an 8-9 out of 10.  At its best it is an 8 or 10.  At its worst it is a 9 out of 10.  Patient's pain is aggravated with walking.  Pain is alleviated with rest.  She states that when she is walking she feels compelled to lean forward and sometimes she ends up leaning forward so far that it is difficult for her to stand back up straight.  Patient denies any numbness or tingling down  the legs.  She feels generally weak.    Patient has not had any recent physical therapy.  Her daughter states that she went to one physical therapy session in 2017 but did not return.  She is not doing any home exercises.  Uses gabapentin 100 mg twice daily and if pain is severe she increases it to 3 times daily.  She uses Tylenol 650 mg twice daily as well.  She tried acupuncture but she states it was painful and cause her body to feel numb.    Past medical history is reviewed and is pertinent for seeing her rheumatologist on August 27, 2018.  According to his note there is no evidence for connective tissue disease and it was recommended that she wean off of her prednisone.    Family history is reviewed and is unchanged in the interim.    Review of Systems:  Positive for loss of bladder control, weakness, dizziness, blurry vision, balance changes.  Negative for numbness/tingling, footdrop, headache, nausea/vomiting.     Objective:   CONSTITUTIONAL:  Vital signs as above.  No acute distress.  The patient is well nourished and well groomed.    PSYCHIATRIC:  The patient is awake, alert, oriented to person, place and time.  The patient is answering questions appropriately with clear speech.  Normal affect.  HEENT: Normocephalic, atraumatic.  Sclera clear.    SKIN:  Skin over the face, posterior torso, bilateral upper and lower extremities is clean, dry, intact without rashes.  MUSCULOSKELETAL: Patient ambulate with flexed forward posture at the hips.   Mild tenderness over the bilateral lower lumbar paraspinal muscles.      The patient has 5/5 strength for the bilateral hip flexors, knee flexors/extensors, ankle dorsiflexors/plantar flexors, ankle evertors/invertors.    NEUROLOGICAL:  Sensation to light touch is intact in the bilateral L4, L5, and S1 dermatomes.       RESULTS:  I reviewed the MRI lumbar spine from [facility] dated June 8, 2018.  At L4-5 there is mild spinal canal stenosis with asymmetric left lateral  recess stenosis.  There is mild left  foraminal stenosis.  At this level there is mild facet arthropathy and a circumferential disc bulge.  At L3-4 there is low-grade lateral recess stenosis with mild right and mild to moderate left foraminal stenosis.  There is a chronic superior endplate compression fracture T12.  Please see report for further details.

## 2021-06-20 NOTE — PROGRESS NOTES
MTM Initial Encounter  Assessment & Plan                                                     1. Essential hypertension  Controlled to goal of <140/90.  Advised Luis Manuel that it is likely her occasional hypertension may resolve with reductions in her chronic steroid use.  - continue current medications    2. Restrictive lung disease  Somewhat controlled.  Does not like Combivent and would prefer albuterol; prescription has already been sent by PCP.    -  albuterol at pharmacy    3. Degenerative disc disease, cervical  Uncontrolled, worsening.  Luis Manuel notes worsening back pain as she has started her prednisone taper.  It is worst during the late afternoon, likely because her morning gabapentin and acetaminophen are wearing off and not redosed until the evening.  I advised her to start taking these during the afternoon with her Tums and wrote a note to her home care nurse requesting that they be placed in her pill box each day at this time.  Discussed that completely eliminating her back pain is unlikely, but that it could be improved with more continuous medication use and ongoing PT.  - Increase APAP and gabapentin from two times a day to three times a day     4. Age-related osteoporosis without current pathological fracture  Continued appropriate use of bisphosphonate; 4.5 years of therapy completed so far.  Calcium and vitamin D supplementation appropriate.  - continue current medications    5. Drug-induced constipation  Controlled.  Using docusate sodium two times a day with good results. Based on frequency of BMs, recommend continued use.  - continue current medications    Follow Up  Follow up scheduled in one month with PCP    Subjective & Objective                                                     Luis Manuel Lemon is a 78 y.o. female coming in for an initial visit for Medication Therapy Management. She was referred to me from Kateryna Morales MD    Chief Complaint: hypertension and medication review    Medication  "Adherence/Access: Luis Manuel has a home care nurse who comes out to her home to set up her medications every two weeks.  She was there last Wednesday.    Hypertension: losartan 100/25mg once daily in morning, amlodipine 10mg  Luis Manuel denies missing any doses of medications prior to her last appointment - \"sometimes its high like that, sometimes not\".  She notes that her HTN is asymptomatic, but she does not feel good when her BP is too low.    Restrictive lung disease: Combivent q6h prn  Using Combivent 2-3 times per day only as needed.  She notes that she has a prescription for another inhaler, but has not picked it up yet.  Waking up at night SoB 0-3 times per month.  Luis Manuel feels SoB during the day 0-1 times per month.  She feels her Combivent is working well.  When she uses it she has to take 3 puffs.  She brings in a 1/2 full inhaler from 11/17.  She wants the other inhaler because she has a hard time using the Combivent - she had her last Pulm consult a little over a year ago.    Polyarthralgia:  Prednisone 7.5mg daily, gabapentin 100mg am and afternoon, 300mg at bedtime   Taper off prednisone slowly over 3 months - 7.5 mg daily x 4 weeks, 5mg daily x4 weeks, 2.5mg daily x 4 weeks- taper started 9/5/18 and pain not worsening.  She does complain of lower back pain with radiating pain down her right leg which has been worsening over the past week.  Her nurse is only setting up her gabapentin two times a day, and she notes worsening back pain in the late afternoon.    Osteoporosis w/ hx of vertebral fracture: Ibandronate 150mg once monthly, Tums 500mg calcium carbonate 1 tablet three times a day, ergocalciferol 50K IU  Luis Manuel was first started on bisphosphonates in April 2014.  She was on alendronate through the summer of 2017 before being switched to ibandronate.  She denies milk, yogurt, cheese, or other diary consumption.  She has two servings of dark leafy greens each day.    Constipation: docusate sodium 100mg BID  Having a " BM every 1-2 days.    Loss of appetitie: mirtazapine 7.5mg at bedtime  [facility] notes improvement in appetite since starting on mirtazapine    PMH: reviewed in EPIC   Allergies/ADRs: reviewed in EPIC   Alcohol: reviewed in EPIC   Tobacco:   History   Smoking Status     Never Smoker   Smokeless Tobacco     Never Used     Comment: chew betel nut     Today's Vitals: There were no vitals filed for this visit.  ----------------    Much or all of the text in this note was generated through the use of Dragon Dictate voice-to-text software. Errors in spelling or words which seem out of context are unintentional. Sound alike errors, in particular, may have escaped editing.    The patient was given a summary of these recommendations as an after visit summary    I spent 60 minutes with this patient today;  All changes were made via collaborative practice agreement with Kateryna Morales MD. A copy of the visit note was provided to the patient's provider.     Odell Meek, PharmD, BCACP  [facility] Pharmacist at [facility]       Current Outpatient Prescriptions   Medication Sig Dispense Refill     albuterol (PROAIR HFA;PROVENTIL HFA;VENTOLIN HFA) 90 mcg/actuation inhaler Inhale 2 puffs every 6 (six) hours as needed for wheezing. 1 each 0     amLODIPine (NORVASC) 10 MG tablet TAKE 1 TABLET ORALLY EVERY DAY. 30 tablet 0     ANTACID, CALCIUM CARBONATE, 200 mg calcium (500 mg) chewable tablet CHEW 1 TABLET BY MOUTH THREE TIMES A DAY TO KEEP YOUR BONES HEALTHY 90 tablet 11     camphor-menthol 0.2-3.5 % Gel Apply topically daily as needed.       clotrimazole (LOTRIMIN) 1 % cream Apply topically 2 (two) times a day as needed.       diphenhydrAMINE (BENADRYL) 12.5 mg/5 mL liquid Take 20 mg by mouth at bedtime as needed for allergies.       docusate sodium (COLACE) 100 MG capsule TAKE 1 CAPSULE BY MOUTH TWICE DAILY. 60 capsule 11     gabapentin (NEURONTIN) 100 MG capsule TAKE 1 CAPSULE IN THE MORNING, 1 CAPSULE MIDDAY  AND 3 CAPSULES AT BEDTIME 120 capsule 11     ibandronate (BONIVA) 150 mg tablet TAKE 1 TABLET BY MOUTH EVERY 30 DAYS. TAKE IN MORNING WITH A GLASS OFWATER PRIOR TO FOOD. DON'T LIE DOWN FOR 30 MINUTES 1 tablet 6     ipratropium-albuterol (COMBIVENT RESPIMAT)  mcg/actuation Mist inhaler Inhale 1 puff every 6 (six) hours as needed. 1 Inhaler 12     losartan-hydrochlorothiazide (HYZAAR) 100-25 mg per tablet TAKE 1 TABLET BY MOUTH DAILY. 30 tablet 11     MAPAP ARTHRITIS PAIN 650 mg CR tablet TAKE 1 TABLET BY MOUTH EVERY 8 HOURS AS NEEDED FOR PAIN 100 tablet 3     mirtazapine (REMERON) 7.5 MG tablet Take 1 tablet (7.5 mg total) by mouth at bedtime. 30 tablet 11     ondansetron (ZOFRAN) 4 MG tablet Take 4-8 mg by mouth every 4 (four) hours as needed for nausea (not to exceed 4 tablets/day).       polyvinyl alcohol (LIQUIFILM TEARS) 1.4 % ophthalmic solution Apply to eye daily as needed 15 mL 6     predniSONE (DELTASONE) 5 MG tablet Prednisone taper over 3 months: 7.5 mg x 30 days then 5mg x 30 days, then 2.5mg x 30 days 60 tablet 0     ranitidine (ZANTAC) 150 MG tablet Take 1 tablet (150 mg total) by mouth 2 (two) times a day. 60 tablet 1     VITAMIN D2 50,000 unit capsule TAKE 1 CAPSULE BY MOUTH ONCE WEEKLY 4 capsule 0     No current facility-administered medications for this visit.

## 2021-06-20 NOTE — PROGRESS NOTES
HPI - 77 yo female here to f/u on chronic pain      Lumbar spinal stenosis per MRI-   Continue gabapentin and tylenol  Referral to spine clinic - per consult note 7/25/18  MRI lumbar spine shows mild spinal canal stenosis at L4-5 related to mild facet arthropathy and a circumferential disc bulge.  Patient has had multiple steroid injections in the past.  She states none of them is been helpful.  She had a bilateral L5-S1 facet joint injection June 22, 2016, and L4-5 interlaminar epidural steroid injection July 6, 2016, and a left sacroiliac joint injection February 20, 2017.  Referred to PT     Restrictive lung disease  Continue inhalers - combivent and ventolin     Connective tissues -   Continue prednisone 10mg daily  Rheum consult on 7/2/18 who did steroid injections in both knees and ordered labs for Polyarthralgia.   f/u with Rheum next week on 8/27/18  Trying acupuncture     Poor appetite and sleep improved with remeron     Vit D deficiency - last level 23.4 on 4/10/18 and taking ergo 52799 weekly        Polypharmacy- RN sets up meds     HTN  - well controlled on losaratan- HCTZ 100-25mg and amlodipine'    LLQ pain -   Intermittent constipation and loose stool  CT - chest/abdo/pelvis on 6/8/18   Was normal except  Enlarged central pulmonary arteries consistent with pulmonary artery hypertension.    Current Outpatient Prescriptions   Medication Sig Note     amLODIPine (NORVASC) 10 MG tablet Take 10 mg by mouth every morning.      ANTACID, CALCIUM CARBONATE, 200 mg calcium (500 mg) chewable tablet CHEW 1 TABLET BY MOUTH THREE TIMES A DAY TO KEEP YOUR BONES HEALTHY      docusate sodium (COLACE) 100 MG capsule TAKE 1 CAPSULE BY MOUTH TWICE DAILY.      gabapentin (NEURONTIN) 100 MG capsule TAKE 1 CAPSULE IN THE MORNING, 1 CAPSULE MIDDAY AND 3 CAPSULES AT BEDTIME      ipratropium-albuterol (COMBIVENT RESPIMAT)  mcg/actuation Mist inhaler Inhale 1 puff every 6 (six) hours as needed.       "losartan-hydrochlorothiazide (HYZAAR) 100-25 mg per tablet TAKE 1 TABLET BY MOUTH DAILY.      MAPAP ARTHRITIS PAIN 650 mg CR tablet TAKE 1 TABLET BY MOUTH EVERY 8 HOURS AS NEEDED FOR PAIN      mirtazapine (REMERON) 7.5 MG tablet Take 1 tablet (7.5 mg total) by mouth at bedtime.      polyvinyl alcohol (LIQUIFILM TEARS) 1.4 % ophthalmic solution Apply to eye daily as needed      predniSONE (DELTASONE) 10 mg tablet Take 1 tablet (10 mg total) by mouth daily with breakfast.      VITAMIN D2 50,000 unit capsule TAKE 1 CAPSULE BY MOUTH ONCE WEEKLY      camphor-menthol 0.2-3.5 % Gel Apply topically daily as needed. 4/25/2018: Unknown strength     clotrimazole (LOTRIMIN) 1 % cream Apply topically 2 (two) times a day as needed.      diphenhydrAMINE (BENADRYL) 12.5 mg/5 mL liquid Take 20 mg by mouth at bedtime as needed for allergies.      ibandronate (BONIVA) 150 mg tablet TAKE 1 TABLET BY MOUTH EVERY 30 DAYS. TAKE IN MORNING WITH A GLASS OFWATER PRIOR TO FOOD. DON'T LIE DOWN FOR 30 MINUTES      ondansetron (ZOFRAN) 4 MG tablet Take 4-8 mg by mouth every 4 (four) hours as needed for nausea (not to exceed 4 tablets/day).      Vitals:    08/23/18 1549   BP: 120/64   Pulse: 80   Temp: 97.4  F (36.3  C)   TempSrc: Oral   SpO2: 93%   Weight: 121 lb (54.9 kg)   Height: 4' 11.02\" (1.499 m)     OBJECTIVE:  Vitals listed above within normal limits  General appearance: well groomed, pleasant, well hydrated, nontoxic appearing  ENT: PERRL, throat clear  Neck: neck supple, no lymphadenopathy, no thyromegaly  Lungs: lungs clear to auscultation bilaterally, no wheezes or rhonchi  Heart: regular rate and rhythm, no murmurs, rubs or gallops  Abdomen: soft, nontender  Neuro: no focal deficits, CN II-XII grossly intact, alert and oriented  Psych:  mood stable, appears to have good insight and judgment      Result Date: 8/23/2018  XR ABDOMEN FLAT AND UPRIGHT 8/23/2018 5:11 PM INDICATION: Left lower quadrant pain COMPARISON: 06/08/2018 CT " FINDINGS: Chronic left pleural disease stable. Cholecystectomy clips. No free air. Nonspecific nonobstructed bowel gas pattern. Degenerative disease.      A/P  LLQ pain - Intermittent constipation and loose stool  CT - chest/abdo/pelvis on 6/8/18  Was normal  Abdo pain with constipation   Advised miralax and Colace and RTC next week      Lumbar spinal stenosis per MRI-   Continue gabapentin and tylenol  Referral to spine clinic - per consult note 7/25/18  Referred to PT     Restrictive lung disease  Continue inhalers - combivent and ventolin     Connective tissues -   Continue prednisone 10mg daily  Rheum consult on 7/2/18 who did steroid injections in both knees and ordered labs for Polyarthralgia.   f/u with Rheum next week on 8/27/18  Trying acupuncture     Poor appetite and sleep improved with remeron     Vit D deficiency - last level 23.4 on 4/10/18 and taking ergo 82767 weekly     Polypharmacy- RN sets up meds     HTN  - well controlled on losaratan- HCTZ 100-25mg and amlodipine    Spent 40 min face to face with patient with more the 50% spent in counseling, reviewing chart, and coordination of care and discussing problems listed above.       ADDENDUM:  I spoke with Evan Hendricks her rheumatologist today who did not think based her her labs and symptoms that she as any connective tissue d/o. He advised she taper off the prednisone over the next few months and then f/u with her after she is off the prednisone to reassess. He believes her pain is mostly related to osteoarthritis. She had been given a steroid knee injection on 7/2/18.

## 2021-06-20 NOTE — PROGRESS NOTES
"[facility] Daily Progress     Patient Name: Luis Manuel Lemon  Date: 2018  Visit #: 2  PTA visit #:  1  Referral Diagnosis: [unfilled]  Referring provider: Kateryna Morales MD  Visit Diagnosis:     ICD-10-CM    1. Lumbar radiculopathy M54.16    2. Generalized muscle weakness M62.81          Assessment:   Pt returned today for her first follow up appointment.  Pt with pain when performing transfers sit<>supine.    Patient is benefitting from skilled physical therapy and is making steady progress toward functional goals.  Patient is appropriate to continue with skilled physical therapy intervention, as indicated by initial plan of care.    Goal Status:  Pt. will demonstrate/verbalize independence in self-management of condition in : 6 weeks  Pt. will be independent with home exercise program in : 6 weeks  Pt. will have improved quality of sleep: with less pain;waking less times/night;in 6 weeks  Pt will: no longer have positive neural tension testing in B LE's within 8 weeks in order to improve her QOL.    Plan / Patient Education:     Continue with initial plan of care.  Progress with home program as tolerated.    Subjective:   \" Still have low back and knee pain.\"  Pt reports compliancy with her HEP. Pt states she gets tired with the exercises. Pt states she was recently diagnosed with asthma. Has recently started using an inhaler.  Pain Ratin        Objective:     Pt ambulates using a SEC. Pt with significant Genu varum B.    Exercises:  Exercise #1: single knee to chest and piriformis stretch  Comment #1: 30\" holds  Exercise #2: LAQ  Comment #2: x20 B  Exercise #3: LTR  Comment #3: 10\" x10  Exercise #4: prayer stretch  Comment #4: 10\" x 3  Exercise #5: prone HS curls/nerve glides  Comment #5: x10 B  Exercise #6: supine nerve glides  Comment #6: x10 B    Treatment Today     TREATMENT MINUTES COMMENTS   Evaluation     Self-care/ Home management     Manual therapy     Neuromuscular Re-education   "   Therapeutic Activity     Therapeutic Exercises 25 See flow sheet  Added to HEP:  -LTR  -supine nerve glides  -prone HS curls  -prayer stretch   Gait training     Modality__________________                Total 25    Blank areas are intentional and mean the treatment did not include these items.       Vaishali Almaraz,CLT  9/19/2018

## 2021-06-20 NOTE — PROGRESS NOTES
[facility] Certification Request    September 12, 2018      Patient: Luis Manuel Lemon  MR Number: [id number]  YOB: 1940  Date of Visit: 9/12/2018      Dear Tiffanie Byrne,    Thank you for this referral.   We are seeing Luis Manuel Lemon for Physical Therapy of lumbar stenosis.    Medicare and/or Medicaid requires physician review and approval of the treatment plan. Please review the plan of care and verify that you agree with the therapy plan of care by co-signing this note.      Plan of Care  Authorization / Certification Start Date: 09/12/18  Authorization / Certification End Date: 12/12/18  Authorization / Certification Number of Visits: 12  Communication with: Referral Source  Patient Related Instruction: Nature of Condition;Treatment plan and rationale;Self Care instruction;Basis of treatment;Body mechanics;Posture;Precautions;Next steps;Expected outcome  Times per Week: 1-2  Number of Weeks: 10-12  Number of Visits: 12  Therapeutic Exercise: ROM;Stretching;Strengthening  Neuromuscular Reeducation: posture;balance/proprioception;core  Manual Therapy: soft tissue mobilization;myofascial release;joint mobilization;muscle energy    Goals:  Pt. will demonstrate/verbalize independence in self-management of condition in : 6 weeks  Pt. will be independent with home exercise program in : 6 weeks  Pt. will have improved quality of sleep: with less pain;waking less times/night;in 6 weeks  Pt will: no longer have positive neural tension testing in ЕК[place]РИ[place] LE's within 8 weeks in order to improve her QOL.      If you have any questions or concerns, please don't hesitate to call.    Sincerely,      Guillermo Garg, PT, DPT        Physician recommendation:     ___ Follow therapist's recommendation        ___ Modify therapy      *Physician co-signature indicates they certify the need for these services furnished within this plan and while under their care.        [facility]   [facility] Initial  Evaluation    Patient Name: Luis Manuel Lemon  Date of evaluation: 9/12/2018  Referral Diagnosis: lumbar stenosis  Referring provider: Tiffanie Byrne PA-C  Visit Diagnosis:     ICD-10-CM    1. Lumbar radiculopathy M54.16    2. Generalized muscle weakness M62.81         Past Medical History:   Diagnosis Date     Biceps tendon rupture      Compression fracture 2/4/2015 2/24/2015:  T12 compression fracture with 33% height loss seen on plain films 1/2015.  Was not seen on plain films 6/2014, so is presumably new since then.      Dyspepsia      Eustachian Tube Dysfunction Of The Left Ear     Created by Conversion      GERD (gastroesophageal reflux disease)      History of immunological disorder     Connective Tissue Disorder     History of kidney stones      Hyperlipidemia      Hypertension      Myalgia and myositis      Neuralgia      Osteoarthritis      Osteoporosis      Pancreatitis 4/15/2016     Polymyalgia rheumatica (H)      Restrictive lung disease      Tinea Pedis     Created by Conversion      Vitamin D deficiency        Assessment:      Luis Manuel Lemon is a 78 y.o. female who presents to therapy today with chief complaints of chronic low back pain. Symptoms began 3 years ago after she fell at adult . Difficulty with sleeping, sitting, walking, changing sleeping positions, bending, stairs, meal prep, house work due to pain.  Pain symptoms are not improving and are constant. Imaging indicates degenerative changes and chronic compression fracture in her lumbar spine. PT POC and goals have been discussed with patient and She  is agreeable to these. Patient appears motivated for physical therapy and is appropriate for skilled therapy services.    The POC is dynamic and will be modified on an ongoing basis.  Barriers to achieving goals as noted in the assessment section may affect outcome.  Prognosis to achieve goals is  fair   Pt. is appropriate for skilled PT intervention as outlined in the Plan of Care (POC).  Pt. is  "a good candidate for skilled PT services to improve pain levels and function.    Goals:  Pt. will demonstrate/verbalize independence in self-management of condition in : 6 weeks  Pt. will be independent with home exercise program in : 6 weeks  Pt. will have improved quality of sleep: with less pain;waking less times/night;in 6 weeks  Pt will: no longer have positive neural tension testing in ЕК[place]ТЕРИН[place] THOMPSON's within 8 weeks in order to improve her QOL.    Patient's expectations/goals are realistic.    Barriers to Learning or Achieving Goals:  Chronicity of the problem.  Co-morbidities or other medical factors.  see [facility]       Plan / Patient Instructions:        Plan of Care:   Authorization / Certification Start Date: 09/12/18  Authorization / Certification End Date: 12/12/18  Authorization / Certification Number of Visits: 12  Communication with: Referral Source  Patient Related Instruction: Nature of Condition;Treatment plan and rationale;Self Care instruction;Basis of treatment;Body mechanics;Posture;Precautions;Next steps;Expected outcome  Times per Week: 1-2  Number of Weeks: 10-12  Number of Visits: 12  Therapeutic Exercise: ROM;Stretching;Strengthening  Neuromuscular Reeducation: posture;balance/proprioception;core  Manual Therapy: soft tissue mobilization;myofascial release;joint mobilization;muscle energy    Exercises:  Exercise #1: single knee to chest and piriformis stretch  Comment #1: 30\" holds  Exercise #2: LAQ  Comment #2: x20 B    Plan for next visit: review HEP. supine nerve glides, prone hamstring curls (nerve glides-no holds), gentle lumbar stretching     Subjective:         Social information:    Occupation:retired    In adult     Spends most of the day sitting or lying down    History of Present Illness:    Luis Manuel Lemon is a 78 y.o. female who presents to therapy today with complaints of chronic low back pain. Date of onset/duration of symptoms is 3 years ago when she fell. Symptoms radiate from her back " and into the front side of her legs bilaterally. Pain is constant    Pain Ratin  Pain rating at best: 0  Pain rating at worst: 8-9    Functional limitations are described as occurring with: sleeping, sitting, walking, changing sleeping positions, bending, stairs, meal prep, house work    Patient reports benefit from:  pain medication         Objective:      Note: Items left blank indicates the item was not performed or not indicated at the time of the evaluation.    Examination  1. Lumbar radiculopathy     2. Generalized muscle weakness         Involved side: Bilateral  Posture Observation: deformity of her knees- genu varus.    Gait: slow, use of SEC    Lumbar ROM:  All motions increase pain  Date:      *Indicate scale AROM AROM AROM   Lumbar Flexion Mid tibia     Lumbar Extension Severely limited      Right Left Right Left Right Left   Lumbar Sidebending Min limited Min limited       Lumbar Rotation Mod limited Mod limited       Thoracic Flexion      Thoracic Extension      Thoracic Sidebending         Thoracic Rotation           Lower Extremity Strength:   4-/5 B.     Sensation    Does get some tingling/numbness into her legs sometimes    Palpation: tender lumbar spine and lateral of B    Lumbar Special Tests:     Lumbar Special Tests Right Left SI Tests Right  Left   Quadrant test   SI Compression     Straight leg raise  65, pos SI Distraction     Crossover response   POSH Test     Slump pos pos Sacral Thrust     Sit-up test  FADIR     Trunk extensor endurance test  Resisted Abduction     Prone instability test  Other:     Pubic shotgun  Other:           LE Screen:  L hip WFL    Treatment Today     TREATMENT MINUTES COMMENTS   Evaluation 18 Low back   Self-care/ Home management     Manual therapy     Neuromuscular Re-education     Therapeutic Activity     Therapeutic Exercises 12 Discussed PT POC and pathology of condition. Answered patient questions. Began HEP-see flowsheet. Recommend patient ice daily and  move more throughout the day.   Gait training     Modality__________________                Total 30    Blank areas are intentional and mean the treatment did not include these items.            PT Evaluation Code: (Please list factors)  Patient History/Comorbidities: see PMH  Examination: chronic low back pain  Clinical Presentation: stable  Clinical Decision Making: low    Patient History/  Comorbidities Examination  (body structures and functions, activity limitations, and/or participation restrictions) Clinical Presentation Clinical Decision Making (Complexity)   No documented Comorbidities or personal factors 1-2 Elements Stable and/or uncomplicated Low   1-2 documented comorbidities or personal factor 3 Elements Evolving clinical presentation with changing characteristics Moderate   3-4 documented comorbidities or personal factors 4 or more Unstable and unpredictable High       Guillermo Morrison, PT, DPT  9/12/2018

## 2021-06-20 NOTE — PROGRESS NOTES
Assessment:   Luis Manuel Lemon is a 78 y.o. y.o. female with past medical history significant for vitamin D deficiency, hypertension, hyperlipidemia, osteoporosis, GERD, congestive heart failure who presents today for follow-up regarding chronic bilateral low back pain with radiation into the right lower extremity with limited walking and standing tolerance.  MRI lumbar spine shows mild spinal canal stenosis at L4-5 related to mild facet arthropathy and a circumferential disc bulge.  Pain has not improved with physical therapy.       Plan:     A shared decision making plan was used.  The patient's values and choices were respected.  The following represents what was discussed and decided upon by the physician assistant and the patient.  A professional  is present for today's visit.    1.  DIAGNOSTIC TESTS:  I reviewed the MRI lumbar spine.  No further diagnostic tests were ordered.      2.  PHYSICAL THERAPY: Patient is currently in physical therapy.  She has had 3 sessions of far.  She should continue doing her home exercises.    3.  MEDICATIONS: No changes are made to the patient's medications.  She uses gabapentin 100 mg twice daily and Tylenol 650 mg twice daily.    4.  INTERVENTIONS: I offered the patient and L5-S1 interlaminar epidural steroid injection.  I chose this injection because the patient does have pain radiating down the right leg and bilateral leg heaviness/weakness with limited walking and standing tolerance consistent with neurogenic claudication.  Patient education would like to proceed and an order was placed.  -If this injection does not help, recommend a bilateral S1 facet joint injection as the next step.    5.  PATIENT EDUCATION: The patient is in agreement with the above plan.  All questions were answered.    6.  FOLLOW-UP: I will see the patient back in the clinic for a two-week post procedure follow-up visit after her L5-S1 intralaminar epidural steroid injection.  She has any questions  or concerns in the meantime, she should not hesitate to contact our clinic.    Subjective:     Luis Manuel Lemon is a 78 y.o. female who presents today for follow-up regarding chronic bilateral low back pain with radiation into the right lower extremity with limited walking and standing tolerance.  I last saw the patient on September 10, 2018.  At that time refer the patient to physical therapy.  Patient has had 3 sessions of physical therapy.  She has not had any improvement in her pain.    Patient complains of bilateral lower back pain.  Pain spans across low back in a broadband distribution at the belt line.  She has pain which radiates into the buttocks and down the posterior thigh on the right to the posterior knee.  She states that her legs feel heavy with walking.  She states that sometimes she has to physically  her legs with her hands in order to keep walking.  She states that she can only stand for about 5 minutes before she has to stop and rest.  Sitting improves her pain.  She rates her pain today as an 8 out of 10.  At its best it is a 9-10.  At its worst it is an 8 out of 10.  She denies any new symptoms since she was last seen.    Patient is currently in physical therapy.  She has had 3 sessions so far.  She uses gabapentin 100 mg twice daily and Tylenol 650 mg twice daily.    Past medical history is reviewed and is unchanged in the interim.    Family history is reviewed and is unchanged in the interim.    Review of Systems:  Positive for weakness.  Negative for numbness/tingling, loss of bowel/bladder control, footdrop, headache, dizziness, nausea/vomiting, blurry vision, balance changes.     Objective:   CONSTITUTIONAL:  Vital signs as above.  No acute distress.  The patient is well nourished and well groomed.    PSYCHIATRIC:  The patient is awake, alert, oriented to person, place and time.  The patient is answering questions appropriately with clear speech.  Normal affect.  HEENT: Normocephalic,  atraumatic.  Sclera clear.    SKIN:  Skin over the face, posterior torso, bilateral upper and lower extremities is clean, dry, intact without rashes.  MUSCULOSKELETAL: Patient ambulates with a flexed forward posture at the hips.  She uses a cane for assistance.    Mild tenderness over the bilateral lower lumbar paraspinal muscles.   Lumbar facet loading maneuvers reproduce low back pain bilaterally.   The patient has 5/5 strength for the bilateral hip flexors, knee flexors/extensors, ankle dorsiflexors/plantar flexors, ankle evertors/invertors.    NEUROLOGICAL: 2+ patellar, 1+ Achilles reflexes which are symmetric bilaterally.  No ankle clonus bilaterally.  Sensation to light touch is intact in the bilateral L4, L5, and S1 dermatomes.       RESULTS:    I reviewed the MRI lumbar spine from [facility] dated June 8, 2018.  At L4-5 there is mild spinal canal stenosis with asymmetric left lateral recess stenosis.  There is mild left  foraminal stenosis.  At this level there is mild facet arthropathy and a circumferential disc bulge.  At L3-4 there is low-grade lateral recess stenosis with mild right and mild to moderate left foraminal stenosis.  There is a chronic superior endplate compression fracture T12.  Please see report for further details.

## 2021-06-20 NOTE — PROGRESS NOTES
HPI -  77 yo female here with chronic pain and polyarthralgia      Polyarthralgia -   Stopped acupuncture last month b/c it causes worsening pain  Taper off prednisone slowly over 3 months - 7.5 mg daily x 4 weeks, 5mg daily x4 weeks, 2.5mg daily x 4 weeks- taper started 9/5/18 and pain not worsening  Rheum will recheck labs once off the prednisone       Restrictive lung disease - minimal symptoms - prn combivent only.  Last pulm consult was 6/25/18 - f/u 1 yr     Lumbar spinal stenosis per MRI-   Continue gabapentin and tylenol  Referral to spine clinic - per consult note 9/10/18  Referred to PT by spine clinic     Poor appetite and sleep improved with remeron      HTN  - well controlled on losaratan- HCTZ 100-25mg and amlodipine'      LLQ pain, appetite and constipation  Improving with stool softener  CMP/mag wnl except low protein    GERD with abdo burning after meals - asking for medication          Bilateral knee OA   Pain improved after steroid injection at Rheum clinic     H/o Vit D deficiency -  Last level 23.4 on 4/10/18 -> 34.1 on 8/30/18    Osteoporosis - on boniva  Due for dexa 9/2018, osteo consult for prolia?     Current Outpatient Prescriptions   Medication Sig Note     amLODIPine (NORVASC) 10 MG tablet TAKE 1 TABLET ORALLY EVERY DAY.      ANTACID, CALCIUM CARBONATE, 200 mg calcium (500 mg) chewable tablet CHEW 1 TABLET BY MOUTH THREE TIMES A DAY TO KEEP YOUR BONES HEALTHY      docusate sodium (COLACE) 100 MG capsule TAKE 1 CAPSULE BY MOUTH TWICE DAILY.      gabapentin (NEURONTIN) 100 MG capsule TAKE 1 CAPSULE IN THE MORNING, 1 CAPSULE MIDDAY AND 3 CAPSULES AT BEDTIME      ibandronate (BONIVA) 150 mg tablet TAKE 1 TABLET BY MOUTH EVERY 30 DAYS. TAKE IN MORNING WITH A GLASS OFWATER PRIOR TO FOOD. DON'T LIE DOWN FOR 30 MINUTES      ipratropium-albuterol (COMBIVENT RESPIMAT)  mcg/actuation Mist inhaler Inhale 1 puff every 6 (six) hours as needed.      losartan-hydrochlorothiazide (HYZAAR) 100-25 mg  "per tablet TAKE 1 TABLET BY MOUTH DAILY.      MAPAP ARTHRITIS PAIN 650 mg CR tablet TAKE 1 TABLET BY MOUTH EVERY 8 HOURS AS NEEDED FOR PAIN      mirtazapine (REMERON) 7.5 MG tablet Take 1 tablet (7.5 mg total) by mouth at bedtime.      polyvinyl alcohol (LIQUIFILM TEARS) 1.4 % ophthalmic solution Apply to eye daily as needed      predniSONE (DELTASONE) 5 MG tablet Prednisone taper over 3 months: 7.5 mg x 30 days then 5mg x 30 days, then 2.5mg x 30 days      VITAMIN D2 50,000 unit capsule TAKE 1 CAPSULE BY MOUTH ONCE WEEKLY      camphor-menthol 0.2-3.5 % Gel Apply topically daily as needed. 4/25/2018: Unknown strength     clotrimazole (LOTRIMIN) 1 % cream Apply topically 2 (two) times a day as needed.      diphenhydrAMINE (BENADRYL) 12.5 mg/5 mL liquid Take 20 mg by mouth at bedtime as needed for allergies.      ondansetron (ZOFRAN) 4 MG tablet Take 4-8 mg by mouth every 4 (four) hours as needed for nausea (not to exceed 4 tablets/day).      Vitals:    09/27/18 1257 09/27/18 1304   BP: 160/70 150/74   Pulse: 78    Temp: 97.2  F (36.2  C)    TempSrc: Oral    SpO2: 95%    Weight: 120 lb 14.4 oz (54.8 kg)    Height: 4' 11.02\" (1.499 m)           PHYSICAL EXAM   General Appearance: Awake and alert, in no acute distress  HEENT: neck is supple  CV: regular rate  Resp: No respiratory distress. Breathing comfortably  Musculoskeletal: moving limbs comfortably with not deficits or deformities  Skin: no rashes noted    A/P  Polyarthralgia -   Stopped acupuncture last month b/c it causes worsening pain  Taper off prednisone slowly over 3 months - 7.5 mg daily x 4 weeks, 5mg daily x4 weeks, 2.5mg daily x 4 weeks- taper started 9/5/18 and pain not worsening  Rheum will recheck labs once off the prednisone  Plan: continue tapering off prednisone and PT       Restrictive lung disease - minimal symptoms  Last pulm consult was 6/25/18 - f/u 1 yr  Refill combivent and ventolin      Lumbar spinal stenosis per MRI-   Continue gabapentin " and tylenol  Referral to spine clinic - per consult note 9/10/18  Referred to PT by spine clinic     Poor appetite and sleep improved with remeron      HTN  -  Elevated today   on losaratan- HCTZ 100-25mg and amlodipine 10mg  Prior BP wnl   Discussed sodium in diet  RTC for BP check       LLQ pain, appetite and constipation  Improving with stool softener  CMP/mag wnl except low protein  This is improving except for the GERD with abdo burning after meals  Sx might be worsened by the prednisone  rx for raniditine        Bilateral knee OA   Pain improved after steroid injection at Rheum clinic     H/o Vit D deficiency -  Last level 23.4 on 4/10/18 -> 34.1 on 8/30/18    Osteoporosis - on boniva  Due for dexa 9/2018, osteo consult for prolia?   She wants to discuss further next visit     Spent 25 min face to face with patient with more the 50% spent in counseling, reviewing chart, and coordination of care and discussing problems listed above.

## 2021-06-20 NOTE — PROGRESS NOTES
HPI -  77 yo female here with chronic pain and polyarthralgia      Vit D deficiency - last level 23.4 on 4/10/18 and taking ergo 90476 weekly    Lumbar spinal stenosis per MRI-   Continue gabapentin and tylenol  Referral to spine clinic - per consult note 7/25/18  MRI lumbar spine shows mild spinal canal stenosis at L4-5 related to mild facet arthropathy and a circumferential disc bulge.  Patient has had multiple steroid injections in the past.  She states none of them is been helpful.  She had a bilateral L5-S1 facet joint injection June 22, 2016, and L4-5 interlaminar epidural steroid injection July 6, 2016, and a left sacroiliac joint injection February 20, 2017.  Referred to PT    Poor appetite and sleep improved with remeron      HTN  - well controlled on losaratan- HCTZ 100-25mg and amlodipine'     LLQ pain - and constipation  Improved with stool softener    CALL FROM ACUPUNCTURIST-  (this was ordered by her )  Questions about Vit D and prednisone and Vit K  Requested labs for Vit D and Calcium and magnesium   -- she feels the acupuncture makes her pain worse    CALL FROM [facility]HE[facility] - DR PINZON  He did not feel there was lab support or evidence for connective tissue disease and advised she taper off prednisone over 3 months and then f/u to recheck.     -per consult know on 8/27/18 - given bilateral corticosteroid knee injections  -osteoarthritis both in the appendicular and the axial system with spinal stenosis some of her symptoms are in neck and back pain.  She is on gabapentin      Polypharmacy- RN sets up meds    Current Outpatient Prescriptions   Medication Sig Note     amLODIPine (NORVASC) 10 MG tablet Take 10 mg by mouth every morning.      ANTACID, CALCIUM CARBONATE, 200 mg calcium (500 mg) chewable tablet CHEW 1 TABLET BY MOUTH THREE TIMES A DAY TO KEEP YOUR BONES HEALTHY      diphenhydrAMINE (BENADRYL) 12.5 mg/5 mL liquid Take 20 mg by mouth at bedtime as needed for allergies.      docusate  "sodium (COLACE) 100 MG capsule TAKE 1 CAPSULE BY MOUTH TWICE DAILY.      gabapentin (NEURONTIN) 100 MG capsule TAKE 1 CAPSULE IN THE MORNING, 1 CAPSULE MIDDAY AND 3 CAPSULES AT BEDTIME      ibandronate (BONIVA) 150 mg tablet TAKE 1 TABLET BY MOUTH EVERY 30 DAYS. TAKE IN MORNING WITH A GLASS OFWATER PRIOR TO FOOD. DON'T LIE DOWN FOR 30 MINUTES      ipratropium-albuterol (COMBIVENT RESPIMAT)  mcg/actuation Mist inhaler Inhale 1 puff every 6 (six) hours as needed.      losartan-hydrochlorothiazide (HYZAAR) 100-25 mg per tablet TAKE 1 TABLET BY MOUTH DAILY.      MAPAP ARTHRITIS PAIN 650 mg CR tablet TAKE 1 TABLET BY MOUTH EVERY 8 HOURS AS NEEDED FOR PAIN      mirtazapine (REMERON) 7.5 MG tablet Take 1 tablet (7.5 mg total) by mouth at bedtime.      ondansetron (ZOFRAN) 4 MG tablet Take 4-8 mg by mouth every 4 (four) hours as needed for nausea (not to exceed 4 tablets/day).      polyvinyl alcohol (LIQUIFILM TEARS) 1.4 % ophthalmic solution Apply to eye daily as needed      predniSONE (DELTASONE) 10 mg tablet Take 1 tablet (10 mg total) by mouth daily with breakfast.      VITAMIN D2 50,000 unit capsule TAKE 1 CAPSULE BY MOUTH ONCE WEEKLY      camphor-menthol 0.2-3.5 % Gel Apply topically daily as needed. 4/25/2018: Unknown strength     clotrimazole (LOTRIMIN) 1 % cream Apply topically 2 (two) times a day as needed.      Vitals:    08/30/18 1443   BP: 136/64   Pulse: 78   Resp: 18   Temp: 98.2  F (36.8  C)   SpO2: 91%   Weight: 123 lb 0.6 oz (55.8 kg)   Height: 4' 11.02\" (1.499 m)         PHYSICAL EXAM   General Appearance: Awake and alert, in no acute distress  HEENT: neck is supple  CV: regular rate  Resp: No respiratory distress. Breathing comfortably  Musculoskeletal: moving limbs comfortably with not deficits or deformities  Skin: no rashes noted    A/P  Polyarthralgia -   Discussed option to stop vs continue acupuncture. She wants to stop b/c it causes worsening pain  Taper off prednisone slowly over 3 months - " 7.5 mg daily x 4 weeks, 5mg daily x4 weeks, 2.5mg daily x 4 weeks  Rheum will recheck labs once off the prednisone    Lumbar spinal stenosis per MRI-   Continue gabapentin and tylenol  Referral to spine clinic - per consult note 7/25/18  Referred to PT by spine clinic    Poor appetite and sleep improved with remeron      HTN  - well controlled on losaratan- HCTZ 100-25mg and amlodipine'     LLQ pain, appetite and constipation  Improving with stool softener  Will check CMP/mag      Bilateral knee OA   Pain improved after steroid injection at Rheum clinic    H/o Vit D deficiency -  Last level 23.4 on 4/10/18  Check Vit D, Calcium    Flu shot given    Spent 40 min face to face with patient with more the 50% spent in counseling, reviewing chart, and coordination of care and discussing problems listed above.

## 2021-06-20 NOTE — PROGRESS NOTES
ASSESSMENT AND PLAN:  Luis Manuel Lemon 78 y.o. female is seen here on 08/27/18 for a follow-up.  She has osteoarthritis both in the appendicular and the axial system with spinal stenosis some of her symptoms are in neck and back pain.  She is on gabapentin.  She is referred to spine clinic.  She noted some improvement with corticosteroid injection into the knees.  It is unclear how adequate that improvement was nonetheless that is an option she can exercise in the future.  Another option for her would be to consider going to the pain clinic.  The issue of whether she has connective tissue disease and the reason that she is on prednisone is addressed today.  She has no evidence to suggest connective tissue disease such as lupus or rheumatoid arthritis.  From a rheumatologic perspective it is unclear as to why she is on prednisone.  I have reviewed the pulmonary note.  It may be appropriate for her to consider gradually tapering the prednisone down to 0.  Have discussed this  with Dr. Morales.  Follow-up here in 3 months or sooner.      Diagnoses and all orders for this visit:    Polyarthralgia    Spinal stenosis, multilevel  -     Ambulatory referral to Spine [facility]    Bilateral primary osteoarthritis of knee      HISTORY OF PRESENTING ILLNESS:  Luis Manuel Lemon, 78 y.o., female is here for follow-up.  She has osteoarthritis.  Recently she was injected into her knees with corticosteroids.  She noticed some improvement.  She wondered if this can be done again.  This is done beginning of July.  Would be too early.  Her main pain is in the neck and lower back.  She is on gabapentin.  At one point she used to be on duloxetine.  She is now on Remeron.  We will arrange for her to be CNA and spine clinic.  Her recent workup shows no evidence of autoimmunity serologically, with negative LEANDRO, DANIELLE as including RNP are negative, her double-stranded DNA is negative.  Her ANCA was negative.  There is little to suggest any evidence of connective  tissue disease.  She is however on 10 mg of prednisone.  She has been seen and pulmonary who seem to suggest that her interstitial lung disease, considered mild, may be secondary to connective tissue disease.  If it turns out that she does not have connective tissue disease as she tapers prednisone and the connection between the interstitial lung disease and a rheumatologic etiology would be untenable..  She reports no headache, jaw claudication double vision.  She has been seen in the past by another rheumatologist.  Her thought process at that time seems to suggest possibility PMR, possibly connective tissue disease.  Her LEANDRO has been negative.  Her RNP was positive on 2 occasions.. Her sed rate has been consistently elevated.  This is been high as far back as 2011.  When it was 48 recently 53.  She reports no mouth ulcers, photosensitivity, rash.  She reports no chest pain.  As well as could be a certain there is no history of stiffness in the morning when she wakes up.  He reports no fever weight loss blurry vision eye redness mouth also nausea cough no history of DVT or PE. Further historical information and ADL limitations as noted in the multidimensional health assessment questionnaire attached in the EMR.   .     ALLERGIES:Ciprofloxacin and Lisinopril    PAST MEDICAL/ACTIVE PROBLEMS/MEDICATION/ FAMILY HISTORY/SOCIAL DATA:  The patient has a family history of  Past Medical History:   Diagnosis Date     Biceps tendon rupture      Compression fracture 2/4/2015 2/24/2015:  T12 compression fracture with 33% height loss seen on plain films 1/2015.  Was not seen on plain films 6/2014, so is presumably new since then.      Dyspepsia      Eustachian Tube Dysfunction Of The Left Ear     Created by Conversion      GERD (gastroesophageal reflux disease)      History of immunological disorder     Connective Tissue Disorder     History of kidney stones      Hyperlipidemia      Hypertension      Myalgia and myositis       Neuralgia      Osteoarthritis      Osteoporosis      Pancreatitis 4/15/2016     Polymyalgia rheumatica (H)      Restrictive lung disease      Tinea Pedis     Created by Conversion      Vitamin D deficiency      History   Smoking Status     Never Smoker   Smokeless Tobacco     Never Used     Comment: chew betel nut     Patient Active Problem List   Diagnosis     Constipation     Vitamin D Deficiency     Essential hypertension     Rotator Cuff Tendonitis     Rupture Of The Bicipital Tendon     Asymptomatic Postmenopausal Status     Hyperlipidemia     Osteoporosis     Noncompliance With Therapy Due To Lack Of Comprehension     Chronic reflux esophagitis     Bursitis, trochanteric     Chronic use of steroids     Hypoalbuminemia     Low back pain     Polypharmacy     Degenerative disc disease, cervical     Spinal stenosis, multilevel     Spondylarthritis (H)     Chronic pain syndrome     CHF (congestive heart failure) (H)     Restrictive lung disease     Short of breath on exertion     MILLER (dyspnea on exertion)     GERD (gastroesophageal reflux disease)     Sinus bradycardia     Bilateral primary osteoarthritis of knee     Polyarthralgia     Current Outpatient Prescriptions   Medication Sig Dispense Refill     amLODIPine (NORVASC) 10 MG tablet Take 10 mg by mouth every morning.       ANTACID, CALCIUM CARBONATE, 200 mg calcium (500 mg) chewable tablet CHEW 1 TABLET BY MOUTH THREE TIMES A DAY TO KEEP YOUR BONES HEALTHY 90 tablet 11     docusate sodium (COLACE) 100 MG capsule TAKE 1 CAPSULE BY MOUTH TWICE DAILY. 60 capsule 11     gabapentin (NEURONTIN) 100 MG capsule TAKE 1 CAPSULE IN THE MORNING, 1 CAPSULE MIDDAY AND 3 CAPSULES AT BEDTIME 120 capsule 11     ipratropium-albuterol (COMBIVENT RESPIMAT)  mcg/actuation Mist inhaler Inhale 1 puff every 6 (six) hours as needed. 1 Inhaler 12     losartan-hydrochlorothiazide (HYZAAR) 100-25 mg per tablet TAKE 1 TABLET BY MOUTH DAILY. 30 tablet 11     MAPAP ARTHRITIS PAIN  650 mg CR tablet TAKE 1 TABLET BY MOUTH EVERY 8 HOURS AS NEEDED FOR PAIN 100 tablet 3     mirtazapine (REMERON) 7.5 MG tablet Take 1 tablet (7.5 mg total) by mouth at bedtime. 30 tablet 11     predniSONE (DELTASONE) 10 mg tablet Take 1 tablet (10 mg total) by mouth daily with breakfast. 30 tablet 3     VITAMIN D2 50,000 unit capsule TAKE 1 CAPSULE BY MOUTH ONCE WEEKLY 4 capsule 0     camphor-menthol 0.2-3.5 % Gel Apply topically daily as needed.       clotrimazole (LOTRIMIN) 1 % cream Apply topically 2 (two) times a day as needed.       diphenhydrAMINE (BENADRYL) 12.5 mg/5 mL liquid Take 20 mg by mouth at bedtime as needed for allergies.       ibandronate (BONIVA) 150 mg tablet TAKE 1 TABLET BY MOUTH EVERY 30 DAYS. TAKE IN MORNING WITH A GLASS OFWATER PRIOR TO FOOD. DON'T LIE DOWN FOR 30 MINUTES 1 tablet 6     ondansetron (ZOFRAN) 4 MG tablet Take 4-8 mg by mouth every 4 (four) hours as needed for nausea (not to exceed 4 tablets/day).       polyvinyl alcohol (LIQUIFILM TEARS) 1.4 % ophthalmic solution Apply to eye daily as needed 15 mL 6     No current facility-administered medications for this visit.      DETAILED EXAMINATION  08/27/18  :  Vitals:    08/27/18 0755   BP: 130/78   Pulse: 72   Weight: 123 lb (55.8 kg)     Alert oriented. Head including the face is examined for malar rash, heliotropes, scarring, lupus pernio. Eyes examined for redness such as in episcleritis/scleritis, periorbital lesions.   Neck/ Face examined for parotid gland swelling, range of motion of neck.  Left upper and lower and right upper and lower extremities examined for tenderness, swelling, warmth of the appendicular joints, range of motion, edema, rash.  Some of the important findings included: She has no swelling warmth or effusion of the knees.  She has mild JLT.  She does not have synovitis in any of the palpable upper extremity joints.  She does not have moderate eruptions there is no sclerodactyly.  There is no pleural pericardial  rub.        LAB / IMAGING DATA:            ALT   Date Value Ref Range Status   07/02/2018 9 0 - 45 U/L Final   04/25/2018 <9 0 - 45 U/L Final   10/08/2017 10 0 - 45 U/L Final     Albumin   Date Value Ref Range Status   07/02/2018 3.4 (L) 3.5 - 5.0 g/dL Final   04/25/2018 3.1 (L) 3.5 - 5.0 g/dL Final   10/12/2017 2.8 (L) 3.5 - 5.0 g/dL Final     Creatinine   Date Value Ref Range Status   07/02/2018 0.75 0.60 - 1.10 mg/dL Final   04/25/2018 1.06 0.60 - 1.10 mg/dL Final   10/12/2017 0.73 0.60 - 1.10 mg/dL Final       WBC   Date Value Ref Range Status   07/02/2018 7.8 4.0 - 11.0 thou/uL Final   06/04/2018 8.2 4.0 - 11.0 thou/uL Final   02/24/2015 5.6 4.0 - 11.0 thou/uL Final     Hemoglobin   Date Value Ref Range Status   07/02/2018 11.5 (L) 12.0 - 16.0 g/dL Final   06/04/2018 11.7 (L) 12.0 - 16.0 g/dL Final   04/25/2018 11.9 (L) 12.0 - 16.0 g/dL Final     Platelets   Date Value Ref Range Status   07/02/2018 248 140 - 440 thou/uL Final   06/04/2018 267 140 - 440 thou/uL Final   04/25/2018 259 140 - 440 thou/uL Final       Lab Results   Component Value Date    RF <15.0 07/02/2018    SEDRATE 53 (H) 05/12/2017

## 2021-06-20 NOTE — LETTER
Letter by Orquidea Ramon SW at      Author: Orquidea Ramon SW Service: -- Author Type: --    Filed:  Encounter Date: 1/31/2020 Status: (Other)       January 31, 2020    [facility]  [address]  [place] [address]        Dear [facility]:    At [facility], we are dedicated to improving your health and well-being. Enclosed is the Comprehensive Care Plan that we developed with you on 01/22/2020. Please review the Care Plan carefully.    As a reminder, some of the things we discussed at your visit include:    Your physical and mental health    Ways to reduce falls    Health care needs you may have    Dont forget to contact your care coordinator if you:    Have been hospitalized or plan to be hospitalized     Have had a fall     Have experienced a change in physical health    Are experiencing emotional problems     If you do not agree with your Care Plan, have questions about it, or have experienced a change in your needs, please call me at [phone]. If you are hearing impaired, please call the [place] [employer] at 185 or [phone] (uelupw-qa-tkmzhl relay service).    Sincerely,        REBECCA Srinivasan  [phone]  [email or web address]                [id number] [phone] ([id number])     [id number] (11/18)

## 2021-06-21 NOTE — LETTER
Letter by Orquidea Ramon SW at      Author: Orquidea Ramon SW Service: -- Author Type: --    Filed:  Encounter Date: 12/22/2020 Status: (Other)       December 22, 2020      [facility]  [address]  [place] [address]      Dear [facility]:    At [facility], we are dedicated to improving your health and well-being. Enclosed is the Comprehensive Care Plan that we developed with you on 12/16/2020. Please review the Care Plan carefully.    As a reminder, some of the things we discussed at your visit include:    Your physical and mental health    Ways to reduce falls    Health care needs you may have    Dont forget to contact your care coordinator if you:    Have been hospitalized or plan to be hospitalized     Have had a fall     Have experienced a change in physical health    Are experiencing emotional problems     If you do not agree with your Care Plan, have questions about it, or have experienced a change in your needs, please call me at [phone]. If you are hearing impaired, please call the [place] [employer] at 547 or [phone] (vabmeh-ad-xwjqiq relay service).    Sincerely,    REBECCA Srinivasan  [phone]  [email or web address] ([facility]) is a health plan that contracts with both Medicare and the [place] Medical Assistance (Medicaid) program to provide benefits of both programs to enrollees. Enrollment in [facility] depends on contract renewal.    [id number])     [id number] (11/18)

## 2021-06-21 NOTE — LETTER
Letter by Vikas Perez MD at      Author: Vikas Perez MD Service: -- Author Type: --    Filed:  Encounter Date: 3/4/2021 Status: (Other)         [facility]  [address] [place] [address]             March 4, 2021         Dear Ms. Ifeomaoh,    Below are the results from your recent visit:    Resulted Orders   Parathyroid Hormone Intact   Result Value Ref Range     (H) 10 - 86 pg/mL   Vitamin D, Total (25-Hydroxy)   Result Value Ref Range    Vitamin D, Total (25-Hydroxy) 26.8 (L) 30.0 - 80.0 ng/mL    Narrative    Deficiency <10.0 ng/mL  Insufficiency 10.0-29.9 ng/mL  Sufficiency 30.0-80.0 ng/mL  Toxicity (possible) >100.0 ng/mL       Luis Manuel:  Your vitamin D level is lower than optimal at 26.8.  Increase your current supplement by 2000 IU daily as an over-the-counter supplement.  The parathyroid hormone level is mildly elevated.  This could be related to vitamin D deficiency.  Restart Prolia as we discussed in clinic.  It was nice to see you.    Please call with questions or contact us using [employer].    Sincerely,        Electronically signed by Vikas Perez MD

## 2021-06-21 NOTE — PROGRESS NOTES
HPI - 77 yo female here to f/u on BP and right hip pain.     HTN -   She was seen at spine clinic yesterday and had a very high BP but no sx. Initial reading was 201/87, on recheck it was 164/70 and sent to [facility] to be evaluated and seen by Dr. Clau ayon assessed her BP at clinic was 162/86. She takes amlodipine 10 mg and Hyzaar 100/25.  BP here today is good at 138/76    Lumbar stenosis  Continue gabapentin and tylenol  Per spine clinic consult yesterday:  --chronic bilateral low back pain with radiation into the right lower extremity with limited walking and standing tolerance.    --MRI lumbar spine shows mild spinal canal stenosis at L4-5 related to mild facet arthropathy and a circumferential disc bulge.    --L5-S1 interlaminar epidural steroid injection on October 17, 2018 which provided 100% relief of the pain across the lower back and 100% relief of the left buttock pain and left leg heaviness.Unfortunately, it did not provide any relief of the right buttock/thigh pain or right leg heaviness.  I suspect that her residual right-sided pain is related to sacroiliac joint dysfunction.  She had reproduction of her pain with SI joint provocative maneuvers x2 today.  She has previously had significant relief of left buttock pain with  SI joint injections through the pain clinic, most recently in February 2017.  -f/u apt in spine clinic on 11/12/18 for injection    Polyarthralgia -   Stopped acupuncture last month b/c it causes worsening pain  Taper off prednisone slowly over 3 months - 7.5 mg daily x 4 weeks, 5mg daily x4 weeks, 2.5mg daily x 4 weeks- taper started 9/5/18 and pain not worsening  Rheum will recheck labs once off the prednisone  Bilateral knee OA   Pain improved after steroid injection at Rheum clinic - f/u on 11/28/18      Restrictive lung disease - minimal symptoms - prn combivent only.  Last pulm consult was 6/25/18 - f/u 1 yr    Poor appetite and sleep - mildly  improved with  remeron      HTN  - well controlled on losaratan- HCTZ 100-25mg and amlodipine'      LLQ pain, appetite and constipation  Improving with stool softener  CMP/mag wnl except low protein     GERD with abdo burning after meals - on ranitidine      H/o Vit D deficiency -  Last level 23.4 on 4/10/18 -> 34.1 on 8/30/18     Osteoporosis - on boniva  Due for dexa 9/2018, osteo consult for prolia?      Current Outpatient Prescriptions   Medication Sig Note     amLODIPine (NORVASC) 10 MG tablet TAKE 1 TABLET ORALLY EVERY DAY.      ANTACID, CALCIUM CARBONATE, 200 mg calcium (500 mg) chewable tablet CHEW 1 TABLET BY MOUTH THREE TIMES A DAY TO KEEP YOUR BONES HEALTHY      clotrimazole (LOTRIMIN) 1 % cream Apply topically 2 (two) times a day as needed.      docusate sodium (COLACE) 100 MG capsule TAKE 1 CAPSULE BY MOUTH TWICE DAILY.      gabapentin (NEURONTIN) 100 MG capsule TAKE 1 CAPSULE IN THE MORNING, 1 CAPSULE MIDDAY AND 3 CAPSULES AT BEDTIME      ibandronate (BONIVA) 150 mg tablet TAKE 1 TABLET BY MOUTH EVERY 30 DAYS. TAKE IN MORNING WITH A GLASS OFWATER PRIOR TO FOOD. DON'T LIE DOWN FOR 30 MINUTES      ipratropium-albuterol (COMBIVENT RESPIMAT)  mcg/actuation Mist inhaler Inhale 1 puff every 6 (six) hours as needed.      losartan-hydrochlorothiazide (HYZAAR) 100-25 mg per tablet TAKE 1 TABLET BY MOUTH DAILY.      MAPAP ARTHRITIS PAIN 650 mg CR tablet TAKE 1 TABLET BY MOUTH EVERY 8 HOURS AS NEEDED FOR PAIN      mirtazapine (REMERON) 7.5 MG tablet Take 1 tablet (7.5 mg total) by mouth at bedtime.      predniSONE (DELTASONE) 5 MG tablet Prednisone taper over 3 months: 7.5 mg x 30 days then 5mg x 30 days, then 2.5mg x 30 days      ranitidine (ZANTAC) 150 MG tablet Take 1 tablet (150 mg total) by mouth 2 (two) times a day.      VENTOLIN HFA 90 mcg/actuation inhaler INAHLE 2 PUFFS BY MOUTH EVERY 6 HOURS AS NEEDED FOR WHEEZING      VITAMIN D2 50,000 unit capsule TAKE 1 CAPSULE BY MOUTH ONCE WEEKLY      camphor-menthol  "0.2-3.5 % Gel Apply topically daily as needed. 4/25/2018: Unknown strength     polyvinyl alcohol (LIQUIFILM TEARS) 1.4 % ophthalmic solution Apply to eye daily as needed      Vitals:    11/01/18 1310   BP: 138/76   Pulse: 78   Resp: 12   Temp: 98.7  F (37.1  C)   TempSrc: Oral   SpO2: 97%   Weight: 120 lb 6.4 oz (54.6 kg)   Height: 4' 11.02\" (1.499 m)     PHYSICAL EXAM   General Appearance: Awake and alert, in no acute distress  HEENT: neck is supple  CV: regular rate  Resp: No respiratory distress. Breathing comfortably  Musculoskeletal: moving limbs comfortably with not deficits or deformities  Skin: no rashes noted      A/P  Lumbar stenosis  Continue gabapentin and tylenol  F/u with spine clinic on 11/12/18 for injection    HTN  - well controlled today in clinic  Unclear what happened yesterday with the very high BP  continue losaratan- HCTZ 100-25mg and amlodipine      Polyarthralgia -   Tapered off prednisone slowly over 3 months - 7.5 mg daily x 4 weeks, 5mg daily x4 weeks, 2.5mg daily x 4 weeks- taper started 9/5/18 and pain not worsening  Pain improved after steroid injection at Rheum clinic - f/u on 11/28/18      Restrictive lung disease - minimal symptoms - prn combivent only.  Last pulm consult was 6/25/18 - f/u 1 yr    Poor appetite and sleep - continue remeron 7.5mg      constipation  Improving with stool softener       GERD with abdo burning after meals - on ranitidine      H/o Vit D deficiency -  34.1 on 8/30/18  Continue ergo     Osteoporosis - on boniva  DEXA 5/2017 - per report: Because of mixed response, one could continue a change in therapy to Prolia if bisphosphonate treatment has approached the five year antoine,  with follow up bone density scan in 2 years.    Polypharmacy - home health RN sets up med box  Reviewed bottles and med list and discussed which ones to stop to decrease pill burden but she did not want to change any meds    Spent 40 min face to face with patient with more the 50% spent " in counseling, reviewing chart, and coordination of care and discussing problems listed above.

## 2021-06-21 NOTE — LETTER
Letter by Johan Chavez MD at      Author: Johan Chavez MD Service: -- Author Type: --    Filed:  Encounter Date: 12/7/2020 Status: (Other)         Ktaeryna Morales MD  [address] [place] [address]                                  December 7, 2020    Patient: Luis Maunel Lemon   MR Number: [id number]   YOB: 1940   Date of Visit: 12/7/2020     Dear Dr. Andrew MD:    Thank you for referring Luis Manuel Lemon to me for evaluation. Below are the relevant portions of my assessment and plan of care.    If you have questions, please do not hesitate to call me. I look forward to following Luis Manuel along with you.    Sincerely,        Johan Chavez MD          CC  No Recipients  Johan Chavez MD  12/7/2020  1:53 PM  Sign when Signing Visit  Assessment and Plan:Luis Manuel Lemon is a 80 y.o. female with a past medical history significant for pulmonary fibrosis and now pulmonary embolism who presents to clinic today for annual follow up.  She is complaining of worsening shortness of breath and her current inhaler helps but only for a little while.  This suggests she has more of an asthma phenotype.  I'd like to try an ICS/LABA until I see her again to see if this helps.  She has some mild thrush to treat with nystatin as well.  Finally I cannot determine if her clot was provoked or not, would recommend a minimum of 6 months, and given her stability issues, she may need to stop this at that time.  Will discuss on her follow up visit if not dispositioned by her primary care doctor first.    1) Pulmonary fibrosis - she seems to do better when she has combivent so there may be a secondary diagnosis causing bronchoconstriction which is usually not associated with classic fibrosis.  An alternate theory remains that she aspirates causing airway irritation which may be benefited with this inhaler.  Will try Breo 200/50 one puff a day until I see her back to see if this helps.  Continue albuterol  or combivent prn.    2) Pulmonary embolism - unclear if this is provoked or not.  Subsegmental PEs do not always require treatment.   Would complete 6 months of therapy and stop given her instability requiring a cane.    3) Thrush - complaining of a sore mouth, has white spots confirming thrush.  10 days of nystatin four times a day.    4) RTC in 6 months          CCx: pulm fibrosis    HPI: Ms. Luis Manuel Lemon is a 80 year old female with a history of pulmonary fibrosis who returns for follow up.  Since her last visit she was diagnosed with a small PE and started on coumadin.  She has shortness of breath, but it is not changed from her baseline. She uses albuterol and combivent to some effect, mostly at night as she gets short of breath with laying flat.  She is also complaining of a sore throat.    ROS:  Review of Systems - History obtained from the patient  General ROS: negative  Psychological ROS: negative  ENT ROS: negative  Allergy and Immunology ROS: negative  Endocrine ROS: negative  Respiratory ROS: positive for - cough, orthopnea and shortness of breath  negative for - sputum changes, stridor, tachypnea or wheezing  Cardiovascular ROS: no chest pain or palpitations  Gastrointestinal ROS: no abdominal pain, change in bowel habits, or black or bloody stools  Genito-Urinary ROS: no dysuria, trouble voiding, or hematuria  Musculoskeletal ROS: negative  Neurological ROS: no TIA or stroke symptoms  Dermatological ROS: negative      Current Meds:  Current Outpatient Medications   Medication Sig   ? albuterol (PROAIR HFA;PROVENTIL HFA;VENTOLIN HFA) 90 mcg/actuation inhaler Inhale 2 puffs every 6 (six) hours as needed for wheezing or shortness of breath.   ? amLODIPine (NORVASC) 10 MG tablet TAKE 1 TABLET ORALLY EVERY DAY.   ? calcium, as carbonate, (ANTACID, CALCIUM CARBONATE,) 200 mg calcium (500 mg) chewable tablet CHEW 1 TABLET BY MOUTH THREE TIMES A DAY TO KEEP YOUR BONES HEALTHY   ? capsaicin (ZOSTRIX) 0.025 % cream  Apply topically 2 (two) times a day as needed.   ? celecoxib (CELEBREX) 100 MG capsule TAKE ONE CAPSULE BY MOUTH DAILY.   ? famotidine (PEPCID) 40 MG tablet TAKE 1 TABLET BY MOUTH TWICE DAILY.   ? gabapentin (NEURONTIN) 100 MG capsule TAKE 2 CAPSULES BY MOUTH THREE TIMES A DAY.   ? hydroCHLOROthiazide (HYDRODIURIL) 25 MG tablet TAKE ONE TABLET BY MOUTH ONCE DAILY WITH LOSARTAN 100MG.   ? ipratropium-albuteroL (COMBIVENT RESPIMAT)  mcg/actuation Mist inhaler Inhale 1 puff every 6 (six) hours as needed.   ? losartan (COZAAR) 100 MG tablet TAKE 1 TABLET BY MOUTH DAILY WITH HYDROCHLOROTHIAZIDE 25MG   ? MAPAP ARTHRITIS PAIN 650 mg CR tablet TAKE 2 TABLETS BY MOUTH 2 TIMES A DAY   ? metoprolol succinate (TOPROL-XL) 50 MG 24 hr tablet TAKE 1 TABLET BY MOUTH DAILY   ? mirtazapine (REMERON) 15 MG tablet Take 0.5 tablets (7.5 mg total) by mouth at bedtime.   ? polyvinyl alcohol (ARTIFICIAL TEARS, POLYVIN ALC,) 1.4 % ophthalmic solution APPLY TO EYE DAILY AS NEEDED.   ? senna-docusate (SENEXON-S) 8.6-50 mg tablet Take 2 tablets by mouth daily.   ? warfarin ANTICOAGULANT (COUMADIN/JANTOVEN) 2.5 MG tablet Take half to one tablets (1.25 to 2.5 mg) by mouth daily. Adjust dose based on INR results as directed.   ? warfarin ANTICOAGULANT (COUMADIN/JANTOVEN) 5 MG tablet TAKE 1 TABLET (5MG) BY MOUTH EVERY WEDNESDAY AND 1/2 TABLET (2.5MG) BY MOUTH ALL OTHER DAYS       Labs:  No results found for this or any previous visit (from the past 72 hour(s)).    I have personally reviewed all pertinent imaging studies and PFT results unless otherwise noted.    Imaging studies:  Xr Chest 1 View Portable    Result Date: 12/14/2019  EXAM: XR CHEST 1 VIEW PORTABLE LOCATION: [facility] DATE/TIME: 12/14/2019 1:53 PM INDICATION: sob COMPARISON: 11/07/2019     There is a mildly elevated right diaphragm on the present and prior study. There is cardiomegaly, the pulmonary vasculature is within normal limits. No pneumonia is seen. There is  blunting of the left costophrenic angle which is stable compared to the prior study.    Cta Chest Pe Run    Result Date: 12/14/2019  EXAM: CTA CHEST PE RUN LOCATION: [facility] DATE/TIME: 12/14/2019 3:49 PM INDICATION: Shortness of breath and dyspnea COMPARISON: 04/25/2018 CTA chest. TECHNIQUE: CT angiogram chest during arterial phase injection IV contrast. 2D and 3D MIP reconstructions were performed by the CT technologist. Dose reduction techniques were used. CONTRAST: Iohexol (Omni) 75mL FINDINGS: ANGIOGRAM CHEST: Negative for pulmonary emboli. Enlarged central pulmonary arteries concerning for pulmonary artery hypertension similar to previous. Fusiform ascending thoracic aortic aneurysm is stable measuring 3.9 cm. No CT evidence of right heart strain. LUNGS AND PLEURA: Minimal mosaic pattern to the lung bases likely from some mild small airway obstruction of little significance but similar to previous. MEDIASTINUM/AXILLAE: No lymphadenopathy. Generalized cardiac enlargement with reflux of contrast into the IVC and hepatic veins suggestive of right heart failure. UPPER ABDOMEN: Normal. MUSCULOSKELETAL: Normal.     1.  No acute new findings. 2.  Negative for pulmonary emboli and negative for dissections. 3.  Enlarged central pulmonary arteries suggestive of pulmonary artery hypertension. 4.  Stable 3.9 cm fusiform ascending thoracic aortic aneurysm. 5.  Enlarged heart with reflux of contrast into the IVC often seen with right heart failure.    Ct Abdomen Pelvis Without Oral With Iv Contrast    Result Date: 12/14/2019  EXAM: CT ABDOMEN PELVIS WO ORAL W IV CONTRAST LOCATION: [facility] DATE/TIME: 12/14/2019 3:50 PM INDICATION: Abdominal distension LLQ abdominal pain, diverticulitis suspected pain COMPARISON: None. TECHNIQUE: CT scan of the abdomen and pelvis was performed following injection of IV contrast. Multiplanar reformats were obtained. Dose reduction techniques were used. CONTRAST: Iohexol  "(Omni) 75mL FINDINGS: LOWER CHEST: CTA chest read separately. HEPATOBILIARY: Gallbladder removed. Otherwise negative. PANCREAS: Normal. SPLEEN: Normal. ADRENAL GLANDS: Normal. KIDNEYS/BLADDER: 3 cm simple cyst left kidney. Otherwise negative. BOWEL: Scattered diverticula but nothing for acute diverticulitis. LYMPH NODES: Normal. VASCULATURE: Moderate calcified plaque. No abdominal aortic aneurysms. PELVIC ORGANS: Postop change. Otherwise negative. OTHER: None. MUSCULOSKELETAL: Normal.     1.  No acute findings in the abdomen and pelvis. 2.  Diverticulosis in the colon but nothing for acute diverticulitis.         Physical Exam:  /62   Pulse (!) 58   Ht 4' 10\" (1.473 m)   Wt 124 lb (56.2 kg) Comment: per pt  SpO2 94% Comment: RA  BMI 25.92 kg/m    General - Well nourished  Ears/Mouth - white plaques on right tonsillar pillar  Neck - no cervical lymphadenopathy  Lungs - Crackles in the base  CVS - regular rhythm with no murmurs, rubs or gallups  Abdomen - soft, NT, ND, NABS  Ext - no cyanosis, clubbing or edema  Skin - no rash  Psychology - alert and oriented, answers appropriate        Electronically signed by:    Johan Chavez MD PhD  [facility]         "

## 2021-06-21 NOTE — PROGRESS NOTES
Assessment:   Luis Manuel Lemon is a 78 y.o. y.o. female with past medical history significant for vitamin D deficiency, hypertension, hyperlipidemia, osteoporosis, GERD, congestive heart failure who presents today for follow-up regarding chronic bilateral low back pain with radiation into the right lower extremity with limited walking and standing tolerance.  MRI lumbar spine shows mild spinal canal stenosis at L4-5 related to mild facet arthropathy and a circumferential disc bulge.  At its most and L5-S1 interlaminar epidural steroid injection on October 17, 2018 which provided 100% relief of the pain across the lower back and 100% relief of the left buttock pain and left leg heaviness.  Unfortunately, it did not provide any relief of the right buttock/thigh pain or right leg heaviness.  I suspect that her residual right-sided pain is related to sacroiliac joint dysfunction.  She had reproduction of her pain with SI joint provocative maneuvers x2 today.  She has previously had significant relief of left buttock pain with  SI joint injections through the pain clinic, most recently in February 2017.  -Patient had elevated blood pressure in the clinic today.  Initial reading was 201/87, on recheck it was 164/70.  Patient states that she took her blood pressure medications this morning.  She is complaining of a headache and she states that she feels slightly dizzy.  Denies chest pain, shortness of breath, blurry vision. I called the patient's primary care clinic and spoke with a nurse and arranged for the patient to be seen today.  Patient will be seen by Dr. Olguin at 10:00 this morning.       Plan:     A shared decision making plan was used.  The patient's values and choices were respected.  The following represents what was discussed and decided upon by the physician assistant and the patient.  A professional  is present for the visit.    1.  DIAGNOSTIC TESTS: I reviewed the MRI lumbar spine.  No further diagnostic  tests were ordered.    2.  PHYSICAL THERAPY: Patient completed 4 sessions of physical therapy.  Encouraged her to continue doing her home exercises.    3.  MEDICATIONS: No changes are made to the patient's medications.  She uses gabapentin 100 mg twice daily +300 mg at bedtime, and Tylenol 650 mg twice daily.    4.  INTERVENTIONS: I offered the patient a right sacroiliac joint injection.  She indicated she would like to proceed and an order was placed.    5.  REFERRALS: I entered a referral for [facility] for the patient to get an SI belt.    6.  FOLLOW-UP: I will see the patient back in the clinic for a two-week post procedure follow-up after her right SI joint injection.  If she has any questions or concerns in the meantime, she should not hesitate to contact our clinic.    Subjective:     Luis Manuel Lemon is a 78 y.o. female who presents today for follow-up regarding chronic bilateral low back pain with radiation into the right lower extremity with limited walking and standing tolerance.  Patient status post an L5-S1 interlaminar epidural steroid injection on October 17, 2018.  Patient reports that that injection provided 100% relief of the pain across the lower back.  It also provided 100% relief of the left buttock pain and left leg heaviness.  Unfortunately, it did not provide any relief of her right buttock/thigh pain or right leg heaviness.    Patient complains of pain which begins in the right buttock.  The pain radiates down the posterior/lateral thigh to the knee.  It does not radiate distal to the knee.  She rates her pain today as an 8 out of 10.  At its best it is an 8-10.  At its worst it is a 9 out of 10.  Patient's pain is aggravated with walking and prolonged sitting.  She denies alleviating factors to the pain.    Patient completed 4 sessions of physical therapy.  She uses gabapentin 100 mg twice daily with an additional 300 mg in the evening and Tylenol 650 mg twice daily.    Past medical history is  reviewed and is unchanged in the interim.    Family history is reviewed and is unchanged in the interim.    Review of Systems:  Positive for weakness, headache.  Negative for numbness/tingling, loss of bowel/bladder control, footdrop, dizziness, nausea/vomiting, blurry vision, balance changes.     Objective:   CONSTITUTIONAL:  Vital signs as above.  No acute distress.  The patient is well nourished and well groomed.    PSYCHIATRIC:  The patient is awake, alert, oriented to person, place and time.  The patient is answering questions appropriately with clear speech.  Normal affect.  HEENT: Normocephalic, atraumatic.  Sclera clear.    SKIN:  Skin over the face, posterior torso, bilateral upper and lower extremities is clean, dry, intact without rashes.  MUSCULOSKELETAL:  Gait is  antalgic, favoring the right.  No significant tenderness over the bilateral lower lumbar paraspinal muscles.  Tender to palpation of the right sacroiliac joint.    The patient has 5/5 strength for the bilateral hip flexors, knee flexors/extensors, ankle dorsiflexors/plantar flexors, ankle evertors/invertors.  Positive pelvic distraction test with reproduction of right upper buttock pain.  Positive thigh thrust on the right.  Negative Ramirez's test on the right.  NEUROLOGICAL: 2+ patellar, achilles reflexes which are symmetric bilaterally.  No ankle clonus bilaterally.  Sensation to light touch is intact in the bilateral L4, L5, and S1 dermatomes.       RESULTS:  I reviewed the MRI lumbar spine from [facility] dated June 8, 2018.  At L4-5 there is mild spinal canal stenosis with asymmetric left lateral recess stenosis.  There is mild left  foraminal stenosis.  At this level there is mild facet arthropathy and a circumferential disc bulge.  At L3-4 there is low-grade lateral recess stenosis with mild right and mild to moderate left foraminal stenosis.  There is a chronic superior endplate compression fracture T12.  Please see report for further  details.

## 2021-06-21 NOTE — PROGRESS NOTES
ASSESMENT AND PLAN:  Diagnoses and all orders for this visit:    Essential hypertension  BP elevated at spine clinic and here.  Last recheck /86  On amlodipine 10 mg and Hyzaar 100/25.  Reviewed previous BP readings.  Last BP here was 160/70 on 9/27/18  Has scheduled appt in am with PCP.  Discussed adding additional med or recheck in am. Pt elected to keep appt in am with PCP.   Advised to call us today if develops worsening headache, acute vision changes, one sided weakness or any other concerns.  Discussed low salt diet.       SUBJECTIVE: Luis Manuel Lemon is here due to elevated BP at spine clinic this am.  BP was 201/87 and 164/70 this am at spine clinic.   Took all am med this morning.   C/O mild headache, but not new. Took tylenol this morning.   Back is hurting more than usual.   No acute vision changes, no CP, shortness of breath or palpitations.   No weaknesses.     Past Medical History:   Diagnosis Date     Biceps tendon rupture      Compression fracture 2/4/2015 2/24/2015:  T12 compression fracture with 33% height loss seen on plain films 1/2015.  Was not seen on plain films 6/2014, so is presumably new since then.      Dyspepsia      Eustachian Tube Dysfunction Of The Left Ear     Created by Conversion      GERD (gastroesophageal reflux disease)      History of immunological disorder     Connective Tissue Disorder     History of kidney stones      Hyperlipidemia      Hypertension      Myalgia and myositis      Neuralgia      Osteoarthritis      Osteoporosis      Pancreatitis 4/15/2016     Polymyalgia rheumatica (H)      Restrictive lung disease      Tinea Pedis     Created by Conversion      Vitamin D deficiency      Patient Active Problem List   Diagnosis     Constipation     Vitamin D Deficiency     Essential hypertension     Rotator Cuff Tendonitis     Rupture Of The Bicipital Tendon     Asymptomatic Postmenopausal Status     Hyperlipidemia     Osteoporosis     Noncompliance With Therapy Due To Lack  "Of Comprehension     Chronic reflux esophagitis     Bursitis, trochanteric     Chronic use of steroids     Hypoalbuminemia     Low back pain     Polypharmacy     Degenerative disc disease, cervical     Spinal stenosis, multilevel     Spondylarthritis     Chronic pain syndrome     CHF (congestive heart failure) (H)     Restrictive lung disease     Short of breath on exertion     MILLER (dyspnea on exertion)     GERD (gastroesophageal reflux disease)     Sinus bradycardia     Bilateral primary osteoarthritis of knee     Polyarthralgia       Allergies:    Allergies   Allergen Reactions     Ciprofloxacin      Lisinopril        History   Smoking Status     Never Smoker   Smokeless Tobacco     Never Used     Comment: chew betel nut       Review of systems otherwise negative except as listed in HPI.   History   Smoking Status     Never Smoker   Smokeless Tobacco     Never Used     Comment: chew betel nut       OBJECTICE:   Vitals:    10/31/18 1031 10/31/18 1106   BP: 172/90 162/88   Patient Site: Left Arm    Patient Position: Sitting    Cuff Size: Adult Regular    Pulse: 65    Temp: 97.5  F (36.4  C)    TempSrc: Oral    SpO2: 96%    Weight: 121 lb 3.2 oz (55 kg)    Height: 4' 11\" (1.499 m)          GEN-alert,  in no apparent distress.  HEENT-mucous membranes are moist, neck is supple.  CV-regular rate and rhythm with no murmur.   RESP-lungs clear to auscultation .  ABDOMEN- Soft , not tender.  NEURO- Normal.  SKIN-normal        Agusto Olguin   10/31/2018     "

## 2021-06-22 NOTE — PROGRESS NOTES
[facility] Occupational Therapy   Daily Progress    Patient Name: Luis Manuel Lemon  Date: 12/27/2018  Visit #: 2 of 10 by 3/14/19  Referral Diagnosis: Lumbar spinal stenosis  Referring provider: Tiffanie Byrne PA-C  Visit Diagnosis:     ICD-10-CM    1. Chronic pain syndrome G89.4    2. Chronic bilateral low back pain, with sciatica presence unspecified M54.5     G89.29    3. Decreased activities of daily living (ADL) R68.89    4. Impaired instrumental activities of daily living (IADL) R53.81        Assessment:     Pt demonstrates increased pain behaviors since last visit and reports increased pain this date.  She is unable to identify triggers for flare up, stating they come and go.  Pt demonstrated retention of pain neuroscience education this date through her ability to recount information to therapist, however she would likely benefit from repetition for improved comprehension and ongoing insight building into triggers for pain exacerbation and methods for coping with bad pain days.  Pt is making progress towards functional goals and is appropriate to continue skilled occupational therapy.     Goal Status:  Pt will: independently verbalize and report at home utilization of 3 health management strategies to improve habits and routines for increased self-management of pain allowing for increased participation in functional, everyday activities within 10 weeks.  (Pt was instructed in seated slump slider nerve glide and deep breathing technique as homework.)  Pt will: demonstrate follow trhough with graded aerobic and neural mobilization home exercise programs to decrease nervous system sensitivity and increase endurance for home management tasks (i.e. cooking) in 6 weeks. (Introduced seated slump slider nerve glide this date.)  Pt will: utilize sleep hygiene and positioning strategies independently, with visual aids and compensatory tools, to impirove sleep as reported by decrease in waking times per night due  to pain in 4 weeks.   (Discussed sleep hygiene strategies this date and practiced positioning in L sidelying (pts preferred sleeping position).)      Plan / Patient Education:     Pt is making progress towards functional goals and will continue skilled occupational therapy as indicated in initial plan of care.    Plan for next treatment:    Review compliance with seated slump nerve glide and deep breathing    Calming nerves education (modified)    Progress nerve glides as tolerated    Intro walking program and gentle mobility routine    Energy conservation education: Pacing and Activity Patterns    Subjective/Objective:     Pt reports she is not doing well, the pain is worse than it was at the time of evaluation last week.  She is unsure why it is worse. She states she has not been any more active lately.  Her pain is radiating down R LE posteriorly.  Pt indicates that she continues to do very little at home.  At the beginning of session, she reports only vague familiarity with pain neuroscience education from evaluation.  Pt demonstrates nonverbal pain behaviors of facial grimacing, wincing, bracing her back, shifting positions, and rubbing/hitting R thigh with a fist.     Treatment Today  TREATMENT MINUTES COMMENTS   Self-care/ Home & Health management 33 Sleep hygiene and positioning education   Neuromuscular Re-education 25 Pain neuroscience education, seated slump slider nerve glide for HEP, deep breathing instruction and completion for increased oxygen flow to nerves for decreased nervous system sensitivity.     Therapeutic Exercises     Standardized cognitive performance test           Total 58  See below for further activity detail   Blank areas are intentional and mean the treatment did not include these items.       Self Care/Home and Health management Training:  Sleep Intervention Guide  Pt and therapist completed sleep tool to identify current behaviors and habits that pt can improve upon to improve  quality of sleep.  Sleep hygiene: Pt identified she currently uses screen(s) before bed and has a non-optimal sleep environment (light and warm). Stimulus control: Pt identified that she currently engaged in non-sleep activities in bed and has an inconsistent wake time.     Based on the above behaviors/habits, pt would benefit from sleep hygiene education, stimulus control education, and relaxation training to improve duration and quality of sleep to promote healing and decrease nervous system sensitivity.    Improve your Sleep: Sleep Hygiene  Pt educated on strategies to promote improved sleep to improve quality of sleep.  Discussed proper sleep positioning in L sidelying as this is patient's preferred sleeping position.  Therapist made recommendations based on patient's reported current sleep routine for improved sleep hygiene including turning off screens 30 minutes before bed time, having a consistent sleep/wake time.   Therapist also instructed patient in deep breathing techniques for relaxation training.        Neuromuscular Re-education:  Pain Neuroscience Education   Education: Patient educated on the concept of the nervous system as the bodies alarm system, and the role of nociception to warn the body of danger. Peripheral nerve sensitization, hyperalgesia and allodynia were explained using metaphors to promote deep learning.  Pt was able to restate information correctly following education, but did frequently discuss her back symptoms as a tangent from education session.  Pt was redirectable to education.  Therapist instructed patient in square deep breathing technique and completed with patient.  She demonstrated more relaxed demeanor following deep breathing and was agreeable to use technique at home for decreased nervous system sensitivity.      Therapist introduced seated slump slider nerve glide this date for neuromobility to assist in decreasing nervous system sensitivity.  Therapist instructed  patient in completion, completed 7 repetitions/side.  She was agreeable with plan to complete at home 1-2 sets each day.      Michelle Russell  12/27/2018  9:00 - 9:58 AM

## 2021-06-22 NOTE — PROGRESS NOTES
[facility] Certification Request    December 20, 2018    Patient: Luis Manuel Lemon  MR Number: [id number]  YOB: 1940  Date of Visit: 12/20/2018    Dear Tiffanie Byrne PA-C:    Thank you for this referral.   We are seeing Luis Manuel Lemon in Occupational Therapy for chronic pain syndrome to improve self management of chronic pain for increased engagement in ADL/IADLs.    Medicare and/or Medicaid requires physician review and approval of the treatment plan. Please review the plan of care and verify that you agree with the therapy plan of care by co-signing this note.    Plan of Care  Authorization / Certification Start Date: 12/20/18  Authorization / Certification End Date: 03/14/19  Authorization / Certification Number of Visits: 10 visits  Communication with: Referral Source  Patient Related Instruction: Treatment plan and rationale;Basis of treatment  Times per Week: up to 1  Number of Weeks: up to 12  Number of Visits: up to 10  Discharge Planning: Pt will be discharged from therapy upon completion of functional goals or plateau in progress towards goals.      Goals:  Pt will: independently verbalize and report at home utilization of 3 health management strategies to improve habits and routines for increased self-management of pain allowing for increased participation in functional, everyday activities within 10 weeks.   Pt will: demonstrate follow trhough with graded aerobic and neural mobilization home exercise programs to decrease nervous system sensitivity and increase endurance for home management tasks (i.e. cooking) in 6 weeks.  Pt will: utilize sleep hygiene and positioning strategies independently, with visual aids and compensatory tools, to impirove sleep as reported by decrease in waking times per night due to pain in 4 weeks.        Recommend occupational therapy in order to increase participation and independence in functional daily living as well as increase self-management of pain using  the following therapeutic interventions (additional interventions to be completed as needed per ongoing assessment):    Neuromuscular re-education of movement via pain neuroscience education (PNE) &/or graded motor imagery (GMI) sequence of pre-movement interventions to alter possible cortical reorganization    Body mechanics training & work station ergonomics     energy conservation education & planning    Sleep hygiene & positioning, sleep preparation routine establishment    Neural mobilizing exercise via neural sliders/tensioners, stretching, yoga, qigong, deidre chi     Cardiovascular exercise establishment to decrease nervous system sensitivity     Core &/or upper body strengthening     Stress and time management for ADL/IADl engagement     Pain flare management techniques     Goal setting    Establishing healthy habits and routines    Cognitive skills screening to inform treatment planning &/or compensatory strategy education  If you have any questions or concerns, please don't hesitate to call.    Sincerely,  Michelle Russell MA, OTR/L    Physician recommendation:     ___ Follow therapist's recommendation        ___ Modify therapy      *Physician co-signature indicates they certify the need for these services furnished within this plan and while under their care.        [facility]   Occupational Therapy Initial Evaluation    Patient Name: Luis Manuel Lemon  Date of evaluation: 12/20/2018  Referral Diagnosis: Lumbar spinal stenosis  Referring provider: Tiffanie Byrne PA-C  Visit Diagnosis:     ICD-10-CM    1. Chronic pain syndrome G89.4    2. Chronic bilateral low back pain, with sciatica presence unspecified M54.5     G89.29    3. Decreased activities of daily living (ADL) R68.89    4. Impaired instrumental activities of daily living (IADL) R53.81        Assessment:      Pt presents for evaluation of chronic low back pain, onset 5 years ago.  Pt has support in her home and receives assistance with many daily  activities including dressing, cooking, cleaning, and laundry.  Pt appears deconditioned and demonstrated pain behaviors of facial grimaces, bracing, and restlessness throughout evaluation.  During physical exam, pt exhibited positive signs of neural tension in seated slump and neck, and she reported pain with trunk flexion, limiting ROM.  Pt's predominantly sedentary lifestyle likely contributes to increased nervous system sensitivity and decreased endurance for activities of daily living.  In describing her self-management of pain, pt describes no healthy routines/coping mechanisms to support pain management and prevent exacerbations.  Pt would benefit from neuromuscular re-education of movement via pain neuroscience education and/or graded motor imagery sequence of pre-movement interventions to alter possible cortical organization, body mechanics training and work station ergonomics, energy conservation education and planning, sleep hygiene and positioning, sleep preparation routine establishment, neural mobilizing exercise via neural sliders/tensioners, stretching, yoga, qigong, deidre chi, cardiovascular exercise establishment to decrease nervous system sensitivity, pain flare management techniques, goal setting, establishing healthy habits and routines.    Impairments in  pain, posture, ROM, ADL's  The POC is dynamic and will be modified on an ongoing basis.  Prognosis to achieve goals is  fair   Pt. is appropriate for skilled OT intervention as outlined in the Plan of Care (POC).    Performance deficits noted include:    Physical skills of mobility, endurance, evidence to suggest cortical reorganization of the brains sensorimotor mapping due to disuse/chronic pain impacting body function.    Limitations/impairment in psychosocial skills of habits to support pain management, healthy routines and behaviors to prevent flare ups of pain, active use of coping strategies for bad pain days, active use of coping  strategies for psychosocial stressors, environmental adaptations to develop skills for successful participation in everyday tasks and social situations.    Goals:  Pt will: independently verbalize and report at home utilization of 3 health management strategies to improve habits and routines for increased self-management of pain allowing for increased participation in functional, everyday activities within 10 weeks.   Pt will: demonstrate follow trhough with graded aerobic and neural mobilization home exercise programs to decrease nervous system sensitivity and increase endurance for home management tasks (i.e. cooking) in 6 weeks.  Pt will: utilize sleep hygiene and positioning strategies independently, with visual aids and compensatory tools, to impirove sleep as reported by decrease in waking times per night due to pain in 4 weeks.      Goals are designed in collaboration and agreement with patient.      Barriers to Learning or Achieving Goals:  Chronicity of the problem.  Language barriers.       Plan / Patient Instructions:        Plan of Care:   Authorization / Certification Start Date: 12/20/18  Authorization / Certification End Date: 03/14/19  Authorization / Certification Number of Visits: 10 visits  Communication with: Referral Source  Patient Related Instruction: Treatment plan and rationale;Basis of treatment  Times per Week: up to 1  Number of Weeks: up to 12  Number of Visits: up to 10  Discharge Planning: Pt will be discharged from therapy upon completion of functional goals or plateau in progress towards goals.      Plan for next visit:     Seated slump slider    PNE sensitive nerves continued, calming nerves    Sleep hygiene and positioning education    Discuss/implement gentle aerobic exercise plan with tracker for compliance     Subjective:       Per progress notes with provider Tiffanie Byrne PA-C:  Luis Manuel Lemon is a 78 y.o. y.o. female with past medical history significant for vitamin D  "deficiency, hypertension, hyperlipidemia, osteoporosis, GERD, congestive heart failure who presents today for follow-up regarding chronic bilateral low back pain with radiation into the right lower extremity.  MRI lumbar spine shows mild spinal canal stenosis at L4-5 related to mild facet arthropathy and a circumferential disc bulge.  Patient is status post a right SI joint injection on November 12, 2018 which provided partial relief of her pain lasting only 2 days.  After that, pain returned.  She had previously had an L5-S1 interlaminar epidural steroid injection on October 17, 2018 which continues to provide relief of her left sided pain.  I do feel that patient's pain is likely multifactorial.  I do feel she is at least in part symptomatic from right sacroiliac joint dysfunction lumbar spinal stenosis.  Today, she did also have some hypertonicity in the right gluteal muscles.  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   Patient complains of bilateral lower back pain. Pain spans across low back in a broadband distribution at the belt line. She has pain which radiates into the buttocks and down the posterior thigh on the right to the posterior knee. She states that her legs feel heavy with walking. She states that sometimes she has to physically  her legs with her hands in order to keep walking. She states that she can only stand for about 5 minutes before she has to stop and rest. Sitting improves her pain.     History of Present Illness:    Luis Maneul is a 78 y.o. female who presents to therapy today with complaints of low back pain, R LE pain.  Pt went to adult  5 years ago and states that she fell as a result of fatigue from being very active there.  She states that medication helped with the low back pain for a while, but the pain has come back.  Pt reports that she \"feels boring\" at home because her daily routine consists of sedentary activities, I.e. Watching television.  Her daughter helps her with " many of her daily activities including dressing, cooking, cleaning, and laundry.  Pt states that one of her goals is to be able to cook again - this is something she used to enjoy.  She also has a long term goal of decreasing her pain in order to be able to visit her son in [facility].  Pt reports low back pain while moving in bed or laying in one position for a period of time.  She wakes about 2x/night on average, sometimes due to pain and sometimes to use the bathroom.  Pt presents to evaluation this date wearing SI belt.  She reports the SI belt helps with her pain level while she is walking, but she does experience some increased pressure/pain when bending forward.    Symptoms are constant and not improving.     Visual Analogue Scale (VAS) Pain Rating (0-100): VAS not used this date due to language barrier.  Pain description: pain, sharp and pulling    Activities that increase pain include: standing, walking, bending, back extension when standing  Activities that decrease pain include: sitting, laying, using SI belt  Patient reports no3 benefit from acupuncture, corticosteroid injections (multiple locations), physical therapy (pt indicates that physical therapy was helpful for her shoulder pain, but did not help with her back pain.    Functional limitations are described as occurring with:   bending  lifting  looking down  personal cares dressing lower body and donning shirt or jacket  reaching overhead  performing routine daily activities  sleeping  standing more than 20 minutes at a time.  transitional movements getting in chairs and tub and getting out of chairs and tub  twisting or turning trunk  walking more than 10 minutes at a time  work around the house (daily chores)    Occupational Hx:   Living Situation:  Pt lives in a basement level apartment with her daughter.  She has to descend stairs to get to their unit, and she indicates there is a railing on these stairs.     Social Supports: Pt's daughter helps  with many ADL/IADLs, and her granddaughter will occasionally walk on her back to help with pain.     Occupation & Work Status:  Unemployed; pt states that she would like to get a job to make money if she wasn't in pain.   Co-morbid conditions affecting occupational performance: Upon discussion, pt does endorse some sadness related to her pain, however chart does not indicate any mental health diagnoses or history.   Past medical/surgical/imaging:   Past Surgical History:   Procedure Laterality Date     CHOLECYSTECTOMY       KIDNEY STONE SURGERY       [place] TOTAL ABDOM HYSTERECTOMY      Description: Hysterectomy;  Recorded: 03/08/2013;     VENTRAL HERNIA REPAIR N/A 4/9/2016    Procedure: HERNIA REPAIR VENTRAL ;  Surgeon: Duncan Odonnell MD;  Location: [facility];  Service:      XR ABDOMEN FLAT AND UPRIGHT  8/23/2018 5:11 PM  INDICATION: Left lower quadrant pain  COMPARISON: 06/08/2018 CT  FINDINGS: Chronic left pleural disease stable. Cholecystectomy clips. No free air. Nonspecific nonobstructed bowel gas pattern. Degenerative disease.    [facility]  MR LUMBAR SPINE WO CONTRAST  6/8/2018 6:33 PM   INDICATION: Abnormal x-ray. Back pain. History of vertebral fractures.  TECHNIQUE: Without IV contrast.  CONTRAST: None  COMPARISON: Lumbar radiographs 12/8/2015  IMPRESSION:   CONCLUSION:  1.  At L4-5, mild trefoil type spinal canal stenosis with asymmetric narrowing of the left lateral recess. Mild left neural foraminal stenosis. Mild displacement of left greater than right L5 nerve roots.  2.  At L3-4, low-grade narrowing the lateral recesses with mild right and mild to moderate left neural foraminal stenosis.  3.  Mild lumbar spondylosis elsewhere as above without additional high-grade stenosis or focal neural impingement.  4.  Mild chronic superior endplate compression fracture at T12.      CT CHEST, ABDOMEN, AND PELVIS  6/8/2018 5:50 PM  INDICATION: Enlarged lymph nodes weakness/ poor  appetite/sob/weight loss  TECHNIQUE: CT chest, abdomen, and pelvis. Dose reduction techniques were used.   IV CONTRAST: Iohexol (Omni) 100 mL  COMPARISON: CT chest 04/25/2018. CT abdomen 09/16/2016  FINDINGS:   CHEST: The previous mild groundglass infiltrates have resolved. Shallow inspiration. Lungs otherwise clear.  Enlarged central pulmonary arteries consistent with pulmonary artery hypertension. Moderate ectasia ascending thoracic aorta measuring 3.9 cm unchanged.  No mediastinal lymphadenopathy.  ABDOMEN: No significant findings in the liver, spleen, pancreas, and adrenal glands. No lymphadenopathy. 3 cm simple cyst left kidney. Kidneys otherwise negative.  Scattered diverticula in the colon. Bowel loops otherwise normal.  PELVIS: Negative. No lymphadenopathy.  MUSCULOSKELETAL: Negative.  IMPRESSION:   CONCLUSION:  1.  Nothing for malignancy in the chest, abdomen, and pelvis.  2.  No lymphadenopathy.  3.  Enlarged central pulmonary arteries consistent with pulmonary artery hypertension.      Performance Patterns:   Roles/Routines/Habits when pain is increased and coping with pain: Pt states when she is having a pain exacerbation, she does not do anything.  She sits or lays down most of the day to manage the pain.    Water intake: One bottle of water each day.   Skips meals: No, consistently eats 3 meals each day plus some snacks.   Caffeine intake daily: No caffeine   Smoker: No   Current Exercise: Pt reports she continues to complete shoulder exercises she learned in physical therapy.    Equipment/technology available to promote engagement in occupations: SE cane, walker (at times), SI belt, tub bench       Objective:        Patient-Reported Outcomes Measurement Information System (SETBXN34 Profile v 2.0): is a reliable measure of patient-reported health status for physical, mental, and social well-being.  Today, the patient completed this questionnaire to provide both the patient and team with important  patient-reported information about the effect of therapy and allow for better understanding of how various treatments might affect symptoms experienced and occupational performance.  Scores reported in standardized T-scores, where 50 is the mean and 10 is one standard deviation (SD).                T-Score / SD  Physical Functioning  35.6 / -1.5   Anxiety  51.8 / 0   Depression  41.0 / 0   Fatigue 71.6 / +2   Sleep disturbance 46.2 / 0   Ability to participate in social roles and activities 37.3 / -1    Pain interference  66.6 / +1.5               Examination  1. Chronic pain syndrome     2. Chronic bilateral low back pain, with sciatica presence unspecified     3. Decreased activities of daily living (ADL)     4. Impaired instrumental activities of daily living (IADL)       Precautions/Restrictions: None  Posture Observation:      General sitting posture is  fair.  Cervical:  Moderate forward head  Shoulder/Thoracic complex: Moderate bilateral scapular protraction   Mildly increased CT junction thoracic kyphosis    ROM:  Trunk flexion BNL    Strength: Not Tested    Functional Mobility: fair      Active neurodynamic screening of nervous system (screen positive if pain is elicited and movement pattern impacts function, may indicate underlying impairment contributing to ongoing pain):  Neck Flexion   +  Seated Slump R +, L -     Treatment Today:   Neuromuscular Re-education    Neuromuscular Re-education:  Pain Neuroscience Education:  Patient introduced to the topic of pain neuroscience education and that improving knowledge of how pain works and promotes improved recovery and rehabilitation. Current knowledge and understanding of patient on pain related topics was explored to create baseline.  Pt verbalized understanding of some concepts of education this date, but she would benefit from repetition for increased comprehension and retention of information.        TREATMENT MINUTES COMMENTS   Evaluation (See chart  below)   Type: Low  47    Self-care/ Home management     Manual therapy     Neuromuscular Re-education 10 Introduction to pain neuroscience education   Therapeutic Activity     Therapeutic Exercises     Cognitive Skills     Standardized cognitive performance testing                                           Blank areas are intentional and mean the treatment did not include these areas.       Total treatment time (including un-coded time) 57 Evaluation and pain neuroscience education, see detail description      Evaluation Type  Comorbities Profile/Hx Review Need Eval Modification # Tx Options Decision Making Perform. Deficits   Low  none Brief None None Low 1-3   Moderate multiple Expanded Min/Mod Several Moderate 3-5   High Multiple and impact performance Extensive Significant Multiple High >5        Michelle Russell  12/20/2018  9:00 - 9:57 AM

## 2021-06-22 NOTE — PROGRESS NOTES
ASSESSMENT AND PLAN:  Luis Manuel Lemon 78 y.o. female is seen here on 11/28/18 for a follow-up.  She has widespread degenerative joint disease affecting her appendicular, axial joints.  Corticosteroid injections in the knees have not been helpful.  She is going to follow-up in the pain clinic.  Her more than 7-year long persistent sed rate elevation is unlikely secondary to a rheumatologic syndrome.  Return here if needed as needed.       Diagnoses and all orders for this visit:    Bilateral primary osteoarthritis of knee    Degenerative disc disease, cervical    Chronic pain syndrome    Polyarthralgia      HISTORY OF PRESENTING ILLNESS:  Luis Manuel Lemon, 78 y.o., female is here for follow-up.  She has osteoarthritis.  Recently she was injected into her knees with corticosteroids.  She noticed some improvement.  However it appears that the improvement lasts only few days.  She is no longer on prednisone.  She does not feel any different being on and off prednisone.  She has noted no headaches, jaw claudication, prolonged stiffness in the shoulders of the hips in the morning.  Her recent workup shows no evidence of autoimmunity serologically, with negative LEANDRO, DANIELLE as including RNP are negative, her double-stranded DNA is negative.  Her ANCA was negative.  There is little to suggest any evidence of connective tissue disease.    Her LEANDRO has been negative.  Her RNP was positive on 2 occasions.. Her sed rate has been consistently elevated.  This is been high as far back as 2011.  When it was 48 recently 53.  She reports no mouth ulcers, photosensitivity, rash.  She reports no chest pain.  As well as could be a certain there is no history of stiffness in the morning when she wakes up.  He reports no fever weight loss blurry vision eye redness mouth also nausea cough no history of DVT or PE. Further historical information and ADL limitations as noted in the multidimensional health assessment questionnaire attached in the EMR.   .      ALLERGIES:Ciprofloxacin and Lisinopril    PAST MEDICAL/ACTIVE PROBLEMS/MEDICATION/ FAMILY HISTORY/SOCIAL DATA:  The patient has a family history of  Past Medical History:   Diagnosis Date     Biceps tendon rupture      Compression fracture 2/4/2015 2/24/2015:  T12 compression fracture with 33% height loss seen on plain films 1/2015.  Was not seen on plain films 6/2014, so is presumably new since then.      Dyspepsia      Eustachian Tube Dysfunction Of The Left Ear     Created by Conversion      GERD (gastroesophageal reflux disease)      History of immunological disorder     Connective Tissue Disorder     History of kidney stones      Hyperlipidemia      Hypertension      Myalgia and myositis      Neuralgia      Osteoarthritis      Osteoporosis      Pancreatitis 4/15/2016     Polymyalgia rheumatica (H)      Restrictive lung disease      Tinea Pedis     Created by Conversion      Vitamin D deficiency      Social History     Tobacco Use   Smoking Status Never Smoker   Smokeless Tobacco Never Used   Tobacco Comment    chew betel nut     Patient Active Problem List   Diagnosis     Constipation     Vitamin D Deficiency     Essential hypertension     Rotator Cuff Tendonitis     Rupture Of The Bicipital Tendon     Asymptomatic Postmenopausal Status     Hyperlipidemia     Osteoporosis     Noncompliance With Therapy Due To Lack Of Comprehension     Chronic reflux esophagitis     Bursitis, trochanteric     Chronic use of steroids     Hypoalbuminemia     Low back pain     Polypharmacy     Degenerative disc disease, cervical     Spinal stenosis, multilevel     Spondylarthritis     Chronic pain syndrome     CHF (congestive heart failure) (H)     Restrictive lung disease     Short of breath on exertion     MILLER (dyspnea on exertion)     GERD (gastroesophageal reflux disease)     Sinus bradycardia     Bilateral primary osteoarthritis of knee     Polyarthralgia     Current Outpatient Medications   Medication Sig Dispense  Refill     amLODIPine (NORVASC) 10 MG tablet TAKE 1 TABLET ORALLY EVERY DAY. 90 tablet 3     ANTACID, CALCIUM CARBONATE, 200 mg calcium (500 mg) chewable tablet CHEW 1 TABLET BY MOUTH THREE TIMES A DAY TO KEEP YOUR BONES HEALTHY 90 tablet 11     docusate sodium (COLACE) 100 MG capsule TAKE 1 CAPSULE BY MOUTH TWICE DAILY. 60 capsule 11     gabapentin (NEURONTIN) 100 MG capsule TAKE 1 CAPSULE IN THE MORNING, 1 CAPSULE MIDDAY AND 3 CAPSULES AT BEDTIME 450 capsule 3     ibandronate (BONIVA) 150 mg tablet TAKE 1 TABLET BY MOUTH EVERY 30 DAYS. TAKE IN MORNING WITH A GLASS OFWATER PRIOR TO FOOD. DON'T LIE DOWN FOR 30 MINUTES 1 tablet 6     losartan-hydrochlorothiazide (HYZAAR) 100-25 mg per tablet TAKE 1 TABLET BY MOUTH DAILY. 30 tablet 11     MAPAP ARTHRITIS PAIN 650 mg CR tablet TAKE 1 TABLET BY MOUTH EVERY 8 HOURS AS NEEDED FOR PAIN 90 tablet 0     mirtazapine (REMERON) 7.5 MG tablet Take 1 tablet (7.5 mg total) by mouth at bedtime. 30 tablet 11     predniSONE (DELTASONE) 5 MG tablet Prednisone taper over 3 months: 7.5 mg x 30 days then 5mg x 30 days, then 2.5mg x 30 days 60 tablet 0     ranitidine (ZANTAC) 150 MG tablet TAKE 1 TABLET BY MOUTH 2 TIMES A  tablet 3     VENTOLIN HFA 90 mcg/actuation inhaler INAHLE 2 PUFFS BY MOUTH EVERY 6 HOURS AS NEEDED FOR WHEEZING 18 g 3     VITAMIN D2 50,000 unit capsule TAKE 1 CAPSULE BY MOUTH ONCE WEEKLY 4 capsule 0     camphor-menthol 0.2-3.5 % Gel Apply topically daily as needed.       clotrimazole (LOTRIMIN) 1 % cream Apply topically 2 (two) times a day as needed.       ipratropium-albuterol (COMBIVENT RESPIMAT)  mcg/actuation Mist inhaler Inhale 1 puff every 6 (six) hours as needed. 1 Inhaler 12     polyvinyl alcohol (LIQUIFILM TEARS) 1.4 % ophthalmic solution Apply to eye daily as needed 15 mL 6     No current facility-administered medications for this visit.      DETAILED EXAMINATION  11/28/18  :  Vitals:    11/28/18 0752   BP: 134/72   Pulse: 72   Weight: 122 lb  (55.3 kg)     Alert oriented. Head including the face is examined for malar rash, heliotropes, scarring, lupus pernio. Eyes examined for redness such as in episcleritis/scleritis, periorbital lesions.   Neck/ Face examined for parotid gland swelling, range of motion of neck.  Left upper and lower and right upper and lower extremities examined for tenderness, swelling, warmth of the appendicular joints, range of motion, edema, rash.  Some of the important findings included: She does not have synovitis in any of the palpable joints of the upper extremities, where she has a burden such as index finger, she does not have sclerodactyly, she has no effusion or warmth in the knees where she has JLT bilaterally.         LAB / IMAGING DATA:            ALT   Date Value Ref Range Status   08/30/2018 10 0 - 45 U/L Final   07/02/2018 9 0 - 45 U/L Final   04/25/2018 <9 0 - 45 U/L Final     Albumin   Date Value Ref Range Status   08/30/2018 3.2 (L) 3.5 - 5.0 g/dL Final   07/02/2018 3.4 (L) 3.5 - 5.0 g/dL Final   04/25/2018 3.1 (L) 3.5 - 5.0 g/dL Final     Creatinine   Date Value Ref Range Status   08/30/2018 0.90 0.60 - 1.10 mg/dL Final   07/02/2018 0.75 0.60 - 1.10 mg/dL Final   04/25/2018 1.06 0.60 - 1.10 mg/dL Final       WBC   Date Value Ref Range Status   07/02/2018 7.8 4.0 - 11.0 thou/uL Final   06/04/2018 8.2 4.0 - 11.0 thou/uL Final   02/24/2015 5.6 4.0 - 11.0 thou/uL Final     Hemoglobin   Date Value Ref Range Status   07/02/2018 11.5 (L) 12.0 - 16.0 g/dL Final   06/04/2018 11.7 (L) 12.0 - 16.0 g/dL Final   04/25/2018 11.9 (L) 12.0 - 16.0 g/dL Final     Platelets   Date Value Ref Range Status   07/02/2018 248 140 - 440 thou/uL Final   06/04/2018 267 140 - 440 thou/uL Final   04/25/2018 259 140 - 440 thou/uL Final       Lab Results   Component Value Date    RF <15.0 07/02/2018    SEDRATE 53 (H) 05/12/2017

## 2021-06-22 NOTE — PROGRESS NOTES
Assessment:   Luis Manuel Lemon is a 78 y.o. y.o. female with past medical history significant for vitamin D deficiency, hypertension, hyperlipidemia, osteoporosis, GERD, congestive heart failure who presents today for follow-up regarding chronic bilateral low back pain with radiation into the right lower extremity.  MRI lumbar spine shows mild spinal canal stenosis at L4-5 related to mild facet arthropathy and a circumferential disc bulge.  Patient is status post a right SI joint injection on November 12, 2018 which provided partial relief of her pain lasting only 2 days.  After that, pain returned.  She had previously had an L5-S1 interlaminar epidural steroid injection on October 17, 2018 which continues to provide relief of her left sided pain.  I do feel that patient's pain is likely multifactorial.  I do feel she is at least in part symptomatic from right sacroiliac joint dysfunction lumbar spinal stenosis.  Today, she did also have some hypertonicity in the right gluteal muscles.  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       Plan:     A shared decision making plan was used.  The patient's values and choices were respected.  The following represents what was discussed and decided upon by the physician assistant and the patient.  A professional  was present for the visit.    1.  DIAGNOSTIC TESTS: I reviewed the MRI lumbar spine.  No further diagnostic tests were ordered.    2.  PHYSICAL THERAPY/OCCUPATIONAL THERAPY: Patient completed 4 sessions of physical therapy in October.  I have entered a referral for the patient to begin neuroscience education through occupational therapy.  Fully this will be    3.  MEDICATIONS: No changes are made to the patient's medications.  The patient's nurse sets up her medications for her so she is not sure how she takes her medication.  She is prescribed gabapentin 100 mg in the morning, 100 mg in the afternoon, and 300 mg at bedtime.    4.  INTERVENTIONS: No additional  interventions were ordered.  Patient is disappointed that her multiple injections have not provided significant or lasting relief of her pain.  I agree that we should take a break from interventional pain management and trial the occupational therapy and SI belt for now.  If patient would like to try another injection, I think it be reasonable to try a right piriformis injection under ultrasound guidance.  I do not see that this injection has been attempted before.  She did have hypertonicity in the buttock today.    5.  PATIENT EDUCATION:    - Patient just got her SI belt yesterday.  She has noticed some improvement in her pain since applying the belt, especially with bending.  She should continue wearing this when she is up and about.  -Patient is in agreement the above plan.  All questions were answered.    6.  FOLLOW-UP: I will see the patient back in the clinic in 2 months to monitor her progress with occupational therapy.  If she has any questions or concerns in the meantime, she should not hesitate to contact our clinic.    Subjective:     Luis Manuel Lemon is a 78 y.o. female who presents today for follow-up regarding chronic right greater than left low back pain with radiation into the right lower extremity.  Patient status post a right sacroiliac joint injection on November 12, 2018.  Patient reports this injection provided partial relief of her pain but only lasted 2 days.  After that, the same pain return.    Patient complains of right greater than left low back pain.  Pain spans across low back at the lumbosacral junction.  Pain radiates into the right buttock and on the right posterior thigh to the knee.  She denies any pain distal to the knee.  She rates her pain today as an 8 out of 10.  At its best it is a 6 out of 10.  At its worst it is an 8 out of 10.  Pain is worse with walking and alleviated with sitting or laying down.  She denies any new symptoms and she was last seen.  She denies any numbness,  tingling, or weakness down the legs.    Patient participated in physical therapy earlier this fall.  She does not know how she takes her medications.  She has gabapentin prescribed 100 mg in the morning, 100 mg in the afternoon, and 300 mg at bedtime.  Patient just picked up her SI belt yesterday.  She has noted some improvement in her pain since applying the belt, especially with bending.    Past medical history is reviewed and is unchanged in the interim.    Family history is reviewed and is unchanged in the interim.    Review of Systems:  Negative for numbness/tingling, loss of bowel/bladder control, footdrop, weakness, headache, dizziness, nausea/vomiting, blurry vision, balance changes.     Objective:   CONSTITUTIONAL:  Vital signs as above.  No acute distress.  The patient is well nourished and well groomed.    PSYCHIATRIC:  The patient is awake, alert, oriented to person, place and time.  The patient is answering questions appropriately with clear speech.  Normal affect.  HEENT: Normocephalic, atraumatic.  Sclera clear.    SKIN:  Skin over the face, posterior torso, bilateral upper and lower extremities is clean, dry, intact without rashes.  MUSCULOSKELETAL:  Gait is antalgic, favoring the right.  No significant tenderness over the bilateral lower lumbar paraspinal muscles.  Moderate tenderness palpation right SI joint.  Moderate tenderness palpation right sciatic notch.  Hypertonicity in the right gluteal muscles.    The patient has 5/5 strength for the bilateral hip flexors, knee flexors/extensors, ankle dorsiflexors/plantar flexors, ankle evertors/invertors.    NEUROLOGICAL:   Sensation to light touch is intact in the bilateral L4, L5, and S1 dermatomes.       RESULTS:    I reviewed the MRI lumbar spine from [facility] dated June 8, 2018.  At L4-5 there is mild spinal canal stenosis with asymmetric left lateral recess stenosis.  There is mild left  foraminal stenosis.  At this level there is mild facet  arthropathy and a circumferential disc bulge.  At L3-4 there is low-grade lateral recess stenosis with mild right and mild to moderate left foraminal stenosis.  There is a chronic superior endplate compression fracture T12.  Please see report for further details.

## 2021-06-22 NOTE — PROGRESS NOTES
HPI - 79 yo female here for followup.    Lumbar stenosis and compression fracture T12  She is reporting rightsided low back pain, butt pain that radiates to her knee.   Lumbar stenosis  Continue gabapentin and tylenol  F/u with spine clinic on 11/12/18 for SI joint injection this helped for 2 days and then the pain returned.   Tried PT and acupuncture that did not help either.   Spine clinic referred pt  On 10/31/18fchelsi Curtis for SI belt but this may not have been schedules  missed f/u apt with spine clinic yesterday 12/5/18 b/c transportation issue        Polyarthralgia -   Tapered off prednisone slowly over 3 months - 7.5 mg daily x 4 weeks, 5mg daily x4 weeks, 2.5mg daily x 4 weeks- taper started 9/5/18 and pain not worsening  Pain improved after steroid injection at Rheum clinic - per consult note from 11/28/18:  Luis Manuel Ploh 78 y.o. female is seen here on 11/28/18 for a follow-up.  She has widespread degenerative joint disease affecting her appendicular, axial joints.  Corticosteroid injections in the knees have not been helpful.  She is going to follow-up in the pain clinic.  Her more than 7-year long persistent sed rate elevation is unlikely secondary to a rheumatologic syndrome.  Return here if needed as needed.         HTN  - well controlled today in clinic  continue losaratan- HCTZ 100-25mg and amlodipine    Restrictive lung disease - minimal symptoms - prn combivent only.  Last pulm consult was 6/25/18 - f/u 1 yr     Poor appetite and sleep - continue remeron 7.5mg      constipation  Improving with stool softener     GERD with abdo burning after meals - on ranitidine      H/o Vit D deficiency -  34.1 on 8/30/18  Continue ergo     Osteoporosis - on boniva  DEXA 5/2017 - per report: Because of mixed response, one could continue a change in therapy to Prolia if bisphosphonate treatment has approached the five year antoine,  with follow up bone density scan in 2 years.     Polypharmacy - home health RN sets up med  "box      Current Outpatient Medications   Medication Sig Note     amLODIPine (NORVASC) 10 MG tablet TAKE 1 TABLET ORALLY EVERY DAY.      ANTACID, CALCIUM CARBONATE, 200 mg calcium (500 mg) chewable tablet CHEW 1 TABLET BY MOUTH THREE TIMES A DAY TO KEEP YOUR BONES HEALTHY      docusate sodium (COLACE) 100 MG capsule TAKE 1 CAPSULE BY MOUTH TWICE DAILY.      gabapentin (NEURONTIN) 100 MG capsule TAKE 1 CAPSULE IN THE MORNING, 1 CAPSULE MIDDAY AND 3 CAPSULES AT BEDTIME      ibandronate (BONIVA) 150 mg tablet TAKE 1 TABLET BY MOUTH EVERY 30 DAYS. TAKE IN MORNING WITH A GLASS OFWATER PRIOR TO FOOD. DON'T LIE DOWN FOR 30 MINUTES      ipratropium-albuterol (COMBIVENT RESPIMAT)  mcg/actuation Mist inhaler Inhale 1 puff every 6 (six) hours as needed.      losartan-hydrochlorothiazide (HYZAAR) 100-25 mg per tablet TAKE 1 TABLET BY MOUTH DAILY.      MAPAP ARTHRITIS PAIN 650 mg CR tablet TAKE 1 TABLET BY MOUTH EVERY 8 HOURS AS NEEDED FOR PAIN      mirtazapine (REMERON) 7.5 MG tablet Take 1 tablet (7.5 mg total) by mouth at bedtime.      ranitidine (ZANTAC) 150 MG tablet TAKE 1 TABLET BY MOUTH 2 TIMES A DAY      VENTOLIN HFA 90 mcg/actuation inhaler INAHLE 2 PUFFS BY MOUTH EVERY 6 HOURS AS NEEDED FOR WHEEZING      VITAMIN D2 50,000 unit capsule TAKE 1 CAPSULE BY MOUTH ONCE WEEKLY      camphor-menthol 0.2-3.5 % Gel Apply topically daily as needed. 4/25/2018: Unknown strength     clotrimazole (LOTRIMIN) 1 % cream Apply topically 2 (two) times a day as needed.      polyvinyl alcohol (LIQUIFILM TEARS) 1.4 % ophthalmic solution Apply to eye daily as needed      predniSONE (DELTASONE) 5 MG tablet Prednisone taper over 3 months: 7.5 mg x 30 days then 5mg x 30 days, then 2.5mg x 30 days      Vitals:    12/06/18 1258 12/06/18 1322   BP: 160/84 138/64   Pulse: 81    Resp: 12    Temp: 97.6  F (36.4  C)    TempSrc: Oral    SpO2: 95%    Weight: 123 lb 12.8 oz (56.2 kg)    Height: 4' 11.02\" (1.499 m)      PHYSICAL EXAM   General " Appearance: Awake and alert, in no acute distress  HEENT: neck is supple  CV: regular rate  Resp: No respiratory distress. Breathing comfortably  Musculoskeletal: moving limbs comfortably with not deficits or deformities  Skin: no rashes noted    A/P  Lumbar stenosis  Continue gabapentin and tylenol  S/p SI joint and JUNIE injection from spine clinic that only helped for 2 days  Acupuncture and PT  did not help  Needs f/u spine clinic b/c missed appt yesterday  Needs help scheduling apt with [facility] for SI bult       Polyarthralgia -   Not rheumatolgic in nature per Rheum consult     HTN  - well controlled today in clinic  continue losaratan- HCTZ 100-25mg and amlodipine    Restrictive lung disease - minimal symptoms - prn combivent only.  Last pulm consult was 6/25/18 - f/u 1 yr     Poor appetite and sleep - continue remeron 7.5mg      constipation  continue stool softener prn     GERD with abdo burning after meals - on ranitidine      H/o Vit D deficiency -no changeContinue ergo     Osteoporosis - on boniva, check DEXA 2019    Polypharmacy - home health RN sets up med box    Spent 25 min face to face with patient with more the 50% spent in counseling, reviewing chart, and coordination of care and discussing problems listed above.

## 2021-06-22 NOTE — PROGRESS NOTES
[facility] Discharge Summary  Patient Name: Luis Manuel Lemon  Date: 11/29/2018  Referral Diagnosis: lumbar spinal stenosis  Referring provider: Kateryna Morales MD  Visit Diagnosis:   1. Lumbar radiculopathy     2. Generalized muscle weakness         Goals (NOT MET):  Pt. will demonstrate/verbalize independence in self-management of condition in : 6 weeks  Pt. will be independent with home exercise program in : 6 weeks  Pt. will have improved quality of sleep: with less pain;waking less times/night;in 6 weeks    Pt will: no longer have positive neural tension testing in B LE's within 8 weeks in order to improve her QOL.      Patient was seen for 4 visits.  She did not return after her 4th PT visit.   She was issued a HEP and instructed in proper usage.       Therapy will be discontinued at this time.  The patient will need a new referral to resume.    Thank you for your referral.  Guillermo Garg, PT, DPT  11/29/2018  4:19 PM

## 2021-06-23 NOTE — PROGRESS NOTES
ASSESSMENT:  1. Age-related osteoporosis without current pathological fracture  Her bone density study from May 2017 was reviewed.  Lowest T score was -3.4 in the spine.  She has been treated with Boniva.  Mixed results on bone density were noted.  She did have evidence for a old T12 compression fracture.  Chart indicates a history of nephrolithiasis although the patient did not recall this.  She will be screened for secondary causes of low bone density.  Other options for therapy were discussed including anabolic agents, Reclast, and Prolia.  I do not feel that the insurance coverage would be reasonable for anabolic agents.  Coverage for Prolia will be reviewed.  If reasonable, I would advise starting this.  If cost is prohibitive, I would advise Reclast.  - Beta-CrossLaps (Beta-CTx)  - Calcium, 24 Hour Urine; Future  - Electrophoresis, Protein, Serum  - Parathyroid Hormone Intact  - Thyroid Stimulating Hormone (TSH)  - Basic Metabolic Panel  - Alkaline Phosphatase    2. Chronic fatigue  TSH will be checked for further evaluation of osteoporosis and due to fatigue  - Thyroid Stimulating Hormone (TSH)    3.  Erosive osteoarthritis of multiple joints  She formerly was treated with steroids.  This was weaned off after rheumatology consultation    PLAN:  Patient Instructions   1.  Lab evaluation for osteoporosis    2.  Skip calcium supplement prior to 24 hour urine collection.    3.  Check insurance for Prolia.  This would be advised if insurance coverage is reasonable.  If not, I would advise Reclast    4.  Recheck bone density after 5/4/2019    5.  See me in one year      Orders Placed This Encounter   Procedures     Beta-CrossLaps (Beta-CTx)     Calcium, 24 Hour Urine     Standing Status:   Future     Standing Expiration Date:   2/5/2020     Electrophoresis, Protein, Serum     Parathyroid Hormone Intact     Thyroid Stimulating Hormone (TSH)     Basic Metabolic Panel     Alkaline Phosphatase     There are no  discontinued medications.    Return in about 1 year (around 2/5/2020) for Recheck.      ASSESSED PROBLEMS:  1. Age-related osteoporosis without current pathological fracture  Beta-CrossLaps (Beta-CTx)    Calcium, 24 Hour Urine    Electrophoresis, Protein, Serum    Parathyroid Hormone Intact    Thyroid Stimulating Hormone (TSH)    Basic Metabolic Panel    Alkaline Phosphatase   2. Chronic fatigue  Thyroid Stimulating Hormone (TSH)       CHIEF COMPLAINT:  Chief Complaint   Patient presents with     Consult     Initial Age related osteoporosis, Bilaterial knee pain       HISTORY OF PRESENT ILLNESS:  Luis Manuel is a 79 y.o. female Dr. Morales patient presenting to the clinic today for an osteoporosis consult. She is accompanied by her daughter and an .     Osteoporosis: Her last DXA done in May 2017 showed osteoporosis with a low T-score of -3.4 in the lumbar spine. She has not had a DXA since then. She is currently taking ibandronate and has tolerated it, but she has not seen a good response in regard to bone density. She suffered a vertebral fracture many years ago but continues to have problems related to this. Nobody in her family has ever suffered a hip fracture. She took prednisone for her back pain for a while, but she is not sure if she is still taking it or not. Her medication list that she brought in does not have prednisone on it. She would be interested in trying Prolia as long as it is covered well by insurance. She has taken a vitamin D supplement. She is not sure if she has lost any height over the years.     REVIEW OF SYSTEMS:   She has pain in her shoulder, knees, and back. Her back pain is very low in the back. All other systems are negative.    PFSH:  She has never had kidney stones in the past.     TOBACCO USE:  Social History     Tobacco Use   Smoking Status Never Smoker   Smokeless Tobacco Never Used   Tobacco Comment    chew betel nut       VITALS:  Vitals:    02/05/19 0852   BP: 112/60    Patient Site: Left Arm   Patient Position: Sitting   Cuff Size: Adult Regular   Pulse: 72   SpO2: 98%   Weight: 125 lb (56.7 kg)     Wt Readings from Last 3 Encounters:   02/05/19 125 lb (56.7 kg)   01/15/19 126 lb 8 oz (57.4 kg)   12/12/18 123 lb (55.8 kg)     Body mass index is 25.23 kg/m .    PHYSICAL EXAM:  Constitutional:   Reveals an alert, talkative woman who does not speak English. Pushes with arms to get up from chair. Ambulates with pain and has a stooped posture.  Vitals: per nursing notes.  Back: Mild scoliosis. Mild kyphosis. No point tenderness over spine.   Skin: Clear  Psychiatric:  Memory intact, mood appropriate.    ADDITIONAL HISTORY SUMMARIZED (2): None.  DECISION TO OBTAIN EXTRA INFORMATION (1): None.   RADIOLOGY TESTS (1): Reviewed 5/4/2017 DXA showing osteoporosis with a low T-score of -3.4 in the lumbar spine.   LABS (1): Labs from 8/30/2018 reviewed. Labs ordered.   MEDICINE TESTS (1): None.  INDEPENDENT REVIEW (2 each): None.     The visit lasted a total of 23 minutes face to face with the patient. Over 50% of the time was spent counseling and educating the patient about her osteoporosis.    I, Amrit Nichole, am scribing for and in the presence of, Dr. Perez.    IVikas, personally performed the services described in this documentation, as scribed by Amrit Nichole in my presence, and it is both accurate and complete.    Dragon dictation was used for this note.  Speech recognition errors are a possibility.    MEDICATIONS:  Current Outpatient Medications   Medication Sig Dispense Refill     acetaminophen (MAPAP ARTHRITIS PAIN) 650 MG CR tablet Take 2 tablets (1,300 mg total) by mouth 2 (two) times a day. 90 tablet 0     amLODIPine (NORVASC) 10 MG tablet TAKE 1 TABLET ORALLY EVERY DAY. 90 tablet 3     ANTACID, CALCIUM CARBONATE, 200 mg calcium (500 mg) chewable tablet CHEW 1 TABLET BY MOUTH THREE TIMES A DAY TO KEEP YOUR BONES HEALTHY 90 tablet 11     camphor-menthol 0.2-3.5 %  Gel Apply topically daily as needed.       clotrimazole (LOTRIMIN) 1 % cream Apply topically 2 (two) times a day as needed.       docusate sodium (COLACE) 100 MG capsule TAKE 1 CAPSULE BY MOUTH TWICE DAILY. 60 capsule 11     gabapentin (NEURONTIN) 100 MG capsule Take 200 mg by mouth 3 (three) times a day. 450 capsule 3     ibandronate (BONIVA) 150 mg tablet TAKE 1 TABLET BY MOUTH EVERY 30 DAYS. TAKE IN MORNING WITH A GLASS OFWATER PRIOR TO FOOD. DON'T LIE DOWN FOR 30 MINUTES 1 tablet 6     ipratropium-albuterol (COMBIVENT RESPIMAT)  mcg/actuation Mist inhaler Inhale 1 puff every 6 (six) hours as needed. 1 Inhaler 12     lidocaine (XYLOCAINE) 5 % ointment Apply to affected area two times a day 50 g 11     losartan-hydrochlorothiazide (HYZAAR) 100-25 mg per tablet TAKE 1 TABLET BY MOUTH DAILY. 30 tablet 11     mirtazapine (REMERON) 7.5 MG tablet Take 1 tablet (7.5 mg total) by mouth at bedtime. 30 tablet 11     polyvinyl alcohol (LIQUIFILM TEARS) 1.4 % ophthalmic solution Apply to eye daily as needed 15 mL 6     ranitidine (ZANTAC) 150 MG tablet TAKE 1 TABLET BY MOUTH 2 TIMES A  tablet 3     VENTOLIN HFA 90 mcg/actuation inhaler INAHLE 2 PUFFS BY MOUTH EVERY 6 HOURS AS NEEDED FOR WHEEZING 18 g 3     VITAMIN D2 50,000 unit capsule TAKE 1 CAPSULE BY MOUTH ONCE WEEKLY 4 capsule 11     No current facility-administered medications for this visit.        Total data points: 2

## 2021-06-23 NOTE — TELEPHONE ENCOUNTER
Fax received from [facility] pharmacy requesting Prior Authorization    Medication Name: Ibandronate 150mg    Insurance Plan: AET[employer] Part D   PBM:    Patient ID Number: [id number]    Please start PA process

## 2021-06-23 NOTE — TELEPHONE ENCOUNTER
RN cannot approve Refill Request    RN can NOT refill this medication med is not covered by policy/route to provider.     Last office visit: 12/6/2018 Kateryna Morales MD Last Physical: Visit date not found Last MTM visit: Visit date not found Last visit same specialty: 12/6/2018 Kateryna Morales MD.  Next visit within 3 mo: Visit date not found  Next physical within 3 mo: Visit date not found      Eric Corrales, [facility] Connection Triage/Med Refill 1/14/2019    Requested Prescriptions   Pending Prescriptions Disp Refills     VITAMIN D2 50,000 unit capsule [Pharmacy Med Name: VIT D2 1.25 MG (50,000 UNIT)] 4 capsule 0     Sig: TAKE 1 CAPSULE BY MOUTH ONCE WEEKLY    There is no refill protocol information for this order

## 2021-06-23 NOTE — TELEPHONE ENCOUNTER
Prior Authorization Request  Who s requesting:  Pharmacy  Pharmacy Name and Location: [facility]   Medication Name: Lidocaine 5% ointment   Insurance Plan:[employer]   Insurance Member ID Number:  [id number]  Informed patient that prior authorizations can take up to 10 business days for response:   No  Okay to leave a detailed message: Yes

## 2021-06-23 NOTE — TELEPHONE ENCOUNTER
Refill Approved    Rx renewed per Medication Renewal Policy. Medication was last renewed on 1/15/19.    Ov: 2/5/19    Rubina Meek, [facility] Connection Triage/Med Refill 2/8/2019     Requested Prescriptions   Pending Prescriptions Disp Refills     MAPAP ARTHRITIS PAIN 650 mg CR tablet [Pharmacy Med Name: MAPAP ARTHRITIS  MG CPLT] 90 tablet 0     Sig: TAKE TWO TABLETS BY MOUTH TWICE DAILY.    There is no refill protocol information for this order

## 2021-06-23 NOTE — TELEPHONE ENCOUNTER
PRIOR AUTHORIZATION DENIED    Denial Rational: MEDICATION IS ONLY USED FOR ANESTHESIA OF ACCESSIBLE MUCOUS MEMBRANES OR OROPHARYNX, ANESTHETIC OF LUBRICANT FOR INTUBATION, AND TEMPORARY RELIEF OF PAIN ASSOCIATED WITH MINOR BURNS, INCLUDING SUNBURNS, ABRASIONS OF THE SKIN, AND INSECT BITES.      Appeal Information: PHONE: [phone] FAX: [phone]

## 2021-06-23 NOTE — PROGRESS NOTES
HPI - 80 yo female here to f/u on back pain.    Lumbar stenosis and compression fracture T12  on gabapentin (order says 100mg am, 100mg mid day, 300mg at bedtime but reports taking 200mg TID)  and tylenol ordered prn but taking 650mg x2 tabs twice daily)  Last apt with spine clinic on 12/12/18 and s/p SI joint injection 11/12/18 this helped for 2 days and then the pain returned. Started OT and had 3 appts    Prior PT and acupuncture that did not help either.   On 12/11/18 seen at [facility] for SI belt      Polyarthralgia -   Tapered off prednisone slowly over 3 months - 7.5 mg daily x 4 weeks, 5mg daily x4 weeks, 2.5mg daily x 4 weeks- taper started 9/5/18 and pain not worsening  Pain improved after steroid injection at Rheum clinic - per consult note from 11/28/18:  Luis Manuel Lemon 78 y.o. female is seen here on 11/28/18 for a follow-up.  She has widespread degenerative joint disease affecting her appendicular, axial joints.  Corticosteroid injections in the knees have not been helpful.  She is going to follow-up in the pain clinic.  Her more than 7-year long persistent sed rate elevation is unlikely secondary to a rheumatologic syndrome.  Return here if needed as needed.         HTN  - elevated today in clinic  Taking losaratan- HCTZ 100-25mg and amlodipine - missed dose today     Restrictive lung disease - minimal symptoms - prn combivent only.  Last pulm consult was 6/25/18 - f/u 1 yr     Poor appetite and sleep - continue remeron 7.5mg      constipation  Improving with stool softener     GERD with abdo burning after meals - on ranitidine      H/o Vit D deficiency -  34.1 on 8/30/18  Continue ergo     Osteoporosis - on boniva  DEXA 5/2017 - per report: Because of mixed response, one could continue a change in therapy to Prolia if bisphosphonate treatment has approached the five year antoine,  with follow up bone density scan in 2 years.     Polypharmacy - home health RN sets up med box    Wants to quit chewing betel nut -  "tried gum already and did not like the smell    Current Outpatient Medications   Medication Sig Note     amLODIPine (NORVASC) 10 MG tablet TAKE 1 TABLET ORALLY EVERY DAY.      ANTACID, CALCIUM CARBONATE, 200 mg calcium (500 mg) chewable tablet CHEW 1 TABLET BY MOUTH THREE TIMES A DAY TO KEEP YOUR BONES HEALTHY      docusate sodium (COLACE) 100 MG capsule TAKE 1 CAPSULE BY MOUTH TWICE DAILY.      gabapentin (NEURONTIN) 100 MG capsule TAKE 1 CAPSULE IN THE MORNING, 1 CAPSULE MIDDAY AND 3 CAPSULES AT BEDTIME      ibandronate (BONIVA) 150 mg tablet TAKE 1 TABLET BY MOUTH EVERY 30 DAYS. TAKE IN MORNING WITH A GLASS OFWATER PRIOR TO FOOD. DON'T LIE DOWN FOR 30 MINUTES      ipratropium-albuterol (COMBIVENT RESPIMAT)  mcg/actuation Mist inhaler Inhale 1 puff every 6 (six) hours as needed.      losartan-hydrochlorothiazide (HYZAAR) 100-25 mg per tablet TAKE 1 TABLET BY MOUTH DAILY.      MAPAP ARTHRITIS PAIN 650 mg CR tablet TAKE 1 TABLET BY MOUTH EVERY 8 HOURS AS NEEDED FOR PAIN      mirtazapine (REMERON) 7.5 MG tablet Take 1 tablet (7.5 mg total) by mouth at bedtime.      ranitidine (ZANTAC) 150 MG tablet TAKE 1 TABLET BY MOUTH 2 TIMES A DAY      VENTOLIN HFA 90 mcg/actuation inhaler INAHLE 2 PUFFS BY MOUTH EVERY 6 HOURS AS NEEDED FOR WHEEZING      VITAMIN D2 50,000 unit capsule TAKE 1 CAPSULE BY MOUTH ONCE WEEKLY      camphor-menthol 0.2-3.5 % Gel Apply topically daily as needed. 4/25/2018: Unknown strength     clotrimazole (LOTRIMIN) 1 % cream Apply topically 2 (two) times a day as needed.      polyvinyl alcohol (LIQUIFILM TEARS) 1.4 % ophthalmic solution Apply to eye daily as needed      Vitals:    01/15/19 0934 01/15/19 0940   BP: 178/80 160/74   Pulse: 77    Resp: 16    Temp: 97.3  F (36.3  C)    TempSrc: Oral    SpO2: 93%    Weight: 126 lb 8 oz (57.4 kg)    Height: 4' 11.02\" (1.499 m)      OBJECTIVE:  Vitals listed above within normal limits  General appearance: well groomed, pleasant, well hydrated, nontoxic " appearing  ENT: PERRL, throat clear  Neck: neck supple, no lymphadenopathy, no thyromegaly  Lungs: lungs clear to auscultation bilaterally, no wheezes or rhonchi  Heart: regular rate and rhythm, no murmurs, rubs or gallops  Abdomen: soft, nontender  Neuro: no focal deficits, CN II-XII grossly intact, alert and oriented  Psych:  mood stable, appears to have good insight and judgment    A/P  Lumbar stenosis and compression fracture T12  on gabapentin and tylenol  Continue OT and recommended by spine clinic  Continue wearing SI belt   rx lidocaine gel  F/u with spine 2/12/19       HTN  - elevated today in clinic- missed dose today  Taking losaratan- HCTZ 100-25mg and amlodipine      Restrictive lung disease - minimal symptoms - prn combivent only.  Last pulm consult was 6/25/18 - f/u 1 yr     Poor appetite and sleep - continue remeron 7.5mg      constipation  Improving with stool softener     GERD with abdo burning after meals - on ranitidine      H/o Vit D deficiency -  34.1 on 8/30/18  Continue ergo     Osteoporosis - on boniva  Referral for osteoporosis consult for possible prolia shots     Polypharmacy - home health RN sets up med box    Wants to quit chewing betel nut - tried gum already and did not like the smell    Spent 40 min face to face with patient with more the 50% spent in counseling, reviewing chart, and coordination of care and discussing problems listed above.

## 2021-06-23 NOTE — PROGRESS NOTES
[facility] Occupational Therapy   Daily Progress    Patient Name: Luis Manuel Lemon  Date: 1/10/2019  Visit #: 3 of 10 by 3/14/19  Referral Diagnosis: Lumbar spinal stenosis  Referring provider: Tiffanie Byrne PA-C  Visit Diagnosis:     ICD-10-CM    1. Chronic pain syndrome G89.4    2. Chronic bilateral low back pain, with sciatica presence unspecified M54.5     G89.29    3. Decreased activities of daily living (ADL) R68.89    4. Impaired instrumental activities of daily living (IADL) R53.81        Assessment:     Pt demonstrates no retention of neuroscience education from previous sessions.  She had difficulty retaining education and repeating homework program at the end of session this date as well.  Pt also demonstrates poor compliance since last visit with sleep hygiene habit implementation.  Pt appears motivated for therapy during sessions, however demonstrates poor follow through and compliance.  Pt did not make progress towards functional goals this visit, however home program was modified to support improved compliance.  OT to check compliance with modified home program at next visit and assess appropriateness for continuation of therapy.      Goal Status:  Pt will: independently verbalize and report at home utilization of 3 health management strategies to improve habits and routines for increased self-management of pain allowing for increased participation in functional, everyday activities within 10 weeks.  (Pt has been completing some stretching (not taught by this therapist) since last session.  Educated in supine knee to chest stretch and gentle mobility program to complete in the next week.)  Pt will: demonstrate follow trhough with graded aerobic and neural mobilization home exercise programs to decrease nervous system sensitivity and increase endurance for home management tasks (i.e. cooking) in 6 weeks. (Pt was unable to demonstrate proper form with seated slump slider, therapsit modified to  supine stretch as patient reports preference for supine activities)  Pt will: utilize sleep hygiene and positioning strategies independently, with visual aids and compensatory tools, to impirove sleep as reported by decrease in waking times per night due to pain in 4 weeks.   (Pt reports she has been sleeping better, even sleeping through the entire night last night.  She reports she has not been using sleep positioning strategies discsused at last visit. )      Plan / Patient Education:     Pt is making progress towards functional goals and will continue skilled occupational therapy as indicated in initial plan of care.    Plan for next treatment:    Review compliance with supine slump nerve glide, deep breathing, walking program, and gentle mobility program    Progress nerve glides as tolerated    Energy conservation education: Pacing and Activity Patterns    Subjective/Objective:     Pt reports no improvement in pain in R low back and R LE especially on posterior LE above knee.  She describes it feels as though her muscles are very tight.  She states she does use heat at home while laying down.  Pt reports she slept through the whole night last night, and has been doing so more often lately.  Pt states she has been doing her deep breathing regularly, every day.  She is unable to accurately demonstrate seated slump slider this date, and reports she has not been utilizing sleep positioning strategies discussed last session.      Pain rating at time of session:  8. She reports her pain has not been any better or worse than an 8 in the last week.    Treatment Today  TREATMENT MINUTES COMMENTS   Self-care/ Home & Health management 9 Coping skills education   Neuromuscular Re-education 33   Review and completion of seated slump slider, Modification of seated slump slider, review and continuation of PNE.   Therapeutic Exercises 15 Gentle mobility program   Standardized cognitive performance test           Total 57 See  below for further activity detail   Blank areas are intentional and mean the treatment did not include these items.       Self Care/Home and Health management Training:  Coping Skills education:  Therapist educated patient on the purpose and importance of use of coping skills to manage anxiety/sadness caused by pain flare ups.  Pt was educated on the effect that stress has on the brain, increasing nervous system sensitivity.  Pt verbalized understanding of education, stating that things she enjoys doing that make her happy include listening to music, listening to pastors' sermons, and watching movies.  Therapist urged patient to engage in one meaningful activity each day.      Neuromuscular Re-education:  Pain Neuroscience Education  Patient and therapist reviewed education from last session discussed nervous system sensitivity.  Patient was educated regarding endogenous mechanisms and strategies to increase the brain s production of chemicals which decrease pain, such as aerobic exercise and improved pain knowledge. The concepts of pacing, graded exposure,  sore but safe , and  hurt does not equal harm  were discussed. Sleep hygiene and diaphragmatic breathing topics introduced to help calm the nervous system and reduce stress.     Homework: Patient provided with tools to start exercise log for walking program.       Therapist modified seated slump slider nerve glide this date into supine position for improved compliance with HEP for neuromobility to assist in decreasing nervous system sensitivity.  Therapist instructed patient in completion, completed 7 repetitions/side.  She was agreeable with plan to complete at home 1-2 sets each day.      Therapeutic Exercise:  Gentle Mobility Program  Therapist guided patient through gentle mobility program paired with deep breathing techniques for increased oxygen flow to nervous system and decreased nervous system sensitivity.  Exercises included:  Lateral neck stretch  (completed R and L)  Gentle spinal twist (completed R and L)  Seated cat cow  Seated, modified pidgeon stretch (completed R and L)  Seated leg lift (completed R and L)  Seated forward fold      Michelle Russell OTR/L  1/10/2019  8:59 - 9:56 AM

## 2021-06-23 NOTE — PATIENT INSTRUCTIONS - HE
1.  Lab evaluation for osteoporosis    2.  Skip calcium supplement prior to 24 hour urine collection.    3.  Check insurance for Prolia    4.  Recheck bone density after 5/4/2019    5.  See me in one year

## 2021-06-23 NOTE — TELEPHONE ENCOUNTER
Current diagnosis: M81.0  Last Prolia Injection Date: New  Requested action for Juliana:  Insurance verification and schedule appt

## 2021-06-23 NOTE — PROGRESS NOTES
[facility] Occupational Therapy   Daily Progress    Patient Name: Luis Manuel Lemon  Date: 1/17/2019  Visit #: 4 of 10 by 3/14/19  Referral Diagnosis: Lumbar spinal stenosis  Referring provider: Tiffanie Byrne PA-C  Visit Diagnosis:     ICD-10-CM    1. Chronic pain syndrome G89.4    2. Chronic bilateral low back pain, with sciatica presence unspecified M54.5     G89.29    3. Decreased activities of daily living (ADL) R68.89    4. Impaired instrumental activities of daily living (IADL) R53.81        Assessment:     Pt demonstrates poor compliance with integrating education and self-management strategies discussed in therapy into her daily routine.  She was able to follow through with some exercises, but did not complete them at the frequency that was discussed.  Language barrier, lack of compliance with homework, and minimal retention of education have all been barriers to progress towards functional goals.    Pt is appropriate for discharge from skilled occupational therapy at this time due to lack of progress towards functional goals. Discontinue OT services.      Goal Status:  Pt will: independently verbalize and report at home utilization of 3 health management strategies to improve habits and routines for increased self-management of pain allowing for increased participation in functional, everyday activities within 10 weeks.  (Pt reports she has been completing gentle mobility HEP and walking program since last visit, however has not followed through with sleep positioning or sleep hygiene strategies reviewed.) - NOT MET.   Pt will: demonstrate follow trhough with graded aerobic and neural mobilization home exercise programs to decrease nervous system sensitivity and increase endurance for home management tasks (i.e. cooking) in 6 weeks. (Pt reports follow through with gentle mobility HEP.) - NOT MET.  Pt will: utilize sleep hygiene and positioning strategies independently, with visual aids and  compensatory tools, to impirove sleep as reported by decrease in waking times per night due to pain in 4 weeks.   (Pt reports no follow through with any strategies to improve sleep hygiene or positioning.  She states that she has been having a difficult time falling and staying asleep.  ) - NOT MET.       Plan / Patient Education:     Pt has made minimal progress towards functional goals, demonstrating plateau.  Pt is agreeable with plan for discharge at this time and verbalizes understanding of discharge education.      Subjective/Objective:     Pain rating at time of session:  8.      Pt reports her pain has been better since last week (despite same numerical rating). She states she has been feeling tired, but pain is better.  She states she has been walking for short periods and getting tired.  Her exercises have been tiring her out as well.  She has been consistent with her walking and exercises.  Pt reports therapy has been helpful, specifically the therapeutic exercises introduced at last visit.  She is requesting discharge from therapy today, stating that she plans to continue to manage her symptoms independently.        Treatment Today  TREATMENT MINUTES COMMENTS   Self-care/ Home & Health management 12  Sleep hygiene education, home exercise program education for continued self-management of pain by decreasing nervous system sensitivity. Objective measures   Neuromuscular Re-education     Therapeutic Exercises     Standardized cognitive performance test           Total 12 treatment time  See below for further activity detail   Blank areas are intentional and mean the treatment did not include these items.       Pain & Occupational Performance: Patient-Reported Outcomes Measurement Information System (PROMIS) 29 Profile v2.0: is a reliable measure of patient-reported health status for physical, mental, and social well-being.  Today, the patient completed this questionnaire to provide both the patient and  team with important patient-reported information about the effect of therapy and allow for better understanding of how various treatments might affect symptoms experienced and occupational performance.  Scores reported in standardized T-scores, where 50 is the mean and 10 is one standard deviation (SD).             Previous    Current  Physical Functioning  35.6 29.1   Anxiety  51.8 40.3   Depression  41.0 41.0   Fatigue 71.6 60.7   Sleep disturbance 46.2 73.4   Ability to participate in social roles and activities 37.3 44.2   Pain interference  66.6 66.6     *Numbers in bold indicate improvement from SOC.    From time of evaluation, objective measure indicated decrease in physical functioning and increase in sleep disturbance, but improvement in anxiety, fatigue, and ability to participate in social roles and activities.  PROMIS objective measure was completed today with assistance from .    Self Care/Home and Health management Training:  Sleep hygiene education:  Pt discussed difficulty falling and staying asleep.  Therapist reviewed concepts of sleep hygiene education from previous session with increased focus on methods to improve falling asleep.  Pt verbalized that she will engage in a non-sleep activity if she is unable to fall asleep for more than 30 minutes as a means of distraction.  Therapist also encouraged patient to resume use of pillow between her legs when sleeping in sidelying for improved positioning.    Discharge education:  Therapist encouraged patient to continue to utilize walking plan and gentle mobility routine.  She verbalized understanding, but made some comments about not walking as often after discharge.  Therapist reviewed education from previous session regarding gentle aerobic activity's role in decreasing pain and nervous system sensitivity.  Pt indicated she had no questions for therapist at the time of discharge.       BECCA Cao/L  1/17/2019  9:01 -  9:30 AM

## 2021-06-24 NOTE — TELEPHONE ENCOUNTER
Orders being requested:   Incontinence ( pull -ups)   Size Small - Med   150 pads per month max / Request is for 60     Reason service is needed/diagnosis: Incontinence     When are orders needed by: asap     Where to send Orders: Fax: Fax # [phone]   ATT: [facility] will be faxing over request as well.  2nd Request      Okay to leave detailed message?  No

## 2021-06-24 NOTE — TELEPHONE ENCOUNTER
No dx of urinary incontinence.  Last seen by PCP on 2/14/19.  No orders in chart.  How does PCP wish to proceed?  Thanks.

## 2021-06-24 NOTE — PROGRESS NOTES
HPI - 78 yo female here to f/u on spinal stenosis    I was given order on 3/14/19 for incontinece supplies from [facility] who requests extra supplies for all patients. Per patient, she just wants the pull up diapers (2 per days) and nothing else. She is reporting urinary incontinence with coughing and sneezing and urgency.     Chronic neck, back and joint pain  Already seen by endo, rheum, pain and spine specialists w/o much improvement  Today, she is reporting left sided neck pain that she wakes up with often. Sleeps on a mattress and pillow (not on the floor).     Lumbar stenosis and compression fracture T12  on gabapen 200mg TID and tylenol 650mg x2 tabs twice daily  On 12/11/18 seen at [facility] for SI belt - she reports she has this belt but is is uncomfortable on her pelvis  Last consult note from spine clinic on 2/20/19:     1.  Chronic bilateral low back pain with radiation into the right greater than left lower extremity.  MRI lumbar spine shows mild spinal canal stenosis at L4-5 related to mild facet arthropathy and a circumferential disc bulge.  Patient is status post a right SI joint injection on November 12, 2018 which provided partial relief of her pain lasting only 2 days.  She had previously had an L5-S1 intralaminar epidural steroid injection on provided relief of her left-sided pain.  I believe pain is multifactorial.  I do think she is at least in part symptomatic from sacroiliac joint dysfunction.  She is likely symptomatic from the spinal stenosis.  Additionally, she may have myofascial pain.  She had some hypertonicity in the bilateral gluteal muscles on exam today.     2.  Acute left neck pain without radicular symptoms.  Most consistent with cervical strain.  rx for diclofenac gel (since lidocaine gel not covered by insurance)     Polyarthralgia -   S/p rheum consults, multiple meds including prednisone and rheum meds  May consider referral to f/u given Alpha 2 globulin - maker of inflammation and  connective tissue d/o but will consult with endo/osteoporosis doc first about these results       HTN  - BP high today  Taking losaratan- HCTZ 100-25mg and amlodipine      Restrictive lung disease - minimal symptoms - prn combivent only.  Last pulm consult was 6/25/18 - f/u 1 yr     Poor appetite and sleep - continue remeron 7.5mg    H/o Vit D deficiency -  34.9 on 2/5/19 on weekly ergo     Age-related osteoporosis without current pathological fracture  osteoporosis consult 2/5/19 and per consult note and f/u phone message on 3/11/19  Awaiting answer from insurance for Prolia.  This would be advised if insurance coverage is reasonable.  If not, I would advise Reclast  Check bone density after 5/4/2019    Cancer screening all up to date and neg  Pap neg 2013  FOBT -neg 2/25/19   mammo - neg 2/14/19    Current Outpatient Medications   Medication Sig Note     acetaminophen (MAPAP ARTHRITIS PAIN) 650 MG CR tablet Take 2 tablets (1,300 mg total) by mouth 2 (two) times a day.      amLODIPine (NORVASC) 10 MG tablet TAKE 1 TABLET ORALLY EVERY DAY.      ANTACID, CALCIUM CARBONATE, 200 mg calcium (500 mg) chewable tablet CHEW 1 TABLET BY MOUTH THREE TIMES A DAY TO KEEP YOUR BONES HEALTHY      docusate sodium (COLACE) 100 MG capsule TAKE 1 CAPSULE BY MOUTH TWICE DAILY.      gabapentin (NEURONTIN) 100 MG capsule Take 200 mg by mouth 3 (three) times a day. 2/20/2019: 1 cap AM, 1 cap noon, 3 caps at bedtime      ibandronate (BONIVA) 150 mg tablet TAKE 1 TABLET BY MOUTH EVERY 30 DAYS. TAKE IN MORNING WITH A GLASS OFWATER PRIOR TO FOOD. DON'T LIE DOWN FOR 30 MINUTES      ipratropium-albuterol (COMBIVENT RESPIMAT)  mcg/actuation Mist inhaler Inhale 1 puff every 6 (six) hours as needed.      losartan-hydrochlorothiazide (HYZAAR) 100-25 mg per tablet TAKE 1 TABLET BY MOUTH DAILY.      mirtazapine (REMERON) 7.5 MG tablet Take 1 tablet (7.5 mg total) by mouth at bedtime.      ranitidine (ZANTAC) 150 MG tablet TAKE 1 TABLET BY  "MOUTH 2 TIMES A DAY      VENTOLIN HFA 90 mcg/actuation inhaler INAHLE 2 PUFFS BY MOUTH EVERY 6 HOURS AS NEEDED FOR WHEEZING      VITAMIN D2 50,000 unit capsule TAKE 1 CAPSULE BY MOUTH ONCE WEEKLY      camphor-menthol 0.2-3.5 % Gel Apply topically daily as needed. 4/25/2018: Unknown strength     clotrimazole (LOTRIMIN) 1 % cream Apply topically 2 (two) times a day as needed.      diclofenac sodium (VOLTAREN) 1 % Gel Apply to affected area two times a day      lidocaine (XYLOCAINE) 5 % ointment Apply to affected area two times a day      polyvinyl alcohol (LIQUIFILM TEARS) 1.4 % ophthalmic solution Apply to eye daily as needed      Vitals:    03/14/19 1027 03/14/19 1034   BP: 160/80 170/76   Pulse: 77    Resp: 20    Temp: 98  F (36.7  C)    TempSrc: Oral    SpO2: 99%    Weight: 129 lb 9.6 oz (58.8 kg)    Height: 4' 11.06\" (1.5 m)      PHYSICAL EXAM   General Appearance: Awake and alert, in no acute distress  HEENT: neck is supple  CV: regular rate  Resp: No respiratory distress. Breathing comfortably  Musculoskeletal: moving limbs comfortably with not deficits or deformities  Skin: no rashes noted    A/P  Female stress and urge incontinence -   Order for adult pull up diapers only with [employer]       Chronic neck, back and joint pain  Already seen by endo, rheum, pain and spine specialists w/o much improvement    Lumbar stenosis and compression fracture T12  on gabapen 200mg TID and tylenol 650mg x2 tabs twice daily  On 12/11/18 seen at [facility] for SI belt - she reports she has this belt but is is uncomfortable on her pelvis  Last consult note from spine clinic on 2/20/19:       Polyarthralgia -   S/p rheum consults, multiple meds including prednisone and rheum meds, acupuncture, pain consults  continue gabapen 200mg TID and tylenol 650mg x2 tabs twice daily  She feels she is managing her pain ok         HTN  - BP is highly variable and per note from home RN, BP has variablity but high  Taking losaratan- HCTZ " 100-25mg and amlodipine 10mg  Will add metoprolol XL 25mg daily      Restrictive lung disease - minimal symptoms - prn combivent only.  Last pulm consult was 6/25/18 - f/u 1 yr     Poor appetite and sleep - continue remeron 7.5mg    H/o Vit D deficiency -continue weekly ergo     Age-related osteoporosis without current pathological fracture  Awaiting answer from insurance for Prolia.  Check bone density after 5/4/2019    Cancer screening all up to date and neg    Polypharmacy -   Home health RN sets up meds every other week    Spent 25 min face to face with patient with more the 50% spent in counseling, reviewing chart, and coordination of care and discussing problems listed above.

## 2021-06-24 NOTE — TELEPHONE ENCOUNTER
----- Message from Tiffanie Byrne PA-C sent at 2/20/2019  2:34 PM CST -----  Regarding: update  Can you please call the patient and let her know I reviewed her case with Dr. Irizarry.  Dr. Irizarry does not have any additional treatment ideas.  The goal at this point will be to try to keep the patient as functional as possible with the pain that she does have.  I would like to see the patient back in the clinic in 3 months to follow-up.  I can see her back sooner if pain worsens.

## 2021-06-24 NOTE — TELEPHONE ENCOUNTER
Phone call to pt with assistance of Amie  # [phone] Randell. Information shared with patient. Stated understanding. States she will follow up with her primary doctor. Explained she could call or return here if she would like as well. Stated understanding.

## 2021-06-24 NOTE — TELEPHONE ENCOUNTER
----- Message from Tiffanie Byrne PA-C sent at 2/20/2019  2:35 PM CST -----  [facility]  Hi Dr. Morales,    Thank you so much for referring this patient to us!  Unfortunately I have not been able to make any progress with [facility].  We have tried a lumbar JUNIE, SI joint injection, PT and OT for neuroscience education.  Dr. Irizarry and I have discussed her case we don't have anything else to offer at this point. [facility] told me today she does not want to return to the pain clinic.  I am planning to continue seeing this patient every three months to check in, monitor her pain, and see what we can do to keep her functional with the pain that she has.  I just wanted to give you a heads up with where we are at with her.  Please let me know if you have any questions.  And please, let us know if her pain changes/worsens at any time and we will have her come in to see us ASAP.    Best regards,  Tiffanie Byrne PA-C

## 2021-06-24 NOTE — PROGRESS NOTES
HPI - 78 yo female here to f/u on spinal stenosis    Lumbar stenosis and compression fracture T12  on gabapentin (order says 100mg am, 100mg mid day, 300mg at bedtime but reports taking 200mg TID)  and tylenol ordered prn but taking 650mg x2 tabs twice daily)  Last apt with spine clinic on 12/12/18 and s/p SI joint injection 11/12/18 this helped for 2 days and then the pain returned. Started OT and had 3 appts    Prior PT and acupuncture that did not help either.   On 12/11/18 seen at [facility] for SI belt - she reports she has this belt but is is uncomfortable on her pelvis  F/u with spine clinic on 2/12/19 was cancelled b/c transportation    Polyarthralgia -   S/p rheum consults, multiple meds including prednisone and rheum meds     HTN  - elevated today in clinic- missed dose today  Taking losaratan- HCTZ 100-25mg and amlodipine      Restrictive lung disease - minimal symptoms - prn combivent only.  Last pulm consult was 6/25/18 - f/u 1 yr     Poor appetite and sleep - continue remeron 7.5mg      constipation  Improving with stool softener     GERD with abdo burning after meals - on ranitidine      H/o Vit D deficiency -  34.1 on 8/30/18  Continue ergo     Age-related osteoporosis without current pathological fracture  osteoporosis consult 2/5/19 and per consult note  Lab evaluation for osteoporosis  - Beta-CrossLaps (Beta-CTx)  - Calcium, 24 Hour Urine; Future  - Electrophoresis, Protein, Serum  - Parathyroid Hormone Intact  - Thyroid Stimulating Hormone (TSH)  - Basic Metabolic Panel  - Alkaline Phosphatase  Check insurance for Prolia.  This would be advised if insurance coverage is reasonable.  If not, I would advise Reclast  Check bone density after 5/4/2019      Labs from this consult reviewed:  Significant labs:   Beta and albumin %  Alpha 2 globulin - maker of inflammation and connective tissue d/o  Elevated PTH  Low urine calcium      Results for LUIS FRAZIER (MRN [id number]) as of 2/14/2019 10:23   Ref. Range  2/5/2019 10:06   Sodium Latest Ref Range: 136 - 145 mmol/L 142   Potassium Latest Ref Range: 3.5 - 5.0 mmol/L 3.8   Chloride Latest Ref Range: 98 - 107 mmol/L 101   CO2 Latest Ref Range: 22 - 31 mmol/L 31   Anion Gap, Calculation Latest Ref Range: 5 - 18 mmol/L 10   BUN Latest Ref Range: 8 - 28 mg/dL 9   Creatinine Latest Ref Range: 0.60 - 1.10 mg/dL 0.85   GFR MDRD Af Amer Latest Ref Range: >60 mL/min/1.73m2 >60   GFR MDRD Non Af Amer Latest Ref Range: >60 mL/min/1.73m2 >60   Calcium Latest Ref Range: 8.5 - 10.5 mg/dL 9.1   Protein, Total Latest Ref Range: 6.0 - 8.0 g/dL 7.6   Alkaline Phosphatase Latest Ref Range: 45 - 120 U/L 117   Glucose Latest Ref Range: 70 - 125 mg/dL 98   Vitamin D, Total (25-Hydroxy) Latest Ref Range: 30.0 - 80.0 ng/mL 34.9   % Beta Latest Ref Range: 10.0 - 17.0 % 21.2 (H)   Albumin % Latest Ref Range: 51.0 - 67.0 % 47.9 (L)   Alpha 1 Latest Ref Range: 0.1 - 0.3 g/dL 0.2   Alpha 1 % Latest Ref Range: 2.0 - 4.0 % 3.2   Alpha 2 Latest Ref Range: 0.4 - 0.9 g/dL 1.0 (H)   Alpha 2 % Latest Ref Range: 5.0 - 13.0 % 12.9   Beta Latest Ref Range: 0.7 - 1.2 g/dL 1.6 (H)   ELP Comment Unknown The alpha 2 globu...   Gamma Globulin Latest Ref Range: 0.6 - 1.4 g/dL 1.1   Gamma Globulin % Latest Ref Range: 9.0 - 20.0 % 14.8   PTH Latest Ref Range: 10 - 86 pg/mL 94 (H)   TSH Latest Ref Range: 0.30 - 5.00 uIU/mL 1.21        Polypharmacy - home health RN sets up med box     Current Outpatient Medications   Medication Sig Note     acetaminophen (MAPAP ARTHRITIS PAIN) 650 MG CR tablet Take 2 tablets (1,300 mg total) by mouth 2 (two) times a day.      amLODIPine (NORVASC) 10 MG tablet TAKE 1 TABLET ORALLY EVERY DAY.      ANTACID, CALCIUM CARBONATE, 200 mg calcium (500 mg) chewable tablet CHEW 1 TABLET BY MOUTH THREE TIMES A DAY TO KEEP YOUR BONES HEALTHY      docusate sodium (COLACE) 100 MG capsule TAKE 1 CAPSULE BY MOUTH TWICE DAILY.      gabapentin (NEURONTIN) 100 MG capsule Take 200 mg by mouth 3 (three)  "times a day.      ibandronate (BONIVA) 150 mg tablet TAKE 1 TABLET BY MOUTH EVERY 30 DAYS. TAKE IN MORNING WITH A GLASS OFWATER PRIOR TO FOOD. DON'T LIE DOWN FOR 30 MINUTES      ipratropium-albuterol (COMBIVENT RESPIMAT)  mcg/actuation Mist inhaler Inhale 1 puff every 6 (six) hours as needed.      losartan-hydrochlorothiazide (HYZAAR) 100-25 mg per tablet TAKE 1 TABLET BY MOUTH DAILY.      mirtazapine (REMERON) 7.5 MG tablet Take 1 tablet (7.5 mg total) by mouth at bedtime.      polyvinyl alcohol (LIQUIFILM TEARS) 1.4 % ophthalmic solution Apply to eye daily as needed      ranitidine (ZANTAC) 150 MG tablet TAKE 1 TABLET BY MOUTH 2 TIMES A DAY      VENTOLIN HFA 90 mcg/actuation inhaler INAHLE 2 PUFFS BY MOUTH EVERY 6 HOURS AS NEEDED FOR WHEEZING      VITAMIN D2 50,000 unit capsule TAKE 1 CAPSULE BY MOUTH ONCE WEEKLY      camphor-menthol 0.2-3.5 % Gel Apply topically daily as needed. 4/25/2018: Unknown strength     clotrimazole (LOTRIMIN) 1 % cream Apply topically 2 (two) times a day as needed.      lidocaine (XYLOCAINE) 5 % ointment Apply to affected area two times a day      Vitals:    02/14/19 0948   BP: 120/66   Pulse: 88   Temp: 98.2  F (36.8  C)   TempSrc: Oral   SpO2: 92%   Weight: 124 lb (56.2 kg)   Height: 4' 10.86\" (1.495 m)     PHYSICAL EXAM   General Appearance: Awake and alert, in no acute distress  HEENT: neck is supple  CV: regular rate  Resp: No respiratory distress. Breathing comfortably  Musculoskeletal: moving limbs comfortably with not deficits or deformities  Skin: no rashes noted    A/P  Lumbar stenosis and compression fracture T12  on gabapen 200mg TID and tylenol 650mg x2 tabs twice daily  On 12/11/18 seen at [facility] for SI belt - she reports she has this belt but is is uncomfortable on her pelvis  F/u with spine clinic on 2/12/19 was cancelled b/c transportation  rx for diclofenac gel (since lidocaine gel not covered by insurance)    Polyarthralgia -   S/p rheum consults, multiple meds " including prednisone and rheum meds  May consider referral to f/u given Alpha 2 globulin - maker of inflammation and connective tissue d/o but will consult with endo/osteoporosis doc first about these results  rx for diclofenac gel (since lidocaine gel not covered by insurance)     HTN  - well controlled  Taking losaratan- HCTZ 100-25mg and amlodipine      Restrictive lung disease - minimal symptoms - prn combivent only.  Last pulm consult was 6/25/18 - f/u 1 yr     Poor appetite and sleep - continue remeron 7.5mg  PHQ9 5 today      constipation  Improving with stool softener     GERD with abdo burning after meals - on ranitidine      H/o Vit D deficiency -  34.1 on 8/30/18  Continue ergo     Age-related osteoporosis without current pathological fracture  osteoporosis consult 2/5/19 and per consult note  Awaiting answer from insurance for Prolia.  This would be advised if insurance coverage is reasonable.  If not, I would advise Reclast  Check bone density after 5/4/2019     I will discuss these with Dr. Vikas Perez (from osteoporosis consult) about treatment options and plan.   Significant labs:   Beta and albumin %  Alpha 2 globulin - maker of inflammation and connective tissue d/o  Elevated PTH  Low urine calcium    Cancer screening - given chronic pain and no prior screening completed  Pap neg 2013  Discussed FOBT and mammo - ordered today        Spent 40 min face to face with patient with more the 50% spent in counseling, reviewing chart, and coordination of care and discussing problems listed above.

## 2021-06-24 NOTE — TELEPHONE ENCOUNTER
I have order from [facility] but want to discuss with patient at apt on 3/14/19.   No discussion in the past about incontinence and the [facility] order is asking for excessive amount of supplies, which is the same for all patients regardless of need.   Pull ups 150  Pads 300  Briefs 300 chucks 300     The [facility] order is denied unless they can justify why the requested supplies are so excessive and the same for all patients.       Dr. Kateryna Morales  3/12/2019

## 2021-06-24 NOTE — TELEPHONE ENCOUNTER
----- Message from Vikas Perez MD sent at 3/9/2019  9:45 AM CST -----  Regarding: RE: abnormal labs and next steps  Kateryna: The abnormalities noted on the serum protein electrophoresis could represent an underlying inflammatory process.  However, I doubt that it is of major significance.  I am not sure why the parathyroid hormone is mildly elevated, since she has a vitamin D level in the normal range and does not have hypercalcemia to suggest primary hyperparathyroidism.  This will be rechecked in the future.  She had a quite low urine volume.  I would be suspicious that the 24-hour urine collection was not complete.  We have submitted her information to [employer] to determine the cost.  We should get a result back in the near future.    Vikas AGEE  ----- Message -----  From: Kateryna Morales MD  Sent: 2/21/2019   6:17 PM  To: Vikas Perez MD  Subject: abnormal labs and next steps                     [facility] Dr. Perez,     Thank you for seeing my patient. I really appreciate your help with her. I saw her last week for follow up and reviewed your recent consult note. I noticed a few labs were abnormal. I am just wondering what the next steps are. And also what is the status of the prolia?      1. 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 Status of insurance for Prolia.Â Per consult note, ifÂ not covered by insurance , then would advise Reclast      Thanks again,   Sincerely,  Dr. Kateryna oMrales  2/21/2019

## 2021-06-24 NOTE — PROGRESS NOTES
Assessment:   Luis Manuel Lemon is a 79 y.o. y.o. female with past medical history significant for vitamin D deficiency, hypertension, hyperlipidemia, osteoporosis, GERD, congestive heart failure who presents today for follow-up regarding 2 areas of pain.  1.  Chronic bilateral low back pain with radiation into the right greater than left lower extremity.  MRI lumbar spine shows mild spinal canal stenosis at L4-5 related to mild facet arthropathy and a circumferential disc bulge.  Patient is status post a right SI joint injection on November 12, 2018 which provided partial relief of her pain lasting only 2 days.  She had previously had an L5-S1 intralaminar epidural steroid injection on provided relief of her left-sided pain.  I believe pain is multifactorial.  I do think she is at least in part symptomatic from sacroiliac joint dysfunction.  She is likely symptomatic from the spinal stenosis.  Additionally, she may have myofascial pain.  She had some hypertonicity in the bilateral gluteal muscles on exam today.  2.  Acute left neck pain without radicular symptoms.  Most consistent with cervical strain.       Plan:     A shared decision making plan was used.  The patient's values and choices were respected.  The following represents what was discussed and decided upon by the physician assistant and the patient.  A professional  is present for the visit.    1.  DIAGNOSTIC TESTS: I reviewed the MRI lumbar spine.  No further diagnostic tests were ordered.  If neck pain fails to improve, she may need imaging.    2.  PHYSICAL THERAPY/OCCUPATIONAL THERAPY patient completed 4 sessions of physical therapy in October.  She completed 4 sessions of occupational therapy with Michelle Russell for neuroscience education last month.  She did not feel this was helpful.    3.  MEDICATIONS: No changes are made to the patient's medications.  The patient's nurse sets up her medications for her so she is not sure how she takes her  medication.  She has been prescribed gabapentin 100 mg in the morning, 100 mg in the afternoon, and 300 mg at bedtime.  She was recently prescribed diclofenac gel but has not yet started that.    4.  INTERVENTIONS:  No additional interventions were ordered.  Patient has had multiple injections including epidural steroid injections, facet joint injections (at the pain clinic), and the SI joint injection.  None of these provided significant or lasting relief of her pain.  She did not have reproduction of pain with palpation of the piriformis muscle today, so I did not recommend a piriformis injection.    5.  PATIENT EDUCATION:    -Patient can continue wearing her SI belt.  She states this gives her some relief of her pain.  -I told the patient that I am going to review her case with my colleague, Dr. Irizarry, to see if she has any other ideas to help with her pain.  - I offered to refer the patient back to the pain clinic.  She states that she is not interested in returning to the pain clinic.    6.  FOLLOW-UP: A nurse will call the patient after I have reviewed her case with Dr. Irizarry.  If she has any questions or concerns in the meantime, she should not hesitate to call.    Subjective:     Luis Manuel Lemon is a 79 y.o. female who presents today for follow-up regarding 2 areas of pain.    Primary reason for follow-up is chronic record left low back pain with radiation to the right greater than left lower extremity.  I last saw the patient on December 12, 2018.  At that time I referred the patient to occupational therapy with Michelle Russell for neuroscience education.  Patient had 4 sessions.  She did not feel that it was helpful.  She has previously tried and failed multiple injections including SI joint injection, epidural steroid injection, and facet joint injections.  She complains of right greater than left low back pain.  Pain is most pronounced in the upper buttocks.  She has pain which radiates into the record left  posterior thigh to the posterior knee.  Pain does not radiate distal to that.  She denies any numbness or tingling down the legs.  She feels that her legs are weak.    Patient also has a new complaint of acute left neck pain.  This pain began 2 days ago after doing some exercises in which she was reaching overhead.  The pain is located in the left posterolateral neck.  She denies any pain radiating down the left arm.  She denies any numbness, tingling, or weakness down the arm.    Overall, patient rates her pain as an 8 out of 10.  At its worst it is a 10 out of 10.  Patient reports that increased activity aggravates her pain.  Medications help to alleviate pain for a short period of time.    Patient completed 4 sessions of physical therapy for her back in October.  As mentioned above, she completed the 4 sessions of occupational therapy in January.  She does do home exercises.  Patient has a nurse to set up her medications so she does not know how she takes it.  She is prescribed gabapentin 100 mg in the morning, 100 mg in she was recently prescribed diclofenac gel by her primary care provider but she has not yet received that from her pharmacy.  Her medications are delivered to her apartment.    Past medical history is reviewed and is pertinent for being prescribed the diclofenac gel.    Family history is reviewed and is unchanged in the interim.    Review of Systems:  Positive for weakness, blurry vision, balance changes.  Negative for numbness/tingling, loss of bowel/bladder control, footdrop, headache, dizziness, nausea/vomiting.     Objective:   CONSTITUTIONAL:  Vital signs as above.  No acute distress.  The patient is well nourished and well groomed.    PSYCHIATRIC:  The patient is awake, alert, oriented to person, place and time.  The patient is answering questions appropriately with clear speech.  Normal affect.  HEENT: Normocephalic, atraumatic.  Sclera clear.    SKIN:  Skin over the face, posterior torso,  bilateral upper and lower extremities is clean, dry, intact without rashes.  MUSCULOSKELETAL:  Gait is antalgic, favoring the right.  She uses a cane for assistance.    Mild tenderness over the bilateral lower lumbar paraspinal muscles.  Tender palpation record left sacral iliac joint.  Hypertonicity bilateral gluteal muscles, although I was not able to reproduce pain with deep palpation along the piriformis.  Mild tenderness palpation with hypertonicity in the left cervical paraspinous muscles.   The patient has 5/5 strength for the bilateral shoulder abductors, elbow flexors/extensors, wrist extensors, finger flexors/abductors, hip flexors, knee flexors/extensors, ankle dorsiflexors/plantar flexors, ankle evertors/invertors.    NEUROLOGICAL: 1-2+ biceps, triceps, brachialis, patellar, achilles reflexes which are symmetric bilaterally.  No ankle clonus bilaterally.  Sensation to light touch is intact in the bilateral L4, L5, and S1 dermatomes.       RESULTS:   I reviewed the MRI lumbar spine from [facility] dated June 8, 2018.  At L4-5 there is mild spinal canal stenosis with asymmetric left lateral recess stenosis.  There is mild left  foraminal stenosis.  At this level there is mild facet arthropathy and a circumferential disc bulge.  At L3-4 there is low-grade lateral recess stenosis with mild right and mild to moderate left foraminal stenosis.  There is a chronic superior endplate compression fracture T12.  Please see report for further details.

## 2021-06-24 NOTE — TELEPHONE ENCOUNTER
Called Sarah from [facility] to relay PCP message.  Did give CMT direct line, if any questions. Thanks.

## 2021-06-25 ENCOUNTER — HOSPITAL ENCOUNTER (OUTPATIENT)
Dept: RESPIRATORY THERAPY | Facility: HOSPITAL | Age: 81
Discharge: HOME | End: 2021-06-25
Attending: INTERNAL MEDICINE
Payer: COMMERCIAL

## 2021-06-25 ENCOUNTER — RECORDS - HEALTHEAST (OUTPATIENT)
Dept: ADMINISTRATIVE | Facility: OTHER | Age: 81
End: 2021-06-25

## 2021-06-25 DIAGNOSIS — R06.09 OTHER FORM OF DYSPNEA: ICD-10-CM

## 2021-06-25 NOTE — ED TRIAGE NOTES
PT BECAME SHORT OF BREATH YESTERDAY, NO KNOWN COVID EXPOSURE. PT COMPLAINS OF NECK PAIN X2 DAYS. PT 89% TO 95% RA IN TRIAGE.

## 2021-06-25 NOTE — TELEPHONE ENCOUNTER
Prior Authorization Request  Who s requesting:  Patient and Pharmacy  [facility]  Medication Name: Bevespi Jedramiro,   Insurance Plan: [employer], Medicare and Medicaid  Insurance Member ID Number:  See face sheet  CoverMyMeds Key: N/A  Informed patient that prior authorizations can take up to 10 business days for response:   Yes  Okay to leave a detailed message: Yes

## 2021-06-25 NOTE — ED NOTES
Discharge instructions and follow up care reviewed with patient and family member who verbalized understanding via reza . Patient ambulatory at discharge but provided a wheelchair ride to the car.

## 2021-06-25 NOTE — ED NOTES
Patient ambulatory in room maintaining RA oxygen at 94-97%, patient indicated she felt okay and her shortness of breath she had on arrival is improved.

## 2021-06-25 NOTE — TELEPHONE ENCOUNTER
RN cannot approve Refill Request    RN can NOT refill this medication med is not covered by policy/route to provider. Last office visit: 5/11/2021 Kateryna Morales MD Last Physical: 12/14/2020 Last MTM visit: Visit date not found Last visit same specialty: 5/11/2021 Kateryna Morales MD.  Next visit within 3 mo: Visit date not found  Next physical within 3 mo: Visit date not found      Kaitlynn Grewal, [facility] Connection Triage/Med Refill 6/7/2021    Requested Prescriptions   Pending Prescriptions Disp Refills     MAPAP ARTHRITIS PAIN 650 mg CR tablet [Pharmacy Med Name: MAPAP ARTHRITIS  MG C 650 Tablet] 120 tablet 6     Sig: TAKE 2 TABLETS BY MOUTH 2 TIMES A DAY       There is no refill protocol information for this order

## 2021-06-26 NOTE — PATIENT INSTRUCTIONS - HE
1. Use spacer with inhaler   2. Talk with lung doctor about changing combivent   3. Schedule appt for neck ultrasound at [facility]  [phone]                           My Depression Action Plan  Name: Luis Manuel Lemon   Date of Birth 1940  Date: 6/10/2021    My Doctor: Kateryna Morales MD   My Clinic: [address] [address]  [phone]          GREEN    ZONE   Good Control    What it looks like:     Things are going generally well. You have normal ups and downs. You may even feel depressed from time to time, but bad moods usually last less than a day.   What you need to do:  1. Continue to care for yourself (see self care plan)  2. Check your depression survival kit and update it as needed  3. Follow your physician s recommendations including any medication.  4. Do not stop taking medication unless you consult with your physician first.           YELLOW         ZONE Getting Worse    What it looks like:     Depression is starting to interfere with your life.     It may be hard to get out of bed; you may be starting to isolate yourself from others.    Symptoms of depression are starting to last most all day and this has happened for several days.     You may have suicidal thoughts but they are not constant.   What you need to do:     1. Call your care team. Your response to treatment will improve if you keep your care team informed of your progress. Yellow periods are signs an adjustment may need to be made.     2. Continue your self-care.  Just get dressed and ready for the day.  Don't give yourself time to talk yourself out of it.    3. Talk to someone in your support network.    4. Open up your depression Depression Self-Care Plan / Wellness kit.           RED    ZONE Medical Alert - Get Help    What it looks like:     Depression is seriously interfering with your life.     You may experience these or other symptoms: You can t get out of bed most days,  can t work or engage in other necessary activities, you have trouble taking care of basic hygiene, or basic responsibilities, thoughts of suicide or death that will not go away, self-injurious behavior.     What you need to do:  1. Call your care team and request a same-day appointment. If they are not available (weekends or after hours) call your local crisis line, emergency room or 911.          Depression Self-Care Plan / Wellness Kit    Many people find that medication and therapy are helpful treatments for managing depression. In addition, making small changes to your everyday life can help to boost your mood and improve your wellbeing. Below are some tips for you to consider. Be sure to talk with your medical provider and/or behavioral health consultant if your symptoms are worsening or not improving.     Sleep   Sleep hygiene  means all of the habits that support good, restful sleep. It includes maintaining a consistent bedtime and wake time, using your bedroom only for sleeping or sex, and keeping the bedroom dark and free of distractions like a computer, smartphone, or television.     Develop a Healthy Routine  Maintain good hygiene. Get out of bed in the morning, make your bed, brush your teeth, take a shower, and get dressed. Don t spend too much time viewing media that makes you feel stressed. Find time to relax each day.    Exercise  Get some form of exercise every day. This will help reduce pain and release endorphins, the  feel good  chemicals in your brain. It can be as simple as just going for a walk or doing some gardening, anything that will get you moving.      Diet  Strive to eat healthy foods, including fruits and vegetables. Drink plenty of water. Avoid excessive sugar, caffeine, alcohol, and other mood-altering substances.     Stay Connected with Others  Stay in touch with friends and family members.    Manage Your Mood  Try deep breathing, massage therapy, biofeedback, or meditation. Take  part in fun activities when you can. Try to find something to smile about each day.     Psychotherapy  Be open to working with a therapist if your provider recommends it.     Medication  Be sure to take your medication as prescribed. Most anti-depressants need to be taken every day. It usually takes several weeks for medications to work. Not all medicines work for all people. It is important to follow-up with your provider to make sure you have a treatment plan that is working for you. Do not stop your medication abruptly without first discussing it with your provider.    Crisis Resources   These hotlines are for both adults and children. They and are open 24 hours a day, 7 days a week unless noted otherwise.      National Suicide Prevention Lifeline   9-585-658-TALK (1169)      Crisis Text Line    www.crisistextline.org  Text HOME to 224838 from anywhere in the [place], anytime, about any type of crisis. A live, trained crisis counselor will receive the text and respond quickly.      [employer] Lifeline for LGBTQ Youth  A national crisis intervention and suicide lifeline for LGBTQ youth under 25. Provides a safe place to talk without judgement. Call 1-347.328.3869; text START to 044221 or visit www.theOptimatavorproject.org to talk to a trained counselor.      For [facility] crisis numbers, visit the [facility] website at:  https://[place].gov/dhs/people-we-serve/adults/health-care/mental-health/resources/crisis-contacts.jsp

## 2021-06-26 NOTE — PROGRESS NOTES
[facility] Care Coordination      [facility] Six-Month Telephone Assessment    6 month telephone assessment completed on 6/9/2021.    ER visits: No  Hospitalizations: No  TCU stays: No  Significant health status changes: No significant changes. Continues to work with specialty providers to improve pain. Reviewed the COVID-19 vaccination and she is not interested in it at this time. Does not feel comfortable.   Falls/Injuries: No  ADL/IADL changes: No  Changes in services: No- PCA services will continue at 4.5 hours a day. SN will continue biweekly.     Caregiver Assessment follow up:  N/A    Goals: See POC in chart for goal progress documentation.      Will see member in 6 months for an annual health risk assessment.   Encouraged member to call CC with any questions or concerns in the meantime.     REBECCA Srinivasan  [facility]  [phone]

## 2021-06-26 NOTE — PATIENT INSTRUCTIONS - HE
1) I can change your inhalers around to see if we can find something to work   1) Stop the combivent, and start bevespi.  Use this twice a day regardless of how you feel   2) Keep using the albuterol as needed    2) I will also test a couple of things.  We measured your oxygen in clinic   1) I will also check an echocardiogram, this is an ultrasound of your heart   2) I will also test your airflow with a PFT    3) I'll see you back in 3 months

## 2021-06-26 NOTE — PROGRESS NOTES
[facility] Care Coordination  CC received notification of Emergency Room visit.  ER visit occurred on 6/10/2021 at [facility] with Dx of shortness of breath.    CC contacted adult daughter La May and reviewed discharge summary.  Member has a follow-up appointment with PCP: Yes: scheduled on 6/10/2021  Member has had a change in condition: No  New referrals placed: No  Home Visit Needed: No  Care plan reviewed and updated.  PCP notified of ED visit via EMR.    REBECCA Srinivasan  [facility]  [phone]

## 2021-06-26 NOTE — PROGRESS NOTES
Oxygen saturation walk test    Patient oxygen saturation on RA at rest is 96%.  Oxygen saturation while ambulating 150ft on RA is 91%.      Patient is ambulatory within his/her home.

## 2021-06-26 NOTE — ED PROVIDER NOTES
EMERGENCY DEPARTMENT ENCOUnter      NAME: Luis Manuel Lemon  AGE: 81 y.o. female  YOB: 1940  MRN: [id number]  EVALUATION DATE & TIME: 6/9/2021  6:26 PM    PCP: Kateryna Morales MD    ED PROVIDER: Doe Lagos M.D.      Chief Complaint   Patient presents with     Shortness of Breath         FINAL IMPRESSION:  1. Chronic bilateral low back pain without sciatica    2. Chronic neck pain    3. SOB (shortness of breath)          ED COURSE & MEDICAL DECISION MAKING:    Pertinent Labs & Imaging studies reviewed. (See chart for details)  81 y.o. female presents to the Emergency Department for evaluation of shortness of breath. Patient appears non toxic with stable vitals signs, patient is afebrile with no tachycardia, no hypoxia on room air in the exam room but did note 89% out at triage. Overall exam is benign.  Lungs are clear and abdomen is benign.  Patient denies any new chest pain, pleurisy, no productive cough, fever or body aches.  She does endorse ongoing and chronic neck and low back pain, she denies any falls or trauma.  Review the medical record shows note from 5/17/2021 noting her chronic neck pain and chronic bilateral low back pain, she is seen at the spine care office for this.  Again no new falls or trauma, moves her neck freely with no signs of meningeal irritation, no fever here.  Certainly nothing suggest meningitis, subarachnoid hemorrhage, cervical or lumbar fracture or dislocation, she has a GCS of 15 with no focal neuro deficits to suggest spinal cord compromise, she denies any change in bowel or bladder habits or new incontinence to suggest cauda equina, epidural abscess, discitis, osteomyelitis, or other more malicious etiology of symptoms.  Her story is atypical for ACS, she denies any pleurisy, no ripping or tearing chest discomfort the back or shoulders with nothing to suggest PE, dissection, esophageal rupture, she has had no tachycardia.  We will obtain screening labs, ECG, chest  x-ray.  Patient was given pain medications here for her neck and back pain and also a DuoNeb treatment.    6:47 PM I met with the patient, obtained history, performed an initial exam, and discussed options and plan for diagnostics and treatment here in the ED. Proper PPE was worn during the entire patient encounter, including: N95 mask, goggles, surgical cap, and gloves.   9:09 PM repeat exam is benign, we will ambulate the patient and anticipate discharge.  10:14 PM Per nursing report, the patient ambulates well and feels her symptoms have improved. Will discharge with follow up.     Reassessment: Troponin negative, ECG nonischemic, BNP is not elevated.  Again the patient denies any chest pain, pleurisy, she has not been tachycardic, no hypoxia here in the exam room and I am slightly suspicious that the 1 recording of 89% is not consistent with the rest of her measurements here.  Covid was negative, chest x-ray reported no acute concerning findings.  Again review the medical record shows history of chronic neck and back pain as well as history of restrictive lung disease.  Following our interventions patient felt markedly improved and repeat exam was benign, she was able to easily ambulate here in the department with no hypoxia.  Again I do not suspect infection, pneumonia, PE.  I suspect her symptoms are secondary to longstanding medical issues in terms of her chronic pain restrictive lung disease.  With negative work-up and benign exam feel she is safe for discharge and close follow-up at this time, family assures me the patient does have follow-up with her primary care provider tomorrow, with such good close follow-up feel that she is again safe for discharge.  Discussed all these findings recommendations with patient and family they felt reassured and comfortable with discharge.  Return precautions provided.    At the conclusion of the encounter I discussed the results of all of the tests and the disposition.  The questions were answered and return precautions provided. The patient or family acknowledged understanding and was agreeable with the care plan.         MEDICATIONS GIVEN IN THE EMERGENCY:  Medications   sodium chloride flush 10 mL (NS) (has no administration in time range)   ketorolac injection 15 mg (TORADOL) (15 mg Intravenous Given 6/9/21 1931)   ipratropium-albuteroL 0.5-2.5 mg/3 mL nebulizer solution 3 mL (DUO-NEB) (3 mL Nebulization Given 6/9/21 1932)       NEW PRESCRIPTIONS STARTED AT TODAY'S ER VISIT  Discharge Medication List as of 6/9/2021 10:20 PM      CONTINUE these medications which have NOT CHANGED    Details   albuterol (PROAIR HFA;PROVENTIL HFA;VENTOLIN HFA) 90 mcg/actuation inhaler Inhale 2 puffs every 6 (six) hours as needed for wheezing or shortness of breath., Starting Tue 7/14/2020, Normal      amLODIPine (NORVASC) 10 MG tablet TAKE 1 TABLET ORALLY EVERY DAY., Normal      calcium, as carbonate, (ANTACID, CALCIUM CARBONATE,) 200 mg calcium (500 mg) chewable tablet CHEW 1 TABLET BY MOUTH THREE TIMES A DAY TO KEEP YOUR BONES HEALTHY, Normal      capsaicin (ZOSTRIX) 0.025 % cream Apply topically 2 (two) times a day as needed., Starting Thu 2/25/2021, Normal      celecoxib (CELEBREX) 100 MG capsule TAKE ONE CAPSULE BY MOUTH DAILY., Normal      DULoxetine (CYMBALTA) 30 MG capsule TAKE 1 CAPSULE (30 MG TOTAL) BY MOUTH DAILY., Starting Mon 5/17/2021, Normal      famotidine (PEPCID) 40 MG tablet TAKE 1 TABLET BY MOUTH TWICE DAILY., Normal      fluticasone furoate-vilanteroL (BREO ELLIPTA) 100-25 mcg/dose inhaler Inhale 1 puff daily., Starting Mon 12/7/2020, Until Tue 5/11/2021, Normal      gabapentin (NEURONTIN) 100 MG capsule Take 1 tab (100mg) in the morning and 2 tabs (200mg) at bedtime, Normal      hydroCHLOROthiazide (HYDRODIURIL) 25 MG tablet TAKE ONE TABLET BY MOUTH ONCE DAILY WITH LOSARTAN 100MG., Normal      ipratropium-albuteroL (COMBIVENT RESPIMAT)  mcg/actuation Mist inhaler Inhale  1 puff every 6 (six) hours as needed., Starting Thu 10/15/2020, Normal      losartan (COZAAR) 100 MG tablet TAKE 1 TABLET BY MOUTH DAILY WITH HYDROCHLOROTHIAZIDE 25MG, Normal      MAPAP ARTHRITIS PAIN 650 mg CR tablet TAKE 2 TABLETS BY MOUTH 2 TIMES A DAY, Normal      metoprolol succinate (TOPROL-XL) 50 MG 24 hr tablet TAKE 1 TABLET BY MOUTH DAILY, Normal      mirtazapine (REMERON) 15 MG tablet Take 1.5 tablets (22.5 mg total) by mouth at bedtime., Starting Tue 5/11/2021, Normal      multivitamin (ONE A DAY) per tablet Take 1 tablet by mouth daily., Starting Tue 5/11/2021, Normal      polyvinyl alcohol (ARTIFICIAL TEARS, POLYVIN ALC,) 1.4 % ophthalmic solution APPLY TO EYE DAILY AS NEEDED., Normal      senna-docusate (SENEXON-S) 8.6-50 mg tablet Take 2 tablets by mouth daily., Starting Thu 10/15/2020, Normal                =================================================================    HPI    Patient information was obtained from: patient     Use of Intrepreter: Yes (Phone) - Language Amie Gunter Eastern    Luis Manuel Ploh is a 81 y.o. female with a pertinent history of hypertension, restrictive lung disease, CKD stage 3, pulmonary embolism without acute cor pulmonale, and pulmonary fibrosis who presents to this ED by private car for evaluation of shortness of breath.     Patient has ongoing, chronic intermittent neck and back pain which she feels is getting worse. This morning, she developed shortness of breath secondary to her worsening neck and back pain which has been constant all day. She feels like she can't even stand normally due to her back pain. Patient is takes medication daily prescribed by her PCP for pain and denies missing any of her medications. Patient denies use of oxygen supplementation at home, or any recent falls or trauma in the past 4 years. She also denies any recent exposure to COVID-19 contacts.     Denies fever, vomiting, cough, abnormal urine or bowel movements, hematuria, dysuria, bloody  stool, or any other additional symptoms at this time.       REVIEW OF SYSTEMS   Constitutional:  Denies fever, chills, excessive weight loss  Eyes:  Denies any vision changes  Respiratory:  Denies productive cough or shortness of breath. Positive for shortness of breath   Cardiovascular:  Denies chest pain or palpitations  GI:  Denies abdominal pain, nausea, vomiting, or change in bowel or bladder habits   Musculoskeletal:  Positive for neck, back pain, and abdominal pain with palpation. Denies any new muscle/joint pain.    Skin:  Denies rash   Neurologic:  Denies headache, focal weakness or sensory changes   Psych: Mood and affect normal  All systems reviewed and are negative except as marked.     PAST MEDICAL HISTORY:  Past Medical History:   Diagnosis Date     Biceps tendon rupture      CHF (congestive heart failure) (H) 10/8/2017     Chronic use of steroids 2/24/2015 2/24/2015:  For connective tissue disease.  On steroids since at least 2011.  Attempts to wean have been unsuccessful.  Rheum currently trying again to wean.  Has known osteoporosis with compression fracture.      Compression fracture 2/4/2015 2/24/2015:  T12 compression fracture with 33% height loss seen on plain films 1/2015.  Was not seen on plain films 6/2014, so is presumably new since then.      MILLER (dyspnea on exertion) 4/25/2018     Dyspepsia      Eustachian Tube Dysfunction Of The Left Ear     Created by Conversion      GERD (gastroesophageal reflux disease)      History of immunological disorder     Connective Tissue Disorder     History of kidney stones      Hyperlipidemia      Hypertension      Inverted nipple     Bilaterally     Myalgia and myositis      Neuralgia      Noncompliance With Therapy Due To Lack Of Comprehension     2/24/2015:  Has had trouble historically understanding and taking medicines.  Much improved now with home health RN Georgina Salinas from [facility].  Georgina's cell [phone].  She needs to be called  with all new orders.  Also in [facility] program -- Care Guide is Jose Castillo.       Opioid dependence, episodic (H) 8/8/2019     Osteoarthritis      Osteoporosis      Pancreatitis 4/15/2016     Polymyalgia rheumatica (H)      Restrictive lung disease      Tinea Pedis     Created by Conversion      Vitamin D deficiency        PAST SURGICAL HISTORY:  Past Surgical History:   Procedure Laterality Date     CHOLECYSTECTOMY       KIDNEY STONE SURGERY       [place] TOTAL ABDOM HYSTERECTOMY      Description: Hysterectomy;  Recorded: 03/08/2013;     VENTRAL HERNIA REPAIR N/A 4/9/2016    Procedure: HERNIA REPAIR VENTRAL ;  Surgeon: Duncan Odonnell MD;  Location: [facility] [place];  Service:            CURRENT MEDICATIONS:    Current Facility-Administered Medications on File Prior to Encounter   Medication     denosumab 60 mg (PROLIA 60 mg/ml)     Current Outpatient Medications on File Prior to Encounter   Medication Sig     albuterol (PROAIR HFA;PROVENTIL HFA;VENTOLIN HFA) 90 mcg/actuation inhaler Inhale 2 puffs every 6 (six) hours as needed for wheezing or shortness of breath.     amLODIPine (NORVASC) 10 MG tablet TAKE 1 TABLET ORALLY EVERY DAY.     calcium, as carbonate, (ANTACID, CALCIUM CARBONATE,) 200 mg calcium (500 mg) chewable tablet CHEW 1 TABLET BY MOUTH THREE TIMES A DAY TO KEEP YOUR BONES HEALTHY     capsaicin (ZOSTRIX) 0.025 % cream Apply topically 2 (two) times a day as needed.     celecoxib (CELEBREX) 100 MG capsule TAKE ONE CAPSULE BY MOUTH DAILY.     DULoxetine (CYMBALTA) 30 MG capsule TAKE 1 CAPSULE (30 MG TOTAL) BY MOUTH DAILY.     famotidine (PEPCID) 40 MG tablet TAKE 1 TABLET BY MOUTH TWICE DAILY.     fluticasone furoate-vilanteroL (BREO ELLIPTA) 100-25 mcg/dose inhaler Inhale 1 puff daily.     gabapentin (NEURONTIN) 100 MG capsule Take 1 tab (100mg) in the morning and 2 tabs (200mg) at bedtime     hydroCHLOROthiazide (HYDRODIURIL) 25 MG tablet TAKE ONE TABLET BY MOUTH ONCE DAILY WITH  LOSARTAN 100MG.     ipratropium-albuteroL (COMBIVENT RESPIMAT)  mcg/actuation Mist inhaler Inhale 1 puff every 6 (six) hours as needed.     losartan (COZAAR) 100 MG tablet TAKE 1 TABLET BY MOUTH DAILY WITH HYDROCHLOROTHIAZIDE 25MG     MAPAP ARTHRITIS PAIN 650 mg CR tablet TAKE 2 TABLETS BY MOUTH 2 TIMES A DAY     metoprolol succinate (TOPROL-XL) 50 MG 24 hr tablet TAKE 1 TABLET BY MOUTH DAILY     mirtazapine (REMERON) 15 MG tablet Take 1.5 tablets (22.5 mg total) by mouth at bedtime.     multivitamin (ONE A DAY) per tablet Take 1 tablet by mouth daily.     polyvinyl alcohol (ARTIFICIAL TEARS, POLYVIN ALC,) 1.4 % ophthalmic solution APPLY TO EYE DAILY AS NEEDED.     senna-docusate (SENEXON-S) 8.6-50 mg tablet Take 2 tablets by mouth daily.       ALLERGIES:  Allergies   Allergen Reactions     Ciprofloxacin      Lisinopril        FAMILY HISTORY:  Family History   Problem Relation Age of Onset     Breast cancer Neg Hx        SOCIAL HISTORY:   Social History     Socioeconomic History     Marital status:      Spouse name: Not on file     Number of children: Not on file     Years of education: Not on file     Highest education level: Not on file   Occupational History     Not on file   Social Needs     Financial resource strain: Not on file     Food insecurity     Worry: Not on file     Inability: Not on file     Transportation needs     Medical: Not on file     Non-medical: Not on file   Tobacco Use     Smoking status: Never Smoker     Smokeless tobacco: Never Used     Tobacco comment: chew betel nut   Substance and Sexual Activity     Alcohol use: No     Drug use: No     Sexual activity: Not on file   Lifestyle     Physical activity     Days per week: Not on file     Minutes per session: Not on file     Stress: Not on file   Relationships     Social connections     Talks on phone: Not on file     Gets together: Not on file     Attends Spiritism service: Not on file     Active member of club or organization:  Not on file     Attends meetings of clubs or organizations: Not on file     Relationship status: Not on file     Intimate partner violence     Fear of current or ex partner: Not on file     Emotionally abused: Not on file     Physically abused: Not on file     Forced sexual activity: Not on file   Other Topics Concern     Not on file   Social History Narrative    3/27/2015:  Kris Holloway.  Came to [place] from [facility] in approximately 2010.  No alcohol use, lives with her daughter Efrain Prieto.  Never a smoker.  Home RN Georgina Salinas from [facility] visits every 2 weeks and sets up meds.  Has PCA 1.75 hours/day as well.  Goes to Adult  at [facility] 2 days per week.       VITALS:  Patient Vitals for the past 24 hrs:   BP Temp Temp src Pulse Resp SpO2 Weight   06/09/21 2145 141/67 -- -- 62 -- 95 % --   06/09/21 2130 138/70 -- -- 64 -- 96 % --   06/09/21 2115 141/68 -- -- 70 -- 95 % --   06/09/21 2100 136/70 -- -- 67 -- 94 % --   06/09/21 2045 137/68 -- -- 66 -- 95 % --   06/09/21 2030 154/74 -- -- 67 -- 96 % --   06/09/21 2000 135/65 -- -- 68 -- 95 % --   06/09/21 1955 -- -- -- 70 -- 99 % --   06/09/21 1930 133/71 -- -- 66 -- 93 % --   06/09/21 1915 126/61 -- -- 67 -- 94 % --   06/09/21 1900 134/65 -- -- 65 -- 94 % --   06/09/21 1845 129/61 -- -- 67 28 94 % --   06/09/21 1817 -- -- -- -- -- (!) 89 % --   06/09/21 1816 -- -- -- -- -- -- 122 lb (55.3 kg)   06/09/21 1815 110/57 98.4  F (36.9  C) Temporal 72 20 94 % --       PHYSICAL EXAM    Constitutional:  Awake, alert, in no apparent [place] distress  HENT:  Normocephalic, Atraumatic, Bilateral external ears normal, Oropharynx moist, Nose normal. Neck- Normal range of motion and moves neck freely with no signs of meningeal irritation, No midline cervical tenderness, Supple, No stridor.   Eyes:  PERRL, EOMI, Conjunctiva normal, No discharge.   Respiratory:  Normal breath sounds, No respiratory distress, No wheezing   Cardiovascular:  Normal heart rate, Normal  rhythm, No appreciable rubs or gallops.   GI:  Soft, No tenderness, No distension, No palpable or pulsatile masses  Musculoskeletal:  Intact distal pulses, No edema. Good range of motion in all major joints. No tenderness to palpation or major deformities noted.  Back-nontender along midline cervical, thoracic spine, bilateral paraspinal lumbar tenderness with no step-offs or signs of trauma.  Integument:  Warm, Dry, No erythema, No rash.   Neurologic:  Alert & oriented, Normal motor function, Normal sensory function, No focal deficits noted.   Psychiatric:  Affect normal, Judgment normal, Mood normal.      LAB:  All pertinent labs reviewed and interpreted.  Results for orders placed or performed during the hospital encounter of 06/09/21   Basic Metabolic Panel   Result Value Ref Range    Sodium 138 136 - 145 mmol/L    Potassium 4.5 3.5 - 5.0 mmol/L    Chloride 102 98 - 107 mmol/L    CO2 26 22 - 31 mmol/L    Anion Gap, Calculation 10 5 - 18 mmol/L    Glucose 99 70 - 125 mg/dL    Calcium 8.8 8.5 - 10.5 mg/dL    BUN 11 8 - 28 mg/dL    Creatinine 0.94 0.60 - 1.10 mg/dL    GFR MDRD Af Amer >60 >60 mL/min/1.73m2    GFR MDRD Non Af Amer 57 (L) >60 mL/min/1.73m2   HM2 (CBC W/O DIFF)   Result Value Ref Range    WBC 5.9 4.0 - 11.0 thou/uL    RBC 4.20 3.80 - 5.40 mill/uL    Hemoglobin 10.3 (L) 12.0 - 16.0 g/dL    Hematocrit 35.6 35.0 - 47.0 %    MCV 85 80 - 100 fL    MCH 24.5 (L) 27.0 - 34.0 pg    MCHC 28.9 (L) 32.0 - 36.0 g/dL    RDW 14.7 (H) 11.0 - 14.5 %    Platelets 262 140 - 440 thou/uL    MPV 10.8 8.5 - 12.5 fL   Troponin I   Result Value Ref Range    Troponin I <0.01 0.00 - 0.29 ng/mL   BNP(B-type Natriuretic Peptide)   Result Value Ref Range    BNP 26 0 - 155 pg/mL   Symptomatic SARS-CoV-2 (COVID-19)-PCR    Specimen: Respiratory   Result Value Ref Range    SARS-CoV-2 PCR Result Negative Negative, Invalid       RADIOLOGY:  Reviewed all pertinent imaging. Please see official radiology report.  Xr Chest 2 Views    Result  Date: 6/9/2021  EXAM: XR CHEST 2 VIEWS LOCATION: [facility] DATE/TIME: 6/9/2021 7:54 PM INDICATION: Shortness of breath. COMPARISON: 12/14/2019.     Lungs are hypoexpanded. Mild bilateral interstitial prominence is exaggerated by hypoexpansion, though underlying congestion also possible, given mild cardiac silhouette enlargement. Mild central airway thickening from bronchitis or edema. No  focal consolidation. No pleural effusion of significant size. No pneumothorax. Scoliosis.       EKG:    Normal sinus rhythm, no specific ST acute ischemic changes, no concerning dysrhythmias or interval prolongation, when compared to ECG of December 14, 2019, no acute ischemic change appreciated  I have independently reviewed and interpreted the EKG(s) documented above.    PROCEDURES:   None       I, Nu Pan, am serving as a scribe to document services personally performed by Doe Lagos MD, based on my observation and the provider's statements to me. I, Doe Lagos MD attest that Nu Pan is acting in a scribe capacity, has observed my performance of the services and has documented them in accordance with my direction.    Doe Lagos M.D.  Emergency Medicine  [facility] EMERGENCY DEPARTMENT  [address]  [facility] [address]  Dept: [phone]  Loc: [phone]     Doe Lagos MD  06/09/21 2334

## 2021-06-26 NOTE — PROGRESS NOTES
Assessment & Plan     Short of breath on exertion  Restrictive lung disease  Pulmonary fibrosis (H)  Advised to f/u with pulm clinic next week as scheduled  Advised to continue breo and combivent but to discuss difficulty with the dispenser with pulm doctor  Given spacer and spacer technique teaching for improved inhaler use  - albuterol (PROAIR HFA;PROVENTIL HFA;VENTOLIN HFA) 90 mcg/actuation inhaler  Dispense: 1 Inhaler; Refill: 11    LAD (lymphadenopathy) of left cervical region  She does chew betel nut and thus at increased risk for oral and throat cancer.   Will check ultrasound given tender LAD  - US Neck Limited    Gastroesophageal reflux disease without esophagitis  Missing bottles so refill given  - calcium, as carbonate, (ANTACID, CALCIUM CARBONATE,) 200 mg calcium (500 mg) chewable tablet  Dispense: 90 tablet; Refill: 3    Vitamin D deficiency  Missing bottles so refill given  - calcium, as carbonate, (ANTACID, CALCIUM CARBONATE,) 200 mg calcium (500 mg) chewable tablet  Dispense: 90 tablet; Refill: 3  - ergocalciferol (ERGOCALCIFEROL) 1,250 mcg (50,000 unit) capsule  Dispense: 4 capsule; Refill: 12    Age-related osteoporosis without current pathological fracture  Continue prolia as scheduled  - calcium, as carbonate, (ANTACID, CALCIUM CARBONATE,) 200 mg calcium (500 mg) chewable tablet  Dispense: 90 tablet; Refill: 3  - ergocalciferol (ERGOCALCIFEROL) 1,250 mcg (50,000 unit) capsule  Dispense: 4 capsule; Refill: 12    Essential hypertension  Well controlled with current regimen    Chronic low back pain, unspecified back pain laterality, unspecified whether sciatica present  Spinal stenosis, multilevel  Chronic pain syndrome  Continue current meds and f/u with spine clinic a a couple of weeks as scheduled    Major depressive disorder, recurrent episode, mild (H)  Continue current meds    Polypharmacy  Home health RN setting up meds however some meds were missing  Given med list to her granddaughter  "to help keep track of refills and meds    Language barrier affecting health care        Review of external notes as documented in note  Diagnosis or treatment significantly limited by social determinants of health - language barrier, low health literacy, poverty  Prescription drug management  36 minutes spent on the date of the encounter doing chart review, history and exam, documentation and further activities per the note       BMI:   Estimated body mass index is 26.11 kg/m  as calculated from the following:    Height as of this encounter: 4' 9.87\" (1.47 m).    Weight as of this encounter: 124 lb 6.4 oz (56.4 kg).       Return in about 4 weeks (around 7/8/2021) for Recheck and f/u on neck u/s and pulm consult.    Kateryna Morales MD  [facility]   [facility] Ion is 81 y.o. and presents today for the following health issues   HPI   [employer] professional phone  used.   Here with granddaughter for f/u on HTN  And pain and ER visit    ER note reviewed    She is also reporting a lump on the left side of neck.   She says it has been present for a couple of years but recently started hurting.   No sore throat or ear pain  She does chew betel nut (but not tobacco or leaves) 1-2 x per day    Spinal stenosis of lumbar region without neurogenic claudication  Chronic pain syndrome  S/p recent injections with Spine clinic  status post bilateral L3, L4, L5 radiofrequency ablation April 19, 2021 which provided 70% relief of her pain.   spine clinic consult 5/17/21 and f/u 6/29/21   medication list is Celebrex 100 mg daily, duloxetine 30 mg daily, gabapentin 100 mg in the morning and 200 mg at bedtime, Tylenol arthritis 1300 mg twice daily.    Major depressive disorder, recurrent episode, mild (H)  Psychophysiological insomnia  Continue current meds.  Will increase the dose of Remeron to 1-1/2 tablets    Essential hypertension  Meds: amlodipine 10mg, hydrochlorothiazide 25mg, losartan " 100mg, metoprolol XL 50mg      Senile osteoporosis  Continue Prolia shots  No bottles for  calcium and vitamin D     Low Vit D    History of pulmonary embolism  Pulmonary fibrosis (H)  Restrictive lung disease  repeat chest CT showed full resolution of PE  Pulm consult 12/7/20 note reviewed and f/u appt next 6/14/21   breo daily in the morning before brushing teeth  combivent - is difficult for her to twist to open b/c her weakness  Ran out of albuterol - she has refills on rx but did not get refills. Home RN calls for refills, but did not call this refill    Polypharmacy  Home health RN  No bottles for calcium or Vit D    Review of Systems   Please see above.  The rest of the review of systems are negative for all systems.     Current Outpatient Medications   Medication Sig     albuterol (PROAIR HFA;PROVENTIL HFA;VENTOLIN HFA) 90 mcg/actuation inhaler Inhale 2 puffs every 6 (six) hours as needed for wheezing or shortness of breath.     amLODIPine (NORVASC) 10 MG tablet TAKE 1 TABLET ORALLY EVERY DAY.     celecoxib (CELEBREX) 100 MG capsule TAKE ONE CAPSULE BY MOUTH DAILY.     DULoxetine (CYMBALTA) 30 MG capsule TAKE 1 CAPSULE (30 MG TOTAL) BY MOUTH DAILY.     famotidine (PEPCID) 40 MG tablet TAKE 1 TABLET BY MOUTH TWICE DAILY.     fluticasone furoate-vilanteroL (BREO ELLIPTA) 100-25 mcg/dose inhaler Inhale 1 puff daily.     gabapentin (NEURONTIN) 100 MG capsule Take 1 tab (100mg) in the morning and 2 tabs (200mg) at bedtime     hydroCHLOROthiazide (HYDRODIURIL) 25 MG tablet TAKE ONE TABLET BY MOUTH ONCE DAILY WITH LOSARTAN 100MG.     ipratropium-albuteroL (COMBIVENT RESPIMAT)  mcg/actuation Mist inhaler Inhale 1 puff every 6 (six) hours as needed.     losartan (COZAAR) 100 MG tablet TAKE 1 TABLET BY MOUTH DAILY WITH HYDROCHLOROTHIAZIDE 25MG     MAPAP ARTHRITIS PAIN 650 mg CR tablet TAKE 2 TABLETS BY MOUTH 2 TIMES A DAY     metoprolol succinate (TOPROL-XL) 50 MG 24 hr tablet TAKE 1 TABLET BY MOUTH DAILY      "polyvinyl alcohol (ARTIFICIAL TEARS, POLYVIN ALC,) 1.4 % ophthalmic solution APPLY TO EYE DAILY AS NEEDED.     senna-docusate (SENEXON-S) 8.6-50 mg tablet Take 2 tablets by mouth daily.     calcium, as carbonate, (ANTACID, CALCIUM CARBONATE,) 200 mg calcium (500 mg) chewable tablet CHEW 1 TABLET BY MOUTH THREE TIMES A DAY TO KEEP YOUR BONES HEALTHY     capsaicin (ZOSTRIX) 0.025 % cream Apply topically 2 (two) times a day as needed.     mirtazapine (REMERON) 15 MG tablet Take 1.5 tablets (22.5 mg total) by mouth at bedtime.     multivitamin (ONE A DAY) per tablet Take 1 tablet by mouth daily.           Objective    /58   Pulse (!) 55   Temp 97.8  F (36.6  C) (Oral)   Resp 14   Ht 4' 9.87\" (1.47 m)   Wt 124 lb 6.4 oz (56.4 kg)   SpO2 98%   BMI 26.11 kg/m    Body mass index is 26.11 kg/m .  Physical Exam    Vitals:    06/10/21 1019   BP: 130/58   Pulse: (!) 55   Resp: 14   Temp: 97.8  F (36.6  C)   TempSrc: Oral   SpO2: 98%   Weight: 124 lb 6.4 oz (56.4 kg)   Height: 4' 9.87\" (1.47 m)     OBJECTIVE:  Vitals listed above within normal limits  General appearance: well groomed, pleasant, well hydrated, nontoxic appearing  ENT: PERRL, throat clear  Neck: neck supple, no lymphadenopathy, no thyromegaly  Lungs: lungs clear to auscultation bilaterally, no wheezes or rhonchi  Heart: regular rate and rhythm, no murmurs, rubs or gallops  Abdomen: soft, nontender  Neuro: no focal deficits, CN II-XII grossly intact, alert and oriented  Psych:  mood stable, appears to have good insight and judgment      "

## 2021-06-26 NOTE — PROGRESS NOTES
Assessment and Plan:Luis Manuel Lemon is a 81 y.o. female with a past medical history significant for mild pulmonary fibrosis presumably from chronic acid reflux who presents to clinic today for follow up.  She seems to have progressive dyspnea.  She has a new CT which does not show any fibrosis, but may suggest pulmonary hypertension with and enlarged pulmonary artery.  She has no known cardiac disease and no ECHO since 2018.  I would like to re-evaluate her with new PFTs (5 years old) and an ECHO (3 years old).  We will also measure her oxygen with ambulation to ensure hypoxia is not driving her dyspnea and presumbed pulmonary hypertension.    1) Dyspnea - re-explore this problem.  ECHO and PFTs as listed above.  Ambulatory sats normal.  Suspect some of her dyspnea is severe deconditioning from fatigue and poor appetite.  2) Cough - no longer present, may have been gerd related  3) RTC in 3 months to assess med function and follow up on her results.      CCx: dyspnea    HPI: Ms. Luis Manuel Lemon is a 81 year old female with a history of presumed pulmonary fibrosis from GERD who returns to discuss worsening dyspnea.  She was recently in the ER and her primary doctors office with dyspnea.  She thinks her dyspnea over the last year is worse and worse.  She is short of breath at rest at times.  She finds the inhalers useful but still struggles with the combivent inhaler.  She denies chest pains.  She has no appetite and struggles to get enough food in.  She is fatigued most of the time.      ROS:  Review of Systems - History obtained from the patient  General ROS: negative  Psychological ROS: negative  ENT ROS: negative  Allergy and Immunology ROS: negative  Endocrine ROS: negative  Respiratory ROS: positive for - shortness of breath  negative for - cough, hemoptysis, orthopnea, pleuritic pain, stridor, tachypnea or wheezing  Cardiovascular ROS: no chest pain or palpitations  Gastrointestinal ROS: no abdominal pain, change in bowel  habits, or black or bloody stools  Genito-Urinary ROS: no dysuria, trouble voiding, or hematuria  Musculoskeletal ROS: negative  Neurological ROS: no TIA or stroke symptoms  Dermatological ROS: negative      Current Meds:  Current Outpatient Medications   Medication Sig     albuterol (PROAIR HFA;PROVENTIL HFA;VENTOLIN HFA) 90 mcg/actuation inhaler Inhale 2 puffs every 6 (six) hours as needed for wheezing or shortness of breath.     amLODIPine (NORVASC) 10 MG tablet TAKE 1 TABLET ORALLY EVERY DAY.     calcium, as carbonate, (ANTACID, CALCIUM CARBONATE,) 200 mg calcium (500 mg) chewable tablet CHEW 1 TABLET BY MOUTH THREE TIMES A DAY TO KEEP YOUR BONES HEALTHY     capsaicin (ZOSTRIX) 0.025 % cream Apply topically 2 (two) times a day as needed.     celecoxib (CELEBREX) 100 MG capsule TAKE ONE CAPSULE BY MOUTH DAILY.     DULoxetine (CYMBALTA) 30 MG capsule TAKE 1 CAPSULE (30 MG TOTAL) BY MOUTH DAILY.     ergocalciferol (ERGOCALCIFEROL) 1,250 mcg (50,000 unit) capsule Take 1 capsule (50,000 Units total) by mouth once a week.     famotidine (PEPCID) 40 MG tablet TAKE 1 TABLET BY MOUTH TWICE DAILY.     fluticasone furoate-vilanteroL (BREO ELLIPTA) 100-25 mcg/dose inhaler Inhale 1 puff daily.     gabapentin (NEURONTIN) 100 MG capsule Take 1 tab (100mg) in the morning and 2 tabs (200mg) at bedtime     hydroCHLOROthiazide (HYDRODIURIL) 25 MG tablet TAKE ONE TABLET BY MOUTH ONCE DAILY WITH LOSARTAN 100MG.     ipratropium-albuteroL (COMBIVENT RESPIMAT)  mcg/actuation Mist inhaler Inhale 1 puff every 6 (six) hours as needed.     losartan (COZAAR) 100 MG tablet TAKE 1 TABLET BY MOUTH DAILY WITH HYDROCHLOROTHIAZIDE 25MG     MAPAP ARTHRITIS PAIN 650 mg CR tablet TAKE 2 TABLETS BY MOUTH 2 TIMES A DAY     metoprolol succinate (TOPROL-XL) 50 MG 24 hr tablet TAKE 1 TABLET BY MOUTH DAILY     mirtazapine (REMERON) 15 MG tablet Take 1.5 tablets (22.5 mg total) by mouth at bedtime.     multivitamin (ONE A DAY) per tablet Take 1  "tablet by mouth daily.     polyvinyl alcohol (ARTIFICIAL TEARS, POLYVIN ALC,) 1.4 % ophthalmic solution APPLY TO EYE DAILY AS NEEDED.     senna-docusate (SENEXON-S) 8.6-50 mg tablet Take 2 tablets by mouth daily.       Labs:  No results found for this or any previous visit (from the past 72 hour(s)).    I have personally reviewed all pertinent imaging studies and PFT results unless otherwise noted.    Imaging studies:  Xr Chest 2 Views    Result Date: 6/9/2021  EXAM: XR CHEST 2 VIEWS LOCATION: [facility] DATE/TIME: 6/9/2021 7:54 PM INDICATION: Shortness of breath. COMPARISON: 12/14/2019.     Lungs are hypoexpanded. Mild bilateral interstitial prominence is exaggerated by hypoexpansion, though underlying congestion also possible, given mild cardiac silhouette enlargement. Mild central airway thickening from bronchitis or edema. No  focal consolidation. No pleural effusion of significant size. No pneumothorax. Scoliosis.         Physical Exam:  /60   Pulse (!) 58   Ht 4' 10\" (1.473 m)   Wt 124 lb (56.2 kg)   SpO2 93%   BMI 25.92 kg/m    General - Well nourished  Ears/Mouth -  Deferred given mask use during pandemic  Neck - no cervical lymphadenopathy  Lungs - Clear to ausculation bilaterally   CVS - regular rhythm with no murmurs, rubs or gallups  Abdomen - soft, NT, ND, NABS  Ext - no cyanosis, clubbing or edema  Skin - no rash  Psychology - alert and oriented, answers appropriate        Electronically signed by:    Johan Chavez MD PhD  [facility]  "

## 2021-06-26 NOTE — PROGRESS NOTES
RESPIRATORY CARE NOTE    Complete Pulmonary Function Test completed by patient.  Good patient effort and cooperation, however patient was getting very tired as study progressed.  Albuterol 2.5 mg neb given for bronchodilation. Inspiratory volume was <90% of VC on DLCO.  PT returned to baseline and left in no distress.    Joon Sams, LRT  6/25/2021

## 2021-06-26 NOTE — TELEPHONE ENCOUNTER
Central PA team  [phone]  Pool: HE PA MED ([id number])          PA has been initiated.       PA form completed and faxed insurance via Cover My Meds     Ortega:  Luis Manuel Lemon (Ortega: [id number])     Medication:  Bevespi Aerosphere 9-4.8MCG/ACT aerosol    Insurance:  ucare/alejandra        Response will be received via fax and may take up to 5-10 business days depending on plan

## 2021-06-27 NOTE — PROGRESS NOTES
Progress Notes by Yulisa Baker RN at 8/8/2019  3:26 PM     Author: Yulisa Baker RN Service: -- Author Type: Registered Nurse    Filed: 8/8/2019  3:50 PM Encounter Date: 8/8/2019 Status: Signed    : Yulisa Baker RN (Registered Nurse)       Clinic Care Coordination Contact    Situation: Patient chart reviewed by care coordinator.    Background:   [place] Patino, AZAELW  Yulisa Baker RN             Hi Yulisa,     Will you please review this chart for an FRG Only status. Patient only has Medicare A and B right now and it looks like the [facility] Medicaid insurance just termed 7/31/19.     I am not sure what her medication coverage is through Medicare but it sounds like there may be an issue???     Hoping she can see [facility] as[facility] to try and find out more about why her Medicaid was termed.             PMI#: [id number]       Major Programs   ?This subscriber has eligibility for MA: Medical Assistance.    Elig Type EX: Over age 65    Eligibility Begin Date: 01/01/2015    Eligibility End Date: 07/31/2019    This subscriber is eligible for the following service types: Medical Care ,  Chiropractic ,  Dental Care ,  Hospital ,  Hospital - Inpatient ,  Hospital - Outpatient ,  Emergency Services ,  Pharmacy ,  Professional (Physician) Visit - Office ,  Vision (Optometry) ,  Mental Health ,  Urgent Care     ?This subscriber has eligibility for QM: Qualified Medicare Beneficiary - Medical Assistance Covers Medicare Coinsurance and Deductibles Only.    Elig Type EQ: Over 65/QMB only    Eligibility Begin Date: 11/01/2017    Eligibility End Date: 07/31/2019    This subscriber is eligible for the following service types: Medical Care ,  Chiropractic ,  Hospital ,  Hospital - Inpatient ,  Hospital - Outpatient ,  Emergency Services ,  Professional (Physician) Visit - Office ,  Mental Health ,  Urgent Care         Prepaid Health Plan   ?This subscriber receives MA30 - [place] [employer] (MSC+)  delivered through [facility]. The phone  "numbers are: [phone] (metro) or [phone] (toll free).   ?The health plan is responsible for paying for Nursing Facility Services.           Thanks,           Assessment:   Chart reviewed completed by [facility] RN today  Writer spoke with [facility] and was told that most of patient's OTC meds aren't cover under medicare part D. Naproxen, Capciasin, Antiacid, stool softner, tylenol and Vit D.   Writer left a message for [facility] home careAnaid about inactive insurance with MA and OTC meds not cover by Medicare Part D.   Home care nurse visits every 2 weeks    Plan/Recommendations:   1) Writer send \" remind me\" message for FRG only - inactive insurance         "

## 2021-06-29 ENCOUNTER — HOSPITAL ENCOUNTER (OUTPATIENT)
Dept: PHYSICAL MEDICINE AND REHAB | Facility: CLINIC | Age: 81
Discharge: HOME | End: 2021-06-29
Attending: PHYSICIAN ASSISTANT
Payer: COMMERCIAL

## 2021-06-29 DIAGNOSIS — M54.2 CHRONIC NECK PAIN: ICD-10-CM

## 2021-06-29 DIAGNOSIS — M79.18 MYOFASCIAL PAIN: ICD-10-CM

## 2021-06-29 DIAGNOSIS — M47.816 LUMBAR FACET ARTHROPATHY: ICD-10-CM

## 2021-06-29 DIAGNOSIS — G89.29 CHRONIC NECK PAIN: ICD-10-CM

## 2021-06-29 ASSESSMENT — MIFFLIN-ST. JEOR: SCORE: 919.95

## 2021-07-03 NOTE — ADDENDUM NOTE
Addendum Note by Abdullahi Linares MD at 5/8/2018  6:44 PM     Author: Abdullahi Linares MD Service: -- Author Type: Physician    Filed: 5/8/2018  6:44 PM Encounter Date: 5/8/2018 Status: Signed    : Abdullahi Linares MD (Physician)    Addended by: ABDULLAHI LINARES on: 5/8/2018 06:44 PM        Modules accepted: Orders

## 2021-07-03 NOTE — ADDENDUM NOTE
Addendum Note by Vikas Mathews MD at 2/5/2019  8:45 AM     Author: Vikas Mathews MD Service: -- Author Type: Physician    Filed: 2/10/2019 12:07 PM Encounter Date: 2/5/2019 Status: Signed    : Vikas Mathews MD (Physician)    Addended by: VIKAS MATHEWS on: 2/10/2019 12:07 PM        Modules accepted: Orders

## 2021-07-03 NOTE — ADDENDUM NOTE
Addendum Note by Abdullahi Linares MD at 4/16/2019  8:20 AM     Author: Abdullahi Linares MD Service: -- Author Type: Physician    Filed: 4/16/2019 10:50 AM Encounter Date: 4/16/2019 Status: Signed    : Abdullahi Linares MD (Physician)    Addended by: ABDULLAHI LINARES on: 4/16/2019 10:50 AM        Modules accepted: Orders

## 2021-07-03 NOTE — ADDENDUM NOTE
Addendum Note by Adan Cavanaugh MA at 7/25/2018 12:44 PM     Author: Adan Cavanaugh MA Service: -- Author Type: Medical Assistant    Filed: 7/25/2018 12:44 PM Date of Service: 7/25/2018 12:44 PM Status: Signed    : Adan Cavanaugh MA (Medical Assistant)    Encounter addended by: Adan Cavanaugh MA on: 7/25/2018 12:44 PM<BR>     Actions taken: Vitals modified, Visit Navigator Flowsheet section accepted

## 2021-07-04 NOTE — TELEPHONE ENCOUNTER
Telephone Encounter by Braulio Lemos at 6/18/2021  2:10 PM     Author: Braulio Lemos Service: -- Author Type: Patient Access    Filed: 6/18/2021  2:14 PM Encounter Date: 6/14/2021 Status: Signed    : Braulio Lemos (Patient Access)       PA APPROVED:    Approval start date: 05/19/21  Approval end date:  06/18/22    Sending to the provider as FYI, as the medication is now approved, but is not on the current and active list and I am not sure if the provider would still like the patient to be on the medication.

## 2021-07-06 ENCOUNTER — COMMUNICATION - HEALTHEAST (OUTPATIENT)
Dept: FAMILY MEDICINE | Facility: CLINIC | Age: 81
End: 2021-07-06

## 2021-07-06 ENCOUNTER — AMBULATORY - HEALTHEAST (OUTPATIENT)
Dept: MULTI SPECIALTY CLINIC | Facility: CLINIC | Age: 81
End: 2021-07-06

## 2021-07-06 VITALS
BODY MASS INDEX: 26.11 KG/M2 | TEMPERATURE: 97.8 F | HEIGHT: 58 IN | OXYGEN SATURATION: 98 % | WEIGHT: 124.4 LBS | DIASTOLIC BLOOD PRESSURE: 58 MMHG | SYSTOLIC BLOOD PRESSURE: 130 MMHG | RESPIRATION RATE: 14 BRPM | HEART RATE: 55 BPM

## 2021-07-06 VITALS — BODY MASS INDEX: 25.58 KG/M2 | WEIGHT: 122 LBS

## 2021-07-06 VITALS — BODY MASS INDEX: 24.19 KG/M2 | WEIGHT: 120 LBS | HEIGHT: 59 IN

## 2021-07-06 VITALS
DIASTOLIC BLOOD PRESSURE: 60 MMHG | HEIGHT: 58 IN | WEIGHT: 124 LBS | SYSTOLIC BLOOD PRESSURE: 110 MMHG | HEART RATE: 58 BPM | BODY MASS INDEX: 26.03 KG/M2 | OXYGEN SATURATION: 93 %

## 2021-07-06 DIAGNOSIS — K21.9 GASTROESOPHAGEAL REFLUX DISEASE WITHOUT ESOPHAGITIS: ICD-10-CM

## 2021-07-06 DIAGNOSIS — J45.41 MODERATE PERSISTENT ASTHMA WITH EXACERBATION: ICD-10-CM

## 2021-07-06 ASSESSMENT — PATIENT HEALTH QUESTIONNAIRE - PHQ9: SUM OF ALL RESPONSES TO PHQ QUESTIONS 1-9: 7

## 2021-07-06 NOTE — TELEPHONE ENCOUNTER
Telephone Encounter by Lisa Watters RN at 7/6/2021  1:39 PM     Author: Lisa Watters RN Service: -- Author Type: Registered Nurse    Filed: 7/6/2021  1:44 PM Encounter Date: 7/6/2021 Status: Signed    : Lisa Watters RN (Registered Nurse)       Refill Approved    Rx renewed per Medication Renewal Policy. Medication was last renewed on 11/5/20 (180 tablets with 2 refills).    Last office visit: 6/10/21.     [facility] Triage/Med Refill 7/6/2021     Requested Prescriptions   Pending Prescriptions Disp Refills   ? famotidine (PEPCID) 40 MG tablet [Pharmacy Med Name: FAMOTIDINE 40 MG TABS 40 Tablet] 60 tablet 2     Sig: TAKE 1 TABLET BY MOUTH 2 TIMES A DAY       GI Medications Refill Protocol Passed - 7/6/2021 10:30 AM        Passed - PCP or prescribing provider visit in last 12 or next 3 months.     Last office visit with prescriber/PCP: 6/10/2021 Kateryna Morales MD OR same dept: 6/10/2021 Kateryna Morales MD OR same specialty: 6/10/2021 Kateryna Morales MD  Last physical: 12/14/2020 Last MTM visit: Visit date not found   Next visit within 3 mo: Visit date not found  Next physical within 3 mo: Visit date not found  Prescriber OR PCP: Kateryna Morales MD  Last diagnosis associated with med order: 1. Gastroesophageal reflux disease without esophagitis  - famotidine (PEPCID) 40 MG tablet [Pharmacy Med Name: FAMOTIDINE 40 MG TABS 40 Tablet]; TAKE 1 TABLET BY MOUTH 2 TIMES A DAY  Dispense: 60 tablet; Refill: 2    If protocol passes may refill for 12 months if within 3 months of last provider visit (or a total of 15 months).

## 2021-07-07 NOTE — PROGRESS NOTES
Assessment:   Luis Manuel Lemon is a 81 y.o. y.o. female with past medical history significant for vitamin D deficiency, hypertension, hyperlipidemia, osteoporosis on Prolia, chronic GERD, chronic pain syndrome, pulmonary fibrosis, polyarthralgia, stage III chronic kidney disease who presents today for follow-up regarding 2 areas of pain.  1.  Chronic neck pain, currently worse on the left than the right.  My review of the MRI cervical spine shows multilevel degenerative change most pronounced at C6-7 where there is moderate spinal canal stenosis.  Pain today seems primarily myofascial in nature.  She had significant hypertonicity left greater than right upper trapezius muscle.  She has had a positive response to trigger point injections for neck pain at the pain clinic most recently in 2018.  2.  Chronic bilateral low back pain with radiation into the bilateral lower extremities with associated weakness and limited walking tolerance.  My review of an MRI lumbar spine shows mild spinal stenosis at L4-5 related to a disc bulge and facet arthropathy.  There is no high-grade spinal canal or neuroforaminal stenosis.  Patient status post bilateral L3, L4, L5 radiofrequency ablation April 19, 2021 which provided 70% relief of her pain.          Plan:     A shared decision making plan was used.  The patient's values and choices were respected.  The following represents what was discussed and decided upon by the physician assistant and the patient.  A telephone  was used for the visit.  Patient's granddaughter was also present for the visit and helps provide history.    1.  DIAGNOSTIC TESTS: I reviewed the MRI scans of the cervical and lumbar spine.  No further diagnostic tests were ordered.    2.  PHYSICAL THERAPY: Most recent physical therapy was in October 2018.  No additional physical therapy was ordered.  -Patient completed 4 sessions of occupational therapy with Michelle Russell in 2019 which she did not feel is  helpful.    3.  MEDICATIONS: No changes are made to the patient's medications.  Medications are set up by a nurse.   On her medication list is Celebrex 100 mg daily, duloxetine 30 mg daily, gabapentin 100 mg in the morning and 200 mg at bedtime, Tylenol arthritis 1300 mg twice daily.      4.  INTERVENTIONS:   -I offer the patient left cervical paraspinous muscle/upper trapezius muscle trigger point injections under ultrasound guidance.  Patient indicated she would like to proceed and an order was placed.  Patient has not had the Covid vaccine and does not plan on getting the Covid vaccine. The injection will use cortisone.  Cortisone weakens/suppresses the immune system so it does not function as well as it normally does.  We do not know if this could make it more likely that you could get the COVID-19 virus or if you do have the virus, if you are going to be sicker than you would have been if you hadn't had the cortisone injection.  Patient was willing to accept this risk and the order for the injection was placed.  -In regards to the low back pain, she wondered if she could have radiofrequency ablation again.  I told the patient we may be able to repeat the radiofrequency ablation every 6 to 12 months if pain returns.    5.  PATIENT EDUCATION: Patient is in agreement the above plan.  All questions were answered.    6.  FOLLOW-UP: Patient will follow up with me 2 weeks after her left cervical paraspinous muscle and upper trapezius muscle trigger point injections.  If she has questions or concerns in the meantime, she should not hesitate to call.    Subjective:     Luis Manuel Lemon is a 81 y.o. female who presents today for follow-up regarding chronic neck and low back pain.  I last saw the patient May 17, 2021.  At that time she reported 70% improvement in pain following bilateral L3, L4, L5 radiofrequency ablation April 19, 2021.  She continues to have relief of back pain since the radiofrequency ablation.  She is more  concerned now about her neck pain.  Left neck pain.    Patient complains of pain radiates toward the left shoulder.  She denies pain radiating further down the arm.  She denies any numbness, tingling, weakness in the arms.  Neck pain is aggravated with movement and sleeping on her left side.    Patient does have mild residual bilateral low back pain.  She denies any pain radiating down the legs.  She has intermittent numbness and tingling right lateral thigh.  She feels weak in her knees and lower legs.    Overall, patient rates her pain today as an 8 of 10.  She denies any additional aggravating or alleviating factors for her pain.      Most recent course of physical therapy for the lower back was in October 2018.  She also did 4 sessions of occupational therapy ending in January 2019.  She states that she does her home exercises on a regular basis.  Patient's medications are set up by her nurse.   On her list is Celebrex 100 mg daily, duloxetine 30 mg daily, gabapentin 100 mg in the morning and 200 mg at bedtime, Tylenol arthritis 1300 mg twice daily.    Past medical history is reviewed and is unchanged.    Review of Systems:  Positive for numbness/tingling, weakness, pain worse at night.  Negative for loss of bowel/bladder control, inability to urinate, headache, trip/stumble/falls, difficulty swallowing, difficulty with hand skills, fevers, unintentional weight loss.     Objective:   CONSTITUTIONAL:  Vital signs as above.  No acute distress.  The patient is well nourished and well groomed.    PSYCHIATRIC:  The patient is awake, alert, oriented to person, place and time.  The patient is answering questions appropriately with clear speech.  Normal affect.  HEENT: Normocephalic, atraumatic.  Sclera clear.    SKIN:  Skin over the face, posterior torso, bilateral upper and lower extremities is clean, dry, intact without rashes.  VASCULAR: No significant lower extremity edema.  MUSCULOSKELETAL:  Gait is unsteady.  She  ambulates with a significantly flexed forward posture at the hips. Tender to palpation left cervical paraspinous muscles and upper trapezius muscle with hypertonicity.  No significant tenderness palpation right neck.  No significant tenderness palpation bilateral lower lumbar paraspinous muscles.  The patient has  Diffuse weakness which I would grade 4/5 for the bilateral shoulder abductors, elbow flexors/extensors, finger flexors/abductors, wrist extensors.  She had 4/5 strength bilateral hip flexors, 5/5 strength bilateral knee flexors/extensors, ankle dorsi/plantar flexors.    NEUROLOGICAL: 1-2+ biceps, triceps, brachioradialis reflexes bilaterally.  2+ patellar, achilles reflexes which are symmetric bilaterally.  Negative Ruby sign bilaterally.  Negative Babinski's bilaterally.  No ankle clonus bilaterally.  Sensation light touch subjectively diminished right lateral thigh.     RESULTS:     I reviewed the MRI cervical spine from [facility] dated March 8, 2021.  This shows multilevel degenerative change.  There is facet arthropathy throughout the cervical spine.  At C3-4 there is mild spinal canal stenosis with moderate left and mild right foraminal stenosis.  At C4-5 there is mild spinal canal stenosis with moderate bilateral foraminal stenosis.  At C5-6 there is mild spinal canal stenosis with moderate bilateral foraminal stenosis.  At C6-7 there is moderate spinal canal stenosis with mild bilateral foraminal stenosis.  At C7-T1 there is mild bilateral foraminal stenosis.  Please see report for further details.    I reviewed the MRI lumbar spine from [facility] dated March 8, 2021.  This shows multilevel lumbar spondylosis.  At L2-3 there is a disc bulge with mild bilateral foraminal stenosis.  At L3-4 there is a disc bulge with mild bilateral foraminal stenosis.  At L4-5 there is a disc bulge and mild facet arthropathy with mild spinal canal stenosis and mild bilateral foraminal stenosis.  Please see  report for further details.

## 2021-07-07 NOTE — PATIENT INSTRUCTIONS - HE
Trigger point injections has been ordered today.      Please note that this injection uses cortisone.  The cortisone may somewhat weaken the immune system.  It is unknown how much the immune system is weakened.  It is unknown if it is weakened to the point that you may be more likely to get the COVID-19 virus, or if you do get the COVID-19 virus, if you would be sicker than you would have been if you had not had the cortisone injection.  If you do not wish to proceed with the injection, please let the nurse/physician know and do NOT schedule the injection.    Please note that since your immune system is weakened from the cortisone, having a vaccine/shot may be less effective if you have this vaccine within 2 weeks from your cortisone injection.  It is advised to wait 2 weeks after your cortisone injection to have the vaccine (or if you have the vaccine first, wait 2 weeks before you have the cortisone injection).    Please schedule this injection at least 1 week  from now to allow time for insurance prior authorization.  On the day of your injection, you cannot be sick or taking antibiotics.  If you become sick and are prescribed, please call the clinic so your injection can be rescheduled for once you have completed your antibiotics.  You will need to bring a  with you for your injection.   If you have any questions or concerns prior to your injection, please do not hesitate to call the nurse navigation line at [phone] or contact Tiffanie Byrne through [employer].

## 2021-07-08 ASSESSMENT — ASTHMA QUESTIONNAIRES: ACT_TOTALSCORE: 21

## 2021-07-14 PROBLEM — N17.9 AKI (ACUTE KIDNEY INJURY) (H): Status: RESOLVED | Noted: 2017-10-11 | Resolved: 2018-02-13

## 2021-07-14 PROBLEM — I50.9 CHF (CONGESTIVE HEART FAILURE) (H): Status: RESOLVED | Noted: 2017-10-08 | Resolved: 2019-01-15

## 2021-07-14 PROBLEM — J96.01 ACUTE RESPIRATORY FAILURE WITH HYPOXIA (H): Status: RESOLVED | Noted: 2017-05-12 | Resolved: 2018-02-13

## 2021-07-19 ENCOUNTER — HOSPITAL ENCOUNTER (OUTPATIENT)
Dept: CARDIOLOGY | Facility: HOSPITAL | Age: 81
Discharge: HOME | End: 2021-07-19
Attending: INTERNAL MEDICINE | Admitting: INTERNAL MEDICINE
Payer: COMMERCIAL

## 2021-07-19 DIAGNOSIS — R06.09 OTHER FORM OF DYSPNEA: ICD-10-CM

## 2021-07-19 PROCEDURE — 93306 TTE W/DOPPLER COMPLETE: CPT

## 2021-07-19 PROCEDURE — 93306 TTE W/DOPPLER COMPLETE: CPT | Mod: 26 | Performed by: INTERNAL MEDICINE

## 2021-07-21 DIAGNOSIS — Z53.9 DIAGNOSIS NOT YET DEFINED: Primary | ICD-10-CM

## 2021-07-21 PROCEDURE — G0179 MD RECERTIFICATION HHA PT: HCPCS | Performed by: FAMILY MEDICINE

## 2021-07-22 DIAGNOSIS — M79.12 MYALGIA OF AUXILIARY MUSCLES, HEAD AND NECK: Primary | ICD-10-CM

## 2021-07-26 ENCOUNTER — ANCILLARY PROCEDURE (OUTPATIENT)
Dept: PHYSICAL MEDICINE AND REHAB | Facility: CLINIC | Age: 81
End: 2021-07-26
Payer: COMMERCIAL

## 2021-07-26 VITALS
SYSTOLIC BLOOD PRESSURE: 126 MMHG | TEMPERATURE: 98.2 F | HEART RATE: 74 BPM | DIASTOLIC BLOOD PRESSURE: 58 MMHG | OXYGEN SATURATION: 92 %

## 2021-07-26 DIAGNOSIS — M79.12 MYALGIA OF AUXILIARY MUSCLES, HEAD AND NECK: ICD-10-CM

## 2021-07-26 DIAGNOSIS — Z79.01 ANTICOAGULATED: Primary | ICD-10-CM

## 2021-07-26 LAB — INR POINT OF CARE: 1 (ref 0.86–1.14)

## 2021-07-26 PROCEDURE — 20552 NJX 1/MLT TRIGGER POINT 1/2: CPT | Performed by: PAIN MEDICINE

## 2021-07-26 PROCEDURE — 85610 PROTHROMBIN TIME: CPT | Performed by: PAIN MEDICINE

## 2021-07-26 PROCEDURE — 36416 COLLJ CAPILLARY BLOOD SPEC: CPT | Performed by: PAIN MEDICINE

## 2021-07-26 PROCEDURE — 76942 ECHO GUIDE FOR BIOPSY: CPT | Performed by: PAIN MEDICINE

## 2021-07-26 RX ORDER — FAMOTIDINE 40 MG/1
40 TABLET, FILM COATED ORAL 2 TIMES DAILY
COMMUNITY
Start: 2021-07-06 | End: 2022-02-25

## 2021-07-26 RX ORDER — WARFARIN SODIUM 5 MG/1
TABLET ORAL
COMMUNITY
Start: 2020-10-06 | End: 2021-12-24

## 2021-07-26 RX ORDER — BUPIVACAINE HYDROCHLORIDE 2.5 MG/ML
INJECTION, SOLUTION EPIDURAL; INFILTRATION; INTRACAUDAL
Status: COMPLETED | OUTPATIENT
Start: 2021-07-26 | End: 2021-07-26

## 2021-07-26 RX ORDER — LIDOCAINE HYDROCHLORIDE 20 MG/ML
INJECTION, SOLUTION EPIDURAL; INFILTRATION; INTRACAUDAL; PERINEURAL
Status: COMPLETED | OUTPATIENT
Start: 2021-07-26 | End: 2021-07-26

## 2021-07-26 RX ORDER — DULOXETIN HYDROCHLORIDE 30 MG/1
30 CAPSULE, DELAYED RELEASE ORAL DAILY
COMMUNITY
Start: 2021-07-06 | End: 2022-03-21

## 2021-07-26 RX ORDER — LOSARTAN POTASSIUM 100 MG/1
100 TABLET ORAL DAILY
COMMUNITY
Start: 2021-04-13 | End: 2021-11-02

## 2021-07-26 RX ORDER — LIDOCAINE HYDROCHLORIDE 10 MG/ML
INJECTION, SOLUTION EPIDURAL; INFILTRATION; INTRACAUDAL; PERINEURAL
Status: COMPLETED | OUTPATIENT
Start: 2021-07-26 | End: 2021-07-26

## 2021-07-26 RX ORDER — METOPROLOL SUCCINATE 50 MG/1
50 TABLET, EXTENDED RELEASE ORAL DAILY
COMMUNITY
Start: 2021-04-13 | End: 2021-10-21

## 2021-07-26 RX ORDER — ERGOCALCIFEROL 1.25 MG/1
CAPSULE, LIQUID FILLED ORAL
COMMUNITY
Start: 2021-07-06 | End: 2021-09-28

## 2021-07-26 RX ORDER — GABAPENTIN 100 MG/1
100 CAPSULE ORAL
COMMUNITY
Start: 2021-07-15 | End: 2021-10-21

## 2021-07-26 RX ORDER — CALCIUM CARBONATE 500 MG/1
1 TABLET, CHEWABLE ORAL DAILY
COMMUNITY
Start: 2021-07-06 | End: 2021-09-28

## 2021-07-26 RX ORDER — SENNOSIDES 8.6 MG
650 CAPSULE ORAL 2 TIMES DAILY
COMMUNITY
Start: 2021-06-07 | End: 2022-02-24

## 2021-07-26 RX ORDER — AMLODIPINE BESYLATE 10 MG/1
10 TABLET ORAL DAILY
COMMUNITY
Start: 2021-04-09 | End: 2021-11-02

## 2021-07-26 RX ORDER — DOCUSATE SODIUM 50MG AND SENNOSIDES 8.6MG 8.6; 5 MG/1; MG/1
2 TABLET, FILM COATED ORAL DAILY
COMMUNITY
Start: 2021-07-06 | End: 2021-09-28

## 2021-07-26 RX ORDER — HYDROCHLOROTHIAZIDE 25 MG/1
25 TABLET ORAL DAILY
COMMUNITY
Start: 2021-07-06 | End: 2021-08-03

## 2021-07-26 RX ORDER — ALBUTEROL SULFATE 90 UG/1
2 AEROSOL, METERED RESPIRATORY (INHALATION) EVERY 6 HOURS PRN
COMMUNITY
Start: 2021-06-10 | End: 2024-06-27

## 2021-07-26 RX ORDER — CELECOXIB 100 MG/1
100 CAPSULE ORAL DAILY
COMMUNITY
Start: 2021-05-20 | End: 2021-12-24

## 2021-07-26 RX ORDER — AZITHROMYCIN 500 MG/1
TABLET, FILM COATED ORAL
COMMUNITY
Start: 2020-09-03 | End: 2021-09-28

## 2021-07-26 RX ORDER — IPRATROPIUM BROMIDE AND ALBUTEROL 20; 100 UG/1; UG/1
SPRAY, METERED RESPIRATORY (INHALATION)
COMMUNITY
Start: 2021-07-06 | End: 2021-10-21

## 2021-07-26 RX ORDER — NYSTATIN 100000/ML
SUSPENSION, ORAL (FINAL DOSE FORM) ORAL
COMMUNITY
Start: 2020-12-07 | End: 2021-12-24

## 2021-07-26 RX ORDER — POLYVINYL ALCOHOL 14 MG/ML
1 SOLUTION/ DROPS OPHTHALMIC
COMMUNITY
Start: 2021-07-06 | End: 2023-08-22

## 2021-07-26 RX ORDER — MIRTAZAPINE 15 MG/1
22.5 TABLET, FILM COATED ORAL AT BEDTIME
COMMUNITY
Start: 2021-07-15 | End: 2022-02-24

## 2021-07-26 RX ORDER — DIPHENOXYLATE HYDROCHLORIDE AND ATROPINE SULFATE 2.5; .025 MG/1; MG/1
1 TABLET ORAL DAILY
COMMUNITY
Start: 2021-05-11 | End: 2022-05-02

## 2021-07-26 RX ADMIN — METHYLPREDNISOLONE ACETATE 20 MG: 40 INJECTION, SUSPENSION INTRA-ARTICULAR; INTRALESIONAL; INTRAMUSCULAR; SOFT TISSUE at 10:34

## 2021-07-26 RX ADMIN — BUPIVACAINE HYDROCHLORIDE 3 ML: 2.5 INJECTION, SOLUTION EPIDURAL; INFILTRATION; INTRACAUDAL at 10:33

## 2021-07-26 RX ADMIN — LIDOCAINE HYDROCHLORIDE 2 ML: 10 INJECTION, SOLUTION EPIDURAL; INFILTRATION; INTRACAUDAL; PERINEURAL at 10:31

## 2021-07-26 RX ADMIN — LIDOCAINE HYDROCHLORIDE 2.5 ML: 20 INJECTION, SOLUTION EPIDURAL; INFILTRATION; INTRACAUDAL; PERINEURAL at 10:32

## 2021-07-26 ASSESSMENT — PAIN SCALES - GENERAL
PAINLEVEL: EXTREME PAIN (8)
PAINLEVEL: EXTREME PAIN (8)

## 2021-07-26 NOTE — PATIENT INSTRUCTIONS
Please follow up two weeks post procedure with Tiffanie to evaluate your plan of care.       DISCHARGE INSTRUCTIONS    During office hours (8:00 a.m.- 4:00 p.m.) questions or concerns may be answered  by calling [facility] Nurses at  [phone].  Messages received after hours will be returned the following business day.      In the case of an emergency, please dial 911 or seek assistance at the nearest Emergency Room/Urgent Care facility.     All Patients:    ? You may experience an increase in your symptoms for the first 2 days (It may take anywhere between 2 days- 2 weeks for the steroid to have maximum effect).    ? You may use ice on the injection site, as frequently as 20 minutes each hour if needed.    ? You may take your pain medicine.    ? You may continue taking your regular medication after your injection.     ? You may shower. No swimming, tub bath or hot tub for 48 hours.  You may remove your bandaid/bandage as soon as you are home.    ? You may resume light activities, as tolerated.    ? Resume your usual diet as tolerated.      POSSIBLE STEROID SIDE EFFECTS (If steroid/cortisone was used for your procedure)    -If you experience these symptoms, it should only last for a short period      Swelling of the legs                Skin redness (flushing)       Mouth (oral) irritation     Blood sugar (glucose) levels              Sweats                      Mood changes    Headache    Sleeplessness    Weakened immune system for up to 14 days, which could increase the risk of nathanael the COVID-19 virus and/or experiencing more severe symptoms of the disease, if exposed.    Decreased effectiveness of the flu vaccine if given within 2 weeks of the steroid.         POSSIBLE PROCEDURE SIDE EFFECTS  -Call the [facility] if you are concerned    Increased Pain             Increased numbness/tingling        Nausea/Vomiting            Bruising/bleeding at site        Redness or swelling                                                         Fever greater than 100.5

## 2021-08-02 RX ORDER — METHYLPREDNISOLONE ACETATE 40 MG/ML
INJECTION, SUSPENSION INTRA-ARTICULAR; INTRALESIONAL; INTRAMUSCULAR; SOFT TISSUE
Status: COMPLETED | OUTPATIENT
Start: 2021-07-26 | End: 2021-07-26

## 2021-08-03 PROBLEM — F11.20 OPIOID DEPENDENCE, EPISODIC (H): Status: RESOLVED | Noted: 2019-08-08 | Resolved: 2019-08-08

## 2021-08-06 NOTE — PATIENT INSTRUCTIONS - HE
Patient Instructions by Kateryna Morales MD at 12/14/2020 12:00 PM     Author: Kateryna Morales MD Service: -- Author Type: Physician    Filed: 12/14/2020 11:57 AM Encounter Date: 12/14/2020 Status: Signed    : Kateryna Morales MD (Physician)         Patient Education     Your Health Risk Assessment indicates you feel you are not in good physical health.    A healthy lifestyle helps keep the body fit and the mind alert. It helps protect you from disease, helps you fight disease, and helps prevent chronic disease (disease that doesn't go away) from getting worse. This is important as you get older and begin to notice twinges in muscles and joints and a decline in the strength and stamina you once took for granted. A healthy lifestyle includes good healthcare, good nutrition, weight control, recreation, and regular exercise. Avoid harmful substances and do what you can to keep safe. Another part of a healthy lifestyle is stay mentally active and socially involved.    Good healthcare     Have a wellness visit every year.     If you have new symptoms, let us know right away. Don't wait until the next checkup.     Take medicines exactly as prescribed and keep your medicines in a safe place. Tell us if your medicine causes problems.   Healthy diet and weight control     Eat 3 or 4 small, nutritious, low-fat, high-fiber meals a day. Include a variety of fruits, vegetables, and whole-grain foods.     Make sure you get enough calcium in your diet. Calcium, vitamin D, and exercise help prevent osteoporosis (bone thinning).     If you live alone, try eating with others when you can. That way you get a good meal and have company while you eat it.     Try to keep a healthy weight. If you eat more calories than your body uses for energy, it will be stored as fat and you will gain weight.     Recreation   Recreation is not limited to sports and team events. It includes any activity that provides relaxation,  interest, enjoyment, and exercise. Recreation provides an outlet for physical, mental, and social energy. It can give a sense of worth and achievement. It can help you stay healthy.       Patient Education     Exercise for a Healthier Heart  You may wonder how you can improve the health of your heart. If youre thinking about exercise, youre on the right track. You dont need to become an athlete, but you do need a certain amount of brisk exercise to help strengthen your heart. If you have been diagnosed with a heart condition, your doctor may recommend exercise to help stabilize your condition. To help make exercise a habit, choose safe, fun activities.       Be sure to check with your health care provider before starting an exercise program.    Why exercise?  Exercising regularly offers many healthy rewards. It can help you do all of the following:    Improve your blood cholesterol levels to help prevent further heart trouble    Lower your blood pressure to help prevent a stroke or heart attack    Control diabetes, or reduce your risk of getting this disease    Improve your heart and lung function    Reach and maintain a healthy weight    Make your muscles stronger and more limber so you can stay active    Prevent falls and fractures by slowing the loss of bone mass (osteoporosis)    Manage stress better  Exercise tips  Ease into your routine. Set small goals. Then build on them.  Exercise on most days. Aim for a total of 150 or more minutes of moderate to  vigorous intensity activity each week. Consider 40 minutes, 3 to 4 times a week. For best results, activity should last for 40 minutes on average. It is OK to work up to the 40 minute period over time. Examples of moderate-intensity activity is walking one mile in 15 minutes or 30 to 45 minutes of yard work.  Step up your daily activity level. Along with your exercise program, try being more active throughout the day. Walk instead of drive. Do more household  tasks or yard work.  Choose one or more activities you enjoy. Walking is one of the easiest things you can do. You can also try swimming, riding a bike, or taking an exercise class.  Stop exercising and call your doctor if you:    Have chest pain or feel dizzy or lightheaded    Feel burning, tightness, pressure, or heaviness in your chest, neck, shoulders, back, or arms    Have unusual shortness of breath    Have increased joint or muscle pain    Have palpitations or an irregular heartbeat      0760-4003 [employer]. [address] [place] [address]. All rights reserved. This information is not intended as a substitute for professional medical care. Always follow your healthcare professional's instructions.         Patient Education   Activities of Daily Living  Your Health Risk Assessment indicates you have difficulties with activities of daily living such as eating, getting dressed, grooming, bathing, walking, or using the toilet. Please make a follow up appointment for us to address this issue in more detail.     Patient Education   Instrumental Activities of Daily Living  Your Health Risk Assessment indicates you have difficulties with instrumental activities of daily living which include laundry, housekeeping, banking, shopping, using the telephone, food preparation, transportation, or taking your own medications. Please make a follow up appointment for us to address this issue in more detail.    [employer] has resources available on the following website: https://www.[employer]org/caregivers.html     Also, here is a local agency that provides help with meals and other assistance:   [facility] Line: [phone]     Patient Education   Signs of Hearing Loss  Hearing loss is a problem shared by many people. In fact, it is one of the most common health conditions, particularly as people age. Most people over age 65 have some hearing loss, and by age 80, almost everyone does. Because  hearing loss usually occurs slowly over the years, you may not realize your hearing ability has gotten worse.       Have your hearing checked  Contact your [facility] care provider if you:    Have to strain to hear normal conversation.    Have to watch other peoples faces very carefully to follow what theyre saying.    Need to ask people to repeat what theyve said.    Often misunderstand what people are saying.    Turn the volume of the television or radio up so high that others complain.    Feel that people are mumbling when theyre talking to you.    Find that the effort to hear leaves you feeling tired and irritated.    Notice, when using the phone, that you hear better with 1 ear than the other.    5674-2525 [employer]. [address] [place] [address], [place] [address]. All rights reserved. This information is not intended as a substitute for professional medical care. Always follow your healthcare professional's instructions.         Patient Education   Urinary Incontinence, Female (Adult)  Urinary incontinence means loss of control of the bladder. This problem affects many women, especially as they get older. If you have incontinence, you may be embarrassed to ask for help. But know that this problem can be treated.  Types of Incontinence  There are different types of incontinence. Two of the main types are described here. You can have more than one type.    Stress incontinence. With this type, urine leaks when pressure (stress) is put on the bladder. This may happen when you cough, sneeze, or laugh. Stress incontinence most often occurs because the pelvic floor muscles that support the bladder and urethra are weak. This can happen after pregnancy and vaginal childbirth or a hysterectomy. It can also be due to excess body weight or hormone changes.    Urge incontinence (also called overactive bladder). With this type, a sudden urge to urinate is felt often. This may happen even though there may not be much  urine in the bladder. The need to urinate often during the night is common. Urge incontinence most often occurs because of bladder spasms. This may be due to bladder irritation or infection. Damage to bladder nerves or pelvic muscles, constipation, and certain medicines can also lead to urge incontinence.  Treatment of urinary incontinence depends on the cause. Further evaluation is needed to find the type you have. This will likely include an exam and certain tests. Based on the results, you and your healthcare provider can then plan treatment. Until a diagnosis is made, the home care tips below can help relieve symptoms.  Home care    Do pelvic floor muscle exercises, if they are prescribed. The pelvic floor muscles help support the bladder and urethra. Many women find that their symptoms improve when doing special exercises that strengthen these muscles. To do the exercises contract the muscles you would use to stop your stream of urine, but do this when youre not urinating. Hold for 10 seconds, then relax. Repeat 10 to 20 times in a row, at least 3 times a day. Your provider may give you other instructions for how to do the exercises and how often.    Keep a bladder diary. This helps track how often and how much you urinate over a set period of time. Bring this diary with you to your next visit with the provider. The information can help your provider learn more about your bladder problem.    Lose weight, if advised to by your provider. Excess weight puts pressure on the bladder. Your provider can help you create a weight-loss plan thats right for you. This may include exercising more and making certain diet changes.    Don't consume foods and drinks that may irritate the bladder. These can include alcohol and caffeinated drinks.    Quit smoking. Smoking and other tobacco use can lead to chronic cough that strains the pelvic floor muscles. Smoking may also damage the bladder and urethra. Talk with your provider  about treatments or methods you can use to quit smoking.    If drinking large amounts of fluid causes you to have symptoms, you may be advised to limit your fluid intake. You may also be advised to drink most of your fluids during the day and to limit fluids at night.    If youre worried about urine leakage or accidents, you may wear absorbent pads to catch urine. Change the pads often. This helps reduce discomfort. It may also reduce the risk of skin or bladder infections.  Follow-up care  Follow up with your healthcare provider, or as directed. It may take some to find the right treatment for your problem. Your treatment plan may include special therapies or medicines. Certain procedures or surgery may also be options. Be sure to discuss any questions you have with your provider.  When to seek medical advice  Call the healthcare provider right away if any of these occur:    Fever of 100.4 F (38 C) or higher, or as directed by your provider    Bladder pain or fullness    Abdominal swelling    Nausea or vomiting    Back pain    Weakness, dizziness or fainting  Date Last Reviewed: 10/1/2017    5227-1315 [employer]. [address] [place] [address]. All rights reserved. This information is not intended as a substitute for professional medical care. Always follow your healthcare professional's instructions.     Patient Education   Your Health Risk Assessment indicates you feel you are not in good emotional health.    Recreation   Recreation is not limited to sports and team events. It includes any activity that provides relaxation, interest, enjoyment, and exercise. Recreation provides an outlet for physical, mental, and social energy. It can give a sense of worth and achievement. It can help you stay healthy.    Mental Exercise and Social Involvement  Mental and emotional health is as important as physical health. Keep in touch with friends and family. Stay as active as possible. Continue to  learn and challenge yourself.   Things you can do to stay mentally active are:    Learn something new, like a foreign language or musical instrument.     Play [employer] or do crossword puzzles. If you cannot find people to play these games with you at home, you can play them with others on your computer through the Internet.     Join a games club--anything from card games to chess or checkers or lawn bowling.     Start a new hobby.     Go back to school.     Volunteer.     Read.     Keep up with world events.       Patient Education   Depression and Suicide in Older Adults  Nearly 2 million older Americans have some type of depression. Sadly, some of them even take their own lives. Yet depression among older adults is often ignored. Learn the warning signs. You may help spare a loved one needless pain. You may also save a life.       What Is Depression?  Depression is a mood disorder that affects the way you think and feel. The most common symptom is a feeling of deep sadness. People who are depressed also may seem tired and listless. And nothing seems to give them pleasure. Its normal to grieve or be sad sometimes. But sadness lessens or passes with time. Depression rarely goes away or improves on its own. Other symptoms of depression are:    Sleeping more or less than normal    Eating more or less than normal    Having headaches, stomachaches, or other pains that dont go away    Feeling nervous, empty, or worthless    Crying a great deal    Thinking or talking about suicide or death    Feeling confused or forgetful  What Causes It?  The causes of depression arent fully known. Certain chemicals in the brain play a role. Depression does run in families. And life stresses can also trigger depression in some people. That may be the case with older adults. They often face great burdens, such as the death of friends or a spouse. They may have failing health. And they are more likely to be alone, lonely, or poor.  How You  Can Help  Often, depressed people may not want to ask for help. When they do, they may be ignored. Or, they may receive the wrong treatment. You can help by showing parents and older friends love and support. If they seem depressed, help them find the right treatment. Talk to your doctor. Or contact a local mental health center, social service agency, or hospital. With modern treatment, no one has to suffer from depression.  Resources:    National [place] of Mental Health  656.610.5088  www.nimh.nih.gov    National [place] on Mental Illness  470.968.9656  www.[facility].org    [facility]  401.466.1625  www.[facility].org    National Suicide Hotline  732.454.2272 (800-SUICIDE)      7525-7042 [employer]. [address] [place] [address], [place] [address]. All rights reserved. This information is not intended as a substitute for professional medical care. Always follow your healthcare professional's instructions.         Patient Education   Preventing Falls in the Home  As you get older, falls are more likely. Thats because your reaction time slows. Your muscles and joints may also get stiffer, making them less flexible. Illness, medications, and vision changes can also affect your balance. A fall could leave you unable to live on your own. To make your home safer, follow these tips:    Floors    Put nonskid pads under area rugs.    Remove throw rugs.    Replace worn floor coverings.    Tack carpets firmly to each step on carpeted stairs. Put nonskid strips on the edges of uncarpeted stairs.    Keep floors and stairs free of clutter and cords.    Arrange furniture so there are clear pathways.    Clean up any spills right away.    Bathrooms    Install grab bars in the tub or shower.    Apply nonskid strips or put a nonskid rubber mat in the tub or shower.    Sit on a bath chair to bathe.    Use bathmats with nonskid backing.    Lighting    Keep a flashlight in each room.    Put a nightlight along the pathway  between the bedroom and the bathroom.    4195-9607 [employer]. [address] [place] [address], [place] [address]. All rights reserved. This information is not intended as a substitute for professional medical care. Always follow your healthcare professional's instructions.         Patient Education   Personalized Prevention Plan  You are due for the preventive services outlined below.  Your care team is available to assist you in scheduling these services.  If you have already completed any of these items, please share that information with your care team to update in your medical record.  Health Maintenance   Topic Date Due   ? ASTHMA ACTION PLAN  1940   ? HEPATITIS C SCREENING  1940   ? ZOSTER VACCINES (1 of 2) 01/01/1991   ? ADVANCE CARE PLANNING  10/04/2016   ? LIPID  06/03/2018   ? FALL RISK ASSESSMENT  12/11/2021   ? Asthma Control Test  12/14/2021   ? MEDICARE ANNUAL WELLNESS VISIT  12/14/2021   ? TD 18+ HE  03/08/2023   ? DEXA SCAN  05/01/2034   ? Pneumococcal Vaccine: 65+ Years  Completed   ? INFLUENZA VACCINE RULE BASED  Completed   ? Pneumococcal Vaccine: Pediatrics (0 to 5 Years) and At-Risk Patients (6 to 64 Years)  Aged Out   ? HEPATITIS B VACCINES  Aged Out

## 2021-08-09 ENCOUNTER — OFFICE VISIT (OUTPATIENT)
Dept: PHYSICAL MEDICINE AND REHAB | Facility: CLINIC | Age: 81
End: 2021-08-09
Payer: COMMERCIAL

## 2021-08-09 VITALS
HEIGHT: 59 IN | SYSTOLIC BLOOD PRESSURE: 116 MMHG | DIASTOLIC BLOOD PRESSURE: 60 MMHG | BODY MASS INDEX: 24.39 KG/M2 | WEIGHT: 121 LBS | HEART RATE: 94 BPM

## 2021-08-09 DIAGNOSIS — M79.18 MYOFASCIAL PAIN: Primary | ICD-10-CM

## 2021-08-09 DIAGNOSIS — M47.816 LUMBAR FACET ARTHROPATHY: ICD-10-CM

## 2021-08-09 DIAGNOSIS — G89.29 CHRONIC NECK PAIN: ICD-10-CM

## 2021-08-09 DIAGNOSIS — M54.2 CHRONIC NECK PAIN: ICD-10-CM

## 2021-08-09 PROCEDURE — 99214 OFFICE O/P EST MOD 30 MIN: CPT | Performed by: PHYSICIAN ASSISTANT

## 2021-08-09 ASSESSMENT — PAIN SCALES - GENERAL: PAINLEVEL: SEVERE PAIN (6)

## 2021-08-09 ASSESSMENT — MIFFLIN-ST. JEOR: SCORE: 924.48

## 2021-08-09 NOTE — PROGRESS NOTES
Assessment:   Luis Manuel Lemon is a 81 y.o. y.o. female with past medical history significant for vitamin D deficiency, hypertension, hyperlipidemia, osteoporosis on Prolia, chronic GERD, chronic pain syndrome, pulmonary fibrosis, polyarthralgia, stage III chronic kidney disease who presents today for follow-up regarding 2 areas of pain.  1.  Chronic neck pain, currently worse on the left than the right.  My review of the MRI cervical spine shows multilevel degenerative change most pronounced at C6-7 where there is moderate spinal canal stenosis.  Patient is status post left cervical and trapezius muscle trigger point injections under ultrasound guidance July 26, 2021 which has provided 50% relief of her pain.  2.  Chronic bilateral low back pain.  My review of an MRI lumbar spine shows mild spinal stenosis at L4-5 related to a disc bulge and facet arthropathy.  There is no high-grade spinal canal or neuroforaminal stenosis. Patient status post bilateral L3, L4, L5 radiofrequency ablation April 19, 2021 which provided 70% relief of her pain.  She feels that that relief is starting to wane, especially on the left side.         Plan:     A shared decision making plan was used.  The patient's values and choices were respected.  The following represents what was discussed and decided upon by the physician assistant and the patient.  A telephone  was used for the visit.  Patient's granddaughter was also present for the visit and helps provide history.    1.  DIAGNOSTIC TESTS: I reviewed the MRI scans of the cervical and lumbar spine.  No further diagnostic tests were ordered.    2.  PHYSICAL THERAPY: Most recent physical therapy was in October 2018.  She admits she is not doing home exercises.  I entered a referral for the patient to return to physical therapy.  -Patient completed 4 sessions of occupational therapy with Michelle Russell in 2019 which she did not feel is helpful.    3.  MEDICATIONS: No changes are made to the  patient's medications.  Medications are set up by a nurse.   On her medication list is Celebrex 100 mg daily, duloxetine 30 mg daily, gabapentin 100 mg in the morning and 200 mg at bedtime, Tylenol arthritis 1300 mg twice daily.  She also uses over-the-counter topical pain relief medications on a regular basis.      4.  INTERVENTIONS:   -No additional interventions were ordered.  -We could repeat left upper trapezius muscle trigger point injections if needed.  -In regards to the low back pain we could repeat the radiofrequency ablation.  We will need to check with insurance how frequently she can have this procedure.    5.  PATIENT EDUCATION: Patient is in agreement the above plan.  All questions were answered.    6.  FOLLOW-UP: I will see the patient back in the clinic in 3 months.  If she has questions or concerns in the meantime, she should not hesitate to call.    Subjective:     Luis Manuel Lemon is a 81 y.o. female who presents today for follow-up regarding chronic neck and low back pain.  She is following up after left cervical and trapezius muscle trigger point injections July 26, 2021.  Patient reports 50% improvement in her pain.    Patient complains of only mild residual left-sided neck pain.  She denies any pain down the arm.  She does have intermittent numbness and tingling in the tips of all of her fingers which is chronic and stable.    Patient also complains of her chronic low back pain.  This is currently worse on the left than the right.  She denies any pain radiating into the buttocks or down the legs.  Denies numbness or tingling down the legs.  She feels generally weak.    Overall, patient rates her pain today as a 6 and 10.  Neck pain is aggravated with moving her neck.  She feels better if she is sitting still.      Most recent course of physical therapy for the lower back was in October 2018.  She also did 4 sessions of occupational therapy ending in January 2019.  She states that she has not been  doing her home exercises.  She attributes this to having some shortness of breath.  She has seen her primary care provider for this.  Patient's medications are set up by her nurse.   On her list is Celebrex 100 mg daily, duloxetine 30 mg daily, gabapentin 100 mg in the morning and 200 mg at bedtime, Tylenol arthritis 1300 mg twice daily.  She will seizes topical over-the-counter pain relief medications.  Medications are somewhat helpful for pain.    Past medical history is reviewed and is unchanged.    Review of Systems:  Positive for numbness/tingling, weakness, loss of bladder control, pain much worse at night.  Negative for loss of bowel control, inability to urinate, headache, trip/stumble/falls, difficulty swallowing, difficulty with hand skills, fevers, unintentional weight loss.     Objective:   CONSTITUTIONAL:  Vital signs as above.  No acute distress.  The patient is well nourished and well groomed.    PSYCHIATRIC:  The patient is awake, alert, oriented to person, place and time.  The patient is answering questions appropriately with clear speech.  Normal affect.  HEENT: Normocephalic, atraumatic.  Sclera clear.    SKIN:  Skin over the face, posterior torso, bilateral upper and lower extremities is clean, dry, intact without rashes.  VASCULAR: No significant lower extremity edema.  MUSCULOSKELETAL:  Gait is unsteady.  She ambulates with a significantly flexed forward posture at the hips. Tender to palpation left cervical paraspinous muscles and upper trapezius muscle with hypertonicity.  No significant tenderness palpation right neck.  Mild tenderness palpation left lower lumbar paraspinous muscles.  The patient has  Diffuse weakness which I would grade 4/5 for the bilateral shoulder abductors, elbow flexors/extensors, finger flexors/abductors, wrist extensors.  She had 4/5 strength bilateral hip flexors, 5/5 strength bilateral knee flexors/extensors, ankle dorsi/plantar flexors.    NEUROLOGICAL: 1-2+  biceps, triceps, brachioradialis reflexes bilaterally.  2+ patellar, achilles reflexes which are symmetric bilaterally.  Negative Ruby sign bilaterally.    Sensation light touch intact bilateral upper and lower extremities 0.     RESULTS:     I reviewed the MRI cervical spine from [facility] dated March 8, 2021.  This shows multilevel degenerative change.  There is facet arthropathy throughout the cervical spine.  At C3-4 there is mild spinal canal stenosis with moderate left and mild right foraminal stenosis.  At C4-5 there is mild spinal canal stenosis with moderate bilateral foraminal stenosis.  At C5-6 there is mild spinal canal stenosis with moderate bilateral foraminal stenosis.  At C6-7 there is moderate spinal canal stenosis with mild bilateral foraminal stenosis.  At C7-T1 there is mild bilateral foraminal stenosis.  Please see report for further details.    I reviewed the MRI lumbar spine from [facility] dated March 8, 2021.  This shows multilevel lumbar spondylosis.  At L2-3 there is a disc bulge with mild bilateral foraminal stenosis.  At L3-4 there is a disc bulge with mild bilateral foraminal stenosis.  At L4-5 there is a disc bulge and mild facet arthropathy with mild spinal canal stenosis and mild bilateral foraminal stenosis.  Please see report for further details.

## 2021-08-09 NOTE — LETTER
8/9/2021         RE: Luis Manuel Lemon  [address] [place] [address]        Dear Colleague,    Thank you for referring your patient, Luis Manuel Lemon, to the [facility] [place]. Please see a copy of my visit note below.    Assessment:   Luis Manuel Lemon is a 81 y.o. y.o. female with past medical history significant for vitamin D deficiency, hypertension, hyperlipidemia, osteoporosis on Prolia, chronic GERD, chronic pain syndrome, pulmonary fibrosis, polyarthralgia, stage III chronic kidney disease who presents today for follow-up regarding 2 areas of pain.  1.  Chronic neck pain, currently worse on the left than the right.  My review of the MRI cervical spine shows multilevel degenerative change most pronounced at C6-7 where there is moderate spinal canal stenosis.  Patient is status post left cervical and trapezius muscle trigger point injections under ultrasound guidance July 26, 2021 which has provided 50% relief of her pain.  2.  Chronic bilateral low back pain.  My review of an MRI lumbar spine shows mild spinal stenosis at L4-5 related to a disc bulge and facet arthropathy.  There is no high-grade spinal canal or neuroforaminal stenosis. Patient status post bilateral L3, L4, L5 radiofrequency ablation April 19, 2021 which provided 70% relief of her pain.  She feels that that relief is starting to wane, especially on the left side.         Plan:     A shared decision making plan was used.  The patient's values and choices were respected.  The following represents what was discussed and decided upon by the physician assistant and the patient.  A telephone  was used for the visit.  Patient's granddaughter was also present for the visit and helps provide history.    1.  DIAGNOSTIC TESTS: I reviewed the MRI scans of the cervical and lumbar spine.  No further diagnostic tests were ordered.    2.  PHYSICAL THERAPY: Most recent physical therapy was in October 2018.  She admits she is not doing home  exercises.  I entered a referral for the patient to return to physical therapy.  -Patient completed 4 sessions of occupational therapy with Michelle Russell in 2019 which she did not feel is helpful.    3.  MEDICATIONS: No changes are made to the patient's medications.  Medications are set up by a nurse.   On her medication list is Celebrex 100 mg daily, duloxetine 30 mg daily, gabapentin 100 mg in the morning and 200 mg at bedtime, Tylenol arthritis 1300 mg twice daily.  She also uses over-the-counter topical pain relief medications on a regular basis.      4.  INTERVENTIONS:   -No additional interventions were ordered.  -We could repeat left upper trapezius muscle trigger point injections if needed.  -In regards to the low back pain we could repeat the radiofrequency ablation.  We will need to check with insurance how frequently she can have this procedure.    5.  PATIENT EDUCATION: Patient is in agreement the above plan.  All questions were answered.    6.  FOLLOW-UP: I will see the patient back in the clinic in 3 months.  If she has questions or concerns in the meantime, she should not hesitate to call.    Subjective:     Luis Manuel Lemon is a 81 y.o. female who presents today for follow-up regarding chronic neck and low back pain.  She is following up after left cervical and trapezius muscle trigger point injections July 26, 2021.  Patient reports 50% improvement in her pain.    Patient complains of only mild residual left-sided neck pain.  She denies any pain down the arm.  She does have intermittent numbness and tingling in the tips of all of her fingers which is chronic and stable.    Patient also complains of her chronic low back pain.  This is currently worse on the left than the right.  She denies any pain radiating into the buttocks or down the legs.  Denies numbness or tingling down the legs.  She feels generally weak.    Overall, patient rates her pain today as a 6 and 10.  Neck pain is aggravated with moving her  neck.  She feels better if she is sitting still.      Most recent course of physical therapy for the lower back was in October 2018.  She also did 4 sessions of occupational therapy ending in January 2019.  She states that she has not been doing her home exercises.  She attributes this to having some shortness of breath.  She has seen her primary care provider for this.  Patient's medications are set up by her nurse.   On her list is Celebrex 100 mg daily, duloxetine 30 mg daily, gabapentin 100 mg in the morning and 200 mg at bedtime, Tylenol arthritis 1300 mg twice daily.  She will seizes topical over-the-counter pain relief medications.  Medications are somewhat helpful for pain.    Past medical history is reviewed and is unchanged.    Review of Systems:  Positive for numbness/tingling, weakness, loss of bladder control, pain much worse at night.  Negative for loss of bowel control, inability to urinate, headache, trip/stumble/falls, difficulty swallowing, difficulty with hand skills, fevers, unintentional weight loss.     Objective:   CONSTITUTIONAL:  Vital signs as above.  No acute distress.  The patient is well nourished and well groomed.    PSYCHIATRIC:  The patient is awake, alert, oriented to person, place and time.  The patient is answering questions appropriately with clear speech.  Normal affect.  HEENT: Normocephalic, atraumatic.  Sclera clear.    SKIN:  Skin over the face, posterior torso, bilateral upper and lower extremities is clean, dry, intact without rashes.  VASCULAR: No significant lower extremity edema.  MUSCULOSKELETAL:  Gait is unsteady.  She ambulates with a significantly flexed forward posture at the hips. Tender to palpation left cervical paraspinous muscles and upper trapezius muscle with hypertonicity.  No significant tenderness palpation right neck.  Mild tenderness palpation left lower lumbar paraspinous muscles.  The patient has  Diffuse weakness which I would grade 4/5 for the  bilateral shoulder abductors, elbow flexors/extensors, finger flexors/abductors, wrist extensors.  She had 4/5 strength bilateral hip flexors, 5/5 strength bilateral knee flexors/extensors, ankle dorsi/plantar flexors.    NEUROLOGICAL: 1-2+ biceps, triceps, brachioradialis reflexes bilaterally.  2+ patellar, achilles reflexes which are symmetric bilaterally.  Negative Ruby sign bilaterally.    Sensation light touch intact bilateral upper and lower extremities 0.     RESULTS:     I reviewed the MRI cervical spine from [facility] dated March 8, 2021.  This shows multilevel degenerative change.  There is facet arthropathy throughout the cervical spine.  At C3-4 there is mild spinal canal stenosis with moderate left and mild right foraminal stenosis.  At C4-5 there is mild spinal canal stenosis with moderate bilateral foraminal stenosis.  At C5-6 there is mild spinal canal stenosis with moderate bilateral foraminal stenosis.  At C6-7 there is moderate spinal canal stenosis with mild bilateral foraminal stenosis.  At C7-T1 there is mild bilateral foraminal stenosis.  Please see report for further details.    I reviewed the MRI lumbar spine from [facility] dated March 8, 2021.  This shows multilevel lumbar spondylosis.  At L2-3 there is a disc bulge with mild bilateral foraminal stenosis.  At L3-4 there is a disc bulge with mild bilateral foraminal stenosis.  At L4-5 there is a disc bulge and mild facet arthropathy with mild spinal canal stenosis and mild bilateral foraminal stenosis.  Please see report for further details.          Again, thank you for allowing me to participate in the care of your patient.        Sincerely,        Tiffanie Byrne PA-C

## 2021-08-16 ENCOUNTER — TRANSFERRED RECORDS (OUTPATIENT)
Dept: HEALTH INFORMATION MANAGEMENT | Facility: CLINIC | Age: 81
End: 2021-08-16

## 2021-08-24 DIAGNOSIS — Z53.9 DIAGNOSIS NOT YET DEFINED: Primary | ICD-10-CM

## 2021-08-24 PROCEDURE — G0179 MD RECERTIFICATION HHA PT: HCPCS | Performed by: FAMILY MEDICINE

## 2021-09-16 ENCOUNTER — HOSPITAL ENCOUNTER (OUTPATIENT)
Dept: PHYSICAL THERAPY | Facility: REHABILITATION | Age: 81
End: 2021-09-16
Attending: PHYSICIAN ASSISTANT
Payer: COMMERCIAL

## 2021-09-16 DIAGNOSIS — M47.816 LUMBAR FACET ARTHROPATHY: Primary | ICD-10-CM

## 2021-09-16 DIAGNOSIS — M54.2 CHRONIC NECK PAIN: ICD-10-CM

## 2021-09-16 DIAGNOSIS — M79.18 MYOFASCIAL PAIN: ICD-10-CM

## 2021-09-16 DIAGNOSIS — G89.29 CHRONIC NECK PAIN: ICD-10-CM

## 2021-09-16 PROCEDURE — 97140 MANUAL THERAPY 1/> REGIONS: CPT | Mod: GP | Performed by: PHYSICAL THERAPIST

## 2021-09-16 PROCEDURE — 97110 THERAPEUTIC EXERCISES: CPT | Mod: GP | Performed by: PHYSICAL THERAPIST

## 2021-09-16 PROCEDURE — 97161 PT EVAL LOW COMPLEX 20 MIN: CPT | Mod: GP | Performed by: PHYSICAL THERAPIST

## 2021-09-16 NOTE — PROGRESS NOTES
[facility]    Outpatient Physical Therapy Discharge Note  Patient: Luis Manuel Lemon  : 1940    Beginning/End Dates of Reporting Period:  21 Eval only     Referring Provider: see below    Therapy Diagnosis: see below     Client Self Report:  See below    Objective Measurements:  See below    Goals:  See below    Plan:  Discharge from therapy.    Discharge:    Reason for Discharge: Patient has failed to schedule further appointments.    Equipment Issued: NA    Discharge Plan: Patient to continue home program.        Rehabilitation Services          OUTPATIENT PHYSICAL THERAPY ORTHOPEDIC EVALUATION  PLAN OF TREATMENT FOR OUTPATIENT REHABILITATION  (COMPLETE FOR INITIAL CLAIMS ONLY)  Patient's Last Name, First Name, M.I.  YOB: 1940  Luis Manuel Lemon       Provider s Name:  Wicho Plascencia PT   Medical Record No.  [id number]   Start of Care Date:  21   Onset Date:   21   Type:     _X__PT   ___OT   ___SLP Medical Diagnosis:  Lumbar Facet Arthropathy, myofascial pain, chronic neck pain     PT Diagnosis:  Neck pain, back pain, dec neck ROM, myofascial pain, LE weakness   Visits from SOC:  1      _________________________________________________________________________________  Plan of Treatment/Functional Goals:  joint mobilization, manual therapy, neuromuscular re-education, ROM, stretching, strengthening     TENS     Goals  Goal Identifier: Pain  Goal Description: pt will report 50% dec in her pain in 4 weeks:  Target Date: 10/14/21    Goal Identifier: Walk  Goal Description: be able to for 3 minutes, for household mobility, in 6 weeks:  Target Date: 10/28/21    Goal Identifier: Neck ROM  Goal Description: patient will improve cervical rotation ROM to > 60 deg bilaterally, in 6 weeks  Target Date: 10/28/21      Therapy Frequency:  1 time/week  Predicted Duration of Therapy Intervention:   6    Wicho Plascencia, PT                 I CERTIFY THE NEED FOR THESE SERVICES FURNISHED UNDER        THIS PLAN OF TREATMENT AND WHILE UNDER MY CARE     (Physician co-signature of this document indicates review and certification of the therapy plan).                       Certification Date From:  09/16/21   Certification Date To:  12/14/21    Referring Provider:  Tiffanie Byrne PA-C    Initial Assessment        See Epic Evaluation Start of Care Date: 09/16/21 09/16/21 1300   General Information   Type of Visit Initial OP Ortho PT Evaluation   Start of Care Date 09/16/21   Referring Physician Tiffanie Byrne PA-C   Orders Evaluate and Treat   Certification Required? Yes   Medical Diagnosis Lumbar Facet Arthropathy, myofascial pain, chronic neck pain   Surgical/Medical history reviewed Yes   Precautions/Limitations   (Osteoporosis, h/o compression fracture)       Present Yes   Language Other  (reza, granddaughter interpreted)   Body Part(s)   Body Part(s) Lumbar Spine/SI;Cervical Spine   Presentation and Etiology   Pertinent history of current problem (include personal factors and/or comorbidities that impact the POC) Pain started in the neck a few years ago. The low back pain started about a year ago. She reports having a fall while at her day center over a year ago, no other falls. She does have a cane and walker at home, uses both. Pain described as in the neck on the left can radiate into the arm some, does report some t/n  L>R., no pain on right. The low back hurts on both sides, radiates down the legs to the knee. The legs can feel weak. Worse with bending over, moving head/neck for turning, walking hard on knees. Better with injections. Previous treatment trigger point injection L upper trap 7/26/21 50% relief, L3,4, 5 RFA on 4/19/21, 70% relief. No manual therapy. She has had PT, but it was about 3 years ago.   Fall Risk Screen   Fall screen completed by PT   Have you  fallen 2 or more times in the past year? No   Have you fallen and had an injury in the past year? No   Is patient a fall risk? No   Abuse Screen (yes response referral indicated)   Feels Unsafe at Home or Work/School no   Feels Threatened by Someone no   Does Anyone Try to Keep You From Having Contact with Others or Doing Things Outside Your Home? no   Physical Signs of Abuse Present no   Lumbar Spine/SI Objective Findings   Flexion ROM mid shin   Extension ROM Mod limit   Right Side Bending ROM Mod limit   Left Side Bending ROM Mod limit   Hip Screen normal hip ROM, but reports pain in hips   Lumbar/Hip/Knee/Foot Strength Comments 4/4 generally   SLR 50/50 pain   Segmental Mobility hypomobile, mild pain   Sensation Testing intact   Palpation tenderness to QL, glut, paraspinals bilat   Cervical Spine   Posture normal   Cervical Flexion ROM 35   Cervical Extension ROM 35   Cervical Right Side Bending ROM 10   Cervical Left Side Bending ROM 20   Cervical Right Rotation ROM 40   Cervical Left Rotation ROM 60   Shoulder/Wrist/Hand Strength Comments generally 4/4   Upper Trapezius Flexibility dec   Levator Scapula Flexibility dec   Spurling Test -/-   Palpation tenderness L>R UT, levator, paraspinals, suboccipitals   Dermatome/Sensory Testing intact UE   Planned Therapy Interventions   Planned Therapy Interventions joint mobilization;manual therapy;neuromuscular re-education;ROM;stretching;strengthening   Planned Modality Interventions   Planned Modality Interventions TENS   Clinical Impression   Criteria for Skilled Therapeutic Interventions Met yes, treatment indicated   PT Diagnosis Neck pain, back pain, dec neck ROM, myofascial pain, LE weakness   Clinical Presentation Stable/Uncomplicated   Clinical Decision Making (Complexity) Low complexity   Therapy Frequency 1 time/week   Predicted Duration of Therapy Intervention (days/wks) 6   Risk & Benefits of therapy have been explained Yes   Patient, Family & other staff  in agreement with plan of care Yes   Clinical Impression Comments Patient is an 81 year of female seen in PT for chronic neck and low back pain. The patient has trouble with moving her head/neck, walking, standing for prolonged periods of time, bending over, lifting, 2/2 pain. The patient reports this started after a fall a few years ago at a day center. With examination, the patient demonstrates decreased cervical and lumbar ROM, myofascial and muscular tightness throughout cervical, lumbar and hip regions, facet joint restrictions, painful hip ROM testing, generalized LE weakness. The patient reported decrease in pain following manual therapy today, would benefit from skilled PT to improve her strength, mobility, pain, and overall function.    Education Assessment   Preferred Learning Style Listening;Reading;Demonstration;Pictures/video   Barriers to Learning Language   ORTHO GOALS   PT Ortho Eval Goals 1;2;3   Ortho Goal 1   Goal Identifier Pain   Goal Description pt will report 50% dec in her pain in 4 weeks:   Target Date 10/14/21   Ortho Goal 2   Goal Identifier Walk   Goal Description be able to for 3 minutes, for household mobility, in 6 weeks:   Target Date 10/28/21   Ortho Goal 3   Goal Identifier Neck ROM   Goal Description patient will improve cervical rotation ROM to > 60 deg bilaterally, in 6 weeks   Target Date 10/28/21   Total Evaluation Time   PT Eval, Low Complexity Minutes (25431) 20   Therapy Certification   Certification date from 09/16/21   Certification date to 12/14/21   Medical Diagnosis Lumbar Facet Arthropathy, myofascial pain, chronic neck pain     Wicho Plascencia, PT

## 2021-09-24 RX ORDER — CAPSAICIN 0.025 %
CREAM (GRAM) TOPICAL 2 TIMES DAILY PRN
COMMUNITY
Start: 2021-02-25 | End: 2024-06-27

## 2021-09-28 ENCOUNTER — OFFICE VISIT (OUTPATIENT)
Dept: PULMONOLOGY | Facility: OTHER | Age: 81
End: 2021-09-28
Payer: COMMERCIAL

## 2021-09-28 VITALS
OXYGEN SATURATION: 94 % | BODY MASS INDEX: 24.6 KG/M2 | SYSTOLIC BLOOD PRESSURE: 110 MMHG | HEART RATE: 79 BPM | WEIGHT: 122 LBS | DIASTOLIC BLOOD PRESSURE: 60 MMHG | HEIGHT: 59 IN

## 2021-09-28 DIAGNOSIS — K59.03 DRUG-INDUCED CONSTIPATION: Primary | ICD-10-CM

## 2021-09-28 DIAGNOSIS — K21.9 GASTROESOPHAGEAL REFLUX DISEASE WITHOUT ESOPHAGITIS: Primary | ICD-10-CM

## 2021-09-28 DIAGNOSIS — M62.81 GENERALIZED MUSCLE WEAKNESS: Primary | ICD-10-CM

## 2021-09-28 PROCEDURE — 99214 OFFICE O/P EST MOD 30 MIN: CPT | Performed by: INTERNAL MEDICINE

## 2021-09-28 RX ORDER — DOCUSATE SODIUM 50MG AND SENNOSIDES 8.6MG 8.6; 5 MG/1; MG/1
2 TABLET, FILM COATED ORAL DAILY
Qty: 60 TABLET | Refills: 3 | Status: SHIPPED | OUTPATIENT
Start: 2021-09-28 | End: 2022-01-11

## 2021-09-28 RX ORDER — CALCIUM CARBONATE 500 MG/1
TABLET, CHEWABLE ORAL
Qty: 90 TABLET | Refills: 3 | Status: SHIPPED | OUTPATIENT
Start: 2021-09-28 | End: 2022-01-11

## 2021-09-28 ASSESSMENT — MIFFLIN-ST. JEOR: SCORE: 929.02

## 2021-09-28 NOTE — PROGRESS NOTES
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  I don't think her dyspnea is from ILD, and the workup of cor pulmonale based on a Chest CT does not show right ventricular dysfunction.  On further probing of her history she described profound orthopnea which I suspect is due to severe respiratory muscle weakness.  This can explain the restrictive defect but preserved DLCO.  I am worried she has a progressive muscular atrophy or weakness that needs further evaluation  1) Weakness- will send a couple of lab tests to initiate this workup as I know neurology can be difficult to access right now.  TSH, aldolase, CK, ACH receptor workup.  Neurology referall  2) Orthopnea - try to work on positioning to help her not feel suffocated but still get to sleep.  3) Dyspnea - have provided an IS to work on throughout the day to get her breathing muscles stronger if this is possible  4) RTC in 6 months.           CCx: dyspnea    HPI: Luis Manuel Lemon returns for follow up.  She has had progressive dyspnea for the past few months, and our workup shows signficant reduction in her airflows due to restriction.  Today she says she can't sleep because she cant breathe.  Further  Questioning reveals she is short of breath anytime she lays flat, and sometimes her breath\"cuts off\" entirely and she has to get up.  Her family says she is getting weaker and weaker, and usually needs help out of her chair, and is very unstable and falling.  She isn't coughing, and isn't getting any relief from any of her inhalers.  She struggles to sleep upright.    ROS:  Review of Systems - History obtained from the patient  General ROS: negative  Psychological ROS: negative  ENT ROS: negative  Allergy and Immunology ROS: negative  Endocrine ROS: negative  Respiratory ROS: positive for - shortness of breath and severe " orthopnea  negative for - cough, hemoptysis, tachypnea or wheezing  Cardiovascular ROS: no chest pain or palpitations  Gastrointestinal ROS: no abdominal pain, change in bowel habits, or black or bloody stools  Genito-Urinary ROS: no dysuria, trouble voiding, or hematuria  Musculoskeletal ROS: weakness and instability  Neurological ROS: no TIA or stroke symptoms  Dermatological ROS: negative      Current Meds:  Current Outpatient Medications   Medication Sig Dispense Refill     acetaminophen (MAPAP ARTHRITIS PAIN) 650 MG CR tablet TAKE 2 TABLETS BY MOUTH 2 TIMES A DAY       albuterol (PROAIR HFA/PROVENTIL HFA/VENTOLIN HFA) 108 (90 Base) MCG/ACT inhaler Inhale 2 puffs into the lungs       amLODIPine (NORVASC) 10 MG tablet Take 10 mg by mouth daily        ANTACID CALCIUM 500 MG chewable tablet Take 1 chew tab by mouth daily CHEW 1 TABLET BY MOUTH THREE TIMES A DAY TO KEEP YOUR BONES HEALTHY       ARTIFICIAL TEARS 1.4 % ophthalmic solution Place 1 drop into both eyes every hour as needed        BREO ELLIPTA 100-25 MCG/INH inhaler Inhale 1 puff into the lungs daily        capsaicin (ZOSTRIX) 0.025 % external cream        celecoxib (CELEBREX) 100 MG capsule Take 100 mg by mouth daily        COMBIVENT RESPIMAT  MCG/ACT inhaler        denosumab (PROLIA) 60 MG/ML SOSY injection Inject 60 mg Subcutaneous       DULoxetine (CYMBALTA) 30 MG capsule Take 30 mg by mouth daily        famotidine (PEPCID) 40 MG tablet Take 40 mg by mouth 2 times daily        gabapentin (NEURONTIN) 100 MG capsule Take 100 mg by mouth Take 1 tab (100mg) in the morning and 2 tabs (200mg) at bedtime       hydrochlorothiazide (HYDRODIURIL) 25 MG tablet TAKE ONE TABLET BY MOUTH ONCE DAILY WITH LOSARTAN 100MG. 90 tablet 3     INCRUSE ELLIPTA 62.5 MCG/INH Inhaler        losartan (COZAAR) 100 MG tablet Take 100 mg by mouth daily TAKE 1 TABLET BY MOUTH DAILY WITH HYDROCHLOROTHIAZIDE 25MG       metoprolol succinate ER (TOPROL-XL) 50 MG 24 hr tablet Take  50 mg by mouth daily        mirtazapine (REMERON) 15 MG tablet 15 mg Take 1.5 tablets (22.5 mg total) by mouth at bedtime       Multiple Vitamin (MULTI-VITAMINS) TABS Take by mouth daily        nystatin (MYCOSTATIN) 787912 UNIT/ML suspension        SENEXON-S 8.6-50 MG tablet Take 2 tablets by mouth daily        warfarin ANTICOAGULANT (COUMADIN) 5 MG tablet TAKE 1 TABLET (5MG) BY MOUTH EVERY WEDNESDAY AND 1/2 TABLET (2.5MG) BY MOUTH ALL OTHER DAYS         Labs:  @clab@  Lab Results   Component Value Date    WBC 5.9 06/09/2021    HGB 10.3 (L) 06/09/2021    HCT 35.6 06/09/2021     06/09/2021     06/09/2021    POTASSIUM 4.5 06/09/2021    CHLORIDE 102 06/09/2021    CO2 26 06/09/2021    GLC 99 06/09/2021    BUN 11 06/09/2021    CR 0.94 06/09/2021    MAG 1.9 12/14/2019    INR 1.0 07/26/2021    BILITOTAL 0.3 10/15/2020    AST 21 10/15/2020    ALT 11 10/15/2020    ALKPHOS 115 10/15/2020    PROTTOTAL 7.3 10/15/2020    ALBUMIN 3.1 (L) 10/15/2020       I have personally reviewed all pertinent imaging studies and PFT results unless otherwise noted.    Imaging studies:  ECHO 07/19  Interpretation Summary     1. Normal left ventricular size and systolic performance with a visually  estimated ejection fraction of 60-65%.  2. There is mild to moderate aortic insufficiency.  3. There is mild, to perhaps mild-moderate, mitral insufficiency.  4. Normal right ventricular size and systolic performance.    PFTs 06/21/21  FEV1/FVC is 89 and is normal.  FEV1 is 0.77 L (53%) predicted and is reduced.  FVC is 0.88 L (46%) predicted and is reduced.  There was no improvement in spirometry after a single inhaled dose of bronchodilator.  TLC is 2.43 L (61%) predicted and is reduced.  RV is 1.32 L (67%) predicted and is reduced.  DLCO is 75% predicted and is reduced when it   is corrected for hemoglobin.  The flow volume loop is normal No.     Impression:    Full Pulmonary Function Test is abnormal.   Spirometry is consistent with a  "restrictive ventilatory defect.  Spirometry is not consistent with reversibility.  Lung volumes confirm a moderate restrictive lung disease.  Diffusion capacity when corrected for hemoglobin is mildly reduced.    Physical Exam:  /60   Pulse 79   Ht 1.499 m (4' 11\")   Wt 55.3 kg (122 lb)   SpO2 94%   Breastfeeding No   BMI 24.64 kg/m    General - Well nourished  Ears/Mouth -  Deferred given mask use during pandemic  Neck - no cervical lymphadenopathy  Lungs - Clear to ausculation bilaterally, shallow breathing.  Using IS device she is barely able to reach over 500cc on inhalation  CVS - regular rhythm with no murmurs, rubs or gallups  Abdomen - soft, NT, ND, NABS  Ext - no cyanosis, clubbing or edema  Skin - no rash  Neuro - DTRs are intact, global weakness 2/4 in extremities   Psychology - alert and oriented, answers appropriate        Electronically signed by:    Johan Chavez MD PhD  [facility] Pulmonary and Critical Care Medicine  "

## 2021-09-28 NOTE — LETTER
"9/28/2021       RE: Luis Manuel Lemon  [address] [place] [address]     Dear Colleague,    Thank you for referring your patient, Luis Manuel Lemon, to the [facility]. Please see a copy of my visit note below.    Assessment and Plan:Luis Manuel Lemon is a 81 year old female with a past medical history significant for ILD from presumed GERD who presents to clinic today for follow up of progressive dyspnea.  I don't think her dyspnea is from ILD, and the workup of cor pulmonale based on a Chest CT does not show right ventricular dysfunction.  On further probing of her history she described profound orthopnea which I suspect is due to severe respiratory muscle weakness.  This can explain the restrictive defect but preserved DLCO.  I am worried she has a progressive muscular atrophy or weakness that needs further evaluation  1) Weakness- will send a couple of lab tests to initiate this workup as I know neurology can be difficult to access right now.  TSH, aldolase, CK, ACH receptor workup.  Neurology referall  2) Orthopnea - try to work on positioning to help her not feel suffocated but still get to sleep.  3) Dyspnea - have provided an IS to work on throughout the day to get her breathing muscles stronger if this is possible  4) RTC in 6 months.           CCx: dyspnea    HPI: Luis Manuel Lemon returns for follow up.  She has had progressive dyspnea for the past few months, and our workup shows signficant reduction in her airflows due to restriction.  Today she says she can't sleep because she cant breathe.  Further  Questioning reveals she is short of breath anytime she lays flat, and sometimes her breath\"cuts off\" entirely and she has to get up.  Her family says she is getting weaker and weaker, and usually needs help out of her chair, and is very unstable and falling.  She isn't coughing, and isn't getting any relief from any of her inhalers.  She struggles to " sleep upright.    ROS:  Review of Systems - History obtained from the patient  General ROS: negative  Psychological ROS: negative  ENT ROS: negative  Allergy and Immunology ROS: negative  Endocrine ROS: negative  Respiratory ROS: positive for - shortness of breath and severe orthopnea  negative for - cough, hemoptysis, tachypnea or wheezing  Cardiovascular ROS: no chest pain or palpitations  Gastrointestinal ROS: no abdominal pain, change in bowel habits, or black or bloody stools  Genito-Urinary ROS: no dysuria, trouble voiding, or hematuria  Musculoskeletal ROS: weakness and instability  Neurological ROS: no TIA or stroke symptoms  Dermatological ROS: negative      Current Meds:  Current Outpatient Medications   Medication Sig Dispense Refill     acetaminophen (MAPAP ARTHRITIS PAIN) 650 MG CR tablet TAKE 2 TABLETS BY MOUTH 2 TIMES A DAY       albuterol (PROAIR HFA/PROVENTIL HFA/VENTOLIN HFA) 108 (90 Base) MCG/ACT inhaler Inhale 2 puffs into the lungs       amLODIPine (NORVASC) 10 MG tablet Take 10 mg by mouth daily        ANTACID CALCIUM 500 MG chewable tablet Take 1 chew tab by mouth daily CHEW 1 TABLET BY MOUTH THREE TIMES A DAY TO KEEP YOUR BONES HEALTHY       ARTIFICIAL TEARS 1.4 % ophthalmic solution Place 1 drop into both eyes every hour as needed        BREO ELLIPTA 100-25 MCG/INH inhaler Inhale 1 puff into the lungs daily        capsaicin (ZOSTRIX) 0.025 % external cream        celecoxib (CELEBREX) 100 MG capsule Take 100 mg by mouth daily        COMBIVENT RESPIMAT  MCG/ACT inhaler        denosumab (PROLIA) 60 MG/ML SOSY injection Inject 60 mg Subcutaneous       DULoxetine (CYMBALTA) 30 MG capsule Take 30 mg by mouth daily        famotidine (PEPCID) 40 MG tablet Take 40 mg by mouth 2 times daily        gabapentin (NEURONTIN) 100 MG capsule Take 100 mg by mouth Take 1 tab (100mg) in the morning and 2 tabs (200mg) at bedtime       hydrochlorothiazide (HYDRODIURIL) 25 MG tablet TAKE ONE TABLET BY MOUTH  ONCE DAILY WITH LOSARTAN 100MG. 90 tablet 3     INCRUSE ELLIPTA 62.5 MCG/INH Inhaler        losartan (COZAAR) 100 MG tablet Take 100 mg by mouth daily TAKE 1 TABLET BY MOUTH DAILY WITH HYDROCHLOROTHIAZIDE 25MG       metoprolol succinate ER (TOPROL-XL) 50 MG 24 hr tablet Take 50 mg by mouth daily        mirtazapine (REMERON) 15 MG tablet 15 mg Take 1.5 tablets (22.5 mg total) by mouth at bedtime       Multiple Vitamin (MULTI-VITAMINS) TABS Take by mouth daily        nystatin (MYCOSTATIN) 181724 UNIT/ML suspension        SENEXON-S 8.6-50 MG tablet Take 2 tablets by mouth daily        warfarin ANTICOAGULANT (COUMADIN) 5 MG tablet TAKE 1 TABLET (5MG) BY MOUTH EVERY WEDNESDAY AND 1/2 TABLET (2.5MG) BY MOUTH ALL OTHER DAYS         Labs:  @clab@  Lab Results   Component Value Date    WBC 5.9 06/09/2021    HGB 10.3 (L) 06/09/2021    HCT 35.6 06/09/2021     06/09/2021     06/09/2021    POTASSIUM 4.5 06/09/2021    CHLORIDE 102 06/09/2021    CO2 26 06/09/2021    GLC 99 06/09/2021    BUN 11 06/09/2021    CR 0.94 06/09/2021    MAG 1.9 12/14/2019    INR 1.0 07/26/2021    BILITOTAL 0.3 10/15/2020    AST 21 10/15/2020    ALT 11 10/15/2020    ALKPHOS 115 10/15/2020    PROTTOTAL 7.3 10/15/2020    ALBUMIN 3.1 (L) 10/15/2020       I have personally reviewed all pertinent imaging studies and PFT results unless otherwise noted.    Imaging studies:  ECHO 07/19  Interpretation Summary     1. Normal left ventricular size and systolic performance with a visually  estimated ejection fraction of 60-65%.  2. There is mild to moderate aortic insufficiency.  3. There is mild, to perhaps mild-moderate, mitral insufficiency.  4. Normal right ventricular size and systolic performance.    PFTs 06/21/21  FEV1/FVC is 89 and is normal.  FEV1 is 0.77 L (53%) predicted and is reduced.  FVC is 0.88 L (46%) predicted and is reduced.  There was no improvement in spirometry after a single inhaled dose of bronchodilator.  TLC is 2.43 L (61%)  "predicted and is reduced.  RV is 1.32 L (67%) predicted and is reduced.  DLCO is 75% predicted and is reduced when it   is corrected for hemoglobin.  The flow volume loop is normal No.     Impression:    Full Pulmonary Function Test is abnormal.   Spirometry is consistent with a restrictive ventilatory defect.  Spirometry is not consistent with reversibility.  Lung volumes confirm a moderate restrictive lung disease.  Diffusion capacity when corrected for hemoglobin is mildly reduced.    Physical Exam:  /60   Pulse 79   Ht 1.499 m (4' 11\")   Wt 55.3 kg (122 lb)   SpO2 94%   Breastfeeding No   BMI 24.64 kg/m    General - Well nourished  Ears/Mouth -  Deferred given mask use during pandemic  Neck - no cervical lymphadenopathy  Lungs - Clear to ausculation bilaterally, shallow breathing.  Using IS device she is barely able to reach over 500cc on inhalation  CVS - regular rhythm with no murmurs, rubs or gallups  Abdomen - soft, NT, ND, NABS  Ext - no cyanosis, clubbing or edema  Skin - no rash  Neuro - DTRs are intact, global weakness 2/4 in extremities   Psychology - alert and oriented, answers appropriate        Electronically signed by:    Johan Chavez MD PhD  [facility] Pulmonary and Critical Care Medicine        "

## 2021-09-28 NOTE — PATIENT INSTRUCTIONS
1) I think your shortness of breath is due to weakness of your breathing muscles  2) I have sent some blood tests to see if we can sort this out  3) I will refer you to neurology to discuss your weakness and instability  4) I don't think inhaled medicines will help  5) There may be a way to rebuild your muscles but I need neurology to help sort this out.

## 2021-10-19 NOTE — PROGRESS NOTES
"[facility] Daily Progress     Patient Name: Luis Manuel Lemon  Date: 9/26/2018  Visit #: 3  PTA visit #:  1  Referral Diagnosis: lumbar spinal stenosis  Referring provider: Kateryna Morales MD  Visit Diagnosis:     ICD-10-CM    1. Lumbar radiculopathy M54.16    2. Generalized muscle weakness M62.81          Assessment:   7' late to appt    Patient feels the same. She isn't icing. Patient wasn't able to independently perform any of her exercises from her HEP correctly today upon review. Upon review, she is doing her own exercises that are likely contributing to her pain due to high velocity of movement and torque put on the spine with the exercises. Daughter present for today's session. Encouraged patient's daughter to help patient but she did not want to help patient. Patient also states that she does not want help and will just do what she remembers. However, patient is unable to demonstrate independence with the HEP and pictures. If patient's family does not help her and she does not use/follow her exercises given by therapist, she is likely not going to make progress in therapy.     HEP/POC compliance is  fair .  Patient demonstrates understanding/independence with home program.    Goal Status:  Pt. will demonstrate/verbalize independence in self-management of condition in : 6 weeks  Pt. will be independent with home exercise program in : 6 weeks  Pt. will have improved quality of sleep: with less pain;waking less times/night;in 6 weeks  Pt will: no longer have positive neural tension testing in B LE's within 8 weeks in order to improve her QOL.    Plan / Patient Education:     Continue with initial plan of care.  Progress with home program as tolerated.     Exercises:  Exercise #1: single knee to chest and piriformis stretch  Comment #1: 30\" holds  Exercise #2: LAQ  Comment #2: x20 B  Exercise #3: LTR  Comment #3: 10\" x10  Exercise #4: prayer stretch  Comment #4: 10\" x 3  Exercise #5: prone HS curls/nerve " rahul  Comment #5: x10 B  Exercise #6: supine nerve rahul  Comment #6: x10 B     Plan for next session: review HEP and discharge if patient is not progressing or doing her HEP/icing. Teach log roll    Subjective:     Pain Ratin     Feels the same. She isn't icing. Her HEP seems to help.      helping with today's session: Ser from [facility] helping with today's session    Objective:     Pt ambulates using a SEC. Slow  Max A + pictures needed with review of HEP today    Treatment Today     TREATMENT MINUTES COMMENTS   Evaluation     Self-care/ Home management     Manual therapy     Neuromuscular Re-education     Therapeutic Activity     Therapeutic Exercises 25 Discussed progress. Extensive review of HEP needed.    Gait training     Modality__________________                Total 25    Blank areas are intentional and mean the treatment did not include these items.       Guillermo Garg, PT, DPT  2018       Detail Level: Detailed

## 2021-10-21 DIAGNOSIS — Z76.0 ENCOUNTER FOR MEDICATION REFILL: Primary | ICD-10-CM

## 2021-10-21 RX ORDER — IPRATROPIUM BROMIDE AND ALBUTEROL 20; 100 UG/1; UG/1
SPRAY, METERED RESPIRATORY (INHALATION)
Qty: 4 G | Refills: 12 | Status: SHIPPED | OUTPATIENT
Start: 2021-10-21 | End: 2023-08-22 | Stop reason: DRUGHIGH

## 2021-10-21 RX ORDER — GABAPENTIN 100 MG/1
CAPSULE ORAL
Qty: 90 CAPSULE | Refills: 3 | Status: SHIPPED | OUTPATIENT
Start: 2021-10-21 | End: 2022-04-21

## 2021-10-21 RX ORDER — METOPROLOL SUCCINATE 50 MG/1
TABLET, EXTENDED RELEASE ORAL
Qty: 90 TABLET | Refills: 3 | Status: SHIPPED | OUTPATIENT
Start: 2021-10-21 | End: 2022-08-08 | Stop reason: DRUGHIGH

## 2021-10-26 PROCEDURE — G0179 MD RECERTIFICATION HHA PT: HCPCS | Performed by: FAMILY MEDICINE

## 2021-10-27 DIAGNOSIS — Z53.9 DIAGNOSIS NOT YET DEFINED: Primary | ICD-10-CM

## 2021-11-01 DIAGNOSIS — I10 ESSENTIAL HYPERTENSION: Primary | ICD-10-CM

## 2021-11-02 RX ORDER — LOSARTAN POTASSIUM 100 MG/1
TABLET ORAL
Qty: 90 TABLET | Refills: 2 | Status: SHIPPED | OUTPATIENT
Start: 2021-11-02 | End: 2022-07-28

## 2021-11-02 RX ORDER — AMLODIPINE BESYLATE 10 MG/1
10 TABLET ORAL DAILY
Qty: 90 TABLET | Refills: 2 | Status: SHIPPED | OUTPATIENT
Start: 2021-11-02 | End: 2022-07-28

## 2021-11-02 NOTE — TELEPHONE ENCOUNTER
"  Disp Refills Start End MAMTA    amLODIPine (NORVASC) 10 MG tablet 90 tablet 2 1/22/2021  No   Sig: TAKE 1 TABLET ORALLY EVERY DAY.   Sent to pharmacy as: amLODIPine 10 mg tablet (NORVASC)   E-Prescribing Status: Receipt confirmed by pharmacy (1/22/2021 11:03 AM CST)       Disp Refills Start End MAMTA    losartan (COZAAR) 100 MG tablet 90 tablet 2 1/19/2021  No   Sig: TAKE 1 TABLET BY MOUTH DAILY WITH HYDROCHLOROTHIAZIDE 25MG   Sent to pharmacy as: losartan 100 mg tablet (COZAAR)   E-Prescribing Status: Receipt confirmed by pharmacy (1/19/2021 11:42 AM CST)       Last office visit provider:  7/8/21     Requested Prescriptions   Pending Prescriptions Disp Refills     losartan (COZAAR) 100 MG tablet [Pharmacy Med Name: LOSARTAN POTASSIUM 100 MG T 100 Tablet] 90 tablet 2     Sig: TAKE 1 TABLET BY MOUTH DAILY WITH HYDROCHLOROTHIAZIDE 25MG       Angiotensin-II Receptors Passed - 11/1/2021  8:39 AM        Passed - Last blood pressure under 140/90 in past 12 months     BP Readings from Last 3 Encounters:   09/28/21 110/60   08/09/21 116/60   07/26/21 126/58                 Passed - Recent (12 mo) or future (30 days) visit within the authorizing provider's specialty     Patient has had an office visit with the authorizing provider or a provider within the authorizing providers department within the previous 12 mos or has a future within next 30 days. See \"Patient Info\" tab in inbasket, or \"Choose Columns\" in Meds & Orders section of the refill encounter.              Passed - Medication is active on med list        Passed - Patient is age 18 or older        Passed - No active pregnancy on record        Passed - Normal serum creatinine on file in past 12 months     Recent Labs   Lab Test 06/09/21  1848   CR 0.94       Ok to refill medication if creatinine is low          Passed - Normal serum potassium on file in past 12 months     Recent Labs   Lab Test 06/09/21  1848   POTASSIUM 4.5                    Passed - No positive " "pregnancy test in past 12 months           amLODIPine (NORVASC) 10 MG tablet [Pharmacy Med Name: AMLODIPINE BESYLATE 10 MG T 10 Tablet] 90 tablet 2     Sig: TAKE 1 TABLET BY MOUTH DAILY       Calcium Channel Blockers Protocol  Passed - 11/1/2021  8:39 AM        Passed - Blood pressure under 140/90 in past 12 months     BP Readings from Last 3 Encounters:   09/28/21 110/60   08/09/21 116/60   07/26/21 126/58                 Passed - Recent (12 mo) or future (30 days) visit within the authorizing provider's specialty     Patient has had an office visit with the authorizing provider or a provider within the authorizing providers department within the previous 12 mos or has a future within next 30 days. See \"Patient Info\" tab in inbasket, or \"Choose Columns\" in Meds & Orders section of the refill encounter.              Passed - Medication is active on med list        Passed - Patient is age 18 or older        Passed - No active pregnancy on record        Passed - Normal serum creatinine on file in past 12 months     Recent Labs   Lab Test 06/09/21  1848   CR 0.94       Ok to refill medication if creatinine is low          Passed - No positive pregnancy test in past 12 months             Moses Borden RN 11/02/21 10:03 AM  "

## 2021-11-24 ENCOUNTER — PATIENT OUTREACH (OUTPATIENT)
Dept: GERIATRIC MEDICINE | Facility: CLINIC | Age: 81
End: 2021-11-24
Payer: COMMERCIAL

## 2021-11-30 SDOH — ECONOMIC STABILITY: INCOME INSECURITY: IN THE LAST 12 MONTHS, WAS THERE A TIME WHEN YOU WERE NOT ABLE TO PAY THE MORTGAGE OR RENT ON TIME?: NO

## 2021-11-30 SDOH — ECONOMIC STABILITY: TRANSPORTATION INSECURITY
IN THE PAST 12 MONTHS, HAS THE LACK OF TRANSPORTATION KEPT YOU FROM MEDICAL APPOINTMENTS OR FROM GETTING MEDICATIONS?: NO

## 2021-11-30 SDOH — HEALTH STABILITY: PHYSICAL HEALTH: ON AVERAGE, HOW MANY DAYS PER WEEK DO YOU ENGAGE IN MODERATE TO STRENUOUS EXERCISE (LIKE A BRISK WALK)?: 0 DAYS

## 2021-11-30 SDOH — ECONOMIC STABILITY: TRANSPORTATION INSECURITY
IN THE PAST 12 MONTHS, HAS LACK OF TRANSPORTATION KEPT YOU FROM MEETINGS, WORK, OR FROM GETTING THINGS NEEDED FOR DAILY LIVING?: NO

## 2021-11-30 SDOH — ECONOMIC STABILITY: FOOD INSECURITY: WITHIN THE PAST 12 MONTHS, THE FOOD YOU BOUGHT JUST DIDN'T LAST AND YOU DIDN'T HAVE MONEY TO GET MORE.: NEVER TRUE

## 2021-11-30 SDOH — ECONOMIC STABILITY: FOOD INSECURITY: WITHIN THE PAST 12 MONTHS, YOU WORRIED THAT YOUR FOOD WOULD RUN OUT BEFORE YOU GOT MONEY TO BUY MORE.: NEVER TRUE

## 2021-11-30 SDOH — HEALTH STABILITY: PHYSICAL HEALTH: ON AVERAGE, HOW MANY MINUTES DO YOU ENGAGE IN EXERCISE AT THIS LEVEL?: 0 MIN

## 2021-11-30 ASSESSMENT — SOCIAL DETERMINANTS OF HEALTH (SDOH)
HOW OFTEN DO YOU GET TOGETHER WITH FRIENDS OR RELATIVES?: MORE THAN THREE TIMES A WEEK
IN A TYPICAL WEEK, HOW MANY TIMES DO YOU TALK ON THE PHONE WITH FAMILY, FRIENDS, OR NEIGHBORS?: NEVER
WITHIN THE LAST YEAR, HAVE YOU BEEN AFRAID OF YOUR PARTNER OR EX-PARTNER?: NO
HOW OFTEN DO YOU ATTEND CHURCH OR RELIGIOUS SERVICES?: NEVER
WITHIN THE LAST YEAR, HAVE TO BEEN RAPED OR FORCED TO HAVE ANY KIND OF SEXUAL ACTIVITY BY YOUR PARTNER OR EX-PARTNER?: NO
WITHIN THE LAST YEAR, HAVE YOU BEEN HUMILIATED OR EMOTIONALLY ABUSED IN OTHER WAYS BY YOUR PARTNER OR EX-PARTNER?: NO
DO YOU BELONG TO ANY CLUBS OR ORGANIZATIONS SUCH AS CHURCH GROUPS UNIONS, FRATERNAL OR ATHLETIC GROUPS, OR SCHOOL GROUPS?: NO
HOW HARD IS IT FOR YOU TO PAY FOR THE VERY BASICS LIKE FOOD, HOUSING, MEDICAL CARE, AND HEATING?: NOT HARD AT ALL
WITHIN THE LAST YEAR, HAVE YOU BEEN KICKED, HIT, SLAPPED, OR OTHERWISE PHYSICALLY HURT BY YOUR PARTNER OR EX-PARTNER?: NO
HOW OFTEN DO YOU ATTENT MEETINGS OF THE CLUB OR ORGANIZATION YOU BELONG TO?: NEVER

## 2021-11-30 ASSESSMENT — LIFESTYLE VARIABLES
HOW OFTEN DO YOU HAVE SIX OR MORE DRINKS ON ONE OCCASION: NEVER
HOW OFTEN DO YOU HAVE A DRINK CONTAINING ALCOHOL: NEVER

## 2021-12-01 NOTE — PROGRESS NOTES
[facility] Care Coordination Contact    [facility] Home Visit Assessment     Home visit for Health Risk Assessment with Luis Manuel Lemon completed on November 24, 2021    Type of residence: Apartment  Current living arrangement: I live in a private home with family     Assessment completed with: Patient    Current Care Plan  Member currently receiving the following home care services: Skilled Nursing   Member currently receiving the following community resources: PCA    Medication Review  Medication reconciliation completed in Epic: No, difficult to complete via phone   Medication set-up & administration: RN set up every two weeks.  Family caregiver administers medications.  Medication Risk Assessment Medication (1 or more, place referral to MTM): N/A: No risk factors identified  MTM Referral Placed: No: No risk factors idenified    Mental/Behavioral Health   Depression Screening: Denies any concerns.   PHQ-2 Total Score (Adult) - Positive if 3 or more points; Administer PHQ-9 if positive: 0  Mental health DX: No        Falls Assessment:   Fallen 2 or more times in the past year?: No   Any fall with injury in the past year?: No    ADL/IADL Dependencies:   Dependent ADLs:: Bathing,Dressing,Grooming,Transfers,Toileting,Ambulation-walker  Dependent IADLs:: Cleaning,Cooking,Laundry,Shopping,Meal Preparation,Medication Management,Money Management,Transportation    [facility] sponsored benefits: Shared information re: Silver Sneakers/gym memberships, ASA, Calcium +D.    PCA Assessment completed at visit: Yes Annual PCA assessment indicated 18 units per day of PCA. This is the same as the previous assessment.   ADL dependencies include: dressing, grooming, bathing, transferring, mobility, and toileting. No changes to PCA assessment. 4.5 hours daily.    Elderly Waiver Eligibility: No-does not meet criteria    Care Plan & Recommendations: Luis Manuel Lemon is a 81 year old female with a past medical history of chronic  pain syndrome, restrictive lung disease, pulmonary fibrosis, age related constipation, GERD, spinal stenosis, and hypertension.      Luis Manuel lives in a single home with her daughter, Lay Nicholas (who is her PCA/Primary caregiver), and grandchildren. Luis Manuel requires assistance with all IADLs and most ADLs.     PCA will continue to be primary support.     See [facility] for detailed assessment information.    Follow-Up Plan: Member informed of future contact, plan to f/u with member with a 6 month telephone assessment.  Contact information shared with member and family, encouraged member to call with any questions or concerns at any time.    REBECCA Srinivasan  [employer]  [phone]

## 2021-12-07 ENCOUNTER — PATIENT OUTREACH (OUTPATIENT)
Dept: GERIATRIC MEDICINE | Facility: CLINIC | Age: 81
End: 2021-12-07
Payer: COMMERCIAL

## 2021-12-07 NOTE — PROGRESS NOTES
[facility] Care Coordination Contact    Received after visit chart from care coordinator.  Completed following tasks: Mailed copy of care plan to client and Updated services in access.    [facility]:  Emailed completed PCA assessment to [facility].  Faxed copy of PCA assessment to PCA Agency and mailed copy to member.  Faxed MD Communication to PCP.     Mailed PCA Signature page and POC Signature page to member w/ self-stamped envelope for return.    Kera Cavanaugh  Care Management Specialist  [facility]  [phone]

## 2021-12-07 NOTE — LETTER
December 7, 2021      [facility]  [address] [place] [address]      Dear [facility]:    At [facility], we are dedicated to improving your health and well-being. Enclosed is the Comprehensive Care Plan that we developed with you on 11/24/2021. Please review the Care Plan carefully.    As a reminder, some of the things we discussed at your visit include:    Your physical and mental health    Ways to reduce falls    Health care needs you may have    Don t forget to contact your care coordinator if you:    Have been hospitalized or plan to be hospitalized     Have had a fall     Have experienced a change in physical health    Are experiencing emotional problems     If you do not agree with your Care Plan, have questions about it, or have experienced a change in your needs, please call me at [phone]. If you are hearing impaired, please call the [place] [employer] at 024 or [phone] (ufkhaq-mx-utriyo relay service).    Sincerely,    REBECCA Srinivasan  [phone]  [email or web address]   [facility] ([facility]) is a health plan that contracts with both Medicare and the [place] Medical Assistance (Medicaid) program to provide benefits of both programs to enrollees. Enrollment in [facility] depends on contract renewal.    [id number])     [id number] (11/18)

## 2021-12-21 DIAGNOSIS — Z53.9 DIAGNOSIS NOT YET DEFINED: Primary | ICD-10-CM

## 2021-12-21 PROCEDURE — G0179 MD RECERTIFICATION HHA PT: HCPCS | Performed by: FAMILY MEDICINE

## 2021-12-24 ENCOUNTER — HOSPITAL ENCOUNTER (INPATIENT)
Facility: HOSPITAL | Age: 81
LOS: 3 days | Discharge: HOME | DRG: 177 | End: 2021-12-27
Attending: FAMILY MEDICINE | Admitting: HOSPITALIST
Payer: COMMERCIAL

## 2021-12-24 ENCOUNTER — APPOINTMENT (OUTPATIENT)
Dept: RADIOLOGY | Facility: HOSPITAL | Age: 81
DRG: 177 | End: 2021-12-24
Payer: COMMERCIAL

## 2021-12-24 ENCOUNTER — APPOINTMENT (OUTPATIENT)
Dept: CT IMAGING | Facility: HOSPITAL | Age: 81
DRG: 177 | End: 2021-12-24
Attending: HOSPITALIST
Payer: COMMERCIAL

## 2021-12-24 DIAGNOSIS — J96.01 ACUTE RESPIRATORY FAILURE WITH HYPOXIA (H): ICD-10-CM

## 2021-12-24 DIAGNOSIS — J84.10 PULMONARY FIBROSIS (H): Primary | ICD-10-CM

## 2021-12-24 DIAGNOSIS — U07.1 INFECTION DUE TO 2019 NOVEL CORONAVIRUS: ICD-10-CM

## 2021-12-24 LAB
ABO/RH(D): NORMAL
ALBUMIN SERPL-MCNC: 2.7 G/DL (ref 3.5–5)
ALP SERPL-CCNC: 77 U/L (ref 45–120)
ALT SERPL W P-5'-P-CCNC: 15 U/L (ref 0–45)
ANION GAP SERPL CALCULATED.3IONS-SCNC: 10 MMOL/L (ref 5–18)
APTT PPP: 30 SECONDS (ref 22–38)
AST SERPL W P-5'-P-CCNC: 33 U/L (ref 0–40)
ATRIAL RATE - MUSE: 61 BPM
BASOPHILS # BLD AUTO: 0 10E3/UL (ref 0–0.2)
BASOPHILS NFR BLD AUTO: 0 %
BILIRUB SERPL-MCNC: 0.6 MG/DL (ref 0–1)
BNP SERPL-MCNC: 85 PG/ML (ref 0–155)
BUN SERPL-MCNC: 13 MG/DL (ref 8–28)
C REACTIVE PROTEIN LHE: 5.9 MG/DL (ref 0–0.8)
CALCIUM SERPL-MCNC: 9 MG/DL (ref 8.5–10.5)
CHLORIDE BLD-SCNC: 98 MMOL/L (ref 98–107)
CO2 SERPL-SCNC: 33 MMOL/L (ref 22–31)
CREAT SERPL-MCNC: 0.92 MG/DL (ref 0.6–1.1)
D DIMER PPP FEU-MCNC: 2.19 UG/ML FEU (ref 0–0.5)
DIASTOLIC BLOOD PRESSURE - MUSE: NORMAL MMHG
EOSINOPHIL # BLD AUTO: 0 10E3/UL (ref 0–0.7)
EOSINOPHIL NFR BLD AUTO: 0 %
ERYTHROCYTE [DISTWIDTH] IN BLOOD BY AUTOMATED COUNT: 15.9 % (ref 10–15)
FIBRINOGEN PPP-MCNC: 559 MG/DL (ref 170–490)
FLUAV RNA SPEC QL NAA+PROBE: NEGATIVE
FLUBV RNA RESP QL NAA+PROBE: NEGATIVE
GFR SERPL CREATININE-BSD FRML MDRD: 63 ML/MIN/1.73M2
GLUCOSE BLD-MCNC: 125 MG/DL (ref 70–125)
HCT VFR BLD AUTO: 34.7 % (ref 35–47)
HGB BLD-MCNC: 10.2 G/DL (ref 11.7–15.7)
HOLD SPECIMEN: NORMAL
IMM GRANULOCYTES # BLD: 0 10E3/UL
IMM GRANULOCYTES NFR BLD: 0 %
INR PPP: 0.91 (ref 0.9–1.15)
INTERPRETATION ECG - MUSE: NORMAL
LDH SERPL L TO P-CCNC: 279 U/L (ref 125–220)
LYMPHOCYTES # BLD AUTO: 1.1 10E3/UL (ref 0.8–5.3)
LYMPHOCYTES NFR BLD AUTO: 18 %
MCH RBC QN AUTO: 23.3 PG (ref 26.5–33)
MCHC RBC AUTO-ENTMCNC: 29.4 G/DL (ref 31.5–36.5)
MCV RBC AUTO: 79 FL (ref 78–100)
MONOCYTES # BLD AUTO: 0.4 10E3/UL (ref 0–1.3)
MONOCYTES NFR BLD AUTO: 8 %
NEUTROPHILS # BLD AUTO: 4.4 10E3/UL (ref 1.6–8.3)
NEUTROPHILS NFR BLD AUTO: 74 %
NRBC # BLD AUTO: 0 10E3/UL
NRBC BLD AUTO-RTO: 0 /100
P AXIS - MUSE: 45 DEGREES
PLAT MORPH BLD: NORMAL
PLATELET # BLD AUTO: 202 10E3/UL (ref 150–450)
POTASSIUM BLD-SCNC: 3.9 MMOL/L (ref 3.5–5)
PR INTERVAL - MUSE: 136 MS
PROCALCITONIN SERPL-MCNC: 0.04 NG/ML (ref 0–0.49)
PROT SERPL-MCNC: 7.7 G/DL (ref 6–8)
QRS DURATION - MUSE: 86 MS
QT - MUSE: 424 MS
QTC - MUSE: 426 MS
R AXIS - MUSE: 38 DEGREES
RBC # BLD AUTO: 4.37 10E6/UL (ref 3.8–5.2)
RBC MORPH BLD: NORMAL
SARS-COV-2 RNA RESP QL NAA+PROBE: POSITIVE
SODIUM SERPL-SCNC: 141 MMOL/L (ref 136–145)
SPECIMEN EXPIRATION DATE: NORMAL
SYSTOLIC BLOOD PRESSURE - MUSE: NORMAL MMHG
T AXIS - MUSE: 70 DEGREES
TROPONIN I SERPL-MCNC: 0.01 NG/ML (ref 0–0.29)
TROPONIN I SERPL-MCNC: 0.02 NG/ML (ref 0–0.29)
VENTRICULAR RATE- MUSE: 61 BPM
WBC # BLD AUTO: 5.9 10E3/UL (ref 4–11)

## 2021-12-24 PROCEDURE — 84145 PROCALCITONIN (PCT): CPT | Performed by: PHYSICIAN ASSISTANT

## 2021-12-24 PROCEDURE — 87636 SARSCOV2 & INF A&B AMP PRB: CPT | Performed by: PHYSICIAN ASSISTANT

## 2021-12-24 PROCEDURE — 83880 ASSAY OF NATRIURETIC PEPTIDE: CPT | Performed by: PHYSICIAN ASSISTANT

## 2021-12-24 PROCEDURE — 99285 EMERGENCY DEPT VISIT HI MDM: CPT | Mod: 25

## 2021-12-24 PROCEDURE — 258N000003 HC RX IP 258 OP 636: Performed by: HOSPITALIST

## 2021-12-24 PROCEDURE — 85730 THROMBOPLASTIN TIME PARTIAL: CPT | Performed by: PHYSICIAN ASSISTANT

## 2021-12-24 PROCEDURE — 84484 ASSAY OF TROPONIN QUANT: CPT | Performed by: PHYSICIAN ASSISTANT

## 2021-12-24 PROCEDURE — 71275 CT ANGIOGRAPHY CHEST: CPT

## 2021-12-24 PROCEDURE — 250N000011 HC RX IP 250 OP 636: Performed by: PHYSICIAN ASSISTANT

## 2021-12-24 PROCEDURE — 120N000001 HC R&B MED SURG/OB

## 2021-12-24 PROCEDURE — 250N000009 HC RX 250: Performed by: HOSPITALIST

## 2021-12-24 PROCEDURE — 250N000011 HC RX IP 250 OP 636: Performed by: HOSPITALIST

## 2021-12-24 PROCEDURE — 250N000013 HC RX MED GY IP 250 OP 250 PS 637: Performed by: HOSPITALIST

## 2021-12-24 PROCEDURE — C9803 HOPD COVID-19 SPEC COLLECT: HCPCS

## 2021-12-24 PROCEDURE — 86901 BLOOD TYPING SEROLOGIC RH(D): CPT | Performed by: HOSPITALIST

## 2021-12-24 PROCEDURE — 85610 PROTHROMBIN TIME: CPT | Performed by: PHYSICIAN ASSISTANT

## 2021-12-24 PROCEDURE — 85384 FIBRINOGEN ACTIVITY: CPT | Performed by: HOSPITALIST

## 2021-12-24 PROCEDURE — 71045 X-RAY EXAM CHEST 1 VIEW: CPT

## 2021-12-24 PROCEDURE — 96365 THER/PROPH/DIAG IV INF INIT: CPT | Mod: 59

## 2021-12-24 PROCEDURE — 85025 COMPLETE CBC W/AUTO DIFF WBC: CPT | Performed by: PHYSICIAN ASSISTANT

## 2021-12-24 PROCEDURE — 93005 ELECTROCARDIOGRAM TRACING: CPT | Performed by: PHYSICIAN ASSISTANT

## 2021-12-24 PROCEDURE — 80053 COMPREHEN METABOLIC PANEL: CPT | Performed by: PHYSICIAN ASSISTANT

## 2021-12-24 PROCEDURE — 84484 ASSAY OF TROPONIN QUANT: CPT | Performed by: HOSPITALIST

## 2021-12-24 PROCEDURE — 250N000013 HC RX MED GY IP 250 OP 250 PS 637: Performed by: PHYSICIAN ASSISTANT

## 2021-12-24 PROCEDURE — 86141 C-REACTIVE PROTEIN HS: CPT | Performed by: HOSPITALIST

## 2021-12-24 PROCEDURE — 96375 TX/PRO/DX INJ NEW DRUG ADDON: CPT

## 2021-12-24 PROCEDURE — 99223 1ST HOSP IP/OBS HIGH 75: CPT | Performed by: HOSPITALIST

## 2021-12-24 PROCEDURE — 36415 COLL VENOUS BLD VENIPUNCTURE: CPT | Performed by: HOSPITALIST

## 2021-12-24 PROCEDURE — 85379 FIBRIN DEGRADATION QUANT: CPT | Performed by: HOSPITALIST

## 2021-12-24 PROCEDURE — 36415 COLL VENOUS BLD VENIPUNCTURE: CPT | Performed by: PHYSICIAN ASSISTANT

## 2021-12-24 PROCEDURE — 83615 LACTATE (LD) (LDH) ENZYME: CPT | Performed by: HOSPITALIST

## 2021-12-24 PROCEDURE — XW033E5 INTRODUCTION OF REMDESIVIR ANTI-INFECTIVE INTO PERIPHERAL VEIN, PERCUTANEOUS APPROACH, NEW TECHNOLOGY GROUP 5: ICD-10-PCS | Performed by: HOSPITALIST

## 2021-12-24 RX ORDER — HYDRALAZINE HYDROCHLORIDE 20 MG/ML
5 INJECTION INTRAMUSCULAR; INTRAVENOUS EVERY 6 HOURS PRN
Status: DISCONTINUED | OUTPATIENT
Start: 2021-12-24 | End: 2021-12-25

## 2021-12-24 RX ORDER — LIDOCAINE 40 MG/G
CREAM TOPICAL
Status: DISCONTINUED | OUTPATIENT
Start: 2021-12-24 | End: 2021-12-27 | Stop reason: HOSPADM

## 2021-12-24 RX ORDER — ALBUTEROL SULFATE 90 UG/1
2 AEROSOL, METERED RESPIRATORY (INHALATION) EVERY 4 HOURS PRN
Status: DISCONTINUED | OUTPATIENT
Start: 2021-12-24 | End: 2021-12-27 | Stop reason: HOSPADM

## 2021-12-24 RX ORDER — DEXTROSE MONOHYDRATE 25 G/50ML
25-50 INJECTION, SOLUTION INTRAVENOUS
Status: DISCONTINUED | OUTPATIENT
Start: 2021-12-24 | End: 2021-12-27 | Stop reason: HOSPADM

## 2021-12-24 RX ORDER — ONDANSETRON 2 MG/ML
4 INJECTION INTRAMUSCULAR; INTRAVENOUS EVERY 6 HOURS PRN
Status: DISCONTINUED | OUTPATIENT
Start: 2021-12-24 | End: 2021-12-27 | Stop reason: HOSPADM

## 2021-12-24 RX ORDER — AMOXICILLIN 250 MG
2 CAPSULE ORAL DAILY
Status: DISCONTINUED | OUTPATIENT
Start: 2021-12-24 | End: 2021-12-27 | Stop reason: HOSPADM

## 2021-12-24 RX ORDER — DULOXETIN HYDROCHLORIDE 30 MG/1
30 CAPSULE, DELAYED RELEASE ORAL DAILY
Status: DISCONTINUED | OUTPATIENT
Start: 2021-12-24 | End: 2021-12-27 | Stop reason: HOSPADM

## 2021-12-24 RX ORDER — ALBUTEROL SULFATE 90 UG/1
4 AEROSOL, METERED RESPIRATORY (INHALATION) ONCE
Status: COMPLETED | OUTPATIENT
Start: 2021-12-24 | End: 2021-12-24

## 2021-12-24 RX ORDER — CALCIUM CARBONATE 500 MG/1
1000 TABLET, CHEWABLE ORAL 4 TIMES DAILY PRN
Status: DISCONTINUED | OUTPATIENT
Start: 2021-12-24 | End: 2021-12-27 | Stop reason: HOSPADM

## 2021-12-24 RX ORDER — GABAPENTIN 100 MG/1
100-200 CAPSULE ORAL SEE ADMIN INSTRUCTIONS
Status: DISCONTINUED | OUTPATIENT
Start: 2021-12-24 | End: 2021-12-24

## 2021-12-24 RX ORDER — ENOXAPARIN SODIUM 300 MG/3ML
0.5 INJECTION INTRAVENOUS; SUBCUTANEOUS EVERY 12 HOURS
Status: DISCONTINUED | OUTPATIENT
Start: 2021-12-24 | End: 2021-12-24

## 2021-12-24 RX ORDER — BISACODYL 10 MG
10 SUPPOSITORY, RECTAL RECTAL DAILY PRN
Status: DISCONTINUED | OUTPATIENT
Start: 2021-12-24 | End: 2021-12-27 | Stop reason: HOSPADM

## 2021-12-24 RX ORDER — ALBUTEROL SULFATE 90 UG/1
2 AEROSOL, METERED RESPIRATORY (INHALATION) 4 TIMES DAILY
Status: DISCONTINUED | OUTPATIENT
Start: 2021-12-24 | End: 2021-12-27 | Stop reason: HOSPADM

## 2021-12-24 RX ORDER — GABAPENTIN 100 MG/1
200 CAPSULE ORAL AT BEDTIME
Status: DISCONTINUED | OUTPATIENT
Start: 2021-12-24 | End: 2021-12-27 | Stop reason: HOSPADM

## 2021-12-24 RX ORDER — METOPROLOL SUCCINATE 25 MG/1
50 TABLET, EXTENDED RELEASE ORAL DAILY
Status: DISCONTINUED | OUTPATIENT
Start: 2021-12-24 | End: 2021-12-27 | Stop reason: HOSPADM

## 2021-12-24 RX ORDER — GABAPENTIN 100 MG/1
100 CAPSULE ORAL DAILY
Status: DISCONTINUED | OUTPATIENT
Start: 2021-12-24 | End: 2021-12-27 | Stop reason: HOSPADM

## 2021-12-24 RX ORDER — AMLODIPINE BESYLATE 5 MG/1
10 TABLET ORAL DAILY
Status: DISCONTINUED | OUTPATIENT
Start: 2021-12-24 | End: 2021-12-27 | Stop reason: HOSPADM

## 2021-12-24 RX ORDER — POLYVINYL ALCOHOL 14 MG/ML
1 SOLUTION/ DROPS OPHTHALMIC
Status: DISCONTINUED | OUTPATIENT
Start: 2021-12-24 | End: 2021-12-27 | Stop reason: HOSPADM

## 2021-12-24 RX ORDER — DEXAMETHASONE 2 MG/1
6 TABLET ORAL DAILY
Status: DISCONTINUED | OUTPATIENT
Start: 2021-12-25 | End: 2021-12-27 | Stop reason: HOSPADM

## 2021-12-24 RX ORDER — DEXAMETHASONE SODIUM PHOSPHATE 10 MG/ML
6 INJECTION, SOLUTION INTRAMUSCULAR; INTRAVENOUS ONCE
Status: COMPLETED | OUTPATIENT
Start: 2021-12-24 | End: 2021-12-24

## 2021-12-24 RX ORDER — IOPAMIDOL 755 MG/ML
100 INJECTION, SOLUTION INTRAVASCULAR ONCE
Status: COMPLETED | OUTPATIENT
Start: 2021-12-25 | End: 2021-12-24

## 2021-12-24 RX ORDER — POLYETHYLENE GLYCOL 3350 17 G/17G
1 POWDER, FOR SOLUTION ORAL DAILY PRN
COMMUNITY
End: 2023-08-22

## 2021-12-24 RX ORDER — FAMOTIDINE 20 MG/1
40 TABLET, FILM COATED ORAL 2 TIMES DAILY
Status: DISCONTINUED | OUTPATIENT
Start: 2021-12-24 | End: 2021-12-25

## 2021-12-24 RX ORDER — NICOTINE POLACRILEX 4 MG
15-30 LOZENGE BUCCAL
Status: DISCONTINUED | OUTPATIENT
Start: 2021-12-24 | End: 2021-12-27 | Stop reason: HOSPADM

## 2021-12-24 RX ORDER — ACETAMINOPHEN 325 MG/1
975 TABLET ORAL EVERY 8 HOURS PRN
Status: DISCONTINUED | OUTPATIENT
Start: 2021-12-24 | End: 2021-12-27 | Stop reason: HOSPADM

## 2021-12-24 RX ORDER — POLYETHYLENE GLYCOL 3350 17 G/17G
17 POWDER, FOR SOLUTION ORAL DAILY
Status: DISCONTINUED | OUTPATIENT
Start: 2021-12-24 | End: 2021-12-27 | Stop reason: HOSPADM

## 2021-12-24 RX ORDER — FUROSEMIDE 10 MG/ML
10 INJECTION INTRAMUSCULAR; INTRAVENOUS DAILY
Status: DISCONTINUED | OUTPATIENT
Start: 2021-12-24 | End: 2021-12-27 | Stop reason: HOSPADM

## 2021-12-24 RX ORDER — ONDANSETRON 4 MG/1
4 TABLET, ORALLY DISINTEGRATING ORAL EVERY 6 HOURS PRN
Status: DISCONTINUED | OUTPATIENT
Start: 2021-12-24 | End: 2021-12-27 | Stop reason: HOSPADM

## 2021-12-24 RX ORDER — FAMOTIDINE 20 MG/1
20 TABLET, FILM COATED ORAL 2 TIMES DAILY PRN
Status: DISCONTINUED | OUTPATIENT
Start: 2021-12-24 | End: 2021-12-24

## 2021-12-24 RX ADMIN — SODIUM CHLORIDE 250 ML: 9 INJECTION, SOLUTION INTRAVENOUS at 14:59

## 2021-12-24 RX ADMIN — ALBUTEROL SULFATE 4 PUFF: 90 AEROSOL, METERED RESPIRATORY (INHALATION) at 09:12

## 2021-12-24 RX ADMIN — IOPAMIDOL 100 ML: 755 INJECTION, SOLUTION INTRAVENOUS at 23:42

## 2021-12-24 RX ADMIN — FLUTICASONE FUROATE AND VILANTEROL TRIFENATATE 1 PUFF: 100; 25 POWDER RESPIRATORY (INHALATION) at 15:43

## 2021-12-24 RX ADMIN — GABAPENTIN 100 MG: 100 CAPSULE ORAL at 15:42

## 2021-12-24 RX ADMIN — HYDRALAZINE HYDROCHLORIDE 5 MG: 20 INJECTION INTRAMUSCULAR; INTRAVENOUS at 17:28

## 2021-12-24 RX ADMIN — DOCUSATE SODIUM 50 MG AND SENNOSIDES 8.6 MG 2 TABLET: 8.6; 5 TABLET, FILM COATED ORAL at 15:46

## 2021-12-24 RX ADMIN — DEXAMETHASONE SODIUM PHOSPHATE 6 MG: 10 INJECTION INTRAMUSCULAR; INTRAVENOUS at 08:38

## 2021-12-24 RX ADMIN — UMECLIDINIUM 1 PUFF: 62.5 AEROSOL, POWDER ORAL at 15:44

## 2021-12-24 RX ADMIN — ALBUTEROL SULFATE 2 PUFF: 90 AEROSOL, METERED RESPIRATORY (INHALATION) at 13:16

## 2021-12-24 RX ADMIN — ALBUTEROL SULFATE 2 PUFF: 90 AEROSOL, METERED RESPIRATORY (INHALATION) at 17:04

## 2021-12-24 RX ADMIN — DULOXETINE 30 MG: 30 CAPSULE, DELAYED RELEASE ORAL at 15:41

## 2021-12-24 RX ADMIN — GABAPENTIN 200 MG: 100 CAPSULE ORAL at 21:28

## 2021-12-24 RX ADMIN — ENOXAPARIN SODIUM 30 MG: 30 INJECTION SUBCUTANEOUS at 17:06

## 2021-12-24 RX ADMIN — POLYETHYLENE GLYCOL 3350 17 G: 17 POWDER, FOR SOLUTION ORAL at 13:19

## 2021-12-24 RX ADMIN — REMDESIVIR 200 MG: 100 INJECTION, POWDER, LYOPHILIZED, FOR SOLUTION INTRAVENOUS at 13:26

## 2021-12-24 RX ADMIN — FUROSEMIDE 10 MG: 10 INJECTION, SOLUTION INTRAMUSCULAR; INTRAVENOUS at 13:33

## 2021-12-24 RX ADMIN — ALBUTEROL SULFATE 2 PUFF: 90 AEROSOL, METERED RESPIRATORY (INHALATION) at 21:26

## 2021-12-24 RX ADMIN — FAMOTIDINE 40 MG: 20 TABLET, FILM COATED ORAL at 21:28

## 2021-12-24 RX ADMIN — AMLODIPINE BESYLATE 10 MG: 5 TABLET ORAL at 15:40

## 2021-12-24 RX ADMIN — FAMOTIDINE 40 MG: 20 TABLET, FILM COATED ORAL at 15:51

## 2021-12-24 ASSESSMENT — ACTIVITIES OF DAILY LIVING (ADL)
ADLS_ACUITY_SCORE: 5
ADLS_ACUITY_SCORE: 7
ADLS_ACUITY_SCORE: 7
ADLS_ACUITY_SCORE: 5
ADLS_ACUITY_SCORE: 5
EQUIPMENT_CURRENTLY_USED_AT_HOME: CANE, STRAIGHT
ADLS_ACUITY_SCORE: 7
DOING_ERRANDS_INDEPENDENTLY_DIFFICULTY: NO
DRESSING/BATHING_DIFFICULTY: NO
ADLS_ACUITY_SCORE: 5
DIFFICULTY_COMMUNICATING: NO
ADLS_ACUITY_SCORE: 7
WALKING_OR_CLIMBING_STAIRS_DIFFICULTY: NO
ADLS_ACUITY_SCORE: 7
DEPENDENT_IADLS:: CLEANING;COOKING;LAUNDRY;SHOPPING;MEAL PREPARATION;MEDICATION MANAGEMENT;MONEY MANAGEMENT;TRANSPORTATION
ADLS_ACUITY_SCORE: 5
ADLS_ACUITY_SCORE: 7
FALL_HISTORY_WITHIN_LAST_SIX_MONTHS: NO
CONCENTRATING,_REMEMBERING_OR_MAKING_DECISIONS_DIFFICULTY: NO
HEARING_DIFFICULTY_OR_DEAF: NO
PATIENT_/_FAMILY_COMMUNICATION_STYLE: SPOKEN LANGUAGE (NON-ENGLISH)
TOILETING_ISSUES: NO
WEAR_GLASSES_OR_BLIND: NO
ADLS_ACUITY_SCORE: 5
DIFFICULTY_EATING/SWALLOWING: NO
INTERPRETER_SERVICES_OFFERED_TO_THE_PATIENT: YES
ADLS_ACUITY_SCORE: 7

## 2021-12-24 NOTE — ED TRIAGE NOTES
Patient arrives by private car with her granddaughters for evaluation of increasing shortness of breath.  Patient has been using her inhalers the last several nights which was helping until last night.  Reports chest pain x 3 days.

## 2021-12-24 NOTE — H&P
[facility]    History and Physical - Hospitalist Service       Date of Admission:  2021  [facility] Ion,  1940, MRN [id number]  PCP: Kateryna Morales    Assessment & Plan      [facility] Ion is a 81 year old female admitted on 2021. She has history of pulmonary fibrosis, reactive airway disease, not on home O2, PMR not on any steroid, HTN, depression, neuropathy presents for evaluation of dyspnea    COVID-19 Diagnosis Details:  Onset of COVID symptoms    Date of positive test results    Vaccinated? Unvaccinated     # Confirmed COVID-19 infection    # Acute Hypoxic Respiratory Failure secondary to COVID-19 infection  # Viral Pneumonia secondary to COVID-19 infection  - Initial chest x-ray showed multifocal airspace disease. She has been requiring 2-3 Lpm NC. Getting CTA chest given history of PE, no longer on anticoagulation, elevated d-dimer, and complained of chest pain at home.  - Continue supportive care with continuous pulse oximetry, COVID-19 special precautions, minimized lab draws, and care consolidation  - Oxygen: continue current support with nasal cannula at 3 L/min; titrate to keep SpO2 between 90-96%  - Labs: CBC, creatinine/BMP, CRP, LDH, D-dimer and fibrinogen daily or as appropriate until patient clinically stable.  - Imaging: chest CT with contrast ordered  - Breathing treatments: albuterol inhaler four times a day scheduled and PRN; home combivent inhaler PRN; avoid nebulizers in favor of MDIs   - IV fluids: not indicated at this time  - Antibiotics: not indicated   - Treatments: Dexamethasone 6 mg x 10 days or until hospital discharge, started on  and Remdesivir x 5 days or until hospital discharge, started on   - DVT Prophylaxis: At high risk of thrombotic complications due to COVID-19 disease (last DDimer 2.1).          - LOW INTENSITY dosing: Lovenox 0.5 mg/kg two times a day    #Chest pain  Does have history of PE and completed 6 months  warfarin and no longer on anticoagulation  Currently denying, but per family, complained of this at home  Troponin normal, EKG normal  CTA chest to rule out PE. Premedicate with tiny bolus 250cc and hold home losartan/HCTZ for now    #HTN  Resume home amlodipine, metoprolol  Hold home losartan and hydrochlorothiazide for today as she is getting IV contrast    #Pulmonary fibrosis  #Reactive airway disease  Home Incruse, Breo Ellipta, albuterol, Combivent  Not wheezing  Has rales from known pulmonary fibrosis, no significant pulmonary edema or effusion seen on CXR  Of note she has been having progressive chronic dyspnea with concern for underlying neuromuscular disorder and supposed to see Neurology as outpatient    #Neuropathy, home Cymbalta and gabapentin  #GERD, home Pepcid  #Mood, home Remeron and Cymbalta       Diet: Combination Diet Regular Diet Adult; 2 gm NA Diet    Hannon Catheter: Not present  Central Lines: None  Code Status: Full Code.  Discussed and confirmed with family.  Unable to discuss with patient at this time as she is too fatigued to converse.  Family were not sure if patient might prefer to change to DNR and would like us to discuss this with her when she is more alert.    Clinically Significant Risk Factors Present on Admission             # Coagulation Defect: home medication list includes an anticoagulant medication        Disposition Plan   Expected Discharge: 12/29/2021 to home when hypoxia resolved       The patient's care was discussed with the Patient and Patient's Family.    Madison Stevenson MD  [facility]  Text page via [facility] Paging/Directory      ______________________________________________________________________    Chief Complaint   Luis Manuel Lemon is a 81 year old female who presents for dyspnea and chest pain    History of Present Illness   Phone  used to converse with patient. Patient is lethargic and not able to answer many questions. She did deny pain.  "Indicated she is \"the same\" as when she came in. Did complain of dyspnea.  I called and spoke with granddaughter Perez who speaks English. Perez indicates patient was in her usual state of health until last night when she began to seem short of breath and complained of chest pain. This seemed to rapidly worsen throughout the evening until they brought her in. She has not had fever, rhinorrhea, congestion, n/v/d/c, urinary issues. Has chronic cough, unchanged. Most of her family are vaccinated though a few are not. No one else has COVID symptoms right now. Patient herself is not vaccinated though has an appt to get first dose of vaccine on 12/29. The unvaccinated family members are planning to get vaccinated soon.    Review of Systems    The 10 point Review of Systems is negative other than noted in the HPI or here.    Past Medical History    I have reviewed this patient's medical history and updated it with pertinent information if needed.   Past Medical History:   Diagnosis Date     Biceps tendon rupture      CHF (congestive heart failure) (H) 10/8/2017     Chronic use of steroids 2/24/2015 2/24/2015:  For connective tissue disease.  On steroids since at least 2011.  Attempts to wean have been unsuccessful.  Rheum currently trying again to wean.  Has known osteoporosis with compression fracture.      Compression fracture 2/4/2015 2/24/2015:  T12 compression fracture with 33% height loss seen on plain films 1/2015.  Was not seen on plain films 6/2014, so is presumably new since then.      Dermatophytosis of foot     Created by Conversion      MILLER (dyspnea on exertion) 4/25/2018     Dysfunction of eustachian tube     Created by Conversion      Dyspepsia      GERD (gastroesophageal reflux disease)      History of immunological disorder     Connective Tissue Disorder     History of kidney stones      Hyperlipidemia      Hypertension      Inverted nipple     Bilaterally     Low back pain 12/8/2015     Myalgia " and myositis      Neuralgia      Nontraumatic rupture of tendons of biceps (long head)     Created by Conversion      Opioid dependence, episodic (H) 8/8/2019     Osteoarthritis      Osteoporosis      Pancreatitis 4/15/2016     Personal history of noncompliance with medical treatment, presenting hazards to health     2/24/2015:  Has had trouble historically understanding and taking medicines.  Much improved now with home health RN Georgina Salinas from [facility].  Georgina's cell [phone].  She needs to be called with all new orders.  Also in [facility] program -- Care Guide is Joseheather Castillo.       Polymyalgia rheumatica (H)      Restrictive lung disease      Vitamin D deficiency        Past Surgical History   I have reviewed this patient's surgical history and updated it with pertinent information if needed.  Past Surgical History:   Procedure Laterality Date     CHOLECYSTECTOMY       HERNIORRHAPHY VENTRAL N/A 4/9/2016    Procedure: HERNIA REPAIR VENTRAL ;  Surgeon: Duncan Odonnell MD;  Location: [facility];  Service:      KIDNEY STONE SURGERY       [facility] TOTAL ABDOM HYSTERECTOMY      Description: Hysterectomy;  Recorded: 03/08/2013;       Social History   I have reviewed this patient's social history and updated it with pertinent information if needed.  Social History     Tobacco Use     Smoking status: Never Smoker     Smokeless tobacco: Never Used     Tobacco comment: chew betel nut   Substance Use Topics     Alcohol use: No     Drug use: No       Family History   No significant family history, including no history of: Lung disease or cancer    Prior to Admission Medications   Prior to Admission Medications   Prescriptions Last Dose Informant Patient Reported? Taking?   ANTACID CALCIUM 500 MG chewable tablet   No No   Sig: CHEW 1 TABLET BY MOUTH THREE TIMES A DAY TO KEEP YOUR BONES HEALTHY   ARTIFICIAL TEARS 1.4 % ophthalmic solution   Yes No   Sig: Place 1 drop into both eyes every  hour as needed    BREO ELLIPTA 100-25 MCG/INH inhaler   Yes No   Sig: Inhale 1 puff into the lungs daily    COMBIVENT RESPIMAT  MCG/ACT inhaler   No No   Sig: INHALE 1 PUFF EVERY 6 (SIX) HOURS AS NEEDED.   DULoxetine (CYMBALTA) 30 MG capsule   Yes No   Sig: Take 30 mg by mouth daily    INCRUSE ELLIPTA 62.5 MCG/INH Inhaler   Yes No   Multiple Vitamin (MULTI-VITAMINS) TABS   Yes No   Sig: Take by mouth daily    SENEXON-S 8.6-50 MG tablet   No No   Sig: TAKE 2 TABLETS BY MOUTH DAILY.   acetaminophen (MAPAP ARTHRITIS PAIN) 650 MG CR tablet   Yes No   Sig: TAKE 2 TABLETS BY MOUTH 2 TIMES A DAY   albuterol (PROAIR HFA/PROVENTIL HFA/VENTOLIN HFA) 108 (90 Base) MCG/ACT inhaler   Yes No   Sig: Inhale 2 puffs into the lungs   amLODIPine (NORVASC) 10 MG tablet   No No   Sig: Take 1 tablet (10 mg) by mouth daily   capsaicin (ZOSTRIX) 0.025 % external cream   Yes No   celecoxib (CELEBREX) 100 MG capsule   Yes No   Sig: Take 100 mg by mouth daily    denosumab (PROLIA) 60 MG/ML SOSY injection   Yes No   Sig: Inject 60 mg Subcutaneous   famotidine (PEPCID) 40 MG tablet   Yes No   Sig: Take 40 mg by mouth 2 times daily    gabapentin (NEURONTIN) 100 MG capsule   No No   Sig: TAKE 1 TAB (100MG) IN THE MORNING AND 2 TABS (200MG) AT BEDTIME   hydrochlorothiazide (HYDRODIURIL) 25 MG tablet   No No   Sig: TAKE ONE TABLET BY MOUTH ONCE DAILY WITH LOSARTAN 100MG.   losartan (COZAAR) 100 MG tablet   No No   Sig: TAKE 1 TABLET BY MOUTH DAILY WITH HYDROCHLOROTHIAZIDE 25MG   metoprolol succinate ER (TOPROL-XL) 50 MG 24 hr tablet   No No   Sig: TAKE 1 TABLET BY MOUTH DAILY   mirtazapine (REMERON) 15 MG tablet   Yes No   Sig: 15 mg Take 1.5 tablets (22.5 mg total) by mouth at bedtime   nystatin (MYCOSTATIN) 610793 UNIT/ML suspension   Yes No   vitamin D2 (ERGOCALCIFEROL) 04520 units (1250 mcg) capsule   Yes No   warfarin ANTICOAGULANT (COUMADIN) 5 MG tablet   Yes No   Sig: TAKE 1 TABLET (5MG) BY MOUTH EVERY WEDNESDAY AND 1/2 TABLET (2.5MG)  BY MOUTH ALL OTHER DAYS      Facility-Administered Medications: None     Allergies   Allergies   Allergen Reactions     Ciprofloxacin Unknown     Lisinopril Unknown       Physical Exam   Vital Signs: Temp: (!) 96.4  F (35.8  C) Temp src: Tympanic BP: (!) 172/77 Pulse: 62   Resp: 20 SpO2: (!) 85 % O2 Device: None (Room air)    Weight: 124 lbs 0 oz    General: in no apparent distress, non-toxic and alert female lying in hospital bed oriented x3  HEENT: Head normocephalic atraumatic, oral mucosa moist. Sclerae anicteric  CV: Regular rhythm, normal rate, no murmurs  Resp: Scattered rales bilaterally, no wheezes, no focal consolidations  GI: Belly soft, nondistended, nontender, bowel sounds present  Skin: No rashes or lesions  Extremities: No peripheral edema  Psych: lethargic  Neuro: CNII-XII grossly intact, moving all 4 extremities    Data   Data reviewed today: I reviewed all medications, new labs and imaging results over the last 24 hours.  EKG personally reviewed shows: NSR rate 61 bpm    Recent Results (from the past 12 hour(s))   ECG 12-Lead with MUSE - LINDA,RERE,CECILIA    Collection Time: 12/24/21  7:34 AM   Result Value Ref Range    Systolic Blood Pressure  mmHg    Diastolic Blood Pressure  mmHg    Ventricular Rate 61 BPM    Atrial Rate 61 BPM    HI Interval 136 ms    QRS Duration 86 ms     ms    QTc 426 ms    P Axis 45 degrees    R AXIS 38 degrees    T Axis 70 degrees    Interpretation ECG       Sinus rhythm  Nonspecific ST abnormality  Abnormal ECG  When compared with ECG of 09-JUN-2021 18:37,  Nonspecific T wave abnormality no longer evident in Anterior leads  Confirmed by SEE ED PROVIDER NOTE FOR, ECG INTERPRETATION ([id number]),  FRANCIA LAMB ([id number]) on 12/24/2021 7:41:17 AM     B-Type Natriuretic Peptide ([facility] Only)    Collection Time: 12/24/21  7:47 AM   Result Value Ref Range    BNP 85 0 - 155 pg/mL   Comprehensive metabolic panel    Collection Time: 12/24/21  7:47 AM   Result Value Ref Range     Sodium 141 136 - 145 mmol/L    Potassium 3.9 3.5 - 5.0 mmol/L    Chloride 98 98 - 107 mmol/L    Carbon Dioxide (CO2) 33 (H) 22 - 31 mmol/L    Anion Gap 10 5 - 18 mmol/L    Urea Nitrogen 13 8 - 28 mg/dL    Creatinine 0.92 0.60 - 1.10 mg/dL    Calcium 9.0 8.5 - 10.5 mg/dL    Glucose 125 70 - 125 mg/dL    Alkaline Phosphatase 77 45 - 120 U/L    AST 33 0 - 40 U/L    ALT 15 0 - 45 U/L    Protein Total 7.7 6.0 - 8.0 g/dL    Albumin 2.7 (L) 3.5 - 5.0 g/dL    Bilirubin Total 0.6 0.0 - 1.0 mg/dL    GFR Estimate 63 >60 mL/min/1.73m2   INR    Collection Time: 12/24/21  7:47 AM   Result Value Ref Range    INR 0.91 0.90 - 1.15   Partial thromboplastin time    Collection Time: 12/24/21  7:47 AM   Result Value Ref Range    aPTT 30 22 - 38 Seconds   Troponin I    Collection Time: 12/24/21  7:47 AM   Result Value Ref Range    Troponin I 0.02 0.00 - 0.29 ng/mL   Symptomatic; Yes; 12/21/2021 Influenza A/B & SARS-CoV2 (COVID-19) Virus PCR Multiplex Nasopharyngeal    Collection Time: 12/24/21  7:47 AM    Specimen: Nasopharyngeal; Swab   Result Value Ref Range    Influenza A PCR Negative Negative    Influenza B PCR Negative Negative    SARS CoV2 PCR Positive (A) Negative   CBC with platelets and differential    Collection Time: 12/24/21  7:47 AM   Result Value Ref Range    WBC Count 5.9 4.0 - 11.0 10e3/uL    RBC Count 4.37 3.80 - 5.20 10e6/uL    Hemoglobin 10.2 (L) 11.7 - 15.7 g/dL    Hematocrit 34.7 (L) 35.0 - 47.0 %    MCV 79 78 - 100 fL    MCH 23.3 (L) 26.5 - 33.0 pg    MCHC 29.4 (L) 31.5 - 36.5 g/dL    RDW 15.9 (H) 10.0 - 15.0 %    Platelet Count 202 150 - 450 10e3/uL   Extra Blood Culture Bottle    Collection Time: 12/24/21  7:47 AM   Result Value Ref Range    Hold Specimen JIC    Extra Red Top Tube    Collection Time: 12/24/21  7:47 AM   Result Value Ref Range    Hold Specimen JIC    Extra Heparinized Syringe    Collection Time: 12/24/21  7:47 AM   Result Value Ref Range    Hold Specimen JIC    Extra Green Top (Lithium Heparin) ON  ICE    Collection Time: 12/24/21  7:47 AM   Result Value Ref Range    Hold Specimen JIC

## 2021-12-24 NOTE — ED PROVIDER NOTES
ED PROVIDER NOTE    EMERGENCY DEPARTMENT ENCOUNTER      NAME: Luis Manuel Lemon  AGE: 81 year old female  YOB: 1940  MRN: [id number]  EVALUATION DATE & TIME: No admission date for patient encounter.    PCP: Kateryna Morales    ED PROVIDER: Trista Valiente PA-C      Chief Complaint   Patient presents with     Shortness of Breath     Chest Pain         FINAL IMPRESSION:  1. Infection due to 2019 novel coronavirus    2. Acute respiratory failure with hypoxia (H)          MEDICAL DECISION MAKING:    Pertinent Labs & Imaging studies reviewed. (See chart for details)    80yo female with a h/o pulmonary fibrosis, CKD, HTN, chronic pain, PE (on coumadin) , CHF presenting with shortness of breath.     On arrival here, vitals reveal hypoxia of 85%, placed on 3 L via NC.  Also hypertensive. On exam has mild respiratory distress, crackles in lungs.  Ddx includes covid19, pneumonia, chf, other viral illness.  Less likely PE as already on coumadin - not tachycardic or having any chest pain.      Labs, EKG, CXR, covid/influenza testing obtained. Covid positive.  CXR with findings c/w covid.  Troponin neg. BNP normal.  INR is not therapeutic but feel PE is unlikely at this time.    Given patient's hypoxia and respiratory failure, will admit.  Start on decadron. Discussed with hospitalist for admission. I updated patient and family regarding results and plan.     At the conclusion of the encounter I discussed the results of all of the tests and the disposition. The questions were answered. The patient or family acknowledged understanding and was agreeable with the care plan.         ED COURSE  7:34 AM Met and evaluated patient. Discussed ED plan.   8:03 AM Spoke with patient's granddaughter for additional history.   8:07 AM Staffed patient with my colleague .   9:09 AM Spoke with , hospitalist, over patient's admission.   9:36 AM Did a conference call over speaker phone with the patient and her family to  update them on COVID-19 diagnoses and plan for admission.     MEDICATIONS GIVEN IN THE EMERGENCY:  Medications   dexamethasone PF (DECADRON) injection 6 mg (6 mg Intravenous Given 12/24/21 0838)   albuterol (PROVENTIL HFA/VENTOLIN HFA) inhaler (4 puffs Inhalation Given 12/24/21 0912)       NEW PRESCRIPTIONS STARTED AT TODAY'S ER VISIT  New Prescriptions    No medications on file          =================================================================    HPI    Patient information was obtained from: Patient    Use of Intrepreter: Yes- (PARIS) Amie Lemon is a 81 year old female with a pertinent past medical history of restrictive lung disease, pulmonary fibrosis, CKD stage 3, GERD, HTN, and HLD who presents to the Emergency Department for the evaluation of worsening shortness of breath.    Per patient, she reports having a few days of shortness of breath that she notes is worsened with both laying down and sitting forward. She additionally has had a mild cough and decreased appetite for the past 1-2 days. Patient reports having had a fever yesterday. She has been using her inhalers which helps slightly to sleep at night. Patient reports being compliant with her medications including her blood thinners. Patient denies any chest pain, vomiting, diarrhea, abdominal pain as well as any other complaints at the time.     Per patient's granddaughter she reports that patient has had more difficulty breathing the last few days which is worse at night. She endorsed generalized body pain. She denies any known sick contacts.     Patient is not vaccinated against COVID-19.     REVIEW OF SYSTEMS   Constitutional: Negative for chills. Positive for subjective fever (since resolved)  HENT:  Negative for congestion, sore throat  Pulmonary: Positive for shortness of breath and mild cough  Cardiac: Negative for chest pain  GI:Negative for nausea, vomiting, diarrhea, abdominal pain  Musculoskeletal: Negative for extremity pain.  Positive for myalgias.     All other systems reviewed and are negative      PAST MEDICAL HISTORY:  Past Medical History:   Diagnosis Date     Biceps tendon rupture      CHF (congestive heart failure) (H) 10/8/2017     Chronic use of steroids 2/24/2015 2/24/2015:  For connective tissue disease.  On steroids since at least 2011.  Attempts to wean have been unsuccessful.  Rheum currently trying again to wean.  Has known osteoporosis with compression fracture.      Compression fracture 2/4/2015 2/24/2015:  T12 compression fracture with 33% height loss seen on plain films 1/2015.  Was not seen on plain films 6/2014, so is presumably new since then.      Dermatophytosis of foot     Created by Conversion      MILLER (dyspnea on exertion) 4/25/2018     Dysfunction of eustachian tube     Created by Conversion      Dyspepsia      GERD (gastroesophageal reflux disease)      History of immunological disorder     Connective Tissue Disorder     History of kidney stones      Hyperlipidemia      Hypertension      Inverted nipple     Bilaterally     Low back pain 12/8/2015     Myalgia and myositis      Neuralgia      Nontraumatic rupture of tendons of biceps (long head)     Created by Conversion      Opioid dependence, episodic (H) 8/8/2019     Osteoarthritis      Osteoporosis      Pancreatitis 4/15/2016     Personal history of noncompliance with medical treatment, presenting hazards to health     2/24/2015:  Has had trouble historically understanding and taking medicines.  Much improved now with home health RN Georgina Salinas from [facility].  Georgina's cell [phone].  She needs to be called with all new orders.  Also in [facility] home program -- Care Guide is Jose Castillo.       Polymyalgia rheumatica (H)      Restrictive lung disease      Vitamin D deficiency        PAST SURGICAL HISTORY:  Past Surgical History:   Procedure Laterality Date     CHOLECYSTECTOMY       HERNIORRHAPHY VENTRAL N/A 4/9/2016    Procedure:  HERNIA REPAIR VENTRAL ;  Surgeon: Duncan Odonnell MD;  Location: [facility];  Service:      KIDNEY STONE SURGERY       ZC TOTAL ABDOM HYSTERECTOMY      Description: Hysterectomy;  Recorded: 03/08/2013;           CURRENT MEDICATIONS:      Current Facility-Administered Medications:      dexamethasone PF (DECADRON) injection 6 mg, 6 mg, Intravenous, Once, Trista Valiente PA-C    Current Outpatient Medications:      COMBIVENT RESPIMAT  MCG/ACT inhaler, INHALE 1 PUFF EVERY 6 (SIX) HOURS AS NEEDED., Disp: 4 g, Rfl: 12     gabapentin (NEURONTIN) 100 MG capsule, TAKE 1 TAB (100MG) IN THE MORNING AND 2 TABS (200MG) AT BEDTIME, Disp: 90 capsule, Rfl: 3     metoprolol succinate ER (TOPROL-XL) 50 MG 24 hr tablet, TAKE 1 TABLET BY MOUTH DAILY, Disp: 90 tablet, Rfl: 3     acetaminophen (MAPAP ARTHRITIS PAIN) 650 MG CR tablet, TAKE 2 TABLETS BY MOUTH 2 TIMES A DAY, Disp: , Rfl:      albuterol (PROAIR HFA/PROVENTIL HFA/VENTOLIN HFA) 108 (90 Base) MCG/ACT inhaler, Inhale 2 puffs into the lungs, Disp: , Rfl:      amLODIPine (NORVASC) 10 MG tablet, Take 1 tablet (10 mg) by mouth daily, Disp: 90 tablet, Rfl: 2     ANTACID CALCIUM 500 MG chewable tablet, CHEW 1 TABLET BY MOUTH THREE TIMES A DAY TO KEEP YOUR BONES HEALTHY, Disp: 90 tablet, Rfl: 3     ARTIFICIAL TEARS 1.4 % ophthalmic solution, Place 1 drop into both eyes every hour as needed , Disp: , Rfl:      BREO ELLIPTA 100-25 MCG/INH inhaler, Inhale 1 puff into the lungs daily , Disp: , Rfl:      capsaicin (ZOSTRIX) 0.025 % external cream, , Disp: , Rfl:      celecoxib (CELEBREX) 100 MG capsule, Take 100 mg by mouth daily , Disp: , Rfl:      denosumab (PROLIA) 60 MG/ML SOSY injection, Inject 60 mg Subcutaneous, Disp: , Rfl:      DULoxetine (CYMBALTA) 30 MG capsule, Take 30 mg by mouth daily , Disp: , Rfl:      famotidine (PEPCID) 40 MG tablet, Take 40 mg by mouth 2 times daily , Disp: , Rfl:      hydrochlorothiazide (HYDRODIURIL) 25 MG tablet, TAKE ONE  TABLET BY MOUTH ONCE DAILY WITH LOSARTAN 100MG., Disp: 90 tablet, Rfl: 3     INCRUSE ELLIPTA 62.5 MCG/INH Inhaler, , Disp: , Rfl:      losartan (COZAAR) 100 MG tablet, TAKE 1 TABLET BY MOUTH DAILY WITH HYDROCHLOROTHIAZIDE 25MG, Disp: 90 tablet, Rfl: 2     mirtazapine (REMERON) 15 MG tablet, 15 mg Take 1.5 tablets (22.5 mg total) by mouth at bedtime, Disp: , Rfl:      Multiple Vitamin (MULTI-VITAMINS) TABS, Take by mouth daily , Disp: , Rfl:      nystatin (MYCOSTATIN) 785405 UNIT/ML suspension, , Disp: , Rfl:      SENEXON-S 8.6-50 MG tablet, TAKE 2 TABLETS BY MOUTH DAILY., Disp: 60 tablet, Rfl: 3     vitamin D2 (ERGOCALCIFEROL) 66258 units (1250 mcg) capsule, , Disp: , Rfl:      warfarin ANTICOAGULANT (COUMADIN) 5 MG tablet, TAKE 1 TABLET (5MG) BY MOUTH EVERY WEDNESDAY AND 1/2 TABLET (2.5MG) BY MOUTH ALL OTHER DAYS, Disp: , Rfl:     ALLERGIES:  Allergies   Allergen Reactions     Ciprofloxacin Unknown     Lisinopril Unknown       FAMILY HISTORY:  Family History   Problem Relation Age of Onset     Breast Cancer No family hx of        SOCIAL HISTORY:   Smoking: No reported history of smoking  Alcohol: No reported history of drinking  Illicit drug: None  Other: Patient lives at home with granddaughter    VITALS:  Vitals:    12/24/21 0719 12/24/21 0735   BP: (!) 172/77 (!) 172/77   Pulse: 62 62   Resp: 20 20   Temp: (!) 96.4  F (35.8  C)    TempSrc: Tympanic    SpO2: (!) 85% 99%   Weight: 56.2 kg (124 lb)        PHYSICAL EXAM    General Appearance:  Alert, cooperative, no distress, appears stated age  HENT: Normocephalic without obvious deformity, atraumatic. Mucous membranes moist   Eyes: Conjunctiva clear, Lids normal. No discharge.   Respiratory: Hypoxic at 85% on room air. Crackles throughout the lungs.   Cardiovascular: Regular rate and rhythm, no murmur. Normal cap refill. No peripheral edema  GI: Abdomen soft, nontender  : No CVA tenderness  Musculoskeletal: Moving all extremities. No gross  deformities  Integument: Warm, dry, no rashes or lesions  Neurologic: Alert and orientated x3. No focal deficits.  Psych: Normal mood and affect        LAB:  Labs Ordered and Resulted from Time of ED Arrival to Time of ED Departure   COMPREHENSIVE METABOLIC PANEL - Abnormal       Result Value    Sodium 141      Potassium 3.9      Chloride 98      Carbon Dioxide (CO2) 33 (*)     Anion Gap 10      Urea Nitrogen 13      Creatinine 0.92      Calcium 9.0      Glucose 125      Alkaline Phosphatase 77      AST 33      ALT 15      Protein Total 7.7      Albumin 2.7 (*)     Bilirubin Total 0.6      GFR Estimate 63     INFLUENZA A/B & SARS-COV2 PCR MULTIPLEX - Abnormal    Influenza A PCR Negative      Influenza B PCR Negative      SARS CoV2 PCR Positive (*)    CBC WITH PLATELETS AND DIFFERENTIAL - Abnormal    WBC Count 5.9      RBC Count 4.37      Hemoglobin 10.2 (*)     Hematocrit 34.7 (*)     MCV 79      MCH 23.3 (*)     MCHC 29.4 (*)     RDW 15.9 (*)     Platelet Count 202     INR - Normal    INR 0.91     PARTIAL THROMBOPLASTIN TIME - Normal    aPTT 30     B-TYPE NATRIURETIC PEPTIDE ( EAST ONLY)   TROPONIN I   PROCALCITONIN       RADIOLOGY:  XR Chest Port 1 View   Final Result   IMPRESSION:       Worsening of multifocal airspace opacities in both lungs with a mid and lower lung zone and peripheral predominance. The pattern is typical for worsening COVID 19 pneumonia and associated acute lung injury.      Unchanged heart and mediastinal borders.      No visible pleural fluid. No pneumothorax.          EKG:    Performed at: 7:34  Impression: Normal sinus rhythm. Nonspecific ST abnormality. Abnormal ECG.   Dr. Reyes and I have independently reviewed and interpreted the EKG(s) documented above.        I, Ann Jaimes, am serving as a scribe to document services personally performed by Trista Valiente PA-C based on my observation and the provider's statements to me. I, Trista Valiente PA-C attest that Ann Jaimes  is acting in a scribe capacity, has observed my performance of the services and has documented them in accordance with my direction.    Trista Valiente PA-C   Emergency Medicine     Trista Valiente PA-C  12/24/21 0904

## 2021-12-24 NOTE — CONSULTS
"Care Management Initial Consult    General Information  Assessment completed with: Family, Shell Be Granddaughter via phone  Type of CM/SW Visit: Initial Assessment    Primary Care Provider verified and updated as needed: Yes   Readmission within the last 30 days: no previous admission in last 30 days      Reason for Consult: discharge planning  Advance Care Planning: Advance Care Planning Reviewed: other (comment) (\"don't have one\")          Communication Assessment  Patient's communication style: spoken language (non-English) (speaks Amie)    Hearing Difficulty or Deaf: no   Wear Glasses or Blind: no    Cognitive  Cognitive/Neuro/Behavioral:                        Living Environment:   People in home: child(dangelo), adult,grandchild(dangelo) (\"daughter, son in law, grandkids\")     Current living Arrangements: house      Able to return to prior arrangements: yes       Family/Social Support:  Care provided by: other (see comments),child(dangelo) (granddaughter is PCA)  Provides care for: no one, unable/limited ability to care for self  Marital Status:   Children,PCA          Description of Support System: Supportive,Involved    Support Assessment: Adequate family and caregiver support,Adequate social supports    Current Resources:   Patient receiving home care services: Yes  Skilled Home Care Services: Home Health Aid  Community Resources: PCA (granddaughter is PCA for 4hr and 30min/day)  Equipment currently used at home: cane, straight,shower chair,walker, standard (\"uses cane almost all the time and the walker occasionally\")  Supplies currently used at home: None    Employment/Financial:  Employment Status: retired        Financial Concerns:     Referral to Financial Counselor: No       Lifestyle & Psychosocial Needs:  Social Determinants of Health     Tobacco Use: Low Risk      Smoking Tobacco Use: Never Smoker     Smokeless Tobacco Use: Never Used   Alcohol Use: Not At Risk     Frequency of Alcohol Consumption: " Never     Average Number of Drinks: Not on file     Frequency of Binge Drinking: Never   Financial Resource Strain: Low Risk      Difficulty of Paying Living Expenses: Not hard at all   Food Insecurity: No Food Insecurity     Worried About Running Out of Food in the Last Year: Never true     Ran Out of Food in the Last Year: Never true   Transportation Needs: No Transportation Needs     Lack of Transportation (Medical): No     Lack of Transportation (Non-Medical): No   Physical Activity: Inactive     Days of Exercise per Week: 0 days     Minutes of Exercise per Session: 0 min   Stress: No Stress Concern Present     Feeling of Stress : Not at all   Social Connections: Unknown     Frequency of Communication with Friends and Family: Never     Frequency of Social Gatherings with Friends and Family: More than three times a week     Attends Jew Services: Never     Active Member of Clubs or Organizations: No     Attends Club or Organization Meetings: Never     Marital Status: Not on file   Intimate Partner Violence: Not At Risk     Fear of Current or Ex-Partner: No     Emotionally Abused: No     Physically Abused: No     Sexually Abused: No   Depression: Not at risk     PHQ-2 Score: 0   Housing Stability: Unknown     Unable to Pay for Housing in the Last Year: No     Number of Places Lived in the Last Year: Not on file     Unstable Housing in the Last Year: No       Functional Status:  Prior to admission patient needed assistance:   Dependent ADLs:: Bathing,Dressing,Grooming,Transfers,Toileting,Ambulation-cane  Dependent IADLs:: Cleaning,Cooking,Laundry,Shopping,Meal Preparation,Medication Management,Money Management,Transportation  Assesssment of Functional Status: Not at baseline with ADL Functioning,Not at baseline with mobility,Not at  functional baseline    Mental Health Status:          Chemical Dependency Status:                Values/Beliefs:  Spiritual, Cultural Beliefs, Jew Practices, Values that  affect care:                 Additional Information:  Luis Manuel is COVID +. She is not vaccinated. She lives in a house with her daughter, son in law, and grandkids. Her granddaughter Perez Zuniga is her PCA for 4hr 30min/day.    Unknown discharge needs at this time. Hopefully able to return home with family helping. Currently needing oxygen.    Family to transport at discharge.    Perez granddaughter and -[phone].  Shell granddaughter [phone].    Shelia Stewatr RN

## 2021-12-24 NOTE — PHARMACY-ADMISSION MEDICATION HISTORY
Pharmacy Note - Admission Medication History    Pertinent Provider Information:   - warfarin was stopped Feb 2021 (after 6 months of treatment)     ______________________________________________________________________    Prior To Admission (PTA) med list completed and updated in EMR.       PTA Med List   Medication Sig Note Last Dose     acetaminophen (MAPAP ARTHRITIS PAIN) 650 MG CR tablet Take 650 mg by mouth 2 times daily  12/24/2021: Last filled for 1300 mg BID, but home care med list says tylenol  mg BID.  (Rx was last filled 11/16/21 for 120 tabs, so refill timing matches the home care directions) 12/23/2021 at pm     albuterol (PROAIR HFA/PROVENTIL HFA/VENTOLIN HFA) 108 (90 Base) MCG/ACT inhaler Inhale 2 puffs into the lungs every 6 hours as needed for shortness of breath / dyspnea or wheezing   12/23/2021 at Unknown time     amLODIPine (NORVASC) 10 MG tablet Take 1 tablet (10 mg) by mouth daily  12/23/2021 at am     ANTACID CALCIUM 500 MG chewable tablet CHEW 1 TABLET BY MOUTH THREE TIMES A DAY TO KEEP YOUR BONES HEALTHY (Patient taking differently: Take 1 chew tab by mouth 3 times daily )  12/23/2021 at pm     ARTIFICIAL TEARS 1.4 % ophthalmic solution Place 1 drop into both eyes every hour as needed for dry eyes (itchy eyes)   Unknown at Unknown time     BREO ELLIPTA 100-25 MCG/INH inhaler Inhale 1 puff into the lungs daily   12/23/2021 at Unknown time     capsaicin (ZOSTRIX) 0.025 % external cream Apply topically 2 times daily as needed (for pain)   Unknown at Unknown time     COMBIVENT RESPIMAT  MCG/ACT inhaler INHALE 1 PUFF EVERY 6 (SIX) HOURS AS NEEDED. (Patient taking differently: Inhale 1 puff into the lungs every 6 hours as needed for shortness of breath / dyspnea or wheezing )  12/23/2021 at Unknown time     denosumab (PROLIA) 60 MG/ML SOSY injection Inject 60 mg Subcutaneous  Unknown at Unknown time     DULoxetine (CYMBALTA) 30 MG capsule Take 30 mg by mouth daily   12/23/2021 at am      famotidine (PEPCID) 40 MG tablet Take 40 mg by mouth 2 times daily   12/23/2021 at pm     gabapentin (NEURONTIN) 100 MG capsule TAKE 1 TAB (100MG) IN THE MORNING AND 2 TABS (200MG) AT BEDTIME (Patient taking differently: Take 100-200 mg by mouth See Admin Instructions Take 1 capsule (100 mg) by mouth in the morning, and take 2 capsules (200 mg) by mouth in the evening)  12/23/2021 at pm     hydrochlorothiazide (HYDRODIURIL) 25 MG tablet TAKE ONE TABLET BY MOUTH ONCE DAILY WITH LOSARTAN 100MG. (Patient taking differently: Take 25 mg by mouth daily )  12/23/2021 at am     INCRUSE ELLIPTA 62.5 MCG/INH Inhaler Inhale 1 puff into the lungs daily   12/23/2021 at Unknown time     losartan (COZAAR) 100 MG tablet TAKE 1 TABLET BY MOUTH DAILY WITH HYDROCHLOROTHIAZIDE 25MG (Patient taking differently: Take 100 mg by mouth daily )  12/23/2021 at am     metoprolol succinate ER (TOPROL-XL) 50 MG 24 hr tablet TAKE 1 TABLET BY MOUTH DAILY (Patient taking differently: Take 50 mg by mouth daily )  12/23/2021 at am     mirtazapine (REMERON) 15 MG tablet Take 22.5 mg by mouth At Bedtime   12/23/2021 at pm     Multiple Vitamin (MULTI-VITAMINS) TABS Take 1 tablet by mouth daily   12/23/2021 at Unknown time     polyethylene glycol (MIRALAX) 17 g packet Take 1 packet by mouth daily as needed for constipation  Unknown at Unknown time     SENEXON-S 8.6-50 MG tablet TAKE 2 TABLETS BY MOUTH DAILY.  12/23/2021 at am     vitamin D2 (ERGOCALCIFEROL) 33698 units (1250 mcg) capsule Take 50,000 Units by mouth once a week Wednesday's 12/22/2021 at am       Information source(s): [facility] med list  Method of interview communication: phone    Summary of Changes to PTA Med List  New: miralax  Discontinued: celecoxib, nystatin oral, warfarin  Changed: acetaminophen, albuterol HFA, capsaicin, Incruse Ellipta, MVI, ergocalciferol    Patient was asked about OTC/herbal products specifically.  PTA med list  reflects this.    Allergies were reviewed, assessed, and updated with the patient.      Patient did not bring any medications to the hospital and can't retrieve from home. No multi-dose medications are available for use during hospital stay.     The information provided in this note is only as accurate as the sources available at the time of the update(s).    Thank you for the opportunity to participate in the care of this patient.    Alyssa Felton RPH  12/24/2021 11:01 AM

## 2021-12-24 NOTE — ED NOTES
[facility] ED Handoff Report    ED Chief Complaint:SOB/chest pain  ED Diagnosis:  (U07.1) Infection due to 2019 novel coronavirus  Comment:  Plan: continue to monitor O2 sats and respirations  (J96.01) Acute respiratory failure with hypoxia (H)  Comment:  Patient gets out of bed and takes of nasal cannula. Needs reminders to keep it in.        PMH:    Past Medical History:   Diagnosis Date     Biceps tendon rupture      CHF (congestive heart failure) (H) 10/8/2017     Chronic use of steroids 2/24/2015 2/24/2015:  For connective tissue disease.  On steroids since at least 2011.  Attempts to wean have been unsuccessful.  Rheum currently trying again to wean.  Has known osteoporosis with compression fracture.      Compression fracture 2/4/2015 2/24/2015:  T12 compression fracture with 33% height loss seen on plain films 1/2015.  Was not seen on plain films 6/2014, so is presumably new since then.      Dermatophytosis of foot     Created by Conversion      MILLER (dyspnea on exertion) 4/25/2018     Dysfunction of eustachian tube     Created by Conversion      Dyspepsia      GERD (gastroesophageal reflux disease)      History of immunological disorder     Connective Tissue Disorder     History of kidney stones      Hyperlipidemia      Hypertension      Inverted nipple     Bilaterally     Low back pain 12/8/2015     Myalgia and myositis      Neuralgia      Nontraumatic rupture of tendons of biceps (long head)     Created by Conversion      Opioid dependence, episodic (H) 8/8/2019     Osteoarthritis      Osteoporosis      Pancreatitis 4/15/2016     Personal history of noncompliance with medical treatment, presenting hazards to health     2/24/2015:  Has had trouble historically understanding and taking medicines.  Much improved now with home health RN Georgina Salinas from [facility].  Georgina's cell [phone].  She needs to be called with all new orders.  Also in [facility] program -- Care  Guide is Jose Castillo.       Polymyalgia rheumatica (H)      Restrictive lung disease      Vitamin D deficiency         Code Status:  Full Code     Falls Risk: Yes Band: Applied    Current Living Situation/Residence: lives with their son or daughter     Elimination Status: Continent: No     Activity Level: SBA w/ walker    Patients Preferred Language:  Other: Amie     Needed: Yes    Vital Signs:  BP (!) 161/72   Pulse 59   Temp (!) 96.4  F (35.8  C) (Tympanic)   Resp 18   Wt 56.2 kg (124 lb)   SpO2 98%   BMI 25.04 kg/m       Cardiac Rhythm: SR  Pain Score: 0/10    Is the Patient Confused:  No    Last Food or Drink: 12/24/21 at 1300    Focused Assessment:  Respiratory    Tests Performed: Done: Labs    Treatments Provided:  Nebs, fluids, medication    Family Dynamics/Concerns: No    Family Updated On Visitor Policy: Yes    Plan of Care Communicated to Family: Yes    Who Was Updated about Plan of Care: Patient    Belongings Checklist Done and Signed by Patient: No    Covid: symptomatic, positive    Additional Information: need language line for interpretation    RN: Brigid Ocampo   12/24/2021 3:02 PM

## 2021-12-24 NOTE — ED PROVIDER NOTES
ED CONSULTATION  Date/Time:12/24/2021 8:09 AM    I am seeing this patient along with LEIGH Starr.  I, Anatoly Reyes M.D. have reviewed the documentation, personally taken the patient's history, performed an exam and agree with the physical finds, diagnosis and management plan.        8:50 a.m. prior records were reviewed.  I personally performed history and physical.  I personally reviewed lab, EKG, and radiology results as indicated.  Care was discussed with mid-level provider.  Diagnosis and disposition were discussed.  Final disposition will be per the [facility] depending diagnostic studies and patient's clinical trajectory.    8:46 AM I personally saw the patient and performed a substantive portion of the visit including all aspects of the medical decision making.    DIAGNOSIS:  1. Infection due to 2019 novel coronavirus    2. Acute respiratory failure with hypoxia (H)        New Prescriptions    No medications on file       HPI: Per patient, she reports having a few days of shortness of breath that she notes is worsened with both laying down and sitting forward. She additionally has had a mild cough and decreased appetite for the past 1-2 days. Patient reports having had a fever yesterday. She has been using her inhalers which helps slightly to sleep at night. Patient reports being compliant with her medications including her blood thinners. Patient denies any chest pain, vomiting, diarrhea, abdominal pain as well as any other complaints at the time.      Per patient's granddaughter she reports that patient has had more difficulty breathing the last few days which is worse at night. She endorsed generalized body pain. She denies any known sick contacts.      Patient is not vaccinated against COVID-19.     Physical Exam:BP (!) 172/77   Pulse 62   Temp (!) 96.4  F (35.8  C) (Tympanic)   Resp 20   Wt 56.2 kg (124 lb)   SpO2 (!) 85%   BMI 25.04 kg/m    Physical Exam  Vitals and nursing note reviewed.    Constitutional:       Appearance: Normal appearance.   HENT:      Head: Normocephalic and atraumatic.      Right Ear: External ear normal.      Left Ear: External ear normal.      Nose: Nose normal.   Eyes:      Extraocular Movements: Extraocular movements intact.      Conjunctiva/sclera: Conjunctivae normal.   Cardiovascular:      Rate and Rhythm: Normal rate and regular rhythm.   Pulmonary:      Effort: Pulmonary effort is normal.      Breath sounds: Examination of the right-middle field reveals wheezing. Wheezing and rales (diffuse mild) present.   Musculoskeletal:         General: Normal range of motion.      Cervical back: Normal range of motion.      Right lower leg: No edema.      Left lower leg: No edema.   Skin:     General: Skin is warm and dry.   Neurological:      General: No focal deficit present.      Mental Status: She is alert and oriented to person, place, and time. Mental status is at baseline.   Psychiatric:         Mood and Affect: Mood normal.         Behavior: Behavior normal.         Thought Content: Thought content normal.         Results for orders placed or performed during the hospital encounter of 12/24/21   XR Chest Port 1 View    Impression    IMPRESSION:     Worsening of multifocal airspace opacities in both lungs with a mid and lower lung zone and peripheral predominance. The pattern is typical for worsening COVID 19 pneumonia and associated acute lung injury.    Unchanged heart and mediastinal borders.    No visible pleural fluid. No pneumothorax.   B-Type Natriuretic Peptide ([facility] Only)   Result Value Ref Range    BNP 85 0 - 155 pg/mL   Comprehensive metabolic panel   Result Value Ref Range    Sodium 141 136 - 145 mmol/L    Potassium 3.9 3.5 - 5.0 mmol/L    Chloride 98 98 - 107 mmol/L    Carbon Dioxide (CO2) 33 (H) 22 - 31 mmol/L    Anion Gap 10 5 - 18 mmol/L    Urea Nitrogen 13 8 - 28 mg/dL    Creatinine 0.92 0.60 - 1.10 mg/dL    Calcium 9.0 8.5 - 10.5 mg/dL    Glucose 125 70  - 125 mg/dL    Alkaline Phosphatase 77 45 - 120 U/L    AST 33 0 - 40 U/L    ALT 15 0 - 45 U/L    Protein Total 7.7 6.0 - 8.0 g/dL    Albumin 2.7 (L) 3.5 - 5.0 g/dL    Bilirubin Total 0.6 0.0 - 1.0 mg/dL    GFR Estimate 63 >60 mL/min/1.73m2   Result Value Ref Range    INR 0.91 0.90 - 1.15   Partial thromboplastin time   Result Value Ref Range    aPTT 30 22 - 38 Seconds   Result Value Ref Range    Troponin I 0.02 0.00 - 0.29 ng/mL   Symptomatic; Yes; 12/21/2021 Influenza A/B & SARS-CoV2 (COVID-19) Virus PCR Multiplex Nasopharyngeal    Specimen: Nasopharyngeal; Swab   Result Value Ref Range    Influenza A PCR Negative Negative    Influenza B PCR Negative Negative    SARS CoV2 PCR Positive (A) Negative   CBC with platelets and differential   Result Value Ref Range    WBC Count 5.9 4.0 - 11.0 10e3/uL    RBC Count 4.37 3.80 - 5.20 10e6/uL    Hemoglobin 10.2 (L) 11.7 - 15.7 g/dL    Hematocrit 34.7 (L) 35.0 - 47.0 %    MCV 79 78 - 100 fL    MCH 23.3 (L) 26.5 - 33.0 pg    MCHC 29.4 (L) 31.5 - 36.5 g/dL    RDW 15.9 (H) 10.0 - 15.0 %    Platelet Count 202 150 - 450 10e3/uL   Extra Blood Culture Bottle   Result Value Ref Range    Hold Specimen JIC    Extra Red Top Tube   Result Value Ref Range    Hold Specimen JIC    Extra Heparinized Syringe   Result Value Ref Range    Hold Specimen JIC    Extra Green Top (Lithium Heparin) ON ICE   Result Value Ref Range    Hold Specimen JIC    ECG 12-Lead with MUSE - [facility]   Result Value Ref Range    Systolic Blood Pressure  mmHg    Diastolic Blood Pressure  mmHg    Ventricular Rate 61 BPM    Atrial Rate 61 BPM    NV Interval 136 ms    QRS Duration 86 ms     ms    QTc 426 ms    P Axis 45 degrees    R AXIS 38 degrees    T Axis 70 degrees    Interpretation ECG       Sinus rhythm  Nonspecific ST abnormality  Abnormal ECG  When compared with ECG of 09-JUN-2021 18:37,  Nonspecific T wave abnormality no longer evident in Anterior leads  Confirmed by SEE ED PROVIDER NOTE FOR, ECG  INTERPRETATION (4000),  FRANCIA LAMB ([id number]) on 12/24/2021 7:41:17 AM         Labs Ordered and Resulted from Time of ED Arrival to Time of ED Departure   COMPREHENSIVE METABOLIC PANEL - Abnormal       Result Value    Sodium 141      Potassium 3.9      Chloride 98      Carbon Dioxide (CO2) 33 (*)     Anion Gap 10      Urea Nitrogen 13      Creatinine 0.92      Calcium 9.0      Glucose 125      Alkaline Phosphatase 77      AST 33      ALT 15      Protein Total 7.7      Albumin 2.7 (*)     Bilirubin Total 0.6      GFR Estimate 63     INFLUENZA A/B & SARS-COV2 PCR MULTIPLEX - Abnormal    Influenza A PCR Negative      Influenza B PCR Negative      SARS CoV2 PCR Positive (*)    CBC WITH PLATELETS AND DIFFERENTIAL - Abnormal    WBC Count 5.9      RBC Count 4.37      Hemoglobin 10.2 (*)     Hematocrit 34.7 (*)     MCV 79      MCH 23.3 (*)     MCHC 29.4 (*)     RDW 15.9 (*)     Platelet Count 202     B-TYPE NATRIURETIC PEPTIDE ( [facility] ONLY) - Normal    BNP 85     INR - Normal    INR 0.91     PARTIAL THROMBOPLASTIN TIME - Normal    aPTT 30     TROPONIN I - Normal    Troponin I 0.02     PROCALCITONIN                Anatoly Reyes MD  12/24/21 [id number]

## 2021-12-25 LAB
ALBUMIN SERPL-MCNC: 2.6 G/DL (ref 3.5–5)
ALP SERPL-CCNC: 89 U/L (ref 45–120)
ALT SERPL W P-5'-P-CCNC: 17 U/L (ref 0–45)
ANION GAP SERPL CALCULATED.3IONS-SCNC: 10 MMOL/L (ref 5–18)
AST SERPL W P-5'-P-CCNC: 31 U/L (ref 0–40)
BILIRUB SERPL-MCNC: 0.5 MG/DL (ref 0–1)
BUN SERPL-MCNC: 16 MG/DL (ref 8–28)
C REACTIVE PROTEIN LHE: 6 MG/DL (ref 0–0.8)
CALCIUM SERPL-MCNC: 9 MG/DL (ref 8.5–10.5)
CHLORIDE BLD-SCNC: 98 MMOL/L (ref 98–107)
CO2 SERPL-SCNC: 31 MMOL/L (ref 22–31)
CREAT SERPL-MCNC: 0.85 MG/DL (ref 0.6–1.1)
D DIMER PPP FEU-MCNC: 2.35 UG/ML FEU (ref 0–0.5)
ERYTHROCYTE [DISTWIDTH] IN BLOOD BY AUTOMATED COUNT: 15.6 % (ref 10–15)
FIBRINOGEN PPP-MCNC: 588 MG/DL (ref 170–490)
GFR SERPL CREATININE-BSD FRML MDRD: 69 ML/MIN/1.73M2
GLUCOSE BLD-MCNC: 125 MG/DL (ref 70–125)
HCT VFR BLD AUTO: 37.4 % (ref 35–47)
HGB BLD-MCNC: 10.8 G/DL (ref 11.7–15.7)
INR PPP: 0.96 (ref 0.9–1.15)
LDH SERPL L TO P-CCNC: 275 U/L (ref 125–220)
MCH RBC QN AUTO: 22.9 PG (ref 26.5–33)
MCHC RBC AUTO-ENTMCNC: 28.9 G/DL (ref 31.5–36.5)
MCV RBC AUTO: 79 FL (ref 78–100)
PLATELET # BLD AUTO: 240 10E3/UL (ref 150–450)
POTASSIUM BLD-SCNC: 4.2 MMOL/L (ref 3.5–5)
PROT SERPL-MCNC: 8 G/DL (ref 6–8)
RBC # BLD AUTO: 4.72 10E6/UL (ref 3.8–5.2)
SODIUM SERPL-SCNC: 139 MMOL/L (ref 136–145)
TROPONIN I SERPL-MCNC: 0.01 NG/ML (ref 0–0.29)
WBC # BLD AUTO: 4.1 10E3/UL (ref 4–11)

## 2021-12-25 PROCEDURE — 250N000013 HC RX MED GY IP 250 OP 250 PS 637: Performed by: INTERNAL MEDICINE

## 2021-12-25 PROCEDURE — 120N000001 HC R&B MED SURG/OB

## 2021-12-25 PROCEDURE — 250N000009 HC RX 250: Performed by: HOSPITALIST

## 2021-12-25 PROCEDURE — 80053 COMPREHEN METABOLIC PANEL: CPT | Performed by: HOSPITALIST

## 2021-12-25 PROCEDURE — 250N000011 HC RX IP 250 OP 636: Performed by: HOSPITALIST

## 2021-12-25 PROCEDURE — 83615 LACTATE (LD) (LDH) ENZYME: CPT | Performed by: HOSPITALIST

## 2021-12-25 PROCEDURE — 86141 C-REACTIVE PROTEIN HS: CPT | Performed by: HOSPITALIST

## 2021-12-25 PROCEDURE — 85027 COMPLETE CBC AUTOMATED: CPT | Performed by: HOSPITALIST

## 2021-12-25 PROCEDURE — 85610 PROTHROMBIN TIME: CPT | Performed by: HOSPITALIST

## 2021-12-25 PROCEDURE — 99231 SBSQ HOSP IP/OBS SF/LOW 25: CPT | Performed by: INTERNAL MEDICINE

## 2021-12-25 PROCEDURE — 85384 FIBRINOGEN ACTIVITY: CPT | Performed by: HOSPITALIST

## 2021-12-25 PROCEDURE — 258N000003 HC RX IP 258 OP 636: Performed by: HOSPITALIST

## 2021-12-25 PROCEDURE — 85379 FIBRIN DEGRADATION QUANT: CPT | Performed by: HOSPITALIST

## 2021-12-25 PROCEDURE — 250N000013 HC RX MED GY IP 250 OP 250 PS 637: Performed by: HOSPITALIST

## 2021-12-25 PROCEDURE — 36415 COLL VENOUS BLD VENIPUNCTURE: CPT | Performed by: HOSPITALIST

## 2021-12-25 PROCEDURE — 250N000012 HC RX MED GY IP 250 OP 636 PS 637: Performed by: HOSPITALIST

## 2021-12-25 PROCEDURE — 84484 ASSAY OF TROPONIN QUANT: CPT | Performed by: HOSPITALIST

## 2021-12-25 RX ORDER — BENZONATATE 100 MG/1
100 CAPSULE ORAL 3 TIMES DAILY PRN
Status: DISCONTINUED | OUTPATIENT
Start: 2021-12-25 | End: 2021-12-27 | Stop reason: HOSPADM

## 2021-12-25 RX ORDER — FAMOTIDINE 20 MG/1
20 TABLET, FILM COATED ORAL 2 TIMES DAILY
Status: DISCONTINUED | OUTPATIENT
Start: 2021-12-25 | End: 2021-12-27 | Stop reason: HOSPADM

## 2021-12-25 RX ORDER — FAMOTIDINE 20 MG/1
40 TABLET, FILM COATED ORAL DAILY
Status: DISCONTINUED | OUTPATIENT
Start: 2021-12-26 | End: 2021-12-25

## 2021-12-25 RX ORDER — HYDRALAZINE HYDROCHLORIDE 20 MG/ML
10 INJECTION INTRAMUSCULAR; INTRAVENOUS EVERY 4 HOURS PRN
Status: DISCONTINUED | OUTPATIENT
Start: 2021-12-25 | End: 2021-12-27 | Stop reason: HOSPADM

## 2021-12-25 RX ORDER — GUAIFENESIN/DEXTROMETHORPHAN 100-10MG/5
10 SYRUP ORAL EVERY 4 HOURS PRN
Status: DISCONTINUED | OUTPATIENT
Start: 2021-12-25 | End: 2021-12-27 | Stop reason: HOSPADM

## 2021-12-25 RX ADMIN — FUROSEMIDE 10 MG: 10 INJECTION, SOLUTION INTRAMUSCULAR; INTRAVENOUS at 10:52

## 2021-12-25 RX ADMIN — REMDESIVIR 100 MG: 100 INJECTION, POWDER, LYOPHILIZED, FOR SOLUTION INTRAVENOUS at 18:48

## 2021-12-25 RX ADMIN — METOPROLOL SUCCINATE 50 MG: 50 TABLET, EXTENDED RELEASE ORAL at 10:56

## 2021-12-25 RX ADMIN — FLUTICASONE FUROATE AND VILANTEROL TRIFENATATE 1 PUFF: 100; 25 POWDER RESPIRATORY (INHALATION) at 11:00

## 2021-12-25 RX ADMIN — AMLODIPINE BESYLATE 10 MG: 5 TABLET ORAL at 10:52

## 2021-12-25 RX ADMIN — ONDANSETRON 4 MG: 2 INJECTION INTRAMUSCULAR; INTRAVENOUS at 11:24

## 2021-12-25 RX ADMIN — SODIUM CHLORIDE, PRESERVATIVE FREE 50 ML: 5 INJECTION INTRAVENOUS at 18:47

## 2021-12-25 RX ADMIN — MIRTAZAPINE 22.5 MG: 7.5 TABLET, FILM COATED ORAL at 21:12

## 2021-12-25 RX ADMIN — GABAPENTIN 100 MG: 100 CAPSULE ORAL at 10:52

## 2021-12-25 RX ADMIN — ENOXAPARIN SODIUM 30 MG: 30 INJECTION SUBCUTANEOUS at 11:39

## 2021-12-25 RX ADMIN — ENOXAPARIN SODIUM 30 MG: 30 INJECTION SUBCUTANEOUS at 18:47

## 2021-12-25 RX ADMIN — ALBUTEROL SULFATE 2 PUFF: 90 AEROSOL, METERED RESPIRATORY (INHALATION) at 21:11

## 2021-12-25 RX ADMIN — DOCUSATE SODIUM 50 MG AND SENNOSIDES 8.6 MG 2 TABLET: 8.6; 5 TABLET, FILM COATED ORAL at 10:49

## 2021-12-25 RX ADMIN — POLYETHYLENE GLYCOL 3350 17 G: 17 POWDER, FOR SOLUTION ORAL at 10:48

## 2021-12-25 RX ADMIN — FAMOTIDINE 40 MG: 20 TABLET, FILM COATED ORAL at 10:51

## 2021-12-25 RX ADMIN — DEXAMETHASONE 6 MG: 2 TABLET ORAL at 10:48

## 2021-12-25 RX ADMIN — FAMOTIDINE 20 MG: 20 TABLET, FILM COATED ORAL at 21:12

## 2021-12-25 RX ADMIN — ALBUTEROL SULFATE 2 PUFF: 90 AEROSOL, METERED RESPIRATORY (INHALATION) at 11:01

## 2021-12-25 RX ADMIN — UMECLIDINIUM 1 PUFF: 62.5 AEROSOL, POWDER ORAL at 11:02

## 2021-12-25 RX ADMIN — ALBUTEROL SULFATE 2 PUFF: 90 AEROSOL, METERED RESPIRATORY (INHALATION) at 17:00

## 2021-12-25 RX ADMIN — GABAPENTIN 200 MG: 100 CAPSULE ORAL at 21:12

## 2021-12-25 ASSESSMENT — ACTIVITIES OF DAILY LIVING (ADL)
ADLS_ACUITY_SCORE: 5
ADLS_ACUITY_SCORE: 7
ADLS_ACUITY_SCORE: 7
ADLS_ACUITY_SCORE: 5
ADLS_ACUITY_SCORE: 5
ADLS_ACUITY_SCORE: 7
ADLS_ACUITY_SCORE: 5
ADLS_ACUITY_SCORE: 5
ADLS_ACUITY_SCORE: 7
ADLS_ACUITY_SCORE: 5
ADLS_ACUITY_SCORE: 7
ADLS_ACUITY_SCORE: 5
ADLS_ACUITY_SCORE: 7
ADLS_ACUITY_SCORE: 7
ADLS_ACUITY_SCORE: 5
ADLS_ACUITY_SCORE: 5
ADLS_ACUITY_SCORE: 7
ADLS_ACUITY_SCORE: 7

## 2021-12-25 NOTE — PROGRESS NOTES
[facility] Internal Medicine Progress Note      ASSESSMENT:    Active Problems:    Essential hypertension    Restrictive lung disease    GERD (gastroesophageal reflux disease)    Acute respiratory failure with hypoxia (H)    Infection due to 2019 novel coronavirus      PLAN:   81 year old female with history of pulmonary fibrosis, reactive airway disease, not on home O2, PMR not on any steroid, HTN, depression, neuropathy admitted on 12/24/202 with covid 19 pneumonia     COVID-19 Diagnosis Details:  Onset of COVID symptoms 12/23   Date of positive test results 12/24   Vaccinated? Unvaccinated      # Confirmed COVID-19 infection    # Acute Hypoxic Respiratory Failure secondary to COVID-19 infection  # Viral Pneumonia secondary to COVID-19 infection  - Initial chest x-ray showed multifocal airspace disease. CTA chest negative for PE, no longer on anticoagulation, elevated d-dimer, and complained of chest pain at home.  - Continue supportive care with continuous pulse oximetry, COVID-19 special precautions, minimized lab draws, and care consolidation  - Oxygen: continue current support, titrate to keep SpO2 between 90-96%  - Labs: CBC, creatinine/BMP, CRP, LDH, D-dimer and fibrinogen daily or as appropriate until patient clinically stable.  - Imaging: chest CT with contrast shows bilateral infiltrates, no PE  - Breathing treatments: albuterol inhaler four times a day scheduled and PRN; home combivent inhaler PRN; avoid nebulizers in favor of MDIs   - IV fluids: avoid  - Antibiotics: not indicated   - Treatments: Dexamethasone 6 mg x 10 days or until hospital discharge, started on 12/24 and Remdesivir x 5 days or until hospital discharge, started on 12/24  - DVT Prophylaxis: At high risk of thrombotic complications due to COVID-19 disease         - Lovenox 0.5 mg/kg two times a day       #Chest pain  Does have history of PE and completed 6 months warfarin and no longer on anticoagulation  -- Troponin normal, EKG normal  -- CTA  chest negative for PE.        #HTN  Continue home amlodipine, metoprolol  Hold home losartan and hydrochlorothiazide given recent IV contrast, can resume 12/26 if GFR remains stable       #Pulmonary fibrosis, Reactive airway disease:  - continue home Incruse, Breo Ellipta, albuterol, Combivent  Of note she has been having progressive chronic dyspnea with concern for underlying neuromuscular disorder and supposed to see Neurology as outpatient       #Neuropathy: continue home Cymbalta and gabapentin    #GERD: continue home Pepcid    #Mood disorder: continue home Remeron and Cymbalta        Code status: unable to meaningfully have a conversation regarding code status with patient and  due to somnolence, keep Full code for now                  Tarik Villagomez D.O.              -------------------------------------------------------------------------------------------------------------  SUBJECTIVE: NAD. Somnolent. Will follow simple commands but unable to have reliable conversation with patient and .         Exam:  BP (!) 162/78 (BP Location: Right arm)   Pulse 81   Temp 98  F (36.7  C) (Oral)   Resp 20   Wt 56.2 kg (124 lb)   SpO2 97%   BMI 25.04 kg/m    General: NAD   RESPIRATORY: Breathing nonlabored  CARDIOVASCULAR: No le edema bilat.   ABDOMEN: soft and non-tender  NEUROLOGIC: Motor grossly intact, somnolent       Diagnostics Reviewed:      Recent Results (from the past 24 hour(s))   Extra Purple Top Tube    Collection Time: 12/24/21  7:28 AM   Result Value Ref Range    Hold Specimen JIC    Lactate Dehydrogenase    Collection Time: 12/24/21  7:28 AM   Result Value Ref Range    Lactate Dehydrogenase 279 (H) 125 - 220 U/L   Troponin I    Collection Time: 12/24/21  7:28 AM   Result Value Ref Range    Troponin I 0.01 0.00 - 0.29 ng/mL   ABO and Rh    Collection Time: 12/24/21  7:28 AM   Result Value Ref Range    ABO/RH(D) O POS     SPECIMEN EXPIRATION DATE [id number]    ECG 12-Lead  with MUSE - [facility]    Collection Time: 12/24/21  7:34 AM   Result Value Ref Range    Systolic Blood Pressure  mmHg    Diastolic Blood Pressure  mmHg    Ventricular Rate 61 BPM    Atrial Rate 61 BPM    CA Interval 136 ms    QRS Duration 86 ms     ms    QTc 426 ms    P Axis 45 degrees    R AXIS 38 degrees    T Axis 70 degrees    Interpretation ECG       Sinus rhythm  Nonspecific ST abnormality  Abnormal ECG  When compared with ECG of 09-JUN-2021 18:37,  Nonspecific T wave abnormality no longer evident in Anterior leads  Confirmed by SEE ED PROVIDER NOTE FOR, ECG INTERPRETATION (4000),  FRANCIA LAMB ([id number]) on 12/24/2021 7:41:17 AM     B-Type Natriuretic Peptide ([facility] Only)    Collection Time: 12/24/21  7:47 AM   Result Value Ref Range    BNP 85 0 - 155 pg/mL   Comprehensive metabolic panel    Collection Time: 12/24/21  7:47 AM   Result Value Ref Range    Sodium 141 136 - 145 mmol/L    Potassium 3.9 3.5 - 5.0 mmol/L    Chloride 98 98 - 107 mmol/L    Carbon Dioxide (CO2) 33 (H) 22 - 31 mmol/L    Anion Gap 10 5 - 18 mmol/L    Urea Nitrogen 13 8 - 28 mg/dL    Creatinine 0.92 0.60 - 1.10 mg/dL    Calcium 9.0 8.5 - 10.5 mg/dL    Glucose 125 70 - 125 mg/dL    Alkaline Phosphatase 77 45 - 120 U/L    AST 33 0 - 40 U/L    ALT 15 0 - 45 U/L    Protein Total 7.7 6.0 - 8.0 g/dL    Albumin 2.7 (L) 3.5 - 5.0 g/dL    Bilirubin Total 0.6 0.0 - 1.0 mg/dL    GFR Estimate 63 >60 mL/min/1.73m2   INR    Collection Time: 12/24/21  7:47 AM   Result Value Ref Range    INR 0.91 0.90 - 1.15   Partial thromboplastin time    Collection Time: 12/24/21  7:47 AM   Result Value Ref Range    aPTT 30 22 - 38 Seconds   Troponin I    Collection Time: 12/24/21  7:47 AM   Result Value Ref Range    Troponin I 0.02 0.00 - 0.29 ng/mL   Symptomatic; Yes; 12/21/2021 Influenza A/B & SARS-CoV2 (COVID-19) Virus PCR Multiplex Nasopharyngeal    Collection Time: 12/24/21  7:47 AM    Specimen: Nasopharyngeal; Swab   Result Value Ref Range     Influenza A PCR Negative Negative    Influenza B PCR Negative Negative    SARS CoV2 PCR Positive (A) Negative   CBC with platelets and differential    Collection Time: 12/24/21  7:47 AM   Result Value Ref Range    WBC Count 5.9 4.0 - 11.0 10e3/uL    RBC Count 4.37 3.80 - 5.20 10e6/uL    Hemoglobin 10.2 (L) 11.7 - 15.7 g/dL    Hematocrit 34.7 (L) 35.0 - 47.0 %    MCV 79 78 - 100 fL    MCH 23.3 (L) 26.5 - 33.0 pg    MCHC 29.4 (L) 31.5 - 36.5 g/dL    RDW 15.9 (H) 10.0 - 15.0 %    Platelet Count 202 150 - 450 10e3/uL    % Neutrophils 74 %    % Lymphocytes 18 %    % Monocytes 8 %    % Eosinophils 0 %    % Basophils 0 %    % Immature Granulocytes 0 %    NRBCs per 100 WBC 0 <1 /100    Absolute Neutrophils 4.4 1.6 - 8.3 10e3/uL    Absolute Lymphocytes 1.1 0.8 - 5.3 10e3/uL    Absolute Monocytes 0.4 0.0 - 1.3 10e3/uL    Absolute Eosinophils 0.0 0.0 - 0.7 10e3/uL    Absolute Basophils 0.0 0.0 - 0.2 10e3/uL    Absolute Immature Granulocytes 0.0 <=0.4 10e3/uL    Absolute NRBCs 0.0 10e3/uL   Extra Blood Culture Bottle    Collection Time: 12/24/21  7:47 AM   Result Value Ref Range    Hold Specimen JIC    Extra Red Top Tube    Collection Time: 12/24/21  7:47 AM   Result Value Ref Range    Hold Specimen JIC    Extra Heparinized Syringe    Collection Time: 12/24/21  7:47 AM   Result Value Ref Range    Hold Specimen JIC    Extra Green Top (Lithium Heparin) ON ICE    Collection Time: 12/24/21  7:47 AM   Result Value Ref Range    Hold Specimen JIC    RBC and Platelet Morphology    Collection Time: 12/24/21  7:47 AM   Result Value Ref Range    Platelet Assessment  Automated Count Confirmed. Platelet morphology is normal.     Automated Count Confirmed. Platelet morphology is normal.    RBC Morphology Confirmed RBC Indices    D dimer quantitative    Collection Time: 12/24/21  7:47 AM   Result Value Ref Range    D-Dimer Quantitative 2.19 (H) 0.00 - 0.50 ug/mL FEU   CRP inflammation    Collection Time: 12/24/21  7:47 AM   Result Value Ref  Range    CRP 5.9 (H) 0.0-<0.8 mg/dL   Fibrinogen activity    Collection Time: 12/24/21  7:47 AM   Result Value Ref Range    Fibrinogen Activity 559 (H) 170 - 490 mg/dL   Procalcitonin    Collection Time: 12/24/21  8:29 AM   Result Value Ref Range    Procalcitonin 0.04 0.00 - 0.49 ng/mL   INR    Collection Time: 12/25/21  6:18 AM   Result Value Ref Range    INR 0.96 0.90 - 1.15   CBC with platelets    Collection Time: 12/25/21  6:18 AM   Result Value Ref Range    WBC Count 4.1 4.0 - 11.0 10e3/uL    RBC Count 4.72 3.80 - 5.20 10e6/uL    Hemoglobin 10.8 (L) 11.7 - 15.7 g/dL    Hematocrit 37.4 35.0 - 47.0 %    MCV 79 78 - 100 fL    MCH 22.9 (L) 26.5 - 33.0 pg    MCHC 28.9 (L) 31.5 - 36.5 g/dL    RDW 15.6 (H) 10.0 - 15.0 %    Platelet Count 240 150 - 450 10e3/uL   Fibrinogen activity    Collection Time: 12/25/21  6:18 AM   Result Value Ref Range    Fibrinogen Activity 588 (H) 170 - 490 mg/dL   CRP inflammation    Collection Time: 12/25/21  6:18 AM   Result Value Ref Range    CRP 6.0 (H) 0.0-<0.8 mg/dL   Comprehensive metabolic panel    Collection Time: 12/25/21  6:18 AM   Result Value Ref Range    Sodium 139 136 - 145 mmol/L    Potassium 4.2 3.5 - 5.0 mmol/L    Chloride 98 98 - 107 mmol/L    Carbon Dioxide (CO2) 31 22 - 31 mmol/L    Anion Gap 10 5 - 18 mmol/L    Urea Nitrogen 16 8 - 28 mg/dL    Creatinine 0.85 0.60 - 1.10 mg/dL    Calcium 9.0 8.5 - 10.5 mg/dL    Glucose 125 70 - 125 mg/dL    Alkaline Phosphatase 89 45 - 120 U/L    AST 31 0 - 40 U/L    ALT 17 0 - 45 U/L    Protein Total 8.0 6.0 - 8.0 g/dL    Albumin 2.6 (L) 3.5 - 5.0 g/dL    Bilirubin Total 0.5 0.0 - 1.0 mg/dL    GFR Estimate 69 >60 mL/min/1.73m2

## 2021-12-25 NOTE — PLAN OF CARE
Problem: Adult Inpatient Plan of Care  Goal: Patient-Specific Goal (Individualized)  Outcome: No Change     CT chest completed; no pulmonary embolisms. Using continuous oxygen per nasal cannula @ 2 Liters. SpO2 93%. Dyspnea with exertion. Infrequent non-productive cough. No pain or discomfort.

## 2021-12-25 NOTE — PLAN OF CARE
Problem: Adult Inpatient Plan of Care  Goal: Plan of Care Review  Outcome: Improving   Pt states she gets short of breath easily with movement but the O2 per NC has been helping her breathe much better.     Problem: Adult Inpatient Plan of Care  Goal: Optimal Comfort and Wellbeing  Outcome: Improving  Pt's BP noted to be elevated, MD updated, PRN medication ordered. Administered as per order, see MAR. Noted to be effective, BP remains somewhat elevated, see flowsheets. Will continue to monitor.     Problem: Risk for Delirium  Goal: Improved Attention and Thought Clarity  Outcome: Improving   Pt knows she is at the hospital because she cannot breathe well. However thought her grandkids would come pick her up tonight. Pt told she would be staying here tonight at the hospital until she could breathe better.

## 2021-12-26 LAB
ALBUMIN SERPL-MCNC: 2.5 G/DL (ref 3.5–5)
ALP SERPL-CCNC: 80 U/L (ref 45–120)
ALT SERPL W P-5'-P-CCNC: 18 U/L (ref 0–45)
ANION GAP SERPL CALCULATED.3IONS-SCNC: 8 MMOL/L (ref 5–18)
AST SERPL W P-5'-P-CCNC: 26 U/L (ref 0–40)
BILIRUB SERPL-MCNC: 0.5 MG/DL (ref 0–1)
BUN SERPL-MCNC: 36 MG/DL (ref 8–28)
C REACTIVE PROTEIN LHE: 3.1 MG/DL (ref 0–0.8)
CALCIUM SERPL-MCNC: 8.8 MG/DL (ref 8.5–10.5)
CHLORIDE BLD-SCNC: 98 MMOL/L (ref 98–107)
CO2 SERPL-SCNC: 35 MMOL/L (ref 22–31)
CREAT SERPL-MCNC: 1.02 MG/DL (ref 0.6–1.1)
D DIMER PPP FEU-MCNC: 2.66 UG/ML FEU (ref 0–0.5)
ERYTHROCYTE [DISTWIDTH] IN BLOOD BY AUTOMATED COUNT: 15.7 % (ref 10–15)
FIBRINOGEN PPP-MCNC: 495 MG/DL (ref 170–490)
GFR SERPL CREATININE-BSD FRML MDRD: 55 ML/MIN/1.73M2
GLUCOSE BLD-MCNC: 120 MG/DL (ref 70–125)
HCT VFR BLD AUTO: 38.1 % (ref 35–47)
HGB BLD-MCNC: 10.7 G/DL (ref 11.7–15.7)
INR PPP: 1.04 (ref 0.85–1.15)
LDH SERPL L TO P-CCNC: 251 U/L (ref 125–220)
MCH RBC QN AUTO: 23 PG (ref 26.5–33)
MCHC RBC AUTO-ENTMCNC: 28.1 G/DL (ref 31.5–36.5)
MCV RBC AUTO: 82 FL (ref 78–100)
PLATELET # BLD AUTO: 261 10E3/UL (ref 150–450)
POTASSIUM BLD-SCNC: 4.7 MMOL/L (ref 3.5–5)
PROT SERPL-MCNC: 7.5 G/DL (ref 6–8)
RBC # BLD AUTO: 4.66 10E6/UL (ref 3.8–5.2)
SODIUM SERPL-SCNC: 141 MMOL/L (ref 136–145)
WBC # BLD AUTO: 4.4 10E3/UL (ref 4–11)

## 2021-12-26 PROCEDURE — 82310 ASSAY OF CALCIUM: CPT | Performed by: HOSPITALIST

## 2021-12-26 PROCEDURE — 120N000001 HC R&B MED SURG/OB

## 2021-12-26 PROCEDURE — 85379 FIBRIN DEGRADATION QUANT: CPT | Performed by: HOSPITALIST

## 2021-12-26 PROCEDURE — 85384 FIBRINOGEN ACTIVITY: CPT | Performed by: HOSPITALIST

## 2021-12-26 PROCEDURE — 83615 LACTATE (LD) (LDH) ENZYME: CPT | Performed by: HOSPITALIST

## 2021-12-26 PROCEDURE — 258N000003 HC RX IP 258 OP 636: Performed by: HOSPITALIST

## 2021-12-26 PROCEDURE — 36415 COLL VENOUS BLD VENIPUNCTURE: CPT | Performed by: HOSPITALIST

## 2021-12-26 PROCEDURE — 99231 SBSQ HOSP IP/OBS SF/LOW 25: CPT | Performed by: INTERNAL MEDICINE

## 2021-12-26 PROCEDURE — 250N000012 HC RX MED GY IP 250 OP 636 PS 637: Performed by: HOSPITALIST

## 2021-12-26 PROCEDURE — 250N000013 HC RX MED GY IP 250 OP 250 PS 637: Performed by: INTERNAL MEDICINE

## 2021-12-26 PROCEDURE — 250N000009 HC RX 250: Performed by: HOSPITALIST

## 2021-12-26 PROCEDURE — 85027 COMPLETE CBC AUTOMATED: CPT | Performed by: HOSPITALIST

## 2021-12-26 PROCEDURE — 250N000013 HC RX MED GY IP 250 OP 250 PS 637: Performed by: HOSPITALIST

## 2021-12-26 PROCEDURE — 86141 C-REACTIVE PROTEIN HS: CPT | Performed by: HOSPITALIST

## 2021-12-26 PROCEDURE — 85610 PROTHROMBIN TIME: CPT | Performed by: HOSPITALIST

## 2021-12-26 PROCEDURE — 250N000011 HC RX IP 250 OP 636: Performed by: HOSPITALIST

## 2021-12-26 RX ADMIN — ENOXAPARIN SODIUM 30 MG: 30 INJECTION SUBCUTANEOUS at 05:26

## 2021-12-26 RX ADMIN — GABAPENTIN 100 MG: 100 CAPSULE ORAL at 10:48

## 2021-12-26 RX ADMIN — ENOXAPARIN SODIUM 30 MG: 30 INJECTION SUBCUTANEOUS at 18:40

## 2021-12-26 RX ADMIN — SODIUM CHLORIDE, PRESERVATIVE FREE 50 ML: 5 INJECTION INTRAVENOUS at 17:40

## 2021-12-26 RX ADMIN — FAMOTIDINE 20 MG: 20 TABLET, FILM COATED ORAL at 21:53

## 2021-12-26 RX ADMIN — ALBUTEROL SULFATE 2 PUFF: 90 AEROSOL, METERED RESPIRATORY (INHALATION) at 10:46

## 2021-12-26 RX ADMIN — FAMOTIDINE 20 MG: 20 TABLET, FILM COATED ORAL at 10:31

## 2021-12-26 RX ADMIN — FUROSEMIDE 10 MG: 10 INJECTION, SOLUTION INTRAMUSCULAR; INTRAVENOUS at 10:35

## 2021-12-26 RX ADMIN — UMECLIDINIUM 1 PUFF: 62.5 AEROSOL, POWDER ORAL at 10:55

## 2021-12-26 RX ADMIN — GABAPENTIN 200 MG: 100 CAPSULE ORAL at 21:54

## 2021-12-26 RX ADMIN — MIRTAZAPINE 22.5 MG: 7.5 TABLET, FILM COATED ORAL at 21:54

## 2021-12-26 RX ADMIN — FLUTICASONE FUROATE AND VILANTEROL TRIFENATATE 1 PUFF: 100; 25 POWDER RESPIRATORY (INHALATION) at 10:36

## 2021-12-26 RX ADMIN — DEXAMETHASONE 6 MG: 2 TABLET ORAL at 10:38

## 2021-12-26 RX ADMIN — DULOXETINE 30 MG: 30 CAPSULE, DELAYED RELEASE ORAL at 10:31

## 2021-12-26 RX ADMIN — REMDESIVIR 100 MG: 100 INJECTION, POWDER, LYOPHILIZED, FOR SOLUTION INTRAVENOUS at 17:32

## 2021-12-26 ASSESSMENT — ACTIVITIES OF DAILY LIVING (ADL)
ADLS_ACUITY_SCORE: 5

## 2021-12-26 NOTE — PROGRESS NOTES
[facility] [place] Internal Medicine Progress Note      ASSESSMENT:    Active Problems:    Essential hypertension    Restrictive lung disease    GERD (gastroesophageal reflux disease)    Acute respiratory failure with hypoxia (H)    Infection due to 2019 novel coronavirus      PLAN:   81 year old female with history of pulmonary fibrosis, reactive airway disease, not on home O2, PMR not on any steroid, HTN, depression, neuropathy admitted on 12/24/202 with covid 19 pneumonia     COVID-19 Diagnosis Details:  Onset of COVID symptoms 12/23   Date of positive test results 12/24   Vaccinated? Unvaccinated      # Confirmed COVID-19 infection    # Acute Hypoxic Respiratory Failure secondary to COVID-19 infection  # Viral Pneumonia secondary to COVID-19 infection  - Initial chest x-ray showed multifocal airspace disease. CTA chest negative for PE   - Oxygen: continue current support, titrate to keep SpO2 between 90-96%  - Labs: CBC, creatinine/BMP, CRP, LDH, D-dimer and fibrinogen daily or as appropriate until patient clinically stable.  - Imaging: chest CT with contrast shows bilateral infiltrates, no PE  - Breathing treatments: albuterol inhaler four times a day scheduled and PRN; home combivent inhaler PRN; avoid nebulizers  - IV fluids: avoid for now  - Antibiotics: not indicated   - Treatments: Dexamethasone 6 mg x 10 days or until hospital discharge, started on 12/24 and Remdesivir x 5 days or until hospital discharge, started on 12/24  - DVT Prophylaxis: At high risk of thrombotic complications due to COVID-19 disease         - Lovenox 0.5 mg/kg two times a day       #Chest pain  Does have history of PE, completed 6 months warfarin and no longer on anticoagulation  -- Troponin normal, EKG normal  -- CTA chest negative for PE  -- continue treatments as above       #HTN  Continue home amlodipine, metoprolol  Hold home losartan and hydrochlorothiazide given recent IV contrast and creatinine rise from 0.85 to 1.02, can resume 12/27  if GFR remains stable       #Pulmonary fibrosis, Reactive airway disease:  - continue home Incruse, Breo Ellipta, albuterol, Combivent  Of note she has been having progressive chronic dyspnea with concern for underlying neuromuscular disorder and supposed to see Neurology as outpatient       #Neuropathy: continue home Cymbalta and gabapentin    #GERD: continue home Pepcid    #Mood disorder: continue home Remeron and Cymbalta        Code status: Patient remains full code  Family reportedly asking for us to clarify CODE STATUS however this is been extremely difficult since patient does not seem to have insight into her illness.  I had a long discussion with the patient 12/26/2021 and I feel that due to language barriers the patient does not fully understand the purpose of full code versus DNR status.  Family updated on plan of care 12/25/2021.  I have not been able to reach family members 12/26/2021.  Palliative care consultation ordered for 12/27 to clarify goals of care further                 Tarik Villagomez D.O.              -------------------------------------------------------------------------------------------------------------  SUBJECTIVE: NAD. Denies any complaints.  States she is feeling better than admission.  She has ongoing cough, shortness of breath and fatigue but overall improving.  Attempted to clarify CODE STATUS however discussion was difficult.  Patient not directly answering my questions regarding goals of care.           Exam:  /67 (BP Location: Right arm, Patient Position: Semi-Nichole's)   Pulse 62   Temp 97.6  F (36.4  C) (Axillary)   Resp 18   Wt 56.2 kg (124 lb)   SpO2 93%   BMI 25.04 kg/m    General: NAD   RESPIRATORY: Breathing nonlabored  CARDIOVASCULAR: No le edema bilat.   ABDOMEN: soft and non-tender  NEUROLOGIC: Motor grossly intact, alert, speech clear, oriented to self only       Diagnostics Reviewed:      Recent Results (from the past 24 hour(s))   INR    Collection  Time: 12/26/21  5:54 AM   Result Value Ref Range    INR 1.04 0.85 - 1.15   CBC with platelets    Collection Time: 12/26/21  5:54 AM   Result Value Ref Range    WBC Count 4.4 4.0 - 11.0 10e3/uL    RBC Count 4.66 3.80 - 5.20 10e6/uL    Hemoglobin 10.7 (L) 11.7 - 15.7 g/dL    Hematocrit 38.1 35.0 - 47.0 %    MCV 82 78 - 100 fL    MCH 23.0 (L) 26.5 - 33.0 pg    MCHC 28.1 (L) 31.5 - 36.5 g/dL    RDW 15.7 (H) 10.0 - 15.0 %    Platelet Count 261 150 - 450 10e3/uL   Fibrinogen activity    Collection Time: 12/26/21  5:54 AM   Result Value Ref Range    Fibrinogen Activity 495 (H) 170 - 490 mg/dL   CRP inflammation    Collection Time: 12/26/21  5:54 AM   Result Value Ref Range    CRP 3.1 (H) 0.0-<0.8 mg/dL   D dimer quantitative    Collection Time: 12/26/21  5:54 AM   Result Value Ref Range    D-Dimer Quantitative 2.66 (H) 0.00 - 0.50 ug/mL FEU   Comprehensive metabolic panel    Collection Time: 12/26/21  5:54 AM   Result Value Ref Range    Sodium 141 136 - 145 mmol/L    Potassium 4.7 3.5 - 5.0 mmol/L    Chloride 98 98 - 107 mmol/L    Carbon Dioxide (CO2) 35 (H) 22 - 31 mmol/L    Anion Gap 8 5 - 18 mmol/L    Urea Nitrogen 36 (H) 8 - 28 mg/dL    Creatinine 1.02 0.60 - 1.10 mg/dL    Calcium 8.8 8.5 - 10.5 mg/dL    Glucose 120 70 - 125 mg/dL    Alkaline Phosphatase 80 45 - 120 U/L    AST 26 0 - 40 U/L    ALT 18 0 - 45 U/L    Protein Total 7.5 6.0 - 8.0 g/dL    Albumin 2.5 (L) 3.5 - 5.0 g/dL    Bilirubin Total 0.5 0.0 - 1.0 mg/dL    GFR Estimate 55 (L) >60 mL/min/1.73m2

## 2021-12-26 NOTE — PLAN OF CARE
"  Problem: Adult Inpatient Plan of Care  Goal: Optimal Comfort and Wellbeing  Outcome: Improving     Problem: Risk for Delirium  Goal: Improved Attention and Thought Clarity  Outcome: Improving     Problem: Respiratory Compromise (Pneumonia)  Goal: Effective Oxygenation and Ventilation  Outcome: Improving   Patient continues on 2 L 02 sating 96-98%, when using  and explaining medications patient tends to be confused when giving inhaler but frequent explanation and patient complies. Patient complained of feeling dizzy and headache offered tylenol and she said \"you give me too many medications\" Took time to explain all and patient verbalized also that she wants to go home. Also educated patient to use call light when needing to use bathroom so we can help her and she doesn't fall because of dizziness appeared to understand request. Will continue to monitor and patient  needs to be fed per family, staff assisted as needed.  "

## 2021-12-26 NOTE — PLAN OF CARE
Problem: Adult Inpatient Plan of Care  Goal: Plan of Care Review  Outcome: Improving     Problem: Adult Inpatient Plan of Care  Goal: Absence of Hospital-Acquired Illness or Injury  Intervention: Identify and Manage Fall Risk  Recent Flowsheet Documentation  Taken 12/25/2021 1100 by Leia Patel RN  Safety Promotion/Fall Prevention:   nonskid shoes/slippers when out of bed   bed alarm on   Pt remain on isolation precaution.  service use to foster communication, call light within reach. Prn Zofran given due to nausea, but no emesis. On 2 liters oxygen and sats maintain in the mid to lower 90's. In take poor, ate less than 50% both meals.

## 2021-12-27 ENCOUNTER — DOCUMENTATION ONLY (OUTPATIENT)
Dept: HOME HEALTH SERVICES | Facility: CLINIC | Age: 81
End: 2021-12-27
Payer: COMMERCIAL

## 2021-12-27 VITALS
RESPIRATION RATE: 18 BRPM | DIASTOLIC BLOOD PRESSURE: 63 MMHG | SYSTOLIC BLOOD PRESSURE: 146 MMHG | TEMPERATURE: 98.4 F | OXYGEN SATURATION: 92 % | WEIGHT: 124 LBS | HEART RATE: 72 BPM | BODY MASS INDEX: 25.04 KG/M2

## 2021-12-27 LAB
ALBUMIN SERPL-MCNC: 2.5 G/DL (ref 3.5–5)
ALP SERPL-CCNC: 77 U/L (ref 45–120)
ALT SERPL W P-5'-P-CCNC: 18 U/L (ref 0–45)
ANION GAP SERPL CALCULATED.3IONS-SCNC: 6 MMOL/L (ref 5–18)
AST SERPL W P-5'-P-CCNC: 29 U/L (ref 0–40)
BILIRUB SERPL-MCNC: 0.5 MG/DL (ref 0–1)
BUN SERPL-MCNC: 40 MG/DL (ref 8–28)
C REACTIVE PROTEIN LHE: 1.8 MG/DL (ref 0–0.8)
CALCIUM SERPL-MCNC: 9 MG/DL (ref 8.5–10.5)
CHLORIDE BLD-SCNC: 97 MMOL/L (ref 98–107)
CO2 SERPL-SCNC: 40 MMOL/L (ref 22–31)
CREAT SERPL-MCNC: 0.99 MG/DL (ref 0.6–1.1)
D DIMER PPP FEU-MCNC: 2.35 UG/ML FEU (ref 0–0.5)
ERYTHROCYTE [DISTWIDTH] IN BLOOD BY AUTOMATED COUNT: 15.7 % (ref 10–15)
FIBRINOGEN PPP-MCNC: 481 MG/DL (ref 170–490)
GFR SERPL CREATININE-BSD FRML MDRD: 57 ML/MIN/1.73M2
GLUCOSE BLD-MCNC: 122 MG/DL (ref 70–125)
HCT VFR BLD AUTO: 40.1 % (ref 35–47)
HGB BLD-MCNC: 11.2 G/DL (ref 11.7–15.7)
INR PPP: 1.05 (ref 0.85–1.15)
LDH SERPL L TO P-CCNC: 214 U/L (ref 125–220)
MCH RBC QN AUTO: 23.2 PG (ref 26.5–33)
MCHC RBC AUTO-ENTMCNC: 27.9 G/DL (ref 31.5–36.5)
MCV RBC AUTO: 83 FL (ref 78–100)
PLATELET # BLD AUTO: 288 10E3/UL (ref 150–450)
POTASSIUM BLD-SCNC: 4.7 MMOL/L (ref 3.5–5)
PROT SERPL-MCNC: 7.4 G/DL (ref 6–8)
RBC # BLD AUTO: 4.83 10E6/UL (ref 3.8–5.2)
SODIUM SERPL-SCNC: 143 MMOL/L (ref 136–145)
WBC # BLD AUTO: 6.5 10E3/UL (ref 4–11)

## 2021-12-27 PROCEDURE — 80053 COMPREHEN METABOLIC PANEL: CPT | Performed by: HOSPITALIST

## 2021-12-27 PROCEDURE — 99239 HOSP IP/OBS DSCHRG MGMT >30: CPT | Performed by: INTERNAL MEDICINE

## 2021-12-27 PROCEDURE — 86141 C-REACTIVE PROTEIN HS: CPT | Performed by: HOSPITALIST

## 2021-12-27 PROCEDURE — 250N000013 HC RX MED GY IP 250 OP 250 PS 637: Performed by: INTERNAL MEDICINE

## 2021-12-27 PROCEDURE — 83615 LACTATE (LD) (LDH) ENZYME: CPT | Performed by: HOSPITALIST

## 2021-12-27 PROCEDURE — 85379 FIBRIN DEGRADATION QUANT: CPT | Performed by: HOSPITALIST

## 2021-12-27 PROCEDURE — 85384 FIBRINOGEN ACTIVITY: CPT | Performed by: HOSPITALIST

## 2021-12-27 PROCEDURE — 85610 PROTHROMBIN TIME: CPT | Performed by: HOSPITALIST

## 2021-12-27 PROCEDURE — 250N000012 HC RX MED GY IP 250 OP 636 PS 637: Performed by: HOSPITALIST

## 2021-12-27 PROCEDURE — 250N000011 HC RX IP 250 OP 636: Performed by: HOSPITALIST

## 2021-12-27 PROCEDURE — 85027 COMPLETE CBC AUTOMATED: CPT | Performed by: HOSPITALIST

## 2021-12-27 PROCEDURE — 250N000013 HC RX MED GY IP 250 OP 250 PS 637: Performed by: HOSPITALIST

## 2021-12-27 PROCEDURE — 36415 COLL VENOUS BLD VENIPUNCTURE: CPT | Performed by: HOSPITALIST

## 2021-12-27 RX ORDER — DEXAMETHASONE 6 MG/1
6 TABLET ORAL DAILY
Qty: 10 TABLET | Refills: 0 | Status: SHIPPED | OUTPATIENT
Start: 2021-12-28 | End: 2022-01-27

## 2021-12-27 RX ORDER — DEXAMETHASONE 6 MG/1
6 TABLET ORAL DAILY
Qty: 10 TABLET | Refills: 0 | Status: SHIPPED | OUTPATIENT
Start: 2021-12-28 | End: 2021-12-27

## 2021-12-27 RX ORDER — BENZONATATE 100 MG/1
100 CAPSULE ORAL 3 TIMES DAILY PRN
Qty: 30 CAPSULE | Refills: 0 | Status: SHIPPED | OUTPATIENT
Start: 2021-12-27 | End: 2022-01-27

## 2021-12-27 RX ORDER — BENZONATATE 100 MG/1
100 CAPSULE ORAL 3 TIMES DAILY PRN
Qty: 30 CAPSULE | Refills: 0 | Status: SHIPPED | OUTPATIENT
Start: 2021-12-27 | End: 2021-12-27

## 2021-12-27 RX ADMIN — GABAPENTIN 100 MG: 100 CAPSULE ORAL at 09:26

## 2021-12-27 RX ADMIN — DULOXETINE 30 MG: 30 CAPSULE, DELAYED RELEASE ORAL at 09:27

## 2021-12-27 RX ADMIN — DEXAMETHASONE 6 MG: 2 TABLET ORAL at 09:24

## 2021-12-27 RX ADMIN — ENOXAPARIN SODIUM 30 MG: 30 INJECTION SUBCUTANEOUS at 06:07

## 2021-12-27 RX ADMIN — FLUTICASONE FUROATE AND VILANTEROL TRIFENATATE 1 PUFF: 100; 25 POWDER RESPIRATORY (INHALATION) at 09:44

## 2021-12-27 RX ADMIN — AMLODIPINE BESYLATE 10 MG: 5 TABLET ORAL at 09:25

## 2021-12-27 RX ADMIN — ALBUTEROL SULFATE 2 PUFF: 90 AEROSOL, METERED RESPIRATORY (INHALATION) at 09:44

## 2021-12-27 RX ADMIN — FUROSEMIDE 10 MG: 10 INJECTION, SOLUTION INTRAMUSCULAR; INTRAVENOUS at 09:24

## 2021-12-27 RX ADMIN — ALBUTEROL SULFATE 2 PUFF: 90 AEROSOL, METERED RESPIRATORY (INHALATION) at 13:28

## 2021-12-27 RX ADMIN — METOPROLOL SUCCINATE 50 MG: 50 TABLET, EXTENDED RELEASE ORAL at 09:25

## 2021-12-27 RX ADMIN — POLYETHYLENE GLYCOL 3350 17 G: 17 POWDER, FOR SOLUTION ORAL at 09:27

## 2021-12-27 RX ADMIN — FAMOTIDINE 20 MG: 20 TABLET, FILM COATED ORAL at 09:27

## 2021-12-27 RX ADMIN — DOCUSATE SODIUM 50 MG AND SENNOSIDES 8.6 MG 2 TABLET: 8.6; 5 TABLET, FILM COATED ORAL at 09:25

## 2021-12-27 RX ADMIN — UMECLIDINIUM 1 PUFF: 62.5 AEROSOL, POWDER ORAL at 09:43

## 2021-12-27 ASSESSMENT — ACTIVITIES OF DAILY LIVING (ADL)
ADLS_ACUITY_SCORE: 5
ADLS_ACUITY_SCORE: 13
ADLS_ACUITY_SCORE: 6
ADLS_ACUITY_SCORE: 13
ADLS_ACUITY_SCORE: 6
ADLS_ACUITY_SCORE: 5
ADLS_ACUITY_SCORE: 6
ADLS_ACUITY_SCORE: 10
ADLS_ACUITY_SCORE: 13
ADLS_ACUITY_SCORE: 6
ADLS_ACUITY_SCORE: 6
ADLS_ACUITY_SCORE: 5
ADLS_ACUITY_SCORE: 13
ADLS_ACUITY_SCORE: 5

## 2021-12-27 NOTE — PROGRESS NOTES
Care Management Discharge Note    Discharge Date: 12/27/2021       Discharge Disposition:  Home with family    Discharge Services:  No skilled services to be ordered. Pt will resume home RN services through her community care coordination services/assessment    Discharge DME:  [facility]    Discharge Transportation:  Family    Private pay costs discussed: Not applicable  Education Provided on the Discharge Plan:  yes  Persons Notified of Discharge Plans: Patient and granddaughter Caryl  Patient/Family in Agreement with the Plan:  Yes family in agreement      Handoff Referral Completed: Yes    Additional Information:  Chart reviewed. Patient planning to discharge today.   [facility]REST spoke to patient's granddaughter Luis Daniel Hutson is pt's PCA and assists pt with her daily cares.   Discussed discharge planning and home care services. Per granddaughter, there are multiple family members that live with patient and can assist, granddaughter denies need for home PT. Pt has a home RN that helps set up medications, this services has been ongoing per granddaughter.   Granddaughter is aware of pt's oxygen needs.   CM will remain available as needed.       Michelle Haley, FADISW

## 2021-12-27 NOTE — DISCHARGE SUMMARY
[facility]  Hospitalist Discharge Summary      Date of Admission:  12/24/2021  Date of Discharge:  12/27/2021  Discharging Provider: Melinda Mcclain MD      Discharge Diagnoses   Active Problems:    Essential hypertension    Restrictive lung disease    GERD (gastroesophageal reflux disease)    Acute respiratory failure with hypoxia (H)    Infection due to 2019 novel coronavirus        Follow-ups Needed After Discharge   Follow-up Appointments     Follow-up and recommended labs and tests       Follow up with primary care provider, Kateryna Morales, within 3-5 days   perform virtual, to evaluate medication change, for hospital follow- up,   and regarding new diagnosis. Evaluate Home O2 need         {Additional follow-up instructions/to-do's for PCP    Further home oxygen need     Unresulted Labs Ordered in the Past 30 Days of this Admission     No orders found from 11/24/2021 to 12/25/2021.          Discharge Disposition   Discharged to home  Condition at discharge: Stable      Hospital Course   pulmonary fibrosis, reactive airway disease, not on home O2, PMR not on any steroid, HTN, depression, neuropathy admitted on 12/24/202 with covid 19 pneumonia     COVID-19 Diagnosis Details:  Onset of COVID symptoms 12/23   Date of positive test results 12/24   Vaccinated? Unvaccinated      # Confirmed COVID-19 infection    # Acute Hypoxic Respiratory Failure secondary to COVID-19 infection  # Viral Pneumonia secondary to COVID-19 infection  - Initial chest x-ray showed multifocal airspace disease. CTA chest negative for PE   - Oxygen: home desat screen performed and needs supp mental O2 see my note for this   - Treatments: Dexamethasone 6 mg x 10 days and will send home on this due to Home O2 needed  - remdesivir while here   - eliquis for 1 month   - patient not eating or resounding to question with interpretor on phone or when in room, not eating as well likely due to language barrier. Would  likely do better at home if family able to isolate and per nursing they are able to care for her and per SW. Unable to reach family.    - she also has a history of pulmonary fibrosis      #Chest pain  Does have history of PE, completed 6 months warfarin and no longer on anticoagulation  -- Troponin normal, EKG normal  -- CTA chest negative for PE  - likely from above      #HTN  Continue home amlodipine, metoprolol  - resume home losartan and hydrochlorothiazide at discharge renal function stable      #Pulmonary fibrosis, Reactive airway disease:  - continue home Incruse, Breo Ellipta, albuterol, Combivent  Of note she has been having progressive chronic dyspnea with concern for underlying neuromuscular disorder and supposed to see Neurology as outpatient      #Neuropathy: continue home Cymbalta and gabapentin     #GERD: continue home Pepcid     #Mood disorder: continue home Remeron and Cymbalta    Consultations This Hospital Stay   SOCIAL WORK IP CONSULT  PHARMACY TO DOSE WARFARIN  PALLIATIVE CARE ADULT IP CONSULT  OCCUPATIONAL THERAPY ADULT IP CONSULT  PHYSICAL THERAPY ADULT IP CONSULT    Code Status   Full Code    Time Spent on this Encounter   I, Melinda Mcclain MD, personally saw the patient today and spent greater than 30 minutes discharging this patient.       Melinda Mcclain MD  [address] [address]  Phone: [phone]  Fax: [phone]  ______________________________________________________________________    Physical Exam   Vital Signs: Temp: 98.4  F (36.9  C) Temp src: Oral BP: (!) 146/63 Pulse: 72   Resp: 18 SpO2: 92 % O2 Device: Nasal cannula Oxygen Delivery: 2 LPM  Weight: 124 lbs 0 oz  Physical Exam  Constitutional:       General: She is not in acute distress.     Appearance: Normal appearance.      Comments: Not responding with interpretor overnight phone when called in nor when in room   HENT:      Head: Normocephalic and  atraumatic.   Cardiovascular:      Rate and Rhythm: Normal rate and regular rhythm.      Pulses: Normal pulses.   Pulmonary:      Effort: Pulmonary effort is normal.      Comments: Unlabored breathing, bibasilar fine crackles and rhonchi at bases   Abdominal:      General: Bowel sounds are normal.      Palpations: Abdomen is soft.   Musculoskeletal:         General: Normal range of motion.      Right lower leg: No edema.      Left lower leg: No edema.   Skin:     General: Skin is warm and dry.   Neurological:      General: No focal deficit present.      Mental Status: She is alert.      Comments: Makes eye contacts follows simple commands but doesn't answer questions with interpretor             Primary Care Physician   Kateryna Morales    Discharge Orders      COVID-19 GetWell Loop Referral      Reason for your hospital stay    Covid Pneumonia with respiratory failure, underlying Pulmonary fibrosis     Contact provider    Contact your primary care provider if If increased trouble breathing, new arm/leg swelling, dizziness/passing out, falls, bleeding that doesn't stop, or uncontrolled pain.     Follow-up and recommended labs and tests     Follow up with primary care provider, Kateryna Morales, within 3-5 days perform virtual, to evaluate medication change, for hospital follow- up, and regarding new diagnosis. Evaluate Home O2 need     Discharge - Quarantine/Isolation Instruction    Date of symptom onset: 12/23   Date of first positive test: 12/24  Stay home and away from others (self-isolate) for at least 20 days from when your symptoms started And...   You've had no fevers and no medicine that reduces fever for 1 full day (24 hours)  And...   Your other symptoms have resolved (gotten better).     Activity    Your activity upon discharge: activity as tolerated     Home Oxygen Order for DME - ONLY FOR DME    I, the undersigned, certify that the above prescribed supplies are medically necessary for this patient and is  both reasonable and necessary in reference to accepted standards of medical and necessary in reference to accepted standards of medical practice in the treatment of this patient's condition and is not prescribed as a convenience.      Diet    Follow this diet upon discharge: Orders Placed This Encounter      Combination Diet Regular Diet Adult; 2 gm NA Diet       Significant Results and Procedures   Most Recent 3 CBC's:Recent Labs   Lab Test 12/27/21  0533 12/26/21  0554 12/25/21  0618   WBC 6.5 4.4 4.1   HGB 11.2* 10.7* 10.8*   MCV 83 82 79    261 240     Most Recent 3 BMP's:Recent Labs   Lab Test 12/27/21  0533 12/26/21  0554 12/25/21  0618    141 139   POTASSIUM 4.7 4.7 4.2   CHLORIDE 97* 98 98   CO2 40* 35* 31   BUN 40* 36* 16   CR 0.99 1.02 0.85   ANIONGAP 6 8 10   VICK 9.0 8.8 9.0    120 125     Most Recent 2 LFT's:Recent Labs   Lab Test 12/27/21  0533 12/26/21  0554   AST 29 26   ALT 18 18   ALKPHOS 77 80   BILITOTAL 0.5 0.5   ,   Results for orders placed or performed during the hospital encounter of 12/24/21   XR Chest Port 1 View    Narrative    EXAM: XR CHEST PORT 1 VIEW  LOCATION: [facility]  DATE/TIME: 12/24/2021 7:39 AM    INDICATION: hypoxia  COMPARISON: PA and lateral views of the chest 06/09/2021      Impression    IMPRESSION:     Worsening of multifocal airspace opacities in both lungs with a mid and lower lung zone and peripheral predominance. The pattern is typical for worsening COVID 19 pneumonia and associated acute lung injury.    Unchanged heart and mediastinal borders.    No visible pleural fluid. No pneumothorax.   CT Chest Pulmonary Embolism w Contrast    Narrative    EXAM: CT CHEST PULMONARY EMBOLISM W CONTRAST  LOCATION: [facility]  DATE/TIME: 12/24/2021 11:41 PM    INDICATION: Difficulty breathing. Covid 19 infection.  COMPARISON: Chest radiographs today and 06/09/2021. CTA chest 05/12/2021.  TECHNIQUE: CT chest  pulmonary angiogram during arterial phase injection of IV contrast. Multiplanar reformats and MIP reconstructions were performed. Dose reduction techniques were used.   CONTRAST: 100 mL Isovue-370.    FINDINGS:  ANGIOGRAM CHEST: Ascending thoracic aorta is mildly smoothly dilated to 3.8 cm. No aortic dissection. No pulmonary embolus. Prominent size of the central pulmonary arteries consistent with pulmonary arterial hypertension.    LUNGS AND PLEURA: Moderate patchy groundglass infiltrates findings throughout both lungs with peripheral and basilar predominance consistent with COVID 19 pneumonitis. No significant pleural fluid. No pneumothorax.    MEDIASTINUM/AXILLAE: No lymphadenopathy.    CORONARY ARTERY CALCIFICATION: Moderate.    UPPER ABDOMEN: Postcholecystectomy. Tiny benign calcified granulomata in the liver. Left renal cysts measuring up to 4 cm.    MUSCULOSKELETAL: Degenerative changes of the spine.      Impression    IMPRESSION:  1.  Moderate bilateral pulmonary infiltrates consistent with COVID 19 pneumonitis.  2.  No pulmonary embolus.  3.  Mild smooth enlargement of the ascending thoracic aorta to 3.8 cm.  4.  Prominent size of the central pulmonary arteries consistent with pulmonary arterial hypertension.  5.  Coronary artery disease.       Discharge Medications   Current Discharge Medication List      START taking these medications    Details   apixaban ANTICOAGULANT (ELIQUIS) 2.5 MG tablet Take 1 tablet (2.5 mg) by mouth 2 times daily  Qty: 60 tablet, Refills: 0    Associated Diagnoses: Infection due to 2019 novel coronavirus      benzonatate (TESSALON) 100 MG capsule Take 1 capsule (100 mg) by mouth 3 times daily as needed for cough  Qty: 30 capsule, Refills: 0    Associated Diagnoses: Infection due to 2019 novel coronavirus      dexamethasone (DECADRON) 6 MG tablet Take 1 tablet (6 mg) by mouth daily  Qty: 10 tablet, Refills: 0    Associated Diagnoses: Infection due to 2019 novel coronavirus          CONTINUE these medications which have NOT CHANGED    Details   acetaminophen (MAPAP ARTHRITIS PAIN) 650 MG CR tablet Take 650 mg by mouth 2 times daily       albuterol (PROAIR HFA/PROVENTIL HFA/VENTOLIN HFA) 108 (90 Base) MCG/ACT inhaler Inhale 2 puffs into the lungs every 6 hours as needed for shortness of breath / dyspnea or wheezing       amLODIPine (NORVASC) 10 MG tablet Take 1 tablet (10 mg) by mouth daily  Qty: 90 tablet, Refills: 2    Associated Diagnoses: Essential hypertension      ANTACID CALCIUM 500 MG chewable tablet CHEW 1 TABLET BY MOUTH THREE TIMES A DAY TO KEEP YOUR BONES HEALTHY  Qty: 90 tablet, Refills: 3    Associated Diagnoses: Gastroesophageal reflux disease without esophagitis      ARTIFICIAL TEARS 1.4 % ophthalmic solution Place 1 drop into both eyes every hour as needed for dry eyes (itchy eyes)       BREO ELLIPTA 100-25 MCG/INH inhaler Inhale 1 puff into the lungs daily       capsaicin (ZOSTRIX) 0.025 % external cream Apply topically 2 times daily as needed (for pain)       COMBIVENT RESPIMAT  MCG/ACT inhaler INHALE 1 PUFF EVERY 6 (SIX) HOURS AS NEEDED.  Qty: 4 g, Refills: 12    Associated Diagnoses: Encounter for medication refill      DULoxetine (CYMBALTA) 30 MG capsule Take 30 mg by mouth daily       famotidine (PEPCID) 40 MG tablet Take 40 mg by mouth 2 times daily       gabapentin (NEURONTIN) 100 MG capsule TAKE 1 TAB (100MG) IN THE MORNING AND 2 TABS (200MG) AT BEDTIME  Qty: 90 capsule, Refills: 3    Associated Diagnoses: Encounter for medication refill      hydrochlorothiazide (HYDRODIURIL) 25 MG tablet TAKE ONE TABLET BY MOUTH ONCE DAILY WITH LOSARTAN 100MG.  Qty: 90 tablet, Refills: 3    Associated Diagnoses: Essential hypertension      INCRUSE ELLIPTA 62.5 MCG/INH Inhaler Inhale 1 puff into the lungs daily       losartan (COZAAR) 100 MG tablet TAKE 1 TABLET BY MOUTH DAILY WITH HYDROCHLOROTHIAZIDE 25MG  Qty: 90 tablet, Refills: 2    Associated Diagnoses: Essential  hypertension      metoprolol succinate ER (TOPROL-XL) 50 MG 24 hr tablet TAKE 1 TABLET BY MOUTH DAILY  Qty: 90 tablet, Refills: 3    Associated Diagnoses: Encounter for medication refill      mirtazapine (REMERON) 15 MG tablet Take 22.5 mg by mouth At Bedtime       Multiple Vitamin (MULTI-VITAMINS) TABS Take 1 tablet by mouth daily       polyethylene glycol (MIRALAX) 17 g packet Take 1 packet by mouth daily as needed for constipation      SENEXON-S 8.6-50 MG tablet TAKE 2 TABLETS BY MOUTH DAILY.  Qty: 60 tablet, Refills: 3    Associated Diagnoses: Drug-induced constipation      vitamin D2 (ERGOCALCIFEROL) 23511 units (1250 mcg) capsule Take 50,000 Units by mouth once a week Wednesday's         STOP taking these medications       denosumab (PROLIA) 60 MG/ML SOSY injection Comments:   Reason for Stopping:             Allergies   Allergies   Allergen Reactions     Ciprofloxacin Unknown     Lisinopril Unknown

## 2021-12-27 NOTE — PROGRESS NOTES
Has been stable on 2 liters O2 and poor appetite in hospital will perform home O2 eval by nursing and discussed with nurse and family happy for her to come home. She will likely do better at home since she is not eating here and she is not communicating with use of interpretor.

## 2021-12-27 NOTE — PLAN OF CARE
Alert with confusion. VSS. Sat 94% on 2L oxygen per NC. Assist of one person with ADLs. Discharged to home with home oxygen with family at 1700. Education provided to family about meds and upcoming appointments. Took all personal belongings including meds with her.

## 2021-12-27 NOTE — PLAN OF CARE
Problem: Adult Inpatient Plan of Care  Goal: Plan of Care Review  Outcome: Improving  Goal: Patient-Specific Goal (Individualized)  Outcome: Improving  Goal: Absence of Hospital-Acquired Illness or Injury  Outcome: Improving  Intervention: Identify and Manage Fall Risk  Recent Flowsheet Documentation  Taken 12/27/2021 0330 by Rema Mna RN  Safety Promotion/Fall Prevention:   bed alarm on   activity supervised   assistive device/personal items within [facility]   fall prevention program maintained   increased rounding and observation   lighting adjusted   nonskid shoes/slippers when out of bed   toileting scheduled  Taken 12/27/2021 0005 by Rema Man RN  Safety Promotion/Fall Prevention:   bed alarm on   activity supervised   assistive device/personal items within [facility]   fall prevention program maintained   increased rounding and observation   lighting adjusted   nonskid shoes/slippers when out of bed   toileting scheduled  Intervention: Prevent Skin Injury  Recent Flowsheet Documentation  Taken 12/27/2021 0330 by Rema Man RN  Body Position: position changed independently  Taken 12/27/2021 0005 by Rema Man RN  Body Position: position changed independently  Goal: Optimal Comfort and Wellbeing  Outcome: Improving  Goal: Readiness for Transition of Care  Outcome: Improving     Problem: Risk for Delirium  Goal: Optimal Coping  Outcome: Improving  Goal: Improved Behavioral Control  Outcome: Improving  Goal: Improved Attention and Thought Clarity  Outcome: Improving  Goal: Improved Sleep  Outcome: Improving     Problem: Fluid Imbalance (Pneumonia)  Goal: Fluid Balance  Outcome: Improving     Problem: Infection (Pneumonia)  Goal: Resolution of Infection Signs and Symptoms  Outcome: Improving  Intervention: Prevent Infection Progression  Recent Flowsheet Documentation  Taken 12/27/2021 0330 by Rema Man RN  Isolation Precautions:   airborne precautions maintained   contact  precautions maintained   droplet precautions maintained  Taken 12/27/2021 0005 by Rema Man, RN  Isolation Precautions:   airborne precautions maintained   contact precautions maintained   droplet precautions maintained     Problem: Respiratory Compromise (Pneumonia)  Goal: Effective Oxygenation and Ventilation  Outcome: Improving  Intervention: Optimize Oxygenation and Ventilation  Recent Flowsheet Documentation  Taken 12/27/2021 0330 by Rema Man, RN  Head of Bed (HOB) Positioning: HOB at 15 degrees  Taken 12/27/2021 0005 by Rema Man RN  Head of Bed (HOB) Positioning: HOB at 15 degrees       Pt alert/ confused, agitated and combative at time. Pt hitting at and shoving staff when attempting to assist up to the bathroom. Gait belt used and walker brought to room. Bed alarm / falls program in place. Pt pulls at tubes, attempting to distract and hide lines. Pt o2 sats maintaining at rest on 3L nc. Pt does have some shortness of breath with ambulation to the bathroom. Will continue to monitor.

## 2021-12-27 NOTE — PROGRESS NOTES
Quick Palliative Care Note:  Connected with Hospitalist. Pt is discharging to home to today, with family, on home oxygen. Has not been interacting with interpreters.   Inpatient palliative care consult cancelled per recommendation of Hospitalist.   Odalys Luna NP,CNP, Palliative Care

## 2021-12-27 NOTE — PLAN OF CARE
Problem: Risk for Delirium  Goal: Optimal Coping  Outcome: No Change     Problem: Respiratory Compromise (Pneumonia)  Goal: Effective Oxygenation and Ventilation  Outcome: Improving   Pt is alert but confused. Vitals WNL. Shortness of breath with exertion. Pt keeps removing her oxygen and the oximeter. Used language line  and a family member to educate her on the needs for oxygen and oximeter. Has poor appetite. Refused  hospital food. Had small bites from food provided by family. Encouraged fluid intake. Ambulates to the bath room assist x 1.

## 2021-12-27 NOTE — PROGRESS NOTES
[facility]    Primary Care Physician:  Kateryna Morales  Discharge Provider: Melinda Mcclain MD   Admission Date: 12/24/2021.  Admission Diagnoses:  Acute respiratory failure with hypoxia (H) [J96.01]  Infection due to 2019 novel coronavirus [U07.1]   Discharge Diagnoses:   Active Problems:    Essential hypertension    Restrictive lung disease    GERD (gastroesophageal reflux disease)    Acute respiratory failure with hypoxia (H)    Infection due to 2019 novel coronavirus    Patient: Luis Manuel Lemon is a 81 year old old female     I certify that this patient, Luis Manuel Lemon has been under my care and that I, or a nurse practitioner or physician's assistant working with me, had a face-to-face encounter that meets face-to-face encounter requirements with this patient on 12/27/2021.      Was the patient admitted for an acute respiratory illness? Yes. Has the acute respiratory illness been resolved? Yes    Luis Manuel Lemon is now in a chronic stable state and continues to require supplemental oxygen due to continued oxygen desaturation.  This patient has been treated in part, or in whole for the following medical condition(s): Pulmonary Fibrosis J84.10  Treatments tried and failed or ruled out to treat hypoxemia include MDI and steroids.  If portability is ordered, is the patient mobile within the home? Yes    **Patients who qualify for home O2 coverage under the CMS guidelines require ABG tests or O2 sat readings obtained closest to, but no earlier than 2 days prior to the discharge, as evidence of the need for home oxygen therapy. Testing must be performed while patient is in the chronic stable state. See notes for O2 sats.**     Oxygen evaluation   At rest   RA 82% 2 liters 94%  With activity    72% and increased to 95% on 5 liters     Melinda Mcclain MD.   Date 12/27/21

## 2021-12-27 NOTE — PROGRESS NOTES
Patient has been assessed for Home Oxygen needs.     Pulse oximetry (SpO2) and Oxygen flow readings:    SpO2 = 82% on room air at rest while awake.    SpO2 improved to 94% on 2 liters/minute at rest.    SpO2 = 72% on room air during activity/with exercise.    *SpO2 improved to 95% on 5 liters/minute during activity/with exercise.

## 2021-12-27 NOTE — PROGRESS NOTES
Received intake call for home oxygen at 1:50PM. Reviewed patient's chart; Patient qualifies under insurance relaxed guidelines and all documentation is in the chart including a good order.   2:04PM- Called to offer choice and spoke with patient's granddaughter Perez, patient is okay with [facility] setting them up. Discussed equipment with Perez and informed them that we would be delivering a tank to the nursing station and then they will need to call us when they leave and we will deliver the concentrator to their home. Perez understood.  2:06PM- Spoke with care coordinator, Vladimir, confirmed we received the order, and provided them with ETA of oxygen.

## 2022-01-09 DIAGNOSIS — Z76.0 ENCOUNTER FOR MEDICATION REFILL: Primary | ICD-10-CM

## 2022-01-09 DIAGNOSIS — K21.9 GASTROESOPHAGEAL REFLUX DISEASE WITHOUT ESOPHAGITIS: ICD-10-CM

## 2022-01-09 DIAGNOSIS — K59.03 DRUG-INDUCED CONSTIPATION: ICD-10-CM

## 2022-01-11 RX ORDER — CALCIUM CARBONATE 500 MG/1
1 TABLET, CHEWABLE ORAL 2 TIMES DAILY
Qty: 90 TABLET | Refills: 3 | Status: SHIPPED | OUTPATIENT
Start: 2022-01-11 | End: 2022-06-27

## 2022-01-11 RX ORDER — DOCUSATE SODIUM 50MG AND SENNOSIDES 8.6MG 8.6; 5 MG/1; MG/1
2 TABLET, FILM COATED ORAL DAILY
Qty: 60 TABLET | Refills: 3 | Status: SHIPPED | OUTPATIENT
Start: 2022-01-11 | End: 2022-05-02

## 2022-01-12 ENCOUNTER — TELEPHONE (OUTPATIENT)
Dept: FAMILY MEDICINE | Facility: CLINIC | Age: 82
End: 2022-01-12
Payer: COMMERCIAL

## 2022-01-12 NOTE — TELEPHONE ENCOUNTER
----- Message from Kateryna Morales MD sent at 1/11/2022  5:25 PM CST -----  Regarding: hospital follow up  Please schedule appt for hospital f/up appt with me ASAP  Discharged on 12/27/21 and advised f/up within 5-7 days but the appt on 12/29/21 was cancelled.     Thanks,   Dr. Kateryna Morales  1/11/2022

## 2022-01-14 ENCOUNTER — TELEPHONE (OUTPATIENT)
Dept: FAMILY MEDICINE | Facility: CLINIC | Age: 82
End: 2022-01-14
Payer: COMMERCIAL

## 2022-01-19 NOTE — ADDENDUM NOTE
Encounter addended by: Wicho Plascencia, PT on: 1/19/2022 9:50 AM   Actions taken: Episode resolved, Clinical Note Signed

## 2022-01-27 ENCOUNTER — OFFICE VISIT (OUTPATIENT)
Dept: FAMILY MEDICINE | Facility: CLINIC | Age: 82
End: 2022-01-27
Payer: COMMERCIAL

## 2022-01-27 VITALS
HEART RATE: 71 BPM | RESPIRATION RATE: 18 BRPM | SYSTOLIC BLOOD PRESSURE: 136 MMHG | TEMPERATURE: 98.2 F | BODY MASS INDEX: 24.17 KG/M2 | DIASTOLIC BLOOD PRESSURE: 62 MMHG | HEIGHT: 59 IN | WEIGHT: 119.88 LBS | OXYGEN SATURATION: 94 %

## 2022-01-27 DIAGNOSIS — E55.9 VITAMIN D DEFICIENCY: ICD-10-CM

## 2022-01-27 DIAGNOSIS — U07.1 PNEUMONIA DUE TO 2019 NOVEL CORONAVIRUS: ICD-10-CM

## 2022-01-27 DIAGNOSIS — Z79.899 POLYPHARMACY: ICD-10-CM

## 2022-01-27 DIAGNOSIS — Z78.9 MEDICALLY COMPLEX PATIENT: ICD-10-CM

## 2022-01-27 DIAGNOSIS — J98.4 RESTRICTIVE LUNG DISEASE: ICD-10-CM

## 2022-01-27 DIAGNOSIS — J84.10 PULMONARY FIBROSIS (H): ICD-10-CM

## 2022-01-27 DIAGNOSIS — J96.01 ACUTE RESPIRATORY FAILURE WITH HYPOXIA (H): ICD-10-CM

## 2022-01-27 DIAGNOSIS — U07.1 INFECTION DUE TO 2019 NOVEL CORONAVIRUS: ICD-10-CM

## 2022-01-27 DIAGNOSIS — G89.4 CHRONIC PAIN SYNDROME: ICD-10-CM

## 2022-01-27 DIAGNOSIS — Z75.8 LANGUAGE BARRIER AFFECTING HEALTH CARE: ICD-10-CM

## 2022-01-27 DIAGNOSIS — I10 ESSENTIAL HYPERTENSION: ICD-10-CM

## 2022-01-27 DIAGNOSIS — Z60.3 LANGUAGE BARRIER AFFECTING HEALTH CARE: ICD-10-CM

## 2022-01-27 DIAGNOSIS — J12.82 PNEUMONIA DUE TO 2019 NOVEL CORONAVIRUS: ICD-10-CM

## 2022-01-27 DIAGNOSIS — R06.02 SHORT OF BREATH ON EXERTION: ICD-10-CM

## 2022-01-27 DIAGNOSIS — Z09 HOSPITAL DISCHARGE FOLLOW-UP: Primary | ICD-10-CM

## 2022-01-27 DIAGNOSIS — Z23 NEEDS FLU SHOT: ICD-10-CM

## 2022-01-27 DIAGNOSIS — Z76.0 ENCOUNTER FOR MEDICATION REFILL: Primary | ICD-10-CM

## 2022-01-27 PROCEDURE — 90662 IIV NO PRSV INCREASED AG IM: CPT | Performed by: FAMILY MEDICINE

## 2022-01-27 PROCEDURE — G0008 ADMIN INFLUENZA VIRUS VAC: HCPCS | Performed by: FAMILY MEDICINE

## 2022-01-27 PROCEDURE — 99214 OFFICE O/P EST MOD 30 MIN: CPT | Mod: 25 | Performed by: FAMILY MEDICINE

## 2022-01-27 SDOH — SOCIAL STABILITY - SOCIAL INSECURITY: ACCULTURATION DIFFICULTY: Z60.3

## 2022-01-27 ASSESSMENT — MIFFLIN-ST. JEOR: SCORE: 909.01

## 2022-01-27 ASSESSMENT — ASTHMA QUESTIONNAIRES
QUESTION_4 LAST FOUR WEEKS HOW OFTEN HAVE YOU USED YOUR RESCUE INHALER OR NEBULIZER MEDICATION (SUCH AS ALBUTEROL): TWO OR THREE TIMES PER WEEK
ACUTE_EXACERBATION_TODAY: NO
QUESTION_1 LAST FOUR WEEKS HOW MUCH OF THE TIME DID YOUR ASTHMA KEEP YOU FROM GETTING AS MUCH DONE AT WORK, SCHOOL OR AT HOME: MOST OF THE TIME
QUESTION_5 LAST FOUR WEEKS HOW WOULD YOU RATE YOUR ASTHMA CONTROL: SOMEWHAT CONTROLLED
QUESTION_2 LAST FOUR WEEKS HOW OFTEN HAVE YOU HAD SHORTNESS OF BREATH: ONCE A DAY
ACT_TOTALSCORE: 13
ACT_TOTALSCORE: 13
QUESTION_3 LAST FOUR WEEKS HOW OFTEN DID YOUR ASTHMA SYMPTOMS (WHEEZING, COUGHING, SHORTNESS OF BREATH, CHEST TIGHTNESS OR PAIN) WAKE YOU UP AT NIGHT OR EARLIER THAN USUAL IN THE MORNING: ONCE A WEEK

## 2022-01-27 NOTE — TELEPHONE ENCOUNTER
Reason for Call:  Medication or medication refill:    Do you use a [facility]?  Name of the pharmacy and phone number for the current request:  [facility]    Name of the medication requested: ELIQUIS    Other request: PER GALA, WHEN PT WAS IN HOSPITAL AROUND [place] OF 2021 SHE WAS PRESCRIBED ELIQUIS BY HOSPITAL. PT IS OUT AND IS WONDERING IF PCP WILL PRESCRIBE MORE OR DOES PT NO LONGER NEED TO TAKE.    Can we leave a detailed message on this number? YES    Phone number patient can be reached at: Home number on file [phone] (home)    Best Time: any    Call taken on 1/27/2022 at 10:49 AM by Dorinda Sanabria

## 2022-01-27 NOTE — PROGRESS NOTES
Assessment & Plan     Hospital discharge follow-up  Acute respiratory failure with hypoxia (H)  Pneumonia due to 2019 novel coronavirus  Discharge summary, labs, imaging reviewed  meds reconciled   Sx improving  Completed 10 days of dexamethasone and 1 mo of Eliquis  Using home O2 as needed  Needs to wait 90 days to get covid vaccine (which will be after 3/27/22)    Restrictive lung disease  Pulmonary fibrosis (H)  Short of breath on exertion  Continue current regimen as outlined by pulm   appt with neuro 3/24/22 as recommended by pulm   - INCRUSE ELLIPTA 62.5 MCG/INH Inhaler  Dispense: 30 each; Refill: 4    Chronic pain syndrome  Continue current pain meds    Vitamin D deficiency  Continue weekly ergo     Essential hypertension  Well controlled with current meds    Polypharmacy  Reconciled pill bottles with discharge summary and epic med list    Medically complex patient    Language barrier affecting health care    Needs flu shot  - INFLUENZA, QUAD, HD, PF, 65+ (FLUZONE HD)      Review of external notes as documented elsewhere in note  Prescription drug management  36 minutes spent on the date of the encounter doing chart review, history and exam, documentation and further activities per the note      Return in about 4 weeks (around 2/24/2022) for Follow up, with me, in person.    Kateryna Morales MD  [facility] is [facility] 82 year [facility] female who presents today for the following   health issues: hospital f/u    Hospital discharge:  Per discharge summary   Date of Admission:  12/24/2021  Date of Discharge:  12/27/2021  Active Problems:    Essential hypertension    Restrictive lung disease    GERD (gastroesophageal reflux disease)    Acute respiratory failure with hypoxia (H)    Infection due to 2019 novel coronavirus     COVID pneumonia with hypoxia  - Oxygen: home desat screen performed and needs supp mental O2 see my note for this   - Treatments: Dexamethasone 6 mg x 10  days (completed) and sent home on this due to Home O2 needed  - remdesivir in hospital   - [facility] for 1 month (completed)     HTN  current home amlodipine, metoprolol, losartan and hydrochlorothiazide     Pulmonary fibrosis, Reactive airway disease:  - continue home Incruse, Breo Ellipta, albuterol, Combivent    Neuropathy: continue home Cymbalta and gabapentin     GERD: continue home Pepcid     Mood disorder: continue home Remeron and Cymbalta    Global weakness  - neuro appt 3/24/22 (recommended by pulmonologist per consult  Note 9/28/21)     Polypharmacy   Home health RN sets up meds    Vaccines - needs to wait 90 days after covid infection since received remdesivir before getting covid vaccines  Needs flu shot    Review of Systems     Please see above.  The rest of the review of systems are negative for all systems.    Current Outpatient Medications   Medication Instructions     acetaminophen (MAPAP ARTHRITIS PAIN) 650 mg, Oral, 2 TIMES DAILY     albuterol (PROAIR HFA/PROVENTIL HFA/VENTOLIN HFA) 108 (90 Base) MCG/ACT inhaler 2 puffs, Inhalation, EVERY 6 HOURS PRN     amLODIPine (NORVASC) 10 mg, Oral, DAILY     apixaban ANTICOAGULANT (ELIQUIS) 2.5 mg, Oral, 2 TIMES DAILY     ARTIFICIAL TEARS 1.4 % ophthalmic solution 1 drop, Both Eyes, EVERY 1 HOUR PRN     benzonatate (TESSALON) 100 mg, Oral, 3 TIMES DAILY PRN     BREO ELLIPTA 100-25 MCG/INH inhaler 1 puff, Inhalation, DAILY     calcium carbonate (ANTACID CALCIUM) 500 mg, Oral, 2 TIMES DAILY     capsaicin (ZOSTRIX) 0.025 % external cream Topical, 2 TIMES DAILY PRN     COMBIVENT RESPIMAT  MCG/ACT inhaler INHALE 1 PUFF EVERY 6 (SIX) HOURS AS NEEDED.     dexamethasone (DECADRON) 6 mg, Oral, DAILY     DULoxetine (CYMBALTA) 30 mg, Oral, DAILY     famotidine (PEPCID) 40 mg, Oral, 2 TIMES DAILY     gabapentin (NEURONTIN) 100 MG capsule TAKE 1 TAB (100MG) IN THE MORNING AND 2 TABS (200MG) AT BEDTIME     hydrochlorothiazide (HYDRODIURIL) 25 MG tablet TAKE ONE  "TABLET BY MOUTH ONCE DAILY WITH LOSARTAN 100MG.     INCRUSE ELLIPTA 62.5 MCG/INH Inhaler 1 puff, Inhalation, DAILY     losartan (COZAAR) 100 MG tablet TAKE 1 TABLET BY MOUTH DAILY WITH HYDROCHLOROTHIAZIDE 25MG     metoprolol succinate ER (TOPROL-XL) 50 MG 24 hr tablet TAKE 1 TABLET BY MOUTH DAILY     mirtazapine (REMERON) 22.5 mg, Oral, AT BEDTIME     Multiple Vitamin (MULTI-VITAMINS) TABS 1 tablet, Oral, DAILY     polyethylene glycol (MIRALAX) 17 g packet 1 packet, Oral, DAILY PRN     SENEXON-S 8.6-50 MG tablet 2 tablets, Oral, DAILY     vitamin D2 (ERGOCALCIFEROL) 50,000 Units, Oral, WEEKLY, Wednesday's         Objective   Vitals:    01/27/22 1638 01/27/22 1641   BP: (!) 144/70 136/62   Pulse: 71    Resp: 18    Temp: 98.2  F (36.8  C)    TempSrc: Oral    SpO2: 94%    Weight: 54.4 kg (119 lb 14.1 oz)    Height: 1.49 m (4' 10.66\")        Body mass index is 24.49 kg/m .    Physical Exam    OBJECTIVE:  Vitals listed above within normal limits  General appearance: well groomed, pleasant, well hydrated, nontoxic appearing  ENT: PERRL, throat clear  Neck: neck supple, no lymphadenopathy, no thyromegaly  Lungs: bibasilar crackles, no wheezes or rhonchi  Heart: regular rate and rhythm, no murmurs, rubs or gallops  Abdomen: soft, nontender  Neuro: no focal deficits, CN II-XII grossly intact, alert and oriented  Psych:  mood stable, appears to have good insight and judgment    Recent Results (from the past 1008 hour(s))   Extra Purple Top Tube    Collection Time: 12/24/21  7:28 AM   Result Value Ref Range    Hold Specimen JIC    Lactate Dehydrogenase    Collection Time: 12/24/21  7:28 AM   Result Value Ref Range    Lactate Dehydrogenase 279 (H) 125 - 220 U/L   Troponin I    Collection Time: 12/24/21  7:28 AM   Result Value Ref Range    Troponin I 0.01 0.00 - 0.29 ng/mL   ABO and Rh    Collection Time: 12/24/21  7:28 AM   Result Value Ref Range    ABO/RH(D) O POS     SPECIMEN EXPIRATION DATE [id number]    ECG 12-Lead with " [place] [facility]    Collection Time: 12/24/21  7:34 AM   Result Value Ref Range    Systolic Blood Pressure  mmHg    Diastolic Blood Pressure  mmHg    Ventricular Rate 61 BPM    Atrial Rate 61 BPM    SD Interval 136 ms    QRS Duration 86 ms     ms    QTc 426 ms    P Axis 45 degrees    R AXIS 38 degrees    T Axis 70 degrees    Interpretation ECG       Sinus rhythm  Nonspecific ST abnormality  Abnormal ECG  When compared with ECG of 09-JUN-2021 18:37,  Nonspecific T wave abnormality no longer evident in Anterior leads  Confirmed by SEE ED PROVIDER NOTE FOR, ECG INTERPRETATION ([id number]),  FRANCIA LAMB ([id number]) on 12/24/2021 7:41:17 AM     B-Type Natriuretic Peptide ([facility] Only)    Collection Time: 12/24/21  7:47 AM   Result Value Ref Range    BNP 85 0 - 155 pg/mL   Comprehensive metabolic panel    Collection Time: 12/24/21  7:47 AM   Result Value Ref Range    Sodium 141 136 - 145 mmol/L    Potassium 3.9 3.5 - 5.0 mmol/L    Chloride 98 98 - 107 mmol/L    Carbon Dioxide (CO2) 33 (H) 22 - 31 mmol/L    Anion Gap 10 5 - 18 mmol/L    Urea Nitrogen 13 8 - 28 mg/dL    Creatinine 0.92 0.60 - 1.10 mg/dL    Calcium 9.0 8.5 - 10.5 mg/dL    Glucose 125 70 - 125 mg/dL    Alkaline Phosphatase 77 45 - 120 U/L    AST 33 0 - 40 U/L    ALT 15 0 - 45 U/L    Protein Total 7.7 6.0 - 8.0 g/dL    Albumin 2.7 (L) 3.5 - 5.0 g/dL    Bilirubin Total 0.6 0.0 - 1.0 mg/dL    GFR Estimate 63 >60 mL/min/1.73m2   INR    Collection Time: 12/24/21  7:47 AM   Result Value Ref Range    INR 0.91 0.90 - 1.15   Partial thromboplastin time    Collection Time: 12/24/21  7:47 AM   Result Value Ref Range    aPTT 30 22 - 38 Seconds   Troponin I    Collection Time: 12/24/21  7:47 AM   Result Value Ref Range    Troponin I 0.02 0.00 - 0.29 ng/mL   Symptomatic; Yes; 12/21/2021 Influenza A/B & SARS-CoV2 (COVID-19) Virus PCR Multiplex Nasopharyngeal    Collection Time: 12/24/21  7:47 AM    Specimen: Nasopharyngeal; Swab   Result Value Ref Range     Influenza A PCR Negative Negative    Influenza B PCR Negative Negative    SARS CoV2 PCR Positive (A) Negative   CBC with platelets and differential    Collection Time: 12/24/21  7:47 AM   Result Value Ref Range    WBC Count 5.9 4.0 - 11.0 10e3/uL    RBC Count 4.37 3.80 - 5.20 10e6/uL    Hemoglobin 10.2 (L) 11.7 - 15.7 g/dL    Hematocrit 34.7 (L) 35.0 - 47.0 %    MCV 79 78 - 100 fL    MCH 23.3 (L) 26.5 - 33.0 pg    MCHC 29.4 (L) 31.5 - 36.5 g/dL    RDW 15.9 (H) 10.0 - 15.0 %    Platelet Count 202 150 - 450 10e3/uL    % Neutrophils 74 %    % Lymphocytes 18 %    % Monocytes 8 %    % Eosinophils 0 %    % Basophils 0 %    % Immature Granulocytes 0 %    NRBCs per 100 WBC 0 <1 /100    Absolute Neutrophils 4.4 1.6 - 8.3 10e3/uL    Absolute Lymphocytes 1.1 0.8 - 5.3 10e3/uL    Absolute Monocytes 0.4 0.0 - 1.3 10e3/uL    Absolute Eosinophils 0.0 0.0 - 0.7 10e3/uL    Absolute Basophils 0.0 0.0 - 0.2 10e3/uL    Absolute Immature Granulocytes 0.0 <=0.4 10e3/uL    Absolute NRBCs 0.0 10e3/uL   Extra Blood Culture Bottle    Collection Time: 12/24/21  7:47 AM   Result Value Ref Range    Hold Specimen JIC    Extra Red Top Tube    Collection Time: 12/24/21  7:47 AM   Result Value Ref Range    Hold Specimen JIC    Extra Heparinized Syringe    Collection Time: 12/24/21  7:47 AM   Result Value Ref Range    Hold Specimen JIC    Extra Green Top (Lithium Heparin) ON ICE    Collection Time: 12/24/21  7:47 AM   Result Value Ref Range    Hold Specimen JIC    RBC and Platelet Morphology    Collection Time: 12/24/21  7:47 AM   Result Value Ref Range    Platelet Assessment  Automated Count Confirmed. Platelet morphology is normal.     Automated Count Confirmed. Platelet morphology is normal.    RBC Morphology Confirmed RBC Indices    D dimer quantitative    Collection Time: 12/24/21  7:47 AM   Result Value Ref Range    D-Dimer Quantitative 2.19 (H) 0.00 - 0.50 ug/mL FEU   CRP inflammation    Collection Time: 12/24/21  7:47 AM   Result Value Ref  Range    CRP 5.9 (H) 0.0-<0.8 mg/dL   Fibrinogen activity    Collection Time: 12/24/21  7:47 AM   Result Value Ref Range    Fibrinogen Activity 559 (H) 170 - 490 mg/dL   Procalcitonin    Collection Time: 12/24/21  8:29 AM   Result Value Ref Range    Procalcitonin 0.04 0.00 - 0.49 ng/mL   INR    Collection Time: 12/25/21  6:18 AM   Result Value Ref Range    INR 0.96 0.90 - 1.15   CBC with platelets    Collection Time: 12/25/21  6:18 AM   Result Value Ref Range    WBC Count 4.1 4.0 - 11.0 10e3/uL    RBC Count 4.72 3.80 - 5.20 10e6/uL    Hemoglobin 10.8 (L) 11.7 - 15.7 g/dL    Hematocrit 37.4 35.0 - 47.0 %    MCV 79 78 - 100 fL    MCH 22.9 (L) 26.5 - 33.0 pg    MCHC 28.9 (L) 31.5 - 36.5 g/dL    RDW 15.6 (H) 10.0 - 15.0 %    Platelet Count 240 150 - 450 10e3/uL   Lactate Dehydrogenase    Collection Time: 12/25/21  6:18 AM   Result Value Ref Range    Lactate Dehydrogenase 275 (H) 125 - 220 U/L   Fibrinogen activity    Collection Time: 12/25/21  6:18 AM   Result Value Ref Range    Fibrinogen Activity 588 (H) 170 - 490 mg/dL   CRP inflammation    Collection Time: 12/25/21  6:18 AM   Result Value Ref Range    CRP 6.0 (H) 0.0-<0.8 mg/dL   D dimer quantitative    Collection Time: 12/25/21  6:18 AM   Result Value Ref Range    D-Dimer Quantitative 2.35 (H) 0.00 - 0.50 ug/mL FEU   Troponin I    Collection Time: 12/25/21  6:18 AM   Result Value Ref Range    Troponin I 0.01 0.00 - 0.29 ng/mL   Comprehensive metabolic panel    Collection Time: 12/25/21  6:18 AM   Result Value Ref Range    Sodium 139 136 - 145 mmol/L    Potassium 4.2 3.5 - 5.0 mmol/L    Chloride 98 98 - 107 mmol/L    Carbon Dioxide (CO2) 31 22 - 31 mmol/L    Anion Gap 10 5 - 18 mmol/L    Urea Nitrogen 16 8 - 28 mg/dL    Creatinine 0.85 0.60 - 1.10 mg/dL    Calcium 9.0 8.5 - 10.5 mg/dL    Glucose 125 70 - 125 mg/dL    Alkaline Phosphatase 89 45 - 120 U/L    AST 31 0 - 40 U/L    ALT 17 0 - 45 U/L    Protein Total 8.0 6.0 - 8.0 g/dL    Albumin 2.6 (L) 3.5 - 5.0 g/dL     Bilirubin Total 0.5 0.0 - 1.0 mg/dL    GFR Estimate 69 >60 mL/min/1.73m2   INR    Collection Time: 12/26/21  5:54 AM   Result Value Ref Range    INR 1.04 0.85 - 1.15   CBC with platelets    Collection Time: 12/26/21  5:54 AM   Result Value Ref Range    WBC Count 4.4 4.0 - 11.0 10e3/uL    RBC Count 4.66 3.80 - 5.20 10e6/uL    Hemoglobin 10.7 (L) 11.7 - 15.7 g/dL    Hematocrit 38.1 35.0 - 47.0 %    MCV 82 78 - 100 fL    MCH 23.0 (L) 26.5 - 33.0 pg    MCHC 28.1 (L) 31.5 - 36.5 g/dL    RDW 15.7 (H) 10.0 - 15.0 %    Platelet Count 261 150 - 450 10e3/uL   Lactate Dehydrogenase    Collection Time: 12/26/21  5:54 AM   Result Value Ref Range    Lactate Dehydrogenase 251 (H) 125 - 220 U/L   Fibrinogen activity    Collection Time: 12/26/21  5:54 AM   Result Value Ref Range    Fibrinogen Activity 495 (H) 170 - 490 mg/dL   CRP inflammation    Collection Time: 12/26/21  5:54 AM   Result Value Ref Range    CRP 3.1 (H) 0.0-<0.8 mg/dL   D dimer quantitative    Collection Time: 12/26/21  5:54 AM   Result Value Ref Range    D-Dimer Quantitative 2.66 (H) 0.00 - 0.50 ug/mL FEU   Comprehensive metabolic panel    Collection Time: 12/26/21  5:54 AM   Result Value Ref Range    Sodium 141 136 - 145 mmol/L    Potassium 4.7 3.5 - 5.0 mmol/L    Chloride 98 98 - 107 mmol/L    Carbon Dioxide (CO2) 35 (H) 22 - 31 mmol/L    Anion Gap 8 5 - 18 mmol/L    Urea Nitrogen 36 (H) 8 - 28 mg/dL    Creatinine 1.02 0.60 - 1.10 mg/dL    Calcium 8.8 8.5 - 10.5 mg/dL    Glucose 120 70 - 125 mg/dL    Alkaline Phosphatase 80 45 - 120 U/L    AST 26 0 - 40 U/L    ALT 18 0 - 45 U/L    Protein Total 7.5 6.0 - 8.0 g/dL    Albumin 2.5 (L) 3.5 - 5.0 g/dL    Bilirubin Total 0.5 0.0 - 1.0 mg/dL    GFR Estimate 55 (L) >60 mL/min/1.73m2   INR    Collection Time: 12/27/21  5:33 AM   Result Value Ref Range    INR 1.05 0.85 - 1.15   CBC with platelets    Collection Time: 12/27/21  5:33 AM   Result Value Ref Range    WBC Count 6.5 4.0 - 11.0 10e3/uL    RBC Count 4.83 3.80 - 5.20  10e6/uL    Hemoglobin 11.2 (L) 11.7 - 15.7 g/dL    Hematocrit 40.1 35.0 - 47.0 %    MCV 83 78 - 100 fL    MCH 23.2 (L) 26.5 - 33.0 pg    MCHC 27.9 (L) 31.5 - 36.5 g/dL    RDW 15.7 (H) 10.0 - 15.0 %    Platelet Count 288 150 - 450 10e3/uL   Lactate Dehydrogenase    Collection Time: 12/27/21  5:33 AM   Result Value Ref Range    Lactate Dehydrogenase 214 125 - 220 U/L   Fibrinogen activity    Collection Time: 12/27/21  5:33 AM   Result Value Ref Range    Fibrinogen Activity 481 170 - 490 mg/dL   CRP inflammation    Collection Time: 12/27/21  5:33 AM   Result Value Ref Range    CRP 1.8 (H) 0.0-<0.8 mg/dL   D dimer quantitative    Collection Time: 12/27/21  5:33 AM   Result Value Ref Range    D-Dimer Quantitative 2.35 (H) 0.00 - 0.50 ug/mL FEU   Comprehensive metabolic panel    Collection Time: 12/27/21  5:33 AM   Result Value Ref Range    Sodium 143 136 - 145 mmol/L    Potassium 4.7 3.5 - 5.0 mmol/L    Chloride 97 (L) 98 - 107 mmol/L    Carbon Dioxide (CO2) 40 (H) 22 - 31 mmol/L    Anion Gap 6 5 - 18 mmol/L    Urea Nitrogen 40 (H) 8 - 28 mg/dL    Creatinine 0.99 0.60 - 1.10 mg/dL    Calcium 9.0 8.5 - 10.5 mg/dL    Glucose 122 70 - 125 mg/dL    Alkaline Phosphatase 77 45 - 120 U/L    AST 29 0 - 40 U/L    ALT 18 0 - 45 U/L    Protein Total 7.4 6.0 - 8.0 g/dL    Albumin 2.5 (L) 3.5 - 5.0 g/dL    Bilirubin Total 0.5 0.0 - 1.0 mg/dL    GFR Estimate 57 (L) >60 mL/min/1.73m2

## 2022-01-27 NOTE — TELEPHONE ENCOUNTER
Routing to PCP to advise if she will continue prescribing Eliquis for patient.     Pended medication and pharmacy.    Karolyn WOOD RN

## 2022-01-31 ENCOUNTER — MEDICAL CORRESPONDENCE (OUTPATIENT)
Dept: HEALTH INFORMATION MANAGEMENT | Facility: CLINIC | Age: 82
End: 2022-01-31
Payer: COMMERCIAL

## 2022-02-03 ENCOUNTER — PATIENT OUTREACH (OUTPATIENT)
Dept: GERIATRIC MEDICINE | Facility: CLINIC | Age: 82
End: 2022-02-03
Payer: COMMERCIAL

## 2022-02-11 ENCOUNTER — PATIENT OUTREACH (OUTPATIENT)
Dept: GERIATRIC MEDICINE | Facility: CLINIC | Age: 82
End: 2022-02-11
Payer: COMMERCIAL

## 2022-02-11 NOTE — LETTER
[address], [address]  [place] [address]  Phone:  [phone]  Fax:  [phone]      February 11, 2022    LUIS MANUEL PLOH  [address] [place] [address]    Dear Luis Manuel,    Enclosed is a copy of your completed PCA Assessment and Service Plan.  This is for your records and no action is required by you.  If you have additional questions regarding your assessment please contact me at [phone]. If you feel that your needs are not being met, please contact the Clinical Supervisor at [phone].    Sincerely,    REBECCA Srinivasan  [phone]  [email or web address]            Enclosure:  Completed PCA assessment

## 2022-02-11 NOTE — LETTER
March 3, 2022      [facility]  [address] [place] [address]      Dear [facility]:    At [facility], we are dedicated to improving your health and well-being. Enclosed is the Comprehensive Care Plan that we developed with you on 2/4/2022. Please review the Care Plan carefully.    As a reminder, some of the things we discussed at your visit include:    Your physical and mental health    Ways to reduce falls    Health care needs you may have    Don t forget to contact your care coordinator if you:    Have been hospitalized or plan to be hospitalized     Have had a fall     Have experienced a change in physical health    Are experiencing emotional problems     If you do not agree with your Care Plan, have questions about it, or have experienced a change in your needs, please call me at [phone]. If you are hearing impaired, please call the [place] [employer] at [phone] or [phone] (yikknd-nd-oodscp relay service).    Sincerely,    REBECCA Srinivasan  [phone]  [email or web address]   [facility] ([facility]) is a health plan that contracts with both Medicare and the [place] Medical Assistance (Medicaid) program to provide benefits of both programs to enrollees. Enrollment in [facility] depends on contract renewal.    [id number])     [id number] (11/18)

## 2022-02-14 ENCOUNTER — PATIENT OUTREACH (OUTPATIENT)
Dept: GERIATRIC MEDICINE | Facility: CLINIC | Age: 82
End: 2022-02-14
Payer: COMMERCIAL

## 2022-02-14 NOTE — PROGRESS NOTES
[facility] Care Coordination Contact    [facility] Change in Condition Assessment    Home visit for Change in Condition Health Risk Assessment with Luis Manuel Lemon completed on February 4, 2022    Reason for Early reassessment: Health Status Change  Yes, if yes explain Luis Manuel Lemon was hospitalized in December and in home oxygen was initiated. Family reports change in funtional status as well.     Type of residence: Apartment  Current living arrangement: I live in a private home with family     Assessment completed with: Family    Current Care Plan  Member currently receiving the following home care services: Home Health Aid   Member currently receiving the following community resources: PCA (granddaughter is PCA for 4hr and 30min/day)    Medication Review  Medication reconciliation completed in Epic: No and If no, please explain difficult to complete telephonically due to language barrier.   Medication set-up & administration: Family/informal caregiver sets up weekly.  Family caregiver administers medications.  Medication Risk Assessment Medication (1 or more, place referral to MTM): N/A: No risk factors identified  MTM Referral Placed: No: No risk factors idenified    Mental/Behavioral Health   Depression Screening: Denies concerns.   PHQ-2 Total Score (Adult) - Positive if 3 or more points; Administer PHQ-9 if positive: 0  Mental health DX: No        Falls Assessment:   Fallen 2 or more times in the past year?: No   Any fall with injury in the past year?: No    ADL/IADL Dependencies:   Dependent ADLs: Bathing,Dressing,Grooming,Transfers,Toileting,Ambulation-cane  Dependent IADLs:Cleaning,Cooking,Laundry,Shopping,Meal Preparation,Medication Management,Money Management,Transportation    [facility] benefits: Shared information re: Silver Sneakers/gym memberships, ASA, Calcium +D.    PCA Assessment completed at visit: Yes Annual PCA assessment indicated 42 units per day of PCA. This is an increase  from the previous assessment.  10.5 hours/day  An early assessment was requested following a hospitalization for acute respiratory failure with hypoxia related to restrictive lung disease. In home oxygen was put in place-it does not meet the criteria laid out for PCA assessments, however, most ADL dependencies are related to respiratory distress so an increase in hours will be put in place due to the complex medical need.     Elderly Waiver Eligibility: No-does not meet criteria    Care Plan & Recommendations: COVID-19 remote assessment completed, no observations made. Assessment completed with a review of EMR and reports from [facility] and family.     Luis Manuel Lemon is a 82 year old female with a past medical history of chronic pain syndrome, restrictive lung disease, pulmonary fibrosis, age related constipation, GERD, spinal stenosis, and hypertension.     Luis Manuel lives in a single family home with her daughter, Lay Nicholas (who is her PCA/Primary caregiver), and grandchildren. Family ADL dependencies include: dressing, grooming, bathing, eating, transferring, mobility, and toileting.     See LTCC for detailed assessment information.    Follow-Up Plan: Member informed of future contact, plan to f/u with member with a 6 month telephone assessment.  Contact information shared with member and family, encouraged member to call with any questions or concerns at any time.    REBECCA Srinivasan  [facility]  [phone]

## 2022-02-14 NOTE — TELEPHONE ENCOUNTER
[facility] Care Coordination Contact    Called member to schedule annual HRA home visit. HRA has been scheduled for 2/4/2022.     REBECCA Srinivasan  [facility]  [phone]

## 2022-02-16 NOTE — PROGRESS NOTES
[facility] Care Coordination Contact      [facility]:  Emailed completed PCA assessment to [facility].  Faxed copy of PCA assessment to PCA Agency and mailed copy to member.  Faxed MD Communication to PCP.     Sherry Tomlin  [facility]  Case Management Specialist  [phone]

## 2022-02-17 ENCOUNTER — TELEPHONE (OUTPATIENT)
Dept: FAMILY MEDICINE | Facility: CLINIC | Age: 82
End: 2022-02-17
Payer: COMMERCIAL

## 2022-02-17 NOTE — TELEPHONE ENCOUNTER
Reason for Call:  Other STATUS UPDATE RELATED TO PATIENT     Detailed comments: Home RN (Vicky) with [facility] contacts the office to provide PCP with patient update.     RN states that patient saw provider 2 weeks ago and has noticed a change in her overall health status. Patient is now using 1.5 L oxygen (desats to 90% without supplemental oxygen, increases to 94% with oxygen).     RN also notes concern about failure to thrive and patient refuses to be seen/evaluated in ED/hospital.     Patient scheduled to see Dr. Morales 2/22/22 in the PM. RN would like MD to call her directly to discuss HC Directives (RN phone [phone]).     Phone Number NURSE can be reached at: Other phone number:  [phone]    Best Time: ASAP     Can we leave a detailed message on this number? YES    Call taken on 2/17/2022 at 2:59 PM by Tony Pate

## 2022-02-22 ENCOUNTER — VIRTUAL VISIT (OUTPATIENT)
Dept: FAMILY MEDICINE | Facility: CLINIC | Age: 82
End: 2022-02-22
Payer: COMMERCIAL

## 2022-02-22 DIAGNOSIS — J84.10 PULMONARY FIBROSIS (H): ICD-10-CM

## 2022-02-22 DIAGNOSIS — U07.1 INFECTION DUE TO 2019 NOVEL CORONAVIRUS: Primary | ICD-10-CM

## 2022-02-22 DIAGNOSIS — N18.30 STAGE 3 CHRONIC KIDNEY DISEASE, UNSPECIFIED WHETHER STAGE 3A OR 3B CKD (H): ICD-10-CM

## 2022-02-22 DIAGNOSIS — I26.93 SINGLE SUBSEGMENTAL PULMONARY EMBOLISM WITHOUT ACUTE COR PULMONALE (H): ICD-10-CM

## 2022-02-22 DIAGNOSIS — I27.21 PULMONARY ARTERIAL HYPERTENSION (H): ICD-10-CM

## 2022-02-22 DIAGNOSIS — F33.0 MAJOR DEPRESSIVE DISORDER, RECURRENT EPISODE, MILD (H): ICD-10-CM

## 2022-02-22 DIAGNOSIS — J98.4 RESTRICTIVE LUNG DISEASE: ICD-10-CM

## 2022-02-22 PROCEDURE — 99213 OFFICE O/P EST LOW 20 MIN: CPT | Mod: 95 | Performed by: FAMILY MEDICINE

## 2022-02-22 NOTE — PROGRESS NOTES
Luis Manuel is a 82 year old who is being evaluated via a billable phone visit.      How would you like to obtain your AVS?       Assessment & Plan     Infection due to 2019 novel coronavirus  Breathing sx worsening.   Unclear if related to her underlying lung d/o vs long covid vs CHF and unable to assess over the phone.   Appt today was converted to a phone visit b/c of the snow storm today but agrees to be seen by me in 2 days for an in person assessment.   At that time, I will also try to address advanced care directive/POLST       Pulmonary fibrosis (H)  Restrictive lung disease  appt with pulm clinic next week on 2/28/22  Continue current regimen as outlined by pulm   appt with neuro 3/24/22 as recommended by pulm   - INCRUSE ELLIPTA 62.5 MCG/INH Inhaler  Dispense: 30 each; Refill: 4    Single subsegmental pulmonary embolism without acute cor pulmonale (H)  Completed 1 mo of eliquis  Will continue to monitor    Pulmonary arterial hypertension (H)  This is a known issue that I take into account for medical decisions but no current exacerbation or new concerns.      Major depressive disorder, recurrent episode, mild (H)  May be contributing to failure to thrive  Confirmed with RN that she is getting the remeron  Will assess in person    Stage 3 chronic kidney disease, unspecified whether stage 3a or 3b CKD (H)  This is a known issue that I take into account for medical decisions but no current exacerbation or new concerns.        Review of external notes as documented elsewhere in note  Discussion of management or test interpretation with external physician/other qualified healthcare professional/appropriate source - spoke personally with home health RN    She will see me in 2 days 2/24/22.   Advised to go to ER if breathing or other sx worsen before then.     Kateryna Morales MD  [facility]    Subjective   Luis Manuel is a 82 year old who presents for the following health issues  accompanied by her  daughter.  [facility] professional phone  used.       HPI   Per call on 2/17/22  from [facility], she had had increased in O2, failure to thrive, needs adv care directicve with request to return call to [facility].    Spoke with [facility]:  88% on RA improved with increase of 1.5L O2 continuous  Crackles and wheezes and diminished breath sounds, patient not taking deep breaths.       Today she reports she is not sleeping or eating - she was supposed to restart remeron at 22.5 mg but unsure if she has this.   She is having more trouble walking.   She is using O2 during the day and night.  Voice shaky when speaking.   She has SOB when lying down. She feels like her lungs are filled with fluid.  She has appt with lung doctor on 2/28/22 - in 6 days.      She feels overwhelmed about her health. Lives with her daughter.   She has a PCA with 4.5 per day but has inconsistent hours.     Per chart review:  --pulm consult and new labs to r/u muscle atrophy on 9/28/21 -   ---Admit 12/24-12/27 for COVID, respiratory failure with hypoxia, restrictive lung dz.  ---She had a hospital followup appt on 1/27/22 with me (PCP).         Review of Systems    Please see above.  The rest of the review of systems are negative for all systems.      Current Outpatient Medications   Medication Instructions     acetaminophen (MAPAP ARTHRITIS PAIN) 650 mg, Oral, 2 TIMES DAILY     albuterol (PROAIR HFA/PROVENTIL HFA/VENTOLIN HFA) 108 (90 Base) MCG/ACT inhaler 2 puffs, Inhalation, EVERY 6 HOURS PRN     amLODIPine (NORVASC) 10 mg, Oral, DAILY     ARTIFICIAL TEARS 1.4 % ophthalmic solution 1 drop, Both Eyes, EVERY 1 HOUR PRN     BREO ELLIPTA 100-25 MCG/INH inhaler 1 puff, Inhalation, DAILY     calcium carbonate (ANTACID CALCIUM) 500 mg, Oral, 2 TIMES DAILY     capsaicin (ZOSTRIX) 0.025 % external cream Topical, 2 TIMES DAILY PRN     COMBIVENT RESPIMAT  MCG/ACT inhaler INHALE 1 PUFF EVERY 6 (SIX) HOURS AS NEEDED.     DULoxetine  (CYMBALTA) 30 mg, Oral, DAILY     famotidine (PEPCID) 40 mg, Oral, 2 TIMES DAILY     gabapentin (NEURONTIN) 100 MG capsule TAKE 1 TAB (100MG) IN THE MORNING AND 2 TABS (200MG) AT BEDTIME     hydrochlorothiazide (HYDRODIURIL) 25 MG tablet TAKE ONE TABLET BY MOUTH ONCE DAILY WITH LOSARTAN 100MG.     INCRUSE ELLIPTA 62.5 MCG/INH Inhaler 1 puff, Inhalation, DAILY     losartan (COZAAR) 100 MG tablet TAKE 1 TABLET BY MOUTH DAILY WITH HYDROCHLOROTHIAZIDE 25MG     metoprolol succinate ER (TOPROL-XL) 50 MG 24 hr tablet TAKE 1 TABLET BY MOUTH DAILY     mirtazapine (REMERON) 22.5 mg, Oral, AT BEDTIME     Multiple Vitamin (MULTI-VITAMINS) TABS 1 tablet, Oral, DAILY     polyethylene glycol (MIRALAX) 17 g packet 1 packet, Oral, DAILY PRN     SENEXON-S 8.6-50 MG tablet 2 tablets, Oral, DAILY     vitamin D2 (ERGOCALCIFEROL) 50,000 Units, Oral, WEEKLY, Wednesday's     Patient Active Problem List   Diagnosis     Constipation     Vitamin D Deficiency     Essential hypertension     Rotator Cuff Tendonitis     Asymptomatic Postmenopausal Status     Hyperlipidemia     Osteoporosis on Prolia 5.7.19     Chronic reflux esophagitis     Bursitis, trochanteric     Hypoalbuminemia     Polypharmacy     Degenerative disc disease, cervical     Spinal stenosis, multilevel     Chronic pain syndrome     Restrictive lung disease     Short of breath on exertion     GERD (gastroesophageal reflux disease)     Sinus bradycardia     Bilateral primary osteoarthritis of knee     Polyarthralgia     Medically complex patient     Language barrier affecting health care     Age-related osteoporosis with current pathological fracture with routine healing     Financial difficulties     Urinary incontinence in female     Insurance coverage problems     Pulmonary fibrosis (H)     Anticoagulated     Single subsegmental pulmonary embolism without acute cor pulmonale (H)     Chronic kidney disease, stage 3 (H)     Acute respiratory failure with hypoxia (H)     Infection  due to 2019 novel coronavirus             Objective           Vitals:  No vitals were obtained today due to virtual visit.    Physical Exam     Phone visit:

## 2022-02-22 NOTE — PROGRESS NOTES
"Luis Manuel is a 82 year old who is being evaluated via a billable video visit.      How would you like to obtain your AVS? {AVS Preference:773497}  If the video visit is dropped, the invitation should be resent by: {video visit invitation:173290}  Will anyone else be joining your video visit? {:307665}  {If patient encounters technical issues they should call [phone] :508480}    Video Start Time: {video visit start/end time for provider to select:133943}    {PROVIDER CHARTING PREFERENCE:406161}    Subjective   Luis Manuel is a 82 year old who presents for the following health issues {ACCOMPANIED BY STATEMENT (Optional):378938}    HPI     {SUPERLIST (Optional):643329}  {additonal problems for provider to add (Optional):481111}    Review of Systems   {ROS COMP (Optional):926752}      Objective           Vitals:  No vitals were obtained today due to virtual visit.    Physical Exam   {video visit exam brief selected:911287::\"GENERAL: Healthy, alert and no distress\",\"EYES: Eyes grossly normal to inspection.  No discharge or erythema, or obvious scleral/conjunctival abnormalities.\",\"RESP: No audible wheeze, cough, or visible cyanosis.  No visible retractions or increased work of breathing.  \",\"SKIN: Visible skin clear. No significant rash, abnormal pigmentation or lesions.\",\"NEURO: Cranial nerves grossly intact.  Mentation and speech appropriate for age.\",\"PSYCH: Mentation appears normal, affect normal/bright, judgement and insight intact, normal speech and appearance well-groomed.\"}    {Diagnostic Test Results (Optional):827142}    {AMBULATORY ATTESTATION (Optional):929261}        Video-Visit Details    Type of service:  Video Visit    Video End Time:{video visit start/end time for provider to select:368265}    Originating Location (pt. Location): {video visit patient location:095081::\"Home\"}    Distant Location (provider location):  [facility]     Platform used for Video Visit: {Virtual Visit " "Platforms:531197::\"Kalee\"}  "

## 2022-02-24 ENCOUNTER — OFFICE VISIT (OUTPATIENT)
Dept: FAMILY MEDICINE | Facility: CLINIC | Age: 82
End: 2022-02-24
Payer: COMMERCIAL

## 2022-02-24 VITALS
RESPIRATION RATE: 18 BRPM | BODY MASS INDEX: 25.2 KG/M2 | WEIGHT: 125 LBS | SYSTOLIC BLOOD PRESSURE: 138 MMHG | OXYGEN SATURATION: 97 % | HEIGHT: 59 IN | DIASTOLIC BLOOD PRESSURE: 60 MMHG | HEART RATE: 58 BPM | TEMPERATURE: 98.2 F

## 2022-02-24 DIAGNOSIS — F33.0 MAJOR DEPRESSIVE DISORDER, RECURRENT EPISODE, MILD (H): ICD-10-CM

## 2022-02-24 DIAGNOSIS — Z79.899 POLYPHARMACY: ICD-10-CM

## 2022-02-24 DIAGNOSIS — R06.02 SOB (SHORTNESS OF BREATH): ICD-10-CM

## 2022-02-24 DIAGNOSIS — J81.1 CHRONIC PULMONARY EDEMA: ICD-10-CM

## 2022-02-24 DIAGNOSIS — Z13.220 SCREENING FOR HYPERLIPIDEMIA: ICD-10-CM

## 2022-02-24 DIAGNOSIS — F99 INSOMNIA DUE TO OTHER MENTAL DISORDER: ICD-10-CM

## 2022-02-24 DIAGNOSIS — D64.9 ANEMIA, UNSPECIFIED TYPE: ICD-10-CM

## 2022-02-24 DIAGNOSIS — N18.30 STAGE 3 CHRONIC KIDNEY DISEASE, UNSPECIFIED WHETHER STAGE 3A OR 3B CKD (H): ICD-10-CM

## 2022-02-24 DIAGNOSIS — U07.1 INFECTION DUE TO 2019 NOVEL CORONAVIRUS: Primary | ICD-10-CM

## 2022-02-24 DIAGNOSIS — F51.05 INSOMNIA DUE TO OTHER MENTAL DISORDER: ICD-10-CM

## 2022-02-24 DIAGNOSIS — Z00.00 ENCOUNTER FOR PREVENTIVE CARE: ICD-10-CM

## 2022-02-24 DIAGNOSIS — R63.0 POOR APPETITE: ICD-10-CM

## 2022-02-24 DIAGNOSIS — J98.4 RESTRICTIVE LUNG DISEASE: ICD-10-CM

## 2022-02-24 DIAGNOSIS — Z76.0 ENCOUNTER FOR MEDICATION REFILL: Primary | ICD-10-CM

## 2022-02-24 DIAGNOSIS — I27.21 PULMONARY ARTERIAL HYPERTENSION (H): ICD-10-CM

## 2022-02-24 DIAGNOSIS — Z66 DNR (DO NOT RESUSCITATE): ICD-10-CM

## 2022-02-24 LAB
ALBUMIN SERPL-MCNC: 2.9 G/DL (ref 3.5–5)
ALP SERPL-CCNC: 99 U/L (ref 45–120)
ALT SERPL W P-5'-P-CCNC: 16 U/L (ref 0–45)
ANION GAP SERPL CALCULATED.3IONS-SCNC: 11 MMOL/L (ref 5–18)
AST SERPL W P-5'-P-CCNC: 21 U/L (ref 0–40)
BASOPHILS # BLD AUTO: 0 10E3/UL (ref 0–0.2)
BASOPHILS NFR BLD AUTO: 0 %
BILIRUB SERPL-MCNC: 0.5 MG/DL (ref 0–1)
BNP SERPL-MCNC: 347 PG/ML (ref 0–159)
BUN SERPL-MCNC: 25 MG/DL (ref 8–28)
C REACTIVE PROTEIN LHE: 0.2 MG/DL (ref 0–0.8)
CALCIUM SERPL-MCNC: 8.9 MG/DL (ref 8.5–10.5)
CHLORIDE BLD-SCNC: 105 MMOL/L (ref 98–107)
CHOLEST SERPL-MCNC: 243 MG/DL
CO2 SERPL-SCNC: 28 MMOL/L (ref 22–31)
CREAT SERPL-MCNC: 0.92 MG/DL (ref 0.6–1.1)
EOSINOPHIL # BLD AUTO: 0.4 10E3/UL (ref 0–0.7)
EOSINOPHIL NFR BLD AUTO: 5 %
ERYTHROCYTE [DISTWIDTH] IN BLOOD BY AUTOMATED COUNT: 18.5 % (ref 10–15)
FASTING STATUS PATIENT QL REPORTED: NO
FERRITIN SERPL-MCNC: 17 NG/ML (ref 10–130)
GFR SERPL CREATININE-BSD FRML MDRD: 62 ML/MIN/1.73M2
GLUCOSE BLD-MCNC: 131 MG/DL (ref 70–125)
HCT VFR BLD AUTO: 33.4 % (ref 35–47)
HDLC SERPL-MCNC: 64 MG/DL
HGB BLD-MCNC: 9.8 G/DL (ref 11.7–15.7)
IMM GRANULOCYTES # BLD: 0 10E3/UL
IMM GRANULOCYTES NFR BLD: 0 %
IRON SATN MFR SERPL: 9 % (ref 15–46)
IRON SERPL-MCNC: 30 UG/DL (ref 35–180)
LDLC SERPL CALC-MCNC: 158 MG/DL
LYMPHOCYTES # BLD AUTO: 2.6 10E3/UL (ref 0.8–5.3)
LYMPHOCYTES NFR BLD AUTO: 34 %
MCH RBC QN AUTO: 24.3 PG (ref 26.5–33)
MCHC RBC AUTO-ENTMCNC: 29.3 G/DL (ref 31.5–36.5)
MCV RBC AUTO: 83 FL (ref 78–100)
MONOCYTES # BLD AUTO: 0.6 10E3/UL (ref 0–1.3)
MONOCYTES NFR BLD AUTO: 8 %
NEUTROPHILS # BLD AUTO: 4 10E3/UL (ref 1.6–8.3)
NEUTROPHILS NFR BLD AUTO: 53 %
PLATELET # BLD AUTO: 293 10E3/UL (ref 150–450)
POTASSIUM BLD-SCNC: 3.2 MMOL/L (ref 3.5–5)
PROT SERPL-MCNC: 6.9 G/DL (ref 6–8)
RBC # BLD AUTO: 4.04 10E6/UL (ref 3.8–5.2)
SODIUM SERPL-SCNC: 144 MMOL/L (ref 136–145)
TIBC SERPL-MCNC: 347 UG/DL (ref 240–430)
TRIGL SERPL-MCNC: 106 MG/DL
WBC # BLD AUTO: 7.7 10E3/UL (ref 4–11)

## 2022-02-24 PROCEDURE — 82728 ASSAY OF FERRITIN: CPT | Performed by: FAMILY MEDICINE

## 2022-02-24 PROCEDURE — 85025 COMPLETE CBC W/AUTO DIFF WBC: CPT | Performed by: FAMILY MEDICINE

## 2022-02-24 PROCEDURE — 99215 OFFICE O/P EST HI 40 MIN: CPT | Performed by: FAMILY MEDICINE

## 2022-02-24 PROCEDURE — 80053 COMPREHEN METABOLIC PANEL: CPT | Performed by: FAMILY MEDICINE

## 2022-02-24 PROCEDURE — 83550 IRON BINDING TEST: CPT | Performed by: FAMILY MEDICINE

## 2022-02-24 PROCEDURE — 80061 LIPID PANEL: CPT | Performed by: FAMILY MEDICINE

## 2022-02-24 PROCEDURE — 36415 COLL VENOUS BLD VENIPUNCTURE: CPT | Performed by: FAMILY MEDICINE

## 2022-02-24 PROCEDURE — 83880 ASSAY OF NATRIURETIC PEPTIDE: CPT | Performed by: FAMILY MEDICINE

## 2022-02-24 PROCEDURE — 86140 C-REACTIVE PROTEIN: CPT | Performed by: FAMILY MEDICINE

## 2022-02-24 RX ORDER — ACETAMINOPHEN 650 MG/1
TABLET, FILM COATED, EXTENDED RELEASE ORAL
Qty: 120 TABLET | Refills: 6 | Status: SHIPPED | OUTPATIENT
Start: 2022-02-24 | End: 2023-04-20

## 2022-02-24 RX ORDER — FUROSEMIDE 20 MG
20 TABLET ORAL DAILY
Qty: 5 TABLET | Refills: 0 | Status: SHIPPED | OUTPATIENT
Start: 2022-02-24 | End: 2022-08-08

## 2022-02-24 RX ORDER — MIRTAZAPINE 15 MG/1
22.5 TABLET, FILM COATED ORAL AT BEDTIME
Qty: 45 TABLET | Refills: 11 | Status: SHIPPED | OUTPATIENT
Start: 2022-02-24 | End: 2023-04-21

## 2022-02-24 RX ORDER — FERROUS GLUCONATE 324(38)MG
324 TABLET ORAL
Qty: 30 TABLET | Refills: 1 | Status: SHIPPED | OUTPATIENT
Start: 2022-02-24 | End: 2022-04-13

## 2022-02-24 ASSESSMENT — PATIENT HEALTH QUESTIONNAIRE - PHQ9: SUM OF ALL RESPONSES TO PHQ QUESTIONS 1-9: 9

## 2022-02-24 NOTE — PROGRESS NOTES
Assessment & Plan     Infection due to 2019 novel coronavirus  SOB (shortness of breath)  Restrictive lung disease  Persistent and worsening SOB after covid infection which exacerbated her underlying restrictive lung dz and respiratory muscle weakness  Will check labs and CXR  Discussed CXR personally with radiologist who though it looked consistent and possibly slightly better since CXR done in hospital   Reviewed inhaler use  Discussed appt with pulm scheduled in 5 days  - CBC with Platelets & Differential  - Comprehensive metabolic panel (BMP + Alb, Alk Phos, ALT, AST, Total. Bili, TP)  - CRP, inflammation  - XR Chest 2 Views      Chronic pulmonary edema  Will give a few days of lasix and defer to pulm doctor evaluation next week  - furosemide (LASIX) 20 MG tablet  Dispense: 5 tablet; Refill: 0    Pulmonary arterial hypertension (H)  Check BNP and CXR  defer to pulm doctor evaluation next week  - B-Type Natriuretic Peptide ( East Only)  - XR Chest 2 Views    Stage 3 chronic kidney disease, unspecified whether stage 3a or 3b CKD (H)  This is a known issue that I take into account for medical decisions but no current exacerbation or new concerns.      Anemia, unspecified type  Check iron studies  Restart iron as anemia may be contributing to her sob and weakness   - Iron and iron binding capacity  - Ferritin  - ferrous gluconate (FERGON) 324 (38 Fe) MG tablet  Dispense: 30 tablet; Refill: 1    Major depressive disorder, recurrent episode, mild (H)  Insomnia due to other mental disorder  Poor appetite  Restart remeron at 22.5 mg which should help with sleep, appetite, and mood  She was missing this at the hospital f/u appt and it was reordered but she does not have that bottle with her/  Reordered   - mirtazapine (REMERON) 15 MG tablet  Dispense: 45 tablet; Refill: 11    Polypharmacy  Reconciled med bottles with med list and communicated with home health RN    Encounter for preventive care  - REVIEW OF HEALTH  MAINTENANCE PROTOCOL ORDERS    Screening for hyperlipidemia  - Lipid panel reflex to direct LDL Fasting    DNR (do not resuscitate) - DNR/DNI - POLST signed 2/24/22   Discussed code status with patient and granddaughter.   She agrees to DNR/DNI with selective treatement      Review of external notes as documented elsewhere in note  Discussion of management or test interpretation with external physician/other qualified healthcare professional/appropriate source - radiology  Ordering of each unique test  Prescription drug management  50 minutes spent on the date of the encounter doing chart review, history and exam, documentation and further activities per the note        Return in about 2 weeks (around 3/10/2022) for Follow up, with me, in person.    Kateryna Morales MD  [facility] professional phone  used as well as her granddaughter.     Luis Manuel Lemon is a 82 year old female who presents today for the following   health issues:    Today reporting that she is not sleeping and poor appetite  Sob and increase need for O2 - using ellipita, combivent, albuterol and home O2    Per chart review:   9/28/21 - pulm consult and new labs to r/u muscle atrophy and severe respiratory muscle weakness.    12/24-12/27/21 Admission for COVID, respiratory failure with hypoxia, restrictive lung dz  2/28/22 - f/u appt with Pulm clinic scheduled.     2/22/22 - phone visit since she did not come for Face to face visit that day b/c of the snow, she has had increased need for O2, failure to thrive, needs adv care directicve  Discussed case with RN from  [facility]    Checking CXR, CRP, BNP, CMP and CBC  Personally spoke with radiologist:   CXR showed some lung improvement for XRAY in Dec 2021.     Polypharmacy  Missing bottle remeron     Code status discussed with her and her granddaughter       Review of Systems     Please see above.  The rest of the review of systems are negative  "for all systems.    Current Outpatient Medications   Medication Instructions     acetaminophen (MAPAP ARTHRITIS PAIN) 650 mg, Oral, 2 TIMES DAILY     albuterol (PROAIR HFA/PROVENTIL HFA/VENTOLIN HFA) 108 (90 Base) MCG/ACT inhaler 2 puffs, Inhalation, EVERY 6 HOURS PRN     amLODIPine (NORVASC) 10 mg, Oral, DAILY     ARTIFICIAL TEARS 1.4 % ophthalmic solution 1 drop, Both Eyes, EVERY 1 HOUR PRN     BREO ELLIPTA 100-25 MCG/INH inhaler 1 puff, Inhalation, DAILY     calcium carbonate (ANTACID CALCIUM) 500 mg, Oral, 2 TIMES DAILY     capsaicin (ZOSTRIX) 0.025 % external cream Topical, 2 TIMES DAILY PRN     COMBIVENT RESPIMAT  MCG/ACT inhaler INHALE 1 PUFF EVERY 6 (SIX) HOURS AS NEEDED.     DULoxetine (CYMBALTA) 30 mg, Oral, DAILY     famotidine (PEPCID) 40 mg, Oral, 2 TIMES DAILY     gabapentin (NEURONTIN) 100 MG capsule TAKE 1 TAB (100MG) IN THE MORNING AND 2 TABS (200MG) AT BEDTIME     hydrochlorothiazide (HYDRODIURIL) 25 MG tablet TAKE ONE TABLET BY MOUTH ONCE DAILY WITH LOSARTAN 100MG.     INCRUSE ELLIPTA 62.5 MCG/INH Inhaler 1 puff, Inhalation, DAILY     losartan (COZAAR) 100 MG tablet TAKE 1 TABLET BY MOUTH DAILY WITH HYDROCHLOROTHIAZIDE 25MG     metoprolol succinate ER (TOPROL-XL) 50 MG 24 hr tablet TAKE 1 TABLET BY MOUTH DAILY     mirtazapine (REMERON) 22.5 mg, Oral, AT BEDTIME     Multiple Vitamin (MULTI-VITAMINS) TABS 1 tablet, Oral, DAILY     polyethylene glycol (MIRALAX) 17 g packet 1 packet, Oral, DAILY PRN     SENEXON-S 8.6-50 MG tablet 2 tablets, Oral, DAILY     vitamin D2 (ERGOCALCIFEROL) 50,000 Units, Oral, WEEKLY, Wednesday's         Objective   Vitals:    02/24/22 0826   BP: 138/60   Pulse: 62   Resp: 18   Temp: 98.2  F (36.8  C)   TempSrc: Oral   Weight: 56.7 kg (125 lb)   Height: 1.49 m (4' 10.66\")       Body mass index is 25.54 kg/m .    Physical Exam    OBJECTIVE:  Vitals listed above within normal limits  General appearance: well groomed, pleasant, well hydrated, nontoxic appearing  ENT: " PERRL, throat clear  Neck: neck supple, no lymphadenopathy, no thyromegaly  Lungs: lungs clear to auscultation bilaterally, no wheezes or rhonchi  Heart: regular rate and rhythm, no murmurs, rubs or gallops  Abdomen: soft, nontender  Neuro: no focal deficits, CN II-XII grossly intact, alert and oriented  Psych:  mood stable, appears to have good insight and judgment       Results for LUIS FRAZIER (MRN [id number]) as of 2/24/2022 09:06   Ref. Range 2/24/2022 08:37   WBC Latest Ref Range: 4.0 - 11.0 10e3/uL 7.7   Hemoglobin Latest Ref Range: 11.7 - 15.7 g/dL 9.8 (L)   Hematocrit Latest Ref Range: 35.0 - 47.0 % 33.4 (L)   Platelet Count Latest Ref Range: 150 - 450 10e3/uL 293   RBC Count Latest Ref Range: 3.80 - 5.20 10e6/uL 4.04   MCV Latest Ref Range: 78 - 100 fL 83   MCH Latest Ref Range: 26.5 - 33.0 pg 24.3 (L)   MCHC Latest Ref Range: 31.5 - 36.5 g/dL 29.3 (L)   RDW Latest Ref Range: 10.0 - 15.0 % 18.5 (H)   % Neutrophils Latest Units: % 53   % Lymphocytes Latest Units: % 34   % Monocytes Latest Units: % 8   % Eosinophils Latest Units: % 5   % Basophils Latest Units: % 0   Absolute Basophils Latest Ref Range: 0.0 - 0.2 10e3/uL 0.0   Absolute Eosinophils Latest Ref Range: 0.0 - 0.7 10e3/uL 0.4   Absolute Immature Granulocytes Latest Ref Range: <=0.4 10e3/uL 0.0   Absolute Lymphocytes Latest Ref Range: 0.8 - 5.3 10e3/uL 2.6   Absolute Monocytes Latest Ref Range: 0.0 - 1.3 10e3/uL 0.6   % Immature Granulocytes Latest Units: % 0   Absolute Neutrophils Latest Ref Range: 1.6 - 8.3 10e3/uL 4.0

## 2022-02-24 NOTE — PATIENT INSTRUCTIONS
Meds changes:  Start Lasix 20mg daily for 5 days until sees pulm doctor on 2/28/22  Start iron supplement once daily      POLST signed -   DNR/DNI and ok for selective treatments

## 2022-02-25 ENCOUNTER — HOSPITAL ENCOUNTER (OUTPATIENT)
Dept: RADIOLOGY | Facility: HOSPITAL | Age: 82
Discharge: HOME | End: 2022-02-25
Attending: FAMILY MEDICINE | Admitting: FAMILY MEDICINE
Payer: COMMERCIAL

## 2022-02-25 DIAGNOSIS — Z76.0 ENCOUNTER FOR MEDICATION REFILL: Primary | ICD-10-CM

## 2022-02-25 PROCEDURE — 71046 X-RAY EXAM CHEST 2 VIEWS: CPT

## 2022-02-25 RX ORDER — FAMOTIDINE 40 MG/1
TABLET, FILM COATED ORAL
Qty: 180 TABLET | Refills: 3 | Status: SHIPPED | OUTPATIENT
Start: 2022-02-25 | End: 2022-08-08

## 2022-03-01 ENCOUNTER — TELEPHONE (OUTPATIENT)
Dept: FAMILY MEDICINE | Facility: CLINIC | Age: 82
End: 2022-03-01
Payer: COMMERCIAL

## 2022-03-01 ENCOUNTER — MEDICAL CORRESPONDENCE (OUTPATIENT)
Dept: HEALTH INFORMATION MANAGEMENT | Facility: CLINIC | Age: 82
End: 2022-03-01
Payer: COMMERCIAL

## 2022-03-01 DIAGNOSIS — J69.0 ASPIRATION PNEUMONITIS (H): Primary | ICD-10-CM

## 2022-03-01 NOTE — TELEPHONE ENCOUNTER
----- Message from Johan Chavez MD sent at 2/28/2022  7:53 PM CST -----  Regarding: Pulm follow up  Hi,  I'm sorry to hear her appt was cancelled.  No idea what that is about, I didn't have a clinic at all today as I'm doing a night shift tonight.  Maybe it was with a different provider?  I am not sure what the worsening CXR is, but I doubt it is a progression of COVID.  Given her history of aspiration and worsening weakness, I would worry she is continuing to have aspiration, maybe silent that is causing a pneumonitis in her lower airways.    Sometimes an antibiotic like Augmentin can help this process.  Does she need oxygen?  I've asked one of our patient care coordinators to look into the scheduling issue.  I don't have clinic til next week, I'm not sure if there are openings.    Johan  ----- Message -----  From: Kateryna Morales MD  Sent: 2/28/2022   9:08 AM CST  To: Johan Chavez MD, REBECCA Marquez    This 83 yo [facility] patient had been hospitalized with Covid in Dec. She has been having worsening SOB, MILLER and failure to thrive. I saw her last week and got this CXR, which showed mild worsening c/w covid infection. She was supposed to have a follow up appt today with Dr. Chavez. So I reinforced her use of her inhalers and oxygen and restarted her remeron for sleep/appetite and advised she attend her appt with pulm clinic for further recommendations.   We also completed a POLST, so she can be DNR/DNI but receive selected treatments and hospitalization if needed.   However, now it looks like that appt was canceled or rescheduled.   I am wondering what happened and what else can be done to help her.     Thanks for your assistance.   Dr. Kateryna Morales  2/28/2022

## 2022-03-01 NOTE — TELEPHONE ENCOUNTER
Called patient's preferred phone number under contacts with phone  on the line. The phone number [phone] is listed for the grand daughter, Aminta Corona, consent to communicate on file. Left voicemail for patient or patient's family member to call clinic back.     Called patient's daughter, Efrain May, consent to communicate on file at [phone]. Relayed provider message to patient's daughter. Patient's daughter verbalized understanding of message, stated she would  the prescription today, and had no further questions at call completion.    Karolyn WOOD RN    ----- Message from Kateryna Morales MD sent at 3/1/2022  8:56 AM CST -----  Regarding: new antibiotics  Please notify the patient and her home health RN that I communicated with her lung doctor.   He suggested some antibiotics for possible aspiration pneuminitis (infection in her lungs).   I sent an rx of Augmentin to take twice daily for a week.   Her lung doctor will also look into rescheduling the appt she thought she had yesterday that got cancelled.     Thanks,   Dr. Kateryna Morales  3/1/2022

## 2022-03-03 NOTE — PROGRESS NOTES
[facility] Care Coordination Contact    Received after visit chart from care coordinator.  Completed following tasks: Mailed copy of care plan to client, Updated services in access and mailed POC sig sheet w/SASE.  Mailed medication disposal info.   and Provider Signature - No POC Shared:  Member indicates that they do not want their POC shared with any EW providers.     Christina Collazo  Case Management Specialist  [facility]  [phone]

## 2022-03-08 ENCOUNTER — TRANSFERRED RECORDS (OUTPATIENT)
Dept: HEALTH INFORMATION MANAGEMENT | Facility: CLINIC | Age: 82
End: 2022-03-08
Payer: COMMERCIAL

## 2022-03-09 ENCOUNTER — DOCUMENTATION ONLY (OUTPATIENT)
Dept: OTHER | Facility: CLINIC | Age: 82
End: 2022-03-09
Payer: COMMERCIAL

## 2022-03-15 ENCOUNTER — MEDICAL CORRESPONDENCE (OUTPATIENT)
Dept: HEALTH INFORMATION MANAGEMENT | Facility: CLINIC | Age: 82
End: 2022-03-15
Payer: COMMERCIAL

## 2022-03-15 DIAGNOSIS — J69.0 ASPIRATION PNEUMONITIS (H): Primary | ICD-10-CM

## 2022-03-15 RX ORDER — AMOXICILLIN AND CLAVULANATE POTASSIUM 500; 125 MG/1; MG/1
1 TABLET, FILM COATED ORAL 2 TIMES DAILY
Qty: 14 TABLET | Refills: 0 | Status: SHIPPED | OUTPATIENT
Start: 2022-03-15 | End: 2022-03-22

## 2022-03-21 DIAGNOSIS — F33.0 MAJOR DEPRESSIVE DISORDER, RECURRENT EPISODE, MILD (H): Primary | ICD-10-CM

## 2022-03-21 RX ORDER — DULOXETIN HYDROCHLORIDE 30 MG/1
CAPSULE, DELAYED RELEASE ORAL
Qty: 90 CAPSULE | Refills: 3 | Status: SHIPPED | OUTPATIENT
Start: 2022-03-21 | End: 2023-03-06

## 2022-03-24 ENCOUNTER — OFFICE VISIT (OUTPATIENT)
Dept: NEUROLOGY | Facility: CLINIC | Age: 82
End: 2022-03-24
Attending: INTERNAL MEDICINE
Payer: COMMERCIAL

## 2022-03-24 VITALS
DIASTOLIC BLOOD PRESSURE: 61 MMHG | BODY MASS INDEX: 24.52 KG/M2 | SYSTOLIC BLOOD PRESSURE: 130 MMHG | WEIGHT: 120 LBS | HEART RATE: 49 BPM

## 2022-03-24 DIAGNOSIS — M62.81 GENERALIZED MUSCLE WEAKNESS: ICD-10-CM

## 2022-03-24 DIAGNOSIS — R29.2 HYPERREFLEXIA: Primary | ICD-10-CM

## 2022-03-24 DIAGNOSIS — R06.00 DYSPNEA, UNSPECIFIED TYPE: ICD-10-CM

## 2022-03-24 PROCEDURE — 99205 OFFICE O/P NEW HI 60 MIN: CPT | Performed by: PSYCHIATRY & NEUROLOGY

## 2022-03-24 NOTE — NURSING NOTE
Chief Complaint   Patient presents with     Generalized Weakness     Dorcas Navas RN on 3/24/2022 at 10:02 AM

## 2022-03-24 NOTE — PROGRESS NOTES
NEUROLOGY OUTPATIENT CONSULT NOTE   Mar 24, 2022     CHIEF COMPLAINT/REASON FOR VISIT/REASON FOR CONSULT  Patient presents with:  Generalized Weakness    REASON FOR CONSULTATION- Generalized weakness    REFERRAL SOURCE  Dr. Johan Chavez  CC Dr. Johan Chavez    HISTORY OF PRESENT ILLNESS  Luis Manuel Lemon is a 82 year old female seen today for evaluation of generalized weakness/breathing difficulty.  Patient cannot tell me how long this has been going on for.  Possibly has been going on for years.  In early 2022 it was thought that she might have Covid and was admitted.  Per family they do not think she had Covid.  She mainly reports difficulty with breathing.  She might be an ex-smoker the history is unclear.  Does use the oxygen at home.  Needs to walk to the bathroom with a cane.  Denies any other significant weakness.  Does have some neck pain/neck issues.  Her appetite has been poor.  Recently ferritin was low and she is supposed to be on iron replacement.  Denies any other bowel or bladder issues or other neurological symptoms.    Previous history is reviewed and this is unchanged.    PAST MEDICAL/SURGICAL HISTORY  Past Medical History:   Diagnosis Date     Biceps tendon rupture      CHF (congestive heart failure) (H) 10/8/2017     Chronic use of steroids 2/24/2015 2/24/2015:  For connective tissue disease.  On steroids since at least 2011.  Attempts to wean have been unsuccessful.  Rheum currently trying again to wean.  Has known osteoporosis with compression fracture.      Compression fracture 2/4/2015 2/24/2015:  T12 compression fracture with 33% height loss seen on plain films 1/2015.  Was not seen on plain films 6/2014, so is presumably new since then.      Dermatophytosis of foot     Created by Conversion      MILLER (dyspnea on exertion) 4/25/2018     Dysfunction of eustachian tube     Created by Conversion      Dyspepsia      GERD (gastroesophageal reflux disease)      History of  immunological disorder     Connective Tissue Disorder     History of kidney stones      Hyperlipidemia      Hypertension      Inverted nipple     Bilaterally     Low back pain 12/8/2015     Myalgia and myositis      Neuralgia      Nontraumatic rupture of tendons of biceps (long head)     Created by Conversion      Opioid dependence, episodic (H) 8/8/2019     Osteoarthritis      Osteoporosis      Pancreatitis 4/15/2016     Personal history of noncompliance with medical treatment, presenting hazards to health     2/24/2015:  Has had trouble historically understanding and taking medicines.  Much improved now with home health RN Georgina Salinas from [employer].  Georgina's cell [phone].  She needs to be called with all new orders.  Also in [facility] program -- Care Guide is Jose Castillo.       Polymyalgia rheumatica (H)      Restrictive lung disease      Vitamin D deficiency      Patient Active Problem List   Diagnosis     Constipation     Vitamin D Deficiency     Essential hypertension     Rotator Cuff Tendonitis     Asymptomatic Postmenopausal Status     Hyperlipidemia     Osteoporosis on Prolia 5.7.19     Chronic reflux esophagitis     Bursitis, trochanteric     Hypoalbuminemia     Polypharmacy     Degenerative disc disease, cervical     Spinal stenosis, multilevel     Chronic pain syndrome     Restrictive lung disease     Short of breath on exertion     GERD (gastroesophageal reflux disease)     Sinus bradycardia     Bilateral primary osteoarthritis of knee     Polyarthralgia     Medically complex patient     Language barrier affecting health care     Age-related osteoporosis with current pathological fracture with routine healing     Financial difficulties     Urinary incontinence in female     Insurance coverage problems     Pulmonary fibrosis (H)     Anticoagulated     Single subsegmental pulmonary embolism without acute cor pulmonale (H)     Chronic kidney disease, stage 3 (H)     Acute  respiratory failure with hypoxia (H)     Infection due to 2019 novel coronavirus     Pulmonary arterial hypertension (H)     Major depressive disorder, recurrent episode, mild (H)   Significant for memory loss.  No history of diabetes    FAMILY HISTORY  Family History   Problem Relation Age of Onset     Breast Cancer No family hx of      LUNG DISEASE No family hx of      Cancer No family hx of    Family history negative for neurological conditions    SOCIAL HISTORY  Social History     Tobacco Use     Smoking status: Never Smoker     Smokeless tobacco: Never Used     Tobacco comment: chew betel nut   Substance Use Topics     Alcohol use: No     Drug use: No       SYSTEMS REVIEW  Twelve-system ROS was done and other than the HPI this was negative except for neck pain, back pain, arm and leg pain, joint pain, numbness and tingling, weakness paralysis, difficulty walking, balance coordination problems, movement disorder, bladder symptoms, dizziness, difficulty swallowing, hearing loss, sleepiness during the day, sleeping problems, memory loss, cardiac/heart problems, skin changes, weight gain, appetite problems.    MEDICATIONS  albuterol (PROAIR HFA/PROVENTIL HFA/VENTOLIN HFA) 108 (90 Base) MCG/ACT inhaler, Inhale 2 puffs into the lungs every 6 hours as needed for shortness of breath / dyspnea or wheezing   amLODIPine (NORVASC) 10 MG tablet, Take 1 tablet (10 mg) by mouth daily  ARTIFICIAL TEARS 1.4 % ophthalmic solution, Place 1 drop into both eyes every hour as needed for dry eyes (itchy eyes)   BREO ELLIPTA 100-25 MCG/INH inhaler, Inhale 1 puff into the lungs daily   calcium carbonate (ANTACID CALCIUM) 500 MG chewable tablet, Take 1 tablet (500 mg) by mouth 2 times daily  capsaicin (ZOSTRIX) 0.025 % external cream, Apply topically 2 times daily as needed (for pain)   COMBIVENT RESPIMAT  MCG/ACT inhaler, INHALE 1 PUFF EVERY 6 (SIX) HOURS AS NEEDED. (Patient taking differently: Inhale 1 puff into the lungs every  6 hours as needed for shortness of breath / dyspnea or wheezing )  DULoxetine (CYMBALTA) 30 MG capsule, TAKE 1 CAPSULE (30 MG TOTAL) BY MOUTH DAILY.  famotidine (PEPCID) 40 MG tablet, TAKE 1 TABLET BY MOUTH 2 TIMES A DAY  ferrous gluconate (FERGON) 324 (38 Fe) MG tablet, Take 1 tablet (324 mg) by mouth daily (with breakfast)  furosemide (LASIX) 20 MG tablet, Take 1 tablet (20 mg) by mouth daily for 5 days  gabapentin (NEURONTIN) 100 MG capsule, TAKE 1 TAB (100MG) IN THE MORNING AND 2 TABS (200MG) AT BEDTIME (Patient taking differently: Take 100-200 mg by mouth See Admin Instructions Take 1 capsule (100 mg) by mouth in the morning, and take 2 capsules (200 mg) by mouth in the evening)  hydrochlorothiazide (HYDRODIURIL) 25 MG tablet, TAKE ONE TABLET BY MOUTH ONCE DAILY WITH LOSARTAN 100MG. (Patient taking differently: Take 25 mg by mouth daily )  INCRUSE ELLIPTA 62.5 MCG/INH Inhaler, Inhale 1 puff into the lungs daily  losartan (COZAAR) 100 MG tablet, TAKE 1 TABLET BY MOUTH DAILY WITH HYDROCHLOROTHIAZIDE 25MG (Patient taking differently: Take 100 mg by mouth daily )  MAPAP ARTHRITIS PAIN 650 MG CR tablet, TAKE 2 TABLETS BY MOUTH 2 TIMES A DAY  metoprolol succinate ER (TOPROL-XL) 50 MG 24 hr tablet, TAKE 1 TABLET BY MOUTH DAILY (Patient taking differently: Take 50 mg by mouth daily )  mirtazapine (REMERON) 15 MG tablet, Take 1.5 tablets (22.5 mg) by mouth At Bedtime  Multiple Vitamin (MULTI-VITAMINS) TABS, Take 1 tablet by mouth daily   polyethylene glycol (MIRALAX) 17 g packet, Take 1 packet by mouth daily as needed for constipation  SENEXON-S 8.6-50 MG tablet, TAKE 2 TABLETS BY MOUTH DAILY.  vitamin D2 (ERGOCALCIFEROL) 78639 units (1250 mcg) capsule, Take 50,000 Units by mouth once a week Wednesday's    No current facility-administered medications on file prior to visit.       PHYSICAL EXAMINATION  VITALS: /61 (BP Location: Left arm, Patient Position: Sitting)   Pulse (!) 49   Wt 54.4 kg (120 lb)   BMI 24.52  kg/m    GENERAL: Healthy appearing, alert, no acute distress, normal habitus.  CARDIOVASCULAR: Extremities warm and well perfused. Pulses present.   EYES: Funduscopic exam limited.  NEUROLOGICAL:  Patient is awake and grossly oriented to self, place and time.  Attention span is normal.  Memory is grossly intact.  Language is fluent and follows commands appropriately.  Appropriate fund of knowledge. Cranial nerves 2-12 are intact. There is no pronator drift.  Motor exam shows 5/5 strength in all extremities.  Tone is symmetric bilaterally in upper and lower extremities.  Reflexes are symmetric and 2+ brisk in upper extremities and lower extremities. Sensory exam is grossly intact to light touch, pin prick and vibration.  Finger to nose and heel to shin is without dysmetria.  Gait is antalgic and slightly wide-based.  Patient does slightly shuffle.  This appears more related to hip and knee issues than true parkinsonian gait.      DIAGNOSTICS  RELEVANT LABS  Component      Latest Ref Rng & Units 2/24/2022   Ferritin      10 - 130 ng/mL 17     OUTSIDE RECORDS  Outside referral notes and chart notes were reviewed and pertinent information has been summarized (in addition to the HPI):-Patient was recently hospitalized for Covid.  This was December 2021.  Had some oxygen needs.  Does have diagnosis of pulmonary fibrosis reactive airway disease.  She recently has an office visit with primary care.  No mention of any neurological issues.  Most of the visit was regarding breathing issues.    Notes from pulmonology clinic also reviewed.  Patient has shortness of breath and neuromuscular causes are being considered.  Pulmonologist does not think she has interstitial lung disease which is causing her symptoms.  Some basic labs were ordered.  Concerns for progressive muscular atrophy and other conditions has been raised.  Patient never did the blood work ordered through pulmonology.    IMPRESSION/REPORT/PLAN  History of low  ferritin  Hyperreflexia  Generalized muscle weakness  Dyspnea, unspecified type    This is a 82 year old female with complains of breathing difficulty with some subjective generalized muscle weakness complaints.  The generalized muscle weakness is most likely related to poor effort from either fatigue or poor appetite.  I do not see a lot of evidence of neuromuscular disease.  Breathing issues could be nonneurological in nature.  She does have some slight hyperreflexia on examination and could be related to cervical spine injury versus myelopathy.  I will check blood work to look for cause of myelopathy.  We will further check MRI of the cervical spine.  We will further complete blood work to look for causes of generalized weakness.  Her ferritin has been low which could be the cause for the lack of energy/fatigue.  She is on iron replacement.  Could consider infusions.    I can see her back in about 6 weeks.  Should continue work-up with her pulmonologist/primary care doctor regarding her breathing difficulties as neurological cause is less likely.    -     MR Cervical Spine w/o Contrast; Future  -     Vitamin B12; Future  -     TSH with free T4 reflex; Future  -     Protein Immunofixation Serum; Future  -     Protein electrophoresis; Future  -     Methylmalonic Acid; Future  -     Hemoglobin A1c; Future  -     Ferritin; Future  -     Copper level; Future  -     Ceruloplasmin; Future  -     CK total; Future  -     ACETYLCHOLINE RECEPTOR BINDING; Future  -     STRIATED MUSCLE ANTIBODY IGG; Future  -     ACETYLCHOLINE MODULATING ANTIBODY; Future  -     ACETYLCHOLINE RECEPTOR BLOCKING PABLO; Future  -     ANCA IgG by IFA with Reflex to Titer; Future  -     Angiotensin converting enzyme; Future  -     Anti Nuclear Pablo IgG by IFA with Reflex; Future    Return in about 6 weeks (around 5/5/2022) for In-Clinic Visit (must), After testing.    Over 64 minutes were spent coordinating the care for the patient on the day of the  encounter.  This includes previsit, during visit and post visit activities as documented above.  Counseling patient.  Use of  requires extra time.  Patient is a rather good historian and reviewing chart requires extra time.  Multiple test ordered/reviewed.  (Activities include but not inclusive of reviewing chart, reviewing outside records, reviewing labs and imaging study results as well as the images, patient visit time including getting history and exam,  use if applicable, review of test results with the patient and coming up with a plan in a shared model, counseling patient and family, education and answering patient questions, EMR , EMR diagnosis entry and problem list management, medication reconciliation and prescription management if applicable, paperwork if applicable, printing documents and documentation of the visit activities.)  Billing:-4 data points, 4 problem points      Yimi López MD  Neurologist  [facility]  Tel:- [phone]    This note was dictated using voice recognition software.  Any grammatical or context distortions are unintentional and inherent to the software.

## 2022-03-24 NOTE — LETTER
3/24/2022         RE: Luis Manuel Lemon  [address] [place] [address]        Dear Colleague,    Thank you for referring your patient, Luis Manuel Lemon, to the [facility] [place]. Please see a copy of my visit note below.    NEUROLOGY OUTPATIENT CONSULT NOTE   Mar 24, 2022     CHIEF COMPLAINT/REASON FOR VISIT/REASON FOR CONSULT  Patient presents with:  Generalized Weakness    REASON FOR CONSULTATION- Generalized weakness    REFERRAL SOURCE  Dr. Johan Chavez  CC Dr. Johan Chavez    HISTORY OF PRESENT ILLNESS  Luis Manuel Lemon is a 82 year old female seen today for evaluation of generalized weakness/breathing difficulty.  Patient cannot tell me how long this has been going on for.  Possibly has been going on for years.  In early 2022 it was thought that she might have Covid and was admitted.  Per family they do not think she had Covid.  She mainly reports difficulty with breathing.  She might be an ex-smoker the history is unclear.  Does use the oxygen at home.  Needs to walk to the bathroom with a cane.  Denies any other significant weakness.  Does have some neck pain/neck issues.  Her appetite has been poor.  Recently ferritin was low and she is supposed to be on iron replacement.  Denies any other bowel or bladder issues or other neurological symptoms.    Previous history is reviewed and this is unchanged.    PAST MEDICAL/SURGICAL HISTORY  Past Medical History:   Diagnosis Date     Biceps tendon rupture      CHF (congestive heart failure) (H) 10/8/2017     Chronic use of steroids 2/24/2015 2/24/2015:  For connective tissue disease.  On steroids since at least 2011.  Attempts to wean have been unsuccessful.  Rheum currently trying again to wean.  Has known osteoporosis with compression fracture.      Compression fracture 2/4/2015 2/24/2015:  T12 compression fracture with 33% height loss seen on plain films 1/2015.  Was not seen on plain films 6/2014, so is presumably new since  then.      Dermatophytosis of foot     Created by Conversion      MILLER (dyspnea on exertion) 4/25/2018     Dysfunction of eustachian tube     Created by Conversion      Dyspepsia      GERD (gastroesophageal reflux disease)      History of immunological disorder     Connective Tissue Disorder     History of kidney stones      Hyperlipidemia      Hypertension      Inverted nipple     Bilaterally     Low back pain 12/8/2015     Myalgia and myositis      Neuralgia      Nontraumatic rupture of tendons of biceps (long head)     Created by Conversion      Opioid dependence, episodic (H) 8/8/2019     Osteoarthritis      Osteoporosis      Pancreatitis 4/15/2016     Personal history of noncompliance with medical treatment, presenting hazards to health     2/24/2015:  Has had trouble historically understanding and taking medicines.  Much improved now with home health RN Georgina Salinas from [facility].  Georgina's cell [phone].  She needs to be called with all new orders.  Also in [facility] home program -- Care Guide is Jose Castillo.       Polymyalgia rheumatica (H)      Restrictive lung disease      Vitamin D deficiency      Patient Active Problem List   Diagnosis     Constipation     Vitamin D Deficiency     Essential hypertension     Rotator Cuff Tendonitis     Asymptomatic Postmenopausal Status     Hyperlipidemia     Osteoporosis on Prolia 5.7.19     Chronic reflux esophagitis     Bursitis, trochanteric     Hypoalbuminemia     Polypharmacy     Degenerative disc disease, cervical     Spinal stenosis, multilevel     Chronic pain syndrome     Restrictive lung disease     Short of breath on exertion     GERD (gastroesophageal reflux disease)     Sinus bradycardia     Bilateral primary osteoarthritis of knee     Polyarthralgia     Medically complex patient     Language barrier affecting health care     Age-related osteoporosis with current pathological fracture with routine healing     Financial difficulties      Urinary incontinence in female     Insurance coverage problems     Pulmonary fibrosis (H)     Anticoagulated     Single subsegmental pulmonary embolism without acute cor pulmonale (H)     Chronic kidney disease, stage 3 (H)     Acute respiratory failure with hypoxia (H)     Infection due to 2019 novel coronavirus     Pulmonary arterial hypertension (H)     Major depressive disorder, recurrent episode, mild (H)   Significant for memory loss.  No history of diabetes    FAMILY HISTORY  Family History   Problem Relation Age of Onset     Breast Cancer No family hx of      LUNG DISEASE No family hx of      Cancer No family hx of    Family history negative for neurological conditions    SOCIAL HISTORY  Social History     Tobacco Use     Smoking status: Never Smoker     Smokeless tobacco: Never Used     Tobacco comment: chew betel nut   Substance Use Topics     Alcohol use: No     Drug use: No       SYSTEMS REVIEW  Twelve-system ROS was done and other than the HPI this was negative except for neck pain, back pain, arm and leg pain, joint pain, numbness and tingling, weakness paralysis, difficulty walking, balance coordination problems, movement disorder, bladder symptoms, dizziness, difficulty swallowing, hearing loss, sleepiness during the day, sleeping problems, memory loss, cardiac/heart problems, skin changes, weight gain, appetite problems.    MEDICATIONS  albuterol (PROAIR HFA/PROVENTIL HFA/VENTOLIN HFA) 108 (90 Base) MCG/ACT inhaler, Inhale 2 puffs into the lungs every 6 hours as needed for shortness of breath / dyspnea or wheezing   amLODIPine (NORVASC) 10 MG tablet, Take 1 tablet (10 mg) by mouth daily  ARTIFICIAL TEARS 1.4 % ophthalmic solution, Place 1 drop into both eyes every hour as needed for dry eyes (itchy eyes)   BREO ELLIPTA 100-25 MCG/INH inhaler, Inhale 1 puff into the lungs daily   calcium carbonate (ANTACID CALCIUM) 500 MG chewable tablet, Take 1 tablet (500 mg) by mouth 2 times daily  capsaicin  (ZOSTRIX) 0.025 % external cream, Apply topically 2 times daily as needed (for pain)   COMBIVENT RESPIMAT  MCG/ACT inhaler, INHALE 1 PUFF EVERY 6 (SIX) HOURS AS NEEDED. (Patient taking differently: Inhale 1 puff into the lungs every 6 hours as needed for shortness of breath / dyspnea or wheezing )  DULoxetine (CYMBALTA) 30 MG capsule, TAKE 1 CAPSULE (30 MG TOTAL) BY MOUTH DAILY.  famotidine (PEPCID) 40 MG tablet, TAKE 1 TABLET BY MOUTH 2 TIMES A DAY  ferrous gluconate (FERGON) 324 (38 Fe) MG tablet, Take 1 tablet (324 mg) by mouth daily (with breakfast)  furosemide (LASIX) 20 MG tablet, Take 1 tablet (20 mg) by mouth daily for 5 days  gabapentin (NEURONTIN) 100 MG capsule, TAKE 1 TAB (100MG) IN THE MORNING AND 2 TABS (200MG) AT BEDTIME (Patient taking differently: Take 100-200 mg by mouth See Admin Instructions Take 1 capsule (100 mg) by mouth in the morning, and take 2 capsules (200 mg) by mouth in the evening)  hydrochlorothiazide (HYDRODIURIL) 25 MG tablet, TAKE ONE TABLET BY MOUTH ONCE DAILY WITH LOSARTAN 100MG. (Patient taking differently: Take 25 mg by mouth daily )  INCRUSE ELLIPTA 62.5 MCG/INH Inhaler, Inhale 1 puff into the lungs daily  losartan (COZAAR) 100 MG tablet, TAKE 1 TABLET BY MOUTH DAILY WITH HYDROCHLOROTHIAZIDE 25MG (Patient taking differently: Take 100 mg by mouth daily )  MAPAP ARTHRITIS PAIN 650 MG CR tablet, TAKE 2 TABLETS BY MOUTH 2 TIMES A DAY  metoprolol succinate ER (TOPROL-XL) 50 MG 24 hr tablet, TAKE 1 TABLET BY MOUTH DAILY (Patient taking differently: Take 50 mg by mouth daily )  mirtazapine (REMERON) 15 MG tablet, Take 1.5 tablets (22.5 mg) by mouth At Bedtime  Multiple Vitamin (MULTI-VITAMINS) TABS, Take 1 tablet by mouth daily   polyethylene glycol (MIRALAX) 17 g packet, Take 1 packet by mouth daily as needed for constipation  SENEXON-S 8.6-50 MG tablet, TAKE 2 TABLETS BY MOUTH DAILY.  vitamin D2 (ERGOCALCIFEROL) 46880 units (1250 mcg) capsule, Take 50,000 Units by mouth once  a week Wednesday's    No current facility-administered medications on file prior to visit.       PHYSICAL EXAMINATION  VITALS: /61 (BP Location: Left arm, Patient Position: Sitting)   Pulse (!) 49   Wt 54.4 kg (120 lb)   BMI 24.52 kg/m    GENERAL: Healthy appearing, alert, no acute distress, normal habitus.  CARDIOVASCULAR: Extremities warm and well perfused. Pulses present.   EYES: Funduscopic exam limited.  NEUROLOGICAL:  Patient is awake and grossly oriented to self, place and time.  Attention span is normal.  Memory is grossly intact.  Language is fluent and follows commands appropriately.  Appropriate fund of knowledge. Cranial nerves 2-12 are intact. There is no pronator drift.  Motor exam shows 5/5 strength in all extremities.  Tone is symmetric bilaterally in upper and lower extremities.  Reflexes are symmetric and 2+ brisk in upper extremities and lower extremities. Sensory exam is grossly intact to light touch, pin prick and vibration.  Finger to nose and heel to shin is without dysmetria.  Gait is antalgic and slightly wide-based.  Patient does slightly shuffle.  This appears more related to hip and knee issues than true parkinsonian gait.      DIAGNOSTICS  RELEVANT LABS  Component      Latest Ref Rng & Units 2/24/2022   Ferritin      10 - 130 ng/mL 17     OUTSIDE RECORDS  Outside referral notes and chart notes were reviewed and pertinent information has been summarized (in addition to the HPI):-Patient was recently hospitalized for Covid.  This was December 2021.  Had some oxygen needs.  Does have diagnosis of pulmonary fibrosis reactive airway disease.  She recently has an office visit with primary care.  No mention of any neurological issues.  Most of the visit was regarding breathing issues.    Notes from pulmonology clinic also reviewed.  Patient has shortness of breath and neuromuscular causes are being considered.  Pulmonologist does not think she has interstitial lung disease which is  causing her symptoms.  Some basic labs were ordered.  Concerns for progressive muscular atrophy and other conditions has been raised.  Patient never did the blood work ordered through pulmonology.    IMPRESSION/REPORT/PLAN  History of low ferritin  Hyperreflexia  Generalized muscle weakness  Dyspnea, unspecified type    This is a 82 year old female with complains of breathing difficulty with some subjective generalized muscle weakness complaints.  The generalized muscle weakness is most likely related to poor effort from either fatigue or poor appetite.  I do not see a lot of evidence of neuromuscular disease.  Breathing issues could be nonneurological in nature.  She does have some slight hyperreflexia on examination and could be related to cervical spine injury versus myelopathy.  I will check blood work to look for cause of myelopathy.  We will further check MRI of the cervical spine.  We will further complete blood work to look for causes of generalized weakness.  Her ferritin has been low which could be the cause for the lack of energy/fatigue.  She is on iron replacement.  Could consider infusions.    I can see her back in about 6 weeks.  Should continue work-up with her pulmonologist/primary care doctor regarding her breathing difficulties as neurological cause is less likely.    -     MR Cervical Spine w/o Contrast; Future  -     Vitamin B12; Future  -     TSH with free T4 reflex; Future  -     Protein Immunofixation Serum; Future  -     Protein electrophoresis; Future  -     Methylmalonic Acid; Future  -     Hemoglobin A1c; Future  -     Ferritin; Future  -     Copper level; Future  -     Ceruloplasmin; Future  -     CK total; Future  -     ACETYLCHOLINE RECEPTOR BINDING; Future  -     STRIATED MUSCLE ANTIBODY IGG; Future  -     ACETYLCHOLINE MODULATING ANTIBODY; Future  -     ACETYLCHOLINE RECEPTOR BLOCKING PABLO; Future  -     ANCA IgG by IFA with Reflex to Titer; Future  -     Angiotensin converting  enzyme; Future  -     Anti Nuclear Bess IgG by IFA with Reflex; Future    Return in about 6 weeks (around 5/5/2022) for In-Clinic Visit (must), After testing.    Over 64 minutes were spent coordinating the care for the patient on the day of the encounter.  This includes previsit, during visit and post visit activities as documented above.  Counseling patient.  Use of  requires extra time.  Patient is a rather good historian and reviewing chart requires extra time.  Multiple test ordered/reviewed.  (Activities include but not inclusive of reviewing chart, reviewing outside records, reviewing labs and imaging study results as well as the images, patient visit time including getting history and exam,  use if applicable, review of test results with the patient and coming up with a plan in a shared model, counseling patient and family, education and answering patient questions, EMR , EMR diagnosis entry and problem list management, medication reconciliation and prescription management if applicable, paperwork if applicable, printing documents and documentation of the visit activities.)  Billing:-4 data points, 4 problem points      Yimi López MD  Neurologist  [facility]  Tel:- [phone]    This note was dictated using voice recognition software.  Any grammatical or context distortions are unintentional and inherent to the software.        Again, thank you for allowing me to participate in the care of your patient.        Sincerely,        Yimi López MD

## 2022-03-28 ENCOUNTER — OFFICE VISIT (OUTPATIENT)
Dept: FAMILY MEDICINE | Facility: CLINIC | Age: 82
End: 2022-03-28
Payer: COMMERCIAL

## 2022-03-28 VITALS
SYSTOLIC BLOOD PRESSURE: 96 MMHG | HEART RATE: 72 BPM | DIASTOLIC BLOOD PRESSURE: 54 MMHG | RESPIRATION RATE: 18 BRPM | WEIGHT: 114.12 LBS | TEMPERATURE: 98.2 F | HEIGHT: 59 IN | OXYGEN SATURATION: 95 % | BODY MASS INDEX: 23 KG/M2

## 2022-03-28 DIAGNOSIS — M62.81 GENERALIZED MUSCLE WEAKNESS: ICD-10-CM

## 2022-03-28 DIAGNOSIS — J98.4 RESTRICTIVE LUNG DISEASE: Primary | ICD-10-CM

## 2022-03-28 DIAGNOSIS — Z79.899 POLYPHARMACY: ICD-10-CM

## 2022-03-28 DIAGNOSIS — J84.10 PULMONARY FIBROSIS (H): ICD-10-CM

## 2022-03-28 DIAGNOSIS — I10 ESSENTIAL HYPERTENSION: ICD-10-CM

## 2022-03-28 DIAGNOSIS — Z75.8 LANGUAGE BARRIER AFFECTING HEALTH CARE: ICD-10-CM

## 2022-03-28 DIAGNOSIS — E87.6 HYPOKALEMIA: ICD-10-CM

## 2022-03-28 DIAGNOSIS — R06.02 SHORT OF BREATH ON EXERTION: ICD-10-CM

## 2022-03-28 DIAGNOSIS — J81.0 ACUTE PULMONARY EDEMA (H): ICD-10-CM

## 2022-03-28 DIAGNOSIS — R29.2 HYPERREFLEXIA: ICD-10-CM

## 2022-03-28 DIAGNOSIS — Z78.9 MEDICALLY COMPLEX PATIENT: ICD-10-CM

## 2022-03-28 DIAGNOSIS — Z60.3 LANGUAGE BARRIER AFFECTING HEALTH CARE: ICD-10-CM

## 2022-03-28 DIAGNOSIS — F33.0 MAJOR DEPRESSIVE DISORDER, RECURRENT EPISODE, MILD (H): ICD-10-CM

## 2022-03-28 LAB
ANION GAP SERPL CALCULATED.3IONS-SCNC: 14 MMOL/L (ref 5–18)
BNP SERPL-MCNC: 39 PG/ML (ref 0–159)
BUN SERPL-MCNC: 19 MG/DL (ref 8–28)
CALCIUM SERPL-MCNC: 9.5 MG/DL (ref 8.5–10.5)
CHLORIDE BLD-SCNC: 97 MMOL/L (ref 98–107)
CK SERPL-CCNC: 22 U/L (ref 30–190)
CO2 SERPL-SCNC: 32 MMOL/L (ref 22–31)
CREAT SERPL-MCNC: 1.24 MG/DL (ref 0.6–1.1)
FERRITIN SERPL-MCNC: 42 NG/ML (ref 10–130)
GFR SERPL CREATININE-BSD FRML MDRD: 44 ML/MIN/1.73M2
GLUCOSE BLD-MCNC: 103 MG/DL (ref 70–125)
HBA1C MFR BLD: 5.7 % (ref 0–5.6)
POTASSIUM BLD-SCNC: 4.3 MMOL/L (ref 3.5–5)
SODIUM SERPL-SCNC: 143 MMOL/L (ref 136–145)
TOTAL PROTEIN SERUM FOR ELP: 7.8 G/DL (ref 6–8)
TSH SERPL DL<=0.005 MIU/L-ACNC: 0.65 UIU/ML (ref 0.3–5)
VIT B12 SERPL-MCNC: 1229 PG/ML (ref 213–816)

## 2022-03-28 PROCEDURE — 82164 ANGIOTENSIN I ENZYME TEST: CPT | Mod: 90 | Performed by: FAMILY MEDICINE

## 2022-03-28 PROCEDURE — 86334 IMMUNOFIX E-PHORESIS SERUM: CPT | Performed by: PATHOLOGY

## 2022-03-28 PROCEDURE — 84443 ASSAY THYROID STIM HORMONE: CPT | Performed by: FAMILY MEDICINE

## 2022-03-28 PROCEDURE — 36415 COLL VENOUS BLD VENIPUNCTURE: CPT | Performed by: FAMILY MEDICINE

## 2022-03-28 PROCEDURE — 84155 ASSAY OF PROTEIN SERUM: CPT | Performed by: FAMILY MEDICINE

## 2022-03-28 PROCEDURE — 82607 VITAMIN B-12: CPT | Performed by: FAMILY MEDICINE

## 2022-03-28 PROCEDURE — 82550 ASSAY OF CK (CPK): CPT | Performed by: FAMILY MEDICINE

## 2022-03-28 PROCEDURE — 86255 FLUORESCENT ANTIBODY SCREEN: CPT | Mod: 90 | Performed by: FAMILY MEDICINE

## 2022-03-28 PROCEDURE — 83516 IMMUNOASSAY NONANTIBODY: CPT | Mod: 90 | Performed by: FAMILY MEDICINE

## 2022-03-28 PROCEDURE — 83519 RIA NONANTIBODY: CPT | Mod: 90 | Performed by: FAMILY MEDICINE

## 2022-03-28 PROCEDURE — 82728 ASSAY OF FERRITIN: CPT | Performed by: FAMILY MEDICINE

## 2022-03-28 PROCEDURE — 83880 ASSAY OF NATRIURETIC PEPTIDE: CPT | Performed by: FAMILY MEDICINE

## 2022-03-28 PROCEDURE — 86036 ANCA SCREEN EACH ANTIBODY: CPT | Performed by: FAMILY MEDICINE

## 2022-03-28 PROCEDURE — 82525 ASSAY OF COPPER: CPT | Mod: 90 | Performed by: FAMILY MEDICINE

## 2022-03-28 PROCEDURE — 86038 ANTINUCLEAR ANTIBODIES: CPT | Performed by: FAMILY MEDICINE

## 2022-03-28 PROCEDURE — 83036 HEMOGLOBIN GLYCOSYLATED A1C: CPT | Performed by: FAMILY MEDICINE

## 2022-03-28 PROCEDURE — 99215 OFFICE O/P EST HI 40 MIN: CPT | Performed by: FAMILY MEDICINE

## 2022-03-28 PROCEDURE — 83921 ORGANIC ACID SINGLE QUANT: CPT | Performed by: FAMILY MEDICINE

## 2022-03-28 PROCEDURE — 82390 ASSAY OF CERULOPLASMIN: CPT | Performed by: FAMILY MEDICINE

## 2022-03-28 PROCEDURE — 80048 BASIC METABOLIC PNL TOTAL CA: CPT | Performed by: FAMILY MEDICINE

## 2022-03-28 PROCEDURE — 84165 PROTEIN E-PHORESIS SERUM: CPT | Performed by: PATHOLOGY

## 2022-03-28 PROCEDURE — 99000 SPECIMEN HANDLING OFFICE-LAB: CPT | Performed by: FAMILY MEDICINE

## 2022-03-28 PROCEDURE — 86256 FLUORESCENT ANTIBODY TITER: CPT | Performed by: FAMILY MEDICINE

## 2022-03-28 RX ORDER — FUROSEMIDE 20 MG
20 TABLET ORAL 2 TIMES DAILY
Qty: 60 TABLET | Refills: 0 | Status: SHIPPED | OUTPATIENT
Start: 2022-03-28 | End: 2022-05-02

## 2022-03-28 SDOH — SOCIAL STABILITY - SOCIAL INSECURITY: ACCULTURATION DIFFICULTY: Z60.3

## 2022-03-28 NOTE — LETTER
April 12, 2022      [facility]  [address] [place] [address]        Dear ,    We are writing to inform you of your test results.    {results letter list:050952}    Resulted Orders   Basic metabolic panel  (Ca, Cl, CO2, Creat, Gluc, K, Na, BUN)   Result Value Ref Range    Sodium 143 136 - 145 mmol/L    Potassium 4.3 3.5 - 5.0 mmol/L    Chloride 97 (L) 98 - 107 mmol/L    Carbon Dioxide (CO2) 32 (H) 22 - 31 mmol/L    Anion Gap 14 5 - 18 mmol/L    Urea Nitrogen 19 8 - 28 mg/dL    Creatinine 1.24 (H) 0.60 - 1.10 mg/dL    Calcium 9.5 8.5 - 10.5 mg/dL    Glucose 103 70 - 125 mg/dL    GFR Estimate 44 (L) >60 mL/min/1.73m2      Comment:      Effective December 21, 2021 eGFRcr in adults is calculated using the 2021 CKD-EPI creatinine equation which includes age and gender (Morgan et al., NEJ, DOI: 10.1056/YHOSyo7422212)   B-Type Natriuretic Peptide ([facility] Only)   Result Value Ref Range    BNP 39 0 - 159 pg/mL       If you have any questions or concerns, please call the clinic at the number listed above.       Sincerely,      Kateryna Morales MD

## 2022-03-28 NOTE — PROGRESS NOTES
Assessment & Plan     Restrictive lung disease  Short of breath on exertion  Pulmonary fibrosis (H)  Continue current meds  F/u with pulm tomorrow as scheduled    Acute pulmonary edema (H)  Bilateral diffuse crackles - likely related to pulm edema and fibrosis  Will restart lasix  - B-Type Natriuretic Peptide ( [facility] Only)  - XR Chest 2 Views  - furosemide (LASIX) 20 MG tablet  Dispense: 60 tablet; Refill: 0      Hypokalemia  - Basic metabolic panel  (Ca, Cl, CO2, Creat, Gluc, K, Na, BUN)    Essential hypertension  BP well controlled     Major depressive disorder, recurrent episode, mild (H)  Mood worsened with worsening health and breathing  Continue cymbalta    Generalized muscle weakness  Hyperreflexia  Reviewed neurology consult note  Labs ordered by neurology drawn [facility] at [facility]  Await MRI  - Vitamin B12  - TSH with free T4 reflex  - Protein Immunofixation Serum  - Protein electrophoresis  - Methylmalonic Acid  - Hemoglobin A1c  - Ferritin  - Copper level  - Ceruloplasmin  - CK total  - ACETYLCHOLINE RECEPTOR BINDING  - STRIATED MUSCLE ANTIBODY IGG  - ACETYLCHOLINE MODULATING ANTIBODY  - ACETYLCHOLINE RECEPTOR BLOCKING PABLO  - ANCA IgG by IFA with Reflex to Titer  - Angiotensin converting enzyme  - Anti Nuclear Pablo IgG by IFA with Reflex    Polypharmacy  Reconciled meds and reviewed with granddaughter    Medically complex patient      Language barrier affecting health care        Review of external notes as documented elsewhere in note  Ordering of each unique test  Prescription drug management  40 minutes spent on the date of the encounter doing chart review, history and exam, documentation and further activities per the note      Return in about 6 weeks (around 5/8/2022) for Follow up, with me, in person f/u on pulm and neuro consults .    Kateryna Morales MD  [facility]    [place] professional phone  used.     Luis Manuel Lemon is a 82 year old female who  presents today for the following   health issues:    H/o Infection due to 2019 novel coronavirus  SOB (shortness of breath)  Restrictive lung disease  Persistent and worsening SOB after covid infection which exacerbated her underlying restrictive lung dz and respiratory muscle weakness  Using combivent TID and albuterol 2-3 x per day  Home O2 as needed, mostly at night     Chronic pulmonary edema  Pulmonary arterial hypertension (H)  Check  on 2/24/22    CXR on 2/25/22 -   Mild worsening of basilar predominant opacities compatible with history of COVID. Stable mild left costophrenic angle blunting from adjacent opacity, thickening or small pleural effusion. Stable mildly enlarged heart size. Mild pulmonary   vascular redistribution / congestion.  She was given lasix last visit on 2/24/22  for 5 days with plan to defer to pulm doctor evaluation but appt was later than initially anticipated - so she ran out   She was also treated for possible aspiration pneumonia with augmentin after discussion with pulm   pulm appt is tomorrow     generalized weakness/breathing difficult  Referred to neurology by pulm clinic - Consult note from 3/24/22 reviewed  History of low ferritin  Hyperreflexia  Generalized muscle weakness  Dyspnea, unspecified type  She does have some slight hyperreflexia on examination and could be related to cervical spine injury versus myelopathy.  I will check blood work to look for cause of myelopathy.  We will further check MRI of the cervical spine scheduled for 4/15/22   F/u with neuro on 5/6/22    Anemia, unspecified type  hgb 9.8, MCV 83 and Iron 30, TIBC 9, ferritin 17 on 2/24/22   Given rx for ferrous gluconate on 2/24/22       Major depressive disorder, recurrent episode, mild (H)  Insomnia due to other mental disorder  Poor appetite  Restarted remeron at 22.5 mg which should help with sleep, appetite, and mood  On cymbalta    HTN -    BP wnl but low side   Amolodipine, losartan, metoprolol      Low potassium   K+ 3.2       Polypharmacy  Reconciled med bottles with med list and communicated with home health RN  Granddinaughter is helping with her cares and meds           Review of Systems     Please see above.  The rest of the review of systems are negative for all systems.    Current Outpatient Medications   Medication Instructions     albuterol (PROAIR HFA/PROVENTIL HFA/VENTOLIN HFA) 108 (90 Base) MCG/ACT inhaler 2 puffs, Inhalation, EVERY 6 HOURS PRN     amLODIPine (NORVASC) 10 mg, Oral, DAILY     ARTIFICIAL TEARS 1.4 % ophthalmic solution 1 drop, Both Eyes, EVERY 1 HOUR PRN     BREO ELLIPTA 100-25 MCG/INH inhaler 1 puff, Inhalation, DAILY     calcium carbonate (ANTACID CALCIUM) 500 mg, Oral, 2 TIMES DAILY     capsaicin (ZOSTRIX) 0.025 % external cream Topical, 2 TIMES DAILY PRN     COMBIVENT RESPIMAT  MCG/ACT inhaler INHALE 1 PUFF EVERY 6 (SIX) HOURS AS NEEDED.     DULoxetine (CYMBALTA) 30 MG capsule TAKE 1 CAPSULE (30 MG TOTAL) BY MOUTH DAILY.     famotidine (PEPCID) 40 MG tablet TAKE 1 TABLET BY MOUTH 2 TIMES A DAY     ferrous gluconate (FERGON) 324 mg, Oral, DAILY WITH BREAKFAST     furosemide (LASIX) 20 mg, Oral, DAILY     gabapentin (NEURONTIN) 100 MG capsule TAKE 1 TAB (100MG) IN THE MORNING AND 2 TABS (200MG) AT BEDTIME     hydrochlorothiazide (HYDRODIURIL) 25 MG tablet TAKE ONE TABLET BY MOUTH ONCE DAILY WITH LOSARTAN 100MG.     INCRUSE ELLIPTA 62.5 MCG/INH Inhaler 1 puff, Inhalation, DAILY     losartan (COZAAR) 100 MG tablet TAKE 1 TABLET BY MOUTH DAILY WITH HYDROCHLOROTHIAZIDE 25MG     MAPAP ARTHRITIS PAIN 650 MG CR tablet TAKE 2 TABLETS BY MOUTH 2 TIMES A DAY     metoprolol succinate ER (TOPROL-XL) 50 MG 24 hr tablet TAKE 1 TABLET BY MOUTH DAILY     mirtazapine (REMERON) 22.5 mg, Oral, AT BEDTIME     Multiple Vitamin (MULTI-VITAMINS) TABS 1 tablet, Oral, DAILY     polyethylene glycol (MIRALAX) 17 g packet 1 packet, Oral, DAILY PRN     SENEXON-S 8.6-50 MG tablet 2 tablets, Oral, DAILY  "    vitamin D2 (ERGOCALCIFEROL) 50,000 Units, Oral, WEEKLY, Wednesday's         Objective   Vitals:    03/28/22 1214   BP: 96/54   Pulse: 72   Resp: 18   Temp: 98.2  F (36.8  C)   TempSrc: Oral   SpO2: 95%   Weight: 51.8 kg (114 lb 1.9 oz)   Height: 1.49 m (4' 10.66\")       Body mass index is 23.32 kg/m .    Physical Exam    OBJECTIVE:  Vitals listed above within normal limits  General appearance: well groomed, pleasant, well hydrated, nontoxic appearing  ENT: PERRL, throat clear  Neck: neck supple, no lymphadenopathy, no thyromegaly  Lungs:diffuse crackles in both lung fields   Heart: regular rate and rhythm, no murmurs, rubs or gallops  Abdomen: soft, nontender  Neuro: no focal deficits, CN II-XII grossly intact, alert and oriented  Psych:  mood stable, appears to have good insight and judgment    Recent Results (from the past 1008 hour(s))   Comprehensive metabolic panel (BMP + Alb, Alk Phos, ALT, AST, Total. Bili, TP)    Collection Time: 02/24/22  8:37 AM   Result Value Ref Range    Sodium 144 136 - 145 mmol/L    Potassium 3.2 (L) 3.5 - 5.0 mmol/L    Chloride 105 98 - 107 mmol/L    Carbon Dioxide (CO2) 28 22 - 31 mmol/L    Anion Gap 11 5 - 18 mmol/L    Urea Nitrogen 25 8 - 28 mg/dL    Creatinine 0.92 0.60 - 1.10 mg/dL    Calcium 8.9 8.5 - 10.5 mg/dL    Glucose 131 (H) 70 - 125 mg/dL    Alkaline Phosphatase 99 45 - 120 U/L    AST 21 0 - 40 U/L    ALT 16 0 - 45 U/L    Protein Total 6.9 6.0 - 8.0 g/dL    Albumin 2.9 (L) 3.5 - 5.0 g/dL    Bilirubin Total 0.5 0.0 - 1.0 mg/dL    GFR Estimate 62 >60 mL/min/1.73m2   CRP, inflammation    Collection Time: 02/24/22  8:37 AM   Result Value Ref Range    CRP 0.2 0.0-<0.8 mg/dL   B-Type Natriuretic Peptide ([facility] Only)    Collection Time: 02/24/22  8:37 AM   Result Value Ref Range     (H) 0 - 159 pg/mL   Lipid panel reflex to direct LDL Fasting    Collection Time: 02/24/22  8:37 AM   Result Value Ref Range    Cholesterol 243 (H) <=199 mg/dL    Triglycerides 106 <=149 " mg/dL    Direct Measure HDL 64 >=50 mg/dL    LDL Cholesterol Calculated 158 (H) <=129 mg/dL    Patient Fasting > 8hrs? No    CBC with platelets and differential    Collection Time: 02/24/22  8:37 AM   Result Value Ref Range    WBC Count 7.7 4.0 - 11.0 10e3/uL    RBC Count 4.04 3.80 - 5.20 10e6/uL    Hemoglobin 9.8 (L) 11.7 - 15.7 g/dL    Hematocrit 33.4 (L) 35.0 - 47.0 %    MCV 83 78 - 100 fL    MCH 24.3 (L) 26.5 - 33.0 pg    MCHC 29.3 (L) 31.5 - 36.5 g/dL    RDW 18.5 (H) 10.0 - 15.0 %    Platelet Count 293 150 - 450 10e3/uL    % Neutrophils 53 %    % Lymphocytes 34 %    % Monocytes 8 %    % Eosinophils 5 %    % Basophils 0 %    % Immature Granulocytes 0 %    Absolute Neutrophils 4.0 1.6 - 8.3 10e3/uL    Absolute Lymphocytes 2.6 0.8 - 5.3 10e3/uL    Absolute Monocytes 0.6 0.0 - 1.3 10e3/uL    Absolute Eosinophils 0.4 0.0 - 0.7 10e3/uL    Absolute Basophils 0.0 0.0 - 0.2 10e3/uL    Absolute Immature Granulocytes 0.0 <=0.4 10e3/uL   Iron and iron binding capacity    Collection Time: 02/24/22  9:26 AM   Result Value Ref Range    Iron 30 (L) 35 - 180 ug/dL    Iron Binding Capacity 347 240 - 430 ug/dL    Iron Sat Index 9 (L) 15 - 46 %   Ferritin    Collection Time: 02/24/22  9:26 AM   Result Value Ref Range    Ferritin 17 10 - 130 ng/mL

## 2022-03-29 ENCOUNTER — OFFICE VISIT (OUTPATIENT)
Dept: PULMONOLOGY | Facility: OTHER | Age: 82
End: 2022-03-29
Payer: COMMERCIAL

## 2022-03-29 VITALS
DIASTOLIC BLOOD PRESSURE: 77 MMHG | SYSTOLIC BLOOD PRESSURE: 125 MMHG | HEART RATE: 87 BPM | WEIGHT: 114 LBS | OXYGEN SATURATION: 91 % | RESPIRATION RATE: 28 BRPM | BODY MASS INDEX: 23.29 KG/M2

## 2022-03-29 DIAGNOSIS — J84.9 ILD (INTERSTITIAL LUNG DISEASE) (H): Primary | ICD-10-CM

## 2022-03-29 LAB
ACE SERPL-CCNC: 35 U/L
ANCA AB PATTERN SER IF-IMP: NORMAL
C-ANCA TITR SER IF: NORMAL {TITER}

## 2022-03-29 PROCEDURE — 99214 OFFICE O/P EST MOD 30 MIN: CPT | Performed by: INTERNAL MEDICINE

## 2022-03-29 RX ORDER — ALBUTEROL SULFATE 0.83 MG/ML
2.5 SOLUTION RESPIRATORY (INHALATION) EVERY 6 HOURS PRN
Qty: 90 ML | Refills: 11 | Status: SHIPPED | OUTPATIENT
Start: 2022-03-29 | End: 2024-06-27

## 2022-03-29 NOTE — PROGRESS NOTES
Patient instructed in use of nebulizer machine from [facility].  Patient states good understanding of how to use the nebulizer machine.  Nebulizer machine given to patient from [facility].  All paperwork signed and copy scanned to chart. Phone numbers given to patient to call if any questions.

## 2022-03-29 NOTE — PROGRESS NOTES
Assessment and Plan:Luis Manuel Lemon is a 82 year old female with a past medical history significant for ILD and restrictive lung disease who presents to clinic today for follow up.  She became very ill from COVID 19 since I saw her last and is making a slow recovery. Her chest Xray still shows bilateral lower lobe infiltrates and her symptoms now include more shortness of breath.  She remains on triple therapy for her lung process, but is using a lot of combivent and albuterol and I think she would be better served with a nebulizer given her weakness and increased needs.    1) ILD- at a baseline, I supsect all of her worsening ifiltrates are covid related, which can also have a fibrotic scarring process.  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  Cannot walk to test ambulatory, but at rest needs at least 2 L in clinic.  Recommend they buy a home oximeter to do spot checks.  4) Weakness - undergoing workup with neurology.  Anticipating MRI of spine and serologic workup.  Likely only more deconditioned since COVID.    Overall the COVID seems to have taken a lot out of her, and her prognosis remains quite guarded.  If she is unable to ambulate and move I suspect she will become more debiitated.        CCx: hospital follow up    HPI: Ms. Lemon is a 82 year old female with a history of ILD and dyspnea who presents as a follow up.  She was hospitalized over [place] with COVID 19 pneumonia and required oxygen on discharge.  She has been more short of breath since and is using incruse, breo and combivent as needed.  Her grand-daughter finds giving her three combivent puffs and 3 albuterol puffs morning and night seem to help get her going.  They don't have a nebulizer.    She is using oxygen most of the day, but  sometimes she takes it off.  They will start it at 2 or 3 depending on how short of breath she is.  They don't have an oximeter.  She also finally did see the neurologist I referred her to as I suspected a lot of her dyspnea was secondary to weakness based on her PFTs and MIP/MEP scores.  She is undergoing a workup with them, but so far there is no specific warning signs of a specific neurologic process.    ROS:  Review of Systems - History obtained from the patient  General ROS: negative  Psychological ROS: negative  ENT ROS: negative  Allergy and Immunology ROS: negative  Endocrine ROS: negative  Respiratory ROS: positive for - cough shortness of breath and wheezing and orthopnea  negative for - hemoptysis, , pleuritic pain, stridor or tachypnea  Cardiovascular ROS: no chest pain or palpitations  Gastrointestinal ROS: no abdominal pain, change in bowel habits, or black or bloody stools  Genito-Urinary ROS: no dysuria, trouble voiding, or hematuria  Musculoskeletal ROS: global weakness and fatigue  Neurological ROS: no TIA or stroke symptoms  Dermatological ROS: negative      Current Meds:  Current Outpatient Medications   Medication Sig Dispense Refill     albuterol (PROAIR HFA/PROVENTIL HFA/VENTOLIN HFA) 108 (90 Base) MCG/ACT inhaler Inhale 2 puffs into the lungs every 6 hours as needed for shortness of breath / dyspnea or wheezing        amLODIPine (NORVASC) 10 MG tablet Take 1 tablet (10 mg) by mouth daily 90 tablet 2     ARTIFICIAL TEARS 1.4 % ophthalmic solution Place 1 drop into both eyes every hour as needed for dry eyes (itchy eyes)        BREO ELLIPTA 100-25 MCG/INH inhaler Inhale 1 puff into the lungs daily        calcium carbonate (ANTACID CALCIUM) 500 MG chewable tablet Take 1 tablet (500 mg) by mouth 2 times daily 90 tablet 3     capsaicin (ZOSTRIX) 0.025 % external cream Apply topically 2 times daily as needed (for pain)        COMBIVENT RESPIMAT  MCG/ACT inhaler INHALE 1 PUFF EVERY 6 (SIX)  HOURS AS NEEDED. (Patient taking differently: Inhale 1 puff into the lungs every 6 hours as needed for shortness of breath / dyspnea or wheezing ) 4 g 12     DULoxetine (CYMBALTA) 30 MG capsule TAKE 1 CAPSULE (30 MG TOTAL) BY MOUTH DAILY. 90 capsule 3     famotidine (PEPCID) 40 MG tablet TAKE 1 TABLET BY MOUTH 2 TIMES A  tablet 3     ferrous gluconate (FERGON) 324 (38 Fe) MG tablet Take 1 tablet (324 mg) by mouth daily (with breakfast) 30 tablet 1     furosemide (LASIX) 20 MG tablet Take 1 tablet (20 mg) by mouth 2 times daily 60 tablet 0     furosemide (LASIX) 20 MG tablet Take 1 tablet (20 mg) by mouth daily for 5 days 5 tablet 0     gabapentin (NEURONTIN) 100 MG capsule TAKE 1 TAB (100MG) IN THE MORNING AND 2 TABS (200MG) AT BEDTIME (Patient taking differently: Take 100-200 mg by mouth See Admin Instructions Take 1 capsule (100 mg) by mouth in the morning, and take 2 capsules (200 mg) by mouth in the evening) 90 capsule 3     hydrochlorothiazide (HYDRODIURIL) 25 MG tablet TAKE ONE TABLET BY MOUTH ONCE DAILY WITH LOSARTAN 100MG. (Patient taking differently: Take 25 mg by mouth daily ) 90 tablet 3     INCRUSE ELLIPTA 62.5 MCG/INH Inhaler Inhale 1 puff into the lungs daily 30 each 4     losartan (COZAAR) 100 MG tablet TAKE 1 TABLET BY MOUTH DAILY WITH HYDROCHLOROTHIAZIDE 25MG (Patient taking differently: Take 100 mg by mouth daily ) 90 tablet 2     MAPAP ARTHRITIS PAIN 650 MG CR tablet TAKE 2 TABLETS BY MOUTH 2 TIMES A  tablet 6     metoprolol succinate ER (TOPROL-XL) 50 MG 24 hr tablet TAKE 1 TABLET BY MOUTH DAILY (Patient taking differently: Take 50 mg by mouth daily ) 90 tablet 3     mirtazapine (REMERON) 15 MG tablet Take 1.5 tablets (22.5 mg) by mouth At Bedtime 45 tablet 11     Multiple Vitamin (MULTI-VITAMINS) TABS Take 1 tablet by mouth daily        polyethylene glycol (MIRALAX) 17 g packet Take 1 packet by mouth daily as needed for constipation       SENEXON-S 8.6-50 MG tablet TAKE 2 TABLETS  BY MOUTH DAILY. 60 tablet 3     vitamin D2 (ERGOCALCIFEROL) 26607 units (1250 mcg) capsule Take 50,000 Units by mouth once a week Wednesday's         Labs:  @viji@  Lab Results   Component Value Date    WBC 7.7 02/24/2022    HGB 9.8 (L) 02/24/2022    HCT 33.4 (L) 02/24/2022     02/24/2022     03/28/2022    POTASSIUM 4.3 03/28/2022    CHLORIDE 97 (L) 03/28/2022    CO2 32 (H) 03/28/2022     03/28/2022    BUN 19 03/28/2022    CR 1.24 (H) 03/28/2022    MAG 1.9 12/14/2019    INR 1.05 12/27/2021    BILITOTAL 0.5 02/24/2022    AST 21 02/24/2022    ALT 16 02/24/2022    ALKPHOS 99 02/24/2022    PROTTOTAL 6.9 02/24/2022    ALBUMIN 2.9 (L) 02/24/2022       I have personally reviewed all pertinent imaging studies and PFT results unless otherwise noted.    Imaging studies:  XR Chest 2 Views    Result Date: 2/25/2022  EXAM: XR CHEST 2 VW LOCATION: [facility] DATE/TIME: 2/25/2022 8:09 AM INDICATION:  Infection due to 2019 novel coronavirus. COMPARISON: 02/24/2022.     IMPRESSION: Mild worsening of basilar predominant opacities compatible with history of COVID. Stable mild left costophrenic angle blunting from adjacent opacity, thickening or small pleural effusion. Stable mildly enlarged heart size. Mild pulmonary vascular redistribution / congestion.         Physical Exam:  /77 (BP Location: Left arm, Patient Position: Sitting, Cuff Size: Adult Regular)   Pulse 87   Resp 28   Wt 51.7 kg (114 lb)   SpO2 91%   BMI 23.29 kg/m    General - thin and frail appearing  Ears/Mouth -  Deferred given mask use during pandemic  Neck - no cervical lymphadenopathy  Lungs - Coarse throughout  CVS - regular rhythm with no murmurs, rubs or gallups  Abdomen - soft, NT, ND, NABS  Ext - no cyanosis, clubbing or edema  Skin - no rash  Psychology - alert and oriented, answers appropriate        Electronically signed by:    Johan Chavez MD PhD  [facility]  [facility]

## 2022-03-29 NOTE — PATIENT INSTRUCTIONS
1) We will start some nebulizers to help open up the lungs  2) You did have covid pneumonia, that's why you were in the hospital and it did cause scarring in your lungs that is still there  3) You need to wear your oxygen.  I would get an oxygen meter to measure how low your oxygen is and use it to keep your sats >90%

## 2022-03-29 NOTE — LETTER
3/29/2022       RE: Luis Manuel Lemon  [address]  [place] [address]     Dear Colleague,    Thank you for referring your patient, Luis Manuel Lemon, to the [facility]. Please see a copy of my visit note below.    Assessment and Plan:Luis Manuel Lemon is a 82 year old female with a past medical history significant for ILD and restrictive lung disease who presents to clinic today for follow up.  She became very ill from COVID 19 since I saw her last and is making a slow recovery. Her chest Xray still shows bilateral lower lobe infiltrates and her symptoms now include more shortness of breath.  She remains on triple therapy for her lung process, but is using a lot of combivent and albuterol and I think she would be better served with a nebulizer given her weakness and increased needs.    1) ILD- at a baseline, I supsect all of her worsening ifiltrates are covid related, which can also have a fibrotic scarring process.  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  Cannot walk to test ambulatory, but at rest needs at least 2 L in clinic.  Recommend they buy a home oximeter to do spot checks.  4) Weakness - undergoing workup with neurology.  Anticipating MRI of spine and serologic workup.  Likely only more deconditioned since COVID.    Overall the COVID seems to have taken a lot out of her, and her prognosis remains quite guarded.  If she is unable to ambulate and move I suspect she will become more debiitated.        CCx: hospital follow up    HPI: Ms. Lemon is a 82 year old female with a history of ILD and dyspnea who presents as a follow up.  She was [place] 19 pneumonia and required oxygen on discharge.  She has  been more short of breath since and is using incruse, breo and combivent as needed.  Her grand-daughter finds giving her three combivent puffs and 3 albuterol puffs morning and night seem to help get her going.  They don't have a nebulizer.    She is using oxygen most of the day, but sometimes she takes it off.  They will start it at 2 or 3 depending on how short of breath she is.  They don't have an oximeter.  She also finally did see the neurologist I referred her to as I suspected a lot of her dyspnea was secondary to weakness based on her PFTs and MIP/MEP scores.  She is undergoing a workup with them, but so far there is no specific warning signs of a specific neurologic process.    ROS:  Review of Systems - History obtained from the patient  General ROS: negative  Psychological ROS: negative  ENT ROS: negative  Allergy and Immunology ROS: negative  Endocrine ROS: negative  Respiratory ROS: positive for - cough shortness of breath and wheezing and orthopnea  negative for - hemoptysis, , pleuritic pain, stridor or tachypnea  Cardiovascular ROS: no chest pain or palpitations  Gastrointestinal ROS: no abdominal pain, change in bowel habits, or black or bloody stools  Genito-Urinary ROS: no dysuria, trouble voiding, or hematuria  Musculoskeletal ROS: global weakness and fatigue  Neurological ROS: no TIA or stroke symptoms  Dermatological ROS: negative      Current Meds:  Current Outpatient Medications   Medication Sig Dispense Refill     albuterol (PROAIR HFA/PROVENTIL HFA/VENTOLIN HFA) 108 (90 Base) MCG/ACT inhaler Inhale 2 puffs into the lungs every 6 hours as needed for shortness of breath / dyspnea or wheezing        amLODIPine (NORVASC) 10 MG tablet Take 1 tablet (10 mg) by mouth daily 90 tablet 2     ARTIFICIAL TEARS 1.4 % ophthalmic solution Place 1 drop into both eyes every hour as needed for dry eyes (itchy eyes)        BREO ELLIPTA 100-25 MCG/INH inhaler Inhale 1 puff into the lungs daily        calcium  carbonate (ANTACID CALCIUM) 500 MG chewable tablet Take 1 tablet (500 mg) by mouth 2 times daily 90 tablet 3     capsaicin (ZOSTRIX) 0.025 % external cream Apply topically 2 times daily as needed (for pain)        COMBIVENT RESPIMAT  MCG/ACT inhaler INHALE 1 PUFF EVERY 6 (SIX) HOURS AS NEEDED. (Patient taking differently: Inhale 1 puff into the lungs every 6 hours as needed for shortness of breath / dyspnea or wheezing ) 4 g 12     DULoxetine (CYMBALTA) 30 MG capsule TAKE 1 CAPSULE (30 MG TOTAL) BY MOUTH DAILY. 90 capsule 3     famotidine (PEPCID) 40 MG tablet TAKE 1 TABLET BY MOUTH 2 TIMES A  tablet 3     ferrous gluconate (FERGON) 324 (38 Fe) MG tablet Take 1 tablet (324 mg) by mouth daily (with breakfast) 30 tablet 1     furosemide (LASIX) 20 MG tablet Take 1 tablet (20 mg) by mouth 2 times daily 60 tablet 0     furosemide (LASIX) 20 MG tablet Take 1 tablet (20 mg) by mouth daily for 5 days 5 tablet 0     gabapentin (NEURONTIN) 100 MG capsule TAKE 1 TAB (100MG) IN THE MORNING AND 2 TABS (200MG) AT BEDTIME (Patient taking differently: Take 100-200 mg by mouth See Admin Instructions Take 1 capsule (100 mg) by mouth in the morning, and take 2 capsules (200 mg) by mouth in the evening) 90 capsule 3     hydrochlorothiazide (HYDRODIURIL) 25 MG tablet TAKE ONE TABLET BY MOUTH ONCE DAILY WITH LOSARTAN 100MG. (Patient taking differently: Take 25 mg by mouth daily ) 90 tablet 3     INCRUSE ELLIPTA 62.5 MCG/INH Inhaler Inhale 1 puff into the lungs daily 30 each 4     losartan (COZAAR) 100 MG tablet TAKE 1 TABLET BY MOUTH DAILY WITH HYDROCHLOROTHIAZIDE 25MG (Patient taking differently: Take 100 mg by mouth daily ) 90 tablet 2     MAPAP ARTHRITIS PAIN 650 MG CR tablet TAKE 2 TABLETS BY MOUTH 2 TIMES A  tablet 6     metoprolol succinate ER (TOPROL-XL) 50 MG 24 hr tablet TAKE 1 TABLET BY MOUTH DAILY (Patient taking differently: Take 50 mg by mouth daily ) 90 tablet 3     mirtazapine (REMERON) 15 MG tablet  Take 1.5 tablets (22.5 mg) by mouth At Bedtime 45 tablet 11     Multiple Vitamin (MULTI-VITAMINS) TABS Take 1 tablet by mouth daily        polyethylene glycol (MIRALAX) 17 g packet Take 1 packet by mouth daily as needed for constipation       SENEXON-S 8.6-50 MG tablet TAKE 2 TABLETS BY MOUTH DAILY. 60 tablet 3     vitamin D2 (ERGOCALCIFEROL) 63131 units (1250 mcg) capsule Take 50,000 Units by mouth once a week Wednesday's         Labs:  @clab@  Lab Results   Component Value Date    WBC 7.7 02/24/2022    HGB 9.8 (L) 02/24/2022    HCT 33.4 (L) 02/24/2022     02/24/2022     03/28/2022    POTASSIUM 4.3 03/28/2022    CHLORIDE 97 (L) 03/28/2022    CO2 32 (H) 03/28/2022     03/28/2022    BUN 19 03/28/2022    CR 1.24 (H) 03/28/2022    MAG 1.9 12/14/2019    INR 1.05 12/27/2021    BILITOTAL 0.5 02/24/2022    AST 21 02/24/2022    ALT 16 02/24/2022    ALKPHOS 99 02/24/2022    PROTTOTAL 6.9 02/24/2022    ALBUMIN 2.9 (L) 02/24/2022       I have personally reviewed all pertinent imaging studies and PFT results unless otherwise noted.    Imaging studies:  XR Chest 2 Views    Result Date: 2/25/2022  EXAM: XR CHEST 2 VW LOCATION: [facility] DATE/TIME: 2/25/2022 8:09 AM INDICATION:  Infection due to 2019 novel coronavirus. COMPARISON: 02/24/2022.     IMPRESSION: Mild worsening of basilar predominant opacities compatible with history of COVID. Stable mild left costophrenic angle blunting from adjacent opacity, thickening or small pleural effusion. Stable mildly enlarged heart size. Mild pulmonary vascular redistribution / congestion.         Physical Exam:  /77 (BP Location: Left arm, Patient Position: Sitting, Cuff Size: Adult Regular)   Pulse 87   Resp 28   Wt 51.7 kg (114 lb)   SpO2 91%   BMI 23.29 kg/m    General - thin and frail appearing  Ears/Mouth -  Deferred given mask use during pandemic  Neck - no cervical lymphadenopathy  Lungs - Coarse throughout  CVS - regular  rhythm with no murmurs, rubs or gallups  Abdomen - soft, NT, ND, NABS  Ext - no cyanosis, clubbing or edema  Skin - no rash  Psychology - alert and oriented, answers appropriate        Electronically signed by:    Johan Chavez MD PhD  [facility]      Again, thank you for allowing me to participate in the care of your patient.      Sincerely,    Johan Chavez MD

## 2022-03-30 LAB
ACHR BIND AB SER-SCNC: 0 NMOL/L
ACHR BLOCK AB/ACHR TOTAL SFR SER: 13 %
ACHR MOD AB/ACHR TOTAL SFR SER: 0 %
COPPER SERPL-MCNC: 141.9 UG/DL
PATH ICD:: NORMAL
PROT PATTERN SERPL IFE-IMP: NORMAL
REVIEWING PATHOLOGIST: NORMAL
STRIA MUS IGG SER QL IF: NORMAL

## 2022-03-31 LAB
ALBUMIN PERCENT: 46.2 % (ref 51–67)
ALBUMIN SERPL ELPH-MCNC: 3.6 G/DL (ref 3.2–4.7)
ALPHA 1 PERCENT: 4 % (ref 2–4)
ALPHA 2 PERCENT: 13.1 % (ref 5–13)
ALPHA1 GLOB SERPL ELPH-MCNC: 0.3 G/DL (ref 0.1–0.3)
ALPHA2 GLOB SERPL ELPH-MCNC: 1 G/DL (ref 0.4–0.9)
B-GLOBULIN SERPL ELPH-MCNC: 1.6 G/DL (ref 0.7–1.2)
BETA PERCENT: 21.1 % (ref 10–17)
CERULOPLASMIN SERPL-MCNC: 22 MG/DL (ref 20–60)
GAMMA GLOB SERPL ELPH-MCNC: 1.2 G/DL (ref 0.6–1.4)
GAMMA GLOBULIN PERCENT: 15.6 % (ref 9–20)
METHYLMALONATE SERPL-SCNC: 0.17 UMOL/L (ref 0–0.4)
PATH ICD:: ABNORMAL
PROT PATTERN SERPL ELPH-IMP: ABNORMAL
REVIEWING PATHOLOGIST: ABNORMAL
TOTAL PROTEIN SERUM FOR ELP (SYNCED VALUE): 7.8 G/DL

## 2022-04-01 LAB — ANA SER QL IF: NEGATIVE

## 2022-04-13 DIAGNOSIS — Z76.0 ENCOUNTER FOR MEDICATION REFILL: Primary | ICD-10-CM

## 2022-04-13 DIAGNOSIS — D64.9 ANEMIA, UNSPECIFIED TYPE: ICD-10-CM

## 2022-04-13 RX ORDER — FERROUS GLUCONATE 324(38)MG
TABLET ORAL
Qty: 90 TABLET | Refills: 3 | Status: SHIPPED | OUTPATIENT
Start: 2022-04-13 | End: 2023-02-18

## 2022-04-21 DIAGNOSIS — Z76.0 ENCOUNTER FOR MEDICATION REFILL: Primary | ICD-10-CM

## 2022-04-21 RX ORDER — GABAPENTIN 100 MG/1
CAPSULE ORAL
Qty: 90 CAPSULE | Refills: 3 | Status: SHIPPED | OUTPATIENT
Start: 2022-04-21 | End: 2023-08-22

## 2022-05-01 DIAGNOSIS — Z76.0 ENCOUNTER FOR MEDICATION REFILL: Primary | ICD-10-CM

## 2022-05-01 DIAGNOSIS — J81.0 ACUTE PULMONARY EDEMA (H): ICD-10-CM

## 2022-05-01 DIAGNOSIS — K59.03 DRUG-INDUCED CONSTIPATION: ICD-10-CM

## 2022-05-02 RX ORDER — FUROSEMIDE 20 MG
20 TABLET ORAL 2 TIMES DAILY
Qty: 60 TABLET | Refills: 0 | Status: SHIPPED | OUTPATIENT
Start: 2022-05-02 | End: 2022-05-31

## 2022-05-02 RX ORDER — DOCUSATE SODIUM 50MG AND SENNOSIDES 8.6MG 8.6; 5 MG/1; MG/1
2 TABLET, FILM COATED ORAL DAILY
Qty: 60 TABLET | Refills: 3 | Status: SHIPPED | OUTPATIENT
Start: 2022-05-02 | End: 2022-08-18

## 2022-05-02 RX ORDER — DIPHENOXYLATE HYDROCHLORIDE AND ATROPINE SULFATE 2.5; .025 MG/1; MG/1
1 TABLET ORAL DAILY
Qty: 90 TABLET | Refills: 3 | Status: SHIPPED | OUTPATIENT
Start: 2022-05-02 | End: 2022-08-08

## 2022-05-09 ENCOUNTER — OFFICE VISIT (OUTPATIENT)
Dept: FAMILY MEDICINE | Facility: CLINIC | Age: 82
End: 2022-05-09
Payer: COMMERCIAL

## 2022-05-09 VITALS
HEIGHT: 59 IN | BODY MASS INDEX: 24.19 KG/M2 | HEART RATE: 84 BPM | OXYGEN SATURATION: 93 % | RESPIRATION RATE: 16 BRPM | WEIGHT: 120 LBS | TEMPERATURE: 98.3 F | SYSTOLIC BLOOD PRESSURE: 130 MMHG | DIASTOLIC BLOOD PRESSURE: 56 MMHG

## 2022-05-09 DIAGNOSIS — J96.01 ACUTE RESPIRATORY FAILURE WITH HYPOXIA (H): ICD-10-CM

## 2022-05-09 DIAGNOSIS — Z59.71 INSURANCE COVERAGE PROBLEMS: ICD-10-CM

## 2022-05-09 DIAGNOSIS — R29.2 HYPERREFLEXIA: ICD-10-CM

## 2022-05-09 DIAGNOSIS — Z60.3 LANGUAGE BARRIER AFFECTING HEALTH CARE: ICD-10-CM

## 2022-05-09 DIAGNOSIS — Z75.8 LANGUAGE BARRIER AFFECTING HEALTH CARE: ICD-10-CM

## 2022-05-09 DIAGNOSIS — J84.10 PULMONARY FIBROSIS (H): ICD-10-CM

## 2022-05-09 DIAGNOSIS — Z76.0 ENCOUNTER FOR MEDICATION REFILL: Primary | ICD-10-CM

## 2022-05-09 DIAGNOSIS — Z78.9 MEDICALLY COMPLEX PATIENT: ICD-10-CM

## 2022-05-09 DIAGNOSIS — M62.81 GENERALIZED MUSCLE WEAKNESS: ICD-10-CM

## 2022-05-09 DIAGNOSIS — J98.4 RESTRICTIVE LUNG DISEASE: Primary | ICD-10-CM

## 2022-05-09 DIAGNOSIS — Z79.899 POLYPHARMACY: ICD-10-CM

## 2022-05-09 PROCEDURE — 99214 OFFICE O/P EST MOD 30 MIN: CPT | Performed by: FAMILY MEDICINE

## 2022-05-09 RX ORDER — ERGOCALCIFEROL 1.25 MG/1
CAPSULE, LIQUID FILLED ORAL
Qty: 4 CAPSULE | Refills: 12 | Status: SHIPPED | OUTPATIENT
Start: 2022-05-09 | End: 2023-04-06

## 2022-05-09 SDOH — SOCIAL STABILITY - SOCIAL INSECURITY: ACCULTURATION DIFFICULTY: Z60.3

## 2022-05-09 ASSESSMENT — ASTHMA QUESTIONNAIRES
QUESTION_3 LAST FOUR WEEKS HOW OFTEN DID YOUR ASTHMA SYMPTOMS (WHEEZING, COUGHING, SHORTNESS OF BREATH, CHEST TIGHTNESS OR PAIN) WAKE YOU UP AT NIGHT OR EARLIER THAN USUAL IN THE MORNING: NOT AT ALL
QUESTION_5 LAST FOUR WEEKS HOW WOULD YOU RATE YOUR ASTHMA CONTROL: POORLY CONTROLLED
QUESTION_2 LAST FOUR WEEKS HOW OFTEN HAVE YOU HAD SHORTNESS OF BREATH: ONCE A DAY
QUESTION_4 LAST FOUR WEEKS HOW OFTEN HAVE YOU USED YOUR RESCUE INHALER OR NEBULIZER MEDICATION (SUCH AS ALBUTEROL): ONE OR TWO TIMES PER DAY
ACT_TOTALSCORE: 13
QUESTION_1 LAST FOUR WEEKS HOW MUCH OF THE TIME DID YOUR ASTHMA KEEP YOU FROM GETTING AS MUCH DONE AT WORK, SCHOOL OR AT HOME: MOST OF THE TIME
HOSPITALIZATION_OVERNIGHT_LAST_YEAR_TOTAL: ONE
ACT_TOTALSCORE: 13
EMERGENCY_ROOM_LAST_YEAR_TOTAL: ONE
ACUTE_EXACERBATION_TODAY: NO

## 2022-05-09 NOTE — PROGRESS NOTES
Assessment & Plan     Restrictive lung disease  Pulmonary fibrosis (H)  Acute respiratory failure with hypoxia (H)  Reviewed pulm consult  Will ask [facility] care coordination team to help her to get a neb machine and home oximeter  - Nebulizer and Supplies Order for DME - ONLY FOR DME        Generalized muscle weakness  Hyperreflexia  Neurology consult reviewed  Will ask [facility] care coordination team to work with family to help schedule:  1. cervical spine (neck) MRI - to check if nerves are making it hard for her to breath (recommended by neuro)   [phone]  2.  follow up appt with neurologist Dr. Yimi López to follow up on general weakness after MRI  -(missed appt on 5/6/22 d/t illness)  [phone]    Polypharmacy      Medically complex patient  Language barrier affecting health care  Insurance coverage problems  Will refer to [facility] care coordination to help with navigating appts and getting access to the medical equipment recommended by pulm clinic   - Primary Care - Care Coordination Referral      Return in about 6 weeks (around 6/20/2022) for Follow up, with me, in person resp failure/ILD and f/u neuro consult.    Kateryna Morales MD  [facility] professional phone  used.   Here with granddaughter.     Luis Manuel Lemon is a 82 year old female who presents today for the following   health issues: f/u neuro and pulm consults    pulm consult from 3/29/22 reviewed  Plan:  She remains on triple therapy for her lung process, but is using a lot of combivent and albuterol and I think she would be better served with a nebulizer given her weakness and increased needs.  1) ILD (interstitial lung dz)- at a baseline, I supsect all of her worsening ifiltrates are covid related, which can also have a fibrotic scarring process.  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  Cannot walk to test ambulatory, but at rest needs at least 2 L in clinic.  Recommend they buy a home oximeter to do spot checks.  4) Weakness - undergoing workup with neurology.  Anticipating MRI of spine and serologic workup.  Likely only more deconditioned since COVID.    --today:   she wants neb machine given weakness  Unable to home oximeter   Per AVS from  pulm clinic, neb machine was ordered but she has not yet gotten this    Neuro consult on 3/24/22 reviewed  DX: generalized weakness and hyperreflexia  The generalized muscle weakness is most likely related to poor effort from either fatigue or poor appetite.  I do not see a lot of evidence of neuromuscular disease.  Breathing issues could be nonneurological in nature.  She does have some slight hyperreflexia on examination and could be related to cervical spine injury versus myelopathy.  I will check blood work to look for cause of myelopathy.  We will further check MRI of the cervical spine  Plan: I can see her back in about 6 weeks.  Should continue work-up with her pulmonologist/primary care doctor regarding her breathing difficulties as neurological cause is less likely.    F/u appt was 5/6/22 - no show b/c feeling dizzy and SOB   Cervical spine MRI not yet scheduled     Polypharmacy   Home health RN picks up and sets up med    Review of Systems     Please see above.  The rest of the review of systems are negative for all systems.    Current Outpatient Medications   Medication Instructions     albuterol (PROAIR HFA/PROVENTIL HFA/VENTOLIN HFA) 108 (90 Base) MCG/ACT inhaler 2 puffs, Inhalation, EVERY 6 HOURS PRN     albuterol (PROVENTIL) 2.5 mg, Nebulization, EVERY 6 HOURS PRN     amLODIPine (NORVASC) 10 mg, Oral, DAILY     ARTIFICIAL TEARS 1.4 % ophthalmic solution 1 drop, Both Eyes, EVERY 1 HOUR PRN     BREO ELLIPTA 100-25 MCG/INH inhaler 1  "puff, Inhalation, DAILY     calcium carbonate (ANTACID CALCIUM) 500 mg, Oral, 2 TIMES DAILY     capsaicin (ZOSTRIX) 0.025 % external cream Topical, 2 TIMES DAILY PRN     COMBIVENT RESPIMAT  MCG/ACT inhaler INHALE 1 PUFF EVERY 6 (SIX) HOURS AS NEEDED.     DULoxetine (CYMBALTA) 30 MG capsule TAKE 1 CAPSULE (30 MG TOTAL) BY MOUTH DAILY.     famotidine (PEPCID) 40 MG tablet TAKE 1 TABLET BY MOUTH 2 TIMES A DAY     ferrous gluconate (FERGON) 324 (38 Fe) MG tablet TAKE 1 TABLET BY MOUTH DAILY (WITH BREAKFAST)     furosemide (LASIX) 20 mg, Oral, DAILY     furosemide (LASIX) 20 mg, Oral, 2 TIMES DAILY     gabapentin (NEURONTIN) 100 MG capsule TAKE 1 CAPSULE BY MOUTH IN THE MORNING AND 2 CAPSULES AT BEDTIME     hydrochlorothiazide (HYDRODIURIL) 25 MG tablet TAKE ONE TABLET BY MOUTH ONCE DAILY WITH LOSARTAN 100MG.     INCRUSE ELLIPTA 62.5 MCG/INH Inhaler 1 puff, Inhalation, DAILY     losartan (COZAAR) 100 MG tablet TAKE 1 TABLET BY MOUTH DAILY WITH HYDROCHLOROTHIAZIDE 25MG     MAPAP ARTHRITIS PAIN 650 MG CR tablet TAKE 2 TABLETS BY MOUTH 2 TIMES A DAY     metoprolol succinate ER (TOPROL-XL) 50 MG 24 hr tablet TAKE 1 TABLET BY MOUTH DAILY     mirtazapine (REMERON) 22.5 mg, Oral, AT BEDTIME     Multiple Vitamin (MULTI-VITAMINS) TABS 1 tablet, Oral, DAILY     polyethylene glycol (MIRALAX) 17 g packet 1 packet, Oral, DAILY PRN     SENEXON-S 8.6-50 MG tablet 2 tablets, Oral, DAILY     vitamin D2 (ERGOCALCIFEROL) 50,000 Units, Oral, WEEKLY, Wednesday's         Objective   Vitals:    05/09/22 1358   BP: 130/56   Pulse: 84   Resp: 16   Temp: 98.3  F (36.8  C)   TempSrc: Oral   SpO2: 93%   Weight: 54.4 kg (120 lb)   Height: 1.488 m (4' 10.58\")       Body mass index is 24.58 kg/m .    Physical Exam    OBJECTIVE:  Vitals listed above within normal limits  General appearance: well groomed, pleasant, well hydrated, nontoxic appearing  ENT: PERRL, throat clear  Neck: neck supple, no lymphadenopathy, no thyromegaly  Lungs: lungs clear " to auscultation bilaterally, no wheezes or rhonchi  Heart: regular rate and rhythm, no murmurs, rubs or gallops  Abdomen: soft, nontender  Neuro: no focal deficits, CN II-XII grossly intact, alert and oriented  Psych:  mood stable, appears to have good insight and judgment    No results found for this or any previous visit (from the past 1008 hour(s)).

## 2022-05-09 NOTE — PATIENT INSTRUCTIONS
Schedule cervical spine (neck) MRI - to check if nerves are making it hard for her to breath  [phone]    2. Schedule follow up appt with neurologist Dr. Yimi López to follow up on general weakness after MRI   [phone]    3. Try to get medical supplies:  Nebulizer machine  Home oximieter

## 2022-05-11 ENCOUNTER — PATIENT OUTREACH (OUTPATIENT)
Dept: GERIATRIC MEDICINE | Facility: CLINIC | Age: 82
End: 2022-05-11
Payer: COMMERCIAL

## 2022-05-11 DIAGNOSIS — J98.4 RESTRICTIVE LUNG DISEASE: Primary | ICD-10-CM

## 2022-05-11 DIAGNOSIS — R06.02 SHORT OF BREATH ON EXERTION: ICD-10-CM

## 2022-05-11 DIAGNOSIS — J84.10 PULMONARY FIBROSIS (H): ICD-10-CM

## 2022-05-11 NOTE — PROGRESS NOTES
[facility] Care Coordination Contact    Situation: Received Care Coordination referral requesting CC address the followin. Schedule cervical spine (neck) MRI - to check if nerves are making it hard for her to breath [phone]  2. Schedule follow up appt with neurologist Dr. Yimi López to follow up on general weakness  3. Needs help to get medical supplies: Nebulizer machine and Home oximeter   [facility] will not bill insurance for these.     Assessment:   Cervical spine (neck) MRI scheduled for:  Friday, 22 @ 1:30 pm   [place] [id number] [facility], [address] [place], [place]    Neurology w/ Dr. Yimi López    at 9:10 am (very booked out and very limited)added to the wait list for cancellations Updated reminders communication to text message to limit risk of no show again.   [address] [address]    Sent message to [facility] about obtaining nebulizer and home oximeter which they say they can run through insurance. Will fax over updated face sheet and request orders from PCP.     Plan/Recommendations: Will assist with coordinating DME delivery.    REBECCA Srinivasan  [facility]  [phone]

## 2022-05-30 DIAGNOSIS — J81.0 ACUTE PULMONARY EDEMA (H): ICD-10-CM

## 2022-05-30 DIAGNOSIS — Z76.0 ENCOUNTER FOR MEDICATION REFILL: Primary | ICD-10-CM

## 2022-05-31 RX ORDER — FUROSEMIDE 20 MG
20 TABLET ORAL 2 TIMES DAILY
Qty: 180 TABLET | Refills: 3 | Status: SHIPPED | OUTPATIENT
Start: 2022-05-31 | End: 2023-04-06

## 2022-06-15 NOTE — PROGRESS NOTES
[facility] Care Coordination Contact    Per [facility], the nebulizer/oximiter was delivered 6/6/22. CC notified.     Luis Manuel Lemon 1940 nebulizer, oximeter 5/23/2022 [employer]  DELIVERED 6/6/2022         Debbie Klein  Care Management Specialist  [facility]  [phone]

## 2022-06-23 ENCOUNTER — OFFICE VISIT (OUTPATIENT)
Dept: FAMILY MEDICINE | Facility: CLINIC | Age: 82
End: 2022-06-23
Payer: COMMERCIAL

## 2022-06-23 VITALS
RESPIRATION RATE: 32 BRPM | TEMPERATURE: 97.8 F | HEART RATE: 60 BPM | BODY MASS INDEX: 24.8 KG/M2 | OXYGEN SATURATION: 90 % | SYSTOLIC BLOOD PRESSURE: 104 MMHG | HEIGHT: 59 IN | WEIGHT: 123 LBS | DIASTOLIC BLOOD PRESSURE: 40 MMHG

## 2022-06-23 DIAGNOSIS — J84.10 PULMONARY FIBROSIS (H): ICD-10-CM

## 2022-06-23 DIAGNOSIS — I27.21 PULMONARY ARTERIAL HYPERTENSION (H): ICD-10-CM

## 2022-06-23 DIAGNOSIS — J98.4 RESTRICTIVE LUNG DISEASE: Primary | ICD-10-CM

## 2022-06-23 DIAGNOSIS — F33.0 MAJOR DEPRESSIVE DISORDER, RECURRENT EPISODE, MILD (H): ICD-10-CM

## 2022-06-23 DIAGNOSIS — Z75.8 LANGUAGE BARRIER AFFECTING HEALTH CARE: ICD-10-CM

## 2022-06-23 DIAGNOSIS — Z78.9 MEDICALLY COMPLEX PATIENT: ICD-10-CM

## 2022-06-23 DIAGNOSIS — M54.2 NECK PAIN: ICD-10-CM

## 2022-06-23 DIAGNOSIS — R06.01 ORTHOPNEA: ICD-10-CM

## 2022-06-23 DIAGNOSIS — Z60.3 LANGUAGE BARRIER AFFECTING HEALTH CARE: ICD-10-CM

## 2022-06-23 DIAGNOSIS — M50.30 DEGENERATIVE DISC DISEASE, CERVICAL: ICD-10-CM

## 2022-06-23 DIAGNOSIS — M62.81 GENERALIZED MUSCLE WEAKNESS: ICD-10-CM

## 2022-06-23 DIAGNOSIS — M48.00 SPINAL STENOSIS, MULTILEVEL: ICD-10-CM

## 2022-06-23 DIAGNOSIS — Z86.16 HISTORY OF 2019 NOVEL CORONAVIRUS DISEASE (COVID-19): ICD-10-CM

## 2022-06-23 PROCEDURE — 99214 OFFICE O/P EST MOD 30 MIN: CPT | Performed by: FAMILY MEDICINE

## 2022-06-23 SDOH — SOCIAL STABILITY - SOCIAL INSECURITY: ACCULTURATION DIFFICULTY: Z60.3

## 2022-06-23 ASSESSMENT — PATIENT HEALTH QUESTIONNAIRE - PHQ9
SUM OF ALL RESPONSES TO PHQ QUESTIONS 1-9: 13
10. IF YOU CHECKED OFF ANY PROBLEMS, HOW DIFFICULT HAVE THESE PROBLEMS MADE IT FOR YOU TO DO YOUR WORK, TAKE CARE OF THINGS AT HOME, OR GET ALONG WITH OTHER PEOPLE: VERY DIFFICULT
SUM OF ALL RESPONSES TO PHQ QUESTIONS 1-9: 13

## 2022-06-23 ASSESSMENT — ASTHMA QUESTIONNAIRES
QUESTION_1 LAST FOUR WEEKS HOW MUCH OF THE TIME DID YOUR ASTHMA KEEP YOU FROM GETTING AS MUCH DONE AT WORK, SCHOOL OR AT HOME: MOST OF THE TIME
QUESTION_2 LAST FOUR WEEKS HOW OFTEN HAVE YOU HAD SHORTNESS OF BREATH: MORE THAN ONCE A DAY
QUESTION_3 LAST FOUR WEEKS HOW OFTEN DID YOUR ASTHMA SYMPTOMS (WHEEZING, COUGHING, SHORTNESS OF BREATH, CHEST TIGHTNESS OR PAIN) WAKE YOU UP AT NIGHT OR EARLIER THAN USUAL IN THE MORNING: FOUR OR MORE NIGHTS A WEEK
QUESTION_4 LAST FOUR WEEKS HOW OFTEN HAVE YOU USED YOUR RESCUE INHALER OR NEBULIZER MEDICATION (SUCH AS ALBUTEROL): TWO OR THREE TIMES PER WEEK
ACT_TOTALSCORE: 9
ACT_TOTALSCORE: 9
QUESTION_5 LAST FOUR WEEKS HOW WOULD YOU RATE YOUR ASTHMA CONTROL: POORLY CONTROLLED

## 2022-06-23 NOTE — PROGRESS NOTES
Assessment & Plan     Restrictive lung disease  Pulmonary fibrosis (H)  History of 2019 novel coronavirus disease (COVID-19)  Reviewed pulm consult  Continue home O2  Suspect possible long covid as her sx have significant worsened and her general energy has declined since her covid infection    Orthopnea  Pulmonary arterial hypertension (H)  Will check echo which has not been rechecked since her covid infection in Dec 2021  - Echocardiogram Complete    Neck pain  Spinal stenosis, multilevel  Degenerative disc disease, cervical  Pain manageable with tylenol and topical diclofenac and cymbalta  - diclofenac (VOLTAREN) 1 % topical gel  Dispense: 600 g; Refill: 4    Major depressive disorder, recurrent episode, mild (H)  Stable and improved with cymbalta    Medically complex patient    Generalized muscle weakness    Language barrier affecting health care        Return in about 4 weeks (around 7/21/2022) for Follow up echo and sob and establish care with new PCP.    Kateryna Morales MD  [facility]    [facility] is a 82 year old accompanied by her grandlanceter., presenting for the following health issues:  Follow Up (Res failure) and Neck Pain (Left side of neck x on going  )      History of Present Illness       Reason for visit:  F/U resp failure     Today's PHQ-9         PHQ-9 Total Score: 13    PHQ-9 Q9 Thoughts of better off dead/self-harm past 2 weeks :   Not at all    How difficult have these problems made it for you to do your work, take care of things at home, or get along with other people: Very difficult     Persistent SOB and restrictive lung dz with baseline pulm fibrosis and covid pneumonia in Dec 24, 2021    She is reporting persistent sob  She is on chronic O2 and the nasal cannula hurts her nose  She does now have her neb machine      pulm consult from 3/29/22 reviewed  Plan:  She remains on triple therapy for her lung process, but is using a lot of combivent and albuterol  and I think she would be better served with a nebulizer given her weakness and increased needs.  1) ILD (interstitial lung dz)- at a baseline, I supsect all of her worsening ifiltrates are covid related, which can also have a fibrotic scarring process.  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  Cannot walk to test ambulatory, but at rest needs at least 2 L in clinic.  Recommend they buy a home oximeter to do spot checks.  4) Weakness - undergoing workup with neurology.  Anticipating MRI of spine and serologic workup.  Likely only more deconditioned since COVID.     Echo 7/2021:  1. Normal left ventricular size and systolic performance with a visually  estimated ejection fraction of 60-65%.  2. There is mild to moderate aortic insufficiency.  3. There is mild, to perhaps mild-moderate, mitral insufficiency.  4. Normal right ventricular size and systolic performance.     Neuro consult on 3/24/22 reviewed  DX: generalized weakness and hyperreflexia  The generalized muscle weakness is most likely related to poor effort from either fatigue or poor appetite.  I do not see a lot of evidence of neuromuscular disease.  Breathing issues could be nonneurological in nature.  She does have some slight hyperreflexia on examination and could be related to cervical spine injury versus myelopathy.  I will check blood work to look for cause of myelopathy.  We will further check MRI of the cervical spine  Plan: I can see her back in about 6 weeks.  Should continue work-up with her pulmonologist/primary care doctor regarding her breathing difficulties as neurological cause is less likely.     Neck pain  Cervical spine MRI 3/10/21  - Motion artifact limits evaluation of the neural foramen. Multilevel degenerative changes  "of the cervical spine are grossly similar to the prior exam in 2016.   -Moderate canal stenosis at C6-C7. Multilevel bilateral neural foraminal narrowing, as described.     Ultrasound 6/17/21:   Normal-sized left cervical lymph nodes. No lesion or fluid collection or other ultrasound findings that would help explain a painful left neck.     Repeat cervical spine MRI ordered by neurology on 5/20/22 but this has  not yet scheduled     Pain meds: gabapentin and cymbalta       HTN -    BP elevated today - not yet taken BP meds today  Meds: amlodipine 10mg, hydrochlorothiazide 25mg, losartan 100mg, metoprolol XL 50mg       MDD /PTSD  On cymbalta    Review of Systems    Please see above.  The rest of the review of systems are negative for all systems.      Objective    /40   Pulse 60   Temp 97.8  F (36.6  C) (Oral)   Resp (!) 32   Ht 1.488 m (4' 10.58\")   Wt 55.8 kg (123 lb)   SpO2 90%   BMI 25.20 kg/m    Body mass index is 25.2 kg/m .  Physical Exam   GENERAL: healthy, alert and no distress  EYES: Eyes grossly normal to inspection, PERRL and conjunctivae and sclerae normal  HENT: ear canals and TM's normal, nose and mouth without ulcers or lesions  NECK: no adenopathy, no asymmetry, masses, or scars and thyroid normal to palpation  RESP: bibasilar crackles,no wheezes but good air movmennt  CV: regular rate and rhythm, normal S1 S2, no S3 or S4, no murmur, click or rub, no peripheral edema and peripheral pulses strong  ABDOMEN: soft, nontender, no hepatosplenomegaly, no masses and bowel sounds normal  MS: no gross musculoskeletal defects noted, no edema  SKIN: no suspicious lesions or rashes  NEURO: Normal strength and tone, mentation intact and speech normal  PSYCH: mentation appears normal, affect normal/bright            .  ..  "

## 2022-06-25 DIAGNOSIS — K21.9 GASTROESOPHAGEAL REFLUX DISEASE WITHOUT ESOPHAGITIS: ICD-10-CM

## 2022-06-25 DIAGNOSIS — Z76.0 ENCOUNTER FOR MEDICATION REFILL: ICD-10-CM

## 2022-06-26 NOTE — TELEPHONE ENCOUNTER
Routing refill request to provider for review/approval because:  Drug not on the [facility] refill protocol     Last Written Prescription Date:  1/11/22  Last Fill Quantity: 90,  # refills: 3   Last office visit provider:  6/23/22     Requested Prescriptions   Pending Prescriptions Disp Refills     ANTACID CALCIUM 500 MG chewable tablet [Pharmacy Med Name: ANTACID CALCIUM 500 MG CHEW 500 Tablet] 90 tablet 3     Sig: TAKE 1 TABLET (500 MG) BY MOUTH 2 TIMES DAILY       There is no refill protocol information for this order          Madelyn Miles 06/26/22 10:02 AM

## 2022-06-27 RX ORDER — CALCIUM CARBONATE 500 MG/1
TABLET, CHEWABLE ORAL
Qty: 90 TABLET | Refills: 3 | Status: SHIPPED | OUTPATIENT
Start: 2022-06-27 | End: 2022-11-30

## 2022-07-28 DIAGNOSIS — Z76.0 ENCOUNTER FOR MEDICATION REFILL: Primary | ICD-10-CM

## 2022-07-28 DIAGNOSIS — I10 ESSENTIAL HYPERTENSION: ICD-10-CM

## 2022-07-28 RX ORDER — LOSARTAN POTASSIUM 100 MG/1
TABLET ORAL
Qty: 90 TABLET | Refills: 3 | Status: SHIPPED | OUTPATIENT
Start: 2022-07-28 | End: 2023-06-14

## 2022-07-28 RX ORDER — HYDROCHLOROTHIAZIDE 25 MG/1
TABLET ORAL
Qty: 90 TABLET | Refills: 3 | Status: SHIPPED | OUTPATIENT
Start: 2022-07-28 | End: 2023-06-14

## 2022-07-28 RX ORDER — AMLODIPINE BESYLATE 10 MG/1
10 TABLET ORAL DAILY
Qty: 90 TABLET | Refills: 3 | Status: SHIPPED | OUTPATIENT
Start: 2022-07-28 | End: 2024-06-27

## 2022-08-08 ENCOUNTER — OFFICE VISIT (OUTPATIENT)
Dept: FAMILY MEDICINE | Facility: CLINIC | Age: 82
End: 2022-08-08
Payer: COMMERCIAL

## 2022-08-08 VITALS
DIASTOLIC BLOOD PRESSURE: 56 MMHG | SYSTOLIC BLOOD PRESSURE: 92 MMHG | OXYGEN SATURATION: 96 % | BODY MASS INDEX: 23.56 KG/M2 | TEMPERATURE: 98 F | RESPIRATION RATE: 16 BRPM | WEIGHT: 115 LBS | HEART RATE: 55 BPM

## 2022-08-08 DIAGNOSIS — N18.30 STAGE 3 CHRONIC KIDNEY DISEASE, UNSPECIFIED WHETHER STAGE 3A OR 3B CKD (H): Primary | ICD-10-CM

## 2022-08-08 DIAGNOSIS — M50.30 DEGENERATIVE DISC DISEASE, CERVICAL: ICD-10-CM

## 2022-08-08 DIAGNOSIS — F33.0 MAJOR DEPRESSIVE DISORDER, RECURRENT EPISODE, MILD (H): ICD-10-CM

## 2022-08-08 DIAGNOSIS — I26.93 SINGLE SUBSEGMENTAL PULMONARY EMBOLISM WITHOUT ACUTE COR PULMONALE (H): ICD-10-CM

## 2022-08-08 DIAGNOSIS — M81.0 AGE-RELATED OSTEOPOROSIS WITHOUT CURRENT PATHOLOGICAL FRACTURE: ICD-10-CM

## 2022-08-08 DIAGNOSIS — M48.00 SPINAL STENOSIS, MULTILEVEL: ICD-10-CM

## 2022-08-08 DIAGNOSIS — J84.10 PULMONARY FIBROSIS (H): ICD-10-CM

## 2022-08-08 DIAGNOSIS — Z76.0 ENCOUNTER FOR MEDICATION REFILL: ICD-10-CM

## 2022-08-08 DIAGNOSIS — I10 ESSENTIAL HYPERTENSION: ICD-10-CM

## 2022-08-08 DIAGNOSIS — G89.4 CHRONIC PAIN SYNDROME: ICD-10-CM

## 2022-08-08 DIAGNOSIS — R00.1 SINUS BRADYCARDIA: ICD-10-CM

## 2022-08-08 DIAGNOSIS — I27.21 PULMONARY ARTERIAL HYPERTENSION (H): ICD-10-CM

## 2022-08-08 DIAGNOSIS — M54.2 NECK PAIN: ICD-10-CM

## 2022-08-08 PROCEDURE — 99214 OFFICE O/P EST MOD 30 MIN: CPT | Performed by: FAMILY MEDICINE

## 2022-08-08 RX ORDER — METOPROLOL SUCCINATE 25 MG/1
25 TABLET, EXTENDED RELEASE ORAL DAILY
Qty: 60 TABLET | Refills: 0 | Status: SHIPPED | OUTPATIENT
Start: 2022-08-08 | End: 2022-09-15

## 2022-08-08 ASSESSMENT — ASTHMA QUESTIONNAIRES
ACT_TOTALSCORE: 16
QUESTION_4 LAST FOUR WEEKS HOW OFTEN HAVE YOU USED YOUR RESCUE INHALER OR NEBULIZER MEDICATION (SUCH AS ALBUTEROL): ONE OR TWO TIMES PER DAY
QUESTION_3 LAST FOUR WEEKS HOW OFTEN DID YOUR ASTHMA SYMPTOMS (WHEEZING, COUGHING, SHORTNESS OF BREATH, CHEST TIGHTNESS OR PAIN) WAKE YOU UP AT NIGHT OR EARLIER THAN USUAL IN THE MORNING: ONCE OR TWICE
QUESTION_1 LAST FOUR WEEKS HOW MUCH OF THE TIME DID YOUR ASTHMA KEEP YOU FROM GETTING AS MUCH DONE AT WORK, SCHOOL OR AT HOME: A LITTLE OF THE TIME
QUESTION_2 LAST FOUR WEEKS HOW OFTEN HAVE YOU HAD SHORTNESS OF BREATH: ONCE OR TWICE A WEEK
QUESTION_5 LAST FOUR WEEKS HOW WOULD YOU RATE YOUR ASTHMA CONTROL: POORLY CONTROLLED
ACT_TOTALSCORE: 16

## 2022-08-08 ASSESSMENT — PATIENT HEALTH QUESTIONNAIRE - PHQ9
SUM OF ALL RESPONSES TO PHQ QUESTIONS 1-9: 14
SUM OF ALL RESPONSES TO PHQ QUESTIONS 1-9: 14
10. IF YOU CHECKED OFF ANY PROBLEMS, HOW DIFFICULT HAVE THESE PROBLEMS MADE IT FOR YOU TO DO YOUR WORK, TAKE CARE OF THINGS AT HOME, OR GET ALONG WITH OTHER PEOPLE: NOT DIFFICULT AT ALL

## 2022-08-08 NOTE — PROGRESS NOTES
"  Assessment & Plan     Stage 3 chronic kidney disease, unspecified whether stage 3a or 3b CKD (H)  Stable, last checked 4 months ago.     Major depressive disorder, recurrent episode, mild (H)  Continue duloxetine    Age-related osteoporosis without current pathological fracture  Chart reviewed, stable.     Pulmonary arterial hypertension (H)  Pulmonary fibrosis (H)  Single subsegmental pulmonary embolism without acute cor pulmonale (H)  Continue home O2, she is not on it in the office today. Sometimes her breathing is good.   Continue albuterol prn, breo or incruse, combivent. She did not bring with her, not sure which ones she takes.     Sinus bradycardia  This was on patient's chart, she is also on metoprolol 50mg daily. Will decrease to 25mg.     Essential hypertension  Actually hypotensive today, will decrease metoprolol due to bradycardia. Medications listed below. Will wean/titrate as able. Echo has not been done, will see if we can help them schedule.   - Albumin Random Urine Quantitative with Creat Ratio  - metoprolol succinate ER (TOPROL XL) 25 MG 24 hr tablet  Dispense: 60 tablet; Refill: 0  - continue:    Amlodipine 10mg   Lasix 20mg daily (patient is not taking BID)    hydrochlorothiazide 25mg daily    Chronic pain syndrome  Degenerative disc disease, cervical  Spinal stenosis, multilevel  Neck pain  Doing ok, declines any intervention today.   - diclofenac (VOLTAREN) 1 % topical gel  Dispense: 600 g; Refill: 4  - continue gabapentin, duloxetine,a cetaminophen prn               BMI:   Estimated body mass index is 23.56 kg/m  as calculated from the following:    Height as of 6/23/22: 1.488 m (4' 10.58\").    Weight as of this encounter: 52.2 kg (115 lb).           Return in about 1 month (around 9/8/2022) for neck pain.    Chichi Brown MD  [facility] Issa is a 82 year old accompanied by her granddaughter, presenting for the following health issues:  establish care w " you      History of Present Illness       Reason for visit:  Seeing My New Doctor.    She eats 0-1 servings of fruits and vegetables daily.She consumes 3 sweetened beverage(s) daily.She exercises with enough effort to increase her heart rate 10 to 19 minutes per day.  She exercises with enough effort to increase her heart rate 4 days per week.   She is taking medications regularly.    Today's PHQ-9         PHQ-9 Total Score: 14    PHQ-9 Q9 Thoughts of better off dead/self-harm past 2 weeks :   Not at all    How difficult have these problems made it for you to do your work, take care of things at home, or get along with other people: Not difficult at all       Left sided neck pain, worse when she looks left. She also noticed a lump on that side. She noticed this about a week or so ago. With tylenol, the pain is much better.     Review of Systems   Constitutional, HEENT, cardiovascular, pulmonary, gi and gu systems are negative, except as otherwise noted.      Objective    BP 92/56   Pulse 55   Temp 98  F (36.7  C) (Temporal)   Resp 16   Wt 52.2 kg (115 lb)   SpO2 96%   BMI 23.56 kg/m    Body mass index is 23.56 kg/m .  Physical Exam   GENERAL: healthy, alert and no distress  EYES: Eyes grossly normal to inspection, PERRL and conjunctivae and sclerae normal  HENT: ear canals and TM's normal, nose and mouth without ulcers or lesions  NECK: no adenopathy, no asymmetry, masses, or scars and thyroid normal to palpation  RESP: lungs clear to auscultation - no rales, rhonchi or wheezes  CV: regular rate and rhythm, normal S1 S2, no S3 or S4, no murmur, click or rub, no peripheral edema and peripheral pulses strong  ABDOMEN: soft, nontender, no hepatosplenomegaly, no masses and bowel sounds normal  MS: no gross musculoskeletal defects noted, no edema  SKIN: no suspicious lesions or rashes  NEURO: Normal strength and tone, mentation intact and speech normal  PSYCH: mentation appears normal, affect  normal/bright    Office Visit on 03/28/2022   Component Date Value Ref Range Status     Sodium 03/28/2022 143  136 - 145 mmol/L Final     Potassium 03/28/2022 4.3  3.5 - 5.0 mmol/L Final     Chloride 03/28/2022 97 (A) 98 - 107 mmol/L Final     Carbon Dioxide (CO2) 03/28/2022 32 (A) 22 - 31 mmol/L Final     Anion Gap 03/28/2022 14  5 - 18 mmol/L Final     Urea Nitrogen 03/28/2022 19  8 - 28 mg/dL Final     Creatinine 03/28/2022 1.24 (A) 0.60 - 1.10 mg/dL Final     Calcium 03/28/2022 9.5  8.5 - 10.5 mg/dL Final     Glucose 03/28/2022 103  70 - 125 mg/dL Final     GFR Estimate 03/28/2022 44 (A) >60 mL/min/1.73m2 Final    Effective December 21, 2021 eGFRcr in adults is calculated using the 2021 CKD-EPI creatinine equation which includes age and gender (Morgan et al., NEJ, DOI: 10.1056/OCXGij5523584)     BNP 03/28/2022 39  0 - 159 pg/mL Final     Vitamin B12 03/28/2022 1,229 (A) 213 - 816 pg/mL Final     TSH 03/28/2022 0.65  0.30 - 5.00 uIU/mL Final     Immunofixation ELP 03/28/2022 No monoclonal component identified.    Final     Path ICD: 03/28/2022 M62.81   Final     Reviewing Pathologist 03/28/2022 Adair Sims MD    Final     Methylmalonic Acid 03/28/2022 0.17  0.00 - 0.40 umol/L Final    INTERPRETIVE INFORMATION:    Slight elevation 0.41-0.99 umol/L is consistent with mild vitamin B12 deficiency, renal insufficiency, or intravascular volume contraction.    Moderate elevation 1.00-9.99 umol/L is consistent with mild vitamin B12 deficiency.    Massive elevation 10.00 umol/L or greater is consistent with significant vitamin B12 deficiency or with inborn errors of metabolism.     Hemoglobin A1C 03/28/2022 5.7 (A) 0.0 - 5.6 % Final    Normal <5.7%   Prediabetes 5.7-6.4%    Diabetes 6.5% or higher     Note: Adopted from ADA consensus guidelines.     Ferritin 03/28/2022 42  10 - 130 ng/mL Final     Copper 03/28/2022 141.9  80.0 - 155.0 ug/dL Final    Comment: INTERPRETIVE INFORMATION: Copper, Serum or  Plasma    Elevated results may be due to skin or collection-related   contamination, including the use of a noncertified   metal-free collection/transport tube. If contamination   concerns exist due to elevated levels of serum/plasma   copper, confirmation with a second specimen collected in a   certified metal-free tube is recommended.    Serum copper may be elevated with infection, inflammation,   stress, and copper supplementation. In females, elevated   copper may also be caused by oral contraceptives and   pregnancy (concentrations may be elevated up to 3 times   normal during the third trimester).    This test was developed and its performance characteristics   determined by [facility]. It has not been cleared or   approved by the US Food and Drug Administration. This test   was performed in a CLIA certified laboratory and is   intended for clinical purposes.  Performed By: [facility]  [address] [place] [address]                            [id number]  : Marilyn Ospina MD     Ceruloplasmin 03/28/2022 22  20 - 60 mg/dL Final     CK 03/28/2022 22 (A) 30 - 190 U/L Final     Acetylcholine Binding Antibody 03/28/2022 0.0  0.0 - 0.4 nmol/L Final    Comment: INTERPRETIVE INFORMATION: Acetylcholine Binding Ab      Negative ....... 0.0 - 0.4 nmol/L    Positive ....... 0.5 nmol/L or greater    Approximately 85-90 percent of patients with myasthenia   gravis (MG) express antibodies to the acetylcholine   receptor (AChR), which can be divided into binding,   blocking, and modulating antibodies. Binding antibody can   activate complement and lead to loss of AChR. Blocking   antibody may impair binding of acetylcholine to the   receptor, leading to poor muscle contraction. Modulating   antibody causes receptor endocytosis resulting in loss of   AChR expression, which correlates most closely with   clinical severity of disease. Approximately 10-15 percent   of individuals with confirmed  myasthenia gravis have no   measurable binding, blocking, or modulating antibodies.    This test was developed and its performance characteristics   determined by [facility]. It has not been cleared or   approved by the US Food and Drug Administration. This                            test   was performed in a CLIA certified laboratory and is   intended for clinical purposes.  Performed By: [facility]  [address] [place] [id number]  : Marilyn Ospina MD     Striated Muscle Antibodies, IgG Sc* 03/28/2022 <1:40  <1:40 Final    Comment:   Striated Muscle Antibodies, IgG are not detected. No   further testing will be performed.  INTERPRETIVE DATA:  Striated Muscle Antibodies, IgG Screen    In the presence of acetylcholine receptor (AChR) antibody,   striated muscle antibodies, which bind in a   cross-striational pattern to skeletal and heart muscle   tissue sections, are associated with late-onset myasthenia   gravis (MG). Striated muscle antibodies recognize epitopes   on three major muscle proteins, including: titin, ryanodine   receptor (RyR) and Kv1.4 (an alpha subunit of voltage-gated   potassium channel [VGKC]). Isolated cases of striated   muscle antibodies may be seen in patients with certain   autoimmune diseases, rheumatic fever, myocardial   infarction, and following some cardiotomy procedures.    This test was developed and its performance characteristics   determined by [facility]. It has not been cleared or   approved by the US Food and Drug Administration. This test   was performed in a CLIA                            certified laboratory and is   intended for clinical purposes.  Performed by [facility],  [address] [place] [id number] [phone]  [email or web address], Marilyn Ospina MD, Lab. Director     Imelda Kellogg 03/28/2022 0  <=45 % Final    Comment: INTERPRETIVE INFORMATION: Acetylcholine Modulating Ab      Negative ..........  0-45  percent modulating    Positive ..........  46 percent or greater modulating    Approximately 85-90 percent of patients with myasthenia   gravis (MG) express antibodies to the acetylcholine   receptor (AChR), which can be divided into binding,   blocking, and modulating antibodies. Binding antibody can   activate complement and lead to loss of AChR. Blocking   antibody may impair binding of acetylcholine to the   receptor, leading to poor muscle contraction. Modulating   antibody causes receptor endocytosis resulting in loss of   AChR expression, which correlates most closely with   clinical severity of disease. Approximately 10-15 percent   of individuals with confirmed myasthenia gravis have no   measurable binding, blocking, or modulating antibodies.    This test was developed and its performance characteristics   determined by [facility]. It has not been cleared or   approved by the [place] Food                            and Drug Administration. This test   was performed in a CLIA certified laboratory and is   intended for clinical purposes.  Performed By: [facility]  [address] [place] [id number]  : Marilyn Ospina MD     AcetChol Block Bess 03/28/2022 13  0 - 26 % Final    Comment: INTERPRETIVE INFORMATION: Acetylcholine Blocking Ab      Negative ............  0-26 percent blocking    Indeterminate ....... 27-41 percent blocking    Positive ............ 42 percent or greater blocking    Approximately 85-90 percent of patients with myasthenia   gravis (MG) express antibodies to the acetylcholine   receptor (AChR), which can be divided into binding,   blocking, and modulating antibodies. Binding antibody can   activate complement and lead to loss of AChR. Blocking   antibody may impair binding of acetylcholine to the   receptor, leading to poor muscle contraction. Modulating   antibody causes receptor endocytosis resulting in loss of   AChR expression, which correlates  most closely with   clinical severity of disease. Approximately 10-15 percent   of individuals with confirmed myasthenia gravis have no   measurable binding, blocking, or modulating antibodies.    This test was developed and its performance characteristics   determined by [facility]. It has                            not been cleared or   approved by the US Food and Drug Administration. This test   was performed in a CLIA certified laboratory and is   intended for clinical purposes.  Performed By: [facility]  [address] [place], [place] [id number]  : Marilyn Ospina MD     Neutrophil Cytoplasmic Antibody 03/28/2022 <1:10  <1:10 Final     Neutrophil Cytoplasmic Antibody Pa* 03/28/2022 The ANCA IFA is <1:10.  No further testing will be performed.   Final     Angiotensin Converting Enzyme 03/28/2022 35  9 - 67 U/L Final    Performed By: [facility]  [address] [id number]  : Marilyn Ospina MD     LEANDRO interpretation 03/28/2022 Negative  Negative Final      Negative:              <1:40  Borderline Positive:   1:40 - 1:80  Positive:              >1:80     Total Protein Serum for ELP 03/28/2022 7.8  6.0 - 8.0 g/dL Final     Albumin % 03/28/2022 46.2 (A) 51.0 - 67.0 % Final     Albumin 03/28/2022 3.6  3.2 - 4.7 g/dL Final     Alpha 1 % 03/28/2022 4.0  2.0 - 4.0 % Final     Alpha 1 03/28/2022 0.3  0.1 - 0.3 g/dL Final     Alpha 2 % 03/28/2022 13.1 (A) 5.0 - 13.0 % Final     Alpha 2 03/28/2022 1.0 (A) 0.4 - 0.9 g/dL Final     Beta % 03/28/2022 21.1 (A) 10.0 - 17.0 % Final     Beta Globulin 03/28/2022 1.6 (A) 0.7 - 1.2 g/dL Final     Gamma Globulin % 03/28/2022 15.6  9.0 - 20.0 % Final     Gamma Globulin 03/28/2022 1.2  0.6 - 1.4 g/dL Final     ELP Interpretation 03/28/2022    Final                    Value:The alpha 2 globulin fraction is increased. Isolated increases in alpha 2 globulin are associated with a variety of chronic inflammatory  conditions, particularly connective tissue inflammatory disorders or collagen-vascular diseases.     The beta globulin fraction is moderately elevated, suggestive of hyperlipoproteinemia, iron deficiency anemia, or use of estrogens or anabolic steroids.        Path ICD: 03/28/2022 M62.81   Final     Reviewing Pathologist 03/28/2022 Adair Sims MD    Final     Total Protein Serum for ELP 03/28/2022 7.8  g/dL Final     No results found for this or any previous visit (from the past 24 hour(s)).                .  ..

## 2022-08-08 NOTE — PROGRESS NOTES
{PROVIDER CHARTING PREFERENCE:348022}    Subjective   Luis Manuel is a 82 year old{ACCOMPANIED BY STATEMENT (Optional):624541}, presenting for the following health issues:  establish care w you      HPI     {SUPERLIST (Optional):292424}  {additonal problems for provider to add (Optional):709971}    Review of Systems   {ROS COMP (Optional):906531}      Objective    There were no vitals taken for this visit.  There is no height or weight on file to calculate BMI.  Physical Exam   {Exam List (Optional):261751}    {Diagnostic Test Results (Optional):857932}    {AMBULATORY ATTESTATION (Optional):144563}            .  ..  Answers for HPI/ROS submitted by the patient on 8/8/2022  If you checked off any problems, how difficult have these problems made it for you to do your work, take care of things at home, or get along with other people?: Not difficult at all  PHQ9 TOTAL SCORE: 14

## 2022-08-17 ENCOUNTER — PATIENT OUTREACH (OUTPATIENT)
Dept: GERIATRIC MEDICINE | Facility: CLINIC | Age: 82
End: 2022-08-17

## 2022-08-17 NOTE — PROGRESS NOTES
CC updated program tasks and targets for [employer] launch.    REBECCA Srinivasan  [employer]  [phone]

## 2022-08-18 ENCOUNTER — PATIENT OUTREACH (OUTPATIENT)
Dept: GERIATRIC MEDICINE | Facility: CLINIC | Age: 82
End: 2022-08-18

## 2022-08-18 DIAGNOSIS — Z76.0 ENCOUNTER FOR MEDICATION REFILL: Primary | ICD-10-CM

## 2022-08-18 DIAGNOSIS — K59.03 DRUG-INDUCED CONSTIPATION: ICD-10-CM

## 2022-08-18 RX ORDER — DOCUSATE SODIUM 50MG AND SENNOSIDES 8.6MG 8.6; 5 MG/1; MG/1
2 TABLET, FILM COATED ORAL DAILY
Qty: 180 TABLET | Refills: 3 | Status: SHIPPED | OUTPATIENT
Start: 2022-08-18 | End: 2023-09-20

## 2022-09-12 PROBLEM — J96.01 ACUTE RESPIRATORY FAILURE WITH HYPOXIA (H): Status: RESOLVED | Noted: 2021-12-24 | Resolved: 2022-09-12

## 2022-09-15 DIAGNOSIS — I10 ESSENTIAL HYPERTENSION: ICD-10-CM

## 2022-09-15 DIAGNOSIS — Z76.0 ENCOUNTER FOR MEDICATION REFILL: Primary | ICD-10-CM

## 2022-09-15 RX ORDER — METOPROLOL SUCCINATE 25 MG/1
25 TABLET, EXTENDED RELEASE ORAL DAILY
Qty: 90 TABLET | Refills: 3 | Status: SHIPPED | OUTPATIENT
Start: 2022-09-15 | End: 2023-09-20

## 2022-09-30 ENCOUNTER — OFFICE VISIT (OUTPATIENT)
Dept: NEUROLOGY | Facility: CLINIC | Age: 82
End: 2022-09-30
Payer: COMMERCIAL

## 2022-09-30 ENCOUNTER — DOCUMENTATION ONLY (OUTPATIENT)
Dept: PULMONOLOGY | Facility: OTHER | Age: 82
End: 2022-09-30

## 2022-09-30 VITALS
SYSTOLIC BLOOD PRESSURE: 113 MMHG | RESPIRATION RATE: 16 BRPM | WEIGHT: 118 LBS | HEART RATE: 56 BPM | DIASTOLIC BLOOD PRESSURE: 64 MMHG | BODY MASS INDEX: 24.18 KG/M2

## 2022-09-30 DIAGNOSIS — U07.1 COVID-19: ICD-10-CM

## 2022-09-30 DIAGNOSIS — J84.10 PULMONARY FIBROSIS (H): Primary | ICD-10-CM

## 2022-09-30 DIAGNOSIS — R29.2 HYPERREFLEXIA: ICD-10-CM

## 2022-09-30 DIAGNOSIS — R06.00 DYSPNEA, UNSPECIFIED TYPE: ICD-10-CM

## 2022-09-30 DIAGNOSIS — M62.81 GENERALIZED MUSCLE WEAKNESS: Primary | ICD-10-CM

## 2022-09-30 PROCEDURE — 99214 OFFICE O/P EST MOD 30 MIN: CPT | Performed by: PSYCHIATRY & NEUROLOGY

## 2022-09-30 NOTE — LETTER
9/30/2022         RE: Luis Manuel Lemon  [address] [place] [address]        Dear Colleague,    Thank you for referring your patient, Luis Manuel Lemon, to the [facility] [place]. Please see a copy of my visit note below.    NEUROLOGY OUTPATIENT PROGRESS NOTE   Sep 30, 2022     CHIEF COMPLAINT/REASON FOR VISIT/REASON FOR CONSULT  Patient presents with:  Follow Up    REASON FOR CONSULTATION- Generalized weakness    REFERRAL SOURCE  Dr. Johan Chavez  CC Dr. Johan Chavez    HISTORY OF PRESENT ILLNESS  Luis Manuel Lemon is a 82 year old female seen today for evaluation of generalized weakness/breathing difficulty.  Patient cannot tell me how long this has been going on for.  Possibly has been going on for years.  In early 2022 it was thought that she might have Covid and was admitted.  Per family they do not think she had Covid.  She mainly reports difficulty with breathing.  She might be an ex-smoker the history is unclear.  Does use the oxygen at home.  Needs to walk to the bathroom with a cane.  Denies any other significant weakness.  Does have some neck pain/neck issues.  Her appetite has been poor.  Recently ferritin was low and she is supposed to be on iron replacement.  Denies any other bowel or bladder issues or other neurological symptoms.    9/30/22  She returns today.  Continues to have generalized weakness and no energy.  Has been taking the iron supplement without any benefit.  Her breathing issues are slightly better.  Reports no other new symptoms.  Appetite has been poor.    Previous history is reviewed and this is unchanged.    PAST MEDICAL/SURGICAL HISTORY  Past Medical History:   Diagnosis Date     Biceps tendon rupture      CHF (congestive heart failure) (H) 10/8/2017     Chronic use of steroids 2/24/2015 2/24/2015:  For connective tissue disease.  On steroids since at least 2011.  Attempts to wean have been unsuccessful.  Rheum currently trying again to wean.  Has  known osteoporosis with compression fracture.      Compression fracture 2/4/2015 2/24/2015:  T12 compression fracture with 33% height loss seen on plain films 1/2015.  Was not seen on plain films 6/2014, so is presumably new since then.      Dermatophytosis of foot     Created by Conversion      MILLER (dyspnea on exertion) 4/25/2018     Dysfunction of eustachian tube     Created by Conversion      Dyspepsia      GERD (gastroesophageal reflux disease)      History of immunological disorder     Connective Tissue Disorder     History of kidney stones      Hyperlipidemia      Hypertension      Inverted nipple     Bilaterally     Low back pain 12/8/2015     Myalgia and myositis      Neuralgia      Nontraumatic rupture of tendons of biceps (long head)     Created by Conversion      Opioid dependence, episodic (H) 8/8/2019     Osteoarthritis      Osteoporosis      Pancreatitis 4/15/2016     Personal history of noncompliance with medical treatment, presenting hazards to health     2/24/2015:  Has had trouble historically understanding and taking medicines.  Much improved now with home health [facility]  Georgina's cell [phone].  She needs to be called with all new orders.  Also in [facility] home program -- Care Guide is Jose Castillo.       Polymyalgia rheumatica (H)      Restrictive lung disease      Vitamin D deficiency      Patient Active Problem List   Diagnosis     Constipation     Vitamin D Deficiency     Essential hypertension     Rotator Cuff Tendonitis     Asymptomatic Postmenopausal Status     Hyperlipidemia     Osteoporosis on Prolia 5.7.19     Chronic reflux esophagitis     Bursitis, trochanteric     Hypoalbuminemia     Polypharmacy     Degenerative disc disease, cervical     Spinal stenosis, multilevel     Chronic pain syndrome     Short of breath on exertion     GERD (gastroesophageal reflux disease)     Sinus bradycardia     Bilateral primary osteoarthritis of knee      Polyarthralgia     Medically complex patient     Language barrier affecting health care     Age-related osteoporosis with current pathological fracture with routine healing     Financial difficulties     Urinary incontinence in female     Insurance coverage problems     Pulmonary fibrosis (H)     Anticoagulated     Single subsegmental pulmonary embolism without acute cor pulmonale (H)     Chronic kidney disease, stage 3 (H)     Infection due to 2019 novel coronavirus     Pulmonary arterial hypertension (H)     Major depressive disorder, recurrent episode, mild (H)   Significant for memory loss.  No history of diabetes    FAMILY HISTORY  Family History   Problem Relation Age of Onset     Breast Cancer No family hx of      LUNG DISEASE No family hx of      Cancer No family hx of    Family history negative for neurological conditions    SOCIAL HISTORY  Social History     Tobacco Use     Smoking status: Never Smoker     Smokeless tobacco: Never Used     Tobacco comment: chew betel nut   Substance Use Topics     Alcohol use: No     Drug use: No       SYSTEMS REVIEW  Twelve-system ROS was done and other than the HPI this was negative except for neck pain, back pain, arm and leg pain, joint pain, numbness and tingling, weakness paralysis, difficulty walking, balance coordination problems, movement disorder, bladder symptoms, dizziness, difficulty swallowing, hearing loss, sleepiness during the day, sleeping problems, memory loss, cardiac/heart problems, skin changes, weight gain, appetite problems.  No new concerns/issues reported today    MEDICATIONS  albuterol (PROAIR HFA/PROVENTIL HFA/VENTOLIN HFA) 108 (90 Base) MCG/ACT inhaler, Inhale 2 puffs into the lungs every 6 hours as needed for shortness of breath / dyspnea or wheezing   albuterol (PROVENTIL) (2.5 MG/3ML) 0.083% neb solution, Take 1 vial (2.5 mg) by nebulization every 6 hours as needed for shortness of breath / dyspnea or wheezing  amLODIPine (NORVASC) 10 MG  tablet, TAKE 1 TABLET (10 MG) BY MOUTH DAILY  ANTACID CALCIUM 500 MG chewable tablet, TAKE 1 TABLET (500 MG) BY MOUTH 2 TIMES DAILY  ARTIFICIAL TEARS 1.4 % ophthalmic solution, Place 1 drop into both eyes every hour as needed for dry eyes (itchy eyes)   BREO ELLIPTA 100-25 MCG/INH inhaler, Inhale 1 puff into the lungs daily   capsaicin (ZOSTRIX) 0.025 % external cream, Apply topically 2 times daily as needed (for pain)   COMBIVENT RESPIMAT  MCG/ACT inhaler, INHALE 1 PUFF EVERY 6 (SIX) HOURS AS NEEDED. (Patient taking differently: Inhale 1 puff into the lungs every 6 hours as needed for shortness of breath / dyspnea or wheezing)  diclofenac (VOLTAREN) 1 % topical gel, Apply 2 g topically 3 times daily as needed for moderate pain  DULoxetine (CYMBALTA) 30 MG capsule, TAKE 1 CAPSULE (30 MG TOTAL) BY MOUTH DAILY.  ferrous gluconate (FERGON) 324 (38 Fe) MG tablet, TAKE 1 TABLET BY MOUTH DAILY (WITH BREAKFAST)  furosemide (LASIX) 20 MG tablet, TAKE 1 TABLET (20 MG) BY MOUTH 2 TIMES DAILY  gabapentin (NEURONTIN) 100 MG capsule, TAKE 1 CAPSULE BY MOUTH IN THE MORNING AND 2 CAPSULES AT BEDTIME  hydrochlorothiazide (HYDRODIURIL) 25 MG tablet, TAKE ONE TABLET BY MOUTH ONCE DAILY WITH LOSARTAN 100MG.  INCRUSE ELLIPTA 62.5 MCG/INH Inhaler, Inhale 1 puff into the lungs daily  losartan (COZAAR) 100 MG tablet, TAKE 1 TABLET BY MOUTH DAILY ALONG WITH HYDROCHLOROTHIAZIDE 25MG  MAPAP ARTHRITIS PAIN 650 MG CR tablet, TAKE 2 TABLETS BY MOUTH 2 TIMES A DAY  metoprolol succinate ER (TOPROL XL) 25 MG 24 hr tablet, TAKE 1 TABLET (25 MG) BY MOUTH DAILY  mirtazapine (REMERON) 15 MG tablet, Take 1.5 tablets (22.5 mg) by mouth At Bedtime  polyethylene glycol (MIRALAX) 17 g packet, Take 1 packet by mouth daily as needed for constipation  SENEXON-S 8.6-50 MG tablet, TAKE 2 TABLETS BY MOUTH DAILY.  vitamin D2 (ERGOCALCIFEROL) 35244 units (1250 mcg) capsule, TAKE 1 CAPSULE (50,000 UNITS TOTAL) BY MOUTH ONCE A WEEK.    No current  facility-administered medications on file prior to visit.       PHYSICAL EXAMINATION  VITALS: /64   Pulse 56   Resp 16   Wt 53.5 kg (118 lb)   BMI 24.18 kg/m    GENERAL: Healthy appearing, alert, no acute distress, normal habitus.  CARDIOVASCULAR: Extremities warm and well perfused. Pulses present.   NEUROLOGICAL:  Patient is awake and grossly oriented to self, place and time.  Attention span is normal.  Memory is grossly intact.  Language is fluent and follows commands appropriately.  Appropriate fund of knowledge. Cranial nerves 2-12 are intact. There is no pronator drift.  Motor exam shows 5/5 strength in all extremities.  Tone is symmetric bilaterally in upper and lower extremities.  Reflexes are symmetric and 2+ brisk in upper extremities and lower extremities. Sensory exam is grossly intact to light touch, pin prick and vibration.  Finger to nose and heel to shin is without dysmetria.  Gait is antalgic and slightly wide-based.  Patient does slightly shuffle.  This appears more related to hip and knee issues than true parkinsonian gait.  Exam similar to before.    DIAGNOSTICS  RELEVANT LABS  Component      Latest Ref Rng & Units 2/24/2022   Ferritin      10 - 130 ng/mL 17     OUTSIDE RECORDS  Outside referral notes and chart notes were reviewed and pertinent information has been summarized (in addition to the HPI):-Patient was recently hospitalized for Covid.  This was December 2021.  Had some oxygen needs.  Does have diagnosis of pulmonary fibrosis reactive airway disease.  She recently has an office visit with primary care.  No mention of any neurological issues.  Most of the visit was regarding breathing issues.    Notes from pulmonology clinic also reviewed.  Patient has shortness of breath and neuromuscular causes are being considered.  Pulmonologist does not think she has interstitial lung disease which is causing her symptoms.  Some basic labs were ordered.  Concerns for progressive muscular  atrophy and other conditions has been raised.  Patient never did the blood work ordered through pulmonology.    LABS    Component      Latest Ref Rng & Units 3/28/2022   Albumin %      51.0 - 67.0 % 46.2 (L)   Albumin Fraction      3.2 - 4.7 g/dL 3.6   Alpha 1 %      2.0 - 4.0 % 4.0   Alpha 1 Fraction      0.1 - 0.3 g/dL 0.3   Alpha 2 %      5.0 - 13.0 % 13.1 (H)   Alpha 2 Fraction      0.4 - 0.9 g/dL 1.0 (H)   Beta %      10.0 - 17.0 % 21.1 (H)   Beta Fraction      0.7 - 1.2 g/dL 1.6 (H)   Gamma Globulin %      9.0 - 20.0 % 15.6   Gamma Fraction      0.6 - 1.4 g/dL 1.2   ELP Interpretation:       The alpha 2 globulin fraction is increased. Isolated increases in alpha 2 globulin are associated with a variety of chronic inflammatory conditions, particularly connective tissue inflammatory disorders or collagen-vascular diseases. . . .   Path ICD       M62.81   Interpreted By       Adair Sims MD   Total Protein Serum for ELP      g/dL 7.8   Neutrophil Cytoplasmic Antibody      <1:10 <1:10   Neutrophil Cytoplasmic Antibody Pattern       The ANCA IFA is <1:10.  No further testing will be performed.   TSH      0.30 - 5.00 uIU/mL 0.65   Methylmalonic Acid      0.00 - 0.40 umol/L 0.17   Hemoglobin A1C      0.0 - 5.6 % 5.7 (H)   Copper      80.0 - 155.0 ug/dL 141.9   Ceruloplasmin      20 - 60 mg/dL 22   CK Total      30 - 190 U/L 22 (L)   LEANDRO interpretation      Negative Negative   Total Protein Serum for ELP      6.0 - 8.0 g/dL 7.8     Component      Latest Ref Rng & Units 3/28/2022   Immunofixation ELP       No monoclonal component identified.   Path ICD       M62.81   Interpreted By       Adair Sims MD   Vitamin B12      213 - 816 pg/mL 1,229 (H)   Ferritin      10 - 130 ng/mL 42   AcetChol Binding Bess      0.0 - 0.4 nmol/L 0.0   Striated Muscle Antibody IgG      <1:40 <1:40   AcetChol Modul Bess      <=45 % 0   AcetChol Block Bess      0 - 26 % 13   Angiotensin Converting Enzyme      9 - 67 U/L 35          IMPRESSION/REPORT/PLAN  History of low ferritin  Hyperreflexia  Generalized muscle weakness-subjective complaint  Dyspnea, unspecified type-subjective complaint    This is a 82 year old female with complains of breathing difficulty with some subjective generalized muscle weakness complaints.  The generalized muscle weakness is most likely related to poor effort from either fatigue or poor appetite.  I do not see a lot of evidence of neuromuscular disease.  Exam does not show any focal weakness.  Breathing issues could be nonneurological in nature.  She does have some slight hyperreflexia on examination and could be related to cervical spine injury versus myelopathy.  She is refusing an MRI scan of the cervical spine and we will check an x-ray rule out any major degeneration though she probably will not be a candidate for surgery.  Blood work has been negative for neuromuscular disease causes.    Her ferritin is low which could be causing lack of energy/fatigue.  She is on iron replacement with slightly low normal ferritin.  Could consider injections.  Will defer to primary doctor.  Should follow-up with the primary doctor for right hip knee pain.    Should continue work-up with her pulmonologist/primary care doctor regarding her breathing difficulties as neurological cause is less likely.  Her breathing issues are somewhat better at this point.    I can see her back on as-needed basis    -     XR Cervical Spine 2/3 Views; Future    Return if symptoms worsen or fail to improve, for In-Clinic Visit (must).    Over 30 minutes were spent coordinating the care for the patient on the day of the encounter.  This includes previsit, during visit and post visit activities as documented above.  Counseling patient.  Use of  requires extra time.  Discussion of MRI.  Reviewing results.  (Activities include but not inclusive of reviewing chart, reviewing outside records, reviewing labs and imaging study results  as well as the images, patient visit time including getting history and exam,  use if applicable, review of test results with the patient and coming up with a plan in a shared model, counseling patient and family, education and answering patient questions, EMR , EMR diagnosis entry and problem list management, medication reconciliation and prescription management if applicable, paperwork if applicable, printing documents and documentation of the visit activities.)      Yimi López MD  Neurologist  [facility]  Tel:- [phone]    This note was dictated using voice recognition software.  Any grammatical or context distortions are unintentional and inherent to the software.        Again, thank you for allowing me to participate in the care of your patient.        Sincerely,        Yimi López MD

## 2022-09-30 NOTE — PROGRESS NOTES
NEUROLOGY OUTPATIENT PROGRESS NOTE   Sep 30, 2022     CHIEF COMPLAINT/REASON FOR VISIT/REASON FOR CONSULT  Patient presents with:  Follow Up    REASON FOR CONSULTATION- Generalized weakness    REFERRAL SOURCE  Dr. Johan Chavez  CC Dr. Johan Chavez    HISTORY OF PRESENT ILLNESS  Luis Manuel Lemon is a 82 year old female seen today for evaluation of generalized weakness/breathing difficulty.  Patient cannot tell me how long this has been going on for.  Possibly has been going on for years.  In early 2022 it was thought that she might have Covid and was admitted.  Per family they do not think she had Covid.  She mainly reports difficulty with breathing.  She might be an ex-smoker the history is unclear.  Does use the oxygen at home.  Needs to walk to the bathroom with a cane.  Denies any other significant weakness.  Does have some neck pain/neck issues.  Her appetite has been poor.  Recently ferritin was low and she is supposed to be on iron replacement.  Denies any other bowel or bladder issues or other neurological symptoms.    9/30/22  She returns today.  Continues to have generalized weakness and no energy.  Has been taking the iron supplement without any benefit.  Her breathing issues are slightly better.  Reports no other new symptoms.  Appetite has been poor.    Previous history is reviewed and this is unchanged.    PAST MEDICAL/SURGICAL HISTORY  Past Medical History:   Diagnosis Date     Biceps tendon rupture      CHF (congestive heart failure) (H) 10/8/2017     Chronic use of steroids 2/24/2015 2/24/2015:  For connective tissue disease.  On steroids since at least 2011.  Attempts to wean have been unsuccessful.  Rheum currently trying again to wean.  Has known osteoporosis with compression fracture.      Compression fracture 2/4/2015 2/24/2015:  T12 compression fracture with 33% height loss seen on plain films 1/2015.  Was not seen on plain films 6/2014, so is presumably new since then.       Dermatophytosis of foot     Created by Conversion      MILLER (dyspnea on exertion) 4/25/2018     Dysfunction of eustachian tube     Created by Conversion      Dyspepsia      GERD (gastroesophageal reflux disease)      History of immunological disorder     Connective Tissue Disorder     History of kidney stones      Hyperlipidemia      Hypertension      Inverted nipple     Bilaterally     Low back pain 12/8/2015     Myalgia and myositis      Neuralgia      Nontraumatic rupture of tendons of biceps (long head)     Created by Conversion      Opioid dependence, episodic (H) 8/8/2019     Osteoarthritis      Osteoporosis      Pancreatitis 4/15/2016     Personal history of noncompliance with medical treatment, presenting hazards to health     2/24/2015:  Has had trouble historically understanding and taking medicines.  Much improved now with home health RN Georgina Salinas from [facility].  Georgina's cell [phone].  She needs to be called with all new orders.  Also in [facility] home program -- Care Guide is Jose Castillo.       Polymyalgia rheumatica (H)      Restrictive lung disease      Vitamin D deficiency      Patient Active Problem List   Diagnosis     Constipation     Vitamin D Deficiency     Essential hypertension     Rotator Cuff Tendonitis     Asymptomatic Postmenopausal Status     Hyperlipidemia     Osteoporosis on Prolia 5.7.19     Chronic reflux esophagitis     Bursitis, trochanteric     Hypoalbuminemia     Polypharmacy     Degenerative disc disease, cervical     Spinal stenosis, multilevel     Chronic pain syndrome     Short of breath on exertion     GERD (gastroesophageal reflux disease)     Sinus bradycardia     Bilateral primary osteoarthritis of knee     Polyarthralgia     Medically complex patient     Language barrier affecting health care     Age-related osteoporosis with current pathological fracture with routine healing     Financial difficulties     Urinary incontinence in female     Insurance  coverage problems     Pulmonary fibrosis (H)     Anticoagulated     Single subsegmental pulmonary embolism without acute cor pulmonale (H)     Chronic kidney disease, stage 3 (H)     Infection due to 2019 novel coronavirus     Pulmonary arterial hypertension (H)     Major depressive disorder, recurrent episode, mild (H)   Significant for memory loss.  No history of diabetes    FAMILY HISTORY  Family History   Problem Relation Age of Onset     Breast Cancer No family hx of      LUNG DISEASE No family hx of      Cancer No family hx of    Family history negative for neurological conditions    SOCIAL HISTORY  Social History     Tobacco Use     Smoking status: Never Smoker     Smokeless tobacco: Never Used     Tobacco comment: chew betel nut   Substance Use Topics     Alcohol use: No     Drug use: No       SYSTEMS REVIEW  Twelve-system ROS was done and other than the HPI this was negative except for neck pain, back pain, arm and leg pain, joint pain, numbness and tingling, weakness paralysis, difficulty walking, balance coordination problems, movement disorder, bladder symptoms, dizziness, difficulty swallowing, hearing loss, sleepiness during the day, sleeping problems, memory loss, cardiac/heart problems, skin changes, weight gain, appetite problems.  No new concerns/issues reported today    MEDICATIONS  albuterol (PROAIR HFA/PROVENTIL HFA/VENTOLIN HFA) 108 (90 Base) MCG/ACT inhaler, Inhale 2 puffs into the lungs every 6 hours as needed for shortness of breath / dyspnea or wheezing   albuterol (PROVENTIL) (2.5 MG/3ML) 0.083% neb solution, Take 1 vial (2.5 mg) by nebulization every 6 hours as needed for shortness of breath / dyspnea or wheezing  amLODIPine (NORVASC) 10 MG tablet, TAKE 1 TABLET (10 MG) BY MOUTH DAILY  ANTACID CALCIUM 500 MG chewable tablet, TAKE 1 TABLET (500 MG) BY MOUTH 2 TIMES DAILY  ARTIFICIAL TEARS 1.4 % ophthalmic solution, Place 1 drop into both eyes every hour as needed for dry eyes (itchy eyes)    BREO ELLIPTA 100-25 MCG/INH inhaler, Inhale 1 puff into the lungs daily   capsaicin (ZOSTRIX) 0.025 % external cream, Apply topically 2 times daily as needed (for pain)   COMBIVENT RESPIMAT  MCG/ACT inhaler, INHALE 1 PUFF EVERY 6 (SIX) HOURS AS NEEDED. (Patient taking differently: Inhale 1 puff into the lungs every 6 hours as needed for shortness of breath / dyspnea or wheezing)  diclofenac (VOLTAREN) 1 % topical gel, Apply 2 g topically 3 times daily as needed for moderate pain  DULoxetine (CYMBALTA) 30 MG capsule, TAKE 1 CAPSULE (30 MG TOTAL) BY MOUTH DAILY.  ferrous gluconate (FERGON) 324 (38 Fe) MG tablet, TAKE 1 TABLET BY MOUTH DAILY (WITH BREAKFAST)  furosemide (LASIX) 20 MG tablet, TAKE 1 TABLET (20 MG) BY MOUTH 2 TIMES DAILY  gabapentin (NEURONTIN) 100 MG capsule, TAKE 1 CAPSULE BY MOUTH IN THE MORNING AND 2 CAPSULES AT BEDTIME  hydrochlorothiazide (HYDRODIURIL) 25 MG tablet, TAKE ONE TABLET BY MOUTH ONCE DAILY WITH LOSARTAN 100MG.  INCRUSE ELLIPTA 62.5 MCG/INH Inhaler, Inhale 1 puff into the lungs daily  losartan (COZAAR) 100 MG tablet, TAKE 1 TABLET BY MOUTH DAILY ALONG WITH HYDROCHLOROTHIAZIDE 25MG  MAPAP ARTHRITIS PAIN 650 MG CR tablet, TAKE 2 TABLETS BY MOUTH 2 TIMES A DAY  metoprolol succinate ER (TOPROL XL) 25 MG 24 hr tablet, TAKE 1 TABLET (25 MG) BY MOUTH DAILY  mirtazapine (REMERON) 15 MG tablet, Take 1.5 tablets (22.5 mg) by mouth At Bedtime  polyethylene glycol (MIRALAX) 17 g packet, Take 1 packet by mouth daily as needed for constipation  SENEXON-S 8.6-50 MG tablet, TAKE 2 TABLETS BY MOUTH DAILY.  vitamin D2 (ERGOCALCIFEROL) 11460 units (1250 mcg) capsule, TAKE 1 CAPSULE (50,000 UNITS TOTAL) BY MOUTH ONCE A WEEK.    No current facility-administered medications on file prior to visit.       PHYSICAL EXAMINATION  VITALS: /64   Pulse 56   Resp 16   Wt 53.5 kg (118 lb)   BMI 24.18 kg/m    GENERAL: Healthy appearing, alert, no acute distress, normal habitus.  CARDIOVASCULAR:  Extremities warm and well perfused. Pulses present.   NEUROLOGICAL:  Patient is awake and grossly oriented to self, place and time.  Attention span is normal.  Memory is grossly intact.  Language is fluent and follows commands appropriately.  Appropriate fund of knowledge. Cranial nerves 2-12 are intact. There is no pronator drift.  Motor exam shows 5/5 strength in all extremities.  Tone is symmetric bilaterally in upper and lower extremities.  Reflexes are symmetric and 2+ brisk in upper extremities and lower extremities. Sensory exam is grossly intact to light touch, pin prick and vibration.  Finger to nose and heel to shin is without dysmetria.  Gait is antalgic and slightly wide-based.  Patient does slightly shuffle.  This appears more related to hip and knee issues than true parkinsonian gait.  Exam similar to before.    DIAGNOSTICS  RELEVANT LABS  Component      Latest Ref Rng & Units 2/24/2022   Ferritin      10 - 130 ng/mL 17     OUTSIDE RECORDS  Outside referral notes and chart notes were reviewed and pertinent information has been summarized (in addition to the HPI):-Patient was recently hospitalized for Covid.  This was December 2021.  Had some oxygen needs.  Does have diagnosis of pulmonary fibrosis reactive airway disease.  She recently has an office visit with primary care.  No mention of any neurological issues.  Most of the visit was regarding breathing issues.    Notes from pulmonology clinic also reviewed.  Patient has shortness of breath and neuromuscular causes are being considered.  Pulmonologist does not think she has interstitial lung disease which is causing her symptoms.  Some basic labs were ordered.  Concerns for progressive muscular atrophy and other conditions has been raised.  Patient never did the blood work ordered through pulmonology.    LABS    Component      Latest Ref Rng & Units 3/28/2022   Albumin %      51.0 - 67.0 % 46.2 (L)   Albumin Fraction      3.2 - 4.7 g/dL 3.6   Alpha 1  %      2.0 - 4.0 % 4.0   Alpha 1 Fraction      0.1 - 0.3 g/dL 0.3   Alpha 2 %      5.0 - 13.0 % 13.1 (H)   Alpha 2 Fraction      0.4 - 0.9 g/dL 1.0 (H)   Beta %      10.0 - 17.0 % 21.1 (H)   Beta Fraction      0.7 - 1.2 g/dL 1.6 (H)   Gamma Globulin %      9.0 - 20.0 % 15.6   Gamma Fraction      0.6 - 1.4 g/dL 1.2   ELP Interpretation:       The alpha 2 globulin fraction is increased. Isolated increases in alpha 2 globulin are associated with a variety of chronic inflammatory conditions, particularly connective tissue inflammatory disorders or collagen-vascular diseases. . . .   Path ICD       M62.81   Interpreted By       Adair Sims MD   Total Protein Serum for ELP      g/dL 7.8   Neutrophil Cytoplasmic Antibody      <1:10 <1:10   Neutrophil Cytoplasmic Antibody Pattern       The ANCA IFA is <1:10.  No further testing will be performed.   TSH      0.30 - 5.00 uIU/mL 0.65   Methylmalonic Acid      0.00 - 0.40 umol/L 0.17   Hemoglobin A1C      0.0 - 5.6 % 5.7 (H)   Copper      80.0 - 155.0 ug/dL 141.9   Ceruloplasmin      20 - 60 mg/dL 22   CK Total      30 - 190 U/L 22 (L)   LEANDRO interpretation      Negative Negative   Total Protein Serum for ELP      6.0 - 8.0 g/dL 7.8     Component      Latest Ref Rng & Units 3/28/2022   Immunofixation ELP       No monoclonal component identified.   Path ICD       M62.81   Interpreted By       Adair Sims MD   Vitamin B12      213 - 816 pg/mL 1,229 (H)   Ferritin      10 - 130 ng/mL 42   AcetChol Binding Bess      0.0 - 0.4 nmol/L 0.0   Striated Muscle Antibody IgG      <1:40 <1:40   AcetChol Modul Bess      <=45 % 0   AcetChol Block Bess      0 - 26 % 13   Angiotensin Converting Enzyme      9 - 67 U/L 35         IMPRESSION/REPORT/PLAN  History of low ferritin  Hyperreflexia  Generalized muscle weakness-subjective complaint  Dyspnea, unspecified type-subjective complaint    This is a 82 year old female with complains of breathing difficulty with some subjective  generalized muscle weakness complaints.  The generalized muscle weakness is most likely related to poor effort from either fatigue or poor appetite.  I do not see a lot of evidence of neuromuscular disease.  Exam does not show any focal weakness.  Breathing issues could be nonneurological in nature.  She does have some slight hyperreflexia on examination and could be related to cervical spine injury versus myelopathy.  She is refusing an MRI scan of the cervical spine and we will check an x-ray rule out any major degeneration though she probably will not be a candidate for surgery.  Blood work has been negative for neuromuscular disease causes.    Her ferritin is low which could be causing lack of energy/fatigue.  She is on iron replacement with slightly low normal ferritin.  Could consider injections.  Will defer to primary doctor.  Should follow-up with the primary doctor for right hip knee pain.    Should continue work-up with her pulmonologist/primary care doctor regarding her breathing difficulties as neurological cause is less likely.  Her breathing issues are somewhat better at this point.    I can see her back on as-needed basis    -     XR Cervical Spine 2/3 Views; Future    Return if symptoms worsen or fail to improve, for In-Clinic Visit (must).    Over 30 minutes were spent coordinating the care for the patient on the day of the encounter.  This includes previsit, during visit and post visit activities as documented above.  Counseling patient.  Use of  requires extra time.  Discussion of MRI.  Reviewing results.  (Activities include but not inclusive of reviewing chart, reviewing outside records, reviewing labs and imaging study results as well as the images, patient visit time including getting history and exam,  use if applicable, review of test results with the patient and coming up with a plan in a shared model, counseling patient and family, education and answering patient  questions, EMR , EMR diagnosis entry and problem list management, medication reconciliation and prescription management if applicable, paperwork if applicable, printing documents and documentation of the visit activities.)      Yimi López MD  Neurologist  [facility] Neurology [facility]  Tel:- [phone]    This note was dictated using voice recognition software.  Any grammatical or context distortions are unintentional and inherent to the software.     no

## 2022-10-03 ENCOUNTER — TELEPHONE (OUTPATIENT)
Dept: PULMONOLOGY | Facility: OTHER | Age: 82
End: 2022-10-03

## 2022-10-03 NOTE — TELEPHONE ENCOUNTER
DATE: 10/03/2022  DME PROVIDER: Gloria   SUPPLY ORDERED: new/recertification oxygen   PROVIDER: Scott    Called and left message for customer service    Nancy Angel LPN

## 2022-10-04 NOTE — PROGRESS NOTES
[facility] Care Coordination Contact      [facility] Six-Month Telephone Assessment    6 month telephone assessment completed on 8/18/2022.    ER visits: No  Hospitalizations: No  TCU stays: No  Significant health status changes: No significant health changes noted. [facility] completed a office visit in June with PCP. Restrictive Lung disease addressed. No vaccinations completed. Reviewed recommendations and health maintenance. Encouraged [facility] to schedule a Medicare Wellness exam to address issues.   Falls/Injuries: No  ADL/IADL changes: No  Changes in services: No    Caregiver Assessment follow up:  n/a    Goals: See POC in chart for goal progress documentation.      Will see member in 6 months for an annual health risk assessment.   Encouraged member to call CC with any questions or concerns in the meantime.     REBECCA Srinivasan  [facility]  [phone]

## 2022-11-30 DIAGNOSIS — K21.9 GASTROESOPHAGEAL REFLUX DISEASE WITHOUT ESOPHAGITIS: ICD-10-CM

## 2022-11-30 DIAGNOSIS — Z76.0 ENCOUNTER FOR MEDICATION REFILL: Primary | ICD-10-CM

## 2022-11-30 RX ORDER — CALCIUM CARBONATE 500 MG/1
TABLET, CHEWABLE ORAL
Qty: 180 TABLET | Refills: 3 | Status: SHIPPED | OUTPATIENT
Start: 2022-11-30 | End: 2023-08-22

## 2022-12-20 ENCOUNTER — PATIENT OUTREACH (OUTPATIENT)
Dept: GERIATRIC MEDICINE | Facility: CLINIC | Age: 82
End: 2022-12-20

## 2022-12-20 NOTE — PROGRESS NOTES
[facility] Care Coordination Contact    Encounter opened due to Regulatory [employer] Update to close FVP Program.    Debbie Klein  Care Management Specialist  [facility]  [phone]

## 2022-12-20 NOTE — PROGRESS NOTES
[facility] Care Coordination Contact    Encounter opened due to Regulatory [employer] Update to open FVP Program.    Debbie Klein  Care Management Specialist  [facility]  [phone]

## 2023-01-03 ENCOUNTER — PATIENT OUTREACH (OUTPATIENT)
Dept: GERIATRIC MEDICINE | Facility: CLINIC | Age: 83
End: 2023-01-03

## 2023-01-03 NOTE — PROGRESS NOTES
[facility] Care Coordination Contact    Called adult daughter La May to schedule annual HRA home visit. HRA has been scheduled for 1/10/2023 at 10:30am. Pending  availability. Request sent to [employer].     REBECCA Srinivasan  [facility]  [phone]

## 2023-01-09 ENCOUNTER — OFFICE VISIT (OUTPATIENT)
Dept: PULMONOLOGY | Facility: CLINIC | Age: 83
End: 2023-01-09
Payer: COMMERCIAL

## 2023-01-09 VITALS — HEART RATE: 76 BPM | DIASTOLIC BLOOD PRESSURE: 76 MMHG | SYSTOLIC BLOOD PRESSURE: 116 MMHG | OXYGEN SATURATION: 96 %

## 2023-01-09 DIAGNOSIS — J84.9 ILD (INTERSTITIAL LUNG DISEASE) (H): Primary | ICD-10-CM

## 2023-01-09 PROCEDURE — 99214 OFFICE O/P EST MOD 30 MIN: CPT | Performed by: INTERNAL MEDICINE

## 2023-01-09 NOTE — PATIENT INSTRUCTIONS
1) You need to sleep with oxygen every night, not as needed  2) You don't need to wear it all day long  3) Keep using your inhalers.  4) Work with Dr. Brown about your sore back

## 2023-01-09 NOTE — Clinical Note
Been seeing Luis Manuel for a couple years.  Overall I think she is improved from COVID.  She needs to sleep with oxygen, but doesn't need it during the day.  I think a lot of her issues are deconditioning from immobility. She had lots of complaints of neck and back pain that keep her from sleeping well and moving about. Thanks, Johan

## 2023-01-09 NOTE — PROGRESS NOTES
Assessment and Plan:Luis Manuel Lemon is a 83 year old female with a past medical history significant for ILD and COVID infection who presents to clinic today for follow up.  She seems to be slowly recovering from COVID as she is now normoxic on room air and no longer has pulmonary hypertension on echo.  I think most of her breathing issues are related to severe deconditioning secondary to immobility.  She has back and neck pains today that I will defer to her primary to discuss.  My main recommendation is to continue her inhalers and sleep with oxygen at night.     1) ILD - has been on incruse and breo to good effect for symptom control.  Continue.    2) Chronic hypoxic respiratory failure - improved as she has recovered from COVID .  Recommend she wear oxygen with sleep which she is only doing some.  Has sleep issues due to bad back  3) RTC in 6 months      CCx: ILD    HPI: Ms. Lemon is a 83 year old female with a history of ILD presumably from chronic reflux who returns for follow up.  She feels her lungs are no better and she struggles mostly to breathe when she lays down at night. She is only wearing her oxygen as needed.  She is using her inhalers as she is supposed to.  She admits her main issue laying down is a sore back and she tosses and turns.  She isn't coughing.  She is tired and weak.  She saw neurology who could not find a clear neuromuscular disorder and suggests her weakness maybe poor effort or generalized fatigue.  She ambulates with a cane from her bedroom to a bathroom and back.  She does not get more exercise than that.    ROS:  Review of Systems - History obtained from the patient  General ROS: negative  Psychological ROS: negative  ENT ROS: negative  Allergy and Immunology ROS: negative  Endocrine ROS: negative  Respiratory ROS: positive for - shortness of breath  negative for - cough, hemoptysis, pleuritic pain, tachypnea or wheezing  Cardiovascular ROS: no chest pain or palpitations  Gastrointestinal  ROS: no abdominal pain, change in bowel habits, or black or bloody stools  Genito-Urinary ROS: no dysuria, trouble voiding, or hematuria  Musculoskeletal ROS: negative  Neurological ROS: no TIA or stroke symptoms  Dermatological ROS: negative      Current Meds:  Current Outpatient Medications   Medication Sig Dispense Refill     albuterol (PROAIR HFA/PROVENTIL HFA/VENTOLIN HFA) 108 (90 Base) MCG/ACT inhaler Inhale 2 puffs into the lungs every 6 hours as needed for shortness of breath / dyspnea or wheezing        albuterol (PROVENTIL) (2.5 MG/3ML) 0.083% neb solution Take 1 vial (2.5 mg) by nebulization every 6 hours as needed for shortness of breath / dyspnea or wheezing 90 mL 11     amLODIPine (NORVASC) 10 MG tablet TAKE 1 TABLET (10 MG) BY MOUTH DAILY 90 tablet 3     ANTACID REGULAR STRENGTH 500 MG chewable tablet TAKE 1 TABLET (500 MG) BY MOUTH 2 TIMES DAILY 180 tablet 3     ARTIFICIAL TEARS 1.4 % ophthalmic solution Place 1 drop into both eyes every hour as needed for dry eyes (itchy eyes)        BREO ELLIPTA 100-25 MCG/INH inhaler Inhale 1 puff into the lungs daily        capsaicin (ZOSTRIX) 0.025 % external cream Apply topically 2 times daily as needed (for pain)        COMBIVENT RESPIMAT  MCG/ACT inhaler INHALE 1 PUFF EVERY 6 (SIX) HOURS AS NEEDED. (Patient taking differently: Inhale 1 puff into the lungs every 6 hours as needed for shortness of breath or wheezing) 4 g 12     diclofenac (VOLTAREN) 1 % topical gel Apply 2 g topically 3 times daily as needed for moderate pain 600 g 4     DULoxetine (CYMBALTA) 30 MG capsule TAKE 1 CAPSULE (30 MG TOTAL) BY MOUTH DAILY. 90 capsule 3     ferrous gluconate (FERGON) 324 (38 Fe) MG tablet TAKE 1 TABLET BY MOUTH DAILY (WITH BREAKFAST) 90 tablet 3     furosemide (LASIX) 20 MG tablet TAKE 1 TABLET (20 MG) BY MOUTH 2 TIMES DAILY 180 tablet 3     gabapentin (NEURONTIN) 100 MG capsule TAKE 1 CAPSULE BY MOUTH IN THE MORNING AND 2 CAPSULES AT BEDTIME 90 capsule 3      hydrochlorothiazide (HYDRODIURIL) 25 MG tablet TAKE ONE TABLET BY MOUTH ONCE DAILY WITH LOSARTAN 100MG. 90 tablet 3     INCRUSE ELLIPTA 62.5 MCG/INH Inhaler Inhale 1 puff into the lungs daily 30 each 4     losartan (COZAAR) 100 MG tablet TAKE 1 TABLET BY MOUTH DAILY ALONG WITH HYDROCHLOROTHIAZIDE 25MG 90 tablet 3     MAPAP ARTHRITIS PAIN 650 MG CR tablet TAKE 2 TABLETS BY MOUTH 2 TIMES A  tablet 6     metoprolol succinate ER (TOPROL XL) 25 MG 24 hr tablet TAKE 1 TABLET (25 MG) BY MOUTH DAILY 90 tablet 3     mirtazapine (REMERON) 15 MG tablet Take 1.5 tablets (22.5 mg) by mouth At Bedtime 45 tablet 11     polyethylene glycol (MIRALAX) 17 g packet Take 1 packet by mouth daily as needed for constipation       SENEXON-S 8.6-50 MG tablet TAKE 2 TABLETS BY MOUTH DAILY. 180 tablet 3     vitamin D2 (ERGOCALCIFEROL) 86594 units (1250 mcg) capsule TAKE 1 CAPSULE (50,000 UNITS TOTAL) BY MOUTH ONCE A WEEK. 4 capsule 12       Labs:  @clab@  Lab Results   Component Value Date    WBC 7.7 02/24/2022    HGB 9.8 (L) 02/24/2022    HCT 33.4 (L) 02/24/2022     02/24/2022     03/28/2022    POTASSIUM 4.3 03/28/2022    CHLORIDE 97 (L) 03/28/2022    CO2 32 (H) 03/28/2022     03/28/2022    BUN 19 03/28/2022    CR 1.24 (H) 03/28/2022    MAG 1.9 12/14/2019    INR 1.05 12/27/2021    BILITOTAL 0.5 02/24/2022    AST 21 02/24/2022    ALT 16 02/24/2022    ALKPHOS 99 02/24/2022    PROTTOTAL 6.9 02/24/2022    ALBUMIN 2.9 (L) 02/24/2022       I have personally reviewed all pertinent imaging studies and PFT results unless otherwise noted.    Imaging studies:  No results found.      Physical Exam:  /76 (BP Location: Right arm, Patient Position: Chair, Cuff Size: Adult Regular)   Pulse 76   SpO2 96%   General - Well nourished  Ears/Mouth -  Deferred given mask use during pandemic  Neck - no cervical lymphadenopathy  Lungs - Clear to ausculation bilaterally, slight inspiratory crack in right base  CVS - regular rhythm  with no murmurs, rubs or gallups  Abdomen - soft, NT, ND, NABS  Ext - no cyanosis, clubbing or edema  Skin - no rash  Psychology - alert and oriented, answers appropriate        Electronically signed by:    Johan Chavez MD PhD  [facility] Pulmonary and Critical Care Medicine

## 2023-01-10 ENCOUNTER — PATIENT OUTREACH (OUTPATIENT)
Dept: GERIATRIC MEDICINE | Facility: CLINIC | Age: 83
End: 2023-01-10
Payer: COMMERCIAL

## 2023-01-10 DIAGNOSIS — R06.02 SHORT OF BREATH ON EXERTION: ICD-10-CM

## 2023-01-10 DIAGNOSIS — M50.30 DEGENERATIVE DISC DISEASE, CERVICAL: Primary | ICD-10-CM

## 2023-01-10 DIAGNOSIS — N39.3 FEMALE STRESS INCONTINENCE: ICD-10-CM

## 2023-01-18 SDOH — HEALTH STABILITY: PHYSICAL HEALTH: ON AVERAGE, HOW MANY DAYS PER WEEK DO YOU ENGAGE IN MODERATE TO STRENUOUS EXERCISE (LIKE A BRISK WALK)?: 0 DAYS

## 2023-01-18 SDOH — HEALTH STABILITY: PHYSICAL HEALTH: ON AVERAGE, HOW MANY MINUTES DO YOU ENGAGE IN EXERCISE AT THIS LEVEL?: 0 MIN

## 2023-01-18 SDOH — ECONOMIC STABILITY: INCOME INSECURITY: IN THE LAST 12 MONTHS, WAS THERE A TIME WHEN YOU WERE NOT ABLE TO PAY THE MORTGAGE OR RENT ON TIME?: NO

## 2023-01-18 ASSESSMENT — SOCIAL DETERMINANTS OF HEALTH (SDOH)
HOW OFTEN DO YOU ATTENT MEETINGS OF THE CLUB OR ORGANIZATION YOU BELONG TO?: NEVER
WITHIN THE LAST YEAR, HAVE YOU BEEN AFRAID OF YOUR PARTNER OR EX-PARTNER?: NO
HOW OFTEN DO YOU ATTEND CHURCH OR RELIGIOUS SERVICES?: NEVER
HOW OFTEN DO YOU GET TOGETHER WITH FRIENDS OR RELATIVES?: MORE THAN THREE TIMES A WEEK
DO YOU BELONG TO ANY CLUBS OR ORGANIZATIONS SUCH AS CHURCH GROUPS UNIONS, FRATERNAL OR ATHLETIC GROUPS, OR SCHOOL GROUPS?: NO
WITHIN THE LAST YEAR, HAVE YOU BEEN KICKED, HIT, SLAPPED, OR OTHERWISE PHYSICALLY HURT BY YOUR PARTNER OR EX-PARTNER?: NO
IN A TYPICAL WEEK, HOW MANY TIMES DO YOU TALK ON THE PHONE WITH FAMILY, FRIENDS, OR NEIGHBORS?: ONCE A WEEK
WITHIN THE LAST YEAR, HAVE YOU BEEN HUMILIATED OR EMOTIONALLY ABUSED IN OTHER WAYS BY YOUR PARTNER OR EX-PARTNER?: NO
WITHIN THE LAST YEAR, HAVE TO BEEN RAPED OR FORCED TO HAVE ANY KIND OF SEXUAL ACTIVITY BY YOUR PARTNER OR EX-PARTNER?: NO

## 2023-01-18 NOTE — PROGRESS NOTES
[facility] Care Coordination Contact    [facility] Home Visit Assessment     Home visit for Health Risk Assessment with Luis Manuel Lemon completed on January 10, 2023    Type of residence: Apartment  Current living arrangement: I live in a private home with family     Assessment completed with:: Family    Current Care Plan  Member currently receiving the following home care services: Skilled Nursing   Member currently receiving the following community resources: PCA     Medication Review  Medication reconciliation completed in Epic: Yes  Medication set-up & administration: RN set up every two weeks.  Family caregiver administers medications.  Medication Risk Assessment Medication (1 or more, place referral to MTM): N/A: No risk factors identified  MTM Referral Placed: No: No risk factors idenified    Mental/Behavioral Health   Depression Screening: Denies concerns.   PHQ-2 Total Score (Adult) - Positive if 3 or more points; Administer PHQ-9 if positive: 0  Mental health DX: No        Falls Assessment:   Fallen 2 or more times in the past year?: No   Any fall with injury in the past year?: No    ADL/IADL Dependencies:   Dependent ADLs: Bathing, Dressing, Grooming, Transfers, Toileting, Ambulation-cane  Dependent IADLs: Cleaning, Cooking, Laundry, Shopping, Meal Preparation, Medication Management, Money Management, Transportation    [facility] sponsored benefits: Shared information re: Silver Sneakers/gym memberships, ASA, Calcium +D.    PCA Assessment completed at visit: Yes Annual PCA assessment indicated 26 units per day of PCA. This is a decrease from the  previous assessment. 6.5 hours per day.     Elderly Waiver Eligibility: No-does not meet criteria    Care Plan & Recommendations: Luis Manuel Lemon is a 83 year old female with a past medical history of chronic pain syndrome, restrictive lung disease, pulmonary fibrosis, age related constipation, GERD, spinal stenosis, and hypertension.      Luis Manuel lives in a  single home with her daughter, Lay Nicholas (who is her PCA/Primary caregiver), and grandchildren. Luis Manuel requires assistance with all IADLs and most ADLs.     Previous assessment was completed following a hospitalization in 12/2021 related to restrictive lung disease and acute respiratory failure with hypoxia. Luis Manuel Lemon was discharged to home with in home oxygen. A general increase in PCA was given at that time.     Upon reassessment Luis Manuel Lemon is now independent with eating, so a decrease in PCA hours will occur. During in home visit CC identified a need for a shower chair and incontinence products. Will request order from PCP.     See [facility] for detailed assessment information.    Follow-Up Plan: Member informed of future contact, plan to f/u with member with a 6 month telephone assessment.  Contact information shared with member and family, encouraged member to call with any questions or concerns at any time.    [facility] care continuum providers: Please see Snapshot and Care Management Flowsheets for Specific details of care plan.    This CC note routed to PCP.    REBECCA Srinivasan  [facility]  [phone]

## 2023-01-19 ENCOUNTER — PATIENT OUTREACH (OUTPATIENT)
Dept: GERIATRIC MEDICINE | Facility: CLINIC | Age: 83
End: 2023-01-19
Payer: COMMERCIAL

## 2023-01-19 NOTE — LETTER
January 19, 2023      [facility]  [address] [place] [address]      Dear [facility]:    At [facility], we re dedicated to improving your health and wellness. Enclosed is the Care Plan developed with you on 1/10/2023. Please review the Care Plan carefully.    As a reminder, during your visit we talked about:    Ways to manage your physical and mental health    Using health care to maintain and improve your health     Your preventive care needs     Remember to contact your care coordinator if you:    Are hospitalized, or plan to be hospitalized     Have a fall      Have a change in your physical or mental health    Need help finding support or services    If you have questions, or don t agree with your Care Plan, call me at [phone]. You can also call me if your needs change. TTY users, call the [place] [employer] at (769) or 1-832.336.6498 (ihvwvq-bo-seuivd relay service).    Sincerely,    REBECCA Srinivasan  [phone]  [email or web address]     [facility] ([facility]) is a health plan that contracts with both Medicare and the [place] Medical Assistance (Medicaid) program to provide benefits of both programs to enrollees. Enrollment in [facility] depends on contract renewal.    [id number] accepted    [id number] (07/2022)

## 2023-01-19 NOTE — PROGRESS NOTES
[facility] Care Coordination Contact    Received after visit chart from care coordinator.  Completed following tasks: Mailed copy of care plan to client, Updated services in Database, Sent the following resources/forms: HCD with goals workhseet  and Mailed [facility] Safe Medication Disposal   , Provider Signature - No POC Shared:  Member indicates that they do not want their POC shared with any EW providers.     and PCA DTR per CC.  Faxed completed PCA Assessment to PCA Agency and mailed copy to member.  Faxed MD Communication to PCP.    -POC/PCA sig pages signed and saved to member file    Debbie Klein  Care Management Specialist  [facility]  [phone]

## 2023-01-19 NOTE — LETTER
[facility] Advance Care Planning       [facility]  [address] [place] [address]      Dear [facility],    You shared with me your interest in receiving information on Advance Care Planning and Health Care Directives. Discussing and making decisions about this part of our health is very important.  A Health Care Directive is a written document that outlines your goals, values, beliefs and choices for health care and medical treatment in the event you are unable to speak for yourself.     We greatly value the opportunity to assist you in documenting your choices and to honor your   wishes. We ve enclosed HCD and goals worksheet to help you get started thinking about your values and goals. We have several options for additional resources:       Health Care Directives and Advance Care Planning resources can be viewed and printed   for free at our web site:  [email or web address].       Free group classes on Advance Care Planning and completing a Health Care Directive are available at multiple locations and times. These classes are led by trained staff who will provide information and guide you through a Health Care Directive.  They can also review, notarize and add your Health Care Directive to your medical record. [place] for a class at [email or web address] or by calling [facility] at [phone] or toll free [phone].      COPIES of completed Health Care Directives can be brought or mailed to any of our   locations, including the address listed below. You can also email a copy to [place] [email or web address] .      Email or call me at the contact information listed below for questions, assistance, or to   make an appointment to discuss creating a Health Care Directive. You can also contact   our [facility] Notes Department for questions or assistance.       Sincerely,   REBECCA Srinivasan  [facility]  [phone]

## 2023-02-11 NOTE — TELEPHONE ENCOUNTER
RN cannot approve Refill Request    RN can NOT refill this medication historical medication requested. Last office visit: Visit date not found Last Physical: Visit date not found Last MTM visit: Visit date not found Last visit same specialty: 10/15/2020 Ludivina Early CNP.  Next visit within 3 mo: Visit date not found  Next physical within 3 mo: Visit date not found      Blaire Keating, [facility] Triage/Med Refill 11/5/2020    Requested Prescriptions   Pending Prescriptions Disp Refills     famotidine (PEPCID) 40 MG tablet [Pharmacy Med Name: FAMOTIDINE 40 MG TABS 40 Tablet] 180 tablet 2     Sig: TAKE 1 TABLET BY MOUTH TWICE DAILY.       GI Medications Refill Protocol Passed - 11/2/2020 10:44 AM        Passed - PCP or prescribing provider visit in last 12 or next 3 months.     Last office visit with prescriber/PCP: Visit date not found OR same dept: 10/15/2020 Ludivina Early CNP OR same specialty: 10/15/2020 Ludivina Early CNP  Last physical: Visit date not found Last MTM visit: Visit date not found   Next visit within 3 mo: Visit date not found  Next physical within 3 mo: Visit date not found  Prescriber OR PCP: Chichi Brown MD  Last diagnosis associated with med order: There are no diagnoses linked to this encounter.  If protocol passes may refill for 12 months if within 3 months of last provider visit (or a total of 15 months).                    [Fever] : no fever [Chills] : no chills [FreeTextEntry9] : as per HPI

## 2023-02-16 DIAGNOSIS — Z76.0 ENCOUNTER FOR MEDICATION REFILL: ICD-10-CM

## 2023-02-16 DIAGNOSIS — D64.9 ANEMIA, UNSPECIFIED TYPE: ICD-10-CM

## 2023-02-18 NOTE — TELEPHONE ENCOUNTER
"Routing refill request to provider for review/approval because:  Early fill requested    Last Written Prescription Date:  4/13/22  Last Fill Quantity: 90,  # refills: 3   Last office visit provider:  8/8/22     Requested Prescriptions   Pending Prescriptions Disp Refills     ferrous gluconate (FERGON) 324 (38 Fe) MG tablet [Pharmacy Med Name: FERROUS GLUCONATE 324 MG  (38 FE) Tablet] 30 tablet 3     Sig: TAKE 1 TABLET BY MOUTH DAILY (WITH BREAKFAST)       Iron Supplements Passed - 2/16/2023 10:16 AM        Passed - Patient is 12 years of age or older        Passed - Recent (12 mo) or future (30 days) visit within the authorizing provider's specialty     Patient has had an office visit with the authorizing provider or a provider within the authorizing providers department within the previous 12 mos or has a future within next 30 days. See \"Patient Info\" tab in inbasket, or \"Choose Columns\" in Meds & Orders section of the refill encounter.              Passed - Hgb OR Hct on record within the past 12 mos.     Patient need only have had a HGB or HCT on file in the past 12 mos. That result does not need to be normal.    Recent Labs   Lab Test 02/24/22  0837 12/27/21  0533 12/26/21  0554   HGB 9.8* 11.2* 10.7*     Recent Labs   Lab Test 02/24/22  0837 12/27/21  0533 12/26/21  0554   HCT 33.4* 40.1 38.1       Please verify a HGB or HCT has been checked SINCE THE LAST DOSE CHANGE.            Passed - Medication is active on med list             Gisella Trinidad RN 02/18/23 8:37 AM  "

## 2023-02-20 RX ORDER — FERROUS GLUCONATE 324(38)MG
TABLET ORAL
Qty: 90 TABLET | Refills: 0 | Status: SHIPPED | OUTPATIENT
Start: 2023-02-20 | End: 2023-05-05

## 2023-02-21 ENCOUNTER — OFFICE VISIT (OUTPATIENT)
Dept: PHYSICAL MEDICINE AND REHAB | Facility: CLINIC | Age: 83
End: 2023-02-21
Payer: COMMERCIAL

## 2023-02-21 VITALS
SYSTOLIC BLOOD PRESSURE: 144 MMHG | HEART RATE: 77 BPM | DIASTOLIC BLOOD PRESSURE: 66 MMHG | HEIGHT: 59 IN | BODY MASS INDEX: 23.79 KG/M2 | WEIGHT: 118 LBS

## 2023-02-21 DIAGNOSIS — M47.816 LUMBAR FACET ARTHROPATHY: ICD-10-CM

## 2023-02-21 DIAGNOSIS — M79.18 MYOFASCIAL PAIN: Primary | ICD-10-CM

## 2023-02-21 PROCEDURE — 99214 OFFICE O/P EST MOD 30 MIN: CPT | Performed by: PHYSICIAN ASSISTANT

## 2023-02-21 RX ORDER — DIPHENOXYLATE HYDROCHLORIDE AND ATROPINE SULFATE 2.5; .025 MG/1; MG/1
TABLET ORAL
COMMUNITY
Start: 2023-02-16 | End: 2023-03-15

## 2023-02-21 ASSESSMENT — PAIN SCALES - GENERAL: PAINLEVEL: EXTREME PAIN (8)

## 2023-02-21 NOTE — LETTER
2/21/2023         RE: Luis Manuel Lemon  [address] [place] [address]        Dear Colleague,    Thank you for referring your patient, Luis Manuel Lemon, to the [facility]. Please see a copy of my visit note below.    Assessment:   Luis Manuel Lemon is a 83 y.o. y.o. female with past medical history significant for vitamin D deficiency, hypertension, hyperlipidemia, osteoporosis on Prolia, chronic GERD, chronic pain syndrome, pulmonary fibrosis, polyarthralgia, stage III chronic kidney disease who presents today for follow-up regarding 2 areas of pain.  1.  Chronic neck pain, currently worse on the left than the right.  My review of the MRI cervical spine shows multilevel degenerative change most pronounced at C6-7 where there is moderate spinal canal stenosis.  Patient is status post left cervical and trapezius muscle trigger point injections under ultrasound guidance July 26, 2021 which provided 50% relief of her pain.  She does not recall how long the pain relief lasted but it has been getting worse over the past several weeks.  Pain is most consistent with myofascial pain.  She has significant hypertonicity left scalene and left upper trapezius muscles.  2.  Chronic bilateral low back pain with bilateral lower extremity pain.  My review of an MRI lumbar spine shows mild spinal stenosis at L4-5 related to a disc bulge and facet arthropathy.  There is no high-grade spinal canal or neuroforaminal stenosis. Patient status post bilateral L3, L4, L5 radiofrequency ablation April 19, 2021 which patient reported provided 70% relief of her pain at her follow-up visit.  Today she states she is not sure if it was helpful.         Plan:     A shared decision making plan was used.  The patient's values and choices were respected.  The following represents what was discussed and decided upon by the physician assistant and the patient.  A telephone  was used for the visit.  Patient's granddaughter was  also present for the visit and helps provide history.    1.  DIAGNOSTIC TESTS: I reviewed the MRI scans of the cervical and lumbar spine from 2021.  No further diagnostic tests were ordered.  - If neck pain fails to improve with the trigger point injections I would recommend obtaining an updated MRI cervical spine for further evaluation.  - We may also need to get a new MRI lumbar spine for further evaluation.    2.  PHYSICAL THERAPY: Most recent physical therapy was in October 2018.  She will continue with home exercises.  -Patient completed 4 sessions of occupational therapy with Michelle Russell in 2019 which she did not feel is helpful.    3.  MEDICATIONS: No changes are made to the patient's medications.  She does not feel that medications have been very helpful for managing her pain.  - Patient takes Tylenol 1300 mg twice daily  - Patient uses capsaicin  - Patient applies diclofenac gel  - Patient takes duloxetine 30 mg daily  - Patient takes gabapentin 100 mg in the morning and 200 mg at bedtime.      4.  INTERVENTIONS:   -Patient indicated she would like to focus on her neck first.  After the patient left scalene and left upper trapezius muscle trigger point injections.  Patient indicated she would like to proceed and an order was placed.  -We will discuss repeating the radiofrequency ablation at her follow-up visit.  Patient is a difficult historian.  She is not sure if the radiofrequency ablation was helpful.  She had told me that the ablation provided 70% relief of her pain at her postprocedure follow-up visit.  We will discuss in more detail at her follow-up.    5.  PATIENT EDUCATION: Patient is in agreement the above plan.  All questions were answered.    6.  FOLLOW-UP: Patient will follow-up with me 2 weeks after her    Subjective:     Luis Manuel Lemon is a 83 y.o. female who presents today for follow-up regarding chronic neck and low back pain.  I have not seen this patient since August 2021.  Patient is a very  difficult historian.  She complains of both left-sided neck pain and bilateral low back pain with radiation to the bilateral lower extremities.  These are chronic issues for her.  Pain has been getting worse, especially over the past 3 weeks.    Patient complains of left-sided neck pain.  Pain radiates from the left side of the neck into the upper trapezius muscle.  She denies pain down the arms.  She had trigger point injections for this pain in 2021.  She states these were somewhat helpful but does not recall how long the relief lasted.    Patient also complains of bilateral low back pain.  Pain radiates into the buttock and on the posterior thighs to the knees.  Pain is severe in the morning.  It makes it difficult for her to walk.  She had lumbar radiofrequency ablation in 2021.  She is not sure if this was helpful although she had told me the ablation provided 70% relief of her pain at her postprocedure follow-up visit.    Overall, patient rates her pain today as an 8-10.  She denies any aggravating or alleviating factors of pain.    Treatment to date:  - physical therapy for the lower back was in October 2018.  - She also did 4 sessions of occupational therapy ending in January 2019.  - 1 session of physical therapy July 2021    - Bilateral L3, L4, L5 radiofrequency ablation April 19, 2021  - Left cervical and trapezius muscle trigger point injections under ultrasound guidance July 21, 2021  - Right sacroiliac joint injection November 12, 2018  - L5-S1 interlaminar epidural steroid injection October 17, 2018  - Left sacroiliac joint injection February 20, 2017  - C6-7 interlaminar epidural steroid injection July 27, 2016  - L4-5 interlaminar epidural steroid injection July 6, 2016  - Bilateral L5-S1 facet joint injections June 22, 2016    - Tylenol 1300 mg twice daily  - Capsaicin as needed  - Diclofenac gel as needed  - Duloxetine 30 mg daily  - Gabapentin 100 mg in the morning and 200 mg at bedtime.   Medications are somewhat helpful.    Past medical history is reviewed and is unchanged.    Review of Systems:  Positive for numbness/tingling, weakness, loss of bladder control, pain much worse at night.  Negative for loss of bowel control, inability to urinate, headache, trip/stumble/falls, difficulty swallowing, difficulty with hand skills, fevers, unintentional weight loss.     Objective:   CONSTITUTIONAL:  Vital signs as above.  No acute distress.  The patient is well nourished and well groomed.    PSYCHIATRIC:  The patient is awake, alert, oriented to person, place and time.  The patient is answering questions appropriately with clear speech.  Normal affect.  HEENT: Normocephalic, atraumatic.  Sclera clear.    SKIN:  Skin over the face, posterior torso, bilateral upper and lower extremities is clean, dry, intact without rashes.  VASCULAR: No significant lower extremity edema.  MUSCULOSKELETAL: Gait is deferred.  Tender to palpation left cervical scalene muscle and upper trapezius muscle with hypertonicity.The patient has  Diffuse weakness which I would grade 4/5 for the bilateral shoulder abductors, elbow flexors/extensors, finger flexors/abductors, wrist extensors.  She had 4/5 strength bilateral hip flexors, 5/5 strength bilateral knee flexors/extensors, ankle dorsi/plantar flexors.    NEUROLOGICAL:  Negative Ruby sign bilaterally.    Sensation light touch intact bilateral upper and lower extremities.     RESULTS:     I reviewed the MRI cervical spine from [facility] dated March 8, 2021.  This shows multilevel degenerative change.  There is facet arthropathy throughout the cervical spine.  At C3-4 there is mild spinal canal stenosis with moderate left and mild right foraminal stenosis.  At C4-5 there is mild spinal canal stenosis with moderate bilateral foraminal stenosis.  At C5-6 there is mild spinal canal stenosis with moderate bilateral foraminal stenosis.  At C6-7 there is moderate spinal canal  stenosis with mild bilateral foraminal stenosis.  At C7-T1 there is mild bilateral foraminal stenosis.  Please see report for further details.    I reviewed the MRI lumbar spine from [facility] dated March 8, 2021.  This shows multilevel lumbar spondylosis.  At L2-3 there is a disc bulge with mild bilateral foraminal stenosis.  At L3-4 there is a disc bulge with mild bilateral foraminal stenosis.  At L4-5 there is a disc bulge and mild facet arthropathy with mild spinal canal stenosis and mild bilateral foraminal stenosis.  Please see report for further details.          Again, thank you for allowing me to participate in the care of your patient.        Sincerely,        Tiffanie Byrne PA-C

## 2023-02-21 NOTE — PROGRESS NOTES
Assessment:   Luis Manuel Lemon is a 83 y.o. y.o. female with past medical history significant for vitamin D deficiency, hypertension, hyperlipidemia, osteoporosis on Prolia, chronic GERD, chronic pain syndrome, pulmonary fibrosis, polyarthralgia, stage III chronic kidney disease who presents today for follow-up regarding 2 areas of pain.  1.  Chronic neck pain, currently worse on the left than the right.  My review of the MRI cervical spine shows multilevel degenerative change most pronounced at C6-7 where there is moderate spinal canal stenosis.  Patient is status post left cervical and trapezius muscle trigger point injections under ultrasound guidance July 26, 2021 which provided 50% relief of her pain.  She does not recall how long the pain relief lasted but it has been getting worse over the past several weeks.  Pain is most consistent with myofascial pain.  She has significant hypertonicity left scalene and left upper trapezius muscles.  2.  Chronic bilateral low back pain with bilateral lower extremity pain.  My review of an MRI lumbar spine shows mild spinal stenosis at L4-5 related to a disc bulge and facet arthropathy.  There is no high-grade spinal canal or neuroforaminal stenosis. Patient status post bilateral L3, L4, L5 radiofrequency ablation April 19, 2021 which patient reported provided 70% relief of her pain at her follow-up visit.  Today she states she is not sure if it was helpful.         Plan:     A shared decision making plan was used.  The patient's values and choices were respected.  The following represents what was discussed and decided upon by the physician assistant and the patient.  A telephone  was used for the visit.  Patient's granddaughter was also present for the visit and helps provide history.    1.  DIAGNOSTIC TESTS: I reviewed the MRI scans of the cervical and lumbar spine from 2021.  No further diagnostic tests were ordered.  - If neck pain fails to improve with the trigger  point injections I would recommend obtaining an updated MRI cervical spine for further evaluation.  - We may also need to get a new MRI lumbar spine for further evaluation.    2.  PHYSICAL THERAPY: Most recent physical therapy was in October 2018.  She will continue with home exercises.  -Patient completed 4 sessions of occupational therapy with Michelle Russell in 2019 which she did not feel is helpful.    3.  MEDICATIONS: No changes are made to the patient's medications.  She does not feel that medications have been very helpful for managing her pain.  - Patient takes Tylenol 1300 mg twice daily  - Patient uses capsaicin  - Patient applies diclofenac gel  - Patient takes duloxetine 30 mg daily  - Patient takes gabapentin 100 mg in the morning and 200 mg at bedtime.      4.  INTERVENTIONS:   -Patient indicated she would like to focus on her neck first.  After the patient left scalene and left upper trapezius muscle trigger point injections.  Patient indicated she would like to proceed and an order was placed.  -We will discuss repeating the radiofrequency ablation at her follow-up visit.  Patient is a difficult historian.  She is not sure if the radiofrequency ablation was helpful.  She had told me that the ablation provided 70% relief of her pain at her postprocedure follow-up visit.  We will discuss in more detail at her follow-up.    5.  PATIENT EDUCATION: Patient is in agreement the above plan.  All questions were answered.    6.  FOLLOW-UP: Patient will follow-up with me 2 weeks after her    Subjective:     Luis Manuel Lemon is a 83 y.o. female who presents today for follow-up regarding chronic neck and low back pain.  I have not seen this patient since August 2021.  Patient is a very difficult historian.  She complains of both left-sided neck pain and bilateral low back pain with radiation to the bilateral lower extremities.  These are chronic issues for her.  Pain has been getting worse, especially over the past 3  weeks.    Patient complains of left-sided neck pain.  Pain radiates from the left side of the neck into the upper trapezius muscle.  She denies pain down the arms.  She had trigger point injections for this pain in 2021.  She states these were somewhat helpful but does not recall how long the relief lasted.    Patient also complains of bilateral low back pain.  Pain radiates into the buttock and on the posterior thighs to the knees.  Pain is severe in the morning.  It makes it difficult for her to walk.  She had lumbar radiofrequency ablation in 2021.  She is not sure if this was helpful although she had told me the ablation provided 70% relief of her pain at her postprocedure follow-up visit.    Overall, patient rates her pain today as an 8-10.  She denies any aggravating or alleviating factors of pain.    Treatment to date:  - physical therapy for the lower back was in October 2018.  - She also did 4 sessions of occupational therapy ending in January 2019.  - 1 session of physical therapy July 2021    - Bilateral L3, L4, L5 radiofrequency ablation April 19, 2021  - Left cervical and trapezius muscle trigger point injections under ultrasound guidance July 21, 2021  - Right sacroiliac joint injection November 12, 2018  - L5-S1 interlaminar epidural steroid injection October 17, 2018  - Left sacroiliac joint injection February 20, 2017  - C6-7 interlaminar epidural steroid injection July 27, 2016  - L4-5 interlaminar epidural steroid injection July 6, 2016  - Bilateral L5-S1 facet joint injections June 22, 2016    - Tylenol 1300 mg twice daily  - Capsaicin as needed  - Diclofenac gel as needed  - Duloxetine 30 mg daily  - Gabapentin 100 mg in the morning and 200 mg at bedtime.  Medications are somewhat helpful.    Past medical history is reviewed and is unchanged.    Review of Systems:  Positive for numbness/tingling, weakness, loss of bladder control, pain much worse at night.  Negative for loss of bowel control,  inability to urinate, headache, trip/stumble/falls, difficulty swallowing, difficulty with hand skills, fevers, unintentional weight loss.     Objective:   CONSTITUTIONAL:  Vital signs as above.  No acute distress.  The patient is well nourished and well groomed.    PSYCHIATRIC:  The patient is awake, alert, oriented to person, place and time.  The patient is answering questions appropriately with clear speech.  Normal affect.  HEENT: Normocephalic, atraumatic.  Sclera clear.    SKIN:  Skin over the face, posterior torso, bilateral upper and lower extremities is clean, dry, intact without rashes.  VASCULAR: No significant lower extremity edema.  MUSCULOSKELETAL: Gait is deferred.  Tender to palpation left cervical scalene muscle and upper trapezius muscle with hypertonicity.The patient has  Diffuse weakness which I would grade 4/5 for the bilateral shoulder abductors, elbow flexors/extensors, finger flexors/abductors, wrist extensors.  She had 4/5 strength bilateral hip flexors, 5/5 strength bilateral knee flexors/extensors, ankle dorsi/plantar flexors.    NEUROLOGICAL:  Negative Ruby sign bilaterally.    Sensation light touch intact bilateral upper and lower extremities.     RESULTS:     I reviewed the MRI cervical spine from [facility] dated March 8, 2021.  This shows multilevel degenerative change.  There is facet arthropathy throughout the cervical spine.  At C3-4 there is mild spinal canal stenosis with moderate left and mild right foraminal stenosis.  At C4-5 there is mild spinal canal stenosis with moderate bilateral foraminal stenosis.  At C5-6 there is mild spinal canal stenosis with moderate bilateral foraminal stenosis.  At C6-7 there is moderate spinal canal stenosis with mild bilateral foraminal stenosis.  At C7-T1 there is mild bilateral foraminal stenosis.  Please see report for further details.    I reviewed the MRI lumbar spine from [facility] dated March 8, 2021.  This shows multilevel lumbar  spondylosis.  At L2-3 there is a disc bulge with mild bilateral foraminal stenosis.  At L3-4 there is a disc bulge with mild bilateral foraminal stenosis.  At L4-5 there is a disc bulge and mild facet arthropathy with mild spinal canal stenosis and mild bilateral foraminal stenosis.  Please see report for further details.

## 2023-02-21 NOTE — PATIENT INSTRUCTIONS
Trigger point injections have been ordered today.      Please note that this injection uses cortisone.  The cortisone may somewhat weaken the immune system.  It is unknown how much the immune system is weakened.  It is unknown if it is weakened to the point that you may be more likely to get the COVID-19 virus, or if you do get the COVID-19 virus, if you would be sicker than you would have been if you had not had the cortisone injection.  If you do not wish to proceed with the injection, please let the nurse/physician know and do NOT schedule the injection.    Please note that since your immune system is weakened from the cortisone, having any vaccine/shot may be less effective if you have this vaccine within 2 weeks from your cortisone injection.  It is advised to wait 2 weeks after your cortisone injection to have any vaccine (or if you have a vaccine first, wait 2 weeks before you have the cortisone injection).    Please schedule this injection at least 1 week  from now to allow time for insurance prior authorization.  On the day of your injection, you cannot be sick or taking antibiotics.  If you become sick and are prescribed, please call the clinic so your injection can be rescheduled for once you have completed your antibiotics.  If you have any questions or concerns prior to your injection, please do not hesitate to call the nurse navigation line at [phone] or contact Tiffanie Byrne through [employer].

## 2023-02-22 DIAGNOSIS — Z53.9 DIAGNOSIS NOT YET DEFINED: Primary | ICD-10-CM

## 2023-02-22 PROCEDURE — G0179 MD RECERTIFICATION HHA PT: HCPCS | Performed by: FAMILY MEDICINE

## 2023-02-22 NOTE — TELEPHONE ENCOUNTER
RN cannot approve Refill Request    RN can NOT refill this medication med is not covered by policy/route to provider. Last office visit: Visit date not found Last Physical: Visit date not found Last MTM visit: Visit date not found Last visit same specialty: 12/9/2019 Kateryna Morales MD.  Next visit within 3 mo: Visit date not found  Next physical within 3 mo: Visit date not found      Sofi Pelletier, [facility] Triage/Med Refill 4/22/2020    Requested Prescriptions   Pending Prescriptions Disp Refills     celecoxib (CELEBREX) 100 MG capsule [Pharmacy Med Name: CELECOXIB 100 MG CAPSULE] 30 capsule 0     Sig: TAKE ONE CAPSULE BY MOUTH DAILY.       There is no refill protocol information for this order            50 yo male with PMHX CVA 2019 s/p TPA at [facility],  HTN compliant diagnosed at age 27 yo. Patient presents with right facial droop and slurred speech started yesterday at 4pm and neurology was consulted. Collateral from wife and patient state patient woke up this morning at his base line with no complaints and went to work. Around 4 pm while leaving work he called his wife urged him to take an ambulance and went to [facility] CTH was done (does not have record of results) and prescribed him aspirin and cholesterol medication. Patient left hospital AMA and came here for further management because his slurred speech and numbness on left face has not resolved.

## 2023-03-03 DIAGNOSIS — F33.0 MAJOR DEPRESSIVE DISORDER, RECURRENT EPISODE, MILD (H): ICD-10-CM

## 2023-03-05 NOTE — TELEPHONE ENCOUNTER
"Routing refill request to provider for review/approval because:  Values out of range:  BP, PHQ-9  Patient needs recent visit    Last Written Prescription Date:  3/21/2022  Last Fill Quantity: 90,  # refills: 3   Last office visit provider: 8/08/2022      Requested Prescriptions   Pending Prescriptions Disp Refills     DULoxetine (CYMBALTA) 30 MG capsule [Pharmacy Med Name: DULOXETINE HCL 30 MG CPEP 30 Capsule] 90 capsule 3     Sig: TAKE 1 CAPSULE (30 MG TOTAL) BY MOUTH DAILY.       Serotonin-Norepinephrine Reuptake Inhibitors  Failed - 3/3/2023  2:49 PM        Failed - Blood pressure under 140/90 in past 12 months     BP Readings from Last 3 Encounters:   02/21/23 (!) 144/66   01/09/23 116/76   09/30/22 113/64                 Failed - PHQ-9 score of less than 5 in past 6 months     Please review last PHQ-9 score.           Failed - Recent (6 mo) or future (30 days) visit within the authorizing provider's specialty     Patient had office visit in the last 6 months or has a visit in the next 30 days with authorizing provider or within the authorizing provider's specialty.  See \"Patient Info\" tab in inbasket, or \"Choose Columns\" in Meds & Orders section of the refill encounter.            Passed - Medication is active on med list        Passed - Patient is age 18 or older        Passed - No active pregnancy on record        Passed - No positive pregnancy test in past 12 months             Thelma Perkins RN 03/05/23 12:18 PM  "

## 2023-03-06 RX ORDER — DULOXETIN HYDROCHLORIDE 30 MG/1
CAPSULE, DELAYED RELEASE ORAL
Qty: 90 CAPSULE | Refills: 3 | Status: SHIPPED | OUTPATIENT
Start: 2023-03-06 | End: 2024-02-26

## 2023-03-07 ENCOUNTER — RADIOLOGY INJECTION OFFICE VISIT (OUTPATIENT)
Dept: PHYSICAL MEDICINE AND REHAB | Facility: CLINIC | Age: 83
End: 2023-03-07
Attending: PHYSICIAN ASSISTANT
Payer: COMMERCIAL

## 2023-03-07 VITALS — SYSTOLIC BLOOD PRESSURE: 125 MMHG | HEART RATE: 71 BPM | TEMPERATURE: 98.8 F | DIASTOLIC BLOOD PRESSURE: 59 MMHG

## 2023-03-07 DIAGNOSIS — M79.18 MYOFASCIAL PAIN: ICD-10-CM

## 2023-03-07 PROCEDURE — 76942 ECHO GUIDE FOR BIOPSY: CPT | Performed by: PAIN MEDICINE

## 2023-03-07 PROCEDURE — 20552 NJX 1/MLT TRIGGER POINT 1/2: CPT | Performed by: PAIN MEDICINE

## 2023-03-07 RX ORDER — BUPIVACAINE HYDROCHLORIDE 2.5 MG/ML
INJECTION, SOLUTION EPIDURAL; INFILTRATION; INTRACAUDAL
Status: COMPLETED | OUTPATIENT
Start: 2023-03-07 | End: 2023-03-07

## 2023-03-07 RX ORDER — METHYLPREDNISOLONE ACETATE 40 MG/ML
INJECTION, SUSPENSION INTRA-ARTICULAR; INTRALESIONAL; INTRAMUSCULAR; SOFT TISSUE
Status: COMPLETED | OUTPATIENT
Start: 2023-03-07 | End: 2023-03-07

## 2023-03-07 RX ORDER — LIDOCAINE HYDROCHLORIDE 10 MG/ML
INJECTION, SOLUTION EPIDURAL; INFILTRATION; INTRACAUDAL; PERINEURAL
Status: COMPLETED | OUTPATIENT
Start: 2023-03-07 | End: 2023-03-07

## 2023-03-07 RX ADMIN — LIDOCAINE HYDROCHLORIDE 1 ML: 10 INJECTION, SOLUTION EPIDURAL; INFILTRATION; INTRACAUDAL; PERINEURAL at 13:39

## 2023-03-07 RX ADMIN — BUPIVACAINE HYDROCHLORIDE 2 ML: 2.5 INJECTION, SOLUTION EPIDURAL; INFILTRATION; INTRACAUDAL at 13:40

## 2023-03-07 RX ADMIN — METHYLPREDNISOLONE ACETATE 20 MG: 40 INJECTION, SUSPENSION INTRA-ARTICULAR; INTRALESIONAL; INTRAMUSCULAR; SOFT TISSUE at 13:39

## 2023-03-07 ASSESSMENT — PAIN SCALES - GENERAL: PAINLEVEL: EXTREME PAIN (8)

## 2023-03-07 NOTE — PATIENT INSTRUCTIONS
Rubina Hardy, DO                                                    Ann Zamudio, DORA Xavier,  DO                                                                                MARISOL Bustos, DO                                                                                       DISCHARGE INSTRUCTIONS  During office hours (8:00 a.m.- 4:30 p.m.) questions or concerns may be answered  by calling Spine Navigation Nurses at [phone]. If you experience any problems after hours please call [phone] and you will be connected to [facility].     All Patients:  You may experience an increase in your symptoms or have some soreness from the injection for the first 2 days (It may take anywhere between 2 days- 2 weeks for the steroid to have maximum effect).  You may use ice on the injection site, as frequently as 20 minutes each hour if needed.  You may continue taking your regular medication.  You may shower. No swimming, tub bath or hot tub for 48 hours.  You may remove your   bandaid/bandage as soon as you are home.  You may resume light activities, as tolerated unless otherwise directed.  Resume your usual diet as tolerated.      POSSIBLE STEROID SIDE EFFECTS   (If steroid/cortisone was used for your procedure)  -If you experience these symptoms, it should only last for a short period  Swelling of the legs        Skin redness (flushing)  Mouth (oral) irritation   Blood sugar (glucose) levels      Sweats                          Mood changes  Severe headache                  Sleeplessness            POSSIBLE PROCEDURE SIDE EFFECTS  -Call the Spine Center if you are concerned  Increased Pain      Bruising/bleeding at site                  Redness or swelling  Fever greater than 100.5            Diffuse rash            THESE INSTRUCTIONS HAVE BEEN EXPLAINED TO THE PATIENT AND THE PATIENT/PATIENT REPRESENTATITVE HAS VERBALIZED UNDERSTANDING.  A COPY OF THE  INSTRUCTIONS HAVE BEEN GIVEN TO THE PATIENT/PATIENT REPRESENTATIVE.

## 2023-03-14 DIAGNOSIS — M80.00XD AGE-RELATED OSTEOPOROSIS WITH CURRENT PATHOLOGICAL FRACTURE WITH ROUTINE HEALING: Primary | ICD-10-CM

## 2023-03-15 RX ORDER — DIPHENOXYLATE HYDROCHLORIDE AND ATROPINE SULFATE 2.5; .025 MG/1; MG/1
TABLET ORAL
Qty: 30 TABLET | Refills: 3 | Status: SHIPPED | OUTPATIENT
Start: 2023-03-15 | End: 2023-06-28

## 2023-03-15 NOTE — TELEPHONE ENCOUNTER
"Routing refill request to provider for review/approval because:  Drug not on the G refill protocol   Medication is reported/historical    Last Written Prescription Date:  X  Last Fill Quantity: 30,  # refills: 3   Last office visit provider:  8/8/2022     Requested Prescriptions   Pending Prescriptions Disp Refills     Multiple Vitamin (MULTI-VITAMINS) TABS [Pharmacy Med Name: MULTI-VITAMINS TABS Tablet] 30 tablet 3     Sig: TAKE 1 TABLET BY MOUTH DAILY.       Vitamin Supplements (Adult) Protocol Passed - 3/14/2023  1:35 PM        Passed - High dose Vitamin D not ordered        Passed - Recent (12 mo) or future (30 days) visit within the authorizing provider's specialty     Patient has had an office visit with the authorizing provider or a provider within the authorizing providers department within the previous 12 mos or has a future within next 30 days. See \"Patient Info\" tab in inbasket, or \"Choose Columns\" in Meds & Orders section of the refill encounter.              Passed - Medication is active on med list             RIK SILVA RN 03/15/23 3:47 PM  "

## 2023-04-06 DIAGNOSIS — Z76.0 ENCOUNTER FOR MEDICATION REFILL: ICD-10-CM

## 2023-04-06 DIAGNOSIS — J81.0 ACUTE PULMONARY EDEMA (H): ICD-10-CM

## 2023-04-06 RX ORDER — FUROSEMIDE 20 MG
20 TABLET ORAL 2 TIMES DAILY
Qty: 60 TABLET | Refills: 3 | Status: SHIPPED | OUTPATIENT
Start: 2023-04-06 | End: 2023-07-24

## 2023-04-06 RX ORDER — ERGOCALCIFEROL 1.25 MG/1
CAPSULE, LIQUID FILLED ORAL
Qty: 4 CAPSULE | Refills: 12 | Status: SHIPPED | OUTPATIENT
Start: 2023-04-06 | End: 2024-04-22

## 2023-04-20 DIAGNOSIS — R63.0 POOR APPETITE: ICD-10-CM

## 2023-04-20 DIAGNOSIS — Z76.0 ENCOUNTER FOR MEDICATION REFILL: ICD-10-CM

## 2023-04-20 DIAGNOSIS — F51.05 INSOMNIA DUE TO OTHER MENTAL DISORDER: ICD-10-CM

## 2023-04-20 DIAGNOSIS — F99 INSOMNIA DUE TO OTHER MENTAL DISORDER: ICD-10-CM

## 2023-04-20 DIAGNOSIS — F33.0 MAJOR DEPRESSIVE DISORDER, RECURRENT EPISODE, MILD (H): ICD-10-CM

## 2023-04-20 RX ORDER — ACETAMINOPHEN 650 MG/1
TABLET, FILM COATED, EXTENDED RELEASE ORAL
Qty: 120 TABLET | Refills: 6 | Status: SHIPPED | OUTPATIENT
Start: 2023-04-20 | End: 2024-06-27

## 2023-04-21 RX ORDER — MIRTAZAPINE 15 MG/1
TABLET, FILM COATED ORAL
Qty: 45 TABLET | Refills: 11 | Status: SHIPPED | OUTPATIENT
Start: 2023-04-21 | End: 2023-12-11 | Stop reason: DRUGHIGH

## 2023-04-21 NOTE — TELEPHONE ENCOUNTER
"Remeron  Routing refill request to provider for review/approval because:  PHQ-9 score  Patient needs to be seen    Last Written Prescription Date:  2/24/2022  Last Fill Quantity: 45,  # refills: 11   Last office visit provider:  8/8/2022       MAPAP  Last Written Prescription Date:  2/24/2022  Last Fill Quantity: 120,  # refills: 6   Last office visit provider:  8/8/2022     Requested Prescriptions   Pending Prescriptions Disp Refills     mirtazapine (REMERON) 15 MG tablet [Pharmacy Med Name: MIRTAZAPINE 15 MG TABS 15 Tablet] 45 tablet 11     Sig: TAKE 1&1/2 TABLETS BY MOUTH AT BEDTIME       Atypical Antidepressants Protocol Failed - 4/20/2023  9:12 AM        Failed - Patient has PHQ-9 score less than 5 in past 6 months.     Please review last PHQ-9 score.           Failed - Recent (6 mo) or future (30 days) visit within the authorizing provider's specialty     Patient had office visit in the last 6 months or has a visit in the next 30 days with authorizing provider or within the authorizing provider's specialty.  See \"Patient Info\" tab in inbasket, or \"Choose Columns\" in Meds & Orders section of the refill encounter.            Passed - Medication active on med list        Passed - Patient is age 18 or older        Passed - No active pregnancy on record        Passed - No positive pregnancy test in past 12 mos           MAPAP ARTHRITIS PAIN 650 MG CR tablet [Pharmacy Med Name: MAPAP ARTHRITIS  MG C 650 Tablet] 120 tablet 6     Sig: TAKE 2 TABLETS BY MOUTH 2 TIMES A DAY       Analgesics (Non-Narcotic Tylenol and ASA Only) Passed - 4/20/2023  9:12 AM        Passed - Recent (12 mo) or future (30 days) visit within the authorizing provider's specialty     Patient has had an office visit with the authorizing provider or a provider within the authorizing providers department within the previous 12 mos or has a future within next 30 days. See \"Patient Info\" tab in inbasket, or \"Choose Columns\" in Meds & Orders " "section of the refill encounter.              Passed - Patient is 7 months old or older     If patient is a peds patient of the age 7 mos -12 years, ok to refill using weight-based dosing.     If >3g daily and/or sig is not \"prn\", check for liver enzymes. If normal in the last year, ok to refill.  If not, refer to the provider.          Passed - Medication is active on med list             Rubina Pérez RN 04/20/23 8:33 PM  "

## 2023-04-26 ENCOUNTER — LAB (OUTPATIENT)
Dept: LAB | Facility: CLINIC | Age: 83
End: 2023-04-26
Payer: COMMERCIAL

## 2023-04-26 DIAGNOSIS — I10 ESSENTIAL HYPERTENSION: ICD-10-CM

## 2023-04-26 LAB
CREAT UR-MCNC: 184 MG/DL
MICROALBUMIN UR-MCNC: <12 MG/L
MICROALBUMIN/CREAT UR: NORMAL MG/G{CREAT}

## 2023-04-26 PROCEDURE — 82570 ASSAY OF URINE CREATININE: CPT

## 2023-04-26 PROCEDURE — 82043 UR ALBUMIN QUANTITATIVE: CPT

## 2023-05-04 DIAGNOSIS — Z76.0 ENCOUNTER FOR MEDICATION REFILL: ICD-10-CM

## 2023-05-04 DIAGNOSIS — D64.9 ANEMIA, UNSPECIFIED TYPE: ICD-10-CM

## 2023-05-05 NOTE — TELEPHONE ENCOUNTER
"Routing refill request to provider for review/approval because:  Labs not current:  HGB or HCT    Last Written Prescription Date:  2/20/23  Last Fill Quantity: 90,  # refills: 0   Last office visit provider:   8/8/22 with HO    Requested Prescriptions   Pending Prescriptions Disp Refills     ferrous gluconate (FERGON) 324 (38 Fe) MG tablet [Pharmacy Med Name: FERROUS GLUCONATE 324 MG  (38 FE) Tablet] 30 tablet 0     Sig: TAKE 1 TABLET BY MOUTH DAILY (WITH BREAKFAST)       Iron Supplements Failed - 5/5/2023  1:41 PM        Failed - Hgb OR Hct on record within the past 12 mos.     Patient need only have had a HGB or HCT on file in the past 12 mos. That result does not need to be normal.    Recent Labs   Lab Test 02/24/22  0837 12/27/21  0533 12/26/21  0554   HGB 9.8* 11.2* 10.7*     Recent Labs   Lab Test 02/24/22  0837 12/27/21  0533 12/26/21  0554   HCT 33.4* 40.1 38.1       Please verify a HGB or HCT has been checked SINCE THE LAST DOSE CHANGE.            Passed - Patient is 12 years of age or older        Passed - Recent (12 mo) or future (30 days) visit within the authorizing provider's specialty     Patient has had an office visit with the authorizing provider or a provider within the authorizing providers department within the previous 12 mos or has a future within next 30 days. See \"Patient Info\" tab in inbasket, or \"Choose Columns\" in Meds & Orders section of the refill encounter.              Passed - Medication is active on med list             ANGIE ROCA RN 05/05/23 1:41 PM  "

## 2023-05-08 RX ORDER — FERROUS GLUCONATE 324(38)MG
TABLET ORAL
Qty: 90 TABLET | Refills: 1 | Status: SHIPPED | OUTPATIENT
Start: 2023-05-08 | End: 2023-08-22

## 2023-06-01 DIAGNOSIS — I10 ESSENTIAL HYPERTENSION: ICD-10-CM

## 2023-06-01 DIAGNOSIS — Z76.0 ENCOUNTER FOR MEDICATION REFILL: ICD-10-CM

## 2023-06-02 RX ORDER — HYDROCHLOROTHIAZIDE 25 MG/1
TABLET ORAL
Qty: 30 TABLET | Refills: 3 | OUTPATIENT
Start: 2023-06-02

## 2023-06-02 RX ORDER — LOSARTAN POTASSIUM 100 MG/1
TABLET ORAL
Qty: 30 TABLET | Refills: 3 | OUTPATIENT
Start: 2023-06-02

## 2023-06-02 NOTE — TELEPHONE ENCOUNTER
"Routing refill request to provider for review/approval because:  Labs not current:  3/28/2022  To soon    Last Written Prescription Date:  7/28/2022  Last Fill Quantity: 90,  # refills: 3   Last office visit provider:  8/8/2022     Requested Prescriptions   Pending Prescriptions Disp Refills     hydrochlorothiazide (HYDRODIURIL) 25 MG tablet [Pharmacy Med Name: HYDROCHLOROTHIAZIDE 25 MG T 25 Tablet] 30 tablet 3     Sig: TAKE ONE TABLET BY MOUTH ONCE DAILY WITH LOSARTAN 100MG.       Diuretics (Including Combos) Protocol Failed - 6/2/2023  8:49 AM        Failed - Normal serum creatinine on file in past 12 months     Recent Labs   Lab Test 03/28/22  1336   CR 1.24*              Failed - Normal serum potassium on file in past 12 months     Recent Labs   Lab Test 03/28/22  1336   POTASSIUM 4.3                    Failed - Normal serum sodium on file in past 12 months     Recent Labs   Lab Test 03/28/22  1336                 Passed - Blood pressure under 140/90 in past 12 months     BP Readings from Last 3 Encounters:   03/07/23 125/59   02/21/23 (!) 144/66   01/09/23 116/76                 Passed - Recent (12 mo) or future (30 days) visit within the authorizing provider's specialty     Patient has had an office visit with the authorizing provider or a provider within the authorizing providers department within the previous 12 mos or has a future within next 30 days. See \"Patient Info\" tab in inbasket, or \"Choose Columns\" in Meds & Orders section of the refill encounter.              Passed - Medication is active on med list        Passed - Patient is age 18 or older        Passed - No active pregancy on record        Passed - No positive pregnancy test in past 12 months      Last Written Prescription Date:  7/28/2022  Last Fill Quantity: 90,  # refills: 3   Last office visit provider:  8/8/2022      losartan (COZAAR) 100 MG tablet [Pharmacy Med Name: LOSARTAN POTASSIUM 100 MG T 100 Tablet] 30 tablet 3     Sig: TAKE " "1 TABLET BY MOUTH DAILY ALONG WITH HYDROCHLOROTHIAZIDE 25MG       Angiotensin-II Receptors Failed - 6/2/2023  8:49 AM        Failed - Normal serum creatinine on file in past 12 months     Recent Labs   Lab Test 03/28/22  1336   CR 1.24*       Ok to refill medication if creatinine is low          Failed - Normal serum potassium on file in past 12 months     Recent Labs   Lab Test 03/28/22  1336   POTASSIUM 4.3                    Passed - Last blood pressure under 140/90 in past 12 months     BP Readings from Last 3 Encounters:   03/07/23 125/59   02/21/23 (!) 144/66   01/09/23 116/76                 Passed - Recent (12 mo) or future (30 days) visit within the authorizing provider's specialty     Patient has had an office visit with the authorizing provider or a provider within the authorizing providers department within the previous 12 mos or has a future within next 30 days. See \"Patient Info\" tab in inbasket, or \"Choose Columns\" in Meds & Orders section of the refill encounter.              Passed - Medication is active on med list        Passed - Patient is age 18 or older        Passed - No active pregnancy on record        Passed - No positive pregnancy test in past 12 months             Constance Pereyra RN 06/02/23 8:50 AM  "

## 2023-06-05 ENCOUNTER — TELEPHONE (OUTPATIENT)
Dept: FAMILY MEDICINE | Facility: CLINIC | Age: 83
End: 2023-06-05
Payer: COMMERCIAL

## 2023-06-05 NOTE — TELEPHONE ENCOUNTER
----- Message from Tony Pate MA sent at 6/5/2023 10:23 AM CDT -----  Regarding: APPOINTMENT REQUIRED  MD receives prescription refill request from [facility].     Patient MUST be seen by MD for refills at it has been greater than 1 year since follow up. Thanks.

## 2023-06-05 NOTE — TELEPHONE ENCOUNTER
Spoke to patients grand daughter and scheduled her with Dr. Brown on 6/7-mac Felder  6/5/2023  10:40am

## 2023-06-06 ENCOUNTER — TELEPHONE (OUTPATIENT)
Dept: FAMILY MEDICINE | Facility: CLINIC | Age: 83
End: 2023-06-06
Payer: COMMERCIAL

## 2023-06-06 NOTE — TELEPHONE ENCOUNTER
Cassidy RN from [facility] called to report elevated BP during visit on 6/5/2023 of 179/109, pulse 71.  Patient denied symptoms.  Patient is alert and oriented.  Patient is being visited weekly with medications set up into weekly med planner from [facility].  Chart review, patient has a follow up appt with pcp scheduled for 6/7/23.  HC nurse reported patient is aware of the appt. However, HC nurse will have  call patient again to remind of the appointment.    RM Vee, RN  [facility]

## 2023-06-07 ENCOUNTER — ANCILLARY PROCEDURE (OUTPATIENT)
Dept: GENERAL RADIOLOGY | Facility: CLINIC | Age: 83
End: 2023-06-07
Attending: FAMILY MEDICINE
Payer: COMMERCIAL

## 2023-06-07 ENCOUNTER — OFFICE VISIT (OUTPATIENT)
Dept: FAMILY MEDICINE | Facility: CLINIC | Age: 83
End: 2023-06-07
Payer: COMMERCIAL

## 2023-06-07 VITALS
BODY MASS INDEX: 24.44 KG/M2 | HEIGHT: 59 IN | OXYGEN SATURATION: 93 % | SYSTOLIC BLOOD PRESSURE: 150 MMHG | TEMPERATURE: 98.2 F | RESPIRATION RATE: 32 BRPM | WEIGHT: 121.25 LBS | DIASTOLIC BLOOD PRESSURE: 80 MMHG | HEART RATE: 73 BPM

## 2023-06-07 DIAGNOSIS — J18.9 COMMUNITY ACQUIRED PNEUMONIA, UNSPECIFIED LATERALITY: ICD-10-CM

## 2023-06-07 DIAGNOSIS — R09.89 LUNG CRACKLES: ICD-10-CM

## 2023-06-07 DIAGNOSIS — R06.82 TACHYPNEA: ICD-10-CM

## 2023-06-07 DIAGNOSIS — F33.0 MAJOR DEPRESSIVE DISORDER, RECURRENT EPISODE, MILD (H): ICD-10-CM

## 2023-06-07 DIAGNOSIS — N18.30 STAGE 3 CHRONIC KIDNEY DISEASE, UNSPECIFIED WHETHER STAGE 3A OR 3B CKD (H): ICD-10-CM

## 2023-06-07 DIAGNOSIS — K59.04 CHRONIC IDIOPATHIC CONSTIPATION: ICD-10-CM

## 2023-06-07 DIAGNOSIS — Z13.0 SCREENING, ANEMIA, DEFICIENCY, IRON: ICD-10-CM

## 2023-06-07 DIAGNOSIS — R06.02 SHORTNESS OF BREATH: ICD-10-CM

## 2023-06-07 DIAGNOSIS — R06.02 SHORTNESS OF BREATH: Primary | ICD-10-CM

## 2023-06-07 DIAGNOSIS — Z13.220 SCREENING FOR HYPERLIPIDEMIA: ICD-10-CM

## 2023-06-07 DIAGNOSIS — I27.21 PULMONARY ARTERIAL HYPERTENSION (H): ICD-10-CM

## 2023-06-07 PROBLEM — I26.93 SINGLE SUBSEGMENTAL PULMONARY EMBOLISM WITHOUT ACUTE COR PULMONALE (H): Status: RESOLVED | Noted: 2020-10-15 | Resolved: 2023-06-07

## 2023-06-07 LAB
ALBUMIN UR-MCNC: NEGATIVE MG/DL
APPEARANCE UR: CLEAR
BACTERIA #/AREA URNS HPF: ABNORMAL /HPF
BILIRUB UR QL STRIP: NEGATIVE
BNP SERPL-MCNC: 95 PG/ML (ref 0–163)
COLOR UR AUTO: YELLOW
ERYTHROCYTE [DISTWIDTH] IN BLOOD BY AUTOMATED COUNT: 15.2 % (ref 10–15)
GLUCOSE UR STRIP-MCNC: NEGATIVE MG/DL
HCT VFR BLD AUTO: 34 % (ref 35–47)
HGB BLD-MCNC: 9.8 G/DL (ref 11.7–15.7)
HGB UR QL STRIP: ABNORMAL
KETONES UR STRIP-MCNC: NEGATIVE MG/DL
LEUKOCYTE ESTERASE UR QL STRIP: NEGATIVE
MCH RBC QN AUTO: 24.4 PG (ref 26.5–33)
MCHC RBC AUTO-ENTMCNC: 28.8 G/DL (ref 31.5–36.5)
MCV RBC AUTO: 85 FL (ref 78–100)
MUCOUS THREADS #/AREA URNS LPF: PRESENT /LPF
NITRATE UR QL: NEGATIVE
PH UR STRIP: 5.5 [PH] (ref 5–7)
PLATELET # BLD AUTO: 274 10E3/UL (ref 150–450)
RBC # BLD AUTO: 4.01 10E6/UL (ref 3.8–5.2)
RBC #/AREA URNS AUTO: ABNORMAL /HPF
SP GR UR STRIP: 1.02 (ref 1–1.03)
SQUAMOUS #/AREA URNS AUTO: ABNORMAL /LPF
UROBILINOGEN UR STRIP-ACNC: 0.2 E.U./DL
WBC # BLD AUTO: 7 10E3/UL (ref 4–11)
WBC #/AREA URNS AUTO: ABNORMAL /HPF

## 2023-06-07 PROCEDURE — 85027 COMPLETE CBC AUTOMATED: CPT | Performed by: FAMILY MEDICINE

## 2023-06-07 PROCEDURE — 80048 BASIC METABOLIC PNL TOTAL CA: CPT | Performed by: FAMILY MEDICINE

## 2023-06-07 PROCEDURE — 81001 URINALYSIS AUTO W/SCOPE: CPT | Performed by: FAMILY MEDICINE

## 2023-06-07 PROCEDURE — 80061 LIPID PANEL: CPT | Performed by: FAMILY MEDICINE

## 2023-06-07 PROCEDURE — 71046 X-RAY EXAM CHEST 2 VIEWS: CPT | Mod: TC | Performed by: RADIOLOGY

## 2023-06-07 PROCEDURE — 99215 OFFICE O/P EST HI 40 MIN: CPT | Performed by: FAMILY MEDICINE

## 2023-06-07 PROCEDURE — 83880 ASSAY OF NATRIURETIC PEPTIDE: CPT | Performed by: FAMILY MEDICINE

## 2023-06-07 PROCEDURE — 36415 COLL VENOUS BLD VENIPUNCTURE: CPT | Performed by: FAMILY MEDICINE

## 2023-06-07 RX ORDER — POLYETHYLENE GLYCOL 3350 17 G/17G
1 POWDER, FOR SOLUTION ORAL DAILY PRN
Qty: 510 G | Refills: 3 | Status: SHIPPED | OUTPATIENT
Start: 2023-06-07 | End: 2023-10-17

## 2023-06-07 RX ORDER — AZITHROMYCIN 250 MG/1
TABLET, FILM COATED ORAL
Qty: 6 TABLET | Refills: 0 | Status: SHIPPED | OUTPATIENT
Start: 2023-06-07 | End: 2023-06-12

## 2023-06-07 ASSESSMENT — ASTHMA QUESTIONNAIRES
QUESTION_4 LAST FOUR WEEKS HOW OFTEN HAVE YOU USED YOUR RESCUE INHALER OR NEBULIZER MEDICATION (SUCH AS ALBUTEROL): TWO OR THREE TIMES PER WEEK
QUESTION_2 LAST FOUR WEEKS HOW OFTEN HAVE YOU HAD SHORTNESS OF BREATH: ONCE OR TWICE A WEEK
ACT_TOTALSCORE: 18
QUESTION_1 LAST FOUR WEEKS HOW MUCH OF THE TIME DID YOUR ASTHMA KEEP YOU FROM GETTING AS MUCH DONE AT WORK, SCHOOL OR AT HOME: A LITTLE OF THE TIME
QUESTION_5 LAST FOUR WEEKS HOW WOULD YOU RATE YOUR ASTHMA CONTROL: SOMEWHAT CONTROLLED
QUESTION_3 LAST FOUR WEEKS HOW OFTEN DID YOUR ASTHMA SYMPTOMS (WHEEZING, COUGHING, SHORTNESS OF BREATH, CHEST TIGHTNESS OR PAIN) WAKE YOU UP AT NIGHT OR EARLIER THAN USUAL IN THE MORNING: ONCE OR TWICE
ACT_TOTALSCORE: 18

## 2023-06-07 ASSESSMENT — PATIENT HEALTH QUESTIONNAIRE - PHQ9
10. IF YOU CHECKED OFF ANY PROBLEMS, HOW DIFFICULT HAVE THESE PROBLEMS MADE IT FOR YOU TO DO YOUR WORK, TAKE CARE OF THINGS AT HOME, OR GET ALONG WITH OTHER PEOPLE: NOT DIFFICULT AT ALL
SUM OF ALL RESPONSES TO PHQ QUESTIONS 1-9: 8
SUM OF ALL RESPONSES TO PHQ QUESTIONS 1-9: 8

## 2023-06-07 NOTE — PATIENT INSTRUCTIONS
For the next 5 days (6/7/2023 through 6/12/2023) I want her to take 2 tablets of furosemide twice per day, then go back to normal.     Pick the antibiotics, those are also twice per day for 5 hours.

## 2023-06-07 NOTE — PROGRESS NOTES
Assessment & Plan     Stage 3 chronic kidney disease, unspecified whether stage 3a or 3b CKD (H)  Recheck BMP  - BASIC METABOLIC PANEL  - BASIC METABOLIC PANEL    Screening, anemia, deficiency, iron - CBC with platelets  Screening for hyperlipidemia - Lipid panel reflex to direct LDL Non-fasting    Lung crackles  Tachypnea  Shortness of breath  Community acquired pneumonia, unspecified laterality  With her symptoms, low threshold to treat or send to ER if more short of breath. cxr normal, but with her tachypnea and pleuritic pain, will treat for CAP. If BNP elevated then would consider stopping treatment. RR up to 30 during visit. No wheezing. Crackles on LLL. Will help diurese and treat for CAP because she is visibly unwell/uncomfortable. Differential includes fluid overload (3lb weight gain), CAP (will treat due to symptoms though xray clear), constipation (treating as well), UTI (checking UA). Follow up in 1 week to monitor closely.   - XR Chest 2 Views  - BASIC METABOLIC PANEL  - CBC with platelets  - B-Type Natriuretic Peptide ( [place] Only)  - UA Macroscopic with reflex to Microscopic and Culture  - UA Microscopic with Reflex to Culture  - amoxicillin-clavulanate (AUGMENTIN) 875-125 MG tablet  Dispense: 10 tablet; Refill: 0  - azithromycin (ZITHROMAX) 250 MG tablet  Dispense: 6 tablet; Refill: 0      Chronic idiopathic constipation  - polyethylene glycol (MIRALAX) 17 GM/Dose powder  Dispense: 510 g; Refill: 3    Pulmonary arterial hypertension (H)  Stable.     Major depressive disorder, recurrent episode, mild (H)  Stable. No changes.       No follow-ups on file.    45 minutes spent on the date of the encounter doing chart review, history and exam, documentation and further activities per the note      Chichi Brown MD  [facility] Issa is a 83 year old, presenting for the following health issues:  Medication Refill (Not sure which ones exactly since she has nurses who  "come in to set them up. She brought all her meds today.  ), Pain (Having pain on both sides of ribs), and Asthma (Breathing difficulty)        8/8/2022     2:30 PM   Additional Questions   Roomed by ei   Accompanied by johanna     History of Present Illness       Reason for visit:  Check up and Med Refil    She eats 0-1 servings of fruits and vegetables daily.She consumes 2 sweetened beverage(s) daily.She exercises with enough effort to increase her heart rate 10 to 19 minutes per day.  She exercises with enough effort to increase her heart rate 3 or less days per week.   She is taking medications regularly.    Today's PHQ-9         PHQ-9 Total Score: 8    PHQ-9 Q9 Thoughts of better off dead/self-harm past 2 weeks :   Not at all    How difficult have these problems made it for you to do your work, take care of things at home, or get along with other people: Not difficult at all         Wt Readings from Last 4 Encounters:   06/07/23 55 kg (121 lb 4 oz)   02/21/23 53.5 kg (118 lb)   09/30/22 53.5 kg (118 lb)   08/08/22 52.2 kg (115 lb)       Patient is up 3lbs.   She tachypneic  Non wheezing.   Bilateral crackles, unsure if this is baseline.   No fevers.   Pain bilaterally. Will     Review of Systems   Constitutional, HEENT, cardiovascular, pulmonary, gi and gu systems are negative, except as otherwise noted.      Objective    BP (!) 150/80   Pulse 73   Temp 98.2  F (36.8  C) (Oral)   Resp (!) 32   Ht 1.488 m (4' 10.58\")   Wt 55 kg (121 lb 4 oz)   LMP  (LMP Unknown)   SpO2 93%   Breastfeeding No   BMI 24.84 kg/m    Body mass index is 24.84 kg/m .  Physical Exam   GENERAL: healthy, alert and no distress  NECK: no adenopathy, no asymmetry, masses, or scars and thyroid normal to palpation  RESP: lungs clear to auscultation - no rales, rhonchi or wheezes  CV: regular rate and rhythm, normal S1 S2, no S3 or S4, no murmur, click or rub, no peripheral edema and peripheral pulses strong  ABDOMEN: soft, " nontender, no hepatosplenomegaly, no masses and bowel sounds normal  MS: no gross musculoskeletal defects noted, no edema    Results for orders placed or performed in visit on 06/07/23 (from the past 24 hour(s))   CBC with platelets   Result Value Ref Range    WBC Count 7.0 4.0 - 11.0 10e3/uL    RBC Count 4.01 3.80 - 5.20 10e6/uL    Hemoglobin 9.8 (L) 11.7 - 15.7 g/dL    Hematocrit 34.0 (L) 35.0 - 47.0 %    MCV 85 78 - 100 fL    MCH 24.4 (L) 26.5 - 33.0 pg    MCHC 28.8 (L) 31.5 - 36.5 g/dL    RDW 15.2 (H) 10.0 - 15.0 %    Platelet Count 274 150 - 450 10e3/uL   UA Macroscopic with reflex to Microscopic and Culture    Specimen: Urine, NOS   Result Value Ref Range    Color Urine Yellow Colorless, Straw, Light Yellow, Yellow    Appearance Urine Clear Clear    Glucose Urine Negative Negative mg/dL    Bilirubin Urine Negative Negative    Ketones Urine Negative Negative mg/dL    Specific Gravity Urine 1.025 1.005 - 1.030    Blood Urine Trace (A) Negative    pH Urine 5.5 5.0 - 7.0    Protein Albumin Urine Negative Negative mg/dL    Urobilinogen Urine 0.2 0.2, 1.0 E.U./dL    Nitrite Urine Negative Negative    Leukocyte Esterase Urine Negative Negative   UA Microscopic with Reflex to Culture   Result Value Ref Range    Bacteria Urine Few (A) None Seen /HPF    RBC Urine 0-2 0-2 /HPF /HPF    WBC Urine 0-5 0-5 /HPF /HPF    Squamous Epithelials Urine Few (A) None Seen /LPF    Mucus Urine Present (A) None Seen /LPF    Narrative    Urine Culture not indicated

## 2023-06-08 LAB
ANION GAP SERPL CALCULATED.3IONS-SCNC: 14 MMOL/L (ref 7–15)
BUN SERPL-MCNC: 21.8 MG/DL (ref 8–23)
CALCIUM SERPL-MCNC: 8.8 MG/DL (ref 8.8–10.2)
CHLORIDE SERPL-SCNC: 102 MMOL/L (ref 98–107)
CHOLEST SERPL-MCNC: 240 MG/DL
CREAT SERPL-MCNC: 1.15 MG/DL (ref 0.51–0.95)
DEPRECATED HCO3 PLAS-SCNC: 26 MMOL/L (ref 22–29)
GFR SERPL CREATININE-BSD FRML MDRD: 47 ML/MIN/1.73M2
GLUCOSE SERPL-MCNC: 88 MG/DL (ref 70–99)
HDLC SERPL-MCNC: 69 MG/DL
LDLC SERPL CALC-MCNC: 149 MG/DL
NONHDLC SERPL-MCNC: 171 MG/DL
POTASSIUM SERPL-SCNC: 4.4 MMOL/L (ref 3.4–5.3)
SODIUM SERPL-SCNC: 142 MMOL/L (ref 136–145)
TRIGL SERPL-MCNC: 109 MG/DL

## 2023-06-27 DIAGNOSIS — M80.00XD AGE-RELATED OSTEOPOROSIS WITH CURRENT PATHOLOGICAL FRACTURE WITH ROUTINE HEALING: ICD-10-CM

## 2023-06-28 RX ORDER — DIPHENOXYLATE HYDROCHLORIDE AND ATROPINE SULFATE 2.5; .025 MG/1; MG/1
1 TABLET ORAL DAILY
Qty: 90 TABLET | Refills: 3 | Status: SHIPPED | OUTPATIENT
Start: 2023-06-28 | End: 2024-06-18

## 2023-06-28 NOTE — TELEPHONE ENCOUNTER
"Routing refill request to provider for review/approval because:  Early refill requested.    Last Written Prescription Date:  3/15/23  Last Fill Quantity: 30,  # refills: 3   Last office visit provider:  6/7/23     Requested Prescriptions   Pending Prescriptions Disp Refills     Multiple Vitamin (MULTI-VITAMINS) TABS [Pharmacy Med Name: MULTI-VITAMINS TABS Tablet] 30 tablet 3     Sig: TAKE 1 TABLET BY MOUTH DAILY.       Vitamin Supplements (Adult) Protocol Passed - 6/28/2023  9:18 AM        Passed - High dose Vitamin D not ordered        Passed - Recent (12 mo) or future (30 days) visit within the authorizing provider's specialty     Patient has had an office visit with the authorizing provider or a provider within the authorizing providers department within the previous 12 mos or has a future within next 30 days. See \"Patient Info\" tab in inbasket, or \"Choose Columns\" in Meds & Orders section of the refill encounter.              Passed - Medication is active on med list             Moses Borden RN 06/28/23 9:19 AM  "

## 2023-07-21 DIAGNOSIS — J81.0 ACUTE PULMONARY EDEMA (H): ICD-10-CM

## 2023-07-21 DIAGNOSIS — Z76.0 ENCOUNTER FOR MEDICATION REFILL: ICD-10-CM

## 2023-07-21 NOTE — TELEPHONE ENCOUNTER
"Routing refill request to provider for review/approval because:  Labs out of range:  creatinine  Failed BP protocol    Last Written Prescription Date:  4/06/23  Last Fill Quantity: 60,  # refills: 3   Last office visit provider:  6/07/23 w/ Dr Brown     Requested Prescriptions   Pending Prescriptions Disp Refills     furosemide (LASIX) 20 MG tablet [Pharmacy Med Name: FUROSEMIDE 20 MG TABS 20 Tablet] 60 tablet 3     Sig: TAKE 1 TABLET BY MOUTH 2 TIMES DAILY       Diuretics (Including Combos) Protocol Failed - 7/21/2023  8:09 AM        Failed - Blood pressure under 140/90 in past 12 months     BP Readings from Last 3 Encounters:   06/07/23 (!) 150/80   03/07/23 125/59   02/21/23 (!) 144/66                 Failed - Normal serum creatinine on file in past 12 months     Recent Labs   Lab Test 06/07/23  1417   CR 1.15*              Passed - Recent (12 mo) or future (30 days) visit within the authorizing provider's specialty     Patient has had an office visit with the authorizing provider or a provider within the authorizing providers department within the previous 12 mos or has a future within next 30 days. See \"Patient Info\" tab in inbasket, or \"Choose Columns\" in Meds & Orders section of the refill encounter.              Passed - Medication is active on med list        Passed - Patient is age 18 or older        Passed - No active pregancy on record        Passed - Normal serum potassium on file in past 12 months     Recent Labs   Lab Test 06/07/23  1417   POTASSIUM 4.4                    Passed - Normal serum sodium on file in past 12 months     Recent Labs   Lab Test 06/07/23  1417                 Passed - No positive pregnancy test in past 12 months             Liz Arroyo RN 07/21/23 11:06 AM  " LACERATION

## 2023-07-24 RX ORDER — FUROSEMIDE 20 MG
TABLET ORAL
Qty: 60 TABLET | Refills: 3 | Status: SHIPPED | OUTPATIENT
Start: 2023-07-24 | End: 2023-12-11

## 2023-08-01 ENCOUNTER — PATIENT OUTREACH (OUTPATIENT)
Dept: GERIATRIC MEDICINE | Facility: CLINIC | Age: 83
End: 2023-08-01
Payer: COMMERCIAL

## 2023-08-03 NOTE — PROGRESS NOTES
[facility] Care Coordination Contact      [facility] Six-Month Telephone Assessment    6 month telephone assessment completed on 8/3/2023.    ER visits: No  Hospitalizations: No  TCU stays: No  Significant health status changes: No significant health changes noted. Luis Manuel continues to work with specialists on better managing her pain.   Falls/Injuries: No  ADL/IADL changes: No  Changes in services: No, PCA continues at 6.5 hours per day. Luis Manuel reports that she received the shower chair and incontinence supplies have been coming monthly.     Caregiver Assessment follow up:  n/a    Goals: See POC in chart for goal progress documentation.      Will see member in 6 months for an annual health risk assessment.   Encouraged member to call CC with any questions or concerns in the meantime.     REBECCA Srinivasan  [facility]  [phone]

## 2023-08-10 DIAGNOSIS — Z53.9 DIAGNOSIS NOT YET DEFINED: Primary | ICD-10-CM

## 2023-08-10 PROCEDURE — G0179 MD RECERTIFICATION HHA PT: HCPCS | Performed by: FAMILY MEDICINE

## 2023-08-15 NOTE — PROGRESS NOTES
HPI -80-year-old female who is here with her granddaughter  Amie professional phone  used.     She was living with her daughter  and son-in-law and children.  Her daughter was her PCA.  Over the summer she decided to move to [place] to live with her son.  She had planned to live there for a year but had some difficulty with her son's wife and so after a month moved back in with her daughter in [place].  While she was in [place] she was diagnosed with pulmonary embolism.  The son drove her back from [place] for a pulmonology appointment but there seemed to been some confusion and she was unable to have that appointment.    Single subsegmental pulmonary embolism without acute cor pulmonale (H)  Per records from [place] clinic describing acute PE on 8/22.  Getting  home care for anticoagulation teaching and med management.  anticoagulation monitoring managed with anti-coag RN.  Recent INR was 3.6    HTN -   Current meds include amlodipine, losartan, hydrochlorothiazide, metoprolol    Chronic pain -   Pain is manageable with Tylenol, gabapentin, capsaicin    Osteoporosis -she has been getting Prolia shots    Restrictive lung dz and pulm fibrosis  Last pulm appt 8/19/19 and due for 1 year f/u. Speaks Amie. She came from [place] for appt in 9/3/20 but was told she has no appt. Then there is an appt 9/8/20 that she did not attend b/c she was not told this new date.     Constipation-the stool softeners working    History of PTSD with poor appetite and insomnia  The recent addition to Remeron seems to be helping    Polypharmacy    Patient Active Problem List   Diagnosis     Constipation     Vitamin D Deficiency     Essential hypertension     Rotator Cuff Tendonitis     Rupture Of The Bicipital Tendon     Asymptomatic Postmenopausal Status     Hyperlipidemia     Osteoporosis on Prolia 5.7.19     Chronic reflux esophagitis     Bursitis, trochanteric     Hypoalbuminemia     Low back pain     Polypharmacy      Degenerative disc disease, cervical     Spinal stenosis, multilevel     Chronic pain syndrome     Restrictive lung disease     Short of breath on exertion     GERD (gastroesophageal reflux disease)     Sinus bradycardia     Bilateral primary osteoarthritis of knee     Polyarthralgia     Medically complex patient     Language barrier affecting health care     Age-related osteoporosis with current pathological fracture with routine healing     Financial difficulties     Urinary incontinence in female     Insurance coverage problems     Pulmonary fibrosis (H)     Anticoagulated     Single subsegmental pulmonary embolism without acute cor pulmonale (H)     Current Outpatient Medications   Medication Sig     acetaminophen (MAPAP ARTHRITIS PAIN) 650 MG CR tablet Take 2 tablets (1,300 mg total) by mouth 2 (two) times a day.     albuterol (PROAIR HFA;PROVENTIL HFA;VENTOLIN HFA) 90 mcg/actuation inhaler Inhale 2 puffs every 6 (six) hours as needed for wheezing or shortness of breath.     amLODIPine (NORVASC) 10 MG tablet TAKE 1 TABLET ORALLY EVERY DAY.     calcium, as carbonate, (ANTACID, CALCIUM CARBONATE,) 200 mg calcium (500 mg) chewable tablet CHEW 1 TABLET BY MOUTH THREE TIMES A DAY TO KEEP YOUR BONES HEALTHY     capsaicin (ZOSTRIX) 0.025 % cream Apply topically 2 (two) times a day as needed.     famotidine (PEPCID) 40 MG tablet 1 tablet daily.     gabapentin (NEURONTIN) 100 MG capsule TAKE 2 CAPSULES BY MOUTH THREE TIMES A DAY.     hydroCHLOROthiazide (HYDRODIURIL) 25 MG tablet TAKE ONE TABLET BY MOUTH ONCE DAILY WITH LOSARTAN 100MG.     ipratropium-albuteroL (COMBIVENT RESPIMAT)  mcg/actuation Mist inhaler Inhale 1 puff every 6 (six) hours as needed.     losartan (COZAAR) 100 MG tablet TAKE 1 TABLET BY MOUTH DAILY WITH HYDROCHLOROTHIAZIDE 25MG     metoprolol succinate (TOPROL-XL) 50 MG 24 hr tablet Take 1 tablet (50 mg total) by mouth daily.     mirtazapine (REMERON) 15 MG tablet Take 0.5 tablets (7.5 mg total)  "by mouth at bedtime.     polyvinyl alcohol (ARTIFICIAL TEARS, POLYVIN ALC,) 1.4 % ophthalmic solution APPLY TO EYE DAILY AS NEEDED.     senna-docusate (SENEXON-S) 8.6-50 mg tablet Take 2 tablets by mouth daily.     warfarin ANTICOAGULANT (COUMADIN/JANTOVEN) 2.5 MG tablet Take half to one tablets (1.25 to 2.5 mg) by mouth daily. Adjust dose based on INR results as directed.     warfarin ANTICOAGULANT (COUMADIN/JANTOVEN) 5 MG tablet TAKE 1 TABLET (5MG) BY MOUTH EVERY WEDNESDAY AND 1/2 TABLET (2.5MG) BY MOUTH ALL OTHER DAYS     Vitals:    11/02/20 1454   BP: 130/68   Patient Site: Left Arm   Patient Position: Sitting   Cuff Size: Adult Regular   Pulse: (!) 56   Resp: 20   Temp: 97.6  F (36.4  C)   TempSrc: Oral   SpO2: 92%   Weight: 124 lb 1 oz (56.3 kg)   Height: 4' 10.66\" (1.49 m)     OBJECTIVE:  Vitals listed above within normal limits  General appearance: well groomed, pleasant, well hydrated, nontoxic appearing  ENT: PERRL, throat clear  Neck: neck supple, no lymphadenopathy, no thyromegaly  Lungs: lungs clear to auscultation bilaterally, no wheezes or rhonchi  Heart: regular rate and rhythm, no murmurs, rubs or gallops  Abdomen: soft, nontender  Neuro: no focal deficits, CN II-XII grossly intact, alert and oriented  Psych:  mood stable, appears to have good insight and judgment    A/P  Single subsegmental pulmonary embolism without acute cor pulmonale (H)  Continue with home health checking INR and anticoagulation RN managing warfarin dose    HTN -   BP well controlled with current regimen    Chronic pain -   Pain well controlled with current regimen    Osteoporosis -continue Prolia shots    Restrictive lung dz and pulm fibrosis  Breathing is good with her current inhalers.  We will put in a referral to try to reschedule her pulmonary consultation    Constipation-continue current meds    History of PTSD with poor appetite and insomnia  Doing better with Remeron    Polypharmacy  Medications reconciled and " verified    Flu shot given    RTC for Medicare annual wellness visit and follow-up on pulmonary consult    Spent 25 min face to face with patient with more the 50% spent in counseling, reviewing chart with patient and coordination of care, medication reconciliation and discussing problems listed above.        0

## 2023-08-22 ENCOUNTER — OFFICE VISIT (OUTPATIENT)
Dept: FAMILY MEDICINE | Facility: CLINIC | Age: 83
End: 2023-08-22
Payer: COMMERCIAL

## 2023-08-22 VITALS
HEIGHT: 59 IN | BODY MASS INDEX: 23.39 KG/M2 | WEIGHT: 116 LBS | SYSTOLIC BLOOD PRESSURE: 124 MMHG | TEMPERATURE: 98.1 F | OXYGEN SATURATION: 96 % | RESPIRATION RATE: 12 BRPM | DIASTOLIC BLOOD PRESSURE: 60 MMHG | HEART RATE: 69 BPM

## 2023-08-22 DIAGNOSIS — Z79.899 POLYPHARMACY: Primary | ICD-10-CM

## 2023-08-22 DIAGNOSIS — H04.123 DRY EYES: ICD-10-CM

## 2023-08-22 DIAGNOSIS — D50.8 IRON DEFICIENCY ANEMIA SECONDARY TO INADEQUATE DIETARY IRON INTAKE: ICD-10-CM

## 2023-08-22 DIAGNOSIS — G47.00 INSOMNIA, UNSPECIFIED TYPE: ICD-10-CM

## 2023-08-22 DIAGNOSIS — Z76.0 ENCOUNTER FOR MEDICATION REFILL: ICD-10-CM

## 2023-08-22 DIAGNOSIS — M48.00 SPINAL STENOSIS, MULTILEVEL: ICD-10-CM

## 2023-08-22 LAB
ALBUMIN SERPL BCG-MCNC: 3.9 G/DL (ref 3.5–5.2)
ALP SERPL-CCNC: 96 U/L (ref 35–104)
ALT SERPL W P-5'-P-CCNC: 13 U/L (ref 0–50)
ANION GAP SERPL CALCULATED.3IONS-SCNC: 13 MMOL/L (ref 7–15)
AST SERPL W P-5'-P-CCNC: 52 U/L (ref 0–45)
BILIRUB SERPL-MCNC: 0.4 MG/DL
BUN SERPL-MCNC: 20.7 MG/DL (ref 8–23)
CALCIUM SERPL-MCNC: 9.3 MG/DL (ref 8.8–10.2)
CHLORIDE SERPL-SCNC: 98 MMOL/L (ref 98–107)
CREAT SERPL-MCNC: 1.23 MG/DL (ref 0.51–0.95)
DEPRECATED HCO3 PLAS-SCNC: 30 MMOL/L (ref 22–29)
FERRITIN SERPL-MCNC: 42 NG/ML (ref 11–328)
GFR SERPL CREATININE-BSD FRML MDRD: 44 ML/MIN/1.73M2
GLUCOSE SERPL-MCNC: 102 MG/DL (ref 70–99)
IRON BINDING CAPACITY (ROCHE): 397 UG/DL (ref 240–430)
IRON SATN MFR SERPL: 9 % (ref 15–46)
IRON SERPL-MCNC: 36 UG/DL (ref 37–145)
POTASSIUM SERPL-SCNC: 4.6 MMOL/L (ref 3.4–5.3)
PROT SERPL-MCNC: 8 G/DL (ref 6.4–8.3)
SODIUM SERPL-SCNC: 141 MMOL/L (ref 136–145)

## 2023-08-22 PROCEDURE — 99214 OFFICE O/P EST MOD 30 MIN: CPT | Performed by: FAMILY MEDICINE

## 2023-08-22 PROCEDURE — 83540 ASSAY OF IRON: CPT | Performed by: FAMILY MEDICINE

## 2023-08-22 PROCEDURE — 82728 ASSAY OF FERRITIN: CPT | Performed by: FAMILY MEDICINE

## 2023-08-22 PROCEDURE — 80053 COMPREHEN METABOLIC PANEL: CPT | Performed by: FAMILY MEDICINE

## 2023-08-22 PROCEDURE — 36415 COLL VENOUS BLD VENIPUNCTURE: CPT | Performed by: FAMILY MEDICINE

## 2023-08-22 PROCEDURE — 83550 IRON BINDING TEST: CPT | Performed by: FAMILY MEDICINE

## 2023-08-22 RX ORDER — GABAPENTIN 100 MG/1
100 CAPSULE ORAL 2 TIMES DAILY
Qty: 90 CAPSULE | Refills: 3 | Status: SHIPPED | OUTPATIENT
Start: 2023-08-22 | End: 2023-12-21 | Stop reason: DRUGHIGH

## 2023-08-22 RX ORDER — MIRTAZAPINE 30 MG/1
30 TABLET, ORALLY DISINTEGRATING ORAL AT BEDTIME
Qty: 90 TABLET | Refills: 3 | Status: SHIPPED | OUTPATIENT
Start: 2023-08-22 | End: 2024-09-25

## 2023-08-22 RX ORDER — ALBUTEROL SULFATE 90 UG/1
2 AEROSOL, METERED RESPIRATORY (INHALATION) EVERY 6 HOURS PRN
Qty: 18 G | Status: CANCELLED | OUTPATIENT
Start: 2023-08-22

## 2023-08-22 RX ORDER — POLYVINYL ALCOHOL 14 MG/ML
1 SOLUTION/ DROPS OPHTHALMIC
Qty: 30 ML | Refills: 3 | Status: SHIPPED | OUTPATIENT
Start: 2023-08-22 | End: 2024-06-27

## 2023-08-22 ASSESSMENT — ASTHMA QUESTIONNAIRES
QUESTION_3 LAST FOUR WEEKS HOW OFTEN DID YOUR ASTHMA SYMPTOMS (WHEEZING, COUGHING, SHORTNESS OF BREATH, CHEST TIGHTNESS OR PAIN) WAKE YOU UP AT NIGHT OR EARLIER THAN USUAL IN THE MORNING: TWO OR THREE NIGHTS A WEEK
QUESTION_5 LAST FOUR WEEKS HOW WOULD YOU RATE YOUR ASTHMA CONTROL: POORLY CONTROLLED
QUESTION_2 LAST FOUR WEEKS HOW OFTEN HAVE YOU HAD SHORTNESS OF BREATH: MORE THAN ONCE A DAY
ACT_TOTALSCORE: 12
ACT_TOTALSCORE: 12
QUESTION_3 LAST FOUR WEEKS HOW OFTEN DID YOUR ASTHMA SYMPTOMS (WHEEZING, COUGHING, SHORTNESS OF BREATH, CHEST TIGHTNESS OR PAIN) WAKE YOU UP AT NIGHT OR EARLIER THAN USUAL IN THE MORNING: TWO OR THREE NIGHTS A WEEK
QUESTION_4 LAST FOUR WEEKS HOW OFTEN HAVE YOU USED YOUR RESCUE INHALER OR NEBULIZER MEDICATION (SUCH AS ALBUTEROL): ONE OR TWO TIMES PER DAY
QUESTION_1 LAST FOUR WEEKS HOW MUCH OF THE TIME DID YOUR ASTHMA KEEP YOU FROM GETTING AS MUCH DONE AT WORK, SCHOOL OR AT HOME: MOST OF THE TIME
QUESTION_4 LAST FOUR WEEKS HOW OFTEN HAVE YOU USED YOUR RESCUE INHALER OR NEBULIZER MEDICATION (SUCH AS ALBUTEROL): ONE OR TWO TIMES PER DAY
QUESTION_1 LAST FOUR WEEKS HOW MUCH OF THE TIME DID YOUR ASTHMA KEEP YOU FROM GETTING AS MUCH DONE AT WORK, SCHOOL OR AT HOME: NONE OF THE TIME
QUESTION_2 LAST FOUR WEEKS HOW OFTEN HAVE YOU HAD SHORTNESS OF BREATH: MORE THAN ONCE A DAY

## 2023-08-22 ASSESSMENT — PATIENT HEALTH QUESTIONNAIRE - PHQ9: SUM OF ALL RESPONSES TO PHQ QUESTIONS 1-9: 11

## 2023-08-22 NOTE — PROGRESS NOTES
Assessment & Plan     Polypharmacy  Medications reviewed with patient, no changes today.   - Comprehensive metabolic panel (BMP + Alb, Alk Phos, ALT, AST, Total. Bili, TP)    Iron deficiency anemia secondary to inadequate dietary iron intake  Iron studies consistent with iron deficiency anemia.   - Iron and iron binding capacity  - Ferritin    Insomnia, unspecified type  - mirtazapine (REMERON SOL-TAB) 30 MG ODT  Dispense: 90 tablet; Refill: 3    Dry eyes  - ARTIFICIAL TEARS 1.4 % ophthalmic solution  Dispense: 30 mL; Refill: 3      Spinal stenosis, multilevel  Refilled, no changes. Higher doses caused sedation.   - gabapentin (NEURONTIN) 100 MG capsule  Dispense: 90 capsule; Refill: 3    Shortness of breath  Improved. She doesn't really need the O2, sometimes during sleep but family says she doesn't wear it all the time. She only uses her albuterol inhaler PRN and does not need it every day. I dont know what controller inhaler she has - multiple listed and med rec says she doesn't have any of them. Will have her bring those in at her next visit to confirm.       Return in about 1 month (around 9/22/2023) for Medication Check.     30 minutes spent on the date of the encounter doing chart review, history and exam, documentation and further activities per the note      Chichi Brown MD  [facility] is a 83 year old, presenting for the following health issues:  shortness of breath         8/22/2023    11:44 AM   Additional Questions   Roomed by nilay madison   Accompanied by Grand daughter       History of Present Illness     Asthma:  She presents for follow up of asthma.  She has no cough, no wheezing, and no shortness of breath.  She is using a relief medication a few times a week. She does not have a controller medication. Patient is aware of the following triggers: exercise or sports. The patient has not had a visit to the Emergency Room, Urgent Care or Hospital due to asthma  "since the last clinic visit.     She eats 2-3 servings of fruits and vegetables daily.She consumes 2 sweetened beverage(s) daily.She exercises with enough effort to increase her heart rate 10 to 19 minutes per day.  She exercises with enough effort to increase her heart rate 3 or less days per week.   She is taking medications regularly.         Stage 3 chronic kidney disease, unspecified whether stage 3a or 3b CKD (H)  Recheck BMP  - BASIC METABOLIC PANEL  - BASIC METABOLIC PANEL        Shortness of breath  Improved/resolved.       Wt Readings from Last 4 Encounters:   08/22/23 52.6 kg (116 lb)   06/07/23 55 kg (121 lb 4 oz)   02/21/23 53.5 kg (118 lb)   09/30/22 53.5 kg (118 lb)         Review of Systems   Constitutional, HEENT, cardiovascular, pulmonary, gi and gu systems are negative, except as otherwise noted.      Objective    /60 (BP Location: Left arm, Patient Position: Sitting, Cuff Size: Adult Regular)   Pulse 69   Temp 98.1  F (36.7  C) (Oral)   Resp 12   Ht 1.488 m (4' 10.58\")   Wt 52.6 kg (116 lb)   LMP  (LMP Unknown)   SpO2 96%   BMI 23.77 kg/m    Body mass index is 23.77 kg/m .  Physical Exam   GENERAL: healthy, alert and no distress  NECK: no adenopathy, no asymmetry, masses, or scars and thyroid normal to palpation  RESP: lungs clear to auscultation - no rales, rhonchi or wheezes  CV: regular rate and rhythm, normal S1 S2, no S3 or S4, no murmur, click or rub, no peripheral edema and peripheral pulses strong  ABDOMEN: soft, nontender, no hepatosplenomegaly, no masses and bowel sounds normal  MS: no gross musculoskeletal defects noted, no edema    Results for orders placed or performed in visit on 08/22/23   Iron and iron binding capacity     Status: Abnormal   Result Value Ref Range    Iron 36 (L) 37 - 145 ug/dL    Iron Binding Capacity 397 240 - 430 ug/dL    Iron Sat Index 9 (L) 15 - 46 %   Ferritin     Status: Normal   Result Value Ref Range    Ferritin 42 11 - 328 ng/mL "   Comprehensive metabolic panel (BMP + Alb, Alk Phos, ALT, AST, Total. Bili, TP)     Status: Abnormal   Result Value Ref Range    Sodium 141 136 - 145 mmol/L    Potassium 4.6 3.4 - 5.3 mmol/L    Chloride 98 98 - 107 mmol/L    Carbon Dioxide (CO2) 30 (H) 22 - 29 mmol/L    Anion Gap 13 7 - 15 mmol/L    Urea Nitrogen 20.7 8.0 - 23.0 mg/dL    Creatinine 1.23 (H) 0.51 - 0.95 mg/dL    Calcium 9.3 8.8 - 10.2 mg/dL    Glucose 102 (H) 70 - 99 mg/dL    Alkaline Phosphatase 96 35 - 104 U/L    AST 52 (H) 0 - 45 U/L    ALT 13 0 - 50 U/L    Protein Total 8.0 6.4 - 8.3 g/dL    Albumin 3.9 3.5 - 5.2 g/dL    Bilirubin Total 0.4 <=1.2 mg/dL    GFR Estimate 44 (L) >60 mL/min/1.73m2

## 2023-08-22 NOTE — PATIENT INSTRUCTIONS
We are trying to decrease pill burden and polypharmacy for [facility].     We are making a few changes and following up closely to monitor the effects.   HOLD hydrochlorothiazide until her next visit.   HOLD ferrous sulfate until her next visit.    HOLD multivitamin  START gabapentin  CHANGED DOSE of mirtazapine    Will follow up in about a month

## 2023-08-24 ENCOUNTER — TRANSFERRED RECORDS (OUTPATIENT)
Dept: HEALTH INFORMATION MANAGEMENT | Facility: CLINIC | Age: 83
End: 2023-08-24

## 2023-09-06 ENCOUNTER — PATIENT OUTREACH (OUTPATIENT)
Dept: CARE COORDINATION | Facility: CLINIC | Age: 83
End: 2023-09-06
Payer: COMMERCIAL

## 2023-09-06 NOTE — PROGRESS NOTES
Clinic Care Coordination Contact  Community Health Worker Initial Outreach    CHW Initial Information Gathering:  Referral Source: PCP  Preferred Hospital: [facility]  [phone]  Preferred Urgent Care: [facility] [place], [phone]  Current living arrangement:: I live in a private home with family  Type of residence:: Private home - stairs  Community Resources: PCA  Supplies Currently Used at Home: Other  Equipment Currently Used at Home: wheelchair, manual, cane, straight  Informal Support system:: Family  No PCP office visit in Past Year: No  Transportation means:: Family, Medical transport  CHW Additional Questions  If ED/Hospital discharge, follow-up appointment scheduled as recommended?: N/A  Medication changes made following ED/Hospital discharge?: N/A  MyChart active?: No  Patient agreeable to assistance with activating MyChart?: No    Patient accepts CC: Yes. Patient scheduled for assessment with CCC RN on 9/07/2023 at 2:00 PM. Patient noted desire to discuss health insurance inactive.     Financial Resource Worker Screening    [facility] Benefits  Is patient requesting help applying for [facility] benefits?: No    [place]:  [place]  Is this an insurance renewal?: Yes  Is this a new insurance application request?: No    Any other information for the FRW?: Patient's health insurance ended on 8/31/2023    Care Coordination team will tell patient:   Thank you for answering all the questions, based on screening questions, our Financial Resource Worker will reach out to you with additional questions and next steps.    [place] Department of Human Services: [place] Health [place] Programs Eligibility Response (271)    SUBSCRIBER INFORMATION   Date of Service Subscriber ID Subscriber Name Birthdate Age Gender   05/02/2023 [id number] [facility] P[facility]H 1940 83 FEMALE          Address   [address] [place]  [address]          PROVIDER  INFORMATION   Provider ID Submitter Transaction ID Provider Name Taxonomy Code Qualifier Taxonomy Code   [id number] xx [facility]   This subscriber has eligibility for MA: Medical Assistance.  Elig Type EX: Over age 65  Eligibility Begin Date: 08/01/2019  Eligibility End Date: 08/31/2023  This subscriber is eligible for the following service types: Medical Care ,  Chiropractic ,  Dental Care ,  Hospital ,  Hospital - Inpatient ,  Hospital - Outpatient ,  Emergency Services ,  Pharmacy ,  Professional (Physician) Visit - Office ,  Vision (Optometry) ,  Mental Health ,  Urgent Care  This subscriber has eligibility for QM: Qualified Medicare Beneficiary - Medical Assistance Covers Medicare Coinsurance and Deductibles Only.  Elig Type EQ: Over 65/QMB only  Eligibility Begin Date: 11/01/2017  Eligibility End Date: 08/31/2023  This subscriber is eligible for the following service types: Medical Care ,  Chiropractic ,  Hospital ,  Hospital - Inpatient ,  Hospital - Outpatient ,  Emergency Services ,  Professional (Physician) Visit - Office ,  Mental Health ,  Urgent Care   Prepaid Health Plan   This subscriber receives [employer] [place] [facility] ([facility]) delivered through [facility]. The phone numbers are: [phone] (metro) or [phone] (toll free).  The health plan is responsible for paying for Nursing Facility Services.   Other Eligibility Information   No Special Transportation.  No Hospice.  Refer to Health Care Programs and Services Overview of the [facility] Provider Manual for a list of covered services.   Waivers   None     Subscriber Responsibility Information   None     Restricted Recipient Program   None       Medicare   Medicare ID: [id number]   Part A Effective Date:  11/01/2017   Part B Effective Date: 07/01/2017       Benefit Limits   Dental     Benefits: Benefit Used:      Last [place] X-rays 10/25/2011      Last Comprehensive Dental Exam  10/25/2011       Vision     Benefits: Benefit Used:     Last eyeglasses 10/06/2011

## 2023-09-07 ENCOUNTER — PATIENT OUTREACH (OUTPATIENT)
Dept: NURSING | Facility: CLINIC | Age: 83
End: 2023-09-07
Payer: COMMERCIAL

## 2023-09-07 ASSESSMENT — ACTIVITIES OF DAILY LIVING (ADL)
DEPENDENT_IADLS:: CLEANING;COOKING;LAUNDRY;SHOPPING;MEAL PREPARATION;MEDICATION MANAGEMENT;MONEY MANAGEMENT;TRANSPORTATION;INCONTINENCE

## 2023-09-07 NOTE — LETTER
[facility]  Patient Centered Plan of Care  About Me:        Patient Name:  Luis Manuel Lemon    YOB: 1940  Age:         83 year old   Gloria MRN:    [id number] Telephone Information:  Home Phone [phone]   Mobile [phone]       Address:  [address]  [place] [address] Email address:  No e-mail address on record      Emergency Contact(s)    Name Relationship Lgl Grd Work Phone Home Phone Mobile Phone   1. MAY,LA Daughter No  [phone]    2. NEISHAKATHLEEN TARIQ Grandchild No  [phone]    3. BE,CARLO Grandchild No   [phone]           Primary language:  Amie     needed? Yes   [place] [facility]:  [phone] op. 1  Other communication barriers:Language barrier; Physical impairment    Preferred Method of Communication:     Current living arrangement: I live in a private home with family    Mobility Status/ Medical Equipment: Independent        Health Maintenance  Health Maintenance Reviewed: Due/Overdue       My Access Plan  Medical Emergency 911   Primary Clinic Line [facility] [phone]   24 Hour Appointment Line [phone] or  [phone]) (toll-free)   24 Hour Nurse Line [phone] (toll-free)   Preferred Urgent Care [facility] [phone]     [facility]  [phone]     Preferred Pharmacy [facility] - [address]     Behavioral Health Crisis Line The National Suicide Prevention Lifeline at 1-447.882.3830 or Text/Call 258             My Care Team Members  Patient Care Team         Relationship Specialty Notifications Start End    Chichi Brown MD PCP - General Family Medicine  1/9/23     Phone: [phone] Fax: [phone]         [address] [place] [address] [address] [place] [address]    Orquidea Ramon LSW Lead Care Coordinator Primary Care - CC Admissions 8/15/19     Phone: [phone] Fax: [phone]         [facility] [address] [facility] [address]    Johan Chavez MD Assigned Pulmonology Provider   7/16/21     Phone: [phone] Fax: [phone] [address] [facility] [address]    Yimi López MD Assigned Neuroscience Provider   3/27/22     Phone: [phone] Fax: [phone] [address] [facility] [address]    Chichi Brown MD Assigned PCP   8/13/22     Phone: [phone] Fax: [phone]         [address] [facility] [address] [place] [address]    Jose Castillo Community Health Worker Primary Care - CC Admissions 9/6/23     Phone: [phone] Fax: [phone]         [address] [place] [address] [address] [facility] [address]    Yulisa Baker, RN Lead Care Coordinator Primary Care - CC Admissions 9/6/23     Ariela Luna MA Financial Resource Worker   9/7/23     Phone: [phone]                   My Care Plans  Self Management and Treatment Plan  Care Plan  Care Plan: Financial Wellbeing       Problem: Inactive       Goal: Patient would like to re-apply for health insurance in the next 4 months.       Start Date: 9/7/2023 Expected End Date: 12/31/2023    Note:       Barriers: language  Strengths: Accepting of support  Patient expressed understanding of goal: Yes     Action steps to achieve this goal:   1.  I will answer my phone when I am contacted by the [facility] FRW to schedule an initial appointment.   2.  I will provide all necessary documentation and information to complete a [place] application.   3.  I will call my FRW if I have questions or concerns regarding my county benefits application.      Note: FRW screening completed by NICHELLE Garcia on 9/6/2023.                                Action Plans on File:                       Advance Care Plans/Directives Type:   No data recorded    My Medical and Care Information  Problem List   Patient Active Problem List   Diagnosis    Constipation    Vitamin D Deficiency    Essential hypertension    Rotator Cuff Tendonitis    Asymptomatic  Postmenopausal Status    Hyperlipidemia    Osteoporosis on Prolia 5.7.19    Chronic reflux esophagitis    Bursitis, trochanteric    Hypoalbuminemia    Polypharmacy    Degenerative disc disease, cervical    Spinal stenosis, multilevel    Chronic pain syndrome    Short of breath on exertion    GERD (gastroesophageal reflux disease)    Sinus bradycardia    Bilateral primary osteoarthritis of knee    Polyarthralgia    Medically complex patient    Language barrier affecting health care    Age-related osteoporosis with current pathological fracture with routine healing    Financial difficulties    Urinary incontinence in female    Insurance coverage problems    Pulmonary fibrosis (H)    Anticoagulated    Chronic kidney disease, stage 3 (H)    Infection due to 2019 novel coronavirus    Pulmonary arterial hypertension (H)    Major depressive disorder, recurrent episode, mild (H)      Current Medications and Allergies:  See printed Medication Report.    Care Coordination Start Date: 9/6/2023   Frequency of Care Coordination: 6 weeks     Form Last Updated: 09/07/2023

## 2023-09-07 NOTE — PROGRESS NOTES
Clinic Care Coordination Contact  Clinic Care Coordination Contact  OUTREACH    Referral Information:  Referral Source: PCP    Primary Diagnosis: Other (include Comment box) (Health Insurance)    Chief Complaint   Patient presents with    Clinic Care Coordination - Initial     Clinic Utilization  Difficulty keeping appointments:: No  Compliance Concerns: No  No-Show Concerns: No  No PCP office visit in Past Year: No  Utilization      Hospital Admissions  0             ED Visits  0             No Show Count (past year)  3                    Current as of: 9/7/2023  3:57 AM              Clinical Concerns:  Patient and grand daughter showed up in clinic yesterday requesting assist re-apply for health insurance. CCRN met with patient and grand daughter. Patient received a letter from the [facility] stating that patient's health insurance ended because they didn't received renewal paperwork. Jose, CHW [place] ended on 8/31/203. CHW completed FRW screening yesterday. 22 yrs old grand daughter speaks English but stated that she doesn't know how to re=apply for health insurance so they would like support from care coordination. Grand daughter is patient's PCA. CCRN completed initial assessment today via phone with patient and grand daughter. Denies abuse or neglect. Denied financial concern or food insecurity. Per chart review, patient is enrolled with [facility] but per grand daughter they received a letter stating patient was discharged from [facility]. Patient and grand daughter would like to focus on getting patient's health active again then will address other medical needs.     Pain  Pain (GOAL):: No  Health Maintenance Reviewed: Due/Overdue   Clinical Pathway: None    Medication Management:  Medication review status: Medications reviewed and no changes reported per patient.           Functional Status:  Dependent ADLs:: Bathing, Dressing, Grooming, Toileting  Dependent  IADLs:: Cleaning, Cooking, Laundry, Shopping, Meal Preparation, Medication Management, Money Management, Transportation, Incontinence  Bed or wheelchair confined:: No  Mobility Status: Independent  Fallen 2 or more times in the past year?: No  Any fall with injury in the past year?: No    Living Situation:  Current living arrangement:: I live in a private home with family  Type of residence:: Private home - stairs    Lifestyle & Psychosocial Needs:    Social Determinants of Health     Tobacco Use: Medium Risk (8/22/2023)    Patient History     Smoking Tobacco Use: Never     Smokeless Tobacco Use: Never     Passive Exposure: Current   Alcohol Use: Not At Risk (11/30/2021)    AUDIT-C     Frequency of Alcohol Consumption: Never     Average Number of Drinks: Not on file     Frequency of Binge Drinking: Never   Financial Resource Strain: Low Risk  (11/30/2021)    Overall Financial Resource Strain (CARDIA)     Difficulty of Paying Living Expenses: Not hard at all   Food Insecurity: No Food Insecurity (11/30/2021)    Hunger Vital Sign     Worried About Running Out of Food in the Last Year: Never true     Ran Out of Food in the Last Year: Never true   Transportation Needs: No Transportation Needs (1/18/2023)    PRAPARE - Transportation     Lack of Transportation (Medical): No     Lack of Transportation (Non-Medical): No   Physical Activity: Inactive (1/18/2023)    Exercise Vital Sign     Days of Exercise per Week: 0 days     Minutes of Exercise per Session: 0 min   Stress: No Stress Concern Present (1/18/2023)    [employer] - Occupational Stress Questionnaire     Feeling of Stress : Only a little   Social Connections: Socially Isolated (1/18/2023)    Social Connection and Isolation Panel [NHANES]     Frequency of Communication with Friends and Family: Once a week     Frequency of Social Gatherings with Friends and Family: More than three times a week     Attends Protestant Services: Never      Active Member of Clubs or Organizations: No     Attends Club or Organization Meetings: Never     Marital Status:    Intimate Partner Violence: Not At Risk (1/18/2023)    Humiliation, Afraid, Rape, and Kick questionnaire     Fear of Current or Ex-Partner: No     Emotionally Abused: No     Physically Abused: No     Sexually Abused: No   Depression: At risk (8/22/2023)    PHQ-2     PHQ-2 Score: 4   Housing Stability: Low Risk  (1/18/2023)    Housing Stability Vital Sign     Unable to Pay for Housing in the Last Year: No     Number of Places Lived in the Last Year: 1     Unstable Housing in the Last Year: No     Diet:: Regular  Inadequate nutrition (GOAL):: No  Tube Feeding: No  Inadequate activity/exercise (GOAL):: No  Significant changes in sleep pattern (GOAL): No  Transportation means:: Regular car     Taoism or spiritual beliefs that impact treatment:: No  Mental health DX:: No  Mental health management concern (GOAL):: No  Chemical Dependency Status: No Current Concerns  Informal Support system:: Family, Children, Alejandrina based      Resources and Interventions:  Current Resources:      Community Resources: [facility], County Programs, County Worker, DME  Supplies Currently Used at Home: Other, Incontinence Supplies  Equipment Currently Used at Home: wheelchair, manual, cane, straight  Employment Status: disabled     Advance Care Plan/Directive  Advanced Care Plans/Directives on file:: No  Advanced Care Plan/Directive Status: Declined Further Information    Referrals Placed: None     Care Plan:  Care Plan: Financial Wellbeing       Problem: Inactive       Goal: Patient would like to re-apply for health insurance in the next 4 months.       Start Date: 9/7/2023 Expected End Date: 12/31/2023    Note:       Barriers: language  Strengths: Accepting of support  Patient expressed understanding of goal: Yes     Action steps to achieve this goal:   1.  I will answer my phone when I am contacted by the CCC FRW to schedule  an initial appointment.   2.  I will provide all necessary documentation and information to complete a [place] application.   3.  I will call my FRW if I have questions or concerns regarding my county benefits application.      Note: FRW screening completed by NICHELLE Garcia on 9/6/2023.                                 Outreach Frequency: 6 weeks  Future Appointments                In 2 weeks Chichi Brown MD [facility] DYLLAN Bob SPRO            Plan: Patient and caregiver will answer the phone when contacted by FRW.

## 2023-09-11 ENCOUNTER — PATIENT OUTREACH (OUTPATIENT)
Dept: CARE COORDINATION | Facility: CLINIC | Age: 83
End: 2023-09-11
Payer: COMMERCIAL

## 2023-09-11 NOTE — PROGRESS NOTES
Clinic Care Coordination Contact  Program: Renewal  County: [facility] Case #:  [facility] Worker:   Da #:   Subscriber #:   Renewal:  Date Applied: 9/11/23     FRW Outreach:   9/11/23 FRW called and talked with daughter and we filled out a renewal from and FRW emailed it to the patient so that she can print it out and send in to the [facility]. FRW will follow up in 30 days   Ariela Luna   Financial Resource Worker  [facility]  Clinic Care Coordination  [phone]      Health Insurance:      Referral/Screening:   FRW Screening    Row Name 09/06/23 1432       [facility] Benefits   Is patient requesting help applying for [facility] benefits? No       [place]:   [place]       Is this an insurance renewal? Yes       Is this a new insurance application request? No       OTHER   Is this a iraj care application? No       Any other information for the FRW? Patient's health insurance ended on 8/31/2023

## 2023-09-20 DIAGNOSIS — I10 ESSENTIAL HYPERTENSION: ICD-10-CM

## 2023-09-20 DIAGNOSIS — Z76.0 ENCOUNTER FOR MEDICATION REFILL: ICD-10-CM

## 2023-09-20 DIAGNOSIS — K59.03 DRUG-INDUCED CONSTIPATION: ICD-10-CM

## 2023-09-20 RX ORDER — DOCUSATE SODIUM 50MG AND SENNOSIDES 8.6MG 8.6; 5 MG/1; MG/1
2 TABLET, FILM COATED ORAL DAILY
Qty: 90 TABLET | Refills: 2 | Status: SHIPPED | OUTPATIENT
Start: 2023-09-20 | End: 2024-01-30

## 2023-09-20 RX ORDER — METOPROLOL SUCCINATE 25 MG/1
25 TABLET, EXTENDED RELEASE ORAL DAILY
Qty: 90 TABLET | Refills: 2 | Status: SHIPPED | OUTPATIENT
Start: 2023-09-20 | End: 2024-05-20

## 2023-09-26 NOTE — PROGRESS NOTES
CCx: follow up from hospital stay    HPI: Luis Manuel Lemon is a 77 year old female with a history of restrictive lung disease presumably from connective tissue disease who returns for follow up.  She has been hospitalized a couple of times since I saw her last, most recently with dyspnea and hypoxia.  She feels back to her baseline today, but has a persistent cough that is worse at night time.  She occasionally produces mucous.  She felt better getting inhalers in the hospital, but is actually not on any at home.  She gets winded at times, and is wheezy.  She has multiple other joint and pain complaints throughout her body.    On her visit a year ago she claimed she was always sleepy, and we qualified her to have a sleep study, but she never scheduled this.  Today she denies feeling sleepy.    ROS:  Review of Systems - History obtained from the patient  General ROS: negative  Psychological ROS: negative  ENT ROS: negative  Allergy and Immunology ROS: negative  Endocrine ROS: negative  Respiratory ROS: positive for - cough, shortness of breath and wheezing  negative for - hemoptysis or sputum changes  Cardiovascular ROS: no chest pain or palpitations  Gastrointestinal ROS: no abdominal pain, change in bowel habits, or black or bloody stools  Genito-Urinary ROS: no dysuria, trouble voiding, or hematuria  Musculoskeletal ROS: negative  Neurological ROS: no TIA or stroke symptoms  Dermatological ROS: negative      Current Meds:  Current Outpatient Prescriptions   Medication Sig Note     amLODIPine (NORVASC) 10 MG tablet Take 10 mg by mouth every morning.      calcium, as carbonate, (TUMS) 200 mg calcium (500 mg) chewable tablet Chew 1 tablet 3 (three) times a day.      docusate sodium (COLACE) 100 MG capsule Take 100 mg by mouth 2 (two) times a day.      gabapentin (NEURONTIN) 100 MG capsule Take 100 mg by mouth 2 times daily before breakfast and lunch.      gabapentin (NEURONTIN) 100 MG capsule Take 300 mg by mouth at bedtime.       ibandronate (BONIVA) 150 mg tablet Take 1 tablet (150 mg total) by mouth every 30 (thirty) days. Take in AM with glass of water prior to food, don't lie down for 30 minutes.      losartan-hydrochlorothiazide (HYZAAR) 100-25 mg per tablet Take 1 tablet by mouth daily.      MAPAP ARTHRITIS PAIN 650 mg CR tablet TAKE 1 TABLET BY MOUTH EVERY 8 HOURS AS NEEDED FOR PAIN      metoprolol succinate (TOPROL XL) 25 MG Take 1 tablet (25 mg total) by mouth daily.      omeprazole (PRILOSEC) 20 MG capsule Take 20 mg by mouth daily before breakfast.      polyvinyl alcohol (LIQUIFILM TEARS) 1.4 % ophthalmic solution Apply to eye daily as needed      VITAMIN D2 50,000 unit capsule Take 1 capsule (50,000 Units total) by mouth once a week. 5/12/2017: Monday per bottle     diphenhydrAMINE (DIPHENHIST) 25 mg tablet Take 1 tablet (25 mg total) by mouth at bedtime as needed for itching or sleep.      ipratropium-albuterol (COMBIVENT RESPIMAT)  mcg/actuation Mist inhaler Inhale 1 puff every 6 (six) hours as needed.        Labs:  No results found for this or any previous visit (from the past 72 hour(s)).    I have personally reviewed all pertinent imaging studies and PFT results unless otherwise noted.    Imaging studies:  Xr Chest Pa And Lateral    Result Date: 10/9/2017  XR CHEST PA AND LATERAL 10/9/2017 11:16 AM INDICATION: Dyspnea, cardiac origin suspected COMPARISON: 10/8/2017 and 9/19/2017 FINDINGS: Decreased left pleural effusion or left lung base consolidation compared to yesterday. Stable enlarged cardiac silhouette. No pulmonary vascular congestion. Stable mild right lung base infiltrate.    Xr Chest Pa And Lateral    Result Date: 10/8/2017  XR CHEST PA AND LATERAL 10/08/2017, 3:10 PM INDICATION: Cough. COMPARISON: 09/19/2017. FINDINGS: Heart and mediastinal size are normal. There is diffuse indistinctness of the pulmonary interstitium, reflecting either edema or airway inflammation. There is airspace opacification  involving the left lateral lung base. This may reflect worsening infiltrate or a loculated pleural effusion. No pneumothorax.     Ct Chest Without Contrast    Result Date: 10/9/2017  CT CHEST WO CONTRAST 10/9/2017 4:59 PM INDICATION: Acute respiratory illness, > 40 years old. TECHNIQUE: Routine chest. Dose reduction techniques were used. IV CONTRAST: None. COMPARISON: May 12, 2017. FINDINGS: LUNGS AND PLEURA: There is severe respiratory motion which obscures detail. There is subpleural fat deposition visible posteriorly. There are some patchy groundglass opacities in both lungs that are nonspecific, the differential would include infection. There also is bronchial wall thickening. Some soft tissue density in the left side of trachea on image 18 series 3 likely represents mucus, it appears to have some small air bubbles. MEDIASTINUM: The ascending thoracic aorta is dilated at 4.1 cm. There is an enlarged main pulmonary artery and right and left pulmonary artery consistent with pulmonary hypertension. There are some coronary calcifications. No adenopathy is seen. LIMITED UPPER ABDOMEN: The patient's had a cholecystectomy. The stomach is distended with particulate matter. There are some calcifications in the liver seen previously MUSCULOSKELETAL: There are normal degenerative changes in the spine.     CONCLUSION: 1.  Respiratory motion obscures detail. There is some bronchial wall thickening most prominent in the lower lobes. There are some patchy groundglass opacities in the upper lobes that are nonspecific, these could represent some areas of infection or aspiration. 2.  The ascending thoracic aorta is dilated at 4.1 cm. 3.  The pulmonary arteries are large, there is likely pulmonary hypertension. There are coronary artery calcifications.    Us Venous Legs Bilateral    Result Date: 10/9/2017  US VENOUS LEGS BILATERAL 10/9/2017 11:11 AM INDICATION: Edema TECHNIQUE: Routine exam with compression, augmentation, and  duplex utilizing 2D gray-scale imaging, Doppler interrogation with color-flow and spectral waveform analysis. COMPARISON: None. FINDINGS: The common femoral, femoral, popliteal, and segmentally visualized calf veins were evaluated. Right leg veins are negative for deep venous thrombosis. Left leg veins are negative for deep venous thrombosis. No popliteal cysts.     CONCLUSION: 1.  Bilateral leg veins are negative for DVT.    Nm Lung Vq Scan    Result Date: 10/9/2017  NM LUNG VQ SCAN 10/9/2017 11:31 AM INDICATION: Dyspnea, cardiac origin suspected TECHNIQUE: 47.4 mCi technetium 99m DTPA aerosol. 8.7 mCi technetium 99m MAA IV. COMPARISON: Chest radiograph from 10/9/2017 at 1113 hours FINDINGS: No matched or mismatched defects.     CONCLUSION: 1.  Low probability of PE.           Physical Exam:  /62  Pulse 80  Resp 16  Wt 124 lb (56.2 kg)  SpO2 96% Comment: KORY  BMI 25.04 kg/m2  General - Well nourished  Ears/Mouth - TMs clear bilaterally,  OP pink moist, no thrush  Neck - no cervical lymphadenopathy  Lungs - Coarse breath sounds with a vigorous raspy cough  CVS - regular rhythm with no murmurs, rubs or gallups  Abdomen - soft, NT, ND, NABS  Ext - no cyanosis, clubbing or edema  Skin - no rash  Psychology - alert and oriented, answers appropriate      Assessment and Plan:Luis Manuel Lemon is a 77 y.o. with a past medical history significant for moderate restrictive lung disease who presents to clinic today for follow up.  I suspect she has some bronchiectasis from her restrictive lung disease and gets recurrent symptoms from this.  She has been difficult to schedule in the office, and I would like to get more aggressive at finding solutions for her symptoms as she is on no inhalers at the moment.  I suspect we should start simply and work our way up from there.    1) Cough/dyspnea - will try combivent 1-2 puffs q6h prn.  I suspect this will help a fair bit.  If not feeling great on her next visit we can consider  adding an inhaled steroid which may help any inflammatory component of her lung process.  2) RTC in 3 months         Electronically signed by:    Johan Chavez MD PhD  [facility] Pulmonary and Critical Care Medicine     Davon Morrison MD

## 2023-09-27 DIAGNOSIS — I10 ESSENTIAL HYPERTENSION: ICD-10-CM

## 2023-09-27 DIAGNOSIS — Z76.0 ENCOUNTER FOR MEDICATION REFILL: ICD-10-CM

## 2023-09-27 RX ORDER — LOSARTAN POTASSIUM 100 MG/1
TABLET ORAL
Qty: 30 TABLET | Refills: 3 | Status: SHIPPED | OUTPATIENT
Start: 2023-09-27 | End: 2024-01-03

## 2023-10-11 ENCOUNTER — PATIENT OUTREACH (OUTPATIENT)
Dept: CARE COORDINATION | Facility: CLINIC | Age: 83
End: 2023-10-11
Payer: COMMERCIAL

## 2023-10-11 NOTE — PROGRESS NOTES
Clinic Care Coordination Contact  Program: Renewal  County: [facility] Case #:  [facility] Worker:   Da #:   Subscriber #:   Renewal:  Date Applied: 9/11/23     FRW Outreach:   10/11/23  FRW called patient and left a vm with call back information. FRW will make outreach in one week.  Ariela Luna   Financial Resource Worker  [facility] Care Coordination  [phone]   9/11/23 FRW called and talked with daughter and we filled out a renewal from and FRW emailed it to the patient so that she can print it out and send in to the [facility]. FRW will follow up in 30 days   Ariela Luna   Financial Resource Worker  [facility] Care Coordination  [phone]      Health Insurance:      Referral/Screening:   W Screening    Row Name 09/06/23 1432       [facility] Benefits   Is patient requesting help applying for [facility] benefits? No       Ins[place]:   [place]       Is this an insurance renewal? Yes       Is this a new insurance application request? No       OTHER   Is this a iraj care application? No       Any other information for the FRW? Patient's health insurance ended on 8/31/2023

## 2023-10-16 DIAGNOSIS — K59.04 CHRONIC IDIOPATHIC CONSTIPATION: ICD-10-CM

## 2023-10-17 ENCOUNTER — PATIENT OUTREACH (OUTPATIENT)
Dept: CARE COORDINATION | Facility: CLINIC | Age: 83
End: 2023-10-17
Payer: COMMERCIAL

## 2023-10-17 RX ORDER — POLYETHYLENE GLYCOL 3350 17 G/17G
1 POWDER, FOR SOLUTION ORAL DAILY PRN
Qty: 510 G | Refills: 3 | Status: SHIPPED | OUTPATIENT
Start: 2023-10-17 | End: 2024-01-30

## 2023-10-17 NOTE — PROGRESS NOTES
Clinic Care Coordination Contact  Program: Renewal  County: [facility] Case #:  [facility] Worker:   Da #:   Subscriber #:   Renewal:  Date Applied: 9/11/23     FRW Outreach:   10/17/2023 FRW called and patient stated that renewal was complete and he did not need anything at this time. FRW is closing program   Ariela Luna   Financial Resource Worker  [facility] Care Coordination  [phone]    10/11/23  FRW called patient and left a vm with call back information. FRW will make outreach in one week.  Ariela Luna   Financial Resource Worker  [facility] Care Coordination  [phone]   9/11/23 FRW called and talked with daughter and we filled out a renewal from and FRW emailed it to the patient so that she can print it out and send in to the [facility]. FRW will follow up in 30 days   Ariela Luna   Financial Resource Worker  [facility] Care Coordination  [phone]      Health Insurance:  This subscriber has eligibility for MA: Medical Assistance.  Elig Type EX: Over age 65  Eligibility Begin Date: 08/01/2019  Eligibility End Date: --/--/----  This subscriber is eligible for the following service types: Medical Care ,  Chiropractic ,  Dental Care ,  Hospital ,  Hospital - Inpatient ,  Hospital - Outpatient ,  Emergency Services ,  Pharmacy ,  Professional (Physician) Visit - Office ,  Vision (Optometry) ,  Mental Health ,  Urgent Care  This subscriber has eligibility for QM: Qualified Medicare Beneficiary - Medical Assistance Covers Medicare Coinsurance and Deductibles Only.  Elig Type EQ: Over 65/QMB only  Eligibility Begin Date: 11/01/2017  Eligibility End Date: --/--/----  This subscriber is eligible for the following service types: Medical Care ,  Chiropractic ,  Hospital ,  Hospital - Inpatient ,  Hospital - Outpatient ,  Emergency Services ,  Professional (Physician) Visit - Office ,  Mental Health ,  Urgent  Care    Referral/Screening:   FRW Screening    Row Name 09/06/23 1432       [facility] Benefits   Is patient requesting help applying for Counts include [facility] beds at the [facility] benefits? No       [place]:   [place]       Is this an insurance renewal? Yes       Is this a new insurance application request? No       OTHER   Is this a iraj care application? No       Any other information for the [facility]? Patient's health insurance ended on 8/31/2023

## 2023-10-17 NOTE — PROGRESS NOTES
Clinic Care Coordination Contact  Patient has completed all goals with Clinic Care Coordination.  Please review the chart and confirm if maintenance/graduate is approved.    -Patient's health insurance re-instated, resume PCA services and receiving County benefits and disability benefits.   -No additional coordination assistance or resources needed at this time.  -Patient and caregiver were informed to call with questions or concerns.   -Chart review routed to CCC RN to further review and advise.     [employer]: [place] [facility] Programs Eligibility Response (271)  SUBSCRIBER INFORMATION   Date of Service Subscriber ID Subscriber Name Birthdate Age Gender   10/17/2023 [id number] [facility] PLOH 1940 83 FEMALE          Address   [address] , , [place]  [address]          PROVIDER INFORMATION   Provider ID Submitter Transaction ID Provider Name Taxonomy Code Qualifier Taxonomy Code   [id number] [facility]   This subscriber has eligibility for MA: Medical Assistance.  Elig Type EX: Over age 65  Eligibility Begin Date: 08/01/2019  Eligibility End Date: --/--/----  This subscriber is eligible for the following service types: Medical Care ,  Chiropractic ,  Dental Care ,  Hospital ,  Hospital - Inpatient ,  Hospital - Outpatient ,  Emergency Services ,  Pharmacy ,  Professional (Physician) Visit - Office ,  Vision (Optometry) ,  Mental Health ,  Urgent Care  This subscriber has eligibility for QM: Qualified Medicare Beneficiary - Medical Assistance Covers Medicare Coinsurance and Deductibles Only.  Elig Type EQ: Over 65/QMB only  Eligibility Begin Date: 11/01/2017  Eligibility End Date: --/--/----  This subscriber is eligible for the following service types: Medical Care ,  Chiropractic ,  Hospital ,  Hospital - Inpatient ,  Hospital - Outpatient ,  Emergency Services ,  Professional (Physician) Visit - Office ,  Mental Health ,   Urgent Care   Prepaid Health Plan   This subscriber receives MA02 - [facility] Health Options ([facility] delivered through [facility]. The phone numbers are: [phone] (metro) or [phone] (toll free).  The health plan is responsible for paying for Nursing Facility Services.   Other Eligibility Information   No Special Transportation.  No Hospice.  Refer to Health Care Programs and Services Overview of the [facility] Provider Manual for a list of covered services.   Waivers   None     Subscriber Responsibility Information   None     Restricted Recipient Program   None       Medicare   Medicare ID: [id number]   Part A Effective Date:  11/01/2017   Part B Effective Date: 07/01/2017       Benefit Limits   Dental     Benefits: Benefit Used:      Last [place] X-rays 10/25/2011      Last Comprehensive Dental Exam 10/25/2011       Vision     Benefits: Benefit Used:     Last eyeglasses 10/06/2011

## 2023-10-25 ENCOUNTER — PATIENT OUTREACH (OUTPATIENT)
Dept: CARE COORDINATION | Facility: CLINIC | Age: 83
End: 2023-10-25
Payer: COMMERCIAL

## 2023-10-25 NOTE — PROGRESS NOTES
Clinic Care Coordination Contact  Care Coordination Clinician Chart Review    Situation: Patient chart reviewed by Care Coordinator.       Background: Care Coordination Program started: 9/6/2023. Initial assessment completed and patient-centered care plan(s) were developed with participation from patient. Lead CC handed patient off to CHW for continued outreaches.       Assessment: Per chart review, patient outreach completed by CC CHW on 10/17/2023. Patient's health insurance was renewed and no further assist needed from clinic care coordination.      Plan:: Patient is graduated from care coordination and she is enrolled with [facility] care coordination with lead care coordinator - REBECCA Srinivasan.

## 2023-10-26 DIAGNOSIS — Z53.9 DIAGNOSIS NOT YET DEFINED: Primary | ICD-10-CM

## 2023-10-26 PROCEDURE — G0180 MD CERTIFICATION HHA PATIENT: HCPCS | Performed by: FAMILY MEDICINE

## 2023-10-30 ENCOUNTER — TELEPHONE (OUTPATIENT)
Dept: FAMILY MEDICINE | Facility: CLINIC | Age: 83
End: 2023-10-30

## 2023-10-30 NOTE — TELEPHONE ENCOUNTER
General Call      Reason for Call:  RX question    What are your questions or concerns:  Christine from [facility] called to get clarification on mirtazapine, as there is 2 prescriptions. Please call Christine for clarifications, thanks!    Date of last appointment with provider: 8/22/2023    Okay to leave a detailed message?: Yes at Other phone number:  [phone]

## 2023-10-30 NOTE — TELEPHONE ENCOUNTER
One order for Mirtazapine is 30 mg (take one by mouth at bedtime). The other is 15 mg (take 1 and 1/2 tablets by mouth at bedtime). Please verify proper dose. Thanks!

## 2023-11-02 ENCOUNTER — MEDICAL CORRESPONDENCE (OUTPATIENT)
Dept: HEALTH INFORMATION MANAGEMENT | Facility: CLINIC | Age: 83
End: 2023-11-02
Payer: COMMERCIAL

## 2023-11-17 ENCOUNTER — TELEPHONE (OUTPATIENT)
Dept: FAMILY MEDICINE | Facility: CLINIC | Age: 83
End: 2023-11-17
Payer: COMMERCIAL

## 2023-11-17 NOTE — TELEPHONE ENCOUNTER
Patient Quality Outreach    Patient is due for the following:   Physical Annual Wellness Visit    Next Steps:   Schedule a Well Child Check    Type of outreach:    Phone, spoke to patient/parent. Scheduled    Next Steps:  Reach out within 90 days via Phone.    Max number of attempts reached: No. Will try again in 90 days if patient still on fail list.    Questions for provider review:    None           Laura Tripp  Chart routed to Care Team.

## 2023-12-08 DIAGNOSIS — J81.0 ACUTE PULMONARY EDEMA (H): ICD-10-CM

## 2023-12-08 DIAGNOSIS — D64.9 ANEMIA, UNSPECIFIED TYPE: ICD-10-CM

## 2023-12-08 DIAGNOSIS — Z76.0 ENCOUNTER FOR MEDICATION REFILL: ICD-10-CM

## 2023-12-11 ENCOUNTER — HOSPITAL ENCOUNTER (EMERGENCY)
Facility: HOSPITAL | Age: 83
Discharge: LEFT BEFORE DISCHARGE | End: 2023-12-11
Admitting: STUDENT IN AN ORGANIZED HEALTH CARE EDUCATION/TRAINING PROGRAM
Payer: COMMERCIAL

## 2023-12-11 ENCOUNTER — PATIENT OUTREACH (OUTPATIENT)
Dept: GERIATRIC MEDICINE | Facility: CLINIC | Age: 83
End: 2023-12-11
Payer: COMMERCIAL

## 2023-12-11 VITALS
OXYGEN SATURATION: 96 % | HEART RATE: 82 BPM | TEMPERATURE: 97.4 F | SYSTOLIC BLOOD PRESSURE: 138 MMHG | RESPIRATION RATE: 16 BRPM | DIASTOLIC BLOOD PRESSURE: 67 MMHG | HEIGHT: 59 IN | WEIGHT: 119 LBS | BODY MASS INDEX: 23.99 KG/M2

## 2023-12-11 PROCEDURE — 99281 EMR DPT VST MAYX REQ PHY/QHP: CPT

## 2023-12-11 RX ORDER — FUROSEMIDE 20 MG
TABLET ORAL
Qty: 60 TABLET | Refills: 3 | Status: SHIPPED | OUTPATIENT
Start: 2023-12-11 | End: 2024-03-21

## 2023-12-11 RX ORDER — FERROUS GLUCONATE 324(38)MG
TABLET ORAL
Qty: 30 TABLET | Refills: 1 | Status: SHIPPED | OUTPATIENT
Start: 2023-12-11 | End: 2024-01-30

## 2023-12-11 NOTE — PROGRESS NOTES
[facility] Care Coordination Contact    Called member to schedule annual HRA home visit. HRA has been scheduled for 12/13/2023 at 2:30 pm.  Called Surad and scheduled an  for the home visit.    REBECCA Srinivasan  [facility]  [phone]

## 2023-12-11 NOTE — CONFIDENTIAL NOTE
Unclear if patient is taking furosemide BID, but that's what the last notes said.   She has not seen any other providers in 4 months and no showed for an appointment with me in September   Follow up scheduled January. Will only provide 3 months and will need to review again at next visit.     Chichi Brown MD

## 2023-12-11 NOTE — ED TRIAGE NOTES
Patient is here with granddaughter who interpreted triage. For 3 days patient has been having subjective numbness in the right arm. Alex ZHANG in triage.      Triage Assessment (Adult)       Row Name 12/11/23 5774          Triage Assessment    Airway WDL WDL        Respiratory WDL    Respiratory WDL WDL        Skin Circulation/Temperature WDL    Skin Circulation/Temperature WDL WDL        Cardiac WDL    Cardiac WDL WDL        Peripheral/Neurovascular WDL    Peripheral Neurovascular WDL WDL        Cognitive/Neuro/Behavioral WDL    Cognitive/Neuro/Behavioral WDL WDL

## 2023-12-12 NOTE — ED NOTES
Called for patient, patient not in waiting area. Registration reported patient already left. MD updated

## 2023-12-13 ENCOUNTER — PATIENT OUTREACH (OUTPATIENT)
Dept: GERIATRIC MEDICINE | Facility: CLINIC | Age: 83
End: 2023-12-13
Payer: COMMERCIAL

## 2023-12-13 NOTE — Clinical Note
Hello,  I am the  Partners care coordinator for [facility].  I am writing to let you know I completed [facility]'s annual assessment today. All of my documentation can be found in EPIC. Please do not hesitate to contact me with any questions or concerns.  REBECCA Srinivasan [facility] Care Coordinator [phone]

## 2023-12-14 NOTE — PROGRESS NOTES
[facility] Care Coordination Contact    [facility] Home Visit Assessment     Home visit for Health Risk Assessment with Luis Manuel Diaoh completed on December 13, 2023    Type of residence: Apartment  Current living arrangement: I live in a private home with family     Assessment completed with: Family    Current Care Plan  Member currently receiving the following home care services: Skilled Nursing   Member currently receiving the following community resources: PCA     Medication Review  Medication reconciliation completed in Epic: Yes  Medication set-up & administration: RN set up every two weeks.  Family caregiver administers medications.  Medication Risk Assessment Medication (1 or more, place referral to MTM): N/A: No risk factors identified  MTM Referral Placed: No: No risk factors idenified    Mental/Behavioral Health   Depression Screening: Luis Manuel is struggling with occasional episodes of depression primarily related to pain. She has been experiencing pain in her right hand that radiates up her arm and into her upper back and neck for the past 2 weeks. This has been a challenge for her. She was prescribed some pain management medications, and has a follow up scheduled with PCP.   PHQ-2 Total Score (Adult) - Positive if 3 or more points; Administer PHQ-9 if positive: 2  Mental health DX: No        Falls Assessment:   Fallen 2 or more times in the past year?: No   Any fall with injury in the past year?: No    ADL/IADL Dependencies:   Dependent ADLs: Bathing, Dressing, Grooming, Transfers, Toileting, Ambulation-cane  Dependent IADLs: Cleaning, Cooking, Laundry, Shopping, Meal Preparation, Medication Management, Money Management, Transportation    Health Plan sponsored benefits: [facility]: Shared information regarding [employer]. Reviewed preventative health screening and health plan supplemental benefits/incentives. Reviewed medication disposal form.    PCA Assessment completed at visit: Yes  Annual PCA assessment indicated 6.5 hours per day of PCA. This is the same as the previous assessment.     Elderly Waiver Eligibility: No-does not meet criteria    Care Plan & Recommendations: Luis Manuel Lemon is a 83 year old female with a past medical history of chronic pain syndrome, restrictive lung disease, pulmonary fibrosis, age related constipation, GERD, spinal stenosis, and hypertension.      Luis Manuel lives in a single family home with her daughter, Lay Nicholas (who is her PCA/Primary caregiver), and grandchildren. Family ADL dependencies include: dressing, grooming, bathing, transferring, mobility, and toileting. Luis Manuel is utilizing oxygen at night now over 8 hours a day. No changes to PCA hours.     Receiving monthly incontinence supplies.     See [facility] for detailed assessment information.    Follow-Up Plan: Member informed of future contact, plan to f/u with member with a 6 month telephone assessment.  Contact information shared with member and family, encouraged member to call with any questions or concerns at any time.    [facility] providers: Please see Snapshot and Care Management Flowsheets for Specific details of care plan.    This CC note routed to PCP, Chichi Brown LSW  [facility]  [phone]

## 2023-12-18 ENCOUNTER — DOCUMENTATION ONLY (OUTPATIENT)
Dept: PULMONOLOGY | Facility: OTHER | Age: 83
End: 2023-12-18
Payer: COMMERCIAL

## 2023-12-18 DIAGNOSIS — J84.10 PULMONARY FIBROSIS, UNSPECIFIED (H): Primary | ICD-10-CM

## 2023-12-18 DIAGNOSIS — U07.1 COVID-19: ICD-10-CM

## 2023-12-19 ENCOUNTER — PATIENT OUTREACH (OUTPATIENT)
Dept: GERIATRIC MEDICINE | Facility: CLINIC | Age: 83
End: 2023-12-19
Payer: COMMERCIAL

## 2023-12-19 NOTE — PROGRESS NOTES
[facility] Care Coordination Contact    Received after visit chart from care coordinator.  Completed following tasks: Mailed copy of care plan/support plan to member, Mailed Safe Medication Disposal , Sent the following resources/forms: Health Saving and Healthy Saving card info , and Updated services in Database  , Provider Signature - No POC Shared:  Member indicates that they do not want their POC shared with any EW providers.    [facility]:  Emailed required PCA documents to [facility].  Faxed copy of PCA assessment to PCA Agency and mailed copy to member.  Faxed MD Communication to PCP.     Liz York  Care Management Specialist  [facility]  [phone]

## 2023-12-19 NOTE — LETTER
December 19, 2023      [facility]  [address] [place] [address]      Dear [facility]:    At [facility], we re dedicated to improving your health and wellness. Enclosed is the Care Plan developed with you on 12/13/2023. Please review the Care Plan carefully.    As a reminder, during your visit we talked about:  Ways to manage your physical and mental health  Using health care to maintain and improve your health   Your preventive care needs     Remember to contact your care coordinator if you:  Are hospitalized, or plan to be hospitalized   Have a fall    Have a change in your physical or mental health  Need help finding support or services    If you have questions, or don t agree with your Care Plan, call me at [phone]. You can also call me if your needs change. TTY users, call the [place] [employer] at (520) or 1-286.427.4734 (wmfexv-yy-yxlqof relay service).    Sincerely,    REBECCA Srinivasan  [phone]  [email or web address]       [facility] ([facility]) is a health plan that contracts with both Medicare and the [place] Medical Assistance (Medicaid) program to provide benefits of both programs to enrollees. Enrollment in [facility] depends on contract renewal.    [id number] accepted    [id number] (07/2022)                         Group A Streptococcus PCR is NEGATIVE  No treatment or change in treatment [facility] ED lab result Strep Group A protocol.

## 2023-12-21 ENCOUNTER — OFFICE VISIT (OUTPATIENT)
Dept: FAMILY MEDICINE | Facility: CLINIC | Age: 83
End: 2023-12-21
Payer: COMMERCIAL

## 2023-12-21 VITALS
HEIGHT: 59 IN | BODY MASS INDEX: 23.28 KG/M2 | HEART RATE: 77 BPM | TEMPERATURE: 97.2 F | OXYGEN SATURATION: 98 % | SYSTOLIC BLOOD PRESSURE: 161 MMHG | WEIGHT: 115.5 LBS | RESPIRATION RATE: 16 BRPM | DIASTOLIC BLOOD PRESSURE: 81 MMHG

## 2023-12-21 DIAGNOSIS — M54.2 NECK PAIN: ICD-10-CM

## 2023-12-21 DIAGNOSIS — M48.00 SPINAL STENOSIS, MULTILEVEL: ICD-10-CM

## 2023-12-21 DIAGNOSIS — M50.30 DEGENERATIVE DISC DISEASE, CERVICAL: Primary | ICD-10-CM

## 2023-12-21 DIAGNOSIS — N18.31 STAGE 3A CHRONIC KIDNEY DISEASE (H): ICD-10-CM

## 2023-12-21 DIAGNOSIS — Z23 IMMUNIZATION DUE: ICD-10-CM

## 2023-12-21 PROCEDURE — G0008 ADMIN INFLUENZA VIRUS VAC: HCPCS | Performed by: FAMILY MEDICINE

## 2023-12-21 PROCEDURE — 99214 OFFICE O/P EST MOD 30 MIN: CPT | Mod: 25 | Performed by: FAMILY MEDICINE

## 2023-12-21 PROCEDURE — 90662 IIV NO PRSV INCREASED AG IM: CPT | Performed by: FAMILY MEDICINE

## 2023-12-21 RX ORDER — OXYCODONE HYDROCHLORIDE 5 MG/1
5 TABLET ORAL EVERY 6 HOURS PRN
Qty: 12 TABLET | Refills: 0 | Status: SHIPPED | OUTPATIENT
Start: 2023-12-21 | End: 2023-12-24

## 2023-12-21 RX ORDER — NORTRIPTYLINE HCL 10 MG
10 CAPSULE ORAL
Qty: 90 CAPSULE | Refills: 1 | Status: SHIPPED | OUTPATIENT
Start: 2023-12-21 | End: 2024-01-22

## 2023-12-21 RX ORDER — GABAPENTIN 300 MG/1
300 CAPSULE ORAL 2 TIMES DAILY
Qty: 180 CAPSULE | Refills: 3 | Status: SHIPPED | OUTPATIENT
Start: 2023-12-21

## 2023-12-21 RX ORDER — RESPIRATORY SYNCYTIAL VIRUS VACCINE 120MCG/0.5
0.5 KIT INTRAMUSCULAR ONCE
Qty: 1 EACH | Refills: 0 | Status: CANCELLED | OUTPATIENT
Start: 2023-12-21 | End: 2023-12-21

## 2023-12-21 NOTE — PROGRESS NOTES
"  Assessment & Plan     Degenerative disc disease, cervical  Spinal stenosis, multilevel  Neck pain  Agree with ER that this is likely cervical radiculopathy, possibly thoracic as well. MRI cspine done in 2021, she had an injection and said it lasted nearly 2 years, she would like to repeat. She says she was told she is high risk for surgery due to her age and her bones, which I agree. Will refer back to spine clinic, ok to increase gabapentin, add prn oxycodone with her extreme pain - no refills. Add low dose nortriptyline for neuropathy and sleep. She is also on duloxetine so would avoid high doses. ER provided her with steroid burst, I recommended she start that tomorrow morning due to possible side effects.   - gabapentin (NEURONTIN) 300 MG capsule  Dispense: 180 capsule; Refill: 3  - diclofenac (VOLTAREN) 1 % topical gel  Dispense: 600 g; Refill: 4  - oxyCODONE (ROXICODONE) 5 MG tablet  Dispense: 12 tablet; Refill: 0  - nortriptyline (PAMELOR) 10 MG capsule  Dispense: 90 capsule; Refill: 1  - Spine  Referral    Immunization due  - INFLUENZA VACCINE 65+ (FLUZONE HD)      Stage 3a chronic kidney disease (H)  Stable, checked in ER. Medications dosed appropriately.     Side pain  Likely radiculopathy again, but with it being unilateral and \"burning\" did consider shingles. 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 Will  new medications after this visit. )      HPI     Today, she says she is in pain, only the right side. Starts around her neck/shoulder and radiates down her arm but also up her neck. Similar to what it was like before. Asked about her MRI 2 " years ago, she said she remembers that, the pain is similar, and she was hoping to get another injection. The injection lasted over a year, maybe 2 years.     There is a pain on her right side under her breast, over her rib cage. This has also been several months. It does not hurt to touch - it just hurts. No rash. Burning pain, shooting pains.       ER follow up - no admission  From chart review:   Presenting with right shoulder pain. VS reassuring, within normal reference range. Differential includes fracture, contusion, sprain, dislocation, subluxation, rotator cuff injury, ligamentous tear, bursitis. Given concern for possible fracture or dislocation, XR imaging of the right shoulder obtained and negative for acute processes. Exam does not reveal any clear ligamentous or tendon instability but is complicated by patient pain and guarding. At this time, this injury likely represents contusion or sprain but tendon or ligamentous rotator cuff injury cannot be ruled out. Patient should follow up with sports medicine for repeat examination and possible MRI testing if indicated. Reviewed most recent MRI of cervical spine which was done in March of 2021, due to concern for radiculopathy pain. CT of the cervical spine showing many changes, no acute fractures at that time. Do not feel that MRI imaging of the patient's cervical spine is warranted at this time.     8 minutes spent on the date of the encounter doing chart review and charting prior to patient arrival.     Prior visit, patient has significant iron deficiency anemia. Per med rec, has been without iron supplements for a few months.     She has had an MR of c-spine done, another ordered in 2022 and not completed. She does have significant c-spine pathology      Last clinic visit:   Polypharmacy  Medications reviewed with patient, no changes today.   - Comprehensive metabolic panel (BMP + Alb, Alk Phos, ALT, AST, Total. Bili, TP)     Iron deficiency anemia  "secondary to inadequate dietary iron intake  Iron studies consistent with iron deficiency anemia.   - Iron and iron binding capacity  - Ferritin     Insomnia, unspecified type - mirtazapine (REMERON SOL-TAB) 30 MG ODT  Dispense: 90 tablet; Refill: 3     Spinal stenosis, multilevel Refilled gabapentin, no changes. Higher doses caused sedation.      Shortness of breath  Improved. She doesn't really need the O2, sometimes during sleep but family says she doesn't wear it all the time. She only uses her albuterol inhaler PRN and does not need it every day. I dont know what controller inhaler she has - multiple listed and med rec says she doesn't have any of them. Will have her bring those in at her next visit to confirm.      Review of Systems   Constitutional, HEENT, cardiovascular, pulmonary, gi and gu systems are negative, except as otherwise noted.      Objective    BP (!) 161/81   Pulse 77   Temp 97.2  F (36.2  C) (Temporal)   Resp 16   Ht 1.499 m (4' 11\")   Wt 52.4 kg (115 lb 8 oz)   LMP  (LMP Unknown)   SpO2 98%   BMI 23.33 kg/m    Body mass index is 23.33 kg/m .  Physical Exam   GENERAL: frail, alert and mild distress from right arm/shoulder pain - sitting uncomfortably in wheelchair  NECK: no adenopathy, no asymmetry, masses, or scars and thyroid normal to palpation  RESP: lungs clear to auscultation - no rales, rhonchi or wheezes  CV: regular rate and rhythm, normal S1 S2, no S3 or S4, no murmur, click or rub, no peripheral edema and peripheral pulses strong  ABDOMEN: soft, nontender, no hepatosplenomegaly, no masses and bowel sounds normal  MS: no gross musculoskeletal defects noted, no edema. Significant guarding on exam but active ROM is normal.     No results found for any visits on 12/21/23.  No results found for this or any previous visit (from the past 24 hour(s)).                  "

## 2023-12-21 NOTE — PATIENT INSTRUCTIONS
12/21/23: for tonight, take one of the nortriptyline with her night time medicine. If she is in too much pain to sleep after 30-45 minutes, ok to take an oxycodone.     Start the prednisone on the morning of 12/22/23  ==========================================================    I increased her gabapentin to 300mg twice per day. This should be set up in her medication box by her home nurse.     - if she cannot sleep tonight even with all her medications, take the nortriptyline 10mg. This should only be taken if she cannot sleep.   - if she is still in too much pain, you can take one of they oxycodone. This is very strong and I cannot give you very many, so save these for when she is in pain.

## 2023-12-23 ENCOUNTER — TRANSFERRED RECORDS (OUTPATIENT)
Dept: HEALTH INFORMATION MANAGEMENT | Facility: CLINIC | Age: 83
End: 2023-12-23

## 2024-01-03 DIAGNOSIS — I10 ESSENTIAL HYPERTENSION: ICD-10-CM

## 2024-01-03 DIAGNOSIS — Z76.0 ENCOUNTER FOR MEDICATION REFILL: ICD-10-CM

## 2024-01-03 RX ORDER — LOSARTAN POTASSIUM 100 MG/1
TABLET ORAL
Qty: 30 TABLET | Refills: 3 | Status: SHIPPED | OUTPATIENT
Start: 2024-01-03 | End: 2024-04-22

## 2024-01-05 NOTE — PROGRESS NOTES
Assessment:   Luis Manuel Lemon is a 84y.o. female with past medical history significant for vitamin D deficiency, hypertension, hyperlipidemia, osteoporosis on Prolia, chronic GERD, chronic pain syndrome, pulmonary fibrosis, polyarthralgia, stage III chronic kidney disease who presents today for follow-up regarding a flare of chronic neck pain, now worse on the right.  Flare began about 1 month ago with no trauma.  Pain radiates from the right neck down the right arm and the entire right side of her body.  My review of the MRI cervical spine from 2021 shows multilevel degenerative change most pronounced at C6-7 where there is moderate spinal canal stenosis.  Patient appears to have a large component of myofascial pain.  She has significant hypertonicity right scalenes and right upper trapezius muscle.  She has had a positive response to trigger point injections in the past, most recently ultrasound-guided left scalene and trapezial muscle trigger point injections March 7, 2023.           Plan:     A shared decision making plan was used.  The patient's values and choices were respected.  The following represents what was discussed and decided upon by the physician assistant and the patient.  A telephone  was used for the visit.  Patient's granddaughter was also present for the visit and helps provide history.    1.  DIAGNOSTIC TESTS: I reviewed the MRI scans of the cervical and lumbar spine from 2021.  No further diagnostic tests were ordered.  - If neck pain fails to improve with the trigger point injections I would recommend obtaining an updated MRI cervical spine for further evaluation.  - We may also need to get a new MRI lumbar spine for further evaluation.    2.  PHYSICAL THERAPY: Most recent physical therapy was in October 2018.  She will continue with home exercises.  -Patient completed 4 sessions of occupational therapy with Michelle Russell in 2019 which she did not feel is helpful.    3.  MEDICATIONS: No  changes are made to the patient's medications.  Pain relieving medications are managed by her primary care provider.  - Patient takes Tylenol 1300 mg twice daily  - Patient applies diclofenac gel  - Patient takes duloxetine 30 mg daily  - Patient takes gabapentin 300 g twice daily.  - Patient takes nortriptyline 10 mg at bedtime.      4.  INTERVENTIONS:   I offered the patient right scalene and upper trapezius muscle trigger point injections.  Patient indicated she would like to proceed and an order was placed.    5.  PATIENT EDUCATION: Patient is in agreement the above plan.  All questions were answered.    6.  FOLLOW-UP: Patient will follow-up with me 2 weeks after her trigger point injections.  If she has questions or concerns in the meantime, she should not hesitate to call.    Subjective:     Luis Manuel Lemon is a 84 y.o. female who presents today for follow-up regarding a flare of chronic neck pain.  Patient was last in our clinic for ultrasound-guided left scalene and trapezial muscle trigger point injections March 7, 2023.  Patient did okay up until December 2023 when pain flared up again, this time more severe on the right.  She denies any specific injury or event to cause the pain.  Pain is severe.    Patient complains of right-sided neck pain.  Pain extends down the lateral and posterior aspects of the right neck into the right upper trapezius muscle.  She then feels the pain radiate down the right arm and down the right side of the back to the lumbar region.  She describes the pain as throbbing and shooting.  She cannot sleep due to the pain.  She has numbness and tingling in both hands.  She feels generally weak.  She rates her pain today as a 9.  Pain is aggravated with movement and alleviated with massage.  She states her neck feels tender and sore.    Treatment to date:  - physical therapy for the lower back was in October 2018.  - She also did 4 sessions of occupational therapy ending in January 2019.  - 1  session of physical therapy July 2021      - Ultrasound-guided left scalene and trapezial muscle trigger point injections March 7, 2023 with significant relief of pain  - Bilateral L3, L4, L5 radiofrequency ablation April 19, 2021  - Left cervical and trapezius muscle trigger point injections under ultrasound guidance July 21, 2021  - Right sacroiliac joint injection November 12, 2018  - L5-S1 interlaminar epidural steroid injection October 17, 2018  - Left sacroiliac joint injection February 20, 2017  - C6-7 interlaminar epidural steroid injection July 27, 2016  - L4-5 interlaminar epidural steroid injection July 6, 2016  - Bilateral L5-S1 facet joint injections June 22, 2016    - Tylenol 1300 mg twice daily  - Diclofenac gel as needed  - Duloxetine 30 mg daily  - Gabapentin 300 mg twice daily  - Nortriptyline 10 mg bedtime    Past medical history is reviewed and is unchanged.    Review of Systems:  Positive for numbness/tingling, weakness, loss of bladder control, pain much worse at night.  Negative for loss of bowel control, inability to urinate, headache, trip/stumble/falls, difficulty swallowing, difficulty with hand skills, fevers, unintentional weight loss.     Objective:   CONSTITUTIONAL:  Vital signs as above.  No acute distress.  The patient is well nourished and well groomed.    PSYCHIATRIC:  The patient is awake, alert, oriented to person, place and time.  The patient is answering questions appropriately with clear speech.  Normal affect.  HEENT: Normocephalic, atraumatic.  Sclera clear.    SKIN:  Skin over the face, posterior torso, bilateral upper and lower extremities is clean, dry, intact without rashes.  VASCULAR: No significant lower extremity edema.  MUSCULOSKELETAL: Patient is able to ambulate from the wheelchair across the exam room to the bed.  Tender to palpation left right cervical scalene muscle and upper trapezius muscle with hypertonicity.The patient has 5/5 strength bilateral shoulder  abductors, elbow flexors/extensors, wrist extensors, finger flexors/abductors, hip flexors, knee flexors/extensors, ankle dorsi/plantar flexors.  NEUROLOGICAL:  Negative Ruby sign bilaterally.    Sensation light touch intact bilateral upper and lower extremities.     RESULTS:     I reviewed the MRI cervical spine from [facility] dated March 8, 2021.  This shows multilevel degenerative change.  There is facet arthropathy throughout the cervical spine.  At C3-4 there is mild spinal canal stenosis with moderate left and mild right foraminal stenosis.  At C4-5 there is mild spinal canal stenosis with moderate bilateral foraminal stenosis.  At C5-6 there is mild spinal canal stenosis with moderate bilateral foraminal stenosis.  At C6-7 there is moderate spinal canal stenosis with mild bilateral foraminal stenosis.  At C7-T1 there is mild bilateral foraminal stenosis.  Please see report for further details.    I reviewed the MRI lumbar spine from [facility] dated March 8, 2021.  This shows multilevel lumbar spondylosis.  At L2-3 there is a disc bulge with mild bilateral foraminal stenosis.  At L3-4 there is a disc bulge with mild bilateral foraminal stenosis.  At L4-5 there is a disc bulge and mild facet arthropathy with mild spinal canal stenosis and mild bilateral foraminal stenosis.  Please see report for further details.

## 2024-01-09 ENCOUNTER — OFFICE VISIT (OUTPATIENT)
Dept: PHYSICAL MEDICINE AND REHAB | Facility: CLINIC | Age: 84
End: 2024-01-09
Attending: FAMILY MEDICINE
Payer: COMMERCIAL

## 2024-01-09 VITALS
BODY MASS INDEX: 23.18 KG/M2 | HEART RATE: 86 BPM | DIASTOLIC BLOOD PRESSURE: 59 MMHG | WEIGHT: 115 LBS | SYSTOLIC BLOOD PRESSURE: 116 MMHG | HEIGHT: 59 IN

## 2024-01-09 DIAGNOSIS — M54.12 CERVICAL RADICULAR PAIN: ICD-10-CM

## 2024-01-09 DIAGNOSIS — M47.816 LUMBAR FACET ARTHROPATHY: ICD-10-CM

## 2024-01-09 DIAGNOSIS — M79.18 MYOFASCIAL PAIN: Primary | ICD-10-CM

## 2024-01-09 PROCEDURE — 99214 OFFICE O/P EST MOD 30 MIN: CPT | Performed by: PHYSICIAN ASSISTANT

## 2024-01-09 RX ORDER — LIDOCAINE 560 MG/1
PATCH PERCUTANEOUS; TOPICAL; TRANSDERMAL
COMMUNITY
Start: 2023-12-18 | End: 2024-06-27

## 2024-01-09 ASSESSMENT — PAIN SCALES - GENERAL: PAINLEVEL: EXTREME PAIN (9)

## 2024-01-09 NOTE — PATIENT INSTRUCTIONS
Trigger point injections have been ordered today.      Please note that this injection uses cortisone.  The cortisone may somewhat weaken the immune system.  It is unknown how much the immune system is weakened.  It is unknown if it is weakened to the point that you may be more likely to get the COVID-19 virus, or if you do get the COVID-19 virus, if you would be sicker than you would have been if you had not had the cortisone injection.  If you do not wish to proceed with the injection, please let the nurse/physician know and do NOT schedule the injection.    Please note that since your immune system is weakened from the cortisone, having any vaccine/shot may be less effective if you have this vaccine within 2 weeks from your cortisone injection.  It is advised to wait 2 weeks after your cortisone injection to have any vaccine (or if you have a vaccine first, wait 2 weeks before you have the cortisone injection).    Please schedule this injection at least 1 week  from now to allow time for insurance prior authorization.  On the day of your injection, you cannot be sick or taking antibiotics.  If you become sick and are prescribed, please call the clinic so your injection can be rescheduled for once you have completed your antibiotics.  You will need to bring a  with you for your injection.   If you have any questions or concerns prior to your injection, please do not hesitate to call the nurse navigation line at [phone] or contact Tiffanie Byrne through [employer].

## 2024-01-09 NOTE — LETTER
1/9/2024         RE: Luis Manuel Lemon  [address] [place] [address]        Dear Colleague,    Thank you for referring your patient, Luis Manuel Lemon, to the [facility]. Please see a copy of my visit note below.    Assessment:   Luis Manuel Lemon is a 84y.o. female with past medical history significant for vitamin D deficiency, hypertension, hyperlipidemia, osteoporosis on Prolia, chronic GERD, chronic pain syndrome, pulmonary fibrosis, polyarthralgia, stage III chronic kidney disease who presents today for follow-up regarding a flare of chronic neck pain, now worse on the right.  Flare began about 1 month ago with no trauma.  Pain radiates from the right neck down the right arm and the entire right side of her body.  My review of the MRI cervical spine from 2021 shows multilevel degenerative change most pronounced at C6-7 where there is moderate spinal canal stenosis.  Patient appears to have a large component of myofascial pain.  She has significant hypertonicity right scalenes and right upper trapezius muscle.  She has had a positive response to trigger point injections in the past, most recently ultrasound-guided left scalene and trapezial muscle trigger point injections March 7, 2023.           Plan:     A shared decision making plan was used.  The patient's values and choices were respected.  The following represents what was discussed and decided upon by the physician assistant and the patient.  A telephone  was used for the visit.  Patient's granddaughter was also present for the visit and helps provide history.    1.  DIAGNOSTIC TESTS: I reviewed the MRI scans of the cervical and lumbar spine from 2021.  No further diagnostic tests were ordered.  - If neck pain fails to improve with the trigger point injections I would recommend obtaining an updated MRI cervical spine for further evaluation.  - We may also need to get a new MRI lumbar spine for further evaluation.    2.  PHYSICAL  THERAPY: Most recent physical therapy was in October 2018.  She will continue with home exercises.  -Patient completed 4 sessions of occupational therapy with Michelle Russell in 2019 which she did not feel is helpful.    3.  MEDICATIONS: No changes are made to the patient's medications.  Pain relieving medications are managed by her primary care provider.  - Patient takes Tylenol 1300 mg twice daily  - Patient applies diclofenac gel  - Patient takes duloxetine 30 mg daily  - Patient takes gabapentin 300 g twice daily.  - Patient takes nortriptyline 10 mg at bedtime.      4.  INTERVENTIONS:   I offered the patient right scalene and upper trapezius muscle trigger point injections.  Patient indicated she would like to proceed and an order was placed.    5.  PATIENT EDUCATION: Patient is in agreement the above plan.  All questions were answered.    6.  FOLLOW-UP: Patient will follow-up with me 2 weeks after her trigger point injections.  If she has questions or concerns in the meantime, she should not hesitate to call.    Subjective:     Luis Manuel Lemon is a 84 y.o. female who presents today for follow-up regarding a flare of chronic neck pain.  Patient was last in our clinic for ultrasound-guided left scalene and trapezial muscle trigger point injections March 7, 2023.  Patient did okay up until December 2023 when pain flared up again, this time more severe on the right.  She denies any specific injury or event to cause the pain.  Pain is severe.    Patient complains of right-sided neck pain.  Pain extends down the lateral and posterior aspects of the right neck into the right upper trapezius muscle.  She then feels the pain radiate down the right arm and down the right side of the back to the lumbar region.  She describes the pain as throbbing and shooting.  She cannot sleep due to the pain.  She has numbness and tingling in both hands.  She feels generally weak.  She rates her pain today as a 9.  Pain is aggravated with movement  and alleviated with massage.  She states her neck feels tender and sore.    Treatment to date:  - physical therapy for the lower back was in October 2018.  - She also did 4 sessions of occupational therapy ending in January 2019.  - 1 session of physical therapy July 2021      - Ultrasound-guided left scalene and trapezial muscle trigger point injections March 7, 2023 with significant relief of pain  - Bilateral L3, L4, L5 radiofrequency ablation April 19, 2021  - Left cervical and trapezius muscle trigger point injections under ultrasound guidance July 21, 2021  - Right sacroiliac joint injection November 12, 2018  - L5-S1 interlaminar epidural steroid injection October 17, 2018  - Left sacroiliac joint injection February 20, 2017  - C6-7 interlaminar epidural steroid injection July 27, 2016  - L4-5 interlaminar epidural steroid injection July 6, 2016  - Bilateral L5-S1 facet joint injections June 22, 2016    - Tylenol 1300 mg twice daily  - Diclofenac gel as needed  - Duloxetine 30 mg daily  - Gabapentin 300 mg twice daily  - Nortriptyline 10 mg bedtime    Past medical history is reviewed and is unchanged.    Review of Systems:  Positive for numbness/tingling, weakness, loss of bladder control, pain much worse at night.  Negative for loss of bowel control, inability to urinate, headache, trip/stumble/falls, difficulty swallowing, difficulty with hand skills, fevers, unintentional weight loss.     Objective:   CONSTITUTIONAL:  Vital signs as above.  No acute distress.  The patient is well nourished and well groomed.    PSYCHIATRIC:  The patient is awake, alert, oriented to person, place and time.  The patient is answering questions appropriately with clear speech.  Normal affect.  HEENT: Normocephalic, atraumatic.  Sclera clear.    SKIN:  Skin over the face, posterior torso, bilateral upper and lower extremities is clean, dry, intact without rashes.  VASCULAR: No significant lower extremity edema.  MUSCULOSKELETAL:  Patient is able to ambulate from the wheelchair across the exam room to the bed.  Tender to palpation left right cervical scalene muscle and upper trapezius muscle with hypertonicity.The patient has 5/5 strength bilateral shoulder abductors, elbow flexors/extensors, wrist extensors, finger flexors/abductors, hip flexors, knee flexors/extensors, ankle dorsi/plantar flexors.  NEUROLOGICAL:  Negative Ruby sign bilaterally.    Sensation light touch intact bilateral upper and lower extremities.     RESULTS:     I reviewed the MRI cervical spine from [facility] dated March 8, 2021.  This shows multilevel degenerative change.  There is facet arthropathy throughout the cervical spine.  At C3-4 there is mild spinal canal stenosis with moderate left and mild right foraminal stenosis.  At C4-5 there is mild spinal canal stenosis with moderate bilateral foraminal stenosis.  At C5-6 there is mild spinal canal stenosis with moderate bilateral foraminal stenosis.  At C6-7 there is moderate spinal canal stenosis with mild bilateral foraminal stenosis.  At C7-T1 there is mild bilateral foraminal stenosis.  Please see report for further details.    I reviewed the MRI lumbar spine from [facility] dated March 8, 2021.  This shows multilevel lumbar spondylosis.  At L2-3 there is a disc bulge with mild bilateral foraminal stenosis.  At L3-4 there is a disc bulge with mild bilateral foraminal stenosis.  At L4-5 there is a disc bulge and mild facet arthropathy with mild spinal canal stenosis and mild bilateral foraminal stenosis.  Please see report for further details.        Again, thank you for allowing me to participate in the care of your patient.        Sincerely,        Tiffanie Byrne PA-C

## 2024-01-22 ENCOUNTER — OFFICE VISIT (OUTPATIENT)
Dept: FAMILY MEDICINE | Facility: CLINIC | Age: 84
End: 2024-01-22
Payer: COMMERCIAL

## 2024-01-22 VITALS
HEIGHT: 59 IN | RESPIRATION RATE: 36 BRPM | HEART RATE: 81 BPM | OXYGEN SATURATION: 98 % | SYSTOLIC BLOOD PRESSURE: 140 MMHG | TEMPERATURE: 97.6 F | BODY MASS INDEX: 22.99 KG/M2 | WEIGHT: 114.04 LBS | DIASTOLIC BLOOD PRESSURE: 74 MMHG

## 2024-01-22 DIAGNOSIS — M62.838 MUSCLE SPASM: ICD-10-CM

## 2024-01-22 DIAGNOSIS — M48.00 SPINAL STENOSIS, MULTILEVEL: ICD-10-CM

## 2024-01-22 DIAGNOSIS — N18.31 STAGE 3A CHRONIC KIDNEY DISEASE (H): ICD-10-CM

## 2024-01-22 DIAGNOSIS — Z00.00 ENCOUNTER FOR MEDICARE ANNUAL WELLNESS EXAM: Primary | ICD-10-CM

## 2024-01-22 DIAGNOSIS — J84.10 PULMONARY FIBROSIS (H): ICD-10-CM

## 2024-01-22 DIAGNOSIS — F33.0 MAJOR DEPRESSIVE DISORDER, RECURRENT EPISODE, MILD (H): ICD-10-CM

## 2024-01-22 DIAGNOSIS — M50.30 DEGENERATIVE DISC DISEASE, CERVICAL: ICD-10-CM

## 2024-01-22 DIAGNOSIS — I27.21 PULMONARY ARTERIAL HYPERTENSION (H): ICD-10-CM

## 2024-01-22 PROCEDURE — 99397 PER PM REEVAL EST PAT 65+ YR: CPT | Performed by: FAMILY MEDICINE

## 2024-01-22 PROCEDURE — 99214 OFFICE O/P EST MOD 30 MIN: CPT | Mod: 25 | Performed by: FAMILY MEDICINE

## 2024-01-22 RX ORDER — IBUPROFEN 600 MG/1
600 TABLET, FILM COATED ORAL EVERY 6 HOURS PRN
Qty: 80 TABLET | Refills: 3 | Status: SHIPPED | OUTPATIENT
Start: 2024-01-22 | End: 2024-06-27

## 2024-01-22 RX ORDER — NORTRIPTYLINE HCL 10 MG
20 CAPSULE ORAL AT BEDTIME
Qty: 90 CAPSULE | Refills: 1 | Status: SHIPPED | OUTPATIENT
Start: 2024-01-22 | End: 2024-05-20

## 2024-01-22 RX ORDER — TIZANIDINE 2 MG/1
2 TABLET ORAL AT BEDTIME
Qty: 30 TABLET | Refills: 3 | Status: SHIPPED | OUTPATIENT
Start: 2024-01-22 | End: 2024-05-20

## 2024-01-22 ASSESSMENT — ENCOUNTER SYMPTOMS
BREAST MASS: 0
WEAKNESS: 1
HEADACHES: 1
ARTHRALGIAS: 1
MYALGIAS: 1

## 2024-01-22 ASSESSMENT — ACTIVITIES OF DAILY LIVING (ADL)
CURRENT_FUNCTION: HOUSEWORK REQUIRES ASSISTANCE
CURRENT_FUNCTION: MONEY MANAGEMENT REQUIRES ASSISTANCE
CURRENT_FUNCTION: TRANSPORTATION REQUIRES ASSISTANCE
CURRENT_FUNCTION: BATHING REQUIRES ASSISTANCE
CURRENT_FUNCTION: LAUNDRY REQUIRES ASSISTANCE
CURRENT_FUNCTION: SHOPPING REQUIRES ASSISTANCE
CURRENT_FUNCTION: TELEPHONE REQUIRES ASSISTANCE
CURRENT_FUNCTION: PREPARING MEALS REQUIRES ASSISTANCE
CURRENT_FUNCTION: MEDICATION ADMINISTRATION REQUIRES ASSISTANCE

## 2024-01-22 ASSESSMENT — PATIENT HEALTH QUESTIONNAIRE - PHQ9
10. IF YOU CHECKED OFF ANY PROBLEMS, HOW DIFFICULT HAVE THESE PROBLEMS MADE IT FOR YOU TO DO YOUR WORK, TAKE CARE OF THINGS AT HOME, OR GET ALONG WITH OTHER PEOPLE: SOMEWHAT DIFFICULT
SUM OF ALL RESPONSES TO PHQ QUESTIONS 1-9: 8
SUM OF ALL RESPONSES TO PHQ QUESTIONS 1-9: 8

## 2024-01-22 NOTE — PROGRESS NOTES
"Preventive Care Visit  [facility] Brown MD, Family Medicine  Jan 22, 2024      SUBJECTIVE:   Luis Manuel is a 84 year old, presenting for the following:  Physical (Neck, arm, side pain. Pt states she cannot turn her head around due to the pain.)        1/22/2024     2:37 PM   Additional Questions   Roomed by ghada madison MA   Accompanied by granddaughter     Are you in the first 12 months of your Medicare coverage?  No    Healthy Habits:     In general, how would you rate your overall health?  Good    Frequency of exercise:  None    Do you usually eat at least 4 servings of fruit and vegetables a day, include whole grains    & fiber and avoid regularly eating high fat or \"junk\" foods?  No    Taking medications regularly:  Yes    Medication side effects:  Other    Ability to successfully perform activities of daily living:  Telephone requires assistance, transportation requires assistance, shopping requires assistance, preparing meals requires assistance, housework requires assistance, bathing requires assistance, laundry requires assistance, medication administration requires assistance and money management requires assistance    Home Safety:  Lack of grab bars in the bathroom    Hearing Impairment:  Difficulty understanding soft or whispered speech and difficulty understanding speech on the telephone    In the past 6 months, have you been bothered by leaking of urine? Yes    In general, how would you rate your overall mental or emotional health?  Good    Additional concerns today:  No    Today's PHQ-9 Score:       1/22/2024     2:34 PM   PHQ-9 SCORE   PHQ-9 Total Score MyChart 8 (Mild depression)   PHQ-9 Total Score 8         Have you ever done Advance Care Planning? (For example, a Health Directive, POLST, or a discussion with a medical provider or your loved ones about your wishes): No, advance care planning information given to patient to review.  Patient plans to discuss their wishes with " loved ones or provider.         Fall risk  Fallen 2 or more times in the past year?: No  Any fall with injury in the past year?: No    Cognitive Screening   1) Repeat 3 items (Leader, Season, Table)    2) Clock draw: ABNORMAL    3) 3 item recall: Recalls 3 objects  Results: 3 items recalled: COGNITIVE IMPAIRMENT LESS LIKELY    Mini-CogTM Copyright SOM Oconnor. Licensed by the author for use in [facility]; reprinted with permission ([email or web address]). All rights reserved.          Reviewed and updated as needed this visit by clinical staff   Tobacco  Allergies  Meds  Problems  Med Hx  Surg Hx  Fam Hx          Reviewed and updated as needed this visit by Provider   Tobacco  Allergies  Meds  Problems  Med Hx  Surg Hx  Fam Hx          Social History     Tobacco Use    Smoking status: Never     Passive exposure: Current (Son smokes outside)    Smokeless tobacco: Never    Tobacco comments:     chew betel nut. Son smokes outside    Substance Use Topics    Alcohol use: No             1/22/2024     2:42 PM   Alcohol Use   Prescreen: >3 drinks/day or >7 drinks/week? No     Do you have a current opioid prescription? (!) YES   How severe is your pain on a scale from 1-10? 9/10   Patient declined to complete Opioid Risk Tool today      1/22/2024     4:00 PM   OPIOID RISK TOOL TOTAL SCORE   Total Score 1     Low Risk (0-3)  Moderate Risk (4-7)  High Risk (>8)        1/22/2024     4:43 PM   RIOSORD Total Score   RIOSORD Total Score 13     Average probability of overdose or serious opioid-induced respiratory depression in the next six months:   Points    Risk   0-4    2%   5-7    5%   8-9    7%   10-17    15%   18-25    30%   26-41    55%   42    83%  Do you use any other controlled substances or medications that are not prescribed by a provider? None    Current providers sharing in care for this patient include:   Patient Care Team:  Chichi Brown MD as PCP - General (Family Medicine)  Orquidea Ramon LSW as  "Lead Care Coordinator (Primary Care - CC)  Johan Chavez MD as Assigned Pulmonology Provider  Yimi López MD as Assigned Neuroscience Provider  Chichi Brown MD as Assigned PCP    The following health maintenance items are reviewed in Epic and correct as of today:  Health Maintenance   Topic Date Due    COVID-19 Vaccine (1) Never done    ZOSTER IMMUNIZATION (1 of 2) Never done    RSV VACCINE (Pregnancy & 60+) (1 - 1-dose 60+ series) Never done    DTAP/TDAP/TD IMMUNIZATION (2 - Td or Tdap) 03/08/2023    ASTHMA ACTION PLAN  01/01/2030 (Originally 1940)    MICROALBUMIN  04/26/2024    LIPID  06/07/2024    HEMOGLOBIN  06/07/2024    ASTHMA CONTROL TEST  06/21/2024    PHQ-9  07/22/2024    BMP  08/22/2024    MEDICARE ANNUAL WELLNESS VISIT  01/22/2025    ANNUAL REVIEW OF HM ORDERS  01/22/2025    FALL RISK ASSESSMENT  01/22/2025    ADVANCE CARE PLANNING  03/09/2027    DEXA  05/02/2034    DEPRESSION ACTION PLAN  Completed    INFLUENZA VACCINE  Completed    Pneumococcal Vaccine: 65+ Years  Completed    URINALYSIS  Completed    IPV IMMUNIZATION  Aged Out    HPV IMMUNIZATION  Aged Out    MENINGITIS IMMUNIZATION  Aged Out    RSV MONOCLONAL ANTIBODY  Aged Out           Mammogram Screening - Patient over age 75, has elected to discontinue screenings.  Pertinent mammograms are reviewed under the imaging tab.  Review of Systems   Genitourinary:  Negative for pelvic pain, vaginal bleeding and vaginal discharge.   Musculoskeletal:  Positive for arthralgias and myalgias.   Neurological:  Positive for weakness and headaches.          OBJECTIVE:   BP (!) 140/74   Pulse 81   Temp 97.6  F (36.4  C) (Oral)   Resp (!) 36   Ht 1.499 m (4' 11\")   Wt 51.7 kg (114 lb 0.6 oz)   LMP  (LMP Unknown)   SpO2 98%   BMI 23.03 kg/m     Estimated body mass index is 23.03 kg/m  as calculated from the following:    Height as of this encounter: 1.499 m (4' 11\").    Weight as of this encounter: 51.7 kg (114 lb 0.6 oz).  Physical " Exam  GENERAL: alert and no distress  NECK: no adenopathy, no asymmetry, masses, or scars  RESP: lungs clear to auscultation - no rales, rhonchi or wheezes  CV: regular rate and rhythm, normal S1 S2, no S3 or S4, no murmur, click or rub, no peripheral edema  ABDOMEN: soft, nontender, no hepatosplenomegaly, no masses and bowel sounds normal  MS: no gross musculoskeletal defects noted, no edema        ASSESSMENT / PLAN:   Encounter for Medicare annual wellness exam  Declined covid, zoster, rsv, tdap    Pulmonary fibrosis (H)  Pulmonary arterial hypertension (H)  Doing well with her inhalers - follows with pulmonology.   Daughter says they are working on getting her oxygen tank.     Major depressive disorder, recurrent episode, mild (H24)  Stable but worse with her pain in her arm. Declined any changes. Is on nortriptyline and duloxetine - will increase notriptyline but not to high doses.     Stage 3a chronic kidney disease (H)  Stable. Rechecked last visit with GFR 53 in the ER for her arm pan. No changes, avoid excessive nsaids.     Degenerative disc disease, cervical  Spinal stenosis, multilevel  Muscle spasm  Will add tizanidine and ibuprofen, increase nortriptyline to 20mg at bedtime - she is having trouble sleeping. Will not increase nortriptyline further because she is also on duloxetine. Oxycodone was not that helpful - will not refill at this time.   - nortriptyline (PAMELOR) 10 MG capsule  Dispense: 90 capsule; Refill: 1  - tiZANidine (ZANAFLEX) 2 MG tablet  Dispense: 30 tablet; Refill: 3  - ibuprofen (ADVIL/MOTRIN) 600 MG tablet  Dispense: 80 tablet; Refill: 3      Return in about 1 month (around 2/22/2024) for Medication Check, neck pain.        Counseling  Reviewed preventive health counseling, as reflected in patient instructions        She reports that she has never smoked. She has been exposed to tobacco smoke. She has never used smokeless tobacco.      Appropriate preventive services were discussed  with this patient, including applicable screening as appropriate for fall prevention, nutrition, physical activity, Tobacco-use cessation, weight loss and cognition.  Checklist reviewing preventive services available has been given to the patient.    Reviewed patients plan of care and provided an AVS. The Intermediate Care Plan ( asthma action plan, low back pain action plan, and migraine action plan) for Luis Manuel meets the Care Plan requirement. This Care Plan has been established and reviewed with the Patient and daughter.        Signed Electronically by: Chichi Brown MD    Identified Health Risks  I have reviewed Opioid Use Disorder and Substance Use Disorder risk factors and made any needed referrals. I have reviewed the current pain treatment plan including non-opioid treatment options.She is at risk for lack of exercise and has been provided with information to increase physical activity for the benefit of her well-being.  The patient was counseled and encouraged to consider modifying their diet and eating habits. She was provided with information on recommended healthy diet options.  The patient reports that she has difficulty with activities of daily living. I have asked that the patient make a follow up appointment in 12 weeks where this issue will be further evaluated and addressed.  The patient was provided with written information regarding signs of hearing loss.  Information on urinary incontinence and treatment options given to patient.  The patient's PHQ-9 score is consistent with mild depression. She was provided with information regarding depression and was advised to schedule a follow up appointment in 12 weeks to further address this issue.

## 2024-01-22 NOTE — PATIENT INSTRUCTIONS
"Patient Education   Personalized Prevention Plan  You are due for the preventive services outlined below.  Your care team is available to assist you in scheduling these services.  If you have already completed any of these items, please share that information with your care team to update in your medical record.  Health Maintenance Due   Topic Date Due     COVID-19 Vaccine (1) Never done     Zoster (Shingles) Vaccine (1 of 2) Never done     RSV VACCINE (Pregnancy & 60+) (1 - 1-dose 60+ series) Never done     Diptheria Tetanus Pertussis (DTAP/TDAP/TD) Vaccine (2 - Td or Tdap) 03/08/2023     Learning About Being Physically Active  What is physical activity?     Being physically active means doing any kind of activity that gets your body moving.  The types of physical activity that can help you get fit and stay healthy include:  Aerobic or \"cardio\" activities. These make your heart beat faster and make you breathe harder, such as brisk walking, riding a bike, or running. They strengthen your heart and lungs and build up your endurance.  Strength training activities. These make your muscles work against, or \"resist,\" something. Examples include lifting weights or doing push-ups. These activities help tone and strengthen your muscles and bones.  Stretches. These let you move your joints and muscles through their full range of motion. Stretching helps you be more flexible.  Reaching a balance between these three types of physical activity is important because each one contributes to your overall fitness.  What are the benefits of being active?  Being active is one of the best things you can do for your health. It helps you to:  Feel stronger and have more energy to do all the things you like to do.  Focus better at school or work.  Feel, think, and sleep better.  Reach and stay at a healthy weight.  Lose fat and build lean muscle.  Lower your risk for serious health problems, including diabetes, heart attack, high blood " "pressure, and some cancers.  Keep your heart, lungs, bones, muscles, and joints strong and healthy.  How can you make being active part of your life?  Start slowly. Make it your long-term goal to get at least 30 minutes of exercise on most days of the week. Walking is a good choice. You also may want to do other activities, such as running, swimming, cycling, or playing tennis or team sports.  Pick activities that you like--ones that make your heart beat faster, your muscles stronger, and your muscles and joints more flexible. If you find more than one thing you like doing, do them all. You don't have to do the same thing every day.  Get your heart pumping every day. Any activity that makes your heart beat faster and keeps it at that rate for a while counts.  Here are some great ways to get your heart beating faster:  Go for a brisk walk, run, or hike.  Go for a swim or bike ride.  Take an online exercise class or dance.  Play a game of touch football, basketball, or soccer.  Play tennis, pickleball, or racquetball.  Climb stairs.  Even some household chores can be aerobic. Just do them at a faster pace. Raking or mowing the lawn, sweeping the garage, and vacuuming and cleaning your home all can help get your heart rate up.  Strengthen your muscles during the week. You don't have to lift heavy weights or grow big, bulky muscles to get stronger. Doing a few simple activities that make your muscles work against, or \"resist,\" something can help you get stronger. Aim for at least twice a week.  For example, you can:  Do push-ups or sit-ups, which use your own body weight as resistance.  Lift weights or dumbbells or use stretch bands at home or in a gym or community center.  Stretch your muscles often. Stretching will help you as you become more active. It can help you stay flexible and loosen tight muscles. It can also help improve your balance and posture and can be a great way to relax.  Be sure to stretch the muscles " "you'll be using when you work out. It's best to warm your muscles slightly before you stretch them. Walk or do some other light aerobic activity for a few minutes. Then start stretching.  When you stretch your muscles:  Do it slowly. Stretching is not about going fast or making sudden movements.  Don't push or bounce during a stretch.  Hold each stretch for at least 15 to 30 seconds, if you can. You should feel a stretch in the muscle, but not pain.  Breathe out as you do the stretch. Then breathe in as you hold the stretch. Don't hold your breath.  If you're worried about how more activity might affect your health, have a checkup before you start. Follow any special advice your doctor gives you for getting a smart start.  Where can you learn more?  Go to https://www.[employer].net/patiented  Enter W332 in the search box to learn more about \"Learning About Being Physically Active.\"  Current as of: June 6, 2023               Content Version: 13.8    4617-1273 [employer].   Care instructions adapted under license by your healthcare professional. If you have questions about a medical condition or this instruction, always ask your healthcare professional. [employer] disclaims any warranty or liability for your use of this information.      Learning About Dietary Guidelines  What are the Dietary Guidelines for Americans?     Dietary Guidelines for Americans provide tips for eating well and staying healthy. This helps reduce the risk for long-term (chronic) diseases.  These guidelines recommend that you:  Eat and drink the right amount for you. The U.[place]. government's food guide is called MyPlate. It can help you make your own well-balanced eating plan.  Try to balance your eating with your activity. This helps you stay at a healthy weight.  Drink alcohol in moderation, if at all.  Limit foods high in salt, saturated fat, trans fat, and added sugar.  These guidelines are from the U.[place]. Department " "of Agriculture and the U.S. Department of Health and Human Services. They are updated every 5 years.  What is MyPlate?  MyPlate is the U.[place]. government's food guide. It can help you make your own well-balanced eating plan. A balanced eating plan means that you eat enough, but not too much, and that your food gives you the nutrients you need to stay healthy.  MyPlate focuses on eating plenty of whole grains, fruits, and vegetables, and on limiting fat and sugar. It is available online at www.ChooseMyPlate.gov.  How can you get started?  If you're trying to eat healthier, you can slowly change your eating habits over time. You don't have to make big changes all at once. Start by adding one or two healthy foods to your meals each day.  Grains  Choose whole-grain breads and cereals and whole-wheat pasta and whole-grain crackers.  Vegetables  Eat a variety of vegetables every day. They have lots of nutrients and are part of a heart-healthy diet.  Fruits  Eat a variety of fruits every day. Fruits contain lots of nutrients. Choose fresh fruit instead of fruit juice.  Protein foods  Choose fish and lean poultry more often. Eat red meat and fried meats less often. Dried beans, tofu, and nuts are also good sources of protein.  Dairy  Choose low-fat or fat-free products from this food group. If you have problems digesting milk, try eating cheese or yogurt instead.  Fats and oils  Limit fats and oils if you're trying to cut calories. Choose healthy fats when you cook. These include canola oil and olive oil.  Where can you learn more?  Go to https://www.healthProcure[employer].net/patiented  Enter D676 in the search box to learn more about \"Learning About Dietary Guidelines.\"  Current as of: February 28, 2023               Content Version: 13.8    0032-9105 [employer].   Care instructions adapted under license by your healthcare professional. If you have questions about a medical condition or this instruction, always ask your " healthcare professional. [employer] disclaims any warranty or liability for your use of this information.      Activities of Daily Living    Your Health Risk Assessment indicates you have difficulties with activities of daily living such as housework, bathing, preparing meals, taking medication, etc. Please make a follow up appointment for us to address this issue in more detail.  Hearing Loss: Care Instructions  Overview     Hearing loss is a sudden or slow decrease in how well you hear. It can range from slight to profound. Permanent hearing loss can occur with aging. It also can happen when you are exposed long-term to loud noise. Examples include listening to loud music, riding motorcycles, or being around other loud machines.  Hearing loss can affect your work and home life. It can make you feel lonely or depressed. You may feel that you have lost your independence. But hearing aids and other devices can help you hear better and feel connected to others.  Follow-up care is a key part of your treatment and safety. Be sure to make and go to all appointments, and call your doctor if you are having problems. It's also a good idea to know your test results and keep a list of the medicines you take.  How can you care for yourself at home?  Avoid loud noises whenever possible. This helps keep your hearing from getting worse.  Always wear hearing protection around loud noises.  Wear a hearing aid as directed.  A professional can help you pick a hearing aid that will work best for you.  You can also get hearing aids over the counter for mild to moderate hearing loss.  Have hearing tests as your doctor suggests. They can show whether your hearing has changed. Your hearing aid may need to be adjusted.  Use other devices as needed. These may include:  Telephone amplifiers and hearing aids that can connect to a television, stereo, radio, or microphone.  Devices that use lights or vibrations. These alert you to  "the doorbell, a ringing telephone, or a baby monitor.  Television closed-captioning. This shows the words at the bottom of the screen. Most new TVs can do this.  TTY (text telephone). This lets you type messages back and forth on the telephone instead of talking or listening. These devices are also called TDD. When messages are typed on the keyboard, they are sent over the phone line to a receiving TTY. The message is shown on a monitor.  Use text messaging, social media, and email if it is hard for you to communicate by telephone.  Try to learn a listening technique called speechreading. It is not lipreading. You pay attention to people's gestures, expressions, posture, and tone of voice. These clues can help you understand what a person is saying. Face the person you are talking to, and have them face you. Make sure the lighting is good. You need to see the other person's face clearly.  Think about counseling if you need help to adjust to your hearing loss.  When should you call for help?  Watch closely for changes in your health, and be sure to contact your doctor if:    You think your hearing is getting worse.     You have new symptoms, such as dizziness or nausea.   Where can you learn more?  Go to https://www.[employer].net/patiented  Enter R798 in the search box to learn more about \"Hearing Loss: Care Instructions.\"  Current as of: February 28, 2023               Content Version: 13.8    6158-1407 [employer].   Care instructions adapted under license by your healthcare professional. If you have questions about a medical condition or this instruction, always ask your healthcare professional. [employer] disclaims any warranty or liability for your use of this information.      Bladder Training: Care Instructions  Your Care Instructions     Bladder training is used to treat urge incontinence and stress incontinence. Urge incontinence means that the need to urinate comes on so fast " that you can't get to a toilet in time. Stress incontinence means that you leak urine because of pressure on your bladder. For example, it may happen when you laugh, cough, or lift something heavy.  Bladder training can increase how long you can wait before you have to urinate. It can also help your bladder hold more urine. And it can give you better control over the urge to urinate.  It is important to remember that bladder training takes a few weeks to a few months to make a difference. You may not see results right away, but don't give up.  Follow-up care is a key part of your treatment and safety. Be sure to make and go to all appointments, and call your doctor if you are having problems. It's also a good idea to know your test results and keep a list of the medicines you take.  How can you care for yourself at home?  Work with your doctor to come up with a bladder training program that is right for you. You may use one or more of the following methods.  Delayed urination  In the beginning, try to keep from urinating for 5 minutes after you first feel the need to go.  While you wait, take deep, slow breaths to relax. Kegel exercises can also help you delay the need to go to the bathroom.  After some practice, when you can easily wait 5 minutes to urinate, try to wait 10 minutes before you urinate.  Slowly increase the waiting period until you are able to control when you have to urinate.  Scheduled urination  Empty your bladder when you first wake up in the morning.  Schedule times throughout the day when you will urinate.  Start by going to the bathroom every hour, even if you don't need to go.  Slowly increase the time between trips to the bathroom.  When you have found a schedule that works well for you, keep doing it.  If you wake up during the night and have to urinate, do it. Apply your schedule to waking hours only.  Kegel exercises  These tighten and strengthen pelvic muscles, which can help you control  "the flow of urine. (If doing these exercises causes pain, stop doing them and talk with your doctor.) To do Kegel exercises:  Squeeze your muscles as if you were trying not to pass gas. Or squeeze your muscles as if you were stopping the flow of urine. Your belly, legs, and buttocks shouldn't move.  Hold the squeeze for 3 seconds, then relax for 5 to 10 seconds.  Start with 3 seconds, then add 1 second each week until you are able to squeeze for 10 seconds.  Repeat the exercise 10 times a session. Do 3 to 8 sessions a day.  When should you call for help?  Watch closely for changes in your health, and be sure to contact your doctor if:    Your incontinence is getting worse.     You do not get better as expected.   Where can you learn more?  Go to https://www.[employer].net/patiented  Enter V684 in the search box to learn more about \"Bladder Training: Care Instructions.\"  Current as of: February 28, 2023               Content Version: 13.8    7336-3462 [employer].   Care instructions adapted under license by your healthcare professional. If you have questions about a medical condition or this instruction, always ask your healthcare professional. [employer] disclaims any warranty or liability for your use of this information.      Learning About Depression Screening  What is depression screening?  Depression screening is a way to see if you have depression symptoms. It may be done by a doctor or counselor. It's often part of a routine checkup. That's because your mental health is just as important as your physical health.  Depression is a mental health condition that affects how you feel, think, and act. You may:  Have less energy.  Lose interest in your daily activities.  Feel sad and grouchy for a long time.  Depression is very common. It affects people of all ages.  Many things can lead to depression. Some people become depressed after they have a stroke or find out they have a major " "illness like cancer or heart disease. The death of a loved one or a breakup may lead to depression. It can run in families. Most experts believe that a combination of inherited genes and stressful life events can cause it.  What happens during screening?  You may be asked to fill out a form about your depression symptoms. You and the doctor will discuss your answers. The doctor may ask you more questions to learn more about how you think, act, and feel.  What happens after screening?  If you have symptoms of depression, your doctor will talk to you about your options.  Doctors usually treat depression with medicines or counseling. Often, combining the two works best. Many people don't get help because they think that they'll get over the depression on their own. But people with depression may not get better unless they get treatment.  The cause of depression is not well understood. There may be many factors involved. But if you have depression, it's not your fault.  A serious symptom of depression is thinking about death or suicide. If you or someone you care about talks about this or about feeling hopeless, get help right away.  It's important to know that depression can be treated. Medicine, counseling, and self-care may help.  Where can you learn more?  Go to https://www.[employer].net/patiented  Enter T185 in the search box to learn more about \"Learning About Depression Screening.\"  Current as of: June 25, 2023               Content Version: 13.8    9272-3608 [employer].   Care instructions adapted under license by your healthcare professional. If you have questions about a medical condition or this instruction, always ask your healthcare professional. [employer] disclaims any warranty or liability for your use of this information.         "

## 2024-01-24 ENCOUNTER — RADIOLOGY INJECTION OFFICE VISIT (OUTPATIENT)
Dept: PHYSICAL MEDICINE AND REHAB | Facility: CLINIC | Age: 84
End: 2024-01-24
Attending: PHYSICIAN ASSISTANT
Payer: COMMERCIAL

## 2024-01-24 VITALS
SYSTOLIC BLOOD PRESSURE: 150 MMHG | OXYGEN SATURATION: 96 % | HEART RATE: 80 BPM | HEIGHT: 59 IN | TEMPERATURE: 98.4 F | WEIGHT: 113.5 LBS | DIASTOLIC BLOOD PRESSURE: 72 MMHG | BODY MASS INDEX: 22.88 KG/M2

## 2024-01-24 DIAGNOSIS — M79.18 MYOFASCIAL PAIN: ICD-10-CM

## 2024-01-24 PROCEDURE — 76942 ECHO GUIDE FOR BIOPSY: CPT | Performed by: STUDENT IN AN ORGANIZED HEALTH CARE EDUCATION/TRAINING PROGRAM

## 2024-01-24 PROCEDURE — 20552 NJX 1/MLT TRIGGER POINT 1/2: CPT | Performed by: STUDENT IN AN ORGANIZED HEALTH CARE EDUCATION/TRAINING PROGRAM

## 2024-01-24 RX ORDER — BUPIVACAINE HYDROCHLORIDE 2.5 MG/ML
INJECTION, SOLUTION EPIDURAL; INFILTRATION; INTRACAUDAL
Status: COMPLETED | OUTPATIENT
Start: 2024-01-24 | End: 2024-01-24

## 2024-01-24 RX ADMIN — BUPIVACAINE HYDROCHLORIDE 1 ML: 2.5 INJECTION, SOLUTION EPIDURAL; INFILTRATION; INTRACAUDAL at 15:28

## 2024-01-24 ASSESSMENT — PAIN SCALES - GENERAL: PAINLEVEL: EXTREME PAIN (8)

## 2024-01-24 NOTE — PATIENT INSTRUCTIONS
Follow-up visit with YOCASTA Bustos in 2 weeks to discuss injection outcome and determine care plan going forward.       DISCHARGE INSTRUCTIONS    During office hours (8:00 a.m.- 4:00 p.m.) questions or concerns may be answered  by calling [facility] Nurses at  [phone].  Messages received after hours will be returned the following business day.      In the case of an emergency, please dial 911 or seek assistance at the nearest Emergency Room/Urgent Care facility.     All Patients:    You may experience an increase in your symptoms for the first 2 days (It may take anywhere between 2 days- 2 weeks for the steroid to have maximum effect).    You may use ice on the injection site, as frequently as 20 minutes each hour if needed.    You may take your pain medicine.    You may continue taking your regular medication after your injection. If you have had a Medial Branch Block you may resume pain medication once your pain diary is completed.    You may shower. No swimming, tub bath or hot tub for 48 hours.  You may remove your bandaid/bandage as soon as you are home.    You may resume light activities, as tolerated.    Resume your usual diet as tolerated.    It is strongly advised that you do not drive for 1-3 hours post injection.    If you have had oral sedation:  Do not drive for 8 hours post injection.      If you have had IV sedation:  Do not drive for 24 hours post injection.  Do not operate hazardous machinery or make important personal/business decisions for 24 hours.      POSSIBLE STEROID SIDE EFFECTS (If steroid/cortisone was used for your procedure)    -If you experience these symptoms, it should only last for a short period    Swelling of the legs              Skin redness (flushing)     Mouth (oral) irritation   Blood sugar (glucose) levels            Sweats                    Mood changes  Headache  Sleeplessness  Weakened immune system for up to 14 days, which could increase the risk of  nathanael the COVID-19 virus and/or experiencing more severe symptoms of the disease, if exposed.  Decreased effectiveness of the flu vaccine if given within 2 weeks of the steroid.         POSSIBLE PROCEDURE SIDE EFFECTS  -Call the Spine Center if you are concerned  Increased Pain           Increased numbness/tingling      Nausea/Vomiting          Bruising/bleeding at site      Redness or swelling                                              Difficulty walking      Weakness           Fever greater than 100.5    *In the event of a severe headache after an epidural steroid injection that is relieved by lying down, please call the [facility] to speak with a clinical staff member*

## 2024-01-30 DIAGNOSIS — K59.04 CHRONIC IDIOPATHIC CONSTIPATION: ICD-10-CM

## 2024-01-30 DIAGNOSIS — Z76.0 ENCOUNTER FOR MEDICATION REFILL: ICD-10-CM

## 2024-01-30 DIAGNOSIS — K59.03 DRUG-INDUCED CONSTIPATION: ICD-10-CM

## 2024-01-30 DIAGNOSIS — D64.9 ANEMIA, UNSPECIFIED TYPE: ICD-10-CM

## 2024-01-30 RX ORDER — POLYETHYLENE GLYCOL 3350 17 G/17G
17 POWDER, FOR SOLUTION ORAL DAILY PRN
Qty: 510 G | Refills: 3 | Status: SHIPPED | OUTPATIENT
Start: 2024-01-30 | End: 2024-05-20

## 2024-01-30 RX ORDER — DOCUSATE SODIUM 50MG AND SENNOSIDES 8.6MG 8.6; 5 MG/1; MG/1
2 TABLET, FILM COATED ORAL DAILY
Qty: 30 TABLET | Refills: 2 | Status: SHIPPED | OUTPATIENT
Start: 2024-01-30 | End: 2024-04-22

## 2024-01-30 RX ORDER — FERROUS GLUCONATE 324(38)MG
TABLET ORAL
Qty: 30 TABLET | Refills: 1 | Status: SHIPPED | OUTPATIENT
Start: 2024-01-30 | End: 2024-03-21

## 2024-02-01 ENCOUNTER — PATIENT OUTREACH (OUTPATIENT)
Dept: GERIATRIC MEDICINE | Facility: CLINIC | Age: 84
End: 2024-02-01
Payer: COMMERCIAL

## 2024-02-01 NOTE — PROGRESS NOTES
[facility] [place] Care Coordination Contact    Received after visit chart from care coordinator.  Completed following tasks: Submitted referrals/auths for [facility] with transportation and Updated services in Database   and Member Signature - POC Change:  Per CC, member has made a change to their POC.  Care Plan Change Letter mailed to member for signature with a self-addressed return envelope.    Liz York  Care Management Specialist  [facility] [place]  [phone]

## 2024-02-12 ENCOUNTER — TELEPHONE (OUTPATIENT)
Dept: FAMILY MEDICINE | Facility: CLINIC | Age: 84
End: 2024-02-12
Payer: COMMERCIAL

## 2024-02-12 NOTE — TELEPHONE ENCOUNTER
Patient Quality Outreach    Patient is due for the following:   Hypertension -  Hypertension follow-up visit    Next Steps:   No follow up needed at this time.  Patient has upcoming appointment, these items will be addressed at that time.    Type of outreach:    Chart review performed, no outreach needed.      Questions for provider review:               Gonzalez Carlton RN

## 2024-02-22 DIAGNOSIS — Z53.9 DIAGNOSIS NOT YET DEFINED: Primary | ICD-10-CM

## 2024-02-22 PROCEDURE — G0179 MD RECERTIFICATION HHA PT: HCPCS | Performed by: FAMILY MEDICINE

## 2024-02-26 DIAGNOSIS — F33.0 MAJOR DEPRESSIVE DISORDER, RECURRENT EPISODE, MILD (H): ICD-10-CM

## 2024-02-26 RX ORDER — DULOXETIN HYDROCHLORIDE 30 MG/1
30 CAPSULE, DELAYED RELEASE ORAL DAILY
Qty: 30 CAPSULE | Refills: 3 | Status: SHIPPED | OUTPATIENT
Start: 2024-02-26 | End: 2024-06-18

## 2024-02-28 ENCOUNTER — OFFICE VISIT (OUTPATIENT)
Dept: PEDIATRICS | Facility: CLINIC | Age: 84
End: 2024-02-28
Payer: COMMERCIAL

## 2024-02-28 ENCOUNTER — HOSPITAL ENCOUNTER (OUTPATIENT)
Dept: CT IMAGING | Facility: HOSPITAL | Age: 84
Discharge: HOME | End: 2024-02-28
Attending: FAMILY MEDICINE | Admitting: FAMILY MEDICINE
Payer: COMMERCIAL

## 2024-02-28 VITALS
WEIGHT: 108.2 LBS | DIASTOLIC BLOOD PRESSURE: 83 MMHG | BODY MASS INDEX: 21.85 KG/M2 | SYSTOLIC BLOOD PRESSURE: 177 MMHG | RESPIRATION RATE: 29 BRPM | TEMPERATURE: 97.1 F | HEART RATE: 71 BPM | OXYGEN SATURATION: 97 %

## 2024-02-28 DIAGNOSIS — R10.11 ABDOMINAL PAIN, RIGHT UPPER QUADRANT: ICD-10-CM

## 2024-02-28 DIAGNOSIS — D50.9 MICROCYTIC ANEMIA: ICD-10-CM

## 2024-02-28 DIAGNOSIS — K85.00 IDIOPATHIC ACUTE PANCREATITIS WITHOUT INFECTION OR NECROSIS: Primary | ICD-10-CM

## 2024-02-28 LAB
ALBUMIN SERPL-MCNC: 3.3 G/DL (ref 3.4–5)
ALBUMIN UR-MCNC: NEGATIVE MG/DL
ALP SERPL-CCNC: 84 U/L (ref 40–150)
ALT SERPL W P-5'-P-CCNC: 21 U/L (ref 0–50)
AMYLASE SERPL-CCNC: 182 U/L (ref 28–100)
ANION GAP SERPL CALCULATED.3IONS-SCNC: 7 MMOL/L (ref 3–14)
APPEARANCE UR: CLEAR
AST SERPL W P-5'-P-CCNC: 29 U/L (ref 0–45)
BASOPHILS # BLD AUTO: 0 10E3/UL (ref 0–0.2)
BASOPHILS NFR BLD AUTO: 1 %
BILIRUB SERPL-MCNC: 0.6 MG/DL (ref 0.2–1.3)
BILIRUB UR QL STRIP: NEGATIVE
BUN SERPL-MCNC: 9 MG/DL (ref 7–30)
CALCIUM SERPL-MCNC: 9 MG/DL (ref 8.5–10.1)
CHLORIDE BLD-SCNC: 105 MMOL/L (ref 94–109)
CO2 SERPL-SCNC: 28 MMOL/L (ref 20–32)
COLOR UR AUTO: YELLOW
CREAT BLD-MCNC: 0.9 MG/DL (ref 0.6–1.1)
CREAT SERPL-MCNC: 0.8 MG/DL (ref 0.52–1.04)
EGFRCR SERPLBLD CKD-EPI 2021: 73 ML/MIN/1.73M2
EGFRCR SERPLBLD CKD-EPI 2021: >60 ML/MIN/1.73M2
EOSINOPHIL # BLD AUTO: 0.2 10E3/UL (ref 0–0.7)
EOSINOPHIL NFR BLD AUTO: 3 %
ERYTHROCYTE [DISTWIDTH] IN BLOOD BY AUTOMATED COUNT: 17.1 % (ref 10–15)
GLUCOSE BLD-MCNC: 86 MG/DL (ref 70–99)
GLUCOSE UR STRIP-MCNC: NEGATIVE MG/DL
HCT VFR BLD AUTO: 30.2 % (ref 35–47)
HGB BLD-MCNC: 9 G/DL (ref 11.7–15.7)
HGB UR QL STRIP: NEGATIVE
IMM GRANULOCYTES # BLD: 0 10E3/UL
IMM GRANULOCYTES NFR BLD: 0 %
KETONES UR STRIP-MCNC: NEGATIVE MG/DL
LEUKOCYTE ESTERASE UR QL STRIP: NEGATIVE
LIPASE SERPL-CCNC: 46 U/L (ref 13–60)
LYMPHOCYTES # BLD AUTO: 2.2 10E3/UL (ref 0.8–5.3)
LYMPHOCYTES NFR BLD AUTO: 35 %
MCH RBC QN AUTO: 22.9 PG (ref 26.5–33)
MCHC RBC AUTO-ENTMCNC: 29.8 G/DL (ref 31.5–36.5)
MCV RBC AUTO: 77 FL (ref 78–100)
MONOCYTES # BLD AUTO: 0.6 10E3/UL (ref 0–1.3)
MONOCYTES NFR BLD AUTO: 10 %
NEUTROPHILS # BLD AUTO: 3.3 10E3/UL (ref 1.6–8.3)
NEUTROPHILS NFR BLD AUTO: 52 %
NITRATE UR QL: NEGATIVE
PH UR STRIP: 7 [PH] (ref 5–8)
PLATELET # BLD AUTO: 321 10E3/UL (ref 150–450)
POTASSIUM BLD-SCNC: 3.6 MMOL/L (ref 3.4–5.3)
PROT SERPL-MCNC: 7.5 G/DL (ref 6.8–8.8)
RBC # BLD AUTO: 3.93 10E6/UL (ref 3.8–5.2)
SODIUM SERPL-SCNC: 140 MMOL/L (ref 135–145)
SP GR UR STRIP: 1.01 (ref 1–1.03)
UROBILINOGEN UR STRIP-ACNC: 1 E.U./DL
WBC # BLD AUTO: 6.3 10E3/UL (ref 4–11)

## 2024-02-28 PROCEDURE — 81003 URINALYSIS AUTO W/O SCOPE: CPT | Performed by: FAMILY MEDICINE

## 2024-02-28 PROCEDURE — 96374 THER/PROPH/DIAG INJ IV PUSH: CPT | Performed by: FAMILY MEDICINE

## 2024-02-28 PROCEDURE — 250N000011 HC RX IP 250 OP 636: Performed by: FAMILY MEDICINE

## 2024-02-28 PROCEDURE — 82565 ASSAY OF CREATININE: CPT

## 2024-02-28 PROCEDURE — 99214 OFFICE O/P EST MOD 30 MIN: CPT | Mod: 25 | Performed by: FAMILY MEDICINE

## 2024-02-28 PROCEDURE — 74177 CT ABD & PELVIS W/CONTRAST: CPT

## 2024-02-28 PROCEDURE — 82565 ASSAY OF CREATININE: CPT | Performed by: FAMILY MEDICINE

## 2024-02-28 PROCEDURE — 85025 COMPLETE CBC W/AUTO DIFF WBC: CPT | Performed by: FAMILY MEDICINE

## 2024-02-28 PROCEDURE — 83690 ASSAY OF LIPASE: CPT | Performed by: FAMILY MEDICINE

## 2024-02-28 PROCEDURE — 36415 COLL VENOUS BLD VENIPUNCTURE: CPT | Performed by: FAMILY MEDICINE

## 2024-02-28 PROCEDURE — 82150 ASSAY OF AMYLASE: CPT | Performed by: FAMILY MEDICINE

## 2024-02-28 RX ORDER — IOPAMIDOL 755 MG/ML
90 INJECTION, SOLUTION INTRAVASCULAR ONCE
Status: COMPLETED | OUTPATIENT
Start: 2024-02-28 | End: 2024-02-28

## 2024-02-28 RX ORDER — KETOROLAC TROMETHAMINE 30 MG/ML
15 INJECTION, SOLUTION INTRAMUSCULAR; INTRAVENOUS ONCE
Status: COMPLETED | OUTPATIENT
Start: 2024-02-28 | End: 2024-02-28

## 2024-02-28 RX ADMIN — KETOROLAC TROMETHAMINE 15 MG: 30 INJECTION, SOLUTION INTRAMUSCULAR; INTRAVENOUS at 14:56

## 2024-02-28 RX ADMIN — IOPAMIDOL 90 ML: 755 INJECTION, SOLUTION INTRAVENOUS at 15:44

## 2024-02-28 ASSESSMENT — PAIN SCALES - GENERAL: PAINLEVEL: WORST PAIN (10)

## 2024-02-28 NOTE — LETTER
February 28, 2024      Luis Manuel Lemon  [address] [place] [address]        To Whom It May Concern:    Luis Manuel Lemon  was seen on 02/28/24.  Please excuse her family member from work today.         Sincerely,        Kimberly Sagastume MD

## 2024-02-28 NOTE — PATIENT INSTRUCTIONS
Clear liquid diet for a few days  Follow up in clinic with your primary doctor in a couple of days  If you have worsening pain then go to the ER

## 2024-02-28 NOTE — Clinical Note
ELIJAHI - seen at [facility] today and will need close follow up If severe pain should go to ER May benefit from HIDA scan

## 2024-02-28 NOTE — PROGRESS NOTES
Patient seen with the help of an iPad   Assessment & Plan     Idiopathic acute pancreatitis without infection or necrosis  Mildly elevated amylase and normal lipase in the setting of right upper quadrant abdominal pain which is worse with eating.  Status postcholecystectomy and CT abdomen pelvis is normal  Recommended clear liquid diet for couple of days and close follow-up  Note sent to primary  If severe pain should proceed to the emergency room  May benefit from HIDA scan    Abdominal pain, right upper quadrant  See above  - CBC with platelets differential; Future  - Comprehensive metabolic panel; Future  - Amylase; Future  - Lipase; Future  - UA Macroscopic with reflex to Microscopic and Culture - Clinic Collect  - ketorolac (TORADOL) injection 15 mg  - Lipase  - Amylase  - Comprehensive metabolic panel  - CBC with platelets differential    Microcytic anemia  Chronic anemia but worsening and now microcytic                  Return in about 2 days (around 3/1/2024) for Follow up, with primary provider.    Subjective   Luis Manuel is a 84 year old, presenting for the following health issues:  Abdominal Pain (RUQ )    HPI     Abdominal/Flank Pain  Onset/Duration: 2 days  Description:   Character: Stabbing and throbbing  Location: right upper quadrant  Radiation: None and Shoulder  Intensity: severe  Progression of Symptoms:  constant  Accompanying Signs & Symptoms:  Fever/chills: no   Gas/Bloating: YES - bloating   Nausea: no   Vomitting: no   Diarrhea: no   Constipation:no   Dysuria: no            Hematuria: no            Frequency: YES- little amount of urine out           Incontinence of urine: no   History:            Last bowel movement: yesterday  Trauma: no   Previous similar pain: no    Previous tests done: none           Previous Abdominal surgery: YES- gall bladder out, kidney stone surgery  Precipitating factors:   Does the pain change with:     Food: YES- worse     Bowel Movement: no     Urination: no               Other factors: no   Therapies tried and outcome:  no    When food last eaten: 11am today    Patient is an 84-year-old female who complains of severe right upper quadrant abdominal pain for 2 days.  It is worse after eating food.  It is throbbing and stabbing.  It radiates up to her right axilla.  She has decreased urine output as well.  Pain is worse with deep breaths.  No chest pain or shortness of breath.  No fall or injury.  She has a history of cholecystectomy, kidney stone surgery, pancreatitis.      Review of Systems  Negative except as listed in HPI      Objective    BP (!) 177/83 (BP Location: Left arm, Patient Position: Sitting, Cuff Size: Adult Regular)   Pulse 71   Temp 97.1  F (36.2  C) (Oral)   Resp 29   Wt 49.1 kg (108 lb 3.2 oz)   LMP  (LMP Unknown)   SpO2 97%   BMI 21.85 kg/m    Body mass index is 21.85 kg/m .  Physical Exam   Vitals noted and within normal limits except for elevated systolic blood pressure  In general she is alert, oriented, and in no acute distress  Heart regular rate and rhythm with no murmurs  Lungs clear to auscultation bilaterally  Abdomen with normal bowel sounds.  Soft and nondistended.  Tenderness to palpation in the right upper quadrant with guarding.  Also tenderness mildly in the epigastric area umbilical area and right lower quadrant.  CBC: Normal white count and differential.  Worsening microcytic anemia.  CMP: Within normal limits except for mildly low albumin  UA: No sign of infection  Amylase and lipase: Amylase elevated and lipase normal  CT abdomen pelvis: No significant findings  Results for orders placed or performed during the hospital encounter of 02/28/24   CT Abdomen Pelvis w Contrast     Status: None    Narrative    EXAM: CT ABDOMEN PELVIS W CONTRAST  LOCATION: [facility]  DATE: 2/28/2024    INDICATION: RUQ abd pain, history of cholecystectomy, history of kidney stone and pancreatitis  COMPARISON:  None.  TECHNIQUE: CT scan of the abdomen and pelvis was performed following injection of IV contrast. Multiplanar reformats were obtained. Dose reduction techniques were used.  CONTRAST: 90ml IV Isovue 370    FINDINGS:   LOWER CHEST: Benign calcified granuloma in the right lower lobe. Partially imaged coronary artery calcifications.    HEPATOBILIARY: Cholecystectomy.    PANCREAS: Normal.    SPLEEN: Normal.    ADRENAL GLANDS: Normal.    KIDNEYS/BLADDER: No significant mass, stones, or hydronephrosis. There are simple or benign cysts. No follow up is needed.    BOWEL: Diverticulosis of the colon. No acute inflammatory change. No obstruction. Distal duodenal diverticulum.    LYMPH NODES: Normal.    VASCULATURE: Moderate multifocal atherosclerotic calcification of the abdominal aorta and iliofemoral arteries with infrarenal ectasia measuring up to 2.3 cm. 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    PELVIC ORGANS: Hysterectomy.    MUSCULOSKELETAL: Degenerative changes in the spine.      Impression    IMPRESSION:   No acute findings or other explanation for symptoms.   Creatinine POCT     Status: Normal   Result Value Ref Range    Creatinine POCT 0.9 0.6 - 1.1 mg/dL    GFR, ESTIMATED POCT >60 >60 mL/min/1.73m2   Results for orders placed or performed in visit on 02/28/24   UA Macroscopic with reflex to Microscopic and Culture - Clinic Collect     Status: Normal    Specimen: Urethra; Urine   Result Value Ref Range    Color Urine Yellow Colorless, Straw, Light Yellow, Yellow    Appearance Urine Clear Clear    Glucose Urine Negative Negative mg/dL    Bilirubin Urine Negative Negative    Ketones Urine Negative Negative mg/dL    Specific Gravity Urine 1.010 1.005 - 1.030    Blood Urine Negative Negative    pH Urine 7.0 5.0 - 8.0    Protein Albumin Urine Negative Negative mg/dL    Urobilinogen Urine 1.0 0.2, 1.0 E.U./dL    Nitrite Urine Negative Negative    Leukocyte Esterase Urine  Negative Negative    Narrative    Microscopic not indicated   Lipase     Status: Normal   Result Value Ref Range    Lipase 46 13 - 60 U/L   Amylase     Status: Abnormal   Result Value Ref Range    Amylase 182 (H) 28 - 100 U/L   Comprehensive metabolic panel     Status: Abnormal   Result Value Ref Range    Sodium 140 135 - 145 mmol/L    Potassium 3.6 3.4 - 5.3 mmol/L    Chloride 105 94 - 109 mmol/L    Carbon Dioxide (CO2) 28 20 - 32 mmol/L    Anion Gap 7 3 - 14 mmol/L    Urea Nitrogen 9 7 - 30 mg/dL    Creatinine 0.80 0.52 - 1.04 mg/dL    GFR Estimate 73 >60 mL/min/1.73m2    Calcium 9.0 8.5 - 10.1 mg/dL    Glucose 86 70 - 99 mg/dL    Alkaline Phosphatase 84 40 - 150 U/L    AST 29 0 - 45 U/L    ALT 21 0 - 50 U/L    Protein Total 7.5 6.8 - 8.8 g/dL    Albumin 3.3 (L) 3.4 - 5.0 g/dL    Bilirubin Total 0.6 0.2 - 1.3 mg/dL   CBC with platelets and differential     Status: Abnormal   Result Value Ref Range    WBC Count 6.3 4.0 - 11.0 10e3/uL    RBC Count 3.93 3.80 - 5.20 10e6/uL    Hemoglobin 9.0 (L) 11.7 - 15.7 g/dL    Hematocrit 30.2 (L) 35.0 - 47.0 %    MCV 77 (L) 78 - 100 fL    MCH 22.9 (L) 26.5 - 33.0 pg    MCHC 29.8 (L) 31.5 - 36.5 g/dL    RDW 17.1 (H) 10.0 - 15.0 %    Platelet Count 321 150 - 450 10e3/uL    % Neutrophils 52 %    % Lymphocytes 35 %    % Monocytes 10 %    % Eosinophils 3 %    % Basophils 1 %    % Immature Granulocytes 0 %    Absolute Neutrophils 3.3 1.6 - 8.3 10e3/uL    Absolute Lymphocytes 2.2 0.8 - 5.3 10e3/uL    Absolute Monocytes 0.6 0.0 - 1.3 10e3/uL    Absolute Eosinophils 0.2 0.0 - 0.7 10e3/uL    Absolute Basophils 0.0 0.0 - 0.2 10e3/uL    Absolute Immature Granulocytes 0.0 <=0.4 10e3/uL   CBC with platelets differential     Status: Abnormal    Narrative    The following orders were created for panel order CBC with platelets differential.  Procedure                               Abnormality         Status                     ---------                               -----------         ------                      CBC with platelets and d...[[id number]]  Abnormal            Final result                 Please view results for these tests on the individual orders.                 Signed Electronically by: Kimberly Sagastume MD

## 2024-03-06 NOTE — PROGRESS NOTES
2nd Attempt: Signed Letter not received from member, resent per process.    Pavan Burrell  Care Management Specialist  [facility]  [phone]

## 2024-03-12 DIAGNOSIS — Z53.9 DIAGNOSIS NOT YET DEFINED: Primary | ICD-10-CM

## 2024-03-12 PROCEDURE — G0179 MD RECERTIFICATION HHA PT: HCPCS | Performed by: FAMILY MEDICINE

## 2024-03-14 ENCOUNTER — TELEPHONE (OUTPATIENT)
Dept: FAMILY MEDICINE | Facility: CLINIC | Age: 84
End: 2024-03-14
Payer: COMMERCIAL

## 2024-03-14 NOTE — TELEPHONE ENCOUNTER
Forms/Letter Request    Type of form/letter: -Report of Participant Physical Examination       Do we have the form/letter: Yes: TH    Who is the form from? [facility]  (if other please explain)    Where did/will the form come from? form was faxed in    When is form/letter needed by: asap    How would you like the form/letter returned: Fax : [phone]

## 2024-03-21 DIAGNOSIS — J81.0 ACUTE PULMONARY EDEMA (H): ICD-10-CM

## 2024-03-21 DIAGNOSIS — Z76.0 ENCOUNTER FOR MEDICATION REFILL: ICD-10-CM

## 2024-03-21 DIAGNOSIS — D64.9 ANEMIA, UNSPECIFIED TYPE: ICD-10-CM

## 2024-03-21 RX ORDER — FUROSEMIDE 20 MG
TABLET ORAL
Qty: 60 TABLET | Refills: 3 | Status: SHIPPED | OUTPATIENT
Start: 2024-03-21 | End: 2024-07-15

## 2024-03-21 RX ORDER — FERROUS GLUCONATE 324(38)MG
TABLET ORAL
Qty: 30 TABLET | Refills: 1 | Status: SHIPPED | OUTPATIENT
Start: 2024-03-21 | End: 2024-05-17

## 2024-03-22 ENCOUNTER — TELEPHONE (OUTPATIENT)
Dept: FAMILY MEDICINE | Facility: CLINIC | Age: 84
End: 2024-03-22
Payer: COMMERCIAL

## 2024-03-22 NOTE — TELEPHONE ENCOUNTER
Forms/Letter Request    Type of form/letter: OTHER: Briefs Tabbed Prevail Victorino 32-44 MD 16/pk       Do we have the form/letter: Yes: TH    Who is the form from? [facility] (if other please explain)    Where did/will the form come from? form was faxed in    When is form/letter needed by: asap    How would you like the form/letter returned: Fax : [phone]

## 2024-03-25 ENCOUNTER — MEDICAL CORRESPONDENCE (OUTPATIENT)
Dept: HEALTH INFORMATION MANAGEMENT | Facility: CLINIC | Age: 84
End: 2024-03-25
Payer: COMMERCIAL

## 2024-03-27 ENCOUNTER — OFFICE VISIT (OUTPATIENT)
Dept: FAMILY MEDICINE | Facility: CLINIC | Age: 84
End: 2024-03-27
Payer: COMMERCIAL

## 2024-03-27 VITALS
SYSTOLIC BLOOD PRESSURE: 105 MMHG | OXYGEN SATURATION: 96 % | HEIGHT: 59 IN | BODY MASS INDEX: 22.52 KG/M2 | WEIGHT: 111.7 LBS | RESPIRATION RATE: 16 BRPM | TEMPERATURE: 98 F | DIASTOLIC BLOOD PRESSURE: 61 MMHG | HEART RATE: 68 BPM

## 2024-03-27 DIAGNOSIS — I10 ESSENTIAL HYPERTENSION: ICD-10-CM

## 2024-03-27 DIAGNOSIS — G89.4 CHRONIC PAIN SYNDROME: Primary | ICD-10-CM

## 2024-03-27 DIAGNOSIS — M62.838 MUSCLE SPASM: ICD-10-CM

## 2024-03-27 DIAGNOSIS — M25.50 POLYARTHRALGIA: ICD-10-CM

## 2024-03-27 PROCEDURE — 99214 OFFICE O/P EST MOD 30 MIN: CPT | Performed by: FAMILY MEDICINE

## 2024-03-27 RX ORDER — CYCLOBENZAPRINE HCL 5 MG
5 TABLET ORAL
Qty: 60 TABLET | Refills: 3 | Status: SHIPPED | OUTPATIENT
Start: 2024-03-27 | End: 2024-06-27

## 2024-03-27 RX ORDER — RESPIRATORY SYNCYTIAL VIRUS VACCINE 120MCG/0.5
0.5 KIT INTRAMUSCULAR ONCE
Qty: 1 EACH | Refills: 0 | Status: CANCELLED | OUTPATIENT
Start: 2024-03-27 | End: 2024-03-27

## 2024-03-27 ASSESSMENT — PATIENT HEALTH QUESTIONNAIRE - PHQ9
SUM OF ALL RESPONSES TO PHQ QUESTIONS 1-9: 4
SUM OF ALL RESPONSES TO PHQ QUESTIONS 1-9: 4
10. IF YOU CHECKED OFF ANY PROBLEMS, HOW DIFFICULT HAVE THESE PROBLEMS MADE IT FOR YOU TO DO YOUR WORK, TAKE CARE OF THINGS AT HOME, OR GET ALONG WITH OTHER PEOPLE: NOT DIFFICULT AT ALL

## 2024-03-27 NOTE — PROGRESS NOTES
Assessment & Plan     Chronic pain syndrome  Polyarthralgia  Muscle spasm  Patient had trigger point injections helpful in the past but didn't help this last time. Has some cervical DDD but nothing concerning. Struggling with right neck and shoulder pain, improves with her own massage. Would be a good candidate for massage therapy, will see if any availability.   - cyclobenzaprine (FLEXERIL) 5 MG tablet  Dispense: 60 tablet; Refill: 3    Essential hypertension  Well controlled.     Abdominal pain Resolved  Seen in ER, imaging reviewed, notes reviewed. .       Return in about 3 months (around 6/27/2024) for neck pain.    30 minutes spent on the date of the encounter doing chart review, history and exam, documentation and further activities per the note      Subjective   Luis Manuel is a 84 year old, presenting for the following health issues:  follow up  (Med check and neck pain )    History of Present Illness       Reason for visit:  Follow up med check and neck pain    She eats 0-1 servings of fruits and vegetables daily.She consumes 0 sweetened beverage(s) daily.She exercises with enough effort to increase her heart rate 9 or less minutes per day.  She exercises with enough effort to increase her heart rate 3 or less days per week.   She is taking medications regularly.     Idiopathic acute pancreatitis without infection or necrosis  Mildly elevated amylase and normal lipase in the setting of right upper quadrant abdominal pain which is worse with eating.  Status postcholecystectomy and CT abdomen pelvis is normal  Recommended clear liquid diet for couple of days and close follow-up  Note sent to primary  If severe pain should proceed to the emergency room  May benefit from HIDA scan     Abdominal pain, right upper quadrant  See above    Microcytic anemia  Chronic anemia but worsening and now microcytic  Today:   Continue iron replacement     Pulmonary fibrosis (H)  Pulmonary arterial hypertension (H)  Doing well with  "her inhalers - follows with pulmonology.   Daughter says they are working on getting her oxygen tank.      Major depressive disorder, recurrent episode, mild (H24)  Stable but worse with her pain in her arm. Declined any changes. Is on nortriptyline and duloxetine - will increase notriptyline but not to high doses.   Today:   Sleeping well, doesn't want changes.      Stage 3a chronic kidney disease (H)  Stable. Rechecked last visit with GFR 53 in the ER for her arm pan. No changes, avoid excessive nsaids.      Degenerative disc disease, cervical  Spinal stenosis, multilevel  Muscle spasm  Will add tizanidine and ibuprofen, increase nortriptyline to 20mg at bedtime - she is having trouble sleeping. Will not increase nortriptyline further because she is also on duloxetine. Oxycodone was not that helpful - will not refill at this time.                 Objective    /61 (BP Location: Left arm, Patient Position: Sitting, Cuff Size: Adult Regular)   Pulse 68   Temp 98  F (36.7  C) (Oral)   Resp 16   Ht 1.499 m (4' 11\")   Wt 50.7 kg (111 lb 11.2 oz)   LMP  (LMP Unknown)   SpO2 96%   BMI 22.56 kg/m    Body mass index is 22.56 kg/m .  Physical Exam   GENERAL: alert and no distress  NECK: no adenopathy, no asymmetry, masses, or scars  RESP: lungs clear to auscultation - no rales, rhonchi or wheezes  CV: regular rate and rhythm, normal S1 S2, no S3 or S4, no murmur, click or rub, no peripheral edema  ABDOMEN: soft, nontender, no hepatosplenomegaly, no masses and bowel sounds normal  MS: no gross musculoskeletal defects noted, no edema. Muscle tension on right shoulder/traps.     No results found for any visits on 03/27/24.  No results found for this or any previous visit (from the past 24 hour(s)).        Signed Electronically by: Chichi Brown MD    "

## 2024-03-27 NOTE — Clinical Note
Are we able to schedule this patient for massage therapy for her right sided neck pain? Granddaughter's number listed as main contact, she speaks english, and provides her transportation.

## 2024-04-09 NOTE — PROGRESS NOTES
[facility] Care Coordination Contact    No Letter Received: 60 day tracking of letter complete, no letter received from member. Tracking discontinued.

## 2024-04-11 ENCOUNTER — MEDICAL CORRESPONDENCE (OUTPATIENT)
Dept: HEALTH INFORMATION MANAGEMENT | Facility: CLINIC | Age: 84
End: 2024-04-11
Payer: COMMERCIAL

## 2024-04-11 PROBLEM — R39.81 FUNCTIONAL URINARY INCONTINENCE: Status: ACTIVE | Noted: 2019-03-14

## 2024-04-18 DIAGNOSIS — Z53.9 DIAGNOSIS NOT YET DEFINED: Primary | ICD-10-CM

## 2024-04-18 PROCEDURE — G0179 MD RECERTIFICATION HHA PT: HCPCS | Performed by: FAMILY MEDICINE

## 2024-04-19 DIAGNOSIS — Z76.0 ENCOUNTER FOR MEDICATION REFILL: ICD-10-CM

## 2024-04-19 DIAGNOSIS — K59.03 DRUG-INDUCED CONSTIPATION: ICD-10-CM

## 2024-04-19 DIAGNOSIS — I10 ESSENTIAL HYPERTENSION: ICD-10-CM

## 2024-04-22 RX ORDER — LOSARTAN POTASSIUM 100 MG/1
TABLET ORAL
Qty: 30 TABLET | Refills: 3 | Status: SHIPPED | OUTPATIENT
Start: 2024-04-22 | End: 2024-08-16

## 2024-04-22 RX ORDER — DOCUSATE SODIUM 50MG AND SENNOSIDES 8.6MG 8.6; 5 MG/1; MG/1
2 TABLET, FILM COATED ORAL DAILY
Qty: 30 TABLET | Refills: 2 | Status: SHIPPED | OUTPATIENT
Start: 2024-04-22 | End: 2024-06-03

## 2024-04-22 RX ORDER — ERGOCALCIFEROL 1.25 MG/1
CAPSULE, LIQUID FILLED ORAL
Qty: 4 CAPSULE | Refills: 12 | Status: SHIPPED | OUTPATIENT
Start: 2024-04-22 | End: 2024-06-27

## 2024-05-17 DIAGNOSIS — Z76.0 ENCOUNTER FOR MEDICATION REFILL: ICD-10-CM

## 2024-05-17 DIAGNOSIS — D64.9 ANEMIA, UNSPECIFIED TYPE: ICD-10-CM

## 2024-05-17 RX ORDER — FERROUS GLUCONATE 324(38)MG
TABLET ORAL
Qty: 30 TABLET | Refills: 1 | Status: SHIPPED | OUTPATIENT
Start: 2024-05-17 | End: 2024-08-01

## 2024-05-19 DIAGNOSIS — I10 ESSENTIAL HYPERTENSION: ICD-10-CM

## 2024-05-19 DIAGNOSIS — M48.00 SPINAL STENOSIS, MULTILEVEL: ICD-10-CM

## 2024-05-19 DIAGNOSIS — M62.838 MUSCLE SPASM: ICD-10-CM

## 2024-05-19 DIAGNOSIS — Z76.0 ENCOUNTER FOR MEDICATION REFILL: ICD-10-CM

## 2024-05-19 DIAGNOSIS — M50.30 DEGENERATIVE DISC DISEASE, CERVICAL: ICD-10-CM

## 2024-05-19 DIAGNOSIS — K59.04 CHRONIC IDIOPATHIC CONSTIPATION: ICD-10-CM

## 2024-05-20 RX ORDER — METOPROLOL SUCCINATE 25 MG/1
25 TABLET, EXTENDED RELEASE ORAL DAILY
Qty: 30 TABLET | Refills: 8 | Status: SHIPPED | OUTPATIENT
Start: 2024-05-20 | End: 2024-06-27 | Stop reason: SINTOL

## 2024-05-20 RX ORDER — POLYETHYLENE GLYCOL 3350 17 G/17G
17 POWDER, FOR SOLUTION ORAL DAILY PRN
Qty: 510 G | Refills: 5 | Status: SHIPPED | OUTPATIENT
Start: 2024-05-20 | End: 2024-06-27

## 2024-05-20 RX ORDER — TIZANIDINE 2 MG/1
2 TABLET ORAL AT BEDTIME
Qty: 30 TABLET | Refills: 3 | Status: SHIPPED | OUTPATIENT
Start: 2024-05-20 | End: 2024-06-27

## 2024-05-20 RX ORDER — NORTRIPTYLINE HCL 10 MG
20 CAPSULE ORAL AT BEDTIME
Qty: 60 CAPSULE | Refills: 8 | Status: SHIPPED | OUTPATIENT
Start: 2024-05-20

## 2024-06-02 DIAGNOSIS — Z76.0 ENCOUNTER FOR MEDICATION REFILL: ICD-10-CM

## 2024-06-02 DIAGNOSIS — K59.03 DRUG-INDUCED CONSTIPATION: ICD-10-CM

## 2024-06-03 RX ORDER — DOCUSATE SODIUM 50MG AND SENNOSIDES 8.6MG 8.6; 5 MG/1; MG/1
2 TABLET, FILM COATED ORAL DAILY
Qty: 30 TABLET | Refills: 2 | OUTPATIENT
Start: 2024-06-03

## 2024-06-03 RX ORDER — AMOXICILLIN 250 MG
2 CAPSULE ORAL DAILY
Qty: 60 TABLET | Refills: 2 | Status: SHIPPED | OUTPATIENT
Start: 2024-06-03 | End: 2024-08-28

## 2024-06-14 ENCOUNTER — MEDICAL CORRESPONDENCE (OUTPATIENT)
Dept: HEALTH INFORMATION MANAGEMENT | Facility: CLINIC | Age: 84
End: 2024-06-14
Payer: COMMERCIAL

## 2024-06-14 ENCOUNTER — TRANSFERRED RECORDS (OUTPATIENT)
Dept: HEALTH INFORMATION MANAGEMENT | Facility: CLINIC | Age: 84
End: 2024-06-14
Payer: COMMERCIAL

## 2024-06-17 ENCOUNTER — PATIENT OUTREACH (OUTPATIENT)
Dept: GERIATRIC MEDICINE | Facility: CLINIC | Age: 84
End: 2024-06-17
Payer: COMMERCIAL

## 2024-06-17 NOTE — PROGRESS NOTES
[facility] Care Coordination Contact      [facility] Six-Month Telephone Assessment    6 month telephone assessment completed on 6/17/24 .    ER visits: No  Hospitalizations: No  TCU stays: No  Significant health status changes: Arsalans health has been stable. Her pain is still a large issue, but she is managing it the best she can.   Falls/Injuries: No  ADL/IADL changes: No  Changes in services: EW opened  and Luis Manuel is now attending adult day care. She greatly enjoys the adult day care.     Caregiver Assessment follow up:  Luis Manuel's granddaughter Shell reported that family is doing well caring for Luis Manuel.   They are happy with current services and nothing is needed.     Goals: See POC in chart for goal progress documentation.      Will see member in 6 months for an annual health risk assessment.   Encouraged member to call CC with any questions or concerns in the meantime.       JONATHAN Obregon, Manager   [facility]  [phone]

## 2024-06-18 DIAGNOSIS — M80.00XD AGE-RELATED OSTEOPOROSIS WITH CURRENT PATHOLOGICAL FRACTURE WITH ROUTINE HEALING: ICD-10-CM

## 2024-06-18 DIAGNOSIS — F33.0 MAJOR DEPRESSIVE DISORDER, RECURRENT EPISODE, MILD (H): ICD-10-CM

## 2024-06-18 RX ORDER — DULOXETIN HYDROCHLORIDE 30 MG/1
30 CAPSULE, DELAYED RELEASE ORAL DAILY
Qty: 90 CAPSULE | Refills: 2 | Status: SHIPPED | OUTPATIENT
Start: 2024-06-18

## 2024-06-18 RX ORDER — MULTIVITAMIN
1 TABLET ORAL DAILY
Qty: 90 TABLET | Refills: 2 | Status: SHIPPED | OUTPATIENT
Start: 2024-06-18

## 2024-06-20 DIAGNOSIS — Z53.9 DIAGNOSIS NOT YET DEFINED: Primary | ICD-10-CM

## 2024-06-20 PROCEDURE — G0179 MD RECERTIFICATION HHA PT: HCPCS | Performed by: FAMILY MEDICINE

## 2024-06-27 ENCOUNTER — OFFICE VISIT (OUTPATIENT)
Dept: FAMILY MEDICINE | Facility: CLINIC | Age: 84
End: 2024-06-27
Payer: COMMERCIAL

## 2024-06-27 VITALS
SYSTOLIC BLOOD PRESSURE: 112 MMHG | HEART RATE: 48 BPM | OXYGEN SATURATION: 98 % | WEIGHT: 110 LBS | BODY MASS INDEX: 22.18 KG/M2 | HEIGHT: 59 IN | DIASTOLIC BLOOD PRESSURE: 56 MMHG | RESPIRATION RATE: 16 BRPM | TEMPERATURE: 98 F

## 2024-06-27 DIAGNOSIS — L29.9 PRURITIC DISORDER: ICD-10-CM

## 2024-06-27 DIAGNOSIS — N18.32 STAGE 3B CHRONIC KIDNEY DISEASE (H): Primary | ICD-10-CM

## 2024-06-27 DIAGNOSIS — R52 TOTAL BODY PAIN: ICD-10-CM

## 2024-06-27 LAB
ERYTHROCYTE [DISTWIDTH] IN BLOOD BY AUTOMATED COUNT: 15.9 % (ref 10–15)
ERYTHROCYTE [SEDIMENTATION RATE] IN BLOOD BY WESTERGREN METHOD: 81 MM/HR (ref 0–30)
HCT VFR BLD AUTO: 27.8 % (ref 35–47)
HGB BLD-MCNC: 8.6 G/DL (ref 11.7–15.7)
MCH RBC QN AUTO: 22.9 PG (ref 26.5–33)
MCHC RBC AUTO-ENTMCNC: 30.9 G/DL (ref 31.5–36.5)
MCV RBC AUTO: 74 FL (ref 78–100)
PLATELET # BLD AUTO: 340 10E3/UL (ref 150–450)
RBC # BLD AUTO: 3.76 10E6/UL (ref 3.8–5.2)
WBC # BLD AUTO: 5.5 10E3/UL (ref 4–11)

## 2024-06-27 PROCEDURE — 99214 OFFICE O/P EST MOD 30 MIN: CPT | Performed by: FAMILY MEDICINE

## 2024-06-27 PROCEDURE — 36415 COLL VENOUS BLD VENIPUNCTURE: CPT | Performed by: FAMILY MEDICINE

## 2024-06-27 PROCEDURE — 86140 C-REACTIVE PROTEIN: CPT | Performed by: FAMILY MEDICINE

## 2024-06-27 PROCEDURE — 85027 COMPLETE CBC AUTOMATED: CPT | Performed by: FAMILY MEDICINE

## 2024-06-27 PROCEDURE — 80053 COMPREHEN METABOLIC PANEL: CPT | Performed by: FAMILY MEDICINE

## 2024-06-27 PROCEDURE — 85652 RBC SED RATE AUTOMATED: CPT | Performed by: FAMILY MEDICINE

## 2024-06-27 PROCEDURE — G2211 COMPLEX E/M VISIT ADD ON: HCPCS | Performed by: FAMILY MEDICINE

## 2024-06-27 RX ORDER — HYDROXYZINE HYDROCHLORIDE 10 MG/1
10-20 TABLET, FILM COATED ORAL 3 TIMES DAILY PRN
Qty: 90 TABLET | Refills: 1 | Status: SHIPPED | OUTPATIENT
Start: 2024-06-27 | End: 2024-09-04

## 2024-06-27 RX ORDER — CETIRIZINE HYDROCHLORIDE 10 MG/1
10 TABLET ORAL DAILY
Qty: 90 TABLET | Refills: 3 | Status: SHIPPED | OUTPATIENT
Start: 2024-06-27 | End: 2024-09-04

## 2024-06-27 RX ORDER — RESPIRATORY SYNCYTIAL VIRUS VACCINE 120MCG/0.5
0.5 KIT INTRAMUSCULAR ONCE
Qty: 1 EACH | Refills: 0 | Status: CANCELLED | OUTPATIENT
Start: 2024-06-27 | End: 2024-06-27

## 2024-06-27 ASSESSMENT — ASTHMA QUESTIONNAIRES
QUESTION_4 LAST FOUR WEEKS HOW OFTEN HAVE YOU USED YOUR RESCUE INHALER OR NEBULIZER MEDICATION (SUCH AS ALBUTEROL): ONCE A WEEK OR LESS
ACT_TOTALSCORE: 22
QUESTION_1 LAST FOUR WEEKS HOW MUCH OF THE TIME DID YOUR ASTHMA KEEP YOU FROM GETTING AS MUCH DONE AT WORK, SCHOOL OR AT HOME: NONE OF THE TIME
QUESTION_5 LAST FOUR WEEKS HOW WOULD YOU RATE YOUR ASTHMA CONTROL: WELL CONTROLLED
ACT_TOTALSCORE: 22
QUESTION_3 LAST FOUR WEEKS HOW OFTEN DID YOUR ASTHMA SYMPTOMS (WHEEZING, COUGHING, SHORTNESS OF BREATH, CHEST TIGHTNESS OR PAIN) WAKE YOU UP AT NIGHT OR EARLIER THAN USUAL IN THE MORNING: NOT AT ALL
QUESTION_2 LAST FOUR WEEKS HOW OFTEN HAVE YOU HAD SHORTNESS OF BREATH: ONCE OR TWICE A WEEK

## 2024-06-27 NOTE — PROGRESS NOTES
Assessment & Plan     Stage 3b chronic kidney disease (H)  Check labs below.     Pruritic disorder  All over body itching. No rash. Will check labs, start daily antihistamine, encouraged them to stay cool in the home. Denied any new exposures (detergents, lotions, soap). Follow up in a few weeks.   - cetirizine (ZYRTEC) 10 MG tablet  Dispense: 90 tablet; Refill: 3  - hydrOXYzine HCl (ATARAX) 10 MG tablet  Dispense: 90 tablet; Refill: 1  - Comprehensive metabolic panel (BMP + Alb, Alk Phos, ALT, AST, Total. Bili, TP)  - CBC with platelets  - ESR: Erythrocyte sedimentation rate  - CRP, inflammation  - Comprehensive metabolic panel (BMP + Alb, Alk Phos, ALT, AST, Total. Bili, TP)  - CBC with platelets  - ESR: Erythrocyte sedimentation rate  - CRP, inflammation    Total body pain  Patient complains of overall pain, but eventually narrows it down to her knees. She has OA of both knees, says medications do not help, declined knee injections (fearful because she says her daughter had injections and her overall health rapidly declined because of them), does not want surgery (is not a surgical candidate), does not want additional medications.     Bradycardia  Baseline hr 80, but is 48 today? Discontinue metoprolol, follow up closely      Return in about 1 month (around 7/27/2024) for Medication Check, blood pressure recheck.    The longitudinal plan of care for the diagnosis(es)/condition(s) as documented were addressed during this visit. Due to the added complexity in care, I will continue to support Luis Manuel in the subsequent management and with ongoing continuity of care.    Subjective   Luis Manuel is a 84 year old, presenting for the following health issues:  Follow Up and Rash (All over back to her thigh, ongoing for few days )    History of Present Illness       Reason for visit:  Follow up        Chronic pain syndrome  Polyarthralgia  Muscle spasm  Patient had trigger point injections helpful in the past but didn't help this  "last time. Has some cervical DDD but nothing concerning. Struggling with right neck and shoulder pain, improves with her own massage. Would be a good candidate for massage therapy, will see if any availability.   - cyclobenzaprine (FLEXERIL) 5 MG tablet  Dispense: 60 tablet; Refill: 3     Essential hypertension  Well controlled.      Abdominal pain Resolved  Seen in ER, imaging reviewed, notes reviewed. .         Return in about 3 months (around 6/27/2024) for neck pain.              Objective    /56   Pulse (!) 48   Temp 98  F (36.7  C) (Oral)   Resp 16   Ht 1.499 m (4' 11\")   Wt 49.9 kg (110 lb)   LMP  (LMP Unknown)   SpO2 98%   BMI 22.22 kg/m    Body mass index is 22.22 kg/m .  Physical Exam   GENERAL: alert and no distress  NECK: no adenopathy, no asymmetry, masses, or scars  RESP: lungs clear to auscultation - no rales, rhonchi or wheezes  CV: regular rate and rhythm, normal S1 S2, no S3 or S4, no murmur, click or rub, no peripheral edema  ABDOMEN: soft, nontender, no hepatosplenomegaly, no masses and bowel sounds normal  MS: no gross musculoskeletal defects noted, no edema    Results for orders placed or performed in visit on 06/27/24   CBC with platelets     Status: Abnormal   Result Value Ref Range    WBC Count 5.5 4.0 - 11.0 10e3/uL    RBC Count 3.76 (L) 3.80 - 5.20 10e6/uL    Hemoglobin 8.6 (L) 11.7 - 15.7 g/dL    Hematocrit 27.8 (L) 35.0 - 47.0 %    MCV 74 (L) 78 - 100 fL    MCH 22.9 (L) 26.5 - 33.0 pg    MCHC 30.9 (L) 31.5 - 36.5 g/dL    RDW 15.9 (H) 10.0 - 15.0 %    Platelet Count 340 150 - 450 10e3/uL     Results for orders placed or performed in visit on 06/27/24 (from the past 24 hour(s))   CBC with platelets   Result Value Ref Range    WBC Count 5.5 4.0 - 11.0 10e3/uL    RBC Count 3.76 (L) 3.80 - 5.20 10e6/uL    Hemoglobin 8.6 (L) 11.7 - 15.7 g/dL    Hematocrit 27.8 (L) 35.0 - 47.0 %    MCV 74 (L) 78 - 100 fL    MCH 22.9 (L) 26.5 - 33.0 pg    MCHC 30.9 (L) 31.5 - 36.5 g/dL    RDW 15.9 " (H) 10.0 - 15.0 %    Platelet Count 340 150 - 450 10e3/uL           Signed Electronically by: Chichi Brown MD

## 2024-06-28 LAB
ALBUMIN SERPL BCG-MCNC: 3.9 G/DL (ref 3.5–5.2)
ALP SERPL-CCNC: 139 U/L (ref 40–150)
ALT SERPL W P-5'-P-CCNC: 8 U/L (ref 0–50)
ANION GAP SERPL CALCULATED.3IONS-SCNC: 11 MMOL/L (ref 7–15)
AST SERPL W P-5'-P-CCNC: 24 U/L (ref 0–45)
BILIRUB SERPL-MCNC: 0.3 MG/DL
BUN SERPL-MCNC: 19 MG/DL (ref 8–23)
CALCIUM SERPL-MCNC: 9.1 MG/DL (ref 8.8–10.2)
CHLORIDE SERPL-SCNC: 97 MMOL/L (ref 98–107)
CREAT SERPL-MCNC: 1.08 MG/DL (ref 0.51–0.95)
CRP SERPL-MCNC: <3 MG/L
DEPRECATED HCO3 PLAS-SCNC: 28 MMOL/L (ref 22–29)
EGFRCR SERPLBLD CKD-EPI 2021: 51 ML/MIN/1.73M2
GLUCOSE SERPL-MCNC: 99 MG/DL (ref 70–99)
POTASSIUM SERPL-SCNC: 4.4 MMOL/L (ref 3.4–5.3)
PROT SERPL-MCNC: 8.3 G/DL (ref 6.4–8.3)
SODIUM SERPL-SCNC: 136 MMOL/L (ref 135–145)

## 2024-07-03 NOTE — PATIENT INSTRUCTIONS - HE
MED CHANGES:    Decrease dose of REMERON or MIRTAZAPINE  To 1/2 tabs (15mg x 1/2  = 7.5mg dose at bedime)    Gabapentin   Take 1 tab (100mg) in the morning and 2 tabs (200mg) at bedtime  
Ensure plus high protein 1 can BID

## 2024-07-15 DIAGNOSIS — Z76.0 ENCOUNTER FOR MEDICATION REFILL: ICD-10-CM

## 2024-07-15 DIAGNOSIS — J81.0 ACUTE PULMONARY EDEMA (H): ICD-10-CM

## 2024-07-15 RX ORDER — FUROSEMIDE 20 MG
TABLET ORAL
Qty: 60 TABLET | Refills: 3 | Status: SHIPPED | OUTPATIENT
Start: 2024-07-15

## 2024-07-18 ENCOUNTER — MEDICAL CORRESPONDENCE (OUTPATIENT)
Dept: HEALTH INFORMATION MANAGEMENT | Facility: CLINIC | Age: 84
End: 2024-07-18
Payer: COMMERCIAL

## 2024-08-01 DIAGNOSIS — D64.9 ANEMIA, UNSPECIFIED TYPE: ICD-10-CM

## 2024-08-01 DIAGNOSIS — Z76.0 ENCOUNTER FOR MEDICATION REFILL: ICD-10-CM

## 2024-08-01 RX ORDER — ERGOCALCIFEROL 1.25 MG/1
CAPSULE, LIQUID FILLED ORAL
Qty: 4 CAPSULE | Refills: 12 | OUTPATIENT
Start: 2024-08-01

## 2024-08-01 RX ORDER — FERROUS GLUCONATE 324(38)MG
TABLET ORAL
Qty: 30 TABLET | Refills: 8 | Status: SHIPPED | OUTPATIENT
Start: 2024-08-01

## 2024-08-01 NOTE — TELEPHONE ENCOUNTER
Per chart review, vitamin D2 (ERGOCALCIFEROL) 88180 units (1250 mcg) capsule was discontinued on 6/27/24. However there is a refill request.    Dr. Brown - should pt still be on vit D2?      Gonzalez Carlton, BSN RN  [facility]            Genoveva Chavez RN   to [facility]       8/1/24 11:29 AM  So patient should have refills at the pharmacy,  but the medication is not active and there is a note on last order saying - Patient not taking: Reported on 6/27/2024. So is patient taking? And if so, why is the medication not active?

## 2024-08-01 NOTE — TELEPHONE ENCOUNTER
Chichi Brwon MD   to [facility]         8/1/24  1:11 PM  Medication was discontinued because patient was not taking it and did not have it during previous visits. She also does not have vitamin D low enough to be on high dose replacement.    Looks like this was an automatic request from the pharmacy and not actually requested by the patient, just like the request for metoprolol also from today that was discontinued due to bradycardia.        Gonzalez Koehler Cem Say, BSN RN  [facility]

## 2024-08-12 ENCOUNTER — MEDICAL CORRESPONDENCE (OUTPATIENT)
Dept: HEALTH INFORMATION MANAGEMENT | Facility: CLINIC | Age: 84
End: 2024-08-12
Payer: COMMERCIAL

## 2024-08-16 DIAGNOSIS — I10 ESSENTIAL HYPERTENSION: ICD-10-CM

## 2024-08-16 DIAGNOSIS — Z76.0 ENCOUNTER FOR MEDICATION REFILL: ICD-10-CM

## 2024-08-16 RX ORDER — LOSARTAN POTASSIUM 100 MG/1
TABLET ORAL
Qty: 30 TABLET | Refills: 3 | Status: SHIPPED | OUTPATIENT
Start: 2024-08-16 | End: 2024-09-04

## 2024-08-26 DIAGNOSIS — Z53.9 DIAGNOSIS NOT YET DEFINED: Primary | ICD-10-CM

## 2024-08-26 PROCEDURE — 99207 PR MD RECERTIFICATION HHA PT: CPT | Performed by: FAMILY MEDICINE

## 2024-08-28 DIAGNOSIS — Z76.0 ENCOUNTER FOR MEDICATION REFILL: ICD-10-CM

## 2024-08-28 DIAGNOSIS — K59.03 DRUG-INDUCED CONSTIPATION: ICD-10-CM

## 2024-08-28 RX ORDER — DOCUSATE SODIUM 50MG AND SENNOSIDES 8.6MG 8.6; 5 MG/1; MG/1
2 TABLET, FILM COATED ORAL DAILY
Qty: 60 TABLET | Refills: 5 | Status: SHIPPED | OUTPATIENT
Start: 2024-08-28

## 2024-09-04 ENCOUNTER — OFFICE VISIT (OUTPATIENT)
Dept: FAMILY MEDICINE | Facility: CLINIC | Age: 84
End: 2024-09-04
Payer: COMMERCIAL

## 2024-09-04 VITALS
OXYGEN SATURATION: 99 % | HEIGHT: 59 IN | TEMPERATURE: 98 F | HEART RATE: 72 BPM | BODY MASS INDEX: 22.82 KG/M2 | DIASTOLIC BLOOD PRESSURE: 57 MMHG | WEIGHT: 113.19 LBS | SYSTOLIC BLOOD PRESSURE: 105 MMHG | RESPIRATION RATE: 16 BRPM

## 2024-09-04 DIAGNOSIS — I10 ESSENTIAL HYPERTENSION: ICD-10-CM

## 2024-09-04 DIAGNOSIS — M50.30 DEGENERATIVE DISC DISEASE, CERVICAL: ICD-10-CM

## 2024-09-04 DIAGNOSIS — M67.919 DISORDER OF BURSAE AND TENDONS IN SHOULDER REGION: ICD-10-CM

## 2024-09-04 DIAGNOSIS — I27.21 PULMONARY ARTERIAL HYPERTENSION (H): ICD-10-CM

## 2024-09-04 DIAGNOSIS — M71.9 DISORDER OF BURSAE AND TENDONS IN SHOULDER REGION: ICD-10-CM

## 2024-09-04 DIAGNOSIS — M48.00 SPINAL STENOSIS, MULTILEVEL: ICD-10-CM

## 2024-09-04 DIAGNOSIS — N18.31 STAGE 3A CHRONIC KIDNEY DISEASE (H): Primary | ICD-10-CM

## 2024-09-04 DIAGNOSIS — J84.10 PULMONARY FIBROSIS (H): ICD-10-CM

## 2024-09-04 PROBLEM — R06.02 SHORT OF BREATH ON EXERTION: Status: RESOLVED | Noted: 2018-04-25 | Resolved: 2024-09-04

## 2024-09-04 PROBLEM — Z79.01 ANTICOAGULATED: Status: RESOLVED | Noted: 2020-10-15 | Resolved: 2024-09-04

## 2024-09-04 PROBLEM — Z59.9 FINANCIAL DIFFICULTIES: Status: RESOLVED | Noted: 2019-02-14 | Resolved: 2024-09-04

## 2024-09-04 PROBLEM — Z59.71 INSURANCE COVERAGE PROBLEMS: Status: RESOLVED | Noted: 2019-08-08 | Resolved: 2024-09-04

## 2024-09-04 PROBLEM — R00.1 SINUS BRADYCARDIA: Status: RESOLVED | Noted: 2018-04-25 | Resolved: 2024-09-04

## 2024-09-04 PROBLEM — U07.1 INFECTION DUE TO 2019 NOVEL CORONAVIRUS: Status: RESOLVED | Noted: 2021-12-24 | Resolved: 2024-09-04

## 2024-09-04 PROCEDURE — 99214 OFFICE O/P EST MOD 30 MIN: CPT | Performed by: FAMILY MEDICINE

## 2024-09-04 PROCEDURE — G2211 COMPLEX E/M VISIT ADD ON: HCPCS | Performed by: FAMILY MEDICINE

## 2024-09-04 RX ORDER — LOSARTAN POTASSIUM 100 MG/1
100 TABLET ORAL DAILY
Qty: 30 TABLET | Refills: 3 | Status: SHIPPED | OUTPATIENT
Start: 2024-09-04

## 2024-09-04 RX ORDER — OXYCODONE HYDROCHLORIDE 5 MG/1
5 TABLET ORAL EVERY 6 HOURS PRN
Qty: 20 TABLET | Refills: 0 | Status: SHIPPED | OUTPATIENT
Start: 2024-09-04

## 2024-09-04 ASSESSMENT — PATIENT HEALTH QUESTIONNAIRE - PHQ9
SUM OF ALL RESPONSES TO PHQ QUESTIONS 1-9: 9
SUM OF ALL RESPONSES TO PHQ QUESTIONS 1-9: 9
10. IF YOU CHECKED OFF ANY PROBLEMS, HOW DIFFICULT HAVE THESE PROBLEMS MADE IT FOR YOU TO DO YOUR WORK, TAKE CARE OF THINGS AT HOME, OR GET ALONG WITH OTHER PEOPLE: NOT DIFFICULT AT ALL

## 2024-09-04 NOTE — PROGRESS NOTES
Assessment & Plan     Chronic kidney disease, stage 3 (H)  stable    Essential hypertension  Sinus bradycardia - RESOLVED  She has a lot of concerns about being on medications but does not want to change any of her blood pressure medications.   - losartan (COZAAR) 100 MG tablet  Dispense: 30 tablet; Refill: 3    Pulmonary hypertension  Pulmonary fibrosis  Follows with pulm, on combivent. Ok to continue furosemide but will monitor kidney function.     Rotator Cuff Tendonitis  Degenerative disc disease, cervical  Spinal stenosis, multilevel  No CSA, do not refill. Ok to trial oxycodone at very low dose. Follow up in 2 months. She says she does not think injections helped, but her mood and ability to tolerate anything is significantly improved.   - oxyCODONE (ROXICODONE) 5 MG tablet  Dispense: 20 tablet; Refill: 0        Subjective   Luis Manuel is a 84 year old, presenting for the following health issues:  Follow Up        9/4/2024     2:40 PM   Additional Questions   Roomed by Thelma COLEMAN   Accompanied by Granddaughter         9/4/2024   Forms   Any forms needing to be completed Yes        History of Present Illness       Back Pain:  She presents for follow up of back pain. Patient's back pain is a chronic problem.  Location of back pain:  Right lower back, left lower back, right middle of back, left middle of back, right upper back, right side of neck, right shoulder, right hip and left hip  Description of back pain: cramping, sharp and shooting  Back pain spreads: right thigh, left thigh, right shoulder and right side of neck    Since patient first noticed back pain, pain is: unchanged  Does back pain interfere with her job:  Not applicable       Hypertension: She presents for follow up of hypertension.  She does check blood pressure  regularly outside of the clinic. Outpatient blood pressures have not been over 140/90. She follows a low salt diet.     Headaches:   Since the patient's last clinic visit, headaches are: no  "change  The patient is getting headaches:  Most night abd day time.  She is not able to do normal daily activities when she has a migraine.  The patient is taking the following rescue/relief medications:  Tylenol   Patient states \"I get only a small amount of relief\" from the rescue/relief medications.   The patient is taking the following medications to prevent migraines:  Other  In the past 4 weeks, the patient has gone to an Urgent Care or Emergency Room 0 times times due to headaches.    She eats 2-3 servings of fruits and vegetables daily.She consumes 1 sweetened beverage(s) daily.She exercises with enough effort to increase her heart rate 10 to 19 minutes per day.  She exercises with enough effort to increase her heart rate 4 days per week.   She is taking medications regularly.         Pruritic disorder  All over body itching. No rash. Will check labs, start daily antihistamine, encouraged them to stay cool in the home. Denied any new exposures (detergents, lotions, soap). Follow up in a few weeks.   - cetirizine (ZYRTEC) 10 MG tablet  Dispense: 90 tablet; Refill: 3  - hydrOXYzine HCl (ATARAX) 10 MG tablet  Dispense: 90 tablet; Refill: 1       Total body pain  Patient complains of overall pain, but eventually narrows it down to her knees. She has OA of both knees, says medications do not help, declined knee injections (fearful because she says her daughter had injections and her overall health rapidly declined because of them), does not want surgery (is not a surgical candidate), does not want additional medications.      Bradycardia  Baseline hr 80, but is 48 today? Discontinue metoprolol, follow up closely               Objective    /57   Pulse 72   Temp 98  F (36.7  C) (Oral)   Resp 16   Ht 1.499 m (4' 11\")   Wt 51.3 kg (113 lb 3 oz)   LMP  (LMP Unknown)   SpO2 99%   BMI 22.86 kg/m    Body mass index is 22.86 kg/m .  Physical Exam   GENERAL: alert and no distress  NECK: no adenopathy, no " asymmetry, masses, or scars  RESP: lungs clear to auscultation - no rales, rhonchi or wheezes  CV: regular rate and rhythm, normal S1 S2, no S3 or S4, no murmur, click or rub, no peripheral edema  ABDOMEN: soft, nontender, no hepatosplenomegaly, no masses and bowel sounds normal  MS: no gross musculoskeletal defects noted, no edema    No results found for any visits on 09/04/24.  No results found for this or any previous visit (from the past 24 hour(s)).        Signed Electronically by: Chichi Brown MD

## 2024-09-09 DIAGNOSIS — M48.00 SPINAL STENOSIS, MULTILEVEL: ICD-10-CM

## 2024-09-09 DIAGNOSIS — M50.30 DEGENERATIVE DISC DISEASE, CERVICAL: ICD-10-CM

## 2024-09-09 DIAGNOSIS — M71.9 DISORDER OF BURSAE AND TENDONS IN SHOULDER REGION: ICD-10-CM

## 2024-09-09 DIAGNOSIS — M67.919 DISORDER OF BURSAE AND TENDONS IN SHOULDER REGION: ICD-10-CM

## 2024-09-09 RX ORDER — OXYCODONE HYDROCHLORIDE 5 MG/1
5 TABLET ORAL EVERY 6 HOURS PRN
Qty: 20 TABLET | Refills: 0 | OUTPATIENT
Start: 2024-09-09

## 2024-09-09 NOTE — CONFIDENTIAL NOTE
Was given 20 tablets 5 days ago. Meant to last a month. Looks like this was requested by pharmacy and not patient?     Will not refill until next month, 10/1/24 at earliest    Chichi Brown MD

## 2024-09-25 DIAGNOSIS — Z76.0 ENCOUNTER FOR MEDICATION REFILL: ICD-10-CM

## 2024-09-25 DIAGNOSIS — I10 ESSENTIAL HYPERTENSION: ICD-10-CM

## 2024-09-25 DIAGNOSIS — L29.9 PRURITIC DISORDER: ICD-10-CM

## 2024-09-25 DIAGNOSIS — G47.00 INSOMNIA, UNSPECIFIED TYPE: ICD-10-CM

## 2024-09-25 RX ORDER — CETIRIZINE HYDROCHLORIDE 10 MG/1
10 TABLET ORAL DAILY
Qty: 30 TABLET | Refills: 3 | OUTPATIENT
Start: 2024-09-25

## 2024-09-25 RX ORDER — AMLODIPINE BESYLATE 10 MG/1
10 TABLET ORAL DAILY
Qty: 30 TABLET | Refills: 3 | OUTPATIENT
Start: 2024-09-25

## 2024-09-25 RX ORDER — MIRTAZAPINE 30 MG/1
30 TABLET, ORALLY DISINTEGRATING ORAL AT BEDTIME
Qty: 30 TABLET | Refills: 3 | Status: SHIPPED | OUTPATIENT
Start: 2024-09-25

## 2024-10-26 DIAGNOSIS — Z53.9 DIAGNOSIS NOT YET DEFINED: Primary | ICD-10-CM

## 2024-10-26 PROCEDURE — G0179 MD RECERTIFICATION HHA PT: HCPCS | Performed by: FAMILY MEDICINE

## 2024-10-30 ENCOUNTER — APPOINTMENT (OUTPATIENT)
Dept: INTERPRETER SERVICES | Facility: CLINIC | Age: 84
End: 2024-10-30
Payer: COMMERCIAL

## 2024-11-04 ENCOUNTER — PATIENT OUTREACH (OUTPATIENT)
Dept: GERIATRIC MEDICINE | Facility: CLINIC | Age: 84
End: 2024-11-04
Payer: COMMERCIAL

## 2024-11-04 NOTE — PROGRESS NOTES
[facility] Care Coordination Contact    Message received from ward Heard.  Member requested a new Medical Assistance paperwork.  CHW spoke with member's granddaughter (Aminta) to confirm member's address to send her  MA renewal package today (11/04/24).  Additionally, CHW gave to [facility] phone number [phone].   Member has CHW contact info.      NICHELLE Doyle  [facility]  [phone]

## 2024-11-08 DIAGNOSIS — Z76.0 ENCOUNTER FOR MEDICATION REFILL: ICD-10-CM

## 2024-11-08 DIAGNOSIS — J81.0 ACUTE PULMONARY EDEMA (H): ICD-10-CM

## 2024-11-10 ENCOUNTER — MEDICAL CORRESPONDENCE (OUTPATIENT)
Dept: HEALTH INFORMATION MANAGEMENT | Facility: CLINIC | Age: 84
End: 2024-11-10
Payer: COMMERCIAL

## 2024-11-11 RX ORDER — FUROSEMIDE 20 MG/1
TABLET ORAL
Qty: 60 TABLET | Refills: 3 | Status: SHIPPED | OUTPATIENT
Start: 2024-11-11

## 2024-11-12 ENCOUNTER — PATIENT OUTREACH (OUTPATIENT)
Dept: GERIATRIC MEDICINE | Facility: CLINIC | Age: 84
End: 2024-11-12
Payer: COMMERCIAL

## 2024-11-14 ENCOUNTER — DOCUMENTATION ONLY (OUTPATIENT)
Dept: PULMONOLOGY | Facility: CLINIC | Age: 84
End: 2024-11-14

## 2024-11-14 ENCOUNTER — PATIENT OUTREACH (OUTPATIENT)
Dept: GERIATRIC MEDICINE | Facility: CLINIC | Age: 84
End: 2024-11-14

## 2024-11-14 DIAGNOSIS — U07.1 COVID-19: Primary | ICD-10-CM

## 2024-11-14 DIAGNOSIS — J84.9 ILD (INTERSTITIAL LUNG DISEASE) (H): ICD-10-CM

## 2024-11-14 NOTE — Clinical Note
Hello,  I am the  Partners care coordinator for [facility].  I am writing to let you know I completed [facility]'s annual assessment today. All of my documentation can be found in EPIC. Please do not hesitate to contact me with any questions or concerns.  REBECCA Srinivasan [facility] Care Coordinator [phone]

## 2024-11-14 NOTE — PROGRESS NOTES
[facility] Care Coordination Contact    Called member to schedule annual HRA home visit. HRA has been scheduled for 11/14 at 3:30 pm.  Called Ellie Salomon and scheduled an  for the home visit.    REBECCA Srinivasan  [facility]  [phone]

## 2024-11-20 NOTE — PROGRESS NOTES
[facility] Care Coordination Contact    [facility] Home Visit Assessment     Home visit for Health Risk Assessment with Luis Manuel Lemon completed on November 14, 2024    Type of residence: Single Family Home  Current living arrangement: I live in a private home with family     Assessment completed with: Family    Current Care Plan  Member currently receiving the following home care services: Skilled Nursing   Member currently receiving the following community resources: PCA    Medication Review  Medication reconciliation completed in Epic: Yes  Medication set-up & administration: RN set up every two weeks.  Family caregiver administers medications.  Medication Risk Assessment Medication (1 or more, place referral to MTM): N/A: No risk factors identified  MTM Referral Placed: No: No risk factors idenified    Mental/Behavioral Health   Depression Screening: Diagnosis of Major Depressive disorder managed with duloxetine 30 mg attendance and adult day care  PHQ-2 Total Score (Adult) - Positive if 3 or more points; Administer PHQ-9 if positive: 2  Mental health DX: No        Falls Assessment:   Fallen 2 or more times in the past year?: No   Any fall with injury in the past year?: No    ADL/IADL Dependencies:   Dependent ADLs: Bathing, Dressing, Grooming, Transfers, Toileting, Ambulation-cane  Dependent IADLs: Cleaning, Cooking, Laundry, Shopping, Meal Preparation, Medication Management, Money Management, Transportation    Health Plan sponsored benefits: [facility]: Shared information regarding [employer]. Reviewed preventative health screening and health plan supplemental benefits/incentives. Reviewed medication disposal form.    PCA Assessment completed at visit: Yes Annual PCA assessment indicated 6.5 hours per day of PCA. This is the same as the previous assessment.     Elderly Waiver Eligibility: Yes-will continue on EW    Care Plan & Recommendations: Luis Manuel Lemon is seen today for her annual  reassessment. She is currently an Elderly Waiver participant and receives PCA support and attends Adult Day Care 3 days a week. Assessment completed today with family present in order to reassess services.     Luis Manuel Lemon is a 84 year old female with a past medical history of chronic pain syndrome, restrictive lung disease, pulmonary fibrosis, age related constipation, GERD, spinal stenosis, and hypertension.     Luis Manuel is originally from [place]. She was in a refugee camp in [place] for over10 years. Her  passed away when her daughter was very little. Luis Manuel has three children, two boys and one girl.  One of her son's lives in [place] and the other is still in [place]. Luis Manuel described coming to [place] alone. She was here for 7 years before her daughter and her family came to the [place]. She lived with various families and friends from the community prior to them coming. She said it was hard not being able to understand English and having to navigate the system on her own.     Luis Manuel lives in a single family home with her daughter, son-in-law, and grandchildren. Her grandchildren are a big part of her life now. Her granddaughter is currently her PCA as her daughter is experiencing her own health problems. 5 grandkids live in the house ranging from middle school age to age 25.     Luis Manuel's ADL dependencies include: dressing, grooming, bathing, transferring, mobility, and toileting. Luis Manuel is utilizing oxygen for over 8 hours a day. No changes to PCA hours. Luis Manuel requested Elderly Waiver services last year to start attending Adult Day Care services and has enjoyed this addition to her Care Plan very much will continue support.     Family will work with consultation services provider to receive education people about CFSS and consultation services provider will support them in writing their service delivery plan.     CC will continue to monitor Medical Assistance eligibility and enter MMIS screening when it shows active again. Currently covered  [facility] pinky period.     See [place].    Follow-Up Plan: Member informed of future contact, plan to f/u with member with a 6 month telephone assessment.  Contact information shared with member and family, encouraged member to call with any questions or concerns at any time.    Universal care continuum providers: Please see Snapshot and Care Management Flowsheets for Specific details of care plan.    This CC note routed to PCP, Chichi Brown LSW  [facility]  [phone]

## 2024-11-25 ENCOUNTER — PATIENT OUTREACH (OUTPATIENT)
Dept: GERIATRIC MEDICINE | Facility: CLINIC | Age: 84
End: 2024-11-25
Payer: COMMERCIAL

## 2024-11-25 NOTE — PROGRESS NOTES
[facility] Care Coordination Contact    Received after visit chart from care coordinator.  Completed following tasks: Mailed copy of support plan to member, Mailed [place] Cho[place] signature sheet pages 3-4, Mailed Safe Medication Disposal , Submitted referrals/auths for ADC with Transportation, and Updated services in Database  , Provider Signature - No Support Plan Shared:  Member indicates that they do not want their support plan shared with any EW providers.    [facility]:  Emailed required PCA documents to [facility].  Mailed copy to member.      Liz York  Care Management Specialist  [facility]  [phone]

## 2024-11-25 NOTE — LETTER
November 25, 2024       [facility]  [address] [place] [address]      Dear [facility],    At [facility], we re dedicated to improving your health and wellness. Enclosed is the Support Plan developed with you on 11/14/2024. Please review the Support Plan carefully.    As a reminder, during your visit we talked about:   Ways to manage your physical and mental health   Using health care to maintain and improve your health    Your preventive care needs      Remember to contact your care coordinator if you:   Are hospitalized or plan to be hospitalized    Have a fall     Have a change in your physical or mental health   Need help finding support or services    If you have questions or don t agree with your Support Plan, call me at [phone]. You can also call me if your needs change. TTY users call the [place] Relay at 349 or 1-894.190.5011 (denhkn-lq-qissuz relay service).    Sincerely,       REBECCA Srinivasan  [phone]  [email or web address]                [id number] accepted     (06/2024)                [address] [place] [address]  [phone]  fax [phone]  [facility]

## 2024-11-27 DIAGNOSIS — J84.10 PULMONARY FIBROSIS (H): Primary | ICD-10-CM

## 2024-11-27 DIAGNOSIS — Z76.0 ENCOUNTER FOR MEDICATION REFILL: ICD-10-CM

## 2024-11-27 DIAGNOSIS — D64.9 ANEMIA, UNSPECIFIED TYPE: ICD-10-CM

## 2024-11-27 RX ORDER — FERROUS GLUCONATE 324(38)MG
324 TABLET ORAL
Qty: 30 TABLET | Refills: 8 | Status: SHIPPED | OUTPATIENT
Start: 2024-11-27

## 2024-11-27 RX ORDER — ALBUTEROL SULFATE 90 UG/1
2 INHALANT RESPIRATORY (INHALATION) EVERY 6 HOURS PRN
Qty: 18 G | Refills: 3 | Status: SHIPPED | OUTPATIENT
Start: 2024-11-27

## 2024-12-23 ENCOUNTER — MEDICAL CORRESPONDENCE (OUTPATIENT)
Dept: HEALTH INFORMATION MANAGEMENT | Facility: CLINIC | Age: 84
End: 2024-12-23
Payer: COMMERCIAL

## 2024-12-23 ENCOUNTER — PATIENT OUTREACH (OUTPATIENT)
Dept: CARE COORDINATION | Facility: CLINIC | Age: 84
End: 2024-12-23
Payer: COMMERCIAL

## 2025-01-06 ENCOUNTER — PATIENT OUTREACH (OUTPATIENT)
Dept: CARE COORDINATION | Facility: CLINIC | Age: 85
End: 2025-01-06
Payer: COMMERCIAL

## 2025-01-06 DIAGNOSIS — Z53.9 DIAGNOSIS NOT YET DEFINED: Primary | ICD-10-CM

## 2025-01-06 PROCEDURE — G0179 MD RECERTIFICATION HHA PT: HCPCS | Mod: 4MD | Performed by: FAMILY MEDICINE

## 2025-02-20 ENCOUNTER — TELEPHONE (OUTPATIENT)
Dept: FAMILY MEDICINE | Facility: CLINIC | Age: 85
End: 2025-02-20
Payer: COMMERCIAL

## 2025-02-20 NOTE — TELEPHONE ENCOUNTER
Patient Quality Outreach    Patient is due for the following:   Physical Annual Wellness Visit      Topic Date Due    Zoster (Shingles) Vaccine (1 of 2) Never done    Diptheria Tetanus Pertussis (DTAP/TDAP/TD) Vaccine (2 - Td or Tdap) 03/08/2023    Flu Vaccine (1) 09/01/2024    COVID-19 Vaccine (1 - 2024-25 season) Never done       Action(s) Taken:   Schedule a Annual Wellness Visit    Type of outreach:    Phone, left message for patient/parent to call back.    Questions for provider review:    None           Debra Valenzuela MA

## 2025-02-24 DIAGNOSIS — Z00.00 HEALTH CARE MAINTENANCE: Primary | ICD-10-CM

## 2025-02-24 DIAGNOSIS — I10 ESSENTIAL HYPERTENSION: ICD-10-CM

## 2025-02-24 DIAGNOSIS — M48.00 SPINAL STENOSIS, MULTILEVEL: ICD-10-CM

## 2025-02-24 DIAGNOSIS — M50.30 DEGENERATIVE DISC DISEASE, CERVICAL: ICD-10-CM

## 2025-02-24 DIAGNOSIS — J84.10 PULMONARY FIBROSIS (H): ICD-10-CM

## 2025-02-24 NOTE — TELEPHONE ENCOUNTER
ALVIN Perry calling: Patient transferred all prescriptions to [facility]. Home care nurse requesting refills on orders that were unable to be transferred (no refills remain), so refills are available when needed by patient.    Looking for:  Losartan  Inhalers - advised PCP manages albuterol inhaler, rest should be requested from pulmonology.  Gabapentin  Ibuprofen - not on current med list, but will request order from PCP.  Per Vicky, current order is 600mg q6h PRN pain.    Contact patient/family with any issues.    Ladan Torres RN  [facility]

## 2025-02-25 RX ORDER — LOSARTAN POTASSIUM 100 MG/1
100 TABLET ORAL DAILY
Qty: 30 TABLET | Refills: 3 | Status: SHIPPED | OUTPATIENT
Start: 2025-02-25

## 2025-02-25 RX ORDER — ALBUTEROL SULFATE 90 UG/1
2 INHALANT RESPIRATORY (INHALATION) EVERY 6 HOURS PRN
Qty: 18 G | Refills: 3 | Status: SHIPPED | OUTPATIENT
Start: 2025-02-25

## 2025-02-25 RX ORDER — IBUPROFEN 600 MG/1
600 TABLET, FILM COATED ORAL EVERY 6 HOURS PRN
Qty: 80 TABLET | Refills: 3 | Status: SHIPPED | OUTPATIENT
Start: 2025-02-25

## 2025-02-25 RX ORDER — GABAPENTIN 300 MG/1
300 CAPSULE ORAL 2 TIMES DAILY
Qty: 180 CAPSULE | Refills: 3 | Status: SHIPPED | OUTPATIENT
Start: 2025-02-25

## 2025-03-25 DIAGNOSIS — G47.00 INSOMNIA, UNSPECIFIED TYPE: ICD-10-CM

## 2025-03-25 RX ORDER — MIRTAZAPINE 30 MG/1
TABLET, ORALLY DISINTEGRATING ORAL
Qty: 30 TABLET | Refills: 0 | Status: SHIPPED | OUTPATIENT
Start: 2025-03-25

## 2025-04-07 ENCOUNTER — MEDICAL CORRESPONDENCE (OUTPATIENT)
Dept: HEALTH INFORMATION MANAGEMENT | Facility: CLINIC | Age: 85
End: 2025-04-07
Payer: COMMERCIAL

## 2025-04-08 ENCOUNTER — TELEPHONE (OUTPATIENT)
Dept: FAMILY MEDICINE | Facility: CLINIC | Age: 85
End: 2025-04-08

## 2025-04-08 NOTE — TELEPHONE ENCOUNTER
Forms/Letter Request    Type of form/letter: OTHER: [facility] [employer] [employer] / Annual physical health summary        Do we have the form/letter: Yes: Will place it in Dr.Ho angel folder     Who is the form from? [facility] [employer] [facility]  (if other please explain)    Where did/will the form come from? form was faxed in    When is form/letter needed by: ASAP    How would you like the form/letter returned: Fax : [phone]

## 2025-04-16 NOTE — TELEPHONE ENCOUNTER
Spoke to patients granddaughter (speaks English and C2C form on file.  Scheduled patient with Dr. Brown (only wants to be seen by pcp) for 6/30 for an AWV.    Done    Carmella Felder  4/16/2025  10:30 am    Re-routing telephone encounter to TC team

## 2025-04-24 DIAGNOSIS — G47.00 INSOMNIA, UNSPECIFIED TYPE: ICD-10-CM

## 2025-04-24 RX ORDER — MIRTAZAPINE 30 MG/1
TABLET, ORALLY DISINTEGRATING ORAL
Qty: 30 TABLET | Refills: 0 | Status: SHIPPED | OUTPATIENT
Start: 2025-04-24

## 2025-04-28 ENCOUNTER — MEDICAL CORRESPONDENCE (OUTPATIENT)
Dept: HEALTH INFORMATION MANAGEMENT | Facility: CLINIC | Age: 85
End: 2025-04-28
Payer: COMMERCIAL

## 2025-05-13 ENCOUNTER — TELEPHONE (OUTPATIENT)
Dept: FAMILY MEDICINE | Facility: CLINIC | Age: 85
End: 2025-05-13
Payer: COMMERCIAL

## 2025-05-13 NOTE — TELEPHONE ENCOUNTER
Forms/Letter Request    Type of form/letter: DME     Type of DME requested: Brief Tabbed Prevail Victorino 32-44 MD 16/pk    Do we have the form/letter: Yes: in Dr. Brown's blue folder    Who is the form from? [facility] [employer]     Where did/will the form come from? form was faxed in    When is form/letter needed by: 5-7 Business days    How would you like the form/letter returned: Fax : [phone]    Patient Notified form requests are processed in 5-7 business days:Yes    Okay to leave a detailed message?: Yes at Other phone number:  **[phone]

## 2025-05-20 ENCOUNTER — MEDICAL CORRESPONDENCE (OUTPATIENT)
Dept: HEALTH INFORMATION MANAGEMENT | Facility: CLINIC | Age: 85
End: 2025-05-20

## 2025-05-22 NOTE — TELEPHONE ENCOUNTER
Form reviewed, completed, and signed by physician. Form successfully faxed to [phone] as requested.   Copied to EHR scanning.

## 2025-05-25 DIAGNOSIS — G47.00 INSOMNIA, UNSPECIFIED TYPE: ICD-10-CM

## 2025-05-27 RX ORDER — MIRTAZAPINE 30 MG/1
TABLET, ORALLY DISINTEGRATING ORAL
Qty: 30 TABLET | Refills: 0 | Status: SHIPPED | OUTPATIENT
Start: 2025-05-27

## 2025-05-29 ENCOUNTER — PATIENT OUTREACH (OUTPATIENT)
Dept: GERIATRIC MEDICINE | Facility: CLINIC | Age: 85
End: 2025-05-29
Payer: COMMERCIAL

## 2025-05-29 NOTE — PROGRESS NOTES
[facility] Care Coordination Contact      [facility] Six-Month Telephone Assessment    6 month telephone assessment completed on 5/29/2025.    ER visits: No  Hospitalizations: No  TCU stays: No  Significant health status changes: No significant health changes noted. [facility] continues to be followed by [facility] for routine SN visits to assist with medication management and disease management.   Falls/Injuries: No  ADL/IADL changes: No  Changes in services: Yes: CC spoke with Consultation Service provider and they have not completed the [facility]. CC updated Support Plan and added a 3 month extension to current PCA services and RN supervision.   Adult Day Care w/transportation will continue at 3 days a week without change along with bi-weekly SN visits.      Caregiver Assessment follow up:  n/a    Goals: See Support Plan for goal progress documentation.      Will see member in 6 months for an annual health risk assessment.   Encouraged member to call CC with any questions or concerns in the meantime.     REBECCA Srinivasan  [facility]  [phone]

## 2025-05-29 NOTE — PROGRESS NOTES
[facility] Care Coordination Contact    Received after visit chart from care coordinator.  Completed following tasks: Submitted referrals/auths for Extended PCA and Updated services in Database.   and   [facility]:  Emailed required CFSS documents to [facility].      Liz York  Care Management Specialist  [facility]  [phone]

## 2025-06-05 ENCOUNTER — MEDICAL CORRESPONDENCE (OUTPATIENT)
Dept: HEALTH INFORMATION MANAGEMENT | Facility: CLINIC | Age: 85
End: 2025-06-05
Payer: COMMERCIAL

## 2025-06-05 DIAGNOSIS — Z76.0 ENCOUNTER FOR MEDICATION REFILL: ICD-10-CM

## 2025-06-05 DIAGNOSIS — I10 ESSENTIAL HYPERTENSION: ICD-10-CM

## 2025-06-05 DIAGNOSIS — J81.0 ACUTE PULMONARY EDEMA (H): ICD-10-CM

## 2025-06-05 RX ORDER — FUROSEMIDE 20 MG/1
TABLET ORAL
Qty: 180 TABLET | Refills: 3 | Status: SHIPPED | OUTPATIENT
Start: 2025-06-05

## 2025-06-05 RX ORDER — LOSARTAN POTASSIUM 100 MG/1
TABLET ORAL
Qty: 90 TABLET | Refills: 3 | Status: SHIPPED | OUTPATIENT
Start: 2025-06-05

## 2025-06-16 ENCOUNTER — MEDICAL CORRESPONDENCE (OUTPATIENT)
Dept: HEALTH INFORMATION MANAGEMENT | Facility: CLINIC | Age: 85
End: 2025-06-16
Payer: COMMERCIAL

## 2025-06-19 DIAGNOSIS — Z53.9 DIAGNOSIS NOT YET DEFINED: Primary | ICD-10-CM

## 2025-06-19 PROCEDURE — G0179 MD RECERTIFICATION HHA PT: HCPCS | Performed by: FAMILY MEDICINE

## 2025-06-25 DIAGNOSIS — G47.00 INSOMNIA, UNSPECIFIED TYPE: ICD-10-CM

## 2025-06-25 RX ORDER — MIRTAZAPINE 30 MG/1
TABLET, ORALLY DISINTEGRATING ORAL
Qty: 30 TABLET | Refills: 0 | Status: SHIPPED | OUTPATIENT
Start: 2025-06-25

## 2025-06-26 ENCOUNTER — PATIENT OUTREACH (OUTPATIENT)
Dept: GERIATRIC MEDICINE | Facility: CLINIC | Age: 85
End: 2025-06-26
Payer: COMMERCIAL

## 2025-06-26 NOTE — LETTER
June 26, 2025    [facility]  [address] [place] [address]    Dear  [facility],    Welcome to [facility] MSC+ health program. My name is REBECCA Srinivasan. I am your MSC+ care coordinator. You are eligible for Care Coordination through [facility]+ plan.    As your care coordinator, we ll:  Meet to go over your care coordination benefits  Talk about your physical and mental health care needs   Review your preventative care needs  Create a plan that meets your needs with the services you choose    What happens next?  I ll call you soon to introduce myself and tell you more about my role. We ll then plan time to go over your health and safety needs. Our goal is to keep you as healthy and independent as possible.    [facility]'s MSC+ includes the benefits you may already have from Medical Assistance. Soon, you will receive a new member identification (ID) card from [facility]. Use this card whenever you get health services. If you have Medicare, you will need to show your Medicare card when you get health services.    The [facility] care coordination program is voluntary and offered to you at no cost. If you wish to stop being in the care coordination program or have questions, call me at [phone]. If you reach my voicemail, leave a message and your phone number. TTY users, call the [place] Relay at 858 or 1-339.810.3539 (pxunpp-em-bylsat relay service).      Sincerely,      REBECCA Srinivasan  [phone]  [email or web address]    [id number] accepted   [id number]       [id number] (07/2022)

## 2025-06-26 NOTE — PROGRESS NOTES
[facility] Care Coordination Contact    Member changed health plan products from [facility] MSHO to [facility] MSC+ effective 04/01/25. CC to complete items on Product Change/ Health Plan Change check list including MMIS entry, as applicable. CMS mailed Welcome Letter, supporting documents, [place] and other required tasks.    MA is active but [facility]'s Portal shows termed 3/31/25. Per CM Intake/Mary Nhia on 6/25/25, member lost Medicare Part A effective 3/31/2025 and the MSHO span was not ended in the [facility] system. As of 6/25/25, [facility] was updated in [place] and member is now active MSC+ effective 4/1/25.     Liz Pappas    Care Management Specialist   [facility]  [phone]

## 2025-06-30 ENCOUNTER — OFFICE VISIT (OUTPATIENT)
Dept: FAMILY MEDICINE | Facility: CLINIC | Age: 85
End: 2025-06-30
Payer: COMMERCIAL

## 2025-06-30 VITALS
SYSTOLIC BLOOD PRESSURE: 175 MMHG | TEMPERATURE: 97.7 F | HEART RATE: 56 BPM | HEIGHT: 58 IN | OXYGEN SATURATION: 96 % | DIASTOLIC BLOOD PRESSURE: 65 MMHG | BODY MASS INDEX: 23.89 KG/M2 | WEIGHT: 113.8 LBS | RESPIRATION RATE: 18 BRPM

## 2025-06-30 DIAGNOSIS — L29.9 PRURITIC DISORDER: ICD-10-CM

## 2025-06-30 DIAGNOSIS — Z00.00 ENCOUNTER FOR MEDICARE ANNUAL WELLNESS EXAM: Primary | ICD-10-CM

## 2025-06-30 DIAGNOSIS — E78.2 MIXED HYPERLIPIDEMIA: ICD-10-CM

## 2025-06-30 DIAGNOSIS — I27.21 PULMONARY ARTERIAL HYPERTENSION (H): ICD-10-CM

## 2025-06-30 DIAGNOSIS — M80.00XD AGE-RELATED OSTEOPOROSIS WITH CURRENT PATHOLOGICAL FRACTURE WITH ROUTINE HEALING: ICD-10-CM

## 2025-06-30 DIAGNOSIS — J84.10 PULMONARY FIBROSIS (H): ICD-10-CM

## 2025-06-30 DIAGNOSIS — M71.9 DISORDER OF BURSAE AND TENDONS IN SHOULDER REGION: ICD-10-CM

## 2025-06-30 DIAGNOSIS — M48.00 SPINAL STENOSIS, MULTILEVEL: ICD-10-CM

## 2025-06-30 DIAGNOSIS — Z23 NEED FOR VACCINATION: ICD-10-CM

## 2025-06-30 DIAGNOSIS — M25.50 POLYARTHRALGIA: ICD-10-CM

## 2025-06-30 DIAGNOSIS — N18.31 STAGE 3A CHRONIC KIDNEY DISEASE (H): ICD-10-CM

## 2025-06-30 DIAGNOSIS — G47.00 INSOMNIA, UNSPECIFIED TYPE: ICD-10-CM

## 2025-06-30 DIAGNOSIS — M67.919 DISORDER OF BURSAE AND TENDONS IN SHOULDER REGION: ICD-10-CM

## 2025-06-30 DIAGNOSIS — F33.0 MAJOR DEPRESSIVE DISORDER, RECURRENT EPISODE, MILD: ICD-10-CM

## 2025-06-30 DIAGNOSIS — M17.0 BILATERAL PRIMARY OSTEOARTHRITIS OF KNEE: ICD-10-CM

## 2025-06-30 DIAGNOSIS — J84.9 ILD (INTERSTITIAL LUNG DISEASE) (H): ICD-10-CM

## 2025-06-30 DIAGNOSIS — M50.30 DEGENERATIVE DISC DISEASE, CERVICAL: ICD-10-CM

## 2025-06-30 LAB
ANION GAP SERPL CALCULATED.3IONS-SCNC: 8 MMOL/L (ref 7–15)
BUN SERPL-MCNC: 20.7 MG/DL (ref 8–23)
CALCIUM SERPL-MCNC: 9 MG/DL (ref 8.8–10.4)
CHLORIDE SERPL-SCNC: 103 MMOL/L (ref 98–107)
CHOLEST SERPL-MCNC: 227 MG/DL
CREAT SERPL-MCNC: 0.9 MG/DL (ref 0.51–0.95)
CREAT UR-MCNC: 132 MG/DL
EGFRCR SERPLBLD CKD-EPI 2021: 63 ML/MIN/1.73M2
ERYTHROCYTE [DISTWIDTH] IN BLOOD BY AUTOMATED COUNT: 19.7 % (ref 10–15)
FASTING STATUS PATIENT QL REPORTED: YES
FASTING STATUS PATIENT QL REPORTED: YES
GLUCOSE SERPL-MCNC: 80 MG/DL (ref 70–99)
HCO3 SERPL-SCNC: 30 MMOL/L (ref 22–29)
HCT VFR BLD AUTO: 32.2 % (ref 35–47)
HDLC SERPL-MCNC: 55 MG/DL
HGB BLD-MCNC: 9.9 G/DL (ref 11.7–15.7)
LDLC SERPL CALC-MCNC: 147 MG/DL
MCH RBC QN AUTO: 23.3 PG (ref 26.5–33)
MCHC RBC AUTO-ENTMCNC: 30.7 G/DL (ref 31.5–36.5)
MCV RBC AUTO: 76 FL (ref 78–100)
MICROALBUMIN UR-MCNC: <12 MG/L
MICROALBUMIN/CREAT UR: NORMAL MG/G{CREAT}
NONHDLC SERPL-MCNC: 172 MG/DL
PLATELET # BLD AUTO: 262 10E3/UL (ref 150–450)
POTASSIUM SERPL-SCNC: 4.1 MMOL/L (ref 3.4–5.3)
RBC # BLD AUTO: 4.24 10E6/UL (ref 3.8–5.2)
SODIUM SERPL-SCNC: 141 MMOL/L (ref 135–145)
TRIGL SERPL-MCNC: 123 MG/DL
WBC # BLD AUTO: 6.1 10E3/UL (ref 4–11)

## 2025-06-30 PROCEDURE — 99397 PER PM REEVAL EST PAT 65+ YR: CPT | Performed by: FAMILY MEDICINE

## 2025-06-30 PROCEDURE — 82043 UR ALBUMIN QUANTITATIVE: CPT | Performed by: FAMILY MEDICINE

## 2025-06-30 PROCEDURE — 99214 OFFICE O/P EST MOD 30 MIN: CPT | Mod: 25 | Performed by: FAMILY MEDICINE

## 2025-06-30 PROCEDURE — 85027 COMPLETE CBC AUTOMATED: CPT | Performed by: FAMILY MEDICINE

## 2025-06-30 PROCEDURE — 82570 ASSAY OF URINE CREATININE: CPT | Performed by: FAMILY MEDICINE

## 2025-06-30 PROCEDURE — 3078F DIAST BP <80 MM HG: CPT | Performed by: FAMILY MEDICINE

## 2025-06-30 PROCEDURE — 80048 BASIC METABOLIC PNL TOTAL CA: CPT | Performed by: FAMILY MEDICINE

## 2025-06-30 PROCEDURE — 36415 COLL VENOUS BLD VENIPUNCTURE: CPT | Performed by: FAMILY MEDICINE

## 2025-06-30 PROCEDURE — 3077F SYST BP >= 140 MM HG: CPT | Performed by: FAMILY MEDICINE

## 2025-06-30 PROCEDURE — 80061 LIPID PANEL: CPT | Performed by: FAMILY MEDICINE

## 2025-06-30 RX ORDER — MULTIVITAMIN
1 TABLET ORAL DAILY
Qty: 90 TABLET | Refills: 3 | Status: SHIPPED | OUTPATIENT
Start: 2025-06-30

## 2025-06-30 RX ORDER — LORATADINE 10 MG/1
10 TABLET ORAL DAILY
Qty: 90 TABLET | Refills: 0 | Status: SHIPPED | OUTPATIENT
Start: 2025-06-30

## 2025-06-30 RX ORDER — OXYCODONE HYDROCHLORIDE 5 MG/1
5 TABLET ORAL EVERY 6 HOURS PRN
Qty: 30 TABLET | Refills: 0 | Status: SHIPPED | OUTPATIENT
Start: 2025-06-30

## 2025-06-30 RX ORDER — ACETAMINOPHEN 500 MG
1000 TABLET ORAL 3 TIMES DAILY PRN
Qty: 100 TABLET | Refills: 3 | Status: SHIPPED | OUTPATIENT
Start: 2025-06-30

## 2025-06-30 RX ORDER — NORTRIPTYLINE HYDROCHLORIDE 10 MG/1
20 CAPSULE ORAL AT BEDTIME
Qty: 180 CAPSULE | Refills: 3 | Status: SHIPPED | OUTPATIENT
Start: 2025-06-30

## 2025-06-30 RX ORDER — MIRTAZAPINE 30 MG/1
30 TABLET, ORALLY DISINTEGRATING ORAL AT BEDTIME
Qty: 90 TABLET | Refills: 3 | Status: SHIPPED | OUTPATIENT
Start: 2025-06-30

## 2025-06-30 RX ORDER — DULOXETIN HYDROCHLORIDE 30 MG/1
30 CAPSULE, DELAYED RELEASE ORAL DAILY
Qty: 90 CAPSULE | Refills: 3 | Status: SHIPPED | OUTPATIENT
Start: 2025-06-30

## 2025-06-30 SDOH — HEALTH STABILITY: PHYSICAL HEALTH: ON AVERAGE, HOW MANY DAYS PER WEEK DO YOU ENGAGE IN MODERATE TO STRENUOUS EXERCISE (LIKE A BRISK WALK)?: 3 DAYS

## 2025-06-30 ASSESSMENT — PATIENT HEALTH QUESTIONNAIRE - PHQ9
SUM OF ALL RESPONSES TO PHQ QUESTIONS 1-9: 3
SUM OF ALL RESPONSES TO PHQ QUESTIONS 1-9: 3
10. IF YOU CHECKED OFF ANY PROBLEMS, HOW DIFFICULT HAVE THESE PROBLEMS MADE IT FOR YOU TO DO YOUR WORK, TAKE CARE OF THINGS AT HOME, OR GET ALONG WITH OTHER PEOPLE: NOT DIFFICULT AT ALL

## 2025-06-30 ASSESSMENT — SOCIAL DETERMINANTS OF HEALTH (SDOH): HOW OFTEN DO YOU GET TOGETHER WITH FRIENDS OR RELATIVES?: THREE TIMES A WEEK

## 2025-06-30 NOTE — PROGRESS NOTES
Preventive Care Visit  [facility] LUISCit[facility] HealthcareRENATA Brown MD, Family Medicine  Jun 30, 2025      Assessment & Plan     Encounter for Medicare annual wellness exam      Stage 3a chronic kidney disease (H)  Did not take bp meds   - BASIC METABOLIC PANEL  - CBC with platelets  - Albumin Random Urine Quantitative with Creat Ratio  - BASIC METABOLIC PANEL  - CBC with platelets  - Albumin Random Urine Quantitative with Creat Ratio    Mixed hyperlipidemia  Continue statin.   - Lipid panel reflex to direct LDL Non-fasting  - Lipid panel reflex to direct LDL Non-fasting    Pulmonary arterial hypertension (H)  Pulmonary fibrosis (H)  ILD (interstitial lung disease) (H)  Continue furosemide per pulmonology.     Need for vaccination  Refused.     Spinal stenosis, multilevel  Polyarthralgia  Bilateral primary osteoarthritis of knee  Rotator Cuff Tendonitis  Pruritic disorder  Degenerative disc disease, cervical  Ok to trial oxycodone - NO REFILLS - NO CSA YET. Patient needs follow up to determine efficacy and appropriateness.   - acetaminophen (TYLENOL) 500 MG tablet  Dispense: 100 tablet; Refill: 3  - oxyCODONE (ROXICODONE) 5 MG tablet  Dispense: 30 tablet; Refill: 0  - nortriptyline (PAMELOR) 10 MG capsule  Dispense: 180 capsule; Refill: 3    Pruritic disorder   Unclear cause, hydroxyzine way too sedating. Ok to start daily 2nd gen antihistamine.   - loratadine (CLARITIN) 10 MG tablet  Dispense: 90 tablet; Refill: 0    Major depressive disorder, recurrent episode, mild  - DULoxetine (CYMBALTA) 30 MG capsule  Dispense: 90 capsule; Refill: 3    Age-related osteoporosis with current pathological fracture with routine healing  - Multiple Vitamin (ONE-DAILY MULTI-VITAMIN) TABS  Dispense: 90 tablet; Refill: 3    Insomnia, unspecified type  - mirtazapine (REMERON SOL-TAB) 30 MG ODT  Dispense: 90 tablet; Refill: 3      Hypertension   Did not take medications today. Recheck in 2 months.       Reviewed preventive health  counseling, as reflected in patient instructions  Counseling  Appropriate preventive services were addressed with this patient via screening, questionnaire, or discussion as appropriate for fall prevention, nutrition, physical activity, Tobacco-use cessation, social engagement, weight loss and cognition.  Checklist reviewing preventive services available has been given to the patient.  Reviewed patient's diet, addressing concerns and/or questions.   She is at risk for lack of exercise and has been provided with information to increase physical activity for the benefit of her well-being.   Discussed possible causes of fatigue. Updated plan of care.  Patient reported difficulty with activities of daily living were addressed today.Addressed any concerns about safety while driving.  Information on urinary incontinence and treatment options given to patient.   I have reviewed Opioid Use Disorder and Substance Use Disorder risk factors and made any needed referrals. I have reviewed the current pain treatment plan including non-opioid treatment options.        Follow-up  Return in about 2 months (around 8/30/2025) for blood pressure recheck, Medication Check.    Subjective   Luis Manuel is a 85 year old, presenting for the following:  Physical        6/30/2025     7:34 AM   Additional Questions   Roomed by charito madison   Accompanied by granddaughter         6/30/2025   Forms   Any forms needing to be completed Yes         6/30/2025     7:34 AM   Patient Reported Additional Medications   Patient reports taking the following new medications LIDOCAINE GEL          HPI           Advance Care Planning    Advance care planning document is on file but is outdated.  Patient encouraged to update or provider to update POLST.        6/30/2025   General Health   How would you rate your overall physical health? (!) POOR   Feel stress (tense, anxious, or unable to sleep) Not at all         6/30/2025   Nutrition   Diet: I don't know         6/30/2025    Exercise   Days per week of moderate/strenous exercise 3 days         6/30/2025   Social Factors   Frequency of gathering with friends or relatives Three times a week   Worry food won't last until get money to buy more No   Food not last or not have enough money for food? No   Do you have housing? (Housing is defined as stable permanent housing and does not include staying outside in a car, in a tent, in an abandoned building, in an overnight shelter, or couch-surfing.) Yes   Are you worried about losing your housing? No   Lack of transportation? No   Unable to get utilities (heat,electricity)? No         6/30/2025   Fall Risk   Fallen 2 or more times in the past year? No   Trouble with walking or balance? Yes   Reason Gait Speed Test Not Completed Patient verbalizes unable to perform test          6/30/2025   Activities of Daily Living- Home Safety   Needs help with the following daily activites Telephone use    Transportation    Shopping    Preparing meals    Bathing    Laundry    Medication administration    Toileting    Dressing   Do you have the help that you need? Yes for Some   Safety concerns in the home None of the above       Multiple values from one day are sorted in reverse-chronological order         6/30/2025   Dental   Dentist two times every year? (!) DECLINE         6/30/2025   Hearing Screening   Hearing concerns? None of the above         6/30/2025   Driving Risk Screening   Patient/family members have concerns about driving (!) YES          6/30/2025   General Alertness/Fatigue Screening   Have you been more tired than usual lately? (!) YES         6/30/2025   Urinary Incontinence Screening   Bothered by leaking urine in past 6 months Yes       Today's PHQ-9 Score:       6/30/2025     7:20 AM   PHQ-9 SCORE   PHQ-9 Total Score MyChart 3 (Minimal depression)   PHQ-9 Total Score 3        Patient-reported         6/30/2025   Substance Use   Alcohol more than 3/day or more than 7/wk No   Do you have  a current opioid prescription? (!) YES   How severe/bad is pain from 1 to 10? 6/10   Do you use any other substances recreationally? No         1/22/2024     4:00 PM 6/30/2025     7:47 AM   OPIOID RISK TOOL TOTAL SCORE   Total Score 1 1     Low Risk (0-3)  Moderate Risk (4-7)  High Risk (>8)  Social History     Tobacco Use    Smoking status: Never     Passive exposure: Current (Son smokes outside)    Smokeless tobacco: Never    Tobacco comments:     chew betel nut. Son smokes outside    Vaping Use    Vaping status: Never Used   Substance Use Topics    Alcohol use: No    Drug use: No          Mammogram Screening - After age 74- determine frequency with patient based on health status, life expectancy and patient goals              Reviewed and updated as needed this visit by Provider   Tobacco  Allergies  Meds  Problems  Med Hx  Surg Hx  Fam Hx              Current providers sharing in care for this patient include:  Patient Care Team:  Chichi Brown MD as PCP - General (Family Medicine)  Orquidea Ramon LSW as Lead Care Coordinator (Primary Care - CC)  Chichi Brown MD as Assigned PCP  Tiffanie Byrne PA-C as Assigned Musculoskeletal Provider    The following health maintenance items are reviewed in Epic and correct as of today:  Health Maintenance   Topic Date Due    ZOSTER VACCINE (1 of 2) Never done    RSV VACCINE (1 - 1-dose 75+ series) Never done    DTAP/TDAP/TD VACCINE (2 - Td or Tdap) 03/08/2023    MICROALBUMIN  04/26/2024    LIPID  06/07/2024    COVID-19 VACCINE (1 - 2024-25 season) Never done    BMP  06/27/2025    INFLUENZA VACCINE (Season Ended) 09/01/2025    PHQ-9  12/30/2025    MEDICARE ANNUAL WELLNESS VISIT  06/30/2026    ANNUAL REVIEW OF HM ORDERS  06/30/2026    FALL RISK ASSESSMENT  06/30/2026    HEMOGLOBIN  06/30/2026    ADVANCE CARE PLANNING  06/30/2030    DEXA  05/02/2034    DEPRESSION ACTION PLAN  Completed    PNEUMOCOCCAL VACCINE 50+ YEARS  Completed    URINALYSIS  Completed    HPV VACCINE   "Aged Out    MENINGITIS VACCINE  Aged Out            Objective    Exam  BP (!) 175/65   Pulse 56   Temp 97.7  F (36.5  C) (Oral)   Resp 18   Ht 1.48 m (4' 10.27\")   Wt 51.6 kg (113 lb 12.8 oz)   LMP  (LMP Unknown)   SpO2 96%   BMI 23.57 kg/m     Estimated body mass index is 23.57 kg/m  as calculated from the following:    Height as of this encounter: 1.48 m (4' 10.27\").    Weight as of this encounter: 51.6 kg (113 lb 12.8 oz).    Physical Exam  GENERAL: alert and no distress  NECK: no adenopathy, no asymmetry, masses, or scars  RESP: lungs clear to auscultation - no rales, rhonchi or wheezes  CV: regular rate and rhythm, normal S1 S2, no S3 or S4, no murmur, click or rub, no peripheral edema  ABDOMEN: soft, nontender, no hepatosplenomegaly, no masses and bowel sounds normal  MS: no gross musculoskeletal defects noted, no edema  Gait and balance assessed per Gait Speed Test.  Result as above.        6/30/2025   Mini Cog   Mini-Cog Not Completed (choose reason) --             Signed Electronically by: Chichi Brown MD    Prior to immunization administration, verified patients identity using patient s name and date of birth. Please see Immunization Activity for additional information.     Screening Questionnaire for Adult Immunization    Are you sick today?   No   Do you have allergies to medications, food, a vaccine component or latex?   No   Have you ever had a serious reaction after receiving a vaccination?   No   Do you have a long-term health problem with heart, lung, kidney, or metabolic disease (e.g., diabetes), asthma, a blood disorder, no spleen, complement component deficiency, a cochlear implant, or a spinal fluid leak?  Are you on long-term aspirin therapy?   No   Do you have cancer, leukemia, HIV/AIDS, or any other immune system problem?   No   Do you have a parent, brother, or sister with an immune system problem?   No   In the past 3 months, have you taken medications that affect  your immune " system, such as prednisone, other steroids, or anticancer drugs; drugs for the treatment of rheumatoid arthritis, Crohn s disease, or psoriasis; or have you had radiation treatments?   No   Have you had a seizure, or a brain or other nervous system problem?   No   During the past year, have you received a transfusion of blood or blood    products, or been given immune (gamma) globulin or antiviral drug?   No   For women: Are you pregnant or is there a chance you could become       pregnant during the next month?   No   Have you received any vaccinations in the past 4 weeks?   No     Immunization questionnaire answers were all negative.      Patient instructed to remain in clinic for 15 minutes afterwards, and to report any adverse reactions.     Screening performed by Gabrielle Causey MA on 6/30/2025 at 7:45 AM.   Answers submitted by the patient for this visit:  Patient Health Questionnaire (Submitted on 6/30/2025)  If you checked off any problems, how difficult have these problems made it for you to do your work, take care of things at home, or get along with other people?: Not difficult at all  PHQ9 TOTAL SCORE: 3

## 2025-06-30 NOTE — PATIENT INSTRUCTIONS
Patient Education   You have been provided the Preventive Care Advice document.    Additional copies can be found here: www.[email or web address]  Preventive Care Advice   This is general advice given by our system to help you stay healthy. However, your care team may have specific advice just for you. Please talk to your care team about your preventive care needs.  Nutrition  Eat 5 or more servings of fruits and vegetables each day.  Try wheat bread, brown rice and whole grain pasta (instead of white bread, rice, and pasta).  Get enough calcium and vitamin D. Check the label on foods and aim for 100% of the RDA (recommended daily allowance).  Lifestyle  Exercise at least 150 minutes each week  (30 minutes a day, 5 days a week).  Do muscle strengthening activities 2 days a week. These help control your weight and prevent disease.  No smoking.  Wear sunscreen to prevent skin cancer.  Have a dental exam and cleaning every 6 months.  Yearly exams  See your health care team every year to talk about:  Any changes in your health.  Any medicines your care team has prescribed.  Preventive care, family planning, and ways to prevent chronic diseases.  Shots (vaccines)   HPV shots (up to age 26), if you've never had them before.  Hepatitis B shots (up to age 59), if you've never had them before.  COVID-19 shot: Get this shot when it's due.  Flu shot: Get a flu shot every year.  Tetanus shot: Get a tetanus shot every 10 years.  Pneumococcal, hepatitis A, and RSV shots: Ask your care team if you need these based on your risk.  Shingles shot (for age 50 and up)  General health tests  Diabetes screening:  Starting at age 35, Get screened for diabetes at least every 3 years.  If you are younger than age 35, ask your care team if you should be screened for diabetes.  Cholesterol test: At age 39, start having a cholesterol test every 5 years, or more often if advised.  Bone density scan (DEXA): At age 50, ask your care team if you  should have this scan for osteoporosis (brittle bones).  Hepatitis C: Get tested at least once in your life.  STIs (sexually transmitted infections)  Before age 24: Ask your care team if you should be screened for STIs.  After age 24: Get screened for STIs if you're at risk. You are at risk for STIs (including HIV) if:  You are sexually active with more than one person.  You don't use condoms every time.  You or a partner was diagnosed with a sexually transmitted infection.  If you are at risk for HIV, ask about PrEP medicine to prevent HIV.  Get tested for HIV at least once in your life, whether you are at risk for HIV or not.  Cancer screening tests  Cervical cancer screening: If you have a cervix, begin getting regular cervical cancer screening tests starting at age 21.  Breast cancer scan (mammogram): If you've ever had breasts, begin having regular mammograms starting at age 40. This is a scan to check for breast cancer.  Colon cancer screening: It is important to start screening for colon cancer at age 45.  Have a colonoscopy test every 10 years (or more often if you're at risk) Or, ask your provider about stool tests like a FIT test every year or Cologuard test every 3 years.  To learn more about your testing options, visit:   .  For help making a decision, visit:   https://bit.ly/wg14961.  Prostate cancer screening test: If you have a prostate, ask your care team if a prostate cancer screening test (PSA) at age 55 is right for you.  Lung cancer screening: If you are a current or former smoker ages 50 to 80, ask your care team if ongoing lung cancer screenings are right for you.  For informational purposes only. Not to replace the advice of your health care provider. Copyright   2023 [employer]. All rights reserved. Clinically reviewed by the [facility]. [employer] 408001 - REV 01/24.

## 2025-06-30 NOTE — TELEPHONE ENCOUNTER
Forms pre-filled for provider review, completion, and signature. Forms placed in provider s blue folder. Thanks.

## 2025-07-01 ENCOUNTER — RESULTS FOLLOW-UP (OUTPATIENT)
Dept: FAMILY MEDICINE | Facility: CLINIC | Age: 85
End: 2025-07-01
Payer: COMMERCIAL

## 2025-07-01 NOTE — TELEPHONE ENCOUNTER
Called patient and relayed message to her granddaughter (CTC on file). She verbalized understanding.      Gonzalez Carlton, BSN RN, PHN  [facility]

## 2025-07-03 NOTE — TELEPHONE ENCOUNTER
Form reviewed, completed, and signed by physician. Form successfully faxed to [phone] as requested.   Copied to EHR scanning.

## 2025-07-17 DIAGNOSIS — K59.03 DRUG-INDUCED CONSTIPATION: ICD-10-CM

## 2025-07-17 DIAGNOSIS — Z76.0 ENCOUNTER FOR MEDICATION REFILL: ICD-10-CM

## 2025-07-17 RX ORDER — DOCUSATE SODIUM 50MG AND SENNOSIDES 8.6MG 8.6; 5 MG/1; MG/1
TABLET, FILM COATED ORAL
Qty: 120 TABLET | Refills: 0 | Status: SHIPPED | OUTPATIENT
Start: 2025-07-17

## 2025-08-11 ENCOUNTER — TELEPHONE (OUTPATIENT)
Dept: FAMILY MEDICINE | Facility: CLINIC | Age: 85
End: 2025-08-11
Payer: MEDICARE

## 2025-08-13 ENCOUNTER — PATIENT OUTREACH (OUTPATIENT)
Dept: GERIATRIC MEDICINE | Facility: CLINIC | Age: 85
End: 2025-08-13
Payer: MEDICARE

## 2025-08-14 ENCOUNTER — MEDICAL CORRESPONDENCE (OUTPATIENT)
Dept: HEALTH INFORMATION MANAGEMENT | Facility: CLINIC | Age: 85
End: 2025-08-14
Payer: MEDICARE

## 2025-08-14 ENCOUNTER — PATIENT OUTREACH (OUTPATIENT)
Dept: GERIATRIC MEDICINE | Facility: CLINIC | Age: 85
End: 2025-08-14
Payer: MEDICARE

## 2025-08-21 ENCOUNTER — TELEPHONE (OUTPATIENT)
Dept: FAMILY MEDICINE | Facility: CLINIC | Age: 85
End: 2025-08-21
Payer: MEDICARE